# Patient Record
Sex: FEMALE | Race: WHITE | NOT HISPANIC OR LATINO | Employment: OTHER | URBAN - METROPOLITAN AREA
[De-identification: names, ages, dates, MRNs, and addresses within clinical notes are randomized per-mention and may not be internally consistent; named-entity substitution may affect disease eponyms.]

---

## 2020-05-16 ENCOUNTER — HOSPITAL ENCOUNTER (EMERGENCY)
Facility: MEDICAL CENTER | Age: 55
End: 2020-05-16
Attending: EMERGENCY MEDICINE
Payer: MEDICARE

## 2020-05-16 ENCOUNTER — APPOINTMENT (OUTPATIENT)
Dept: RADIOLOGY | Facility: MEDICAL CENTER | Age: 55
DRG: 177 | End: 2020-05-16
Attending: HOSPITALIST
Payer: MEDICARE

## 2020-05-16 ENCOUNTER — APPOINTMENT (OUTPATIENT)
Dept: RADIOLOGY | Facility: MEDICAL CENTER | Age: 55
End: 2020-05-16
Attending: EMERGENCY MEDICINE
Payer: MEDICARE

## 2020-05-16 ENCOUNTER — HOSPITAL ENCOUNTER (INPATIENT)
Facility: MEDICAL CENTER | Age: 55
LOS: 33 days | DRG: 177 | End: 2020-06-18
Attending: HOSPITALIST | Admitting: HOSPITALIST
Payer: MEDICARE

## 2020-05-16 VITALS
DIASTOLIC BLOOD PRESSURE: 67 MMHG | WEIGHT: 150 LBS | OXYGEN SATURATION: 99 % | TEMPERATURE: 97.5 F | RESPIRATION RATE: 18 BRPM | BODY MASS INDEX: 26.58 KG/M2 | HEIGHT: 63 IN | SYSTOLIC BLOOD PRESSURE: 124 MMHG | HEART RATE: 87 BPM

## 2020-05-16 DIAGNOSIS — I95.9 HYPOTENSION, UNSPECIFIED HYPOTENSION TYPE: ICD-10-CM

## 2020-05-16 DIAGNOSIS — G20.A1 PARKINSON'S DISEASE (TREMOR, STIFFNESS, SLOW MOTION, UNSTABLE POSTURE) (HCC): Primary | ICD-10-CM

## 2020-05-16 DIAGNOSIS — U07.1 COVID-19 VIRUS INFECTION: ICD-10-CM

## 2020-05-16 DIAGNOSIS — E86.0 DEHYDRATION: ICD-10-CM

## 2020-05-16 DIAGNOSIS — S92.911A CLOSED NONDISPLACED FRACTURE OF PHALANX OF TOE OF RIGHT FOOT, UNSPECIFIED TOE, INITIAL ENCOUNTER: ICD-10-CM

## 2020-05-16 DIAGNOSIS — M25.559 HIP PAIN: ICD-10-CM

## 2020-05-16 DIAGNOSIS — N17.9 AKI (ACUTE KIDNEY INJURY) (HCC): ICD-10-CM

## 2020-05-16 PROBLEM — T14.8XXA BRUISING: Status: ACTIVE | Noted: 2020-05-16

## 2020-05-16 PROBLEM — E87.20 METABOLIC ACIDOSIS: Status: ACTIVE | Noted: 2020-05-16

## 2020-05-16 LAB
ALBUMIN SERPL BCP-MCNC: 3.6 G/DL (ref 3.2–4.9)
ALBUMIN/GLOB SERPL: 1.4 G/DL
ALP SERPL-CCNC: 69 U/L (ref 30–99)
ALT SERPL-CCNC: 26 U/L (ref 2–50)
ANION GAP SERPL CALC-SCNC: 16 MMOL/L (ref 7–16)
APPEARANCE UR: CLEAR
AST SERPL-CCNC: 74 U/L (ref 12–45)
BASOPHILS # BLD AUTO: 0.2 % (ref 0–1.8)
BASOPHILS # BLD: 0.01 K/UL (ref 0–0.12)
BILIRUB SERPL-MCNC: <0.2 MG/DL (ref 0.1–1.5)
BILIRUB UR QL STRIP.AUTO: NEGATIVE
BUN SERPL-MCNC: 44 MG/DL (ref 8–22)
CALCIUM SERPL-MCNC: 9.2 MG/DL (ref 8.4–10.2)
CHLORIDE SERPL-SCNC: 102 MMOL/L (ref 96–112)
CO2 SERPL-SCNC: 19 MMOL/L (ref 20–33)
COLOR UR: YELLOW
COVID ORDER STATUS COVID19: NORMAL
CREAT SERPL-MCNC: 2.26 MG/DL (ref 0.5–1.4)
EOSINOPHIL # BLD AUTO: 0 K/UL (ref 0–0.51)
EOSINOPHIL NFR BLD: 0 % (ref 0–6.9)
ERYTHROCYTE [DISTWIDTH] IN BLOOD BY AUTOMATED COUNT: 45.8 FL (ref 35.9–50)
GLOBULIN SER CALC-MCNC: 2.6 G/DL (ref 1.9–3.5)
GLUCOSE SERPL-MCNC: 144 MG/DL (ref 65–99)
GLUCOSE UR STRIP.AUTO-MCNC: NEGATIVE MG/DL
HCT VFR BLD AUTO: 37.3 % (ref 37–47)
HGB BLD-MCNC: 12 G/DL (ref 12–16)
IMM GRANULOCYTES # BLD AUTO: 0.03 K/UL (ref 0–0.11)
IMM GRANULOCYTES NFR BLD AUTO: 0.6 % (ref 0–0.9)
KETONES UR STRIP.AUTO-MCNC: 15 MG/DL
LACTATE BLD-SCNC: 1.5 MMOL/L (ref 0.5–2)
LACTATE BLD-SCNC: 3 MMOL/L (ref 0.5–2)
LEUKOCYTE ESTERASE UR QL STRIP.AUTO: NEGATIVE
LYMPHOCYTES # BLD AUTO: 1.39 K/UL (ref 1–4.8)
LYMPHOCYTES NFR BLD: 27 % (ref 22–41)
MCH RBC QN AUTO: 29.2 PG (ref 27–33)
MCHC RBC AUTO-ENTMCNC: 32.2 G/DL (ref 33.6–35)
MCV RBC AUTO: 90.8 FL (ref 81.4–97.8)
MICRO URNS: ABNORMAL
MONOCYTES # BLD AUTO: 0.69 K/UL (ref 0–0.85)
MONOCYTES NFR BLD AUTO: 13.4 % (ref 0–13.4)
NEUTROPHILS # BLD AUTO: 3.02 K/UL (ref 2–7.15)
NEUTROPHILS NFR BLD: 58.8 % (ref 44–72)
NITRITE UR QL STRIP.AUTO: NEGATIVE
NRBC # BLD AUTO: 0 K/UL
NRBC BLD-RTO: 0 /100 WBC
PH UR STRIP.AUTO: 5 [PH] (ref 5–8)
PLATELET # BLD AUTO: 64 K/UL (ref 164–446)
PMV BLD AUTO: 9.4 FL (ref 9–12.9)
POTASSIUM SERPL-SCNC: 4.3 MMOL/L (ref 3.6–5.5)
PROT SERPL-MCNC: 6.2 G/DL (ref 6–8.2)
PROT UR QL STRIP: NEGATIVE MG/DL
RBC # BLD AUTO: 4.11 M/UL (ref 4.2–5.4)
RBC UR QL AUTO: NEGATIVE
SARS-COV-2 RNA RESP QL NAA+PROBE: DETECTED
SODIUM SERPL-SCNC: 137 MMOL/L (ref 135–145)
SP GR UR STRIP.AUTO: 1.02
SPECIMEN SOURCE: ABNORMAL
TROPONIN T SERPL-MCNC: 16 NG/L (ref 6–19)
WBC # BLD AUTO: 5.1 K/UL (ref 4.8–10.8)

## 2020-05-16 PROCEDURE — 84484 ASSAY OF TROPONIN QUANT: CPT

## 2020-05-16 PROCEDURE — 83605 ASSAY OF LACTIC ACID: CPT

## 2020-05-16 PROCEDURE — 85025 COMPLETE CBC W/AUTO DIFF WBC: CPT

## 2020-05-16 PROCEDURE — 73630 X-RAY EXAM OF FOOT: CPT | Mod: RT

## 2020-05-16 PROCEDURE — 71045 X-RAY EXAM CHEST 1 VIEW: CPT

## 2020-05-16 PROCEDURE — A9270 NON-COVERED ITEM OR SERVICE: HCPCS

## 2020-05-16 PROCEDURE — 93005 ELECTROCARDIOGRAM TRACING: CPT | Performed by: EMERGENCY MEDICINE

## 2020-05-16 PROCEDURE — U0004 COV-19 TEST NON-CDC HGH THRU: HCPCS

## 2020-05-16 PROCEDURE — 80053 COMPREHEN METABOLIC PANEL: CPT

## 2020-05-16 PROCEDURE — C9803 HOPD COVID-19 SPEC COLLECT: HCPCS | Performed by: EMERGENCY MEDICINE

## 2020-05-16 PROCEDURE — 700102 HCHG RX REV CODE 250 W/ 637 OVERRIDE(OP)

## 2020-05-16 PROCEDURE — 81003 URINALYSIS AUTO W/O SCOPE: CPT

## 2020-05-16 PROCEDURE — 73130 X-RAY EXAM OF HAND: CPT | Mod: RT

## 2020-05-16 PROCEDURE — 99223 1ST HOSP IP/OBS HIGH 75: CPT | Mod: AI | Performed by: HOSPITALIST

## 2020-05-16 PROCEDURE — 770022 HCHG ROOM/CARE - ICU (200)

## 2020-05-16 PROCEDURE — 700105 HCHG RX REV CODE 258: Performed by: EMERGENCY MEDICINE

## 2020-05-16 PROCEDURE — 99285 EMERGENCY DEPT VISIT HI MDM: CPT

## 2020-05-16 RX ORDER — PROMETHAZINE HYDROCHLORIDE 25 MG/1
12.5-25 SUPPOSITORY RECTAL EVERY 4 HOURS PRN
Status: CANCELLED | OUTPATIENT
Start: 2020-05-16

## 2020-05-16 RX ORDER — SODIUM CHLORIDE 9 MG/ML
INJECTION, SOLUTION INTRAVENOUS CONTINUOUS
Status: DISCONTINUED | OUTPATIENT
Start: 2020-05-16 | End: 2020-05-16 | Stop reason: HOSPADM

## 2020-05-16 RX ORDER — SODIUM CHLORIDE 9 MG/ML
1000 INJECTION, SOLUTION INTRAVENOUS ONCE
Status: COMPLETED | OUTPATIENT
Start: 2020-05-16 | End: 2020-05-16

## 2020-05-16 RX ORDER — SODIUM CHLORIDE 9 MG/ML
INJECTION, SOLUTION INTRAVENOUS CONTINUOUS
Status: DISCONTINUED | OUTPATIENT
Start: 2020-05-16 | End: 2020-05-17

## 2020-05-16 RX ORDER — SODIUM CHLORIDE 9 MG/ML
INJECTION, SOLUTION INTRAVENOUS CONTINUOUS
Status: CANCELLED | OUTPATIENT
Start: 2020-05-16

## 2020-05-16 RX ORDER — ENALAPRILAT 1.25 MG/ML
1.25 INJECTION INTRAVENOUS EVERY 6 HOURS PRN
Status: CANCELLED | OUTPATIENT
Start: 2020-05-16

## 2020-05-16 RX ORDER — ENALAPRILAT 1.25 MG/ML
1.25 INJECTION INTRAVENOUS EVERY 6 HOURS PRN
Status: DISCONTINUED | OUTPATIENT
Start: 2020-05-16 | End: 2020-05-16 | Stop reason: HOSPADM

## 2020-05-16 RX ORDER — ONDANSETRON 2 MG/ML
4 INJECTION INTRAMUSCULAR; INTRAVENOUS EVERY 4 HOURS PRN
Status: DISCONTINUED | OUTPATIENT
Start: 2020-05-16 | End: 2020-06-18 | Stop reason: HOSPADM

## 2020-05-16 RX ORDER — ACETAMINOPHEN 325 MG/1
650 TABLET ORAL EVERY 6 HOURS PRN
Status: DISCONTINUED | OUTPATIENT
Start: 2020-05-16 | End: 2020-05-16 | Stop reason: HOSPADM

## 2020-05-16 RX ORDER — ONDANSETRON 4 MG/1
4 TABLET, ORALLY DISINTEGRATING ORAL EVERY 4 HOURS PRN
Status: CANCELLED | OUTPATIENT
Start: 2020-05-16

## 2020-05-16 RX ORDER — METFORMIN HYDROCHLORIDE 500 MG/1
500 TABLET, EXTENDED RELEASE ORAL DAILY
Status: SHIPPED | COMMUNITY
End: 2020-08-19

## 2020-05-16 RX ORDER — ONDANSETRON 4 MG/1
4 TABLET, ORALLY DISINTEGRATING ORAL EVERY 4 HOURS PRN
Status: DISCONTINUED | OUTPATIENT
Start: 2020-05-16 | End: 2020-06-18 | Stop reason: HOSPADM

## 2020-05-16 RX ORDER — PROMETHAZINE HYDROCHLORIDE 25 MG/1
12.5-25 TABLET ORAL EVERY 4 HOURS PRN
Status: DISCONTINUED | OUTPATIENT
Start: 2020-05-16 | End: 2020-06-18 | Stop reason: HOSPADM

## 2020-05-16 RX ORDER — PROMETHAZINE HYDROCHLORIDE 25 MG/1
12.5-25 SUPPOSITORY RECTAL EVERY 4 HOURS PRN
Status: DISCONTINUED | OUTPATIENT
Start: 2020-05-16 | End: 2020-06-18 | Stop reason: HOSPADM

## 2020-05-16 RX ORDER — ACETAMINOPHEN 325 MG/1
650 TABLET ORAL EVERY 6 HOURS PRN
Status: CANCELLED | OUTPATIENT
Start: 2020-05-16

## 2020-05-16 RX ORDER — ONDANSETRON 4 MG/1
4 TABLET, ORALLY DISINTEGRATING ORAL EVERY 4 HOURS PRN
Status: DISCONTINUED | OUTPATIENT
Start: 2020-05-16 | End: 2020-05-16 | Stop reason: HOSPADM

## 2020-05-16 RX ORDER — PROMETHAZINE HYDROCHLORIDE 25 MG/1
12.5-25 TABLET ORAL EVERY 4 HOURS PRN
Status: DISCONTINUED | OUTPATIENT
Start: 2020-05-16 | End: 2020-05-16 | Stop reason: HOSPADM

## 2020-05-16 RX ORDER — SODIUM CHLORIDE, SODIUM LACTATE, POTASSIUM CHLORIDE, CALCIUM CHLORIDE 600; 310; 30; 20 MG/100ML; MG/100ML; MG/100ML; MG/100ML
2000 INJECTION, SOLUTION INTRAVENOUS CONTINUOUS
Status: DISCONTINUED | OUTPATIENT
Start: 2020-05-16 | End: 2020-05-16 | Stop reason: HOSPADM

## 2020-05-16 RX ORDER — PROCHLORPERAZINE EDISYLATE 5 MG/ML
5-10 INJECTION INTRAMUSCULAR; INTRAVENOUS EVERY 4 HOURS PRN
Status: CANCELLED | OUTPATIENT
Start: 2020-05-16

## 2020-05-16 RX ORDER — ENALAPRILAT 1.25 MG/ML
1.25 INJECTION INTRAVENOUS EVERY 6 HOURS PRN
Status: DISCONTINUED | OUTPATIENT
Start: 2020-05-16 | End: 2020-06-18 | Stop reason: HOSPADM

## 2020-05-16 RX ORDER — SODIUM CHLORIDE, SODIUM LACTATE, POTASSIUM CHLORIDE, CALCIUM CHLORIDE 600; 310; 30; 20 MG/100ML; MG/100ML; MG/100ML; MG/100ML
2000 INJECTION, SOLUTION INTRAVENOUS CONTINUOUS
Status: DISCONTINUED | OUTPATIENT
Start: 2020-05-16 | End: 2020-05-17

## 2020-05-16 RX ORDER — ACETAMINOPHEN 325 MG/1
650 TABLET ORAL EVERY 6 HOURS PRN
Status: DISCONTINUED | OUTPATIENT
Start: 2020-05-16 | End: 2020-06-18 | Stop reason: HOSPADM

## 2020-05-16 RX ORDER — PROCHLORPERAZINE EDISYLATE 5 MG/ML
5-10 INJECTION INTRAMUSCULAR; INTRAVENOUS EVERY 4 HOURS PRN
Status: DISCONTINUED | OUTPATIENT
Start: 2020-05-16 | End: 2020-05-16 | Stop reason: HOSPADM

## 2020-05-16 RX ORDER — QUETIAPINE FUMARATE 100 MG/1
300 TABLET, FILM COATED ORAL
Status: DISCONTINUED | OUTPATIENT
Start: 2020-05-16 | End: 2020-05-16 | Stop reason: HOSPADM

## 2020-05-16 RX ORDER — ONDANSETRON 2 MG/ML
4 INJECTION INTRAMUSCULAR; INTRAVENOUS EVERY 4 HOURS PRN
Status: CANCELLED | OUTPATIENT
Start: 2020-05-16

## 2020-05-16 RX ORDER — QUETIAPINE FUMARATE 100 MG/1
TABLET, FILM COATED ORAL
Status: COMPLETED
Start: 2020-05-16 | End: 2020-05-16

## 2020-05-16 RX ORDER — PROCHLORPERAZINE EDISYLATE 5 MG/ML
5-10 INJECTION INTRAMUSCULAR; INTRAVENOUS EVERY 4 HOURS PRN
Status: DISCONTINUED | OUTPATIENT
Start: 2020-05-16 | End: 2020-06-18 | Stop reason: HOSPADM

## 2020-05-16 RX ORDER — SODIUM CHLORIDE, SODIUM LACTATE, POTASSIUM CHLORIDE, CALCIUM CHLORIDE 600; 310; 30; 20 MG/100ML; MG/100ML; MG/100ML; MG/100ML
2000 INJECTION, SOLUTION INTRAVENOUS CONTINUOUS
Status: CANCELLED | OUTPATIENT
Start: 2020-05-16

## 2020-05-16 RX ORDER — QUETIAPINE FUMARATE 100 MG/1
300 TABLET, FILM COATED ORAL
Status: CANCELLED | OUTPATIENT
Start: 2020-05-16

## 2020-05-16 RX ORDER — ONDANSETRON 2 MG/ML
4 INJECTION INTRAMUSCULAR; INTRAVENOUS EVERY 4 HOURS PRN
Status: DISCONTINUED | OUTPATIENT
Start: 2020-05-16 | End: 2020-05-16 | Stop reason: HOSPADM

## 2020-05-16 RX ORDER — PROMETHAZINE HYDROCHLORIDE 25 MG/1
12.5-25 SUPPOSITORY RECTAL EVERY 4 HOURS PRN
Status: DISCONTINUED | OUTPATIENT
Start: 2020-05-16 | End: 2020-05-16 | Stop reason: HOSPADM

## 2020-05-16 RX ORDER — MELOXICAM 15 MG/1
15 TABLET ORAL DAILY
Status: SHIPPED | COMMUNITY
End: 2020-08-19

## 2020-05-16 RX ORDER — PROMETHAZINE HYDROCHLORIDE 25 MG/1
12.5-25 TABLET ORAL EVERY 4 HOURS PRN
Status: CANCELLED | OUTPATIENT
Start: 2020-05-16

## 2020-05-16 RX ADMIN — QUETIAPINE FUMARATE 300 MG: 100 TABLET, FILM COATED ORAL at 22:22

## 2020-05-16 RX ADMIN — SODIUM CHLORIDE: 9 INJECTION, SOLUTION INTRAVENOUS at 17:02

## 2020-05-16 RX ADMIN — QUETIAPINE FUMARATE 300 MG: 100 TABLET ORAL at 22:22

## 2020-05-16 RX ADMIN — SODIUM CHLORIDE 1000 ML: 9 INJECTION, SOLUTION INTRAVENOUS at 15:38

## 2020-05-16 SDOH — HEALTH STABILITY: MENTAL HEALTH: HOW OFTEN DO YOU HAVE A DRINK CONTAINING ALCOHOL?: NEVER

## 2020-05-16 ASSESSMENT — COGNITIVE AND FUNCTIONAL STATUS - GENERAL
DRESSING REGULAR UPPER BODY CLOTHING: A LITTLE
EATING MEALS: A LITTLE
DAILY ACTIVITIY SCORE: 13
HELP NEEDED FOR BATHING: A LOT
STANDING UP FROM CHAIR USING ARMS: A LOT
MOVING TO AND FROM BED TO CHAIR: A LOT
WALKING IN HOSPITAL ROOM: TOTAL
SUGGESTED CMS G CODE MODIFIER DAILY ACTIVITY: CL
DRESSING REGULAR LOWER BODY CLOTHING: A LOT
TOILETING: TOTAL
PERSONAL GROOMING: A LOT
MOVING FROM LYING ON BACK TO SITTING ON SIDE OF FLAT BED: UNABLE
MOBILITY SCORE: 11
CLIMB 3 TO 5 STEPS WITH RAILING: TOTAL
SUGGESTED CMS G CODE MODIFIER MOBILITY: CL

## 2020-05-16 ASSESSMENT — FIBROSIS 4 INDEX
FIB4 SCORE: 12.47
FIB4 SCORE: 12.47
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
## 2020-05-17 ENCOUNTER — APPOINTMENT (OUTPATIENT)
Dept: RADIOLOGY | Facility: MEDICAL CENTER | Age: 55
DRG: 177 | End: 2020-05-17
Attending: HOSPITALIST
Payer: MEDICARE

## 2020-05-17 PROBLEM — E11.9 DIABETES (HCC): Status: ACTIVE | Noted: 2020-05-17

## 2020-05-17 PROBLEM — D69.6 THROMBOCYTOPENIA (HCC): Status: ACTIVE | Noted: 2020-05-17

## 2020-05-17 PROBLEM — U07.1 COVID-19 VIRUS DETECTED: Status: ACTIVE | Noted: 2020-05-17

## 2020-05-17 PROBLEM — K59.00 CONSTIPATION: Status: ACTIVE | Noted: 2020-05-17

## 2020-05-17 LAB
25(OH)D3 SERPL-MCNC: 44 NG/ML (ref 30–100)
ALBUMIN SERPL BCP-MCNC: 3 G/DL (ref 3.2–4.9)
ALBUMIN/GLOB SERPL: 1.3 G/DL
ALP SERPL-CCNC: 57 U/L (ref 30–99)
ALT SERPL-CCNC: 25 U/L (ref 2–50)
ANION GAP SERPL CALC-SCNC: 12 MMOL/L (ref 7–16)
AST SERPL-CCNC: 91 U/L (ref 12–45)
BASOPHILS # BLD AUTO: 0.2 % (ref 0–1.8)
BASOPHILS # BLD: 0.01 K/UL (ref 0–0.12)
BILIRUB SERPL-MCNC: 0.2 MG/DL (ref 0.1–1.5)
BUN SERPL-MCNC: 31 MG/DL (ref 8–22)
CALCIUM SERPL-MCNC: 8.6 MG/DL (ref 8.5–10.5)
CHLORIDE SERPL-SCNC: 104 MMOL/L (ref 96–112)
CO2 SERPL-SCNC: 21 MMOL/L (ref 20–33)
CREAT SERPL-MCNC: 1.23 MG/DL (ref 0.5–1.4)
CRP SERPL HS-MCNC: 7.05 MG/DL (ref 0–0.75)
D DIMER PPP IA.FEU-MCNC: <0.27 UG/ML (FEU) (ref 0–0.5)
EOSINOPHIL # BLD AUTO: 0 K/UL (ref 0–0.51)
EOSINOPHIL NFR BLD: 0 % (ref 0–6.9)
ERYTHROCYTE [DISTWIDTH] IN BLOOD BY AUTOMATED COUNT: 46.7 FL (ref 35.9–50)
FERRITIN SERPL-MCNC: 269 NG/ML (ref 10–291)
GLOBULIN SER CALC-MCNC: 2.3 G/DL (ref 1.9–3.5)
GLUCOSE BLD-MCNC: 103 MG/DL (ref 65–99)
GLUCOSE BLD-MCNC: 114 MG/DL (ref 65–99)
GLUCOSE BLD-MCNC: 183 MG/DL (ref 65–99)
GLUCOSE SERPL-MCNC: 138 MG/DL (ref 65–99)
HBV CORE AB SERPL QL IA: NONREACTIVE
HBV SURFACE AG SER QL: NORMAL
HCT VFR BLD AUTO: 32.4 % (ref 37–47)
HCV AB SER QL: NORMAL
HGB BLD-MCNC: 10.6 G/DL (ref 12–16)
HIV 1+2 AB+HIV1 P24 AG SERPL QL IA: NORMAL
IMM GRANULOCYTES # BLD AUTO: 0.02 K/UL (ref 0–0.11)
IMM GRANULOCYTES NFR BLD AUTO: 0.3 % (ref 0–0.9)
LDH SERPL L TO P-CCNC: 296 U/L (ref 107–266)
LYMPHOCYTES # BLD AUTO: 0.67 K/UL (ref 1–4.8)
LYMPHOCYTES NFR BLD: 11.4 % (ref 22–41)
MCH RBC QN AUTO: 29.8 PG (ref 27–33)
MCHC RBC AUTO-ENTMCNC: 32.7 G/DL (ref 33.6–35)
MCV RBC AUTO: 91 FL (ref 81.4–97.8)
MONOCYTES # BLD AUTO: 0.91 K/UL (ref 0–0.85)
MONOCYTES NFR BLD AUTO: 15.4 % (ref 0–13.4)
NEUTROPHILS # BLD AUTO: 4.28 K/UL (ref 2–7.15)
NEUTROPHILS NFR BLD: 72.7 % (ref 44–72)
NRBC # BLD AUTO: 0 K/UL
NRBC BLD-RTO: 0 /100 WBC
PLATELET # BLD AUTO: 54 K/UL (ref 164–446)
PMV BLD AUTO: 9.4 FL (ref 9–12.9)
POTASSIUM SERPL-SCNC: 4 MMOL/L (ref 3.6–5.5)
PROCALCITONIN SERPL-MCNC: 0.11 NG/ML
PROT SERPL-MCNC: 5.3 G/DL (ref 6–8.2)
RBC # BLD AUTO: 3.56 M/UL (ref 4.2–5.4)
SODIUM SERPL-SCNC: 137 MMOL/L (ref 135–145)
VALPROATE SERPL-MCNC: 75.8 UG/ML (ref 50–100)
WBC # BLD AUTO: 5.9 K/UL (ref 4.8–10.8)

## 2020-05-17 PROCEDURE — 87340 HEPATITIS B SURFACE AG IA: CPT

## 2020-05-17 PROCEDURE — 84145 PROCALCITONIN (PCT): CPT

## 2020-05-17 PROCEDURE — 87389 HIV-1 AG W/HIV-1&-2 AB AG IA: CPT

## 2020-05-17 PROCEDURE — 700102 HCHG RX REV CODE 250 W/ 637 OVERRIDE(OP): Performed by: HOSPITALIST

## 2020-05-17 PROCEDURE — 83520 IMMUNOASSAY QUANT NOS NONAB: CPT

## 2020-05-17 PROCEDURE — 82962 GLUCOSE BLOOD TEST: CPT

## 2020-05-17 PROCEDURE — 86140 C-REACTIVE PROTEIN: CPT

## 2020-05-17 PROCEDURE — 80053 COMPREHEN METABOLIC PANEL: CPT

## 2020-05-17 PROCEDURE — 99223 1ST HOSP IP/OBS HIGH 75: CPT | Performed by: INTERNAL MEDICINE

## 2020-05-17 PROCEDURE — 99232 SBSQ HOSP IP/OBS MODERATE 35: CPT | Mod: CS | Performed by: HOSPITALIST

## 2020-05-17 PROCEDURE — 80164 ASSAY DIPROPYLACETIC ACD TOT: CPT

## 2020-05-17 PROCEDURE — 86704 HEP B CORE ANTIBODY TOTAL: CPT

## 2020-05-17 PROCEDURE — 83010 ASSAY OF HAPTOGLOBIN QUANT: CPT

## 2020-05-17 PROCEDURE — 86803 HEPATITIS C AB TEST: CPT

## 2020-05-17 PROCEDURE — A9270 NON-COVERED ITEM OR SERVICE: HCPCS | Performed by: HOSPITALIST

## 2020-05-17 PROCEDURE — 85025 COMPLETE CBC W/AUTO DIFF WBC: CPT

## 2020-05-17 PROCEDURE — 700111 HCHG RX REV CODE 636 W/ 250 OVERRIDE (IP): Performed by: HOSPITALIST

## 2020-05-17 PROCEDURE — 82306 VITAMIN D 25 HYDROXY: CPT

## 2020-05-17 PROCEDURE — 700112 HCHG RX REV CODE 229: Performed by: HOSPITALIST

## 2020-05-17 PROCEDURE — 74176 CT ABD & PELVIS W/O CONTRAST: CPT

## 2020-05-17 PROCEDURE — 770021 HCHG ROOM/CARE - ISO PRIVATE

## 2020-05-17 PROCEDURE — 82728 ASSAY OF FERRITIN: CPT

## 2020-05-17 PROCEDURE — 700105 HCHG RX REV CODE 258: Performed by: HOSPITALIST

## 2020-05-17 PROCEDURE — 83615 LACTATE (LD) (LDH) ENZYME: CPT

## 2020-05-17 PROCEDURE — 85379 FIBRIN DEGRADATION QUANT: CPT

## 2020-05-17 PROCEDURE — 302146: Performed by: HOSPITALIST

## 2020-05-17 RX ORDER — QUETIAPINE FUMARATE 300 MG/1
300 TABLET, FILM COATED ORAL
COMMUNITY
End: 2020-08-19

## 2020-05-17 RX ORDER — LACTULOSE 10 G/15ML
300 SOLUTION ORAL 2 TIMES DAILY
Status: DISCONTINUED | OUTPATIENT
Start: 2020-05-17 | End: 2020-05-17

## 2020-05-17 RX ORDER — TOLTERODINE 4 MG/1
4 CAPSULE, EXTENDED RELEASE ORAL
Status: DISCONTINUED | OUTPATIENT
Start: 2020-05-17 | End: 2020-05-17

## 2020-05-17 RX ORDER — DONEPEZIL HYDROCHLORIDE 5 MG/1
10 TABLET, FILM COATED ORAL EVERY EVENING
Status: DISCONTINUED | OUTPATIENT
Start: 2020-05-17 | End: 2020-06-18 | Stop reason: HOSPADM

## 2020-05-17 RX ORDER — LORAZEPAM 2 MG/ML
1 INJECTION INTRAMUSCULAR ONCE
Status: COMPLETED | OUTPATIENT
Start: 2020-05-17 | End: 2020-05-17

## 2020-05-17 RX ORDER — DEXTROSE MONOHYDRATE 25 G/50ML
50 INJECTION, SOLUTION INTRAVENOUS
Status: DISCONTINUED | OUTPATIENT
Start: 2020-05-17 | End: 2020-05-28

## 2020-05-17 RX ORDER — ASCORBIC ACID 500 MG
1000 TABLET ORAL 2 TIMES DAILY
Status: DISCONTINUED | OUTPATIENT
Start: 2020-05-17 | End: 2020-06-08

## 2020-05-17 RX ORDER — ZINC SULFATE 50(220)MG
220 CAPSULE ORAL DAILY
Status: DISCONTINUED | OUTPATIENT
Start: 2020-05-17 | End: 2020-06-18 | Stop reason: HOSPADM

## 2020-05-17 RX ORDER — IBUPROFEN 200 MG
950 CAPSULE ORAL DAILY
Status: DISCONTINUED | OUTPATIENT
Start: 2020-05-17 | End: 2020-06-08

## 2020-05-17 RX ORDER — DOCUSATE SODIUM 100 MG/1
100 CAPSULE, LIQUID FILLED ORAL 2 TIMES DAILY
Status: DISCONTINUED | OUTPATIENT
Start: 2020-05-17 | End: 2020-06-18 | Stop reason: HOSPADM

## 2020-05-17 RX ORDER — MORPHINE SULFATE 4 MG/ML
2 INJECTION, SOLUTION INTRAMUSCULAR; INTRAVENOUS ONCE
Status: ACTIVE | OUTPATIENT
Start: 2020-05-17 | End: 2020-05-18

## 2020-05-17 RX ORDER — HEPARIN SODIUM 5000 [USP'U]/ML
5000 INJECTION, SOLUTION INTRAVENOUS; SUBCUTANEOUS EVERY 8 HOURS
Status: DISCONTINUED | OUTPATIENT
Start: 2020-05-17 | End: 2020-05-17

## 2020-05-17 RX ORDER — LISINOPRIL AND HYDROCHLOROTHIAZIDE 25; 20 MG/1; MG/1
1 TABLET ORAL DAILY
Status: ON HOLD | COMMUNITY
End: 2020-06-17

## 2020-05-17 RX ORDER — OXYBUTYNIN CHLORIDE 10 MG/1
10 TABLET, EXTENDED RELEASE ORAL EVERY EVENING
Status: DISCONTINUED | OUTPATIENT
Start: 2020-05-17 | End: 2020-06-18 | Stop reason: HOSPADM

## 2020-05-17 RX ORDER — DIVALPROEX SODIUM 500 MG/1
500 TABLET, DELAYED RELEASE ORAL
Status: DISCONTINUED | OUTPATIENT
Start: 2020-05-17 | End: 2020-06-18 | Stop reason: HOSPADM

## 2020-05-17 RX ORDER — VITAMIN B COMPLEX
1000 TABLET ORAL DAILY
Status: DISCONTINUED | OUTPATIENT
Start: 2020-05-17 | End: 2020-06-02

## 2020-05-17 RX ORDER — ARIPIPRAZOLE 15 MG/1
30 TABLET ORAL
Status: DISCONTINUED | OUTPATIENT
Start: 2020-05-17 | End: 2020-06-18 | Stop reason: HOSPADM

## 2020-05-17 RX ADMIN — DIVALPROEX SODIUM 500 MG: 500 TABLET, DELAYED RELEASE ORAL at 19:31

## 2020-05-17 RX ADMIN — OXYBUTYNIN CHLORIDE 10 MG: 10 TABLET, EXTENDED RELEASE ORAL at 17:25

## 2020-05-17 RX ADMIN — Medication 1000 MG: at 11:56

## 2020-05-17 RX ADMIN — HEPARIN SODIUM 5000 UNITS: 5000 INJECTION INTRAVENOUS; SUBCUTANEOUS at 01:40

## 2020-05-17 RX ADMIN — MELATONIN 1000 UNITS: at 11:56

## 2020-05-17 RX ADMIN — CALCIUM CITRATE 200 MG (950 MG) TABLET 950 MG: at 12:12

## 2020-05-17 RX ADMIN — SERTRALINE HYDROCHLORIDE 50 MG: 50 TABLET ORAL at 05:51

## 2020-05-17 RX ADMIN — ZINC SULFATE 220 MG (50 MG) CAPSULE 220 MG: CAPSULE at 11:56

## 2020-05-17 RX ADMIN — HEPARIN SODIUM 5000 UNITS: 5000 INJECTION INTRAVENOUS; SUBCUTANEOUS at 09:29

## 2020-05-17 RX ADMIN — Medication 1000 MG: at 17:25

## 2020-05-17 RX ADMIN — QUETIAPINE FUMARATE 300 MG: 200 TABLET ORAL at 19:31

## 2020-05-17 RX ADMIN — DONEPEZIL HYDROCHLORIDE 10 MG: 5 TABLET, FILM COATED ORAL at 17:25

## 2020-05-17 RX ADMIN — DOCUSATE SODIUM 100 MG: 100 CAPSULE, LIQUID FILLED ORAL at 17:25

## 2020-05-17 RX ADMIN — INSULIN HUMAN 1 UNITS: 100 INJECTION, SOLUTION PARENTERAL at 19:49

## 2020-05-17 RX ADMIN — SODIUM CHLORIDE, POTASSIUM CHLORIDE, SODIUM LACTATE AND CALCIUM CHLORIDE 1000 ML: 600; 310; 30; 20 INJECTION, SOLUTION INTRAVENOUS at 00:22

## 2020-05-17 RX ADMIN — Medication 1 PACKET: at 14:47

## 2020-05-17 RX ADMIN — ARIPIPRAZOLE 30 MG: 15 TABLET ORAL at 19:31

## 2020-05-17 RX ADMIN — LORAZEPAM 1 MG: 2 INJECTION INTRAMUSCULAR; INTRAVENOUS at 00:51

## 2020-05-17 RX ADMIN — ACETAMINOPHEN 650 MG: 325 TABLET, FILM COATED ORAL at 00:22

## 2020-05-17 NOTE — PROGRESS NOTES
Patient admitted by my colleague at [facility] and transferred in stable condition.  Please see orders and H&P from my colleague.  Patient seen at bedside.

## 2020-05-17 NOTE — ED NOTES
[facility] guardian    [phone] business hours  [phone] after hours
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
## 2020-05-17 NOTE — CARE PLAN
Problem: Safety  Goal: Will remain free from injury  Outcome: PROGRESSING AS EXPECTED  Goal: Will remain free from falls  Outcome: PROGRESSING AS EXPECTED     Problem: Fluid Volume:  Goal: Will maintain balanced intake and output  Outcome: PROGRESSING AS EXPECTED

## 2020-05-17 NOTE — PROGRESS NOTES
Pt arrived by EMS. Transferred from stretcher to bed with no issues. Pt complaint of pain in specific areas (hip, back, and legs) with bruising noted in multiple areas. (see flow sheet) VSS, cough present. Plans for CT pelvic and IV fluids.

## 2020-05-17 NOTE — ASSESSMENT & PLAN NOTE
With associated pneumonia/respiratory failure  Completed course of remdesevir  The patient has clinically essentially resolved, is on room air,  Patient continues to test positive for COVID by RNA PCR  Significance of repeated positive results from COVID PCR testing is not known, the patient has recovered clinically from COVID-19 infection and the follow up tests are being sent solely for the purpose of allowing her to return to her facility  Last + 6/9; repeat sent 6/13 and pending
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
## 2020-05-17 NOTE — DIETARY
"Nutrition note:  Received consult for TF; however, pt is on a diabetic diet.    Per RN, report from previous facility was that pt was eating fine.  Pt eating lunch now.   RN does not know of any plans for TF.  Feel TF consult was placed in error.   Per nutrition admit screen, pt is \"unsure\" about any recent wt changes but denied poor po PTA.   Please re-consult RD if pt eating poorly.  Otherwise, RD will monitor per dept policy.  "

## 2020-05-17 NOTE — PROGRESS NOTES
Received ED to inpatient transfer request from [facility]   Sending Physician: Brenda      Diagnosis KYLE   Patient Accepted by: Dr. Gutierrez     Patient coming via: EMS  ETA: TBD   Nursing to notify Direct Admit On-Call hospitalist when patient arrives
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
## 2020-05-17 NOTE — PLAN OF CARE (IOPOC)
COVID +ve  Talked with Transfer center for transfer to [facility]    Lactate improving with IVF  Will need clarification of home psych meds with group home tomorrow

## 2020-05-17 NOTE — H&P
This version of the note has been redacted during the course of a chart correction case. If you need access to the original text of this version of the note, please contact the Health Information Management department at [phone].

## 2020-05-17 NOTE — ED NOTES
Pt. Resting on cart in NAD. Pt. Needs addressed at this time, given warm blankets. Caregiver at bedside.
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
## 2020-05-17 NOTE — PROGRESS NOTES
[facility] Medicine Daily Progress Note    Date of Service  5/17/2020    Chief Complaint  54 y.o. female admitted 5/16/2020 with [facility] Course    Patient is a 54 y.o. female with a reported history of mental retardation and schizophrenia who was sent here from group home due to bruising in multiple areas.  Patient was unable to provide history, however there was reportedly a fall the night prior to admission.    She was also noted to have evidence of acute kidney injury on admission with previous laboratories unknown.  She was also found to be positive for COVID 19 in the ER at [facility] so she was transferred here.  Records indicate that she is normally followed by [facility] family medicine.      Interval Problem Update  She is calm and cooperative but communication is minimal  She denies pain  Follows simple commands  Babbles a lot to herself  Appears comfortable  Mild cough  ROS otherwise unobtainable    Consultants/Specialty  Case management    Code Status  Full code    Disposition  tbd    Review of Systems  Review of Systems   Unable to perform ROS: Medical condition        Physical Exam  Temp:  [37.5 °C (99.5 °F)-37.7 °C (99.8 °F)] 37.5 °C (99.5 °F)  Pulse:  [81-93] 91  Resp:  [14-18] 18  BP: (110-112)/(63-71) 112/71  SpO2:  [86 %-96 %] 86 %    Physical Exam  Vitals signs and nursing note reviewed. Exam conducted with a chaperone present.   Constitutional:       General: She is not in acute distress.     Appearance: Normal appearance. She is not diaphoretic.   HENT:      Head: Normocephalic.      Nose: Nose normal.      Mouth/Throat:      Mouth: Mucous membranes are moist.   Eyes:      Pupils: Pupils are equal, round, and reactive to light.   Cardiovascular:      Rate and Rhythm: Normal rate and regular rhythm.      Pulses: Normal pulses.      Heart sounds: Normal heart sounds.   Pulmonary:      Effort: Pulmonary effort is normal.      Breath sounds: Normal breath sounds.   Abdominal:       General: Abdomen is flat. Bowel sounds are normal.      Palpations: Abdomen is soft.   Musculoskeletal: Normal range of motion.         General: No swelling or deformity.   Skin:     General: Skin is warm and dry.      Capillary Refill: Capillary refill takes less than 2 seconds.      Findings: Bruising (mainly lateral right hand and lateral left foot, mild brusing chest , two small lesions right foot, minimal bruising left lateral ankle and left hand) present.   Neurological:      General: No focal deficit present.      Mental Status: She is alert.      Cranial Nerves: No cranial nerve deficit.   Psychiatric:         Mood and Affect: Mood normal.         Speech: She is noncommunicative (minimal communication, does tell me her name, can say yes and no and follows commands).         Behavior: Behavior is cooperative.         Fluids    Intake/Output Summary (Last 24 hours) at 5/17/2020 1545  Last data filed at 5/17/2020 0700  Gross per 24 hour   Intake 597.5 ml   Output 300 ml   Net 297.5 ml       Laboratory  Recent Labs     05/16/20  1523 05/17/20  0400   WBC 5.1 5.9   RBC 4.11* 3.56*   HEMOGLOBIN 12.0 10.6*   HEMATOCRIT 37.3 32.4*   MCV 90.8 91.0   MCH 29.2 29.8   MCHC 32.2* 32.7*   RDW 45.8 46.7   PLATELETCT 64* 54*   MPV 9.4 9.4     Recent Labs     05/16/20  1523 05/17/20  0400   SODIUM 137 137   POTASSIUM 4.3 4.0   CHLORIDE 102 104   CO2 19* 21   GLUCOSE 144* 138*   BUN 44* 31*   CREATININE 2.26* 1.23   CALCIUM 9.2 8.6                   Imaging  CT-ABDOMEN-PELVIS W/O   Final Result         1. No evidence of acute inflammatory change in the abdomen or pelvis.  The study is however limited due to nonuse of intravenous contrast.      2. Airspace opacity in the right lower lobe, in keeping with pneumonia.      3. Hepatic steatosis.      4. Moderate amount of stool throughout the colon.           Assessment/Plan  COVID-19 virus detected  Assessment & Plan  With infiltrated noted RL on CT scan and mild hypoxia  Suspect  COVID is contributing to acute thrombocytopenia  Following  Immune support with Vitamin D, C and zinc  Discussed with ID  Stable on 1 L but down to 85% on RA  ID in process of evaluating her for plasma trial    Constipation  Assessment & Plan  Increase bowel regimen    Diabetes ([facility])  Assessment & Plan  Suspect type 2 chronic  On metformin and ACE  Inhibitor at baseline - holding both due to renal failure  SSI  Diabetic diet  Following    Thrombocytopenia ([facility])  Assessment & Plan  Suspect this is related to COVID  Likely cause of or significantly contributing to the bruising  Baseline labs pending  H/o pending  No evidence of splenomegaly on exam  Query h/o malignancy  No sign of hemolysis on peripheral and bili wnl but will check LDH and haptoglobin, HIV  Will stop heparin  Continue to follow closely, will consider hematology consult if needed      Bruising  Assessment & Plan  Thrombocytopenia likely contributing  History of fall prior to admission, 5th MT fracture found at [facility]  EPS case pending    KYLE (acute kidney injury) ([facility])- (present on admission)  Assessment & Plan  Baseline unknown  Improvement overnight  Will stop IV fluids as she is COVID positive and monitor  Currently euvolemic on exam  Continue to follow  Awaiting medical records and baseline    Moderate intellectual disability- (present on admission)  Assessment & Plan  Per group home at baseline    Schizophrenia ([facility])- (present on admission)  Assessment & Plan  Continue current outpatient psych meds a tolerated  Check valproic acid level       VTE prophylaxis: heparin

## 2020-05-17 NOTE — DISCHARGE PLANNING
Care Transition Team Assessment    Information Source  Orientation : Disoriented to Event, Disoriented to Place, Disoriented to Time  Information Given By: Legal Guardian  Who is responsible for making decisions for patient? : Guardian  Name(s) of Primary Decision Maker: Ann Urias [phone]    Readmission Evaluation  Is this a readmission?: No    Elopement Risk  Legal Hold: No  Ambulatory or Self Mobile in Wheelchair: No-Not an Elopement Risk    Interdisciplinary Discharge Planning  Primary Care Physician: Whitney Graham MD  Lives with - Patient's Self Care Capacity: Attendant / Paid Care Giver(Group home)  Patient or legal guardian wants to designate a caregiver (see row info): Yes  Caregiver name: Ekaterina  Caregiver relationship to patient: (Group home owner)  Caregiver contact info: [phone]  Housing / Facility: Group Home([facility] [place]-[phone])  Name of Care Facility: [facility] [phone]  Do You Take your Prescribed Medications Regularly: Yes  Able to Return to Previous ADL's: Yes  Mobility Issues: No  Prior Services: Continuous (24 Hour) Care Giving Per Service  Patient Expects to be Discharged to:: Group Home  Assistance Needed: Unknown at this Time  Durable Medical Equipment: Not Applicable    Discharge Preparedness  What is your plan after discharge?: [facility]  What are your discharge supports?: Other (comment)  Prior Functional Level: Ambulatory, Needs Assist with Activities of Daily Living, Needs Assist with Medication Management    Functional Assesment  Prior Functional Level: Ambulatory, Needs Assist with Activities of Daily Living, Needs Assist with Medication Management    Finances  Prescription Coverage: Yes     Advance Directive  Advance Directive?: None     Psychological Assessment  History of Psychiatric Problems: Yes    Discharge Risks or Barriers  Discharge risks or barriers?: Complex medical needs  Patient risk factors: Vulnerable adult    Anticipated  Discharge Information  Anticipated discharge disposition: Group home([facility])  Discharge Address: [address] [place] [id number]  Discharge Contact Phone Number: [phone]
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
## 2020-05-18 LAB
ABO GROUP BLD: NORMAL
ALBUMIN SERPL BCP-MCNC: 3.1 G/DL (ref 3.2–4.9)
ALBUMIN/GLOB SERPL: 1.1 G/DL
ALP SERPL-CCNC: 56 U/L (ref 30–99)
ALT SERPL-CCNC: 25 U/L (ref 2–50)
ANION GAP SERPL CALC-SCNC: 12 MMOL/L (ref 7–16)
AST SERPL-CCNC: 69 U/L (ref 12–45)
BASOPHILS # BLD AUTO: 0.2 % (ref 0–1.8)
BASOPHILS # BLD: 0.01 K/UL (ref 0–0.12)
BILIRUB SERPL-MCNC: 0.2 MG/DL (ref 0.1–1.5)
BUN SERPL-MCNC: 23 MG/DL (ref 8–22)
CALCIUM SERPL-MCNC: 8.9 MG/DL (ref 8.5–10.5)
CHLORIDE SERPL-SCNC: 100 MMOL/L (ref 96–112)
CO2 SERPL-SCNC: 24 MMOL/L (ref 20–33)
COVID ORDER STATUS COVID19: NORMAL
CREAT SERPL-MCNC: 0.93 MG/DL (ref 0.5–1.4)
EOSINOPHIL # BLD AUTO: 0 K/UL (ref 0–0.51)
EOSINOPHIL NFR BLD: 0 % (ref 0–6.9)
ERYTHROCYTE [DISTWIDTH] IN BLOOD BY AUTOMATED COUNT: 47.3 FL (ref 35.9–50)
GLOBULIN SER CALC-MCNC: 2.8 G/DL (ref 1.9–3.5)
GLUCOSE BLD-MCNC: 116 MG/DL (ref 65–99)
GLUCOSE BLD-MCNC: 135 MG/DL (ref 65–99)
GLUCOSE BLD-MCNC: 143 MG/DL (ref 65–99)
GLUCOSE BLD-MCNC: 152 MG/DL (ref 65–99)
GLUCOSE SERPL-MCNC: 126 MG/DL (ref 65–99)
HCT VFR BLD AUTO: 34.1 % (ref 37–47)
HGB BLD-MCNC: 11 G/DL (ref 12–16)
IMM GRANULOCYTES # BLD AUTO: 0.01 K/UL (ref 0–0.11)
IMM GRANULOCYTES NFR BLD AUTO: 0.2 % (ref 0–0.9)
LYMPHOCYTES # BLD AUTO: 1.25 K/UL (ref 1–4.8)
LYMPHOCYTES NFR BLD: 26.2 % (ref 22–41)
MCH RBC QN AUTO: 29.5 PG (ref 27–33)
MCHC RBC AUTO-ENTMCNC: 32.3 G/DL (ref 33.6–35)
MCV RBC AUTO: 91.4 FL (ref 81.4–97.8)
MONOCYTES # BLD AUTO: 0.53 K/UL (ref 0–0.85)
MONOCYTES NFR BLD AUTO: 11.1 % (ref 0–13.4)
NEUTROPHILS # BLD AUTO: 2.98 K/UL (ref 2–7.15)
NEUTROPHILS NFR BLD: 62.3 % (ref 44–72)
NRBC # BLD AUTO: 0 K/UL
NRBC BLD-RTO: 0 /100 WBC
PLATELET # BLD AUTO: 62 K/UL (ref 164–446)
PMV BLD AUTO: 8.9 FL (ref 9–12.9)
POTASSIUM SERPL-SCNC: 4 MMOL/L (ref 3.6–5.5)
PROCALCITONIN SERPL-MCNC: 0.11 NG/ML
PROT SERPL-MCNC: 5.9 G/DL (ref 6–8.2)
RBC # BLD AUTO: 3.73 M/UL (ref 4.2–5.4)
RH BLD: NORMAL
SARS-COV-2 RNA RESP QL NAA+PROBE: DETECTED
SODIUM SERPL-SCNC: 136 MMOL/L (ref 135–145)
SPECIMEN SOURCE: ABNORMAL
WBC # BLD AUTO: 4.8 K/UL (ref 4.8–10.8)

## 2020-05-18 PROCEDURE — 84145 PROCALCITONIN (PCT): CPT

## 2020-05-18 PROCEDURE — 80053 COMPREHEN METABOLIC PANEL: CPT

## 2020-05-18 PROCEDURE — U0004 COV-19 TEST NON-CDC HGH THRU: HCPCS

## 2020-05-18 PROCEDURE — 700102 HCHG RX REV CODE 250 W/ 637 OVERRIDE(OP): Performed by: HOSPITALIST

## 2020-05-18 PROCEDURE — 770021 HCHG ROOM/CARE - ISO PRIVATE

## 2020-05-18 PROCEDURE — 85025 COMPLETE CBC W/AUTO DIFF WBC: CPT

## 2020-05-18 PROCEDURE — A9270 NON-COVERED ITEM OR SERVICE: HCPCS | Performed by: HOSPITALIST

## 2020-05-18 PROCEDURE — 82962 GLUCOSE BLOOD TEST: CPT

## 2020-05-18 PROCEDURE — 700105 HCHG RX REV CODE 258: Performed by: HOSPITALIST

## 2020-05-18 PROCEDURE — 86900 BLOOD TYPING SEROLOGIC ABO: CPT

## 2020-05-18 PROCEDURE — 86480 TB TEST CELL IMMUN MEASURE: CPT

## 2020-05-18 PROCEDURE — 99232 SBSQ HOSP IP/OBS MODERATE 35: CPT | Mod: CS | Performed by: HOSPITALIST

## 2020-05-18 PROCEDURE — 82962 GLUCOSE BLOOD TEST: CPT | Mod: 91

## 2020-05-18 PROCEDURE — C9803 HOPD COVID-19 SPEC COLLECT: HCPCS | Performed by: HOSPITALIST

## 2020-05-18 PROCEDURE — 99233 SBSQ HOSP IP/OBS HIGH 50: CPT | Mod: CS | Performed by: INTERNAL MEDICINE

## 2020-05-18 PROCEDURE — 86901 BLOOD TYPING SEROLOGIC RH(D): CPT

## 2020-05-18 PROCEDURE — 700112 HCHG RX REV CODE 229: Performed by: HOSPITALIST

## 2020-05-18 RX ORDER — LACTULOSE 10 G/15ML
300 SOLUTION ORAL ONCE
Status: COMPLETED | OUTPATIENT
Start: 2020-05-18 | End: 2020-05-18

## 2020-05-18 RX ORDER — SODIUM CHLORIDE 9 MG/ML
1000 INJECTION, SOLUTION INTRAVENOUS ONCE
Status: DISCONTINUED | OUTPATIENT
Start: 2020-05-18 | End: 2020-05-18

## 2020-05-18 RX ORDER — SODIUM CHLORIDE 9 MG/ML
500 INJECTION, SOLUTION INTRAVENOUS ONCE
Status: COMPLETED | OUTPATIENT
Start: 2020-05-18 | End: 2020-05-18

## 2020-05-18 RX ADMIN — SERTRALINE HYDROCHLORIDE 50 MG: 50 TABLET ORAL at 05:53

## 2020-05-18 RX ADMIN — CALCIUM CITRATE 200 MG (950 MG) TABLET 950 MG: at 05:49

## 2020-05-18 RX ADMIN — OXYBUTYNIN CHLORIDE 10 MG: 10 TABLET, EXTENDED RELEASE ORAL at 17:18

## 2020-05-18 RX ADMIN — LACTULOSE 300 ML: 10 SOLUTION ORAL at 12:13

## 2020-05-18 RX ADMIN — DIVALPROEX SODIUM 500 MG: 500 TABLET, DELAYED RELEASE ORAL at 21:17

## 2020-05-18 RX ADMIN — ACETAMINOPHEN 650 MG: 325 TABLET, FILM COATED ORAL at 05:51

## 2020-05-18 RX ADMIN — Medication 1000 MG: at 05:49

## 2020-05-18 RX ADMIN — ACETAMINOPHEN 650 MG: 325 TABLET, FILM COATED ORAL at 17:23

## 2020-05-18 RX ADMIN — SODIUM CHLORIDE 500 ML: 9 INJECTION, SOLUTION INTRAVENOUS at 17:26

## 2020-05-18 RX ADMIN — ZINC SULFATE 220 MG (50 MG) CAPSULE 220 MG: CAPSULE at 05:30

## 2020-05-18 RX ADMIN — INSULIN HUMAN 1 UNITS: 100 INJECTION, SOLUTION PARENTERAL at 17:17

## 2020-05-18 RX ADMIN — DONEPEZIL HYDROCHLORIDE 10 MG: 5 TABLET, FILM COATED ORAL at 17:18

## 2020-05-18 RX ADMIN — Medication 1000 MG: at 17:18

## 2020-05-18 RX ADMIN — QUETIAPINE FUMARATE 300 MG: 200 TABLET ORAL at 21:16

## 2020-05-18 RX ADMIN — DOCUSATE SODIUM 100 MG: 100 CAPSULE, LIQUID FILLED ORAL at 05:49

## 2020-05-18 RX ADMIN — ARIPIPRAZOLE 30 MG: 15 TABLET ORAL at 21:16

## 2020-05-18 RX ADMIN — Medication 1 PACKET: at 05:30

## 2020-05-18 RX ADMIN — MELATONIN 1000 UNITS: at 05:53

## 2020-05-18 ASSESSMENT — FIBROSIS 4 INDEX: FIB4 SCORE: 12.24

## 2020-05-18 NOTE — CARE PLAN
Problem: Infection  Goal: Will remain free from infection  Outcome: PROGRESSING AS EXPECTED     Problem: Fluid Volume:  Goal: Will maintain balanced intake and output  Outcome: PROGRESSING AS EXPECTED

## 2020-05-18 NOTE — PROGRESS NOTES
Spoke with Ann Urias, the patients Legal Guardian to Obtain Medical Records,  her direct line [phone].     Primary care is Dr. Whitney Graham with [facility] on [address] [phone]. Records will be faxed over to [facility].     Updated Dr. Lima and bedside ROD Alexandra.

## 2020-05-18 NOTE — DISCHARGE PLANNING
Anticipated Discharge Disposition: [facility]    Action: APS referral form completed and emailed to [email or web address]  Per chart review, pt with multiple bruising and 5th MT fx of Rt foot; may have had fall night prior to admission; thrombocytopenia; covid + 5/16  Hx of intellectual disablity  Left VM with stevenangel Ann  1530: received call from raul Juarez. Informed Ann of APS referral. Ann's email: [email or web address]  Per Ann Juarez has received approval for remdesivir and medical team may administer remdesivir if needed but Ann still looking for medical literature to support use of convalescent plasma before authorizing approval. TT Dr. Hogan who forwarded message to Dr. Yahaira James who's covering today      Barriers to Discharge:   covid + 5/16  Open APS referral    Plan: return to  when covid negative/cleared by APS

## 2020-05-18 NOTE — DISCHARGE PLANNING
Request from ER on Saturday to evaluate Dr Concerns of Injuries in light of autism and group home setting. Received on Monday. Please review [facility] ER MD notes for concerns. Referral to APS not completed as yet.Referred to Martina MACIAS for possible APS referral and further evaluation.

## 2020-05-18 NOTE — CARE PLAN
Problem: Safety  Goal: Will remain free from injury  Outcome: PROGRESSING AS EXPECTED  Intervention: Provide assistance with mobility  Flowsheets  Taken 5/18/2020 1128  Assistance: Assistance of Two or More  Taken 5/18/2020 1100  Ambulation Tolerance: Tolerates Poorly     Problem: Skin Integrity  Goal: Risk for impaired skin integrity will decrease  Outcome: PROGRESSING AS EXPECTED  Intervention: Implement precautions to protect skin integrity in collaboration with the interdisciplinary team  Flowsheets  Taken 5/18/2020 1128  Skin Preventative Measures:   Pillows in Use to Float Heels   Pillows in Use for Support / Positioning   Silicone Oxygen Tubing in Use  Protocols: Pressure Ulcer Prevention / Intervention Protocol in Place  Patient is Receiving Nutrition: Oral Intake Adequate  Vitamin Therapy in Use: No  Activity: (Stood at bedside 1 minute) Other (comment)  Taken 5/18/2020 0900  Friction Interventions: Draw Sheet / Pad Used for Repositioning  PT / OT Involved in Care: Order has been Placed  Taken 5/18/2020 1100  Patient Turns / Repositioning:   Right Side   Reposition - RUE   Reposition - LUE  Assistance / Tolerance for Turning/Repositioning:   Assistance of Two or More   Tolerates Well

## 2020-05-18 NOTE — DISCHARGE PLANNING
Please notes in Chart for [facility] admission. Email for evaluation of injuries in light of autism in group home setting sent Saturday, received Monday. Forwarded to [facility] for further eval and needs.

## 2020-05-18 NOTE — PROGRESS NOTES
Hospital Medicine Daily Progress Note    Date of Service  5/18/2020    Chief Complaint  54 y.o. female admitted 5/16/2020 with [facility]    Patient is a 54 y.o. female with a reported history of mental retardation and schizophrenia who was sent here from group home due to bruising in multiple areas.  Patient was unable to provide history, however there was reportedly a fall the night prior to admission.    She was also noted to have evidence of acute kidney injury on admission with previous laboratories unknown.  She was also found to be positive for COVID 19 in the ER at [facility] so she was transferred here.  Records indicate that she is normally followed by [facility] family medicine.      Interval Problem Update  Mild hypotension this am, denies dizziness will check orthostatics  Constipation has resolved  She remains axox1  Following simple commands  Denies pain  No cough, denies sob  ROS otherwise negative    Consultants/Specialty  Case management    Code Status  Full code    Disposition  tbd    Review of Systems  Review of Systems   Unable to perform ROS: Medical condition        Physical Exam  Temp:  [37.2 °C (99 °F)-37.8 °C (100 °F)] 37.7 °C (99.9 °F)  Pulse:  [] 89  Resp:  [16-19] 17  BP: (109-127)/(60-72) 109/70  SpO2:  [86 %-94 %] 94 %    Physical Exam  Vitals signs and nursing note reviewed. Exam conducted with a chaperone present.   Constitutional:       General: She is not in acute distress.     Appearance: Normal appearance. She is not diaphoretic.   HENT:      Head: Normocephalic.      Nose: Nose normal.      Mouth/Throat:      Mouth: Mucous membranes are moist.   Eyes:      Pupils: Pupils are equal, round, and reactive to light.   Cardiovascular:      Rate and Rhythm: Normal rate and regular rhythm.      Pulses: Normal pulses.      Heart sounds: Normal heart sounds.   Pulmonary:      Effort: Pulmonary effort is normal.      Breath sounds: Normal breath sounds.    Abdominal:      General: Abdomen is flat. Bowel sounds are normal.      Palpations: Abdomen is soft.   Musculoskeletal: Normal range of motion.         General: No swelling or deformity.   Skin:     General: Skin is warm and dry.      Capillary Refill: Capillary refill takes less than 2 seconds.      Findings: Bruising (mainly lateral right hand and lateral left foot, mild brusing chest , two small lesions right foot, minimal bruising left lateral ankle and left hand) present.      Comments: Bruising stable  Right 5th MT bryce taped   Neurological:      General: No focal deficit present.      Mental Status: She is alert.      Cranial Nerves: No cranial nerve deficit.   Psychiatric:         Mood and Affect: Mood normal.         Speech: She is noncommunicative (minimal communication, does tell me her name, can say yes and no and follows commands).         Behavior: Behavior is cooperative.         Fluids    Intake/Output Summary (Last 24 hours) at 5/18/2020 0838  Last data filed at 5/18/2020 0500  Gross per 24 hour   Intake 1140 ml   Output 900 ml   Net 240 ml       Laboratory  Recent Labs     05/16/20  1523 05/17/20  0400 05/18/20  0554   WBC 5.1 5.9 4.8   RBC 4.11* 3.56* 3.73*   HEMOGLOBIN 12.0 10.6* 11.0*   HEMATOCRIT 37.3 32.4* 34.1*   MCV 90.8 91.0 91.4   MCH 29.2 29.8 29.5   MCHC 32.2* 32.7* 32.3*   RDW 45.8 46.7 47.3   PLATELETCT 64* 54* 62*   MPV 9.4 9.4 8.9*     Recent Labs     05/16/20  1523 05/17/20  0400 05/18/20  0554   SODIUM 137 137 136   POTASSIUM 4.3 4.0 4.0   CHLORIDE 102 104 100   CO2 19* 21 24   GLUCOSE 144* 138* 126*   BUN 44* 31* 23*   CREATININE 2.26* 1.23 0.93   CALCIUM 9.2 8.6 8.9                   Imaging  CT-ABDOMEN-PELVIS W/O   Final Result         1. No evidence of acute inflammatory change in the abdomen or pelvis.  The study is however limited due to nonuse of intravenous contrast.      2. Airspace opacity in the right lower lobe, in keeping with pneumonia.      3. Hepatic steatosis.       4. Moderate amount of stool throughout the colon.           Assessment/Plan  COVID-19 virus detected  Assessment & Plan  With infiltrated noted RL on CT scan and mild hypoxia  Positive COVID test 5/16, will recheck today  Suspect COVID is contributing to acute thrombocytopenia  Following  Immune support with Vitamin D, C and zinc  Discussed with ID  Stable on 1 L but down to 85% on RA  ID in process of evaluating her for plasma trial    Constipation  Assessment & Plan  Increase bowel regimen    Diabetes (HCC)  Assessment & Plan  Suspect type 2 chronic  On metformin and ACE  Inhibitor at baseline - holding both due to renal failure  SSI  Diabetic diet  Continue to follow    Thrombocytopenia (HCC)  Assessment & Plan  Suspect this is related to COVID  Likely cause of or significantly contributing to the bruising  Baseline labs still pending  No evidence of splenomegaly on exam or CT  No sign of hemolysis on peripheral and bili wnl   Haptoglobin still pending, LDH minimally elevated  HIV and hepatitis negative  Some improvement today  Continue to follow closely, will consider hematology consult if needed      Bruising  Assessment & Plan  Likely from trauma, reported fall prior to admission  Thrombocytopenia likely contributing  5th MT fracture found at [facility] R foot  EPS case pending    KYLE (acute kidney injury) (HCC)- (present on admission)  Assessment & Plan  Resolved  Renal function now in normal range and continued to improve off of IVF  Continue to follow  Continue to hold chronic ace at this time    Moderate intellectual disability- (present on admission)  Assessment & Plan  Per group home at baseline    Schizophrenia (HCC)- (present on admission)  Assessment & Plan  Continue current outpatient psych meds a tolerated  valproic acid level pending       VTE prophylaxis: heparin

## 2020-05-18 NOTE — PROGRESS NOTES
Report received from ROD Carlin. All cares taken over at this time by this RN. Patient sleeping soundly. Bed alarm on, call light within reach.

## 2020-05-19 LAB
ALBUMIN SERPL BCP-MCNC: 3.2 G/DL (ref 3.2–4.9)
ALBUMIN/GLOB SERPL: 1 G/DL
ALP SERPL-CCNC: 69 U/L (ref 30–99)
ALT SERPL-CCNC: 34 U/L (ref 2–50)
ANION GAP SERPL CALC-SCNC: 12 MMOL/L (ref 7–16)
AST SERPL-CCNC: 75 U/L (ref 12–45)
BASOPHILS # BLD AUTO: 0.2 % (ref 0–1.8)
BASOPHILS # BLD: 0.01 K/UL (ref 0–0.12)
BILIRUB SERPL-MCNC: 0.2 MG/DL (ref 0.1–1.5)
BUN SERPL-MCNC: 23 MG/DL (ref 8–22)
CALCIUM SERPL-MCNC: 8.9 MG/DL (ref 8.5–10.5)
CHLORIDE SERPL-SCNC: 100 MMOL/L (ref 96–112)
CO2 SERPL-SCNC: 23 MMOL/L (ref 20–33)
COMMENT 1642: NORMAL
CREAT SERPL-MCNC: 0.95 MG/DL (ref 0.5–1.4)
D DIMER PPP IA.FEU-MCNC: 0.69 UG/ML (FEU) (ref 0–0.5)
EOSINOPHIL # BLD AUTO: 0 K/UL (ref 0–0.51)
EOSINOPHIL NFR BLD: 0 % (ref 0–6.9)
ERYTHROCYTE [DISTWIDTH] IN BLOOD BY AUTOMATED COUNT: 47.7 FL (ref 35.9–50)
GLOBULIN SER CALC-MCNC: 3.1 G/DL (ref 1.9–3.5)
GLUCOSE BLD-MCNC: 133 MG/DL (ref 65–99)
GLUCOSE BLD-MCNC: 139 MG/DL (ref 65–99)
GLUCOSE BLD-MCNC: 141 MG/DL (ref 65–99)
GLUCOSE BLD-MCNC: 162 MG/DL (ref 65–99)
GLUCOSE SERPL-MCNC: 139 MG/DL (ref 65–99)
HAPTOGLOB SERPL-MCNC: 121 MG/DL (ref 30–200)
HCT VFR BLD AUTO: 33.9 % (ref 37–47)
HGB BLD-MCNC: 10.8 G/DL (ref 12–16)
IL6 SERPL-MCNC: 3.6 PG/ML
IMM GRANULOCYTES # BLD AUTO: 0.05 K/UL (ref 0–0.11)
IMM GRANULOCYTES NFR BLD AUTO: 1.2 % (ref 0–0.9)
LYMPHOCYTES # BLD AUTO: 0.6 K/UL (ref 1–4.8)
LYMPHOCYTES NFR BLD: 14.6 % (ref 22–41)
MCH RBC QN AUTO: 29.5 PG (ref 27–33)
MCHC RBC AUTO-ENTMCNC: 31.9 G/DL (ref 33.6–35)
MCV RBC AUTO: 92.6 FL (ref 81.4–97.8)
MONOCYTES # BLD AUTO: 0.57 K/UL (ref 0–0.85)
MONOCYTES NFR BLD AUTO: 13.8 % (ref 0–13.4)
MORPHOLOGY BLD-IMP: NORMAL
NEUTROPHILS # BLD AUTO: 2.89 K/UL (ref 2–7.15)
NEUTROPHILS NFR BLD: 70.2 % (ref 44–72)
NRBC # BLD AUTO: 0 K/UL
NRBC BLD-RTO: 0 /100 WBC
PLATELET # BLD AUTO: 55 K/UL (ref 164–446)
PLATELET BLD QL SMEAR: NORMAL
PMV BLD AUTO: 9.5 FL (ref 9–12.9)
POTASSIUM SERPL-SCNC: 4 MMOL/L (ref 3.6–5.5)
PROT SERPL-MCNC: 6.3 G/DL (ref 6–8.2)
RBC # BLD AUTO: 3.66 M/UL (ref 4.2–5.4)
RBC BLD AUTO: NORMAL
SODIUM SERPL-SCNC: 135 MMOL/L (ref 135–145)
WBC # BLD AUTO: 4.1 K/UL (ref 4.8–10.8)

## 2020-05-19 PROCEDURE — 97166 OT EVAL MOD COMPLEX 45 MIN: CPT

## 2020-05-19 PROCEDURE — 85379 FIBRIN DEGRADATION QUANT: CPT

## 2020-05-19 PROCEDURE — 80053 COMPREHEN METABOLIC PANEL: CPT

## 2020-05-19 PROCEDURE — A9270 NON-COVERED ITEM OR SERVICE: HCPCS | Performed by: HOSPITALIST

## 2020-05-19 PROCEDURE — 87040 BLOOD CULTURE FOR BACTERIA: CPT

## 2020-05-19 PROCEDURE — 97161 PT EVAL LOW COMPLEX 20 MIN: CPT

## 2020-05-19 PROCEDURE — 82962 GLUCOSE BLOOD TEST: CPT | Mod: 91

## 2020-05-19 PROCEDURE — 85025 COMPLETE CBC W/AUTO DIFF WBC: CPT

## 2020-05-19 PROCEDURE — 700112 HCHG RX REV CODE 229: Performed by: HOSPITALIST

## 2020-05-19 PROCEDURE — 99232 SBSQ HOSP IP/OBS MODERATE 35: CPT | Mod: CS | Performed by: INTERNAL MEDICINE

## 2020-05-19 PROCEDURE — 99233 SBSQ HOSP IP/OBS HIGH 50: CPT | Mod: CS | Performed by: INTERNAL MEDICINE

## 2020-05-19 PROCEDURE — 700102 HCHG RX REV CODE 250 W/ 637 OVERRIDE(OP): Performed by: HOSPITALIST

## 2020-05-19 PROCEDURE — 770021 HCHG ROOM/CARE - ISO PRIVATE

## 2020-05-19 RX ADMIN — ZINC SULFATE 220 MG (50 MG) CAPSULE 220 MG: CAPSULE at 05:25

## 2020-05-19 RX ADMIN — OXYBUTYNIN CHLORIDE 10 MG: 10 TABLET, EXTENDED RELEASE ORAL at 17:30

## 2020-05-19 RX ADMIN — DIVALPROEX SODIUM 500 MG: 500 TABLET, DELAYED RELEASE ORAL at 21:20

## 2020-05-19 RX ADMIN — Medication 1000 MG: at 17:27

## 2020-05-19 RX ADMIN — DOCUSATE SODIUM 100 MG: 100 CAPSULE, LIQUID FILLED ORAL at 05:25

## 2020-05-19 RX ADMIN — QUETIAPINE FUMARATE 300 MG: 200 TABLET ORAL at 21:20

## 2020-05-19 RX ADMIN — DOCUSATE SODIUM 100 MG: 100 CAPSULE, LIQUID FILLED ORAL at 17:27

## 2020-05-19 RX ADMIN — CALCIUM CITRATE 200 MG (950 MG) TABLET 950 MG: at 05:26

## 2020-05-19 RX ADMIN — DONEPEZIL HYDROCHLORIDE 10 MG: 5 TABLET, FILM COATED ORAL at 17:30

## 2020-05-19 RX ADMIN — ARIPIPRAZOLE 30 MG: 15 TABLET ORAL at 21:20

## 2020-05-19 RX ADMIN — SERTRALINE HYDROCHLORIDE 50 MG: 50 TABLET ORAL at 05:25

## 2020-05-19 RX ADMIN — MELATONIN 1000 UNITS: at 05:25

## 2020-05-19 RX ADMIN — INSULIN HUMAN 1 UNITS: 100 INJECTION, SOLUTION PARENTERAL at 21:16

## 2020-05-19 RX ADMIN — Medication 1 PACKET: at 06:00

## 2020-05-19 ASSESSMENT — COGNITIVE AND FUNCTIONAL STATUS - GENERAL
MOBILITY SCORE: 8
MOVING TO AND FROM BED TO CHAIR: UNABLE
DRESSING REGULAR LOWER BODY CLOTHING: A LOT
SUGGESTED CMS G CODE MODIFIER DAILY ACTIVITY: CK
TURNING FROM BACK TO SIDE WHILE IN FLAT BAD: UNABLE
DAILY ACTIVITIY SCORE: 14
MOVING FROM LYING ON BACK TO SITTING ON SIDE OF FLAT BED: UNABLE
HELP NEEDED FOR BATHING: A LOT
STANDING UP FROM CHAIR USING ARMS: A LOT
SUGGESTED CMS G CODE MODIFIER MOBILITY: CM
TOILETING: A LOT
DRESSING REGULAR UPPER BODY CLOTHING: A LOT
CLIMB 3 TO 5 STEPS WITH RAILING: TOTAL
PERSONAL GROOMING: A LITTLE
WALKING IN HOSPITAL ROOM: A LOT
EATING MEALS: A LITTLE

## 2020-05-19 ASSESSMENT — GAIT ASSESSMENTS: GAIT LEVEL OF ASSIST: UNABLE TO PARTICIPATE

## 2020-05-19 NOTE — THERAPY
Physical Therapy   Initial Evaluation     Patient Name: Erlinda Verde  Age:  54 y.o., Sex:  female  Medical Record #: [id number]  Today's Date: 5/19/2020     Precautions  Precautions: Fall Risk  Comments: Hx schizophrenia, R 5th MT fx    Assessment  Patient is 54 y.o. female admitted following suspected fall with COVID-19. Pt with hx of schizophrenia and developmental delay. Pt seen for PT evaluation. Was limited by baseline behaviors and impaired cognition. Suspect pt is at her functional baseline, demonstrated ability to move in bed but ultimately required max A for functional mobility/transfers d/t impaired new learning and attention. Anticipate pt able to return to group home with 24/7 assist. Patient will not be actively followed for physical therapy services at this time, however may be seen if requested by physician for 1 more visit within 30 days to address any discharge or equipment needs.    Plan  Recommend Physical Therapy for Evaluation only   Discharge recommendations: Anticipate that the patient will have no further physical therapy needs after discharge from the hospital.       05/19/20 1134   Prior Living Situation   Prior Services Continuous (24 Hour) Care Giving Per Service   Housing / Facility Group Home   Lives with - Patient's Self Care Capacity Attendant / Paid Care Giver   Comments unknown true PLOF though pt has 24/7 assist and suspect is at her baseline   Prior Level of Functional Mobility   Comments as above   Balance Assessment   Sitting Balance (Static) Trace +   Sitting Balance (Dynamic) Trace   Standing Balance (Static) Trace +   Standing Balance (Dynamic) Trace   Weight Shift Sitting Poor   Weight Shift Standing Poor   Comments strong posterior lean sitting EOB with very minimal righting reactions. pt resisting therapist cueing and is easily distracted. B knee flexion when standing with assist x2.    Gait Analysis   Gait Level Of Assist Unable to Participate   Bed Mobility    Supine to  Sit Maximal Assist  (d/t behaviors/cognition)   Sit to Supine Maximal Assist   Scooting Maximal Assist   Functional Mobility   Sit to Stand Maximal Assist   Bed, Chair, Wheelchair Transfer Unable to Participate   Anticipated Discharge Equipment   DC Equipment None

## 2020-05-19 NOTE — THERAPY
Occupational Therapy   Initial Evaluation     Patient Name: Erlinda Verde  Age:  54 y.o., Sex:  female  Medical Record #: [id number]  Today's Date: 5/19/2020     Precautions  Precautions: Fall Risk  Comments: Hx of developmental delay vs autism, varies in chart    Subjective    Ouch, my back!     Objective       05/19/20 1139   Prior Living Situation   Prior Services Continuous (24 Hour) Care Giving Per Service   Housing / Facility Group Home   Comments Pt resides at [facility] group home, receives assistance with ADL and IADL per SW note   Prior Level of ADL Function   Comments Pt unable to state, requires assistance per SW note   Prior Level of IADL Function   Comments Per SW note, requires assistance   Precautions   Precautions Fall Risk   Comments Hx of developmental delay vs autism, varies in chart   Cognition    Cognition / Consciousness X   Speech/ Communication Delayed Responses  (Limited vocabulary )   Level of Consciousness Confused   Ability To Follow Commands 1 Step   New Learning Impaired   Attention Impaired   Initiation Impaired   Comments Delayed response, poor attention, somewhat limited behaviorally, possibly fearful   Balance Assessment   Sitting Balance (Static) Trace +   Sitting Balance (Dynamic) Trace   Standing Balance (Static) Trace +   Standing Balance (Dynamic) Trace   Weight Shift Sitting Poor   Weight Shift Standing Absent   Comments Stong posterior lean in sitting, does not fully extend knees in standing   Bed Mobility    Supine to Sit Maximal Assist  (x2)   Sit to Supine Maximal Assist   ADL Assessment   Grooming Seated;Supervision  (Not on command)   Lower Body Dressing Maximal Assist   Functional Mobility   Sit to Stand Maximal Assist   Mobility sup><sit, STS   Comments Unable to fully come upright       Assessment  Patient is 54 y.o. female with a diagnosis of bruising 2/2 GLF with 5th metatarsal fx.  Additional factors influencing patient status / progress: schizophrenia an  developmental delay. Patient is difficult to engage in participation, appears fearful of standing. Pt with several cog deficits, notably speaking to people not in the room during eval. Per SW note, patient is in a group home and receives assistance with ADLs. Likely at functional baseline.       Plan    Recommend Occupational Therapy for Evaluation only.    Discharge recommendations:  DC back to group home.

## 2020-05-19 NOTE — PROGRESS NOTES
Infectious Disease Progress Note    Author: Yahaira James M.D. Date & Time of service: 2020  7:14 PM    Chief Complaint:  COVID-19 pneumonia     Interval History:    Review of Systems:  Review of Systems   Unable to perform ROS: Mental acuity       Hemodynamics:  Temp (24hrs), Av.8 °C (100 °F), Min:37.1 °C (98.7 °F), Max:38.8 °C (101.9 °F)  Temperature: 38 °C (100.4 °F)  Pulse  Av.5  Min: 76  Max: 121   Blood Pressure: 132/72       Physical Exam:  Physical Exam  Constitutional:       Appearance: Normal appearance. She is not ill-appearing.   Cardiovascular:      Rate and Rhythm: Regular rhythm. Tachycardia present.   Pulmonary:      Effort: Pulmonary effort is normal.      Comments: Crackles   Abdominal:      General: Abdomen is flat.   Skin:     General: Skin is warm.      Findings: Bruising present.   Neurological:      Mental Status: She is alert.      Comments: Minimally responsive, moving all extermities   Psychiatric:         Mood and Affect: Mood normal.         Behavior: Behavior normal.         Meds:    Current Facility-Administered Medications:   •  zinc sulfate  •  ascorbic acid  •  vitamin D  •  insulin regular **AND** POC Blood Glucose **AND** NOTIFY MD and PharmD **AND** glucose **AND** dextrose 50%  •  ARIPiprazole  •  divalproex  •  docusate sodium  •  donepezil  •  calcium citrate  •  oxybutynin SR  •  psyllium  •  enalaprilat  •  ondansetron  •  ondansetron  •  prochlorperazine  •  promethazine  •  promethazine  •  acetaminophen  •  QUEtiapine  •  sertraline    Labs:  Recent Labs     20  1523 20  0400 20  0554   WBC 5.1 5.9 4.8   RBC 4.11* 3.56* 3.73*   HEMOGLOBIN 12.0 10.6* 11.0*   HEMATOCRIT 37.3 32.4* 34.1*   MCV 90.8 91.0 91.4   MCH 29.2 29.8 29.5   RDW 45.8 46.7 47.3   PLATELETCT 64* 54* 62*   MPV 9.4 9.4 8.9*   NEUTSPOLYS 58.80 72.70* 62.30   LYMPHOCYTES 27.00 11.40* 26.20   MONOCYTES 13.40 15.40* 11.10   EOSINOPHILS 0.00 0.00 0.00   BASOPHILS 0.20 0.20  0.20     Recent Labs     05/16/20  1523 05/17/20  0400 05/18/20  0554   SODIUM 137 137 136   POTASSIUM 4.3 4.0 4.0   CHLORIDE 102 104 100   CO2 19* 21 24   GLUCOSE 144* 138* 126*   BUN 44* 31* 23*     Recent Labs     05/16/20  1523 05/17/20  0400 05/18/20  0554   ALBUMIN 3.6 3.0* 3.1*   TBILIRUBIN <0.2 0.2 0.2   ALKPHOSPHAT 69 57 56   TOTPROTEIN 6.2 5.3* 5.9*   ALTSGPT 26 25 25   ASTSGOT 74* 91* 69*   CREATININE 2.26* 1.23 0.93       Imaging:  Ct-abdomen-pelvis W/o    Result Date: 5/17/2020 5/17/2020 12:28 AM HISTORY/REASON FOR EXAM:  abd pain ? Trauma; IV contrast only POSITIVE FOR COVID-19 TESTING TECHNIQUE/EXAM DESCRIPTION: CT scan of the abdomen and pelvis without contrast. Noncontrast helical scanning was obtained from the diaphragmatic domes through the pubic symphysis. Low dose optimization technique was utilized for this CT exam including automated exposure control and adjustment of the mA and/or kV according to patient size. COMPARISON: None. FINDINGS: Lung Bases: Airspace opacity in the right lower lobe Abdomen: Within the limits of noncontrast technique, the spleen, pancreas, kidneys and adrenal glands are unremarkable in appearance. Hepatic steatosis. The gallbladder is unremarkable and there is no biliary dilatation. There is no bowel wall thickening or abnormal dilatation. The abdominal aorta is normal in caliber. Moderate amount of stool throughout the colon. Pelvis: No obvious abnormality seen in the pelvic structures but evaluation limited on CT. No lymph node enlargement. No free fluid, or free air in the abdomen or pelvis. No aggressive bone lesions are seen. ________________________________    1. No evidence of acute inflammatory change in the abdomen or pelvis.  The study is however limited due to nonuse of intravenous contrast. 2. Airspace opacity in the right lower lobe, in keeping with pneumonia. 3. Hepatic steatosis. 4. Moderate amount of stool throughout the colon.    Dx-chest-portable (1  View)    Result Date: 5/16/2020 5/16/2020 3:16 PM HISTORY/REASON FOR EXAM:  Chest Pain. Hypotension, recent fall. TECHNIQUE/EXAM DESCRIPTION AND NUMBER OF VIEWS: Single portable view of the chest. COMPARISON: None FINDINGS: Cardiomediastinal contour is within normal limits. Lungs show hypoinflation. Ill-defined increased opacity at both lung bases. No pleural fluid collection or pneumothorax. No major bony abnormality is seen.     Hypoinflation and mild bibasilar atelectasis.  Developing pneumonia is difficult to exclude.    Dx-foot-complete 3+ Right    Result Date: 5/16/2020 5/16/2020 3:16 PM HISTORY/REASON FOR EXAM:  Right foot pain and bruising after fall last evening. TECHNIQUE/EXAM DESCRIPTION AND NUMBER OF VIEWS: 3 views of the RIGHT foot. COMPARISON:  None. FINDINGS: There is an acute oblique nondisplaced fracture of the diaphysis/distal metaphysis of the proximal phalanx of the right 5th toe. No articular communication is present. No dislocation is present. No other fracture is identified.     1.  Acute oblique nondisplaced fracture of the diaphysis/distal metaphysis of the proximal phalanx of the right 5th toe. 2.  No dislocation.    Dx-hand 3+ Right    Result Date: 5/16/2020 5/16/2020 3:16 PM HISTORY/REASON FOR EXAM: Right hand pain after fall last evening with TECHNIQUE/EXAM DESCRIPTION AND NUMBER OF VIEWS:  3 views of the RIGHT hand. COMPARISON: None FINDINGS: No fracture, dislocation, or acute joint abnormality is present. Pulse oximeter is present on the distal aspect of the right index finger.     Negative limited RIGHT hand series.      Micro:  Results     Procedure Component Value Units Date/Time    SARS-CoV-2, PCR (In-House) [[id number]]  (Abnormal) Collected:  05/18/20 0955    Order Status:  Completed Updated:  05/18/20 1129     SARS-CoV-2 Source NP Swab     SARS-CoV-2 by PCR DETECTED     Comment: **The [employer] GeneXpert Xpress SARS-CoV-2 Test has been made available for  use under the Emergency  Use Authorization (EUA) only.         Narrative:       Droplet, Contact, and Eye [id number] REFUGIO PRICE    COVID/SARS CoV-2 [[id number]] Collected:  05/18/20 0955    Order Status:  Completed Specimen:  Respirate from Nasopharyngeal Updated:  05/18/20 1018     COVID Order Status Received    Narrative:       Droplet, Contact, and Eye [id number] REFUGIO PRICE          Assessment:  Active Hospital Problems    Diagnosis   • Thrombocytopenia (HCC) [D69.6]   • Diabetes (HCC) [E11.9]   • Constipation [K59.00]   • COVID-19 virus detected [U07.1]   • KYLE (acute kidney injury) (HCC) [N17.9]   • Moderate intellectual disability [F71]   • Schizophrenia (HCC) [F20.9]     Interval 24 hours:      101.9, O2 2 L NC   Labs reviewed  Imaging personally reviewed both images and report.   Studies reviewed   Micro reviewed       Hospital Course/Assessment:   Erlinda Verde is a 54 y.o. female with a history of mild developmental delay and schizophrenia, long-term resident of a group home, brought to the ER due to multiple new areas of bruising, also found to have an oxygen requirement and diagnosed with COVID-19.     Purplish skin lesions that appear bruise-like, most prominent over right fourth and fifth toes, extending proximally to the distal dorsal foot, as well as (symmetrically in some respects) the right fifth finger.  Also noted smaller similar patches right forearm, left arm.  There was suspicion that these lesions may be secondary to COVID-19, however, she does have an acute fracture underlying the right fifth toe supporting that these may in fact be bruises.     Patient also noted to have thrombocytopenia (appears to be acute, not noted on labs from 2018), potentially secondary to COVID-19.     Pertinent Diagnoses:  COVID-19 pneumonia-chest x-ray unremarkable but opacities noted in right lower lobe on CT of abdomen/pelvis  Acute hypoxemic respiratory failure  Fevers-new  Multiple bruises, right fifth toe  acute fracture  Thrombocytopenia, likely acute-ongoing, stable  Mild developmental delay  Schizophrenia  KYLE-improved    Inflammatory markers  IL-6 - pending   CRP 7.05   Ferritin 269      D Dimer 0.27   Troponin 16 / 5/16   PCT 0.11     Screening  TB quant-pending  Hep B core antibody total- neg  Hep B surface antigen- neg   Hep C antibody- neg   HIV - neg   ABO-  B+     Plan:     -Continue supportive care which is only known proven therapy for COVID-19 at this time-oxygen supplementation, self-proning, judicious use of IV fluids  -Monitor inflammatory markers every 48 hours   -Monitor d-dimer with low threshold to start therapeutic anticoagulation   -Ordered blood cultures due to new fever  -ID (Dr. Renteria) discussed with [facility] public guardian on-call (Juan Carlos Barrios) regarding possibility of administration of convalescent plasma or remdesivir. Per discussion, the public guardian is unable to provide consent for experimental therapies without court permission.  Since patient is currently not severely ill and has no threat of imminent loss of life, will discuss further steps with the COVID-19 EBM committee.  Received message from team and patient has been approved for RDV if needed and potentially convalescent plasma- will discuss further in a.m.  -Recommend repeat chest x-ray to see if worsening in setting of new fever     Sai with Dr. Blankenship. ID will follow.

## 2020-05-19 NOTE — CARE PLAN
Problem: Safety  Goal: Will remain free from injury  Outcome: PROGRESSING AS EXPECTED   Bed locked and in low position, Lower bed rails raised, bed alarm in use, call light within reach, treaded slipper socks on patient and patient room near nursing station. Pt educated on calling when in need of assistance.

## 2020-05-19 NOTE — PROGRESS NOTES
[facility] Medicine Daily Progress Note    Date of Service  5/19/2020    Chief Complaint  brusing    Hospital Course    Patient is a 54 y.o. female with a reported history of mental retardation and schizophrenia who was sent here from group home due to bruising in multiple areas.  Patient was unable to provide history, however there was reportedly a fall the night prior to admission. On admission, was noted to have evidence of acute kidney injury, with fracture of the right 5th metatarsal, low platelets, and tested positive for COVID 19. EPS case was filed. She was started on supportive care, IVF. ID was consulted, who is evaluating her for plasma trial.         Interval Problem Update  5/19/2020 - I reviewed the patient's chart. There were no significant overnight events. Remains hemodynamically stable. Tachycardic. Tmax 38C. Stable on 2L O2 NC.  PCT low. Platelet 67576. Hgb stable at 10.8. Renal function has normalized. ID reports that she is approved for remdesevir trial but public guardian cannot consent without court order.    > I have personally seen and examined the patient today. Comfortable. Answers questions but (+) circumstantial. No complaints. Not short of breath. No nausea, vomiting. No cough/sputum production.      Consultants/Specialty  Case management    Code Status  Full code    Disposition  Monitor in the [facility]  EPS case opened.    Review of Systems  Review of Systems      Pertinent positives/negatives as mentioned above.     A complete review of systems was personally done by me, limited by mentation. All other systems were negative.         Physical Exam  Temp:  [37.1 °C (98.8 °F)-38.8 °C (101.9 °F)] 37.1 °C (98.8 °F)  Pulse:  [] 98  Resp:  [17-23] 21  BP: (101-132)/(57-90) 129/90  SpO2:  [93 %-96 %] 95 %    Physical Exam  Vitals signs and nursing note reviewed. Exam conducted with a chaperone present.   Constitutional:       General: She is not in acute distress.     Appearance: Normal  appearance. She is not diaphoretic.   HENT:      Head: Normocephalic.      Right Ear: External ear normal.      Left Ear: External ear normal.      Nose: Nose normal.      Mouth/Throat:      Mouth: Mucous membranes are moist.   Eyes:      Pupils: Pupils are equal, round, and reactive to light.   Cardiovascular:      Rate and Rhythm: Normal rate and regular rhythm.      Pulses: Normal pulses.      Heart sounds: Normal heart sounds.   Pulmonary:      Effort: Pulmonary effort is normal.      Breath sounds: Normal breath sounds.   Abdominal:      General: Abdomen is flat. Bowel sounds are normal.      Palpations: Abdomen is soft.   Musculoskeletal: Normal range of motion.         General: No swelling or deformity.   Skin:     General: Skin is warm and dry.      Capillary Refill: Capillary refill takes less than 2 seconds.      Findings: Bruising (mainly lateral right hand and lateral left foot, mild brusing chest , two small lesions right foot, minimal bruising left lateral ankle and left hand) present.      Comments: Bruising stable  Right 5th MT bryce taped   Neurological:      General: No focal deficit present.      Mental Status: She is alert.      Cranial Nerves: No cranial nerve deficit.   Psychiatric:         Mood and Affect: Mood normal.         Speech: She is noncommunicative (minimal communication, does tell me her name, can say yes and no and follows commands).         Behavior: Behavior is cooperative.      Comments: Answers questions, but circumstantial, (+) mental retardation  Cooperative             Fluids    Intake/Output Summary (Last 24 hours) at 5/19/2020 1503  Last data filed at 5/19/2020 1303  Gross per 24 hour   Intake 1080 ml   Output --   Net 1080 ml       Laboratory  Recent Labs     05/17/20  0400 05/18/20  0554 05/19/20  0430   WBC 5.9 4.8 4.1*   RBC 3.56* 3.73* 3.66*   HEMOGLOBIN 10.6* 11.0* 10.8*   HEMATOCRIT 32.4* 34.1* 33.9*   MCV 91.0 91.4 92.6   MCH 29.8 29.5 29.5   MCHC 32.7* 32.3* 31.9*    RDW 46.7 47.3 47.7   PLATELETCT 54* 62* 55*   MPV 9.4 8.9* 9.5     Recent Labs     05/17/20  0400 05/18/20  0554 05/19/20  0430   SODIUM 137 136 135   POTASSIUM 4.0 4.0 4.0   CHLORIDE 104 100 100   CO2 21 24 23   GLUCOSE 138* 126* 139*   BUN 31* 23* 23*   CREATININE 1.23 0.93 0.95   CALCIUM 8.6 8.9 8.9                   Imaging  CT-ABDOMEN-PELVIS W/O   Final Result         1. No evidence of acute inflammatory change in the abdomen or pelvis.  The study is however limited due to nonuse of intravenous contrast.      2. Airspace opacity in the right lower lobe, in keeping with pneumonia.      3. Hepatic steatosis.      4. Moderate amount of stool throughout the colon.           Assessment/Plan  * COVID-19 virus infection- (present on admission)  Assessment & Plan  -With infiltrates noted RL on CT scan and mild hypoxia, on 2L O2 NC. Procalcitonin remains low. Last COVID positive test on 5/18.   -Suspect COVID is contributing to acute thrombocytopenia.   -continue immune support with Vitamin D, C and zinc.   -ID on board. She is approved for Remdesivir trial, but public guardian cannot consent to experimental treatment without court approval. Currently no threat of imminent loss to life. Will discuss with public guardian to look ahead and get court approval.   -Initial D dimer low. Hold off on prophylactic lovenox especially in setting of thrombocytopenia and bruising. Repeat d dimer.     Constipation- (present on admission)  Assessment & Plan  -resolved. Having regular BM. Continue bowel regimen.     Thrombocytopenia (HCC)  Assessment & Plan  -Suspect this is related to COVID/acute viral infection.   -No evidence of splenomegaly on exam or CT. No sign of hemolysis on peripheral and bili WNL.  HIV and hepatitis negative. Haptoglobin normal. Platelet count stable. Continue to monitor.     KYLE (acute kidney injury) (HCC)- (present on admission)  Assessment & Plan  -Resolved. Improved with IVF.   -continue to follow.   -  avoid nephrotoxins, and continue to renally dose all medications.    Bruising  Assessment & Plan  Likely from trauma, reported fall prior to admission  Thrombocytopenia likely contributing  5th MT fracture found at [facility]  EPS case pending    Diabetes ([facility])- (present on admission)  Assessment & Plan  -Suspect type 2 chronic.  -continue to hold metformin for now. Continue SSI. Accu-Cheks before meals and at bedtime. Goal to keep BG between 140-180 per 2019 ADA guidelines.      Moderate intellectual disability- (present on admission)  Assessment & Plan  -Per group home at baseline    Schizophrenia (HCC)- (present on admission)  Assessment & Plan  -Continue current outpatient psych meds as tolerated.       VTE prophylaxis: SCD

## 2020-05-19 NOTE — CARE PLAN
Problem: Safety  Goal: Will remain free from injury  Outcome: PROGRESSING AS EXPECTED  Note: Bed locked and in lowest position. Bed alarm on. Treaded socks. Call light and belongings within reach. Patient educated to call for assistance. Pt verbalized understanding. Hourly rounding in place.       Problem: Infection  Goal: Will remain free from infection  Outcome: PROGRESSING AS EXPECTED  Note: Patients individual infection risk assessed. Monitored for signs and symptoms of infection throughout shift. Washed hands or foamed in and out every encounter. Utilized universal precautions. Scrubbed hub before access to IV lines per protocol.

## 2020-05-20 ENCOUNTER — APPOINTMENT (OUTPATIENT)
Dept: RADIOLOGY | Facility: MEDICAL CENTER | Age: 55
DRG: 177 | End: 2020-05-20
Attending: INTERNAL MEDICINE
Payer: MEDICARE

## 2020-05-20 LAB
ALBUMIN SERPL BCP-MCNC: 2.9 G/DL (ref 3.2–4.9)
ALP SERPL-CCNC: 69 U/L (ref 30–99)
ALT SERPL-CCNC: 37 U/L (ref 2–50)
ANION GAP SERPL CALC-SCNC: 11 MMOL/L (ref 7–16)
APTT PPP: 34.4 SEC (ref 24.7–36)
AST SERPL-CCNC: 70 U/L (ref 12–45)
BASOPHILS # BLD AUTO: 0.3 % (ref 0–1.8)
BASOPHILS # BLD: 0.01 K/UL (ref 0–0.12)
BILIRUB CONJ SERPL-MCNC: <0.2 MG/DL (ref 0.1–0.5)
BILIRUB INDIRECT SERPL-MCNC: ABNORMAL MG/DL (ref 0–1)
BILIRUB SERPL-MCNC: <0.2 MG/DL (ref 0.1–1.5)
BUN SERPL-MCNC: 23 MG/DL (ref 8–22)
CALCIUM SERPL-MCNC: 8.3 MG/DL (ref 8.5–10.5)
CHLORIDE SERPL-SCNC: 101 MMOL/L (ref 96–112)
CK SERPL-CCNC: 418 U/L (ref 0–154)
CO2 SERPL-SCNC: 24 MMOL/L (ref 20–33)
CREAT SERPL-MCNC: 0.93 MG/DL (ref 0.5–1.4)
CRP SERPL HS-MCNC: 10.71 MG/DL (ref 0–0.75)
D DIMER PPP IA.FEU-MCNC: 0.55 UG/ML (FEU) (ref 0–0.5)
EOSINOPHIL # BLD AUTO: 0 K/UL (ref 0–0.51)
EOSINOPHIL NFR BLD: 0 % (ref 0–6.9)
ERYTHROCYTE [DISTWIDTH] IN BLOOD BY AUTOMATED COUNT: 46.8 FL (ref 35.9–50)
FERRITIN SERPL-MCNC: 463 NG/ML (ref 10–291)
FIBRINOGEN PPP-MCNC: 522 MG/DL (ref 215–460)
GAMMA INTERFERON BACKGROUND BLD IA-ACNC: 0.05 IU/ML
GLUCOSE BLD-MCNC: 125 MG/DL (ref 65–99)
GLUCOSE BLD-MCNC: 138 MG/DL (ref 65–99)
GLUCOSE BLD-MCNC: 143 MG/DL (ref 65–99)
GLUCOSE BLD-MCNC: 82 MG/DL (ref 65–99)
GLUCOSE SERPL-MCNC: 161 MG/DL (ref 65–99)
HCT VFR BLD AUTO: 31.8 % (ref 37–47)
HGB BLD-MCNC: 10.1 G/DL (ref 12–16)
IMM GRANULOCYTES # BLD AUTO: 0.05 K/UL (ref 0–0.11)
IMM GRANULOCYTES NFR BLD AUTO: 1.3 % (ref 0–0.9)
INR PPP: 0.9 (ref 0.87–1.13)
LDH SERPL L TO P-CCNC: 260 U/L (ref 107–266)
LYMPHOCYTES # BLD AUTO: 0.99 K/UL (ref 1–4.8)
LYMPHOCYTES NFR BLD: 25.7 % (ref 22–41)
M TB IFN-G BLD-IMP: NEGATIVE
M TB IFN-G CD4+ BCKGRND COR BLD-ACNC: 0.01 IU/ML
MAGNESIUM SERPL-MCNC: 2 MG/DL (ref 1.5–2.5)
MCH RBC QN AUTO: 29.4 PG (ref 27–33)
MCHC RBC AUTO-ENTMCNC: 31.8 G/DL (ref 33.6–35)
MCV RBC AUTO: 92.7 FL (ref 81.4–97.8)
MITOGEN IGNF BCKGRD COR BLD-ACNC: >10 IU/ML
MONOCYTES # BLD AUTO: 0.73 K/UL (ref 0–0.85)
MONOCYTES NFR BLD AUTO: 19 % (ref 0–13.4)
NEUTROPHILS # BLD AUTO: 2.07 K/UL (ref 2–7.15)
NEUTROPHILS NFR BLD: 53.7 % (ref 44–72)
NRBC # BLD AUTO: 0 K/UL
NRBC BLD-RTO: 0 /100 WBC
NT-PROBNP SERPL IA-MCNC: 272 PG/ML (ref 0–125)
PLATELET # BLD AUTO: 66 K/UL (ref 164–446)
PMV BLD AUTO: 9.1 FL (ref 9–12.9)
POTASSIUM SERPL-SCNC: 3.9 MMOL/L (ref 3.6–5.5)
PROCALCITONIN SERPL-MCNC: 0.26 NG/ML
PROT SERPL-MCNC: 5.7 G/DL (ref 6–8.2)
PROTHROMBIN TIME: 12.3 SEC (ref 12–14.6)
QFT TB2 - NIL TBQ2: 0.01 IU/ML
RBC # BLD AUTO: 3.43 M/UL (ref 4.2–5.4)
SODIUM SERPL-SCNC: 136 MMOL/L (ref 135–145)
TROPONIN T SERPL-MCNC: 8 NG/L (ref 6–19)
WBC # BLD AUTO: 3.9 K/UL (ref 4.8–10.8)

## 2020-05-20 PROCEDURE — 83615 LACTATE (LD) (LDH) ENZYME: CPT

## 2020-05-20 PROCEDURE — 84484 ASSAY OF TROPONIN QUANT: CPT

## 2020-05-20 PROCEDURE — A9270 NON-COVERED ITEM OR SERVICE: HCPCS | Performed by: HOSPITALIST

## 2020-05-20 PROCEDURE — 99233 SBSQ HOSP IP/OBS HIGH 50: CPT | Mod: CS | Performed by: INTERNAL MEDICINE

## 2020-05-20 PROCEDURE — 86140 C-REACTIVE PROTEIN: CPT

## 2020-05-20 PROCEDURE — 85384 FIBRINOGEN ACTIVITY: CPT

## 2020-05-20 PROCEDURE — 700112 HCHG RX REV CODE 229: Performed by: HOSPITALIST

## 2020-05-20 PROCEDURE — 82962 GLUCOSE BLOOD TEST: CPT

## 2020-05-20 PROCEDURE — 83880 ASSAY OF NATRIURETIC PEPTIDE: CPT

## 2020-05-20 PROCEDURE — 80048 BASIC METABOLIC PNL TOTAL CA: CPT

## 2020-05-20 PROCEDURE — 71045 X-RAY EXAM CHEST 1 VIEW: CPT

## 2020-05-20 PROCEDURE — 85025 COMPLETE CBC W/AUTO DIFF WBC: CPT

## 2020-05-20 PROCEDURE — 700102 HCHG RX REV CODE 250 W/ 637 OVERRIDE(OP): Performed by: HOSPITALIST

## 2020-05-20 PROCEDURE — 85610 PROTHROMBIN TIME: CPT

## 2020-05-20 PROCEDURE — 84145 PROCALCITONIN (PCT): CPT

## 2020-05-20 PROCEDURE — 82728 ASSAY OF FERRITIN: CPT

## 2020-05-20 PROCEDURE — 80076 HEPATIC FUNCTION PANEL: CPT

## 2020-05-20 PROCEDURE — 85730 THROMBOPLASTIN TIME PARTIAL: CPT

## 2020-05-20 PROCEDURE — 82550 ASSAY OF CK (CPK): CPT

## 2020-05-20 PROCEDURE — 83735 ASSAY OF MAGNESIUM: CPT

## 2020-05-20 PROCEDURE — 85379 FIBRIN DEGRADATION QUANT: CPT

## 2020-05-20 PROCEDURE — 770020 HCHG ROOM/CARE - TELE (206)

## 2020-05-20 RX ADMIN — OXYBUTYNIN CHLORIDE 10 MG: 10 TABLET, EXTENDED RELEASE ORAL at 17:26

## 2020-05-20 RX ADMIN — ACETAMINOPHEN 650 MG: 325 TABLET, FILM COATED ORAL at 20:16

## 2020-05-20 RX ADMIN — DONEPEZIL HYDROCHLORIDE 10 MG: 5 TABLET, FILM COATED ORAL at 17:26

## 2020-05-20 RX ADMIN — DOCUSATE SODIUM 100 MG: 100 CAPSULE, LIQUID FILLED ORAL at 04:29

## 2020-05-20 RX ADMIN — Medication 1 PACKET: at 06:00

## 2020-05-20 RX ADMIN — Medication 1000 MG: at 17:26

## 2020-05-20 RX ADMIN — Medication 1000 MG: at 04:28

## 2020-05-20 RX ADMIN — DIVALPROEX SODIUM 500 MG: 500 TABLET, DELAYED RELEASE ORAL at 20:16

## 2020-05-20 RX ADMIN — ZINC SULFATE 220 MG (50 MG) CAPSULE 220 MG: CAPSULE at 04:28

## 2020-05-20 RX ADMIN — QUETIAPINE FUMARATE 300 MG: 200 TABLET ORAL at 20:16

## 2020-05-20 RX ADMIN — ACETAMINOPHEN 650 MG: 325 TABLET, FILM COATED ORAL at 04:28

## 2020-05-20 RX ADMIN — SERTRALINE HYDROCHLORIDE 50 MG: 50 TABLET ORAL at 04:28

## 2020-05-20 RX ADMIN — CALCIUM CITRATE 200 MG (950 MG) TABLET 950 MG: at 04:29

## 2020-05-20 RX ADMIN — ARIPIPRAZOLE 30 MG: 15 TABLET ORAL at 20:16

## 2020-05-20 RX ADMIN — MELATONIN 1000 UNITS: at 04:29

## 2020-05-20 RX ADMIN — DOCUSATE SODIUM 100 MG: 100 CAPSULE, LIQUID FILLED ORAL at 17:26

## 2020-05-20 ASSESSMENT — LIFESTYLE VARIABLES
ON A TYPICAL DAY WHEN YOU DRINK ALCOHOL HOW MANY DRINKS DO YOU HAVE: 0
EVER HAD A DRINK FIRST THING IN THE MORNING TO STEADY YOUR NERVES TO GET RID OF A HANGOVER: NO
AVERAGE NUMBER OF DAYS PER WEEK YOU HAVE A DRINK CONTAINING ALCOHOL: 0
TOTAL SCORE: 0
TOTAL SCORE: 0
CONSUMPTION TOTAL: NEGATIVE
DOES PATIENT WANT TO STOP DRINKING: CANNOT ASSESS
DO YOU DRINK ALCOHOL: NO
TOTAL SCORE: 0
HOW MANY TIMES IN THE PAST YEAR HAVE YOU HAD 5 OR MORE DRINKS IN A DAY: 0
HAVE PEOPLE ANNOYED YOU BY CRITICIZING YOUR DRINKING: NO
HAVE YOU EVER FELT YOU SHOULD CUT DOWN ON YOUR DRINKING: NO
EVER FELT BAD OR GUILTY ABOUT YOUR DRINKING: NO

## 2020-05-20 ASSESSMENT — PATIENT HEALTH QUESTIONNAIRE - PHQ9
1. LITTLE INTEREST OR PLEASURE IN DOING THINGS: NOT AT ALL
SUM OF ALL RESPONSES TO PHQ9 QUESTIONS 1 AND 2: 0
2. FEELING DOWN, DEPRESSED, IRRITABLE, OR HOPELESS: NOT AT ALL

## 2020-05-20 NOTE — PROGRESS NOTES
"Hospital Medicine Daily Progress Note    Date of Service  5/20/2020    Chief Complaint  [facility] Course    Patient is a 54 y.o. female with a reported history of mental retardation and schizophrenia who was sent here from group home due to bruising in multiple areas.  Patient was unable to provide history, however there was reportedly a fall the night prior to admission. On admission, was noted to have evidence of acute kidney injury, with fracture of the right 5th metatarsal, low platelets, and tested positive for COVID 19. EPS case was filed. She was started on supportive care, IVF. ID was consulted, who is evaluating her for plasma trial. ID reports that she is approved for remdesevir trial but public guardian cannot consent without court order. Currently no threat of imminent loss to life.          Interval Problem Update  5/20/2020 - I reviewed the patient's chart today. Uneventful night. VSS. Fever Tmax 38.1C. Requiring 2L O2 NC. WBC 3900. [place] at [address] today. No bandemia. . Troponin 8. . D dimer stable at 0.55. Ferritin 463.Procalcitonion 0.26. CRP 10.71. Blood cultures resent 5/19, negative to date. I spoke with the public guardian, who states that public guardian's office will place an emergency petition for consent for experimental treatment/trial if there is an \"imminent threat to patient's life\". I repeated a portable CXR today, and it showed increasing bilateral haziness on the bases (personally reviewed). I discussed with ID, who recommended to go ahead with Remdesivir and convalescent plasma. I again called the public guardian - Ann Urias who stated that a court order is not needed for Remdesivir, and that she is giving her consent for that. She will go ahead and get a court order for the convalescent plasma therapy.     > I have personally seen and examined the patient today. She remains confused. Appears comfortable. Not short of breath. Has occasional " cough. No nausea, vomiting, diarrhea,       Consultants/Specialty  Case management    Code Status  Full code    Disposition  Monitor in the [facility]  EPS case opened.    Review of Systems  ROS     Pertinent positives/negatives as mentioned above.     A complete review of systems was personally done by me, limited by mentation. All other systems were negative.         Physical Exam  Temp:  [37.4 °C (99.4 °F)-38.1 °C (100.5 °F)] 37.7 °C (99.9 °F)  Pulse:  [] 80  Resp:  [13-26] 13  BP: (110-121)/(65-78) 113/78  SpO2:  [88 %-94 %] 91 %    Physical Exam  Vitals signs and nursing note reviewed. Exam conducted with a chaperone present.   Constitutional:       General: She is not in acute distress.     Appearance: Normal appearance. She is not diaphoretic.   HENT:      Head: Normocephalic.      Right Ear: External ear normal.      Left Ear: External ear normal.      Nose: Nose normal.      Mouth/Throat:      Mouth: Mucous membranes are moist.   Eyes:      Pupils: Pupils are equal, round, and reactive to light.   Cardiovascular:      Rate and Rhythm: Normal rate and regular rhythm.      Pulses: Normal pulses.      Heart sounds: Normal heart sounds.   Pulmonary:      Effort: Pulmonary effort is normal.      Breath sounds: Normal breath sounds.   Abdominal:      General: Abdomen is flat. Bowel sounds are normal.      Palpations: Abdomen is soft.   Musculoskeletal: Normal range of motion.         General: No swelling or deformity.   Skin:     General: Skin is warm and dry.      Capillary Refill: Capillary refill takes less than 2 seconds.      Findings: Bruising (mainly lateral right hand and lateral left foot, mild brusing chest , two small lesions right foot, minimal bruising left lateral ankle and left hand) present.      Comments: Bruising stable  Right 5th MT bryce taped   Neurological:      General: No focal deficit present.      Mental Status: She is alert.      Cranial Nerves: No cranial nerve deficit.   Psychiatric:          Mood and Affect: Mood normal.         Speech: She is noncommunicative (minimal communication, does tell me her name, can say yes and no and follows commands).         Behavior: Behavior is cooperative.      Comments: Answers questions, but circumstantial, (+) mental retardation  Cooperative       I have performed the physical examination today 5/20/2020.  In review of yesterday's note, there are no new changes except as documented above.        Fluids    Intake/Output Summary (Last 24 hours) at 5/20/2020 1223  Last data filed at 5/19/2020 1800  Gross per 24 hour   Intake 720 ml   Output 650 ml   Net 70 ml       Laboratory  Recent Labs     05/18/20  0554 05/19/20  0430 05/20/20  0523   WBC 4.8 4.1* 3.9*   RBC 3.73* 3.66* 3.43*   HEMOGLOBIN 11.0* 10.8* 10.1*   HEMATOCRIT 34.1* 33.9* 31.8*   MCV 91.4 92.6 92.7   MCH 29.5 29.5 29.4   MCHC 32.3* 31.9* 31.8*   RDW 47.3 47.7 46.8   PLATELETCT 62* 55* 66*   MPV 8.9* 9.5 9.1     Recent Labs     05/18/20  0554 05/19/20  0430 05/20/20  0523   SODIUM 136 135 136   POTASSIUM 4.0 4.0 3.9   CHLORIDE 100 100 101   CO2 24 23 24   GLUCOSE 126* 139* 161*   BUN 23* 23* 23*   CREATININE 0.93 0.95 0.93   CALCIUM 8.9 8.9 8.3*     Recent Labs     05/20/20  0523   APTT 34.4   INR 0.90               Imaging  DX-CHEST-PORTABLE (1 VIEW)   Final Result      1.  Enlarged cardiac silhouette with probable mild vascular congestion.   2.  Bibasilar opacities could be related atelectasis, vascular congestion or pneumonitis.      CT-ABDOMEN-PELVIS W/O   Final Result         1. No evidence of acute inflammatory change in the abdomen or pelvis.  The study is however limited due to nonuse of intravenous contrast.      2. Airspace opacity in the right lower lobe, in keeping with pneumonia.      3. Hepatic steatosis.      4. Moderate amount of stool throughout the colon.           Assessment/Plan  * COVID-19 virus infection- (present on admission)  Assessment & Plan  -With infiltrates noted RL on CT  scan and persistent hypoxia, remains on 2L O2 NC. Increasing infiltrates on chest xray with persistent fevers. Procalcitonin remains low. Last COVID positive test on 5/18.   -Suspect COVID is contributing to acute thrombocytopenia.   -continue immune support with Vitamin D, C and zinc.   -Discussed with ID, who recommends remdesivir and convalescent plasma treatement. Public guardian has given consent for Remdesivir (does not need court approval). She will pursue court approval for convalescent plasma therapy.   -d-dimer trending down. Hold off on prophylactic lovenox especially in setting of thrombocytopenia and bruising. Trend d-dimer.    Constipation- (present on admission)  Assessment & Plan  -resolved. Having regular BM. Continue bowel regimen.     Thrombocytopenia (HCC)  Assessment & Plan  -Suspect this is related to COVID/acute viral infection.   -No evidence of splenomegaly on exam or CT. No sign of hemolysis on peripheral and bili WNL.  HIV and hepatitis negative. Haptoglobin normal. Platelet count stable. Continue to monitor.     KYLE (acute kidney injury) (HCC)- (present on admission)  Assessment & Plan  -Resolved. Improved with IVF.   -continue to follow.   - avoid nephrotoxins, and continue to renally dose all medications.    Bruising  Assessment & Plan  Likely from trauma, reported fall prior to admission  Thrombocytopenia likely contributing  5th MT fracture found at [facility]  EPS case pending    Diabetes (HCC)- (present on admission)  Assessment & Plan  -Suspect type 2 chronic.  -continue to hold metformin for now. Continue SSI. Accu-Cheks before meals and at bedtime. Goal to keep BG between 140-180 per 2019 ADA guidelines.      Moderate intellectual disability- (present on admission)  Assessment & Plan  -Per group home at baseline    Schizophrenia (HCC)- (present on admission)  Assessment & Plan  -Continue current outpatient psych meds as tolerated.       VTE prophylaxis: SCD

## 2020-05-20 NOTE — DISCHARGE PLANNING
Anticipated Discharge Disposition:  [facility]    Action: per chart review, currently on 2L NC. T38.1 BCx2 yesterday.  1500: received VM from [phone]. Called back, no answer    Barriers to Discharge:   covid + 5/16, 5/18  Open APS referral    Plan:   return to  when covid negative/cleared by APS

## 2020-05-20 NOTE — PROGRESS NOTES
Infectious Disease Progress Note    Author: Yahaira James M.D. Date & Time of service: 2020  5:05 PM       Chief Complaint:  COVID-19 pneumonia      Interval History:     101.9, O2 2 L NC    Review of Systems:  Review of Systems   Unable to perform ROS: Psychiatric disorder       Hemodynamics:  Temp (24hrs), Av.4 °C (99.4 °F), Min:37.1 °C (98.8 °F), Max:38 °C (100.4 °F)  Temperature: 37.1 °C (98.8 °F)  Pulse  Av.9  Min: 76  Max: 121   Blood Pressure: 121/65       Physical Exam:  Physical Exam  Constitutional:       Appearance: Normal appearance. She is not ill-appearing.   Cardiovascular:      Rate and Rhythm: Normal rate and regular rhythm.      Heart sounds: Normal heart sounds.   Pulmonary:      Effort: Pulmonary effort is normal.      Comments: Crackles in bases  Abdominal:      General: Abdomen is flat. Bowel sounds are normal.      Palpations: Abdomen is soft.   Musculoskeletal:      Comments: Bruising on right foot, left toes, right forearm   Skin:     General: Skin is warm and dry.   Neurological:      Mental Status: She is alert.      Comments: Moving all extremities         Meds:    Current Facility-Administered Medications:   •  zinc sulfate  •  ascorbic acid  •  vitamin D  •  insulin regular **AND** POC Blood Glucose **AND** NOTIFY MD and PharmD **AND** glucose **AND** dextrose 50%  •  ARIPiprazole  •  divalproex  •  docusate sodium  •  donepezil  •  calcium citrate  •  oxybutynin SR  •  psyllium  •  enalaprilat  •  ondansetron  •  ondansetron  •  prochlorperazine  •  promethazine  •  promethazine  •  acetaminophen  •  QUEtiapine  •  sertraline    Labs:  Recent Labs     20  0400 20  0554 20  0430   WBC 5.9 4.8 4.1*   RBC 3.56* 3.73* 3.66*   HEMOGLOBIN 10.6* 11.0* 10.8*   HEMATOCRIT 32.4* 34.1* 33.9*   MCV 91.0 91.4 92.6   MCH 29.8 29.5 29.5   RDW 46.7 47.3 47.7   PLATELETCT 54* 62* 55*   MPV 9.4 8.9* 9.5   NEUTSPOLYS 72.70* 62.30 70.20   LYMPHOCYTES 11.40* 26.20  14.60*   MONOCYTES 15.40* 11.10 13.80*   EOSINOPHILS 0.00 0.00 0.00   BASOPHILS 0.20 0.20 0.20   RBCMORPHOLO  --   --  #CRI     Recent Labs     05/17/20  0400 05/18/20  0554 05/19/20  0430   SODIUM 137 136 135   POTASSIUM 4.0 4.0 4.0   CHLORIDE 104 100 100   CO2 21 24 23   GLUCOSE 138* 126* 139*   BUN 31* 23* 23*     Recent Labs     05/17/20  0400 05/18/20  0554 05/19/20  0430   ALBUMIN 3.0* 3.1* 3.2   TBILIRUBIN 0.2 0.2 0.2   ALKPHOSPHAT 57 56 69   TOTPROTEIN 5.3* 5.9* 6.3   ALTSGPT 25 25 34   ASTSGOT 91* 69* 75*   CREATININE 1.23 0.93 0.95       Imaging:  Ct-abdomen-pelvis W/o    Result Date: 5/17/2020 5/17/2020 12:28 AM HISTORY/REASON FOR EXAM:  abd pain ? Trauma; IV contrast only POSITIVE FOR COVID-19 TESTING TECHNIQUE/EXAM DESCRIPTION: CT scan of the abdomen and pelvis without contrast. Noncontrast helical scanning was obtained from the diaphragmatic domes through the pubic symphysis. Low dose optimization technique was utilized for this CT exam including automated exposure control and adjustment of the mA and/or kV according to patient size. COMPARISON: None. FINDINGS: Lung Bases: Airspace opacity in the right lower lobe Abdomen: Within the limits of noncontrast technique, the spleen, pancreas, kidneys and adrenal glands are unremarkable in appearance. Hepatic steatosis. The gallbladder is unremarkable and there is no biliary dilatation. There is no bowel wall thickening or abnormal dilatation. The abdominal aorta is normal in caliber. Moderate amount of stool throughout the colon. Pelvis: No obvious abnormality seen in the pelvic structures but evaluation limited on CT. No lymph node enlargement. No free fluid, or free air in the abdomen or pelvis. No aggressive bone lesions are seen. ________________________________    1. No evidence of acute inflammatory change in the abdomen or pelvis.  The study is however limited due to nonuse of intravenous contrast. 2. Airspace opacity in the right lower lobe, in  keeping with pneumonia. 3. Hepatic steatosis. 4. Moderate amount of stool throughout the colon.    Dx-chest-portable (1 View)    Result Date: 5/16/2020 5/16/2020 3:16 PM HISTORY/REASON FOR EXAM:  Chest Pain. Hypotension, recent fall. TECHNIQUE/EXAM DESCRIPTION AND NUMBER OF VIEWS: Single portable view of the chest. COMPARISON: None FINDINGS: Cardiomediastinal contour is within normal limits. Lungs show hypoinflation. Ill-defined increased opacity at both lung bases. No pleural fluid collection or pneumothorax. No major bony abnormality is seen.     Hypoinflation and mild bibasilar atelectasis.  Developing pneumonia is difficult to exclude.    Dx-foot-complete 3+ Right    Result Date: 5/16/2020 5/16/2020 3:16 PM HISTORY/REASON FOR EXAM:  Right foot pain and bruising after fall last evening. TECHNIQUE/EXAM DESCRIPTION AND NUMBER OF VIEWS: 3 views of the RIGHT foot. COMPARISON:  None. FINDINGS: There is an acute oblique nondisplaced fracture of the diaphysis/distal metaphysis of the proximal phalanx of the right 5th toe. No articular communication is present. No dislocation is present. No other fracture is identified.     1.  Acute oblique nondisplaced fracture of the diaphysis/distal metaphysis of the proximal phalanx of the right 5th toe. 2.  No dislocation.    Dx-hand 3+ Right    Result Date: 5/16/2020 5/16/2020 3:16 PM HISTORY/REASON FOR EXAM: Right hand pain after fall last evening with TECHNIQUE/EXAM DESCRIPTION AND NUMBER OF VIEWS:  3 views of the RIGHT hand. COMPARISON: None FINDINGS: No fracture, dislocation, or acute joint abnormality is present. Pulse oximeter is present on the distal aspect of the right index finger.     Negative limited RIGHT hand series.      Micro:  Results     Procedure Component Value Units Date/Time    BLOOD CULTURE [[id number]] Collected:  05/19/20 0441    Order Status:  Completed Specimen:  Blood from Peripheral Updated:  05/19/20 0444    Narrative:       Droplet, Contact, and Eye  "[id number] CADEN FIGUEROA  Per Hospital Policy: Only change Specimen Src: to \"Line\" if  specified by physician order.    BLOOD CULTURE [[id number]] Collected:  05/19/20 0441    Order Status:  Completed Specimen:  Blood from Peripheral Updated:  05/19/20 0444    Narrative:       Droplet, Contact, and Eye [id number] CADEN FIGUEROA  Per Hospital Policy: Only change Specimen Src: to \"Line\" if  specified by physician order.    BLOOD CULTURE [[id number]]     Order Status:  Canceled Specimen:  Blood from Peripheral     BLOOD CULTURE [[id number]]     Order Status:  Canceled Specimen:  Blood from Peripheral     SARS-CoV-2, PCR (In-House) [[id number]]  (Abnormal) Collected:  05/18/20 0955    Order Status:  Completed Updated:  05/18/20 1129     SARS-CoV-2 Source NP Swab     SARS-CoV-2 by PCR DETECTED     Comment: **The [employer] GeneXpert Xpress SARS-CoV-2 Test has been made available for  use under the Emergency Use Authorization (EUA) only.         Narrative:       Droplet, Contact, and Eye [id number] [facility]    COVID/SARS CoV-2 [[id number]] Collected:  05/18/20 0955    Order Status:  Completed Specimen:  Respirate from Nasopharyngeal Updated:  05/18/20 1018     COVID Order Status Received    Narrative:       Droplet, Contact, and Eye [id number] [facility]          Assessment:  Active Hospital Problems    Diagnosis   • *COVID-19 virus infection [U07.1]   • Thrombocytopenia (HCC) [D69.6]   • Constipation [K59.00]   • KYLE (acute kidney injury) (HCC) [N17.9]   • Diabetes (HCC) [E11.9]   • Moderate intellectual disability [F71]   • Schizophrenia (HCC) [F20.9]     Interval 24 hours:      AF, O2 2 L NC   Labs reviewed    Pt with difficulty communicating but was alert and appeared to be in no distress.  No further fevers and stable on 2 L nasal cannula.      Hospital Course/Assessment:   Erlinda Verde is a 54 y.o. female with a history of mild developmental delay and schizophrenia, " long-term resident of a group home, brought to the ER due to multiple new areas of bruising, also found to have an oxygen requirement and diagnosed with COVID-19 on 5/16.     Purplish skin lesions that appear bruise-like, most prominent over right fourth and fifth toes, extending proximally to the distal dorsal foot, as well as (symmetrically in some respects) the right fifth finger.  Also noted smaller similar patches right forearm, left arm.  There was suspicion that these lesions may be secondary to COVID-19, however, she does have an acute fracture underlying the right fifth toe supporting that these may in fact be bruises.     Patient also noted to have thrombocytopenia (appears to be acute, not noted on labs from 2018), potentially secondary to COVID-19.     Pertinent Diagnoses:  COVID-19 pneumonia-chest x-ray unremarkable but opacities noted in right lower lobe on CT of abdomen/pelvis  Acute hypoxemic respiratory failure  Fevers-new  Multiple bruises, right fifth toe acute fracture  Thrombocytopenia, likely acute-ongoing, stable  Mild developmental delay  Schizophrenia  KYLE-improved     Inflammatory markers  IL-6 - 3.6   CRP 7.05   Ferritin 269      D Dimer 0.27   Troponin 16 / 5/16   PCT 0.11      Screening  TB quant-pending  Hep B core antibody total- neg  Hep B surface antigen- neg   Hep C antibody- neg   HIV - neg   ABO-  B+     Plan:      -Continue supportive care which is only known proven therapy for COVID-19 at this time-oxygen supplementation, self-proning, judicious use of IV fluids  -Monitor inflammatory markers every 48 hours   -Monitor d-dimer with low threshold to start therapeutic anticoagulation   -Ordered blood cultures due to new fever- pending   -ID (Dr. Renteria) discussed with [facility] public guardian on-call (Juan Carlos Barrios) regarding possibility of administration of convalescent plasma or remdesivir. Per discussion, the public guardian is unable to provide consent for  experimental therapies without court permission.  Since patient is currently not severely ill and has no threat of imminent loss of life, will discuss further steps with the COVID-19 EBM committee.  Received message from team and patient has been approved for RDV if needed and potentially convalescent plasma- she is stable and appears very comfortable earlier was on 1 L nasal cannula -will evaluate daily and if any worsening will pursue additional treatment options   -If she spikes another fever recommend repeat chest x-ray  -Multiple labs pending, will follow     Discussed with Dr. Hayden.  ID will follow.

## 2020-05-20 NOTE — PROGRESS NOTES
Infectious Disease Progress Note    Author: Yahaira James M.D. Date & Time of service: 2020  10:15 AM    Chief Complaint:  COVID-19 pneumonia      Interval History:    101.9, O2 2 L NC   AF, O2 2 L NC, pt has difficulty communicating but was alert and appeared to be in no distress.     Review of Systems:  Review of Systems   Unable to perform ROS: Medical condition       Hemodynamics:  Temp (24hrs), Av.6 °C (99.7 °F), Min:37.1 °C (98.8 °F), Max:38.1 °C (100.5 °F)  Temperature: 37.7 °C (99.9 °F)  Pulse  Av  Min: 76  Max: 123   Blood Pressure: 113/78       Physical Exam:  Physical Exam  Constitutional:       Appearance: Normal appearance.   Cardiovascular:      Rate and Rhythm: Normal rate and regular rhythm.      Heart sounds: Normal heart sounds.   Pulmonary:      Effort: Pulmonary effort is normal.      Breath sounds: Normal breath sounds.   Abdominal:      General: Abdomen is flat. Bowel sounds are normal.      Palpations: Abdomen is soft.   Musculoskeletal:      Right lower leg: No edema.      Left lower leg: No edema.   Skin:     Findings: Bruising present.   Neurological:      Mental Status: She is alert.      Comments: Moving all extremities, poor verbal ability   Psychiatric:         Behavior: Behavior normal.         Meds:    Current Facility-Administered Medications:   •  zinc sulfate  •  ascorbic acid  •  vitamin D  •  insulin regular **AND** POC Blood Glucose **AND** NOTIFY MD and PharmD **AND** glucose **AND** dextrose 50%  •  ARIPiprazole  •  divalproex  •  docusate sodium  •  donepezil  •  calcium citrate  •  oxybutynin SR  •  psyllium  •  enalaprilat  •  ondansetron  •  ondansetron  •  prochlorperazine  •  promethazine  •  promethazine  •  acetaminophen  •  QUEtiapine  •  sertraline    Labs:  Recent Labs     20  0554 20  0430 20  0523   WBC 4.8 4.1* 3.9*   RBC 3.73* 3.66* 3.43*   HEMOGLOBIN 11.0* 10.8* 10.1*   HEMATOCRIT 34.1* 33.9* 31.8*   MCV 91.4 92.6  92.7   MCH 29.5 29.5 29.4   RDW 47.3 47.7 46.8   PLATELETCT 62* 55* 66*   MPV 8.9* 9.5 9.1   NEUTSPOLYS 62.30 70.20 53.70   LYMPHOCYTES 26.20 14.60* 25.70   MONOCYTES 11.10 13.80* 19.00*   EOSINOPHILS 0.00 0.00 0.00   BASOPHILS 0.20 0.20 0.30   RBCMORPHOLO  --  #CRI  --      Recent Labs     05/18/20  0554 05/19/20  0430 05/20/20  0523   SODIUM 136 135 136   POTASSIUM 4.0 4.0 3.9   CHLORIDE 100 100 101   CO2 24 23 24   GLUCOSE 126* 139* 161*   BUN 23* 23* 23*   CPKTOTAL  --   --  418*     Recent Labs     05/18/20  0554 05/19/20  0430 05/20/20  0523   ALBUMIN 3.1* 3.2 2.9*   TBILIRUBIN 0.2 0.2 <0.2   ALKPHOSPHAT 56 69 69   TOTPROTEIN 5.9* 6.3 5.7*   ALTSGPT 25 34 37   ASTSGOT 69* 75* 70*   CREATININE 0.93 0.95 0.93       Imaging:  Ct-abdomen-pelvis W/o    Result Date: 5/17/2020 5/17/2020 12:28 AM HISTORY/REASON FOR EXAM:  abd pain ? Trauma; IV contrast only POSITIVE FOR COVID-19 TESTING TECHNIQUE/EXAM DESCRIPTION: CT scan of the abdomen and pelvis without contrast. Noncontrast helical scanning was obtained from the diaphragmatic domes through the pubic symphysis. Low dose optimization technique was utilized for this CT exam including automated exposure control and adjustment of the mA and/or kV according to patient size. COMPARISON: None. FINDINGS: Lung Bases: Airspace opacity in the right lower lobe Abdomen: Within the limits of noncontrast technique, the spleen, pancreas, kidneys and adrenal glands are unremarkable in appearance. Hepatic steatosis. The gallbladder is unremarkable and there is no biliary dilatation. There is no bowel wall thickening or abnormal dilatation. The abdominal aorta is normal in caliber. Moderate amount of stool throughout the colon. Pelvis: No obvious abnormality seen in the pelvic structures but evaluation limited on CT. No lymph node enlargement. No free fluid, or free air in the abdomen or pelvis. No aggressive bone lesions are seen. ________________________________    1. No evidence  of acute inflammatory change in the abdomen or pelvis.  The study is however limited due to nonuse of intravenous contrast. 2. Airspace opacity in the right lower lobe, in keeping with pneumonia. 3. Hepatic steatosis. 4. Moderate amount of stool throughout the colon.    Dx-chest-portable (1 View)    Result Date: 5/16/2020 5/16/2020 3:16 PM HISTORY/REASON FOR EXAM:  Chest Pain. Hypotension, recent fall. TECHNIQUE/EXAM DESCRIPTION AND NUMBER OF VIEWS: Single portable view of the chest. COMPARISON: None FINDINGS: Cardiomediastinal contour is within normal limits. Lungs show hypoinflation. Ill-defined increased opacity at both lung bases. No pleural fluid collection or pneumothorax. No major bony abnormality is seen.     Hypoinflation and mild bibasilar atelectasis.  Developing pneumonia is difficult to exclude.    Dx-foot-complete 3+ Right    Result Date: 5/16/2020 5/16/2020 3:16 PM HISTORY/REASON FOR EXAM:  Right foot pain and bruising after fall last evening. TECHNIQUE/EXAM DESCRIPTION AND NUMBER OF VIEWS: 3 views of the RIGHT foot. COMPARISON:  None. FINDINGS: There is an acute oblique nondisplaced fracture of the diaphysis/distal metaphysis of the proximal phalanx of the right 5th toe. No articular communication is present. No dislocation is present. No other fracture is identified.     1.  Acute oblique nondisplaced fracture of the diaphysis/distal metaphysis of the proximal phalanx of the right 5th toe. 2.  No dislocation.    Dx-hand 3+ Right    Result Date: 5/16/2020 5/16/2020 3:16 PM HISTORY/REASON FOR EXAM: Right hand pain after fall last evening with TECHNIQUE/EXAM DESCRIPTION AND NUMBER OF VIEWS:  3 views of the RIGHT hand. COMPARISON: None FINDINGS: No fracture, dislocation, or acute joint abnormality is present. Pulse oximeter is present on the distal aspect of the right index finger.     Negative limited RIGHT hand series.      Micro:  Results     Procedure Component Value Units Date/Time    BLOOD  "CULTURE [[id number]] Collected:  05/19/20 0441    Order Status:  Completed Specimen:  Blood from Peripheral Updated:  05/20/20 0717     Significant Indicator NEG     Source BLD     Site PERIPHERAL     Culture Result No Growth  Note: Blood cultures are incubated for 5 days and  are monitored continuously.Positive blood cultures  are called to the RN and reported as soon as  they are identified.      Narrative:       Droplet, Contact, and Eye [id number] [facility]  [facility]  Per Hospital Policy: Only change Specimen Src: to \"Line\" if  specified by physician order.  Left Wrist    BLOOD CULTURE [[id number]] Collected:  05/19/20 0441    Order Status:  Completed Specimen:  Blood from Peripheral Updated:  05/20/20 0717     Significant Indicator NEG     Source BLD     Site PERIPHERAL     Culture Result No Growth  Note: Blood cultures are incubated for 5 days and  are monitored continuously.Positive blood cultures  are called to the RN and reported as soon as  they are identified.      Narrative:       Droplet, Contact, and Eye [id number] [facility] ROXY  A  Per Hospital Policy: Only change Specimen Src: to \"Line\" if  specified by physician order.  Right Hand    BLOOD CULTURE [[id number]]     Order Status:  Canceled Specimen:  Blood from Peripheral     BLOOD CULTURE [[id number]]     Order Status:  Canceled Specimen:  Blood from Peripheral     SARS-CoV-2, PCR (In-House) [[id number]]  (Abnormal) Collected:  05/18/20 0955    Order Status:  Completed Updated:  05/18/20 1129     SARS-CoV-2 Source NP Swab     SARS-CoV-2 by PCR DETECTED     Comment: **The [employer] GeneXpert Xpress SARS-CoV-2 Test has been made available for  use under the Emergency Use Authorization (EUA) only.         Narrative:       Droplet, Contact, and Eye [id number] [employer] [facility]    COVID/SARS CoV-2 [[id number]] Collected:  05/18/20 0955    Order Status:  Completed Specimen:  Respirate from Nasopharyngeal Updated:  05/18/20 1018     COVID " Order Status Received    Narrative:       Droplet, Contact, and Eye [id number] RE[employer]UG[employer] PRICE          Assessment:  Active Hospital Problems    Diagnosis   • *COVID-19 virus infection [U07.1]   • Thrombocytopenia (HCC) [D69.6]   • Constipation [K59.00]   • KYLE (acute kidney injury) (HCC) [N17.9]   • Diabetes (HCC) [E11.9]   • Moderate intellectual disability [F71]   • Schizophrenia (HCC) [F20.9]     Interval 24 hours:      100.5, O2 2 L NC  Labs reviewed  Imaging personally reviewed both images and report.  Chest x-ray with bilateral opacities, stable to slightly worse per my read  Studies reviewed  Micro reviewed    Pt with fevers and inflammatory labs have all increased.  Hospitalist discussed starting room dose of air with guardian and they have agreed.  I discussed this with pharmacy and they are preparing formulation and will likely give first dose tomorrow.  Discussion regarding convalescent plasma also initiated but this will require court order as it is a trial therapy.       Hospital Course/Assessment:   Erlinda Verde is a 54 y.o. female with a history of mild developmental delay and schizophrenia, long-term resident of a group home, brought to the ER due to multiple new areas of bruising, also found to have an oxygen requirement and diagnosed with COVID-19 on 5/16.     Purplish skin lesions that appear bruise-like, most prominent over right fourth and fifth toes, extending proximally to the distal dorsal foot, as well as (symmetrically in some respects) the right fifth finger.  Also noted smaller similar patches right forearm, left arm.  There was suspicion that these lesions may be secondary to COVID-19, however, she does have an acute fracture underlying the right fifth toe supporting that these may in fact be bruises.     Patient also noted to have thrombocytopenia (appears to be acute, not noted on labs from 2018), potentially secondary to COVID-19.     Pertinent Diagnoses:  COVID-19  pneumonia-chest x-ray unremarkable but opacities noted in right lower lobe on CT of abdomen/pelvis  Acute hypoxemic respiratory failure  Fevers-new, ongoing   Multiple bruises, right fifth toe acute fracture  Thrombocytopenia, likely acute-ongoing, stable  Mild developmental delay  Schizophrenia  KYLE-improved  Transaminitis, mild and stable     Inflammatory markers  IL-6 - 3.6   CRP 7.05 -> 10.71   Ferritin 269 -> 463    -> 260   D Dimer 0.27 -> 0.69 -> 0.55   Troponin 16 / 5/16   PCT 0.11 -> 0.26      Screening  TB quant-pending  Hep B core antibody total- neg  Hep B surface antigen- neg   Hep C antibody- neg   HIV - neg   ABO-  B+     Plan:      -Continue supportive care which is only known proven therapy for COVID-19 at this time-oxygen supplementation, self-proning, judicious use of IV fluids  -Monitor inflammatory markers every 48 hours   -Monitor d-dimer with low threshold to start therapeutic anticoagulation   -Follow-up blood culture-no growth to date  -Hospitalist Dr. Hayden discussed starting RDV with public guardian on 5/20 and has approval -will evaluate overnight and then potentially start tomorrow   -Discussion also underway for convalescent plasma but this will require court order as it is a trial therapy      Discussed with Dr. Hayden.  ID will follow.

## 2020-05-20 NOTE — CARE PLAN
Problem: Safety  Goal: Will remain free from injury  Outcome: PROGRESSING AS EXPECTED  Goal: Will remain free from falls  Outcome: PROGRESSING AS EXPECTED     Problem: Venous Thromboembolism (VTW)/Deep Vein Thrombosis (DVT) Prevention:  Goal: Patient will participate in Venous Thrombosis (VTE)/Deep Vein Thrombosis (DVT)Prevention Measures  Outcome: PROGRESSING AS EXPECTED     Problem: Bowel/Gastric:  Goal: Normal bowel function is maintained or improved  Outcome: PROGRESSING AS EXPECTED  Goal: Will not experience complications related to bowel motility  Outcome: PROGRESSING AS EXPECTED

## 2020-05-21 LAB
ALBUMIN SERPL BCP-MCNC: 3.1 G/DL (ref 3.2–4.9)
ALBUMIN/GLOB SERPL: 1.1 G/DL
ALP SERPL-CCNC: 75 U/L (ref 30–99)
ALT SERPL-CCNC: 31 U/L (ref 2–50)
ANION GAP SERPL CALC-SCNC: 11 MMOL/L (ref 7–16)
AST SERPL-CCNC: 44 U/L (ref 12–45)
BILIRUB SERPL-MCNC: 0.2 MG/DL (ref 0.1–1.5)
BUN SERPL-MCNC: 23 MG/DL (ref 8–22)
CALCIUM SERPL-MCNC: 8.4 MG/DL (ref 8.5–10.5)
CHLORIDE SERPL-SCNC: 102 MMOL/L (ref 96–112)
CO2 SERPL-SCNC: 25 MMOL/L (ref 20–33)
CREAT SERPL-MCNC: 0.87 MG/DL (ref 0.5–1.4)
GLOBULIN SER CALC-MCNC: 2.8 G/DL (ref 1.9–3.5)
GLUCOSE BLD-MCNC: 102 MG/DL (ref 65–99)
GLUCOSE BLD-MCNC: 117 MG/DL (ref 65–99)
GLUCOSE BLD-MCNC: 146 MG/DL (ref 65–99)
GLUCOSE BLD-MCNC: 209 MG/DL (ref 65–99)
GLUCOSE SERPL-MCNC: 159 MG/DL (ref 65–99)
POTASSIUM SERPL-SCNC: 4 MMOL/L (ref 3.6–5.5)
PROT SERPL-MCNC: 5.9 G/DL (ref 6–8.2)
SODIUM SERPL-SCNC: 138 MMOL/L (ref 135–145)

## 2020-05-21 PROCEDURE — 99233 SBSQ HOSP IP/OBS HIGH 50: CPT | Mod: CS | Performed by: INTERNAL MEDICINE

## 2020-05-21 PROCEDURE — A9270 NON-COVERED ITEM OR SERVICE: HCPCS | Performed by: HOSPITALIST

## 2020-05-21 PROCEDURE — A9270 NON-COVERED ITEM OR SERVICE: HCPCS | Performed by: INTERNAL MEDICINE

## 2020-05-21 PROCEDURE — 700102 HCHG RX REV CODE 250 W/ 637 OVERRIDE(OP): Performed by: INTERNAL MEDICINE

## 2020-05-21 PROCEDURE — 80053 COMPREHEN METABOLIC PANEL: CPT

## 2020-05-21 PROCEDURE — 700112 HCHG RX REV CODE 229: Performed by: HOSPITALIST

## 2020-05-21 PROCEDURE — 700102 HCHG RX REV CODE 250 W/ 637 OVERRIDE(OP): Performed by: HOSPITALIST

## 2020-05-21 PROCEDURE — 770020 HCHG ROOM/CARE - TELE (206)

## 2020-05-21 PROCEDURE — 82962 GLUCOSE BLOOD TEST: CPT

## 2020-05-21 PROCEDURE — 700105 HCHG RX REV CODE 258: Performed by: INTERNAL MEDICINE

## 2020-05-21 RX ADMIN — Medication 1 PACKET: at 05:44

## 2020-05-21 RX ADMIN — ZINC SULFATE 220 MG (50 MG) CAPSULE 220 MG: CAPSULE at 05:43

## 2020-05-21 RX ADMIN — INSULIN HUMAN 2 UNITS: 100 INJECTION, SOLUTION PARENTERAL at 21:05

## 2020-05-21 RX ADMIN — DONEPEZIL HYDROCHLORIDE 10 MG: 5 TABLET, FILM COATED ORAL at 17:15

## 2020-05-21 RX ADMIN — ARIPIPRAZOLE 30 MG: 15 TABLET ORAL at 21:00

## 2020-05-21 RX ADMIN — DOCUSATE SODIUM 100 MG: 100 CAPSULE, LIQUID FILLED ORAL at 05:43

## 2020-05-21 RX ADMIN — MELATONIN 1000 UNITS: at 05:43

## 2020-05-21 RX ADMIN — REMDESIVIR 200 MG: 5 INJECTION INTRAVENOUS at 15:05

## 2020-05-21 RX ADMIN — DIVALPROEX SODIUM 500 MG: 500 TABLET, DELAYED RELEASE ORAL at 21:01

## 2020-05-21 RX ADMIN — ACETAMINOPHEN 650 MG: 325 TABLET, FILM COATED ORAL at 21:16

## 2020-05-21 RX ADMIN — QUETIAPINE FUMARATE 300 MG: 200 TABLET ORAL at 20:40

## 2020-05-21 RX ADMIN — DOCUSATE SODIUM 100 MG: 100 CAPSULE, LIQUID FILLED ORAL at 17:15

## 2020-05-21 RX ADMIN — OXYBUTYNIN CHLORIDE 10 MG: 10 TABLET, EXTENDED RELEASE ORAL at 17:15

## 2020-05-21 RX ADMIN — Medication 1000 MG: at 05:43

## 2020-05-21 RX ADMIN — SERTRALINE HYDROCHLORIDE 50 MG: 50 TABLET ORAL at 05:43

## 2020-05-21 RX ADMIN — Medication 1000 MG: at 17:15

## 2020-05-21 RX ADMIN — CALCIUM CITRATE 200 MG (950 MG) TABLET 950 MG: at 05:44

## 2020-05-21 NOTE — PROGRESS NOTES
Report received, Assumed care of pt, Assessment completed, see flow sheet. Pt resting comfortably in bed, minimal communication, pt bubbles a lot of note she is developmentally delayed, safety precautions are in place. Will monitor closely.

## 2020-05-21 NOTE — PROGRESS NOTES
REMDESIVIR    PRIOR TO administering remdesivir, I informed the patient or parent/caregiver of the following information:   · I provided them the “Fact Sheet for Patients and Parents/Caregivers”   · I informed them of therapeutic alternatives to remdesivir and the risks and benefits of those alternatives   · I informed them that they have the option to accept or refuse remdesivir   · I informed them that remdesivir is not FDA approved, but that it is authorized for use under emergency by the FDA   · I informed them of the known risks and benefits of remdesivir and discussed the extent to which such risks and benefits are unknown   All information provided and communicated was consistent with the “Fact Sheet for Patients and Parents/Caregivers”.         Yahaira James MD  Infectious Diseases

## 2020-05-21 NOTE — PROGRESS NOTES
[facility] Medicine Daily Progress Note    Date of Service  5/21/2020    Chief Complaint  brusing    Hospital Course    Patient is a 54 y.o. female with a reported history of mental retardation and schizophrenia who was sent here from group home due to bruising in multiple areas.  Patient was unable to provide history, however there was reportedly a fall the night prior to admission. On admission, was noted to have evidence of acute kidney injury, with fracture of the right 5th metatarsal, low platelets, and tested positive for COVID 19. EPS case was filed. She was started on supportive care, IVF. Patient continued to spike fevers, with worsened infiltrates on chest xray. ID was consulted, who is recommended convalescent plasma trial along with Remdesevir. ID reports that she is approved for remdesevir. Public guardian cannot consent without court order for plasma, which has been pursued.          Interval Problem Update  5/21/2020 - I reviewed the patient's chart. There were no significant overnight events. Remains hemodynamically stable. Continues to require 2L O2 NC. Tachypneic RR 25. No fevers since 5/20. Starting remdesevir today. Pending emergency court order for convalescent plasma.     > I have personally seen and examined the patient today. She appears comfortable on oxygen supplement. She is not in distress. Answers questions tangentially. States she is not short of breath. No cough. Denies any pain. Not nauseated and no vomiting. No abdominal pain.           Consultants/Specialty  Case management    Code Status  Full code    Disposition  Monitor in the [facility]  EPS case opened.    Review of Systems  ROS     Pertinent positives/negatives as mentioned above.     A complete review of systems was personally done by me, limited by mentation. All other systems were negative.         Physical Exam  Temp:  [36.9 °C (98.4 °F)-37.6 °C (99.7 °F)] 36.9 °C (98.4 °F)  Pulse:  [] 96  Resp:  [14-25] 16  BP:  (105-125)/(60-75) 124/61  SpO2:  [91 %-96 %] 95 %    Physical Exam  Vitals signs and nursing note reviewed. Exam conducted with a chaperone present.   Constitutional:       General: She is not in acute distress.     Appearance: Normal appearance. She is not diaphoretic.   HENT:      Head: Normocephalic.      Right Ear: External ear normal.      Left Ear: External ear normal.      Nose: Nose normal.      Mouth/Throat:      Mouth: Mucous membranes are moist.   Eyes:      Pupils: Pupils are equal, round, and reactive to light.   Cardiovascular:      Rate and Rhythm: Normal rate and regular rhythm.      Pulses: Normal pulses.      Heart sounds: Normal heart sounds.   Pulmonary:      Effort: Pulmonary effort is normal.      Breath sounds: Normal breath sounds.   Abdominal:      General: Abdomen is flat. Bowel sounds are normal.      Palpations: Abdomen is soft.   Musculoskeletal: Normal range of motion.         General: No swelling or deformity.   Skin:     General: Skin is warm and dry.      Capillary Refill: Capillary refill takes less than 2 seconds.      Findings: Bruising (mainly lateral right hand and lateral left foot, mild brusing chest , two small lesions right foot, minimal bruising left lateral ankle and left hand) present.      Comments: Bruising stable  Right 5th MT bryce taped   Neurological:      General: No focal deficit present.      Mental Status: She is alert.      Cranial Nerves: No cranial nerve deficit.   Psychiatric:         Mood and Affect: Mood normal.         Speech: She is noncommunicative (minimal communication, does tell me her name, can say yes and no and follows commands).         Behavior: Behavior is cooperative.      Comments: Answers questions, but circumstantial, (+) mental retardation  Cooperative       I have performed the physical examination today 5/21/2020.  In review of yesterday's note, there are no new changes except as documented above.      Fluids    Intake/Output Summary  (Last 24 hours) at 5/21/2020 1139  Last data filed at 5/20/2020 1600  Gross per 24 hour   Intake --   Output 450 ml   Net -450 ml       Laboratory  Recent Labs     05/19/20  0430 05/20/20  0523   WBC 4.1* 3.9*   RBC 3.66* 3.43*   HEMOGLOBIN 10.8* 10.1*   HEMATOCRIT 33.9* 31.8*   MCV 92.6 92.7   MCH 29.5 29.4   MCHC 31.9* 31.8*   RDW 47.7 46.8   PLATELETCT 55* 66*   MPV 9.5 9.1     Recent Labs     05/19/20  0430 05/20/20  0523   SODIUM 135 136   POTASSIUM 4.0 3.9   CHLORIDE 100 101   CO2 23 24   GLUCOSE 139* 161*   BUN 23* 23*   CREATININE 0.95 0.93   CALCIUM 8.9 8.3*     Recent Labs     05/20/20  0523   APTT 34.4   INR 0.90               Imaging  DX-CHEST-PORTABLE (1 VIEW)   Final Result      1.  Enlarged cardiac silhouette with probable mild vascular congestion.   2.  Bibasilar opacities could be related atelectasis, vascular congestion or pneumonitis.      CT-ABDOMEN-PELVIS W/O   Final Result         1. No evidence of acute inflammatory change in the abdomen or pelvis.  The study is however limited due to nonuse of intravenous contrast.      2. Airspace opacity in the right lower lobe, in keeping with pneumonia.      3. Hepatic steatosis.      4. Moderate amount of stool throughout the colon.           Assessment/Plan  * COVID-19 virus infection- (present on admission)  Assessment & Plan  -With infiltrates noted RL on CT scan and persistent hypoxia, remains on 2L O2 NC. Increasing infiltrates on chest xray with persistent fevers. Procalcitonin remains low. Last COVID positive test on 5/18.   -Suspect COVID is contributing to acute thrombocytopenia.   -continue immune support with Vitamin D, C and zinc.   -ID starting remdesivir, discussed with pharmacy who is coordinating logistics. ID also recommending starting convalescent plasma. [facility] for consent for experimental treatment. I sent the letter of necessity to the guardian 5/20/20. Per the guardian, they expect  the court to consent by 5/22.   - repeat inflammatory markers tomorrow (q48 hours).   - Hold off on prophylactic lovenox especially in setting of thrombocytopenia and bruising. Trend d-dimer.    Constipation- (present on admission)  Assessment & Plan  -resolved. Having regular BM. Continue bowel regimen.     Thrombocytopenia (HCC)- (present on admission)  Assessment & Plan  -Suspect this is related to COVID/acute viral infection.   -No evidence of splenomegaly on exam or CT. No sign of hemolysis on peripheral and bili WNL.  HIV and hepatitis negative. Haptoglobin normal. Platelet count stable. Continue to monitor.     KYLE (acute kidney injury) (HCC)- (present on admission)  Assessment & Plan  -Resolved. Improved with IVF.   -continue to follow.   - avoid nephrotoxins, and continue to renally dose all medications.    Bruising  Assessment & Plan  Likely from trauma, reported fall prior to admission  Thrombocytopenia likely contributing  5th MT fracture found at [facility]  EPS case pending    Diabetes ([facility])- (present on admission)  Assessment & Plan  -Suspect type 2 chronic.  -continue to hold metformin for now. Continue SSI. Accu-Cheks before meals and at bedtime. Goal to keep BG between 140-180 per 2019 ADA guidelines.      Moderate intellectual disability- (present on admission)  Assessment & Plan  -Per group home at baseline    Schizophrenia (HCC)- (present on admission)  Assessment & Plan  -Continue current outpatient psych meds as tolerated.       VTE prophylaxis: SCD

## 2020-05-21 NOTE — CARE PLAN
Problem: Skin Integrity  Goal: Risk for impaired skin integrity will decrease  Note: Skin is assessed for integrity throughout the shift. Pt is encouraged to reposition and is turned at least every 2 hours. Pt is kept dry and clean.       Problem: Respiratory:  Goal: Respiratory status will improve  Note: Respiratory rate and rhythm assessed and monitored. Oxygenation and saturation monitored and titrated as ordered. Patient positioned optimally. Collaboration with RT in place.

## 2020-05-21 NOTE — PROGRESS NOTES
Infectious Disease Progress Note    Author: Yahaira James M.D. Date & Time of service: 2020  9:06 AM       Chief Complaint:  COVID-19 pneumonia      Interval History:    101.9, O2 2 L NC   AF, O2 2 L NC, pt has difficulty communicating but was alert and appeared to be in no distress.    100.5, O2 2 L NC, fevers and inflammatory labs have all increased.       Review of Systems:  Review of Systems   Unable to perform ROS: Medical condition       Hemodynamics:  Temp (24hrs), Av.2 °C (98.9 °F), Min:36.9 °C (98.4 °F), Max:37.6 °C (99.7 °F)  Temperature: 36.9 °C (98.4 °F)  Pulse  Av.4  Min: 65  Max: 123   Blood Pressure: 109/68       Physical Exam:  Physical Exam  Constitutional:       Appearance: Normal appearance.   Cardiovascular:      Rate and Rhythm: Normal rate and regular rhythm.      Heart sounds: Normal heart sounds.   Pulmonary:      Effort: Pulmonary effort is normal.      Breath sounds: Normal breath sounds.   Abdominal:      General: Abdomen is flat. Bowel sounds are normal.      Palpations: Abdomen is soft.   Skin:     Findings: Bruising present.   Neurological:      General: No focal deficit present.      Mental Status: She is alert.   Psychiatric:         Mood and Affect: Mood normal.         Behavior: Behavior normal.         Meds:    Current Facility-Administered Medications:   •  zinc sulfate  •  ascorbic acid  •  vitamin D  •  insulin regular **AND** POC Blood Glucose **AND** NOTIFY MD and PharmD **AND** glucose **AND** dextrose 50%  •  ARIPiprazole  •  divalproex  •  docusate sodium  •  donepezil  •  calcium citrate  •  oxybutynin SR  •  psyllium  •  enalaprilat  •  ondansetron  •  ondansetron  •  prochlorperazine  •  promethazine  •  promethazine  •  acetaminophen  •  QUEtiapine  •  sertraline    Labs:  Recent Labs     20  0430 20  0523   WBC 4.1* 3.9*   RBC 3.66* 3.43*   HEMOGLOBIN 10.8* 10.1*   HEMATOCRIT 33.9* 31.8*   MCV 92.6 92.7   MCH 29.5 29.4   RDW  47.7 46.8   PLATELETCT 55* 66*   MPV 9.5 9.1   NEUTSPOLYS 70.20 53.70   LYMPHOCYTES 14.60* 25.70   MONOCYTES 13.80* 19.00*   EOSINOPHILS 0.00 0.00   BASOPHILS 0.20 0.30   RBCMORPHOLO #CRI  --      Recent Labs     05/19/20  0430 05/20/20  0523   SODIUM 135 136   POTASSIUM 4.0 3.9   CHLORIDE 100 101   CO2 23 24   GLUCOSE 139* 161*   BUN 23* 23*   CPKTOTAL  --  418*     Recent Labs     05/19/20  0430 05/20/20  0523   ALBUMIN 3.2 2.9*   TBILIRUBIN 0.2 <0.2   ALKPHOSPHAT 69 69   TOTPROTEIN 6.3 5.7*   ALTSGPT 34 37   ASTSGOT 75* 70*   CREATININE 0.95 0.93       Imaging:  Ct-abdomen-pelvis W/o    Result Date: 5/17/2020 5/17/2020 12:28 AM HISTORY/REASON FOR EXAM:  abd pain ? Trauma; IV contrast only POSITIVE FOR COVID-19 TESTING TECHNIQUE/EXAM DESCRIPTION: CT scan of the abdomen and pelvis without contrast. Noncontrast helical scanning was obtained from the diaphragmatic domes through the pubic symphysis. Low dose optimization technique was utilized for this CT exam including automated exposure control and adjustment of the mA and/or kV according to patient size. COMPARISON: None. FINDINGS: Lung Bases: Airspace opacity in the right lower lobe Abdomen: Within the limits of noncontrast technique, the spleen, pancreas, kidneys and adrenal glands are unremarkable in appearance. Hepatic steatosis. The gallbladder is unremarkable and there is no biliary dilatation. There is no bowel wall thickening or abnormal dilatation. The abdominal aorta is normal in caliber. Moderate amount of stool throughout the colon. Pelvis: No obvious abnormality seen in the pelvic structures but evaluation limited on CT. No lymph node enlargement. No free fluid, or free air in the abdomen or pelvis. No aggressive bone lesions are seen. ________________________________    1. No evidence of acute inflammatory change in the abdomen or pelvis.  The study is however limited due to nonuse of intravenous contrast. 2. Airspace opacity in the right lower lobe,  in keeping with pneumonia. 3. Hepatic steatosis. 4. Moderate amount of stool throughout the colon.    Dx-chest-portable (1 View)    Result Date: 5/20/2020 5/20/2020 8:08 AM HISTORY/REASON FOR EXAM:  Fever TECHNIQUE/EXAM DESCRIPTION AND NUMBER OF VIEWS: Single portable view of the chest. COMPARISON: 5/16/2020 FINDINGS: The mediastinal and cardiac silhouette is mildly enlarged. There are perihilar areas of bronchial wall thickening. There is patchy perihilar and lower lobe parenchymal opacity. There may be trace amounts of pleural fluid. There is no visible pneumothorax. Bones are stable. There is a questionable area of sclerosis involving the left anterior mid rib versus old trauma.     1.  Enlarged cardiac silhouette with probable mild vascular congestion. 2.  Bibasilar opacities could be related atelectasis, vascular congestion or pneumonitis.    Dx-chest-portable (1 View)    Result Date: 5/16/2020 5/16/2020 3:16 PM HISTORY/REASON FOR EXAM:  Chest Pain. Hypotension, recent fall. TECHNIQUE/EXAM DESCRIPTION AND NUMBER OF VIEWS: Single portable view of the chest. COMPARISON: None FINDINGS: Cardiomediastinal contour is within normal limits. Lungs show hypoinflation. Ill-defined increased opacity at both lung bases. No pleural fluid collection or pneumothorax. No major bony abnormality is seen.     Hypoinflation and mild bibasilar atelectasis.  Developing pneumonia is difficult to exclude.    Dx-foot-complete 3+ Right    Result Date: 5/16/2020 5/16/2020 3:16 PM HISTORY/REASON FOR EXAM:  Right foot pain and bruising after fall last evening. TECHNIQUE/EXAM DESCRIPTION AND NUMBER OF VIEWS: 3 views of the RIGHT foot. COMPARISON:  None. FINDINGS: There is an acute oblique nondisplaced fracture of the diaphysis/distal metaphysis of the proximal phalanx of the right 5th toe. No articular communication is present. No dislocation is present. No other fracture is identified.     1.  Acute oblique nondisplaced fracture of the  "diaphysis/distal metaphysis of the proximal phalanx of the right 5th toe. 2.  No dislocation.    Dx-hand 3+ Right    Result Date: 5/16/2020 5/16/2020 3:16 PM HISTORY/REASON FOR EXAM: Right hand pain after fall last evening with TECHNIQUE/EXAM DESCRIPTION AND NUMBER OF VIEWS:  3 views of the RIGHT hand. COMPARISON: None FINDINGS: No fracture, dislocation, or acute joint abnormality is present. Pulse oximeter is present on the distal aspect of the right index finger.     Negative limited RIGHT hand series.      Micro:  Results     Procedure Component Value Units Date/Time    BLOOD CULTURE [[id number]] Collected:  05/19/20 0441    Order Status:  Completed Specimen:  Blood from Peripheral Updated:  05/20/20 0717     Significant Indicator NEG     Source BLD     Site PERIPHERAL     Culture Result No Growth  Note: Blood cultures are incubated for 5 days and  are monitored continuously.Positive blood cultures  are called to the RN and reported as soon as  they are identified.      Narrative:       Droplet, Contact, and Eye [id number] NEGRETE ROXY  A  Per Hospital Policy: Only change Specimen Src: to \"Line\" if  specified by physician order.  Left Wrist    BLOOD CULTURE [[id number]] Collected:  05/19/20 0441    Order Status:  Completed Specimen:  Blood from Peripheral Updated:  05/20/20 0717     Significant Indicator NEG     Source BLD     Site PERIPHERAL     Culture Result No Growth  Note: Blood cultures are incubated for 5 days and  are monitored continuously.Positive blood cultures  are called to the RN and reported as soon as  they are identified.      Narrative:       Droplet, Contact, and Eye [id number] NEGRETE ROXY  A  Per Hospital Policy: Only change Specimen Src: to \"Line\" if  specified by physician order.  Right Hand    BLOOD CULTURE [[id number]]     Order Status:  Canceled Specimen:  Blood from Peripheral     BLOOD CULTURE [[id number]]     Order Status:  Canceled Specimen:  Blood from " Peripheral     SARS-CoV-2, PCR (In-House) [[id number]]  (Abnormal) Collected:  05/18/20 0955    Order Status:  Completed Updated:  05/18/20 1129     SARS-CoV-2 Source NP Swab     SARS-CoV-2 by PCR DETECTED     Comment: **The [employer] GeneXpert Xpress SARS-CoV-2 Test has been made available for  use under the Emergency Use Authorization (EUA) only.         Narrative:       Droplet, Contact, and Eye [id number] REFUGIO PRICE    COVID/SARS CoV-2 [[id number]] Collected:  05/18/20 0955    Order Status:  Completed Specimen:  Respirate from Nasopharyngeal Updated:  05/18/20 1018     COVID Order Status Received    Narrative:       Droplet, Contact, and Eye [id number] REFUGIO PRICE          Assessment:  Active Hospital Problems    Diagnosis   • *COVID-19 virus infection [U07.1]   • Thrombocytopenia (HCC) [D69.6]   • Constipation [K59.00]   • KYLE (acute kidney injury) (HCC) [N17.9]   • Diabetes (HCC) [E11.9]   • Moderate intellectual disability [F71]   • Schizophrenia (HCC) [F20.9]     Interval 24 hours:      AF, O2 2 L NC  Labs reviewed  Micro reviewed    Patient with limited verbalization ability due to chronic condition but appears to be in no distress.  Per nurse she does desat when her oxygen is removed.  Will start Remdesivir today .     Hospital Course/Assessment:   Erlinda Verde is a 54 y.o. female with a history of mild developmental delay and schizophrenia, long-term resident of a group home, brought to the ER due to multiple new areas of bruising, also found to have an oxygen requirement and diagnosed with COVID-19 on 5/16.     Purplish skin lesions that appear bruise-like, most prominent over right fourth and fifth toes, extending proximally to the distal dorsal foot, as well as (symmetrically in some respects) the right fifth finger.  Also noted smaller similar patches right forearm, left arm.  There was suspicion that these lesions may be secondary to COVID-19, however, she does have an acute  fracture underlying the right fifth toe supporting that these may in fact be bruises.     Patient also noted to have thrombocytopenia (appears to be acute, not noted on labs from 2018), potentially secondary to COVID-19.     Pertinent Diagnoses:  COVID-19 pneumonia-chest x-ray unremarkable but opacities noted in right lower lobe on CT of abdomen/pelvis  Acute hypoxemic respiratory failure  Fevers- improved this am   Multiple bruises, right fifth toe acute fracture  Thrombocytopenia, likely acute-ongoing, stable  Transaminitis, mild and stable  Leukopenia - new   Developmental delay, minimally verbal   Schizophrenia  KYLE-improved    Inflammatory markers  IL-6 - 3.6   CRP 7.05 -> 10.71   Ferritin 269 -> 463    -> 260   D Dimer 0.27 -> 0.69 -> 0.55   Troponin 16 / 5/16   PCT 0.11 -> 0.26      Screening  TB quant-pending  Hep B core antibody total- neg  Hep B surface antigen- neg   Hep C antibody- neg   HIV - neg   ABO-  B+     Plan:      -Continue supportive care which is only known proven therapy for COVID-19 at this time-oxygen supplementation, self-proning, judicious use of IV fluids  -Monitor inflammatory markers every 48 hours   -Monitor d-dimer with low threshold to start therapeutic anticoagulation   -Follow-up blood culture-no growth to date  -Hospitalist Dr. Hayden discussed starting RDV with public guardian on 5/20 and has approval - will start remdesivir today and anticipate 5 day course   -Once patient is on RDV she will need daily CMP to monitor renal and liver function and ensure no new toxicity   -Discussion also underway for convalescent plasma but this will require court order as it is a trial therapy      Discussed with Dr. Hayden and ID pharmacy.   ID will follow.

## 2020-05-21 NOTE — CARE PLAN
Pt had an incontinent episode of clear yellow urine, pt turned and cleaned, linens changed. Swallowed pm meds without any evidence of chocking, pt blood sugar was wdp [id number] not requiring coverage. Pt maintaing oxygen sats greater than 94 on RA. Will cont to monitor pt frequently

## 2020-05-22 LAB
ALBUMIN SERPL BCP-MCNC: 2.7 G/DL (ref 3.2–4.9)
ALBUMIN/GLOB SERPL: 1 G/DL
ALP SERPL-CCNC: 75 U/L (ref 30–99)
ALT SERPL-CCNC: 26 U/L (ref 2–50)
ANION GAP SERPL CALC-SCNC: 10 MMOL/L (ref 7–16)
APTT PPP: 33.7 SEC (ref 24.7–36)
AST SERPL-CCNC: 39 U/L (ref 12–45)
BASOPHILS # BLD AUTO: 0.3 % (ref 0–1.8)
BASOPHILS # BLD: 0.01 K/UL (ref 0–0.12)
BILIRUB CONJ SERPL-MCNC: <0.2 MG/DL (ref 0.1–0.5)
BILIRUB INDIRECT SERPL-MCNC: ABNORMAL MG/DL (ref 0–1)
BILIRUB SERPL-MCNC: 0.2 MG/DL (ref 0.1–1.5)
BUN SERPL-MCNC: 27 MG/DL (ref 8–22)
CALCIUM SERPL-MCNC: 8.3 MG/DL (ref 8.5–10.5)
CHLORIDE SERPL-SCNC: 100 MMOL/L (ref 96–112)
CK SERPL-CCNC: 143 U/L (ref 0–154)
CO2 SERPL-SCNC: 24 MMOL/L (ref 20–33)
CREAT SERPL-MCNC: 0.75 MG/DL (ref 0.5–1.4)
CRP SERPL HS-MCNC: 6.49 MG/DL (ref 0–0.75)
D DIMER PPP IA.FEU-MCNC: 0.76 UG/ML (FEU) (ref 0–0.5)
EKG IMPRESSION: NORMAL
EKG IMPRESSION: NORMAL
EOSINOPHIL # BLD AUTO: 0 K/UL (ref 0–0.51)
EOSINOPHIL NFR BLD: 0 % (ref 0–6.9)
ERYTHROCYTE [DISTWIDTH] IN BLOOD BY AUTOMATED COUNT: 45.7 FL (ref 35.9–50)
FERRITIN SERPL-MCNC: 552 NG/ML (ref 10–291)
FIBRINOGEN PPP-MCNC: 521 MG/DL (ref 215–460)
GLOBULIN SER CALC-MCNC: 2.8 G/DL (ref 1.9–3.5)
GLUCOSE BLD-MCNC: 105 MG/DL (ref 65–99)
GLUCOSE BLD-MCNC: 122 MG/DL (ref 65–99)
GLUCOSE BLD-MCNC: 165 MG/DL (ref 65–99)
GLUCOSE BLD-MCNC: 91 MG/DL (ref 65–99)
GLUCOSE SERPL-MCNC: 133 MG/DL (ref 65–99)
HCT VFR BLD AUTO: 31.5 % (ref 37–47)
HGB BLD-MCNC: 10 G/DL (ref 12–16)
IMM GRANULOCYTES # BLD AUTO: 0.12 K/UL (ref 0–0.11)
IMM GRANULOCYTES NFR BLD AUTO: 3.4 % (ref 0–0.9)
INR PPP: 0.92 (ref 0.87–1.13)
LDH SERPL L TO P-CCNC: 278 U/L (ref 107–266)
LYMPHOCYTES # BLD AUTO: 1.15 K/UL (ref 1–4.8)
LYMPHOCYTES NFR BLD: 32.2 % (ref 22–41)
MAGNESIUM SERPL-MCNC: 2.1 MG/DL (ref 1.5–2.5)
MCH RBC QN AUTO: 29.1 PG (ref 27–33)
MCHC RBC AUTO-ENTMCNC: 31.7 G/DL (ref 33.6–35)
MCV RBC AUTO: 91.6 FL (ref 81.4–97.8)
MONOCYTES # BLD AUTO: 0.72 K/UL (ref 0–0.85)
MONOCYTES NFR BLD AUTO: 20.2 % (ref 0–13.4)
NEUTROPHILS # BLD AUTO: 1.57 K/UL (ref 2–7.15)
NEUTROPHILS NFR BLD: 43.9 % (ref 44–72)
NRBC # BLD AUTO: 0.02 K/UL
NRBC BLD-RTO: 0.6 /100 WBC
NT-PROBNP SERPL IA-MCNC: 181 PG/ML (ref 0–125)
PLATELET # BLD AUTO: 96 K/UL (ref 164–446)
PMV BLD AUTO: 8.5 FL (ref 9–12.9)
POTASSIUM SERPL-SCNC: 3.6 MMOL/L (ref 3.6–5.5)
PROCALCITONIN SERPL-MCNC: 0.17 NG/ML
PROT SERPL-MCNC: 5.5 G/DL (ref 6–8.2)
PROTHROMBIN TIME: 12.6 SEC (ref 12–14.6)
RBC # BLD AUTO: 3.44 M/UL (ref 4.2–5.4)
SODIUM SERPL-SCNC: 134 MMOL/L (ref 135–145)
TROPONIN T SERPL-MCNC: 11 NG/L (ref 6–19)
WBC # BLD AUTO: 3.6 K/UL (ref 4.8–10.8)

## 2020-05-22 PROCEDURE — 84145 PROCALCITONIN (PCT): CPT

## 2020-05-22 PROCEDURE — 770020 HCHG ROOM/CARE - TELE (206)

## 2020-05-22 PROCEDURE — 93005 ELECTROCARDIOGRAM TRACING: CPT | Performed by: INTERNAL MEDICINE

## 2020-05-22 PROCEDURE — 99233 SBSQ HOSP IP/OBS HIGH 50: CPT | Mod: CS | Performed by: INTERNAL MEDICINE

## 2020-05-22 PROCEDURE — 82248 BILIRUBIN DIRECT: CPT

## 2020-05-22 PROCEDURE — 700102 HCHG RX REV CODE 250 W/ 637 OVERRIDE(OP): Performed by: INTERNAL MEDICINE

## 2020-05-22 PROCEDURE — 82962 GLUCOSE BLOOD TEST: CPT | Mod: 91

## 2020-05-22 PROCEDURE — 85384 FIBRINOGEN ACTIVITY: CPT

## 2020-05-22 PROCEDURE — 85730 THROMBOPLASTIN TIME PARTIAL: CPT

## 2020-05-22 PROCEDURE — 700102 HCHG RX REV CODE 250 W/ 637 OVERRIDE(OP): Performed by: HOSPITALIST

## 2020-05-22 PROCEDURE — 83735 ASSAY OF MAGNESIUM: CPT

## 2020-05-22 PROCEDURE — A9270 NON-COVERED ITEM OR SERVICE: HCPCS | Performed by: HOSPITALIST

## 2020-05-22 PROCEDURE — 80053 COMPREHEN METABOLIC PANEL: CPT

## 2020-05-22 PROCEDURE — 83615 LACTATE (LD) (LDH) ENZYME: CPT

## 2020-05-22 PROCEDURE — 83880 ASSAY OF NATRIURETIC PEPTIDE: CPT

## 2020-05-22 PROCEDURE — 86140 C-REACTIVE PROTEIN: CPT

## 2020-05-22 PROCEDURE — 85610 PROTHROMBIN TIME: CPT

## 2020-05-22 PROCEDURE — A9270 NON-COVERED ITEM OR SERVICE: HCPCS | Performed by: INTERNAL MEDICINE

## 2020-05-22 PROCEDURE — 700112 HCHG RX REV CODE 229: Performed by: HOSPITALIST

## 2020-05-22 PROCEDURE — 85379 FIBRIN DEGRADATION QUANT: CPT

## 2020-05-22 PROCEDURE — 85025 COMPLETE CBC W/AUTO DIFF WBC: CPT

## 2020-05-22 PROCEDURE — 93010 ELECTROCARDIOGRAM REPORT: CPT | Performed by: INTERNAL MEDICINE

## 2020-05-22 PROCEDURE — 84484 ASSAY OF TROPONIN QUANT: CPT

## 2020-05-22 PROCEDURE — 82550 ASSAY OF CK (CPK): CPT

## 2020-05-22 PROCEDURE — 700105 HCHG RX REV CODE 258: Performed by: INTERNAL MEDICINE

## 2020-05-22 PROCEDURE — 82728 ASSAY OF FERRITIN: CPT

## 2020-05-22 RX ADMIN — INSULIN HUMAN 1 UNITS: 100 INJECTION, SOLUTION PARENTERAL at 19:56

## 2020-05-22 RX ADMIN — Medication 1000 MG: at 05:51

## 2020-05-22 RX ADMIN — MELATONIN 1000 UNITS: at 05:51

## 2020-05-22 RX ADMIN — CALCIUM CITRATE 200 MG (950 MG) TABLET 950 MG: at 05:51

## 2020-05-22 RX ADMIN — SERTRALINE HYDROCHLORIDE 50 MG: 50 TABLET ORAL at 05:51

## 2020-05-22 RX ADMIN — DOCUSATE SODIUM 100 MG: 100 CAPSULE, LIQUID FILLED ORAL at 05:51

## 2020-05-22 RX ADMIN — Medication 1000 MG: at 17:44

## 2020-05-22 RX ADMIN — Medication 1 PACKET: at 06:00

## 2020-05-22 RX ADMIN — DOCUSATE SODIUM 100 MG: 100 CAPSULE, LIQUID FILLED ORAL at 17:44

## 2020-05-22 RX ADMIN — ZINC SULFATE 220 MG (50 MG) CAPSULE 220 MG: CAPSULE at 05:51

## 2020-05-22 RX ADMIN — OXYBUTYNIN CHLORIDE 10 MG: 10 TABLET, EXTENDED RELEASE ORAL at 17:44

## 2020-05-22 RX ADMIN — REMDESIVIR 100 MG: 5 INJECTION INTRAVENOUS at 14:01

## 2020-05-22 RX ADMIN — DONEPEZIL HYDROCHLORIDE 10 MG: 5 TABLET, FILM COATED ORAL at 17:44

## 2020-05-22 RX ADMIN — QUETIAPINE FUMARATE 300 MG: 200 TABLET ORAL at 19:54

## 2020-05-22 RX ADMIN — ARIPIPRAZOLE 30 MG: 15 TABLET ORAL at 19:54

## 2020-05-22 RX ADMIN — DIVALPROEX SODIUM 500 MG: 500 TABLET, DELAYED RELEASE ORAL at 19:54

## 2020-05-22 ASSESSMENT — PAIN SCALES - WONG BAKER
WONGBAKER_NUMERICALRESPONSE: DOESN'T HURT AT ALL

## 2020-05-22 NOTE — DIETARY
Nutrition Services: Decrease in PO intake noted.    Pt is a 54 y.o. female with Dx: KYLE (acute kidney injury) (HCC), COVID-19 virus detected    Admit day 6.  RD has been informally monitoring pt's PO intake per recommended COVID-19 screening guidelines.   PO intake was % from dinner 5/17 through D 5/19. PO intake has since decreased to 25-50% since lunch 5/20.  Will add oral nutrition supplements to meals in an effort to increase daily nutrient intake.     Please encourage pt to eat and drink. Promote Boost Glucose Control supplements for additional nutrition.    RD following.

## 2020-05-22 NOTE — CARE PLAN
Problem: Nutritional:  Goal: Achieve adequate nutritional intake  Description: Patient will consume at least 50% of meals/supplements.  Outcome: PROGRESSING SLOWER THAN EXPECTED

## 2020-05-22 NOTE — DISCHARGE PLANNING
Anticipated Discharge Disposition: [facility]    Action: per chart review, T38.3, 2L NC O2 5/21. Receiving RDV 5/21-5/26, daily CMP while on RDV    Barriers to Discharge:   medical clearance  covid + 5/16, 5/18  Open APS referral  Leadership aware    Plan: return to

## 2020-05-22 NOTE — PROGRESS NOTES
[facility] Medicine Daily Progress Note    Date of Service  5/22/2020    Chief Complaint  brusing    Hospital Course    Patient is a 54 y.o. female with a reported history of mental retardation and schizophrenia who was sent here from group home due to bruising in multiple areas.  Patient was unable to provide history, however there was reportedly a fall the night prior to admission. On admission, was noted to have evidence of acute kidney injury, with fracture of the right 5th metatarsal, low platelets, and tested positive for COVID 19. EPS case was filed. She was started on supportive care, IVF. Patient continued to spike fevers, with worsened infiltrates on chest xray. ID was consulted, who is recommended convalescent plasma trial along with Remdesevir. ID reports that she is approved for remdesevir. Public guardian cannot consent without court order for plasma, which has been pursued. Patient has started on Remdesivir.          Interval Problem Update  5/22/2020 - I reviewed the patient's chart today. Uneventful night. VSS. Spiked a fever last night, Tmax 38.3C . Saturating well on 2L O2 NC. Na 134. CPK down to 143. D dimer 0.76. . Troponin remains low. LFT remains normal. Day 2 of Remdesivir course. We have received news from her public guardian that the court has approved/given consent for her to receive convalescent plasma.     > I have personally seen and examined the patient today. Awake, answers questions but tangential. States she has no appetite today, but requesting for poptarts. Per RN, no cough. Not short of breath. No nausea or vomiting. No diarrhea.        Consultants/Specialty  Case management  ID    Code Status  Full code    Disposition  Monitor in the [facility]  EPS case opened.    Review of Systems  ROS     Pertinent positives/negatives as mentioned above.     A complete review of systems was personally done by me, limited by mentation. All other systems were negative.         Physical  Exam  Temp:  [37.2 °C (99 °F)-38.3 °C (101 °F)] 37.2 °C (99 °F)  Pulse:  [] 83  Resp:  [16-23] 16  BP: (114-120)/(67-73) 120/67  SpO2:  [91 %-96 %] 91 %    Physical Exam  Vitals signs and nursing note reviewed. Exam conducted with a chaperone present.   Constitutional:       General: She is not in acute distress.     Appearance: Normal appearance. She is not diaphoretic.   HENT:      Head: Normocephalic.      Right Ear: External ear normal.      Left Ear: External ear normal.      Nose: Nose normal.      Mouth/Throat:      Mouth: Mucous membranes are moist.   Eyes:      Pupils: Pupils are equal, round, and reactive to light.   Cardiovascular:      Rate and Rhythm: Normal rate and regular rhythm.      Pulses: Normal pulses.      Heart sounds: Normal heart sounds.   Pulmonary:      Effort: Pulmonary effort is normal.      Breath sounds: Normal breath sounds.   Abdominal:      General: Abdomen is flat. Bowel sounds are normal.      Palpations: Abdomen is soft.   Musculoskeletal: Normal range of motion.         General: No swelling or deformity.   Skin:     General: Skin is warm and dry.      Capillary Refill: Capillary refill takes less than 2 seconds.      Findings: Bruising (mainly lateral right hand and lateral left foot, mild brusing chest , two small lesions right foot, minimal bruising left lateral ankle and left hand) present.      Comments: Bruising stable  Right 5th MT bryce taped   Neurological:      General: No focal deficit present.      Mental Status: She is alert.      Cranial Nerves: No cranial nerve deficit.   Psychiatric:         Speech: She is communicative (minimal communication, does tell me her name, can say yes and no and follows commands).      Comments: Answers questions but tangential/circumstantial.   Impaired judgment and though content.  (+) mental retardation  Cooperative but elena           Fluids    Intake/Output Summary (Last 24 hours) at 5/22/2020 1150  Last data filed at  5/21/2020 1750  Gross per 24 hour   Intake 970 ml   Output 250 ml   Net 720 ml       Laboratory  Recent Labs     05/20/20  0523 05/22/20  0515   WBC 3.9* 3.6*   RBC 3.43* 3.44*   HEMOGLOBIN 10.1* 10.0*   HEMATOCRIT 31.8* 31.5*   MCV 92.7 91.6   MCH 29.4 29.1   MCHC 31.8* 31.7*   RDW 46.8 45.7   PLATELETCT 66* 96*   MPV 9.1 8.5*     Recent Labs     05/20/20  0523 05/21/20  1315 05/22/20  0515   SODIUM 136 138 134*   POTASSIUM 3.9 4.0 3.6   CHLORIDE 101 102 100   CO2 24 25 24   GLUCOSE 161* 159* 133*   BUN 23* 23* 27*   CREATININE 0.93 0.87 0.75   CALCIUM 8.3* 8.4* 8.3*     Recent Labs     05/20/20  0523 05/22/20  0515   APTT 34.4 33.7   INR 0.90 0.92               Imaging  DX-CHEST-PORTABLE (1 VIEW)   Final Result      1.  Enlarged cardiac silhouette with probable mild vascular congestion.   2.  Bibasilar opacities could be related atelectasis, vascular congestion or pneumonitis.      CT-ABDOMEN-PELVIS W/O   Final Result         1. No evidence of acute inflammatory change in the abdomen or pelvis.  The study is however limited due to nonuse of intravenous contrast.      2. Airspace opacity in the right lower lobe, in keeping with pneumonia.      3. Hepatic steatosis.      4. Moderate amount of stool throughout the colon.           Assessment/Plan  * COVID-19 virus infection- (present on admission)  Assessment & Plan  -With infiltrates noted RL on CT scan and persistent hypoxia, remains on 2L O2 NC. Increasing infiltrates on chest xray with persistent fevers. Procalcitonin remains low. Last COVID positive test on 5/18.   -Suspect COVID is contributing to acute thrombocytopenia.   -continue immune support with Vitamin D, C and zinc.   -Receiving remdesevir, Day 2 of 5 today. ID also recommending starting convalescent plasma, and public guardian has successfully obtained court consent to proceed with treatment.   - trend inflammatory markers q48 hours (next on 5/24), so far stable. Trend CMP daily to monitor for  remdesevir toxicity.  - Hold off on prophylactic lovenox especially in setting of thrombocytopenia and bruising. Trend d-dimer.    Constipation- (present on admission)  Assessment & Plan  -resolved. Having regular BM. Continue bowel regimen.     Thrombocytopenia (HCC)- (present on admission)  Assessment & Plan  -Suspect this is related to COVID/acute viral infection. Counts improving.   -No evidence of splenomegaly on exam or CT. No sign of hemolysis on peripheral and bili WNL.  HIV and hepatitis negative. Haptoglobin normal. Continue to monitor.     KYLE (acute kidney injury) (HCC)- (present on admission)  Assessment & Plan  -Resolved. Improved with IVF.   -continue to follow.   - avoid nephrotoxins, and continue to renally dose all medications.    Bruising  Assessment & Plan  Likely from trauma, reported fall prior to admission  Thrombocytopenia likely contributing  5th MT fracture [facility] at [facility]  EPS case pending    Diabetes (HCC)- (present on admission)  Assessment & Plan  -Suspect type 2 chronic.  -continue to hold metformin for now. Continue SSI. Accu-Cheks before meals and at bedtime. Goal to keep BG between 140-180 per 2019 ADA guidelines.      Moderate intellectual disability- (present on admission)  Assessment & Plan  -Per group home at baseline    Schizophrenia (HCC)- (present on admission)  Assessment & Plan  -Continue current outpatient psych meds as tolerated.       VTE prophylaxis: SCD

## 2020-05-22 NOTE — CARE PLAN
Problem: Knowledge Deficit  Goal: Knowledge of disease process/condition, treatment plan, diagnostic tests, and medications will improve  Outcome: PROGRESSING AS EXPECTED  Goal: Knowledge of the prescribed therapeutic regimen will improve  Outcome: PROGRESSING AS EXPECTED     Problem: Discharge Barriers/Planning  Goal: Patient's continuum of care needs will be met  Outcome: PROGRESSING AS EXPECTED     Problem: Skin Integrity  Goal: Risk for impaired skin integrity will decrease  Outcome: PROGRESSING AS EXPECTED     Problem: Respiratory:  Goal: Respiratory status will improve  Outcome: PROGRESSING AS EXPECTED     Problem: Mobility  Goal: Risk for activity intolerance will decrease  5/22/2020 0408 by Lauryn Watts R.N.  Outcome: PROGRESSING AS EXPECTED  5/22/2020 0407 by Lauryn Watts R.N.  Outcome: PROGRESSING AS EXPECTED     Problem: Urinary Elimination:  Goal: Ability to reestablish a normal urinary elimination pattern will improve  Outcome: PROGRESSING AS EXPECTED

## 2020-05-22 NOTE — PROGRESS NOTES
0715 assumed care. Resting in bed and in no distress. On tele monitor. Bed in low position, call light w/in reach. Voiced no complaints at this time.

## 2020-05-23 LAB
ALBUMIN SERPL BCP-MCNC: 2.7 G/DL (ref 3.2–4.9)
ALBUMIN/GLOB SERPL: 0.9 G/DL
ALP SERPL-CCNC: 81 U/L (ref 30–99)
ALT SERPL-CCNC: 18 U/L (ref 2–50)
ANION GAP SERPL CALC-SCNC: 12 MMOL/L (ref 7–16)
AST SERPL-CCNC: 30 U/L (ref 12–45)
BILIRUB SERPL-MCNC: 0.2 MG/DL (ref 0.1–1.5)
BUN SERPL-MCNC: 28 MG/DL (ref 8–22)
CALCIUM SERPL-MCNC: 8.6 MG/DL (ref 8.5–10.5)
CHLORIDE SERPL-SCNC: 103 MMOL/L (ref 96–112)
CO2 SERPL-SCNC: 25 MMOL/L (ref 20–33)
CREAT SERPL-MCNC: 0.71 MG/DL (ref 0.5–1.4)
GLOBULIN SER CALC-MCNC: 3.1 G/DL (ref 1.9–3.5)
GLUCOSE BLD-MCNC: 117 MG/DL (ref 65–99)
GLUCOSE BLD-MCNC: 202 MG/DL (ref 65–99)
GLUCOSE SERPL-MCNC: 167 MG/DL (ref 65–99)
POTASSIUM SERPL-SCNC: 4 MMOL/L (ref 3.6–5.5)
PROT SERPL-MCNC: 5.8 G/DL (ref 6–8.2)
SODIUM SERPL-SCNC: 140 MMOL/L (ref 135–145)

## 2020-05-23 PROCEDURE — 700112 HCHG RX REV CODE 229: Performed by: HOSPITALIST

## 2020-05-23 PROCEDURE — A9270 NON-COVERED ITEM OR SERVICE: HCPCS | Performed by: HOSPITALIST

## 2020-05-23 PROCEDURE — 700102 HCHG RX REV CODE 250 W/ 637 OVERRIDE(OP): Performed by: HOSPITALIST

## 2020-05-23 PROCEDURE — 82962 GLUCOSE BLOOD TEST: CPT | Mod: 91

## 2020-05-23 PROCEDURE — A9270 NON-COVERED ITEM OR SERVICE: HCPCS | Performed by: INTERNAL MEDICINE

## 2020-05-23 PROCEDURE — 80053 COMPREHEN METABOLIC PANEL: CPT

## 2020-05-23 PROCEDURE — 700105 HCHG RX REV CODE 258: Performed by: INTERNAL MEDICINE

## 2020-05-23 PROCEDURE — 770020 HCHG ROOM/CARE - TELE (206)

## 2020-05-23 PROCEDURE — 99233 SBSQ HOSP IP/OBS HIGH 50: CPT | Mod: CS | Performed by: INTERNAL MEDICINE

## 2020-05-23 PROCEDURE — 700102 HCHG RX REV CODE 250 W/ 637 OVERRIDE(OP): Performed by: INTERNAL MEDICINE

## 2020-05-23 RX ADMIN — DONEPEZIL HYDROCHLORIDE 10 MG: 5 TABLET, FILM COATED ORAL at 17:24

## 2020-05-23 RX ADMIN — DIVALPROEX SODIUM 500 MG: 500 TABLET, DELAYED RELEASE ORAL at 20:48

## 2020-05-23 RX ADMIN — CALCIUM CITRATE 200 MG (950 MG) TABLET 950 MG: at 05:49

## 2020-05-23 RX ADMIN — Medication 1 PACKET: at 06:00

## 2020-05-23 RX ADMIN — SERTRALINE HYDROCHLORIDE 50 MG: 50 TABLET ORAL at 05:52

## 2020-05-23 RX ADMIN — QUETIAPINE FUMARATE 300 MG: 200 TABLET ORAL at 20:48

## 2020-05-23 RX ADMIN — Medication 1000 MG: at 05:49

## 2020-05-23 RX ADMIN — Medication 1000 MG: at 17:24

## 2020-05-23 RX ADMIN — DOCUSATE SODIUM 100 MG: 100 CAPSULE, LIQUID FILLED ORAL at 05:49

## 2020-05-23 RX ADMIN — ZINC SULFATE 220 MG (50 MG) CAPSULE 220 MG: CAPSULE at 05:49

## 2020-05-23 RX ADMIN — INSULIN HUMAN 2 UNITS: 100 INJECTION, SOLUTION PARENTERAL at 20:50

## 2020-05-23 RX ADMIN — OXYBUTYNIN CHLORIDE 10 MG: 10 TABLET, EXTENDED RELEASE ORAL at 17:24

## 2020-05-23 RX ADMIN — MELATONIN 1000 UNITS: at 05:49

## 2020-05-23 RX ADMIN — DOCUSATE SODIUM 100 MG: 100 CAPSULE, LIQUID FILLED ORAL at 17:24

## 2020-05-23 RX ADMIN — REMDESIVIR 100 MG: 5 INJECTION INTRAVENOUS at 13:53

## 2020-05-23 RX ADMIN — ARIPIPRAZOLE 30 MG: 15 TABLET ORAL at 20:48

## 2020-05-23 NOTE — PROGRESS NOTES
Infectious Disease Progress Note    Author: Yahaira James M.D. Date & Time of service: 2020  11:26 AM    Chief Complaint:  COVID-19 pneumonia      Interval History:    101.9, O2 2 L NC   AF, O2 2 L NC, pt has difficulty communicating but was alert and appeared to be in no distress.    100.5, O2 2 L NC, fevers and inflammatory labs have all increased.      AF, O2 2 L NC, appears to be in no distress.  Per nurse she does desat when her oxygen is removed.  Will start Remdesivir today .    101, O2 2 L NC, alert and in no obvious distress.  Patient continued on RDV.     Review of Systems:  Review of Systems   Unable to perform ROS: Medical condition       Hemodynamics:  Temp (24hrs), Av.4 °C (99.3 °F), Min:36.9 °C (98.4 °F), Max:37.9 °C (100.2 °F)  Temperature: 37 °C (98.6 °F)  Pulse  Av  Min: 65  Max: 123   Blood Pressure: 111/68       Physical Exam:  Physical Exam  Constitutional:       Appearance: Normal appearance.   Cardiovascular:      Rate and Rhythm: Normal rate and regular rhythm.      Heart sounds: Normal heart sounds.   Pulmonary:      Effort: Pulmonary effort is normal.      Breath sounds: Normal breath sounds.   Abdominal:      General: Abdomen is flat. Bowel sounds are normal.      Palpations: Abdomen is soft.   Musculoskeletal:      Right lower leg: No edema.      Left lower leg: No edema.   Skin:     Findings: Bruising present.   Neurological:      Mental Status: She is alert. Mental status is at baseline.      Comments: Moving all extremities   Psychiatric:         Mood and Affect: Mood normal.         Behavior: Behavior normal.         Meds:    Current Facility-Administered Medications:   •  [COMPLETED] Remdesivir in NS IVPB **FOLLOWED BY** Remdesivir in NS IVPB  •  zinc sulfate  •  ascorbic acid  •  vitamin D  •  insulin regular **AND** POC Blood Glucose **AND** NOTIFY MD and PharmD **AND** glucose **AND** dextrose 50%  •  ARIPiprazole  •  divalproex  •   docusate sodium  •  donepezil  •  calcium citrate  •  oxybutynin SR  •  psyllium  •  enalaprilat  •  ondansetron  •  ondansetron  •  prochlorperazine  •  promethazine  •  promethazine  •  acetaminophen  •  QUEtiapine  •  sertraline    Labs:  Recent Labs     05/22/20  0515   WBC 3.6*   RBC 3.44*   HEMOGLOBIN 10.0*   HEMATOCRIT 31.5*   MCV 91.6   MCH 29.1   RDW 45.7   PLATELETCT 96*   MPV 8.5*   NEUTSPOLYS 43.90*   LYMPHOCYTES 32.20   MONOCYTES 20.20*   EOSINOPHILS 0.00   BASOPHILS 0.30     Recent Labs     05/21/20  1315 05/22/20  0515 05/23/20  0959   SODIUM 138 134* 140   POTASSIUM 4.0 3.6 4.0   CHLORIDE 102 100 103   CO2 25 24 25   GLUCOSE 159* 133* 167*   BUN 23* 27* 28*   CPKTOTAL  --  143  --      Recent Labs     05/21/20  1315 05/22/20  0515 05/23/20  0959   ALBUMIN 3.1* 2.7* 2.7*   TBILIRUBIN 0.2 0.2 0.2   ALKPHOSPHAT 75 75 81   TOTPROTEIN 5.9* 5.5* 5.8*   ALTSGPT 31 26 18   ASTSGOT 44 39 30   CREATININE 0.87 0.75 0.71       Imaging:  Ct-abdomen-pelvis W/o    Result Date: 5/17/2020 5/17/2020 12:28 AM HISTORY/REASON FOR EXAM:  abd pain ? Trauma; IV contrast only POSITIVE FOR COVID-19 TESTING TECHNIQUE/EXAM DESCRIPTION: CT scan of the abdomen and pelvis without contrast. Noncontrast helical scanning was obtained from the diaphragmatic domes through the pubic symphysis. Low dose optimization technique was utilized for this CT exam including automated exposure control and adjustment of the mA and/or kV according to patient size. COMPARISON: None. FINDINGS: Lung Bases: Airspace opacity in the right lower lobe Abdomen: Within the limits of noncontrast technique, the spleen, pancreas, kidneys and adrenal glands are unremarkable in appearance. Hepatic steatosis. The gallbladder is unremarkable and there is no biliary dilatation. There is no bowel wall thickening or abnormal dilatation. The abdominal aorta is normal in caliber. Moderate amount of stool throughout the colon. Pelvis: No obvious abnormality seen in the  pelvic structures but evaluation limited on CT. No lymph node enlargement. No free fluid, or free air in the abdomen or pelvis. No aggressive bone lesions are seen. ________________________________    1. No evidence of acute inflammatory change in the abdomen or pelvis.  The study is however limited due to nonuse of intravenous contrast. 2. Airspace opacity in the right lower lobe, in keeping with pneumonia. 3. Hepatic steatosis. 4. Moderate amount of stool throughout the colon.    Dx-chest-portable (1 View)    Result Date: 5/20/2020 5/20/2020 8:08 AM HISTORY/REASON FOR EXAM:  Fever TECHNIQUE/EXAM DESCRIPTION AND NUMBER OF VIEWS: Single portable view of the chest. COMPARISON: 5/16/2020 FINDINGS: The mediastinal and cardiac silhouette is mildly enlarged. There are perihilar areas of bronchial wall thickening. There is patchy perihilar and lower lobe parenchymal opacity. There may be trace amounts of pleural fluid. There is no visible pneumothorax. Bones are stable. There is a questionable area of sclerosis involving the left anterior mid rib versus old trauma.     1.  Enlarged cardiac silhouette with probable mild vascular congestion. 2.  Bibasilar opacities could be related atelectasis, vascular congestion or pneumonitis.    Dx-chest-portable (1 View)    Result Date: 5/16/2020 5/16/2020 3:16 PM HISTORY/REASON FOR EXAM:  Chest Pain. Hypotension, recent fall. TECHNIQUE/EXAM DESCRIPTION AND NUMBER OF VIEWS: Single portable view of the chest. COMPARISON: None FINDINGS: Cardiomediastinal contour is within normal limits. Lungs show hypoinflation. Ill-defined increased opacity at both lung bases. No pleural fluid collection or pneumothorax. No major bony abnormality is seen.     Hypoinflation and mild bibasilar atelectasis.  Developing pneumonia is difficult to exclude.    Dx-foot-complete 3+ Right    Result Date: 5/16/2020 5/16/2020 3:16 PM HISTORY/REASON FOR EXAM:  Right foot pain and bruising after fall last evening.  "TECHNIQUE/EXAM DESCRIPTION AND NUMBER OF VIEWS: 3 views of the RIGHT foot. COMPARISON:  None. FINDINGS: There is an acute oblique nondisplaced fracture of the diaphysis/distal metaphysis of the proximal phalanx of the right 5th toe. No articular communication is present. No dislocation is present. No other fracture is identified.     1.  Acute oblique nondisplaced fracture of the diaphysis/distal metaphysis of the proximal phalanx of the right 5th toe. 2.  No dislocation.    Dx-hand 3+ Right    Result Date: 5/16/2020 5/16/2020 3:16 PM HISTORY/REASON FOR EXAM: Right hand pain after fall last evening with TECHNIQUE/EXAM DESCRIPTION AND NUMBER OF VIEWS:  3 views of the RIGHT hand. COMPARISON: None FINDINGS: No fracture, dislocation, or acute joint abnormality is present. Pulse oximeter is present on the distal aspect of the right index finger.     Negative limited RIGHT hand series.      Micro:  Results     Procedure Component Value Units Date/Time    BLOOD CULTURE [[id number]] Collected:  05/19/20 0441    Order Status:  Completed Specimen:  Blood from Peripheral Updated:  05/20/20 0717     Significant Indicator NEG     Source BLD     Site PERIPHERAL     Culture Result No Growth  Note: Blood cultures are incubated for 5 days and  are monitored continuously.Positive blood cultures  are called to the RN and reported as soon as  they are identified.      Narrative:       Droplet, Contact, and Eye [id number] CDAEN FIGUEROA  Per Hospital Policy: Only change Specimen Src: to \"Line\" if  specified by physician order.  Left Wrist    BLOOD CULTURE [[id number]] Collected:  05/19/20 0441    Order Status:  Completed Specimen:  Blood from Peripheral Updated:  05/20/20 0717     Significant Indicator NEG     Source BLD     Site PERIPHERAL     Culture Result No Growth  Note: Blood cultures are incubated for 5 days and  are monitored continuously.Positive blood cultures  are called to the RN and reported as soon as  they are " "identified.      Narrative:       Droplet, Contact, and Eye [id number] CADEN FIGUEROA  Per Hospital Policy: Only change Specimen Src: to \"Line\" if  specified by physician order.  Right Hand    BLOOD CULTURE [[id number]]     Order Status:  Canceled Specimen:  Blood from Peripheral     BLOOD CULTURE [[id number]]     Order Status:  Canceled Specimen:  Blood from Peripheral     SARS-CoV-2, PCR (In-House) [[id number]]  (Abnormal) Collected:  05/18/20 0955    Order Status:  Completed Updated:  05/18/20 1129     SARS-CoV-2 Source NP Swab     SARS-CoV-2 by PCR DETECTED     Comment: **The [employer] GeneXpert Xpress SARS-CoV-2 Test has been made available for  use under the Emergency Use Authorization (EUA) only.         Narrative:       Droplet, Contact, and Eye [id number] REFUGIO PRICE    COVID/SARS CoV-2 [[id number]] Collected:  05/18/20 0955    Order Status:  Completed Specimen:  Respirate from Nasopharyngeal Updated:  05/18/20 1018     COVID Order Status Received    Narrative:       Droplet, Contact, and Eye [id number] HUFF ALBERT          Assessment:  Active Hospital Problems    Diagnosis   • *COVID-19 virus infection [U07.1]   • Thrombocytopenia (HCC) [D69.6]   • Constipation [K59.00]   • KYLE (acute kidney injury) (HCC) [N17.9]   • Diabetes (HCC) [E11.9]   • Moderate intellectual disability [F71]   • Schizophrenia (HCC) [F20.9]     Interval 24 hours:      100.2, O2 2 L NC  Labs reviewed  Micro reviewed    Pt alert and appears to be no distress.  He still requiring oxygen and desats when attempting to wean.  Continued on Remdesivir.      Hospital Course/Assessment:   Erlinda Verde is a 54 y.o. female with a history of mild developmental delay and schizophrenia, long-term resident of a group home, brought to the ER due to multiple new areas of bruising, also found to have an oxygen requirement and diagnosed with COVID-19 on 5/16.     Purplish skin lesions that appear bruise-like, most " prominent over right fourth and fifth toes, extending proximally to the distal dorsal foot, as well as (symmetrically in some respects) the right fifth finger.  Also noted smaller similar patches right forearm, left arm.  There was suspicion that these lesions may be secondary to COVID-19, however, she does have an acute fracture underlying the right fifth toe supporting that these may in fact be bruises.     Patient also noted to have thrombocytopenia (appears to be acute, not noted on labs from 2018), potentially secondary to COVID-19.     Pertinent Diagnoses:  COVID-19 pneumonia-chest x-ray unremarkable but opacities noted in right lower lobe on CT of abdomen/pelvis  Acute hypoxemic respiratory failure  Fevers- improved this am   Multiple bruises, right fifth toe acute fracture  Thrombocytopenia, likely acute-ongoing, stable  Transaminitis, mild and stable, now improved   Leukopenia - new, ongoing, likely due current illness - worse today   - started prior to RDV being initiated   Developmental delay, minimally verbal   Schizophrenia  KYLE-improved     Inflammatory markers  IL-6 - 3.6   CRP 7.05 -> 10.71   Ferritin 269 -> 463    -> 260   D Dimer 0.27 -> 0.69 -> 0.55   Troponin 16 / 5/16   PCT 0.11 -> 0.26      Screening  TB quant-pending  Hep B core antibody total- neg  Hep B surface antigen- neg   Hep C antibody- neg   HIV - neg   ABO-  B+     Plan:      -Continue supportive care which is only known proven therapy for COVID-19 at this time-oxygen supplementation, self-proning, judicious use of IV fluids  -Monitor inflammatory markers every 48 hours   -Monitor d-dimer with low threshold to start therapeutic anticoagulation   -Follow-up blood culture-no growth to date  -Hospitalist Dr. Hayden discussed starting RDV with public guardian on 5/20 and has approval. I confirmed this personally with Guardian (Ann) and after discussing RDV as per FDA emergency use and risk/benefits, placed note in chart. RDV  started on 5/21 and anticipate 5 day course   -Once patient is on RDV she will need daily CMP to monitor renal and liver function and ensure no new toxicity -LFTs and creatinine within normal limits today   -Received report that the patient had received court permission for convalescent plasma.  She is very stable from a respiratory standpoint so do not plan to initiate unless she decompensates.  However, did obtain consent so that we are prepared should her condition deteriorate.       Discussed with Dr. Hayden.  ID will follow.

## 2020-05-23 NOTE — CARE PLAN
Problem: Safety  Goal: Will remain free from falls  Outcome: PROGRESSING AS EXPECTED  Intervention: Implement fall precautions  Flowsheets  Taken 5/22/2020 2129  Bed Alarm: Yes - Alarm On  Taken 5/22/2020 2000  Environmental Precautions:   Treaded Slipper Socks on Patient   Personal Belongings, Wastebasket, Call Bell etc. in Easy Reach   Report Given to Other Health Care Providers Regarding Fall Risk  Note: Fall precautions in place. Room near nurses station. Hourly rounding. Bed locked and in lowest position. Call light and belongings in place.     Problem: Infection  Goal: Will remain free from infection  Outcome: PROGRESSING AS EXPECTED  Intervention: Give CDC handouts for infection prevention (infection prevention/hand washing, disease specific, and device specific) and document in Education  Note: Hand washing done before and after patient care. Proper PPE worn.

## 2020-05-23 NOTE — PROGRESS NOTES
"Hospital Medicine Daily Progress Note    Date of Service  5/23/2020    Chief Complaint  brusing    Hospital Course    Patient is a 54 y.o. female with a reported history of mental retardation and schizophrenia who was sent here from group home due to bruising in multiple areas.  Patient was unable to provide history, however there was reportedly a fall the night prior to admission. On admission, was noted to have evidence of acute kidney injury, with fracture of the right 5th metatarsal, low platelets, and tested positive for COVID 19. EPS case was filed. She was started on supportive care, IVF. Patient continued to spike fevers, with worsened infiltrates on chest xray. ID was consulted, who is recommended convalescent plasma trial along with Remdesevir. ID reports that she is approved for remdesevir. Public guardian cannot consent without court order for plasma, which has been pursued. Patient has started on Remdesivir. Court has also given consent for convalescent plasma.           Interval Problem Update  5/23/2020 - I reviewed the patient's chart. There were no significant overnight events. Remains hemodynamically stable. Continues to spike fevers, Tmax 37.9C. Stable on 3L O2 NC. Day 3 of remdesivir course. ID holding off of convalescent plasma as relatively stable for now.  Pending CMP today.    > I have personally seen and examined the patient today. No complaints. States \"I'm fine\". Not short of breath. No cough. No nausea, vomiting, abd pain.         Consultants/Specialty  Case management  ID    Code Status  Full code    Disposition  Monitor in the [facility]  EPS case opened.    Review of Systems  ROS     Pertinent positives/negatives as mentioned above.     A complete review of systems was personally done by me, limited by mentation. All other systems were negative.         Physical Exam  Temp:  [36.9 °C (98.4 °F)-37.9 °C (100.2 °F)] 36.9 °C (98.5 °F)  Pulse:  [81-94] 87  Resp:  [16-26] 26  BP: (106-143)/(62-68) " 111/68  SpO2:  [90 %-94 %] 92 %    Physical Exam  Vitals signs and nursing note reviewed. Exam conducted with a chaperone present.   Constitutional:       General: She is not in acute distress.     Appearance: Normal appearance. She is not diaphoretic.   HENT:      Head: Normocephalic.      Right Ear: External ear normal.      Left Ear: External ear normal.      Nose: Nose normal.      Mouth/Throat:      Mouth: Mucous membranes are moist.   Eyes:      Pupils: Pupils are equal, round, and reactive to light.   Cardiovascular:      Rate and Rhythm: Normal rate and regular rhythm.      Pulses: Normal pulses.      Heart sounds: Normal heart sounds.   Pulmonary:      Effort: Pulmonary effort is normal.      Breath sounds: Normal breath sounds.   Abdominal:      General: Abdomen is flat. Bowel sounds are normal.      Palpations: Abdomen is soft.   Musculoskeletal: Normal range of motion.         General: No swelling or deformity.   Skin:     General: Skin is warm and dry.      Capillary Refill: Capillary refill takes less than 2 seconds.      Findings: Bruising (mainly lateral right hand and lateral left foot, mild brusing chest , two small lesions right foot, minimal bruising left lateral ankle and left hand) present.      Comments: Bruising stable  Right 5th MT bryce taped   Neurological:      General: No focal deficit present.      Mental Status: She is alert.      Cranial Nerves: No cranial nerve deficit.   Psychiatric:         Speech: She is communicative (minimal communication, does tell me her name, can say yes and no and follows commands).      Comments: Answers questions but tangential/circumstantial.   Impaired judgment and though content.  (+) mental retardation  Cooperative but elena       I have performed the physical examination today 5/23/2020.  In review of yesterday's note, there are no new changes except as documented above.        Fluids    Intake/Output Summary (Last 24 hours) at 5/23/2020 1036  Last  data filed at 5/23/2020 0600  Gross per 24 hour   Intake 50 ml   Output 600 ml   Net -550 ml       Laboratory  Recent Labs     05/22/20  0515   WBC 3.6*   RBC 3.44*   HEMOGLOBIN 10.0*   HEMATOCRIT 31.5*   MCV 91.6   MCH 29.1   MCHC 31.7*   RDW 45.7   PLATELETCT 96*   MPV 8.5*     Recent Labs     05/21/20  1315 05/22/20  0515   SODIUM 138 134*   POTASSIUM 4.0 3.6   CHLORIDE 102 100   CO2 25 24   GLUCOSE 159* 133*   BUN 23* 27*   CREATININE 0.87 0.75   CALCIUM 8.4* 8.3*     Recent Labs     05/22/20  0515   APTT 33.7   INR 0.92               Imaging  DX-CHEST-PORTABLE (1 VIEW)   Final Result      1.  Enlarged cardiac silhouette with probable mild vascular congestion.   2.  Bibasilar opacities could be related atelectasis, vascular congestion or pneumonitis.      CT-ABDOMEN-PELVIS W/O   Final Result         1. No evidence of acute inflammatory change in the abdomen or pelvis.  The study is however limited due to nonuse of intravenous contrast.      2. Airspace opacity in the right lower lobe, in keeping with pneumonia.      3. Hepatic steatosis.      4. Moderate amount of stool throughout the colon.           Assessment/Plan  * COVID-19 virus infection- (present on admission)  Assessment & Plan  -With infiltrates noted RL on CT scan and persistent hypoxia, remains on 2L O2 NC. Increasing infiltrates on chest xray with persistent fevers. Procalcitonin remains low. Last COVID positive test on 5/18.   -Suspect COVID is contributing to acute thrombocytopenia.   -continue immune support with Vitamin D, C and zinc.   -Receiving remdesevir, Day 3 of 5 today. ID initially recommending starting convalescent plasma, and public guardian has successfully obtained court consent to proceed with treatment, but as currently stabilized ID holding off on plasma for now.  - trend inflammatory markers q48 hours (next check tomorrow). Trend CMP daily to monitor for remdesevir toxicity.  - Hold off on prophylactic lovenox especially in  setting of thrombocytopenia and bruising. Trend d-dimer.  - repeat COVID PCR once completed remdesivir course.    Constipation- (present on admission)  Assessment & Plan  -resolved. Having regular BM. Continue bowel regimen.     Thrombocytopenia (HCC)- (present on admission)  Assessment & Plan  -Suspect this is related to COVID/acute viral infection. Counts improving. Continue to trend.  -No evidence of splenomegaly on exam or CT. No sign of hemolysis on peripheral and bili WNL.  HIV and hepatitis negative. Haptoglobin normal.     KYLE (acute kidney injury) ([facility])- (present on admission)  Assessment & Plan  -Resolved. Improved with IVF. D/C IVF.  -continue to follow.   - avoid nephrotoxins, and continue to renally dose all medications.    Bruising  Assessment & Plan  Likely from trauma, reported fall prior to admission  Thrombocytopenia likely contributing  5th MT fracture found at [facility]  EPS case pending    Diabetes ([facility])- (present on admission)  Assessment & Plan  -Suspect type 2 chronic.  -continue to hold metformin for now. Continue SSI. Accu-Cheks before meals and at bedtime. Goal to keep BG between 140-180 per 2019 ADA guidelines.      Moderate intellectual disability- (present on admission)  Assessment & Plan  -Per group home at baseline    Schizophrenia (HCC)- (present on admission)  Assessment & Plan  -Continue current outpatient psych meds as tolerated.       VTE prophylaxis: SCD

## 2020-05-24 LAB
ALBUMIN SERPL BCP-MCNC: 2.4 G/DL (ref 3.2–4.9)
ALP SERPL-CCNC: 78 U/L (ref 30–99)
ALT SERPL-CCNC: 16 U/L (ref 2–50)
APTT PPP: 26.2 SEC (ref 24.7–36)
AST SERPL-CCNC: 19 U/L (ref 12–45)
BACTERIA BLD CULT: NORMAL
BACTERIA BLD CULT: NORMAL
BASOPHILS # BLD AUTO: 0.2 % (ref 0–1.8)
BASOPHILS # BLD: 0.01 K/UL (ref 0–0.12)
BILIRUB CONJ SERPL-MCNC: <0.2 MG/DL (ref 0.1–0.5)
BILIRUB INDIRECT SERPL-MCNC: ABNORMAL MG/DL (ref 0–1)
BILIRUB SERPL-MCNC: 0.2 MG/DL (ref 0.1–1.5)
CK SERPL-CCNC: 42 U/L (ref 0–154)
COMMENT 1642: NORMAL
CRP SERPL HS-MCNC: 6.73 MG/DL (ref 0–0.75)
D DIMER PPP IA.FEU-MCNC: 0.67 UG/ML (FEU) (ref 0–0.5)
EOSINOPHIL # BLD AUTO: 0.01 K/UL (ref 0–0.51)
EOSINOPHIL NFR BLD: 0.2 % (ref 0–6.9)
ERYTHROCYTE [DISTWIDTH] IN BLOOD BY AUTOMATED COUNT: 48 FL (ref 35.9–50)
FERRITIN SERPL-MCNC: 558 NG/ML (ref 10–291)
FIBRINOGEN PPP-MCNC: 589 MG/DL (ref 215–460)
GLUCOSE BLD-MCNC: 103 MG/DL (ref 65–99)
GLUCOSE BLD-MCNC: 121 MG/DL (ref 65–99)
GLUCOSE BLD-MCNC: 127 MG/DL (ref 65–99)
GLUCOSE BLD-MCNC: 92 MG/DL (ref 65–99)
HCT VFR BLD AUTO: 32.8 % (ref 37–47)
HGB BLD-MCNC: 10.1 G/DL (ref 12–16)
IMM GRANULOCYTES # BLD AUTO: 0.16 K/UL (ref 0–0.11)
IMM GRANULOCYTES NFR BLD AUTO: 2.9 % (ref 0–0.9)
INR PPP: 0.9 (ref 0.87–1.13)
LDH SERPL L TO P-CCNC: 316 U/L (ref 107–266)
LYMPHOCYTES # BLD AUTO: 1.61 K/UL (ref 1–4.8)
LYMPHOCYTES NFR BLD: 29 % (ref 22–41)
MAGNESIUM SERPL-MCNC: 2.1 MG/DL (ref 1.5–2.5)
MCH RBC QN AUTO: 29.1 PG (ref 27–33)
MCHC RBC AUTO-ENTMCNC: 30.8 G/DL (ref 33.6–35)
MCV RBC AUTO: 94.5 FL (ref 81.4–97.8)
MONOCYTES # BLD AUTO: 0.92 K/UL (ref 0–0.85)
MONOCYTES NFR BLD AUTO: 16.6 % (ref 0–13.4)
MORPHOLOGY BLD-IMP: NORMAL
NEUTROPHILS # BLD AUTO: 2.84 K/UL (ref 2–7.15)
NEUTROPHILS NFR BLD: 51.1 % (ref 44–72)
NRBC # BLD AUTO: 0.04 K/UL
NRBC BLD-RTO: 0.7 /100 WBC
NT-PROBNP SERPL IA-MCNC: 266 PG/ML (ref 0–125)
PLATELET # BLD AUTO: 131 K/UL (ref 164–446)
PMV BLD AUTO: 8.9 FL (ref 9–12.9)
PROCALCITONIN SERPL-MCNC: 0.2 NG/ML
PROT SERPL-MCNC: 5.5 G/DL (ref 6–8.2)
PROTHROMBIN TIME: 12.3 SEC (ref 12–14.6)
RBC # BLD AUTO: 3.47 M/UL (ref 4.2–5.4)
SIGNIFICANT IND 70042: NORMAL
SIGNIFICANT IND 70042: NORMAL
SITE SITE: NORMAL
SITE SITE: NORMAL
SOURCE SOURCE: NORMAL
SOURCE SOURCE: NORMAL
TROPONIN T SERPL-MCNC: 10 NG/L (ref 6–19)
WBC # BLD AUTO: 5.6 K/UL (ref 4.8–10.8)

## 2020-05-24 PROCEDURE — 80076 HEPATIC FUNCTION PANEL: CPT

## 2020-05-24 PROCEDURE — 85384 FIBRINOGEN ACTIVITY: CPT

## 2020-05-24 PROCEDURE — 700102 HCHG RX REV CODE 250 W/ 637 OVERRIDE(OP): Performed by: INTERNAL MEDICINE

## 2020-05-24 PROCEDURE — 86140 C-REACTIVE PROTEIN: CPT

## 2020-05-24 PROCEDURE — 85610 PROTHROMBIN TIME: CPT

## 2020-05-24 PROCEDURE — 83735 ASSAY OF MAGNESIUM: CPT

## 2020-05-24 PROCEDURE — 84484 ASSAY OF TROPONIN QUANT: CPT

## 2020-05-24 PROCEDURE — 700111 HCHG RX REV CODE 636 W/ 250 OVERRIDE (IP): Performed by: INTERNAL MEDICINE

## 2020-05-24 PROCEDURE — A9270 NON-COVERED ITEM OR SERVICE: HCPCS | Performed by: INTERNAL MEDICINE

## 2020-05-24 PROCEDURE — 83615 LACTATE (LD) (LDH) ENZYME: CPT

## 2020-05-24 PROCEDURE — A9270 NON-COVERED ITEM OR SERVICE: HCPCS | Performed by: HOSPITALIST

## 2020-05-24 PROCEDURE — 82728 ASSAY OF FERRITIN: CPT

## 2020-05-24 PROCEDURE — 82962 GLUCOSE BLOOD TEST: CPT | Mod: 91

## 2020-05-24 PROCEDURE — 700102 HCHG RX REV CODE 250 W/ 637 OVERRIDE(OP): Performed by: HOSPITALIST

## 2020-05-24 PROCEDURE — 85379 FIBRIN DEGRADATION QUANT: CPT

## 2020-05-24 PROCEDURE — 700105 HCHG RX REV CODE 258: Performed by: INTERNAL MEDICINE

## 2020-05-24 PROCEDURE — 85730 THROMBOPLASTIN TIME PARTIAL: CPT

## 2020-05-24 PROCEDURE — 82550 ASSAY OF CK (CPK): CPT

## 2020-05-24 PROCEDURE — 83880 ASSAY OF NATRIURETIC PEPTIDE: CPT

## 2020-05-24 PROCEDURE — 99233 SBSQ HOSP IP/OBS HIGH 50: CPT | Mod: CS | Performed by: INTERNAL MEDICINE

## 2020-05-24 PROCEDURE — 84145 PROCALCITONIN (PCT): CPT

## 2020-05-24 PROCEDURE — 770020 HCHG ROOM/CARE - TELE (206)

## 2020-05-24 PROCEDURE — 700112 HCHG RX REV CODE 229: Performed by: HOSPITALIST

## 2020-05-24 PROCEDURE — 85025 COMPLETE CBC W/AUTO DIFF WBC: CPT

## 2020-05-24 RX ADMIN — ARIPIPRAZOLE 30 MG: 15 TABLET ORAL at 20:29

## 2020-05-24 RX ADMIN — DONEPEZIL HYDROCHLORIDE 10 MG: 5 TABLET, FILM COATED ORAL at 17:33

## 2020-05-24 RX ADMIN — MELATONIN 1000 UNITS: at 05:38

## 2020-05-24 RX ADMIN — DIVALPROEX SODIUM 500 MG: 500 TABLET, DELAYED RELEASE ORAL at 20:29

## 2020-05-24 RX ADMIN — DOCUSATE SODIUM 100 MG: 100 CAPSULE, LIQUID FILLED ORAL at 17:34

## 2020-05-24 RX ADMIN — Medication 1000 MG: at 05:37

## 2020-05-24 RX ADMIN — QUETIAPINE FUMARATE 300 MG: 200 TABLET ORAL at 20:29

## 2020-05-24 RX ADMIN — ZINC SULFATE 220 MG (50 MG) CAPSULE 220 MG: CAPSULE at 05:38

## 2020-05-24 RX ADMIN — Medication 1 PACKET: at 05:37

## 2020-05-24 RX ADMIN — Medication 1000 MG: at 17:34

## 2020-05-24 RX ADMIN — OXYBUTYNIN CHLORIDE 10 MG: 10 TABLET, EXTENDED RELEASE ORAL at 17:34

## 2020-05-24 RX ADMIN — CALCIUM CITRATE 200 MG (950 MG) TABLET 950 MG: at 05:37

## 2020-05-24 RX ADMIN — REMDESIVIR 100 MG: 5 INJECTION INTRAVENOUS at 14:19

## 2020-05-24 RX ADMIN — SERTRALINE HYDROCHLORIDE 50 MG: 50 TABLET ORAL at 05:38

## 2020-05-24 RX ADMIN — ENOXAPARIN SODIUM 30 MG: 30 INJECTION SUBCUTANEOUS at 14:19

## 2020-05-24 RX ADMIN — DOCUSATE SODIUM 100 MG: 100 CAPSULE, LIQUID FILLED ORAL at 05:37

## 2020-05-24 NOTE — PROGRESS NOTES
[facility] Medicine Daily Progress Note    Date of Service  5/24/2020    Chief Complaint  brusing    Hospital Course    Patient is a 54 y.o. female with a reported history of mental retardation and schizophrenia who was sent here from group home due to bruising in multiple areas.  Patient was unable to provide history, however there was reportedly a fall the night prior to admission. On admission, was noted to have evidence of acute kidney injury, with fracture of the right 5th metatarsal, low platelets, and tested positive for COVID 19. EPS case was filed. She was started on supportive care, IVF. Patient continued to spike fevers, with worsened infiltrates on chest xray. ID was consulted, who is recommended convalescent plasma trial along with Remdesevir. ID reports that she is approved for remdesevir. Public guardian cannot consent without court order for plasma, which has been pursued. Patient has started on Remdesivir. Court has also given consent for convalescent plasma. ID holding off of convalescent plasma as relatively stable for now.           Interval Problem Update  5/24/2020 - I reviewed the patient's chart today. Uneventful night. VSS. Tmax 37.8C last night, no temp spikes this morning. Stable respiratory status, on 2L O2 NC. No leukocytosis. Hgb stable. LFTs remain normal. D dimer remains <1. Ferritin stable at 558. Procalcitonin remains low. CRP 6.73. She is on Day 4 of Remdesivir course today.     > I have personally seen and examined the patient today. She is not in respiratory distress. Awake and answers questions but tangential, remains at her baseline mentaion. She is not coughing, and not short of breath. No nausea, or vomiting. No GI complaints. No fevers or chills. She does say she feels cold.        Consultants/Specialty  Case management  ID    Code Status  Full code    Disposition  Monitor in the [facility]  EPS case opened.    Review of Systems  ROS     Pertinent positives/negatives as mentioned  above.     A complete review of systems was personally done by me, limited by mentation. All other systems were negative.         Physical Exam  Temp:  [37.1 °C (98.8 °F)-37.8 °C (100.1 °F)] 37.1 °C (98.8 °F)  Pulse:  [] 78  Resp:  [16-24] 22  BP: (114-130)/(53-68) 123/68  SpO2:  [88 %-90 %] 88 %    Physical Exam  Vitals signs and nursing note reviewed. Exam conducted with a chaperone present.   Constitutional:       General: She is not in acute distress.     Appearance: Normal appearance. She is not diaphoretic.   HENT:      Head: Normocephalic.      Right Ear: External ear normal.      Left Ear: External ear normal.      Nose: Nose normal.      Mouth/Throat:      Mouth: Mucous membranes are moist.   Eyes:      Pupils: Pupils are equal, round, and reactive to light.   Cardiovascular:      Rate and Rhythm: Normal rate and regular rhythm.      Pulses: Normal pulses.      Heart sounds: Normal heart sounds.   Pulmonary:      Effort: Pulmonary effort is normal.      Breath sounds: Normal breath sounds.   Abdominal:      General: Abdomen is flat. Bowel sounds are normal.      Palpations: Abdomen is soft.   Musculoskeletal: Normal range of motion.         General: No swelling or deformity.   Skin:     General: Skin is warm and dry.      Capillary Refill: Capillary refill takes less than 2 seconds.      Findings: Bruising (mainly lateral right hand and lateral left foot, mild brusing chest , two small lesions right foot, minimal bruising left lateral ankle and left hand) present.      Comments: Bruising stable  Right 5th MT bryce taped   Neurological:      General: No focal deficit present.      Mental Status: She is alert.      Cranial Nerves: No cranial nerve deficit.   Psychiatric:         Speech: She is communicative (minimal communication, does tell me her name, can say yes and no and follows commands).      Comments: Answers questions but tangential/circumstantial.   Impaired judgment and though content.  (+)  mental retardation  Cooperative but elena       I have performed the physical examination today 5/24/2020.  In review of yesterday's note, there are no new changes except as documented above.      Fluids    Intake/Output Summary (Last 24 hours) at 5/24/2020 1200  Last data filed at 5/24/2020 0536  Gross per 24 hour   Intake --   Output 400 ml   Net -400 ml       Laboratory  Recent Labs     05/22/20  0515 05/24/20  0517   WBC 3.6* 5.6   RBC 3.44* 3.47*   HEMOGLOBIN 10.0* 10.1*   HEMATOCRIT 31.5* 32.8*   MCV 91.6 94.5   MCH 29.1 29.1   MCHC 31.7* 30.8*   RDW 45.7 48.0   PLATELETCT 96* 131*   MPV 8.5* 8.9*     Recent Labs     05/21/20  1315 05/22/20  0515 05/23/20  0959   SODIUM 138 134* 140   POTASSIUM 4.0 3.6 4.0   CHLORIDE 102 100 103   CO2 25 24 25   GLUCOSE 159* 133* 167*   BUN 23* 27* 28*   CREATININE 0.87 0.75 0.71   CALCIUM 8.4* 8.3* 8.6     Recent Labs     05/22/20  0515 05/24/20  0517   APTT 33.7 26.2   INR 0.92 0.90               Imaging  DX-CHEST-PORTABLE (1 VIEW)   Final Result      1.  Enlarged cardiac silhouette with probable mild vascular congestion.   2.  Bibasilar opacities could be related atelectasis, vascular congestion or pneumonitis.      CT-ABDOMEN-PELVIS W/O   Final Result         1. No evidence of acute inflammatory change in the abdomen or pelvis.  The study is however limited due to nonuse of intravenous contrast.      2. Airspace opacity in the right lower lobe, in keeping with pneumonia.      3. Hepatic steatosis.      4. Moderate amount of stool throughout the colon.           Assessment/Plan  * COVID-19 virus infection- (present on admission)  Assessment & Plan  -With infiltrates noted RL on CT scan and persistent hypoxia, remains on 2L O2 NC. Increasing infiltrates on chest xray with persistent fevers. Procalcitonin remains low. Last COVID positive test on 5/18.   -Suspect COVID contributed to acute thrombocytopenia.   -continue immune support with Vitamin D, C and zinc.   -Receiving  remdesevir, Day 4 of 5 today. LFTs remain normal. ID initially recommending starting convalescent plasma, and public guardian has successfully obtained court consent to proceed with treatment, but as currently stabilized ID holding off on plasma for now.  - inflammatory markers remain stable. Trend inflammatory markers q48 hours (next check on 5/26). Trend CMP daily to monitor for remdesevir toxicity.  - As platelet count has improved, start lovenox 30mg dailt for DVT prophylaxis. Monitor platelet count, and trend d dimer.   - repeat COVID PCR once completed remdesivir course.    Constipation- (present on admission)  Assessment & Plan  -resolved. Continue bowel regimen.     Thrombocytopenia (HCC)- (present on admission)  Assessment & Plan  -Suspect this is related to COVID/acute viral infection. Counts improving. Continue to trend.  -No evidence of splenomegaly on exam or CT. No sign of hemolysis on peripheral and bili WNL.  HIV and hepatitis negative. Haptoglobin normal.   - start lovenox for DVT prophylaxis.     KYLE (acute kidney injury) (HCC)- (present on admission)  Assessment & Plan  -Resolved. Improved with IVF. Off IVF.  -continue to follow.   - avoid nephrotoxins, and continue to renally dose all medications.    Bruising  Assessment & Plan  Likely from trauma, reported fall prior to admission  Thrombocytopenia likely contributing  5th MT fracture found at [facility]  EPS case pending    Diabetes (HCC)- (present on admission)  Assessment & Plan  -Suspect type 2 chronic.  -continue to hold metformin for now. Continue SSI. Accu-Cheks before meals and at bedtime. Goal to keep BG between 140-180 per 2019 ADA guidelines.      Moderate intellectual disability- (present on admission)  Assessment & Plan  -Per group home at baseline    Schizophrenia (HCC)- (present on admission)  Assessment & Plan  -Continue current outpatient psych meds as tolerated.       VTE prophylaxis: SCD

## 2020-05-24 NOTE — CARE PLAN
Problem: Safety  Goal: Will remain free from falls  Outcome: PROGRESSING AS EXPECTED  Intervention: Implement fall precautions  Flowsheets  Taken 5/23/2020 2212  Bed Alarm: Yes - Alarm On  Taken 5/23/2020 2000  Environmental Precautions:   Treaded Slipper Socks on Patient   Personal Belongings, Wastebasket, Call Bell etc. in Easy Reach   Bed in Low Position  Note: Fall precautions in place. Bed locked and I lowest position. Room near nurses station. Call light and belongings within reach.      Problem: Infection  Goal: Will remain free from infection  Outcome: PROGRESSING AS EXPECTED  Intervention: Implement standard precautions and perform hand washing before and after patient contact  Note: Hand washing completed before and after patient care. Proper PPE worn.

## 2020-05-25 LAB
ALBUMIN SERPL BCP-MCNC: 2.4 G/DL (ref 3.2–4.9)
ALBUMIN/GLOB SERPL: 0.8 G/DL
ALP SERPL-CCNC: 76 U/L (ref 30–99)
ALT SERPL-CCNC: 13 U/L (ref 2–50)
ANION GAP SERPL CALC-SCNC: 11 MMOL/L (ref 7–16)
AST SERPL-CCNC: 18 U/L (ref 12–45)
BASOPHILS # BLD AUTO: 0.5 % (ref 0–1.8)
BASOPHILS # BLD: 0.02 K/UL (ref 0–0.12)
BILIRUB SERPL-MCNC: 0.2 MG/DL (ref 0.1–1.5)
BUN SERPL-MCNC: 29 MG/DL (ref 8–22)
CALCIUM SERPL-MCNC: 8.6 MG/DL (ref 8.5–10.5)
CHLORIDE SERPL-SCNC: 104 MMOL/L (ref 96–112)
CO2 SERPL-SCNC: 25 MMOL/L (ref 20–33)
COVID ORDER STATUS COVID19: NORMAL
CREAT SERPL-MCNC: 0.71 MG/DL (ref 0.5–1.4)
EOSINOPHIL # BLD AUTO: 0.01 K/UL (ref 0–0.51)
EOSINOPHIL NFR BLD: 0.2 % (ref 0–6.9)
ERYTHROCYTE [DISTWIDTH] IN BLOOD BY AUTOMATED COUNT: 48.9 FL (ref 35.9–50)
GLOBULIN SER CALC-MCNC: 2.9 G/DL (ref 1.9–3.5)
GLUCOSE BLD-MCNC: 104 MG/DL (ref 65–99)
GLUCOSE BLD-MCNC: 118 MG/DL (ref 65–99)
GLUCOSE BLD-MCNC: 123 MG/DL (ref 65–99)
GLUCOSE BLD-MCNC: 165 MG/DL (ref 65–99)
GLUCOSE BLD-MCNC: 96 MG/DL (ref 65–99)
GLUCOSE BLD-MCNC: 96 MG/DL (ref 65–99)
GLUCOSE SERPL-MCNC: 141 MG/DL (ref 65–99)
HCT VFR BLD AUTO: 32.1 % (ref 37–47)
HGB BLD-MCNC: 9.8 G/DL (ref 12–16)
IMM GRANULOCYTES # BLD AUTO: 0.13 K/UL (ref 0–0.11)
IMM GRANULOCYTES NFR BLD AUTO: 3 % (ref 0–0.9)
LYMPHOCYTES # BLD AUTO: 1.21 K/UL (ref 1–4.8)
LYMPHOCYTES NFR BLD: 28.1 % (ref 22–41)
MCH RBC QN AUTO: 29.2 PG (ref 27–33)
MCHC RBC AUTO-ENTMCNC: 30.5 G/DL (ref 33.6–35)
MCV RBC AUTO: 95.5 FL (ref 81.4–97.8)
MONOCYTES # BLD AUTO: 0.35 K/UL (ref 0–0.85)
MONOCYTES NFR BLD AUTO: 8.1 % (ref 0–13.4)
NEUTROPHILS # BLD AUTO: 2.59 K/UL (ref 2–7.15)
NEUTROPHILS NFR BLD: 60.1 % (ref 44–72)
NRBC # BLD AUTO: 0.03 K/UL
NRBC BLD-RTO: 0.7 /100 WBC
PLATELET # BLD AUTO: 158 K/UL (ref 164–446)
PMV BLD AUTO: 8.5 FL (ref 9–12.9)
POTASSIUM SERPL-SCNC: 4.2 MMOL/L (ref 3.6–5.5)
PROT SERPL-MCNC: 5.3 G/DL (ref 6–8.2)
RBC # BLD AUTO: 3.36 M/UL (ref 4.2–5.4)
SODIUM SERPL-SCNC: 140 MMOL/L (ref 135–145)
WBC # BLD AUTO: 4.3 K/UL (ref 4.8–10.8)

## 2020-05-25 PROCEDURE — 82962 GLUCOSE BLOOD TEST: CPT | Mod: 91

## 2020-05-25 PROCEDURE — 80053 COMPREHEN METABOLIC PANEL: CPT

## 2020-05-25 PROCEDURE — 99233 SBSQ HOSP IP/OBS HIGH 50: CPT | Mod: CS | Performed by: INTERNAL MEDICINE

## 2020-05-25 PROCEDURE — 700102 HCHG RX REV CODE 250 W/ 637 OVERRIDE(OP): Performed by: HOSPITALIST

## 2020-05-25 PROCEDURE — A9270 NON-COVERED ITEM OR SERVICE: HCPCS | Performed by: HOSPITALIST

## 2020-05-25 PROCEDURE — 700111 HCHG RX REV CODE 636 W/ 250 OVERRIDE (IP): Performed by: INTERNAL MEDICINE

## 2020-05-25 PROCEDURE — A9270 NON-COVERED ITEM OR SERVICE: HCPCS | Performed by: INTERNAL MEDICINE

## 2020-05-25 PROCEDURE — 700112 HCHG RX REV CODE 229: Performed by: HOSPITALIST

## 2020-05-25 PROCEDURE — 85025 COMPLETE CBC W/AUTO DIFF WBC: CPT

## 2020-05-25 PROCEDURE — 302146: Performed by: INTERNAL MEDICINE

## 2020-05-25 PROCEDURE — 770020 HCHG ROOM/CARE - TELE (206)

## 2020-05-25 PROCEDURE — 700105 HCHG RX REV CODE 258: Performed by: INTERNAL MEDICINE

## 2020-05-25 PROCEDURE — C9803 HOPD COVID-19 SPEC COLLECT: HCPCS | Performed by: INTERNAL MEDICINE

## 2020-05-25 PROCEDURE — 700102 HCHG RX REV CODE 250 W/ 637 OVERRIDE(OP): Performed by: INTERNAL MEDICINE

## 2020-05-25 RX ADMIN — SERTRALINE HYDROCHLORIDE 50 MG: 50 TABLET ORAL at 04:56

## 2020-05-25 RX ADMIN — INSULIN HUMAN 1 UNITS: 100 INJECTION, SOLUTION PARENTERAL at 21:11

## 2020-05-25 RX ADMIN — REMDESIVIR 100 MG: 5 INJECTION INTRAVENOUS at 14:02

## 2020-05-25 RX ADMIN — Medication 1000 MG: at 04:56

## 2020-05-25 RX ADMIN — DOCUSATE SODIUM 100 MG: 100 CAPSULE, LIQUID FILLED ORAL at 04:56

## 2020-05-25 RX ADMIN — DIVALPROEX SODIUM 500 MG: 500 TABLET, DELAYED RELEASE ORAL at 21:04

## 2020-05-25 RX ADMIN — DONEPEZIL HYDROCHLORIDE 10 MG: 5 TABLET, FILM COATED ORAL at 17:29

## 2020-05-25 RX ADMIN — ARIPIPRAZOLE 30 MG: 15 TABLET ORAL at 21:04

## 2020-05-25 RX ADMIN — OXYBUTYNIN CHLORIDE 10 MG: 10 TABLET, EXTENDED RELEASE ORAL at 17:29

## 2020-05-25 RX ADMIN — CALCIUM CITRATE 200 MG (950 MG) TABLET 950 MG: at 04:56

## 2020-05-25 RX ADMIN — MELATONIN 1000 UNITS: at 04:56

## 2020-05-25 RX ADMIN — Medication 1 PACKET: at 04:56

## 2020-05-25 RX ADMIN — Medication 1000 MG: at 17:29

## 2020-05-25 RX ADMIN — DOCUSATE SODIUM 100 MG: 100 CAPSULE, LIQUID FILLED ORAL at 17:29

## 2020-05-25 RX ADMIN — QUETIAPINE FUMARATE 300 MG: 200 TABLET ORAL at 21:04

## 2020-05-25 RX ADMIN — ENOXAPARIN SODIUM 30 MG: 30 INJECTION SUBCUTANEOUS at 04:56

## 2020-05-25 RX ADMIN — ZINC SULFATE 220 MG (50 MG) CAPSULE 220 MG: CAPSULE at 04:56

## 2020-05-25 ASSESSMENT — FIBROSIS 4 INDEX: FIB4 SCORE: 1.99

## 2020-05-25 NOTE — PROGRESS NOTES
Infectious Disease Progress Note    Author: Judson Mullins D.O. Date & Time of service: 2020  9:21 AM    Chief Complaint:  COVID-19 pneumonia  55yo femal with hx of mental retardation and schizoprenia from group home due to multiple brusies, KYLE     Interval History:    101.9, O2 2 L NC   AF, O2 2 L NC, pt has difficulty communicating but was alert and appeared to be in no distress.    100.5, O2 2 L NC, fevers and inflammatory labs have all increased.      AF, O2 2 L NC, appears to be in no distress.  Per nurse she does desat when her oxygen is removed.  Will start Remdesivir today .    101, O2 2 L NC, alert and in no obvious distress.  Patient continued on RDV.    Afebrile in the past 72 hours, on 1L NC sat >94%. HR in 60-80s. WBC 5.6. Creatinine 0.71. LFT normal    Review of Systems:  Review of Systems   Unable to perform ROS: Medical condition (Unable to obtain due to pt's mental condition )       Hemodynamics:  Temp (24hrs), Av.8 °C (98.3 °F), Min:36.5 °C (97.7 °F), Max:37.1 °C (98.7 °F)  Temperature: 37 °C (98.6 °F)  Pulse  Av  Min: 65  Max: 123   Blood Pressure: 115/74       Physical Exam:  Physical Exam  Constitutional:       General: She is not in acute distress.     Appearance: Normal appearance. She is normal weight. She is not ill-appearing.      Comments: Alert, not able to answer questions appropriately due to mental status.    HENT:      Head: Normocephalic and atraumatic.      Mouth/Throat:      Mouth: Mucous membranes are dry.   Neck:      Musculoskeletal: Neck supple.   Cardiovascular:      Rate and Rhythm: Normal rate and regular rhythm.      Heart sounds: Normal heart sounds.   Pulmonary:      Effort: Pulmonary effort is normal.      Breath sounds: Normal breath sounds. No wheezing, rhonchi or rales.      Comments: Diminished at bases, no wheezing  Abdominal:      General: Abdomen is flat. Bowel sounds are normal.      Palpations: Abdomen is soft.    Musculoskeletal:      Right lower leg: No edema.      Left lower leg: No edema.   Skin:     Findings: Bruising present.   Neurological:      Mental Status: She is alert. Mental status is at baseline.      Comments: Moving all extremities   Psychiatric:      Comments: Underlying mental status at [place]         Meds:    Current Facility-Administered Medications:   •  enoxaparin (LOVENOX) injection  •  [COMPLETED] Remdesivir in NS IVPB **FOLLOWED BY** Remdesivir in NS IVPB  •  zinc sulfate  •  ascorbic acid  •  vitamin D  •  insulin regular **AND** POC Blood Glucose **AND** NOTIFY MD and PharmD **AND** glucose **AND** dextrose 50%  •  ARIPiprazole  •  divalproex  •  docusate sodium  •  donepezil  •  calcium citrate  •  oxybutynin SR  •  psyllium  •  enalaprilat  •  ondansetron  •  ondansetron  •  prochlorperazine  •  promethazine  •  promethazine  •  acetaminophen  •  QUEtiapine  •  sertraline    Labs:  Recent Labs     05/24/20 0517   WBC 5.6   RBC 3.47*   HEMOGLOBIN 10.1*   HEMATOCRIT 32.8*   MCV 94.5   MCH 29.1   RDW 48.0   PLATELETCT 131*   MPV 8.9*   NEUTSPOLYS 51.10   LYMPHOCYTES 29.00   MONOCYTES 16.60*   EOSINOPHILS 0.20   BASOPHILS 0.20     Recent Labs     05/23/20  0959 05/24/20  0517 05/25/20  0449   SODIUM 140  --  140   POTASSIUM 4.0  --  4.2   CHLORIDE 103  --  104   CO2 25  --  25   GLUCOSE 167*  --  141*   BUN 28*  --  29*   CPKTOTAL  --  42  --      Recent Labs     05/23/20  0959 05/24/20  0517 05/25/20  0449   ALBUMIN 2.7* 2.4* 2.4*   TBILIRUBIN 0.2 0.2 0.2   ALKPHOSPHAT 81 78 76   TOTPROTEIN 5.8* 5.5* 5.3*   ALTSGPT 18 16 13   ASTSGOT 30 19 18   CREATININE 0.71  --  0.71       Imaging:  Ct-abdomen-pelvis W/o    Result Date: 5/17/2020 5/17/2020 12:28 AM HISTORY/REASON FOR EXAM:  abd pain ? Trauma; IV contrast only POSITIVE FOR COVID-19 TESTING TECHNIQUE/EXAM DESCRIPTION: CT scan of the abdomen and pelvis without contrast. Noncontrast helical scanning was obtained from the diaphragmatic domes  through the pubic symphysis. Low dose optimization technique was utilized for this CT exam including automated exposure control and adjustment of the mA and/or kV according to patient size. COMPARISON: None. FINDINGS: Lung Bases: Airspace opacity in the right lower lobe Abdomen: Within the limits of noncontrast technique, the spleen, pancreas, kidneys and adrenal glands are unremarkable in appearance. Hepatic steatosis. The gallbladder is unremarkable and there is no biliary dilatation. There is no bowel wall thickening or abnormal dilatation. The abdominal aorta is normal in caliber. Moderate amount of stool throughout the colon. Pelvis: No obvious abnormality seen in the pelvic structures but evaluation limited on CT. No lymph node enlargement. No free fluid, or free air in the abdomen or pelvis. No aggressive bone lesions are seen. ________________________________    1. No evidence of acute inflammatory change in the abdomen or pelvis.  The study is however limited due to nonuse of intravenous contrast. 2. Airspace opacity in the right lower lobe, in keeping with pneumonia. 3. Hepatic steatosis. 4. Moderate amount of stool throughout the colon.    Dx-chest-portable (1 View)    Result Date: 5/20/2020 5/20/2020 8:08 AM HISTORY/REASON FOR EXAM:  Fever TECHNIQUE/EXAM DESCRIPTION AND NUMBER OF VIEWS: Single portable view of the chest. COMPARISON: 5/16/2020 FINDINGS: The mediastinal and cardiac silhouette is mildly enlarged. There are perihilar areas of bronchial wall thickening. There is patchy perihilar and lower lobe parenchymal opacity. There may be trace amounts of pleural fluid. There is no visible pneumothorax. Bones are stable. There is a questionable area of sclerosis involving the left anterior mid rib versus old trauma.     1.  Enlarged cardiac silhouette with probable mild vascular congestion. 2.  Bibasilar opacities could be related atelectasis, vascular congestion or pneumonitis.    Dx-chest-portable (1  View)    Result Date: 5/16/2020 5/16/2020 3:16 PM HISTORY/REASON FOR EXAM:  Chest Pain. Hypotension, recent fall. TECHNIQUE/EXAM DESCRIPTION AND NUMBER OF VIEWS: Single portable view of the chest. COMPARISON: None FINDINGS: Cardiomediastinal contour is within normal limits. Lungs show hypoinflation. Ill-defined increased opacity at both lung bases. No pleural fluid collection or pneumothorax. No major bony abnormality is seen.     Hypoinflation and mild bibasilar atelectasis.  Developing pneumonia is difficult to exclude.    Dx-foot-complete 3+ Right    Result Date: 5/16/2020 5/16/2020 3:16 PM HISTORY/REASON FOR EXAM:  Right foot pain and bruising after fall last evening. TECHNIQUE/EXAM DESCRIPTION AND NUMBER OF VIEWS: 3 views of the RIGHT foot. COMPARISON:  None. FINDINGS: There is an acute oblique nondisplaced fracture of the diaphysis/distal metaphysis of the proximal phalanx of the right 5th toe. No articular communication is present. No dislocation is present. No other fracture is identified.     1.  Acute oblique nondisplaced fracture of the diaphysis/distal metaphysis of the proximal phalanx of the right 5th toe. 2.  No dislocation.    Dx-hand 3+ Right    Result Date: 5/16/2020 5/16/2020 3:16 PM HISTORY/REASON FOR EXAM: Right hand pain after fall last evening with TECHNIQUE/EXAM DESCRIPTION AND NUMBER OF VIEWS:  3 views of the RIGHT hand. COMPARISON: None FINDINGS: No fracture, dislocation, or acute joint abnormality is present. Pulse oximeter is present on the distal aspect of the right index finger.     Negative limited RIGHT hand series.      Micro:  Results     Procedure Component Value Units Date/Time    COVID/SARS CoV-2 [[id number]]     Order Status:  No result Specimen:  Respirate from Nasopharyngeal     BLOOD CULTURE [[id number]] Collected:  05/19/20 0441    Order Status:  Completed Specimen:  Blood from Peripheral Updated:  05/24/20 0700     Significant Indicator NEG     Source BLD     Site  "PERIPHERAL     Culture Result No growth after 5 days of incubation.    Narrative:       Droplet, Contact, and Eye [id number] NEGRETE ROXY  A  Per Hospital Policy: Only change Specimen Src: to \"Line\" if  specified by physician order.  Right Hand    BLOOD CULTURE [[id number]] Collected:  05/19/20 0441    Order Status:  Completed Specimen:  Blood from Peripheral Updated:  05/24/20 0700     Significant Indicator NEG     Source BLD     Site PERIPHERAL     Culture Result No growth after 5 days of incubation.    Narrative:       Droplet, Contact, and Eye [id number] NEGRETE ROXY  A  Per Hospital Policy: Only change Specimen Src: to \"Line\" if  specified by physician order.  Left Wrist    BLOOD CULTURE [[id number]]     Order Status:  Canceled Specimen:  Blood from Peripheral     BLOOD CULTURE [[id number]]     Order Status:  Canceled Specimen:  Blood from Peripheral     SARS-CoV-2, PCR (In-House) [[id number]]  (Abnormal) Collected:  05/18/20 0955    Order Status:  Completed Updated:  05/18/20 1129     SARS-CoV-2 Source NP Swab     SARS-CoV-2 by PCR DETECTED     Comment: **The [employer] GeneXpert Xpress SARS-CoV-2 Test has been made available for  use under the Emergency Use Authorization (EUA) only.         Narrative:       Droplet, Contact, and Eye [id number] REFUGIO PRICE    COVID/SARS CoV-2 [[id number]] Collected:  05/18/20 0955    Order Status:  Completed Specimen:  Respirate from Nasopharyngeal Updated:  05/18/20 1018     COVID Order Status Received    Narrative:       Droplet, Contact, and Eye [id number] REFUGIO PRICE          Assessment:  Active Hospital Problems    Diagnosis   • *COVID-19 virus infection [U07.1]   • Thrombocytopenia (HCC) [D69.6]   • Constipation [K59.00]   • KYLE (acute kidney injury) (HCC) [N17.9]   • Diabetes (HCC) [E11.9]   • Moderate intellectual disability [F71]   • Schizophrenia (HCC) [F20.9]     Interval 24 hours:      100.2, O2 2 L NC  Labs reviewed  Micro " reviewed    Pt alert and appears to be no distress.  He still requiring oxygen and desats when attempting to wean.  Continued on Remdesivir.      Hospital Course/Assessment:   Erlinda Verde is a 54 y.o. female with a history of mild developmental delay and schizophrenia, long-term resident of a group home, brought to the ER due to multiple new areas of bruising, also found to have an oxygen requirement and diagnosed with COVID-19 on 5/16.     Purplish skin lesions that appear bruise-like, most prominent over right fourth and fifth toes, extending proximally to the distal dorsal foot, as well as (symmetrically in some respects) the right fifth finger.  Also noted smaller similar patches right forearm, left arm.  There was suspicion that these lesions may be secondary to COVID-19, however, she does have an acute fracture underlying the right fifth toe supporting that these may in fact be bruises.     Patient also noted to have thrombocytopenia (appears to be acute, not noted on labs from 2018), potentially secondary to COVID-19.     Pertinent Diagnoses:  COVID-19 pneumonia-chest x-ray unremarkable but opacities noted in right lower lobe on CT of abdomen/pelvis  Acute hypoxemic respiratory failure  Fevers- improved this am   Multiple bruises, right fifth toe acute fracture  Thrombocytopenia, likely acute-ongoing, stable  Transaminitis, mild and stable, now improved   Leukopenia - new, ongoing, likely due current illness - worse today   - started prior to RDV being initiated   Developmental delay, minimally verbal   Schizophrenia  KYLE-improved     Inflammatory markers  IL-6 - 3.6   CRP 7.05 -> 10.71 --> 6.73  Ferritin 269 -> 463 --> 558   -> 260   D Dimer 0.27 -> 0.69 -> 0.55  --> 0.67  Troponin 16 --> 10  PCT 0.11 -> 0.26 --> 0.20   --> 143 --> 42    Screening  TB quant-negative  Hep B core antibody total- neg  Hep B surface antigen- neg   Hep C antibody- neg   HIV - neg   ABO-  B+     Plan:       -Continue supportive care which is only known proven therapy for COVID-19 at this time-oxygen supplementation, self-proning, judicious use of IV fluids  -Monitor inflammatory markers every 48 hours   -Monitor d-dimer with low threshold to start therapeutic anticoagulation   -Follow-up blood culture-no growth to date  -Hospitalist Dr. Hayden discussed starting RDV with public guardian on 5/20 and has approval. I confirmed this personally with Guardian (Ann) and after discussing RDV as per FDA emergency use and risk/benefits, placed note in chart. RDV started on 5/21 and anticipate 5 day course   -Once patient is on RDV she will need daily CMP to monitor renal and liver function and ensure no new toxicity -LFTs and creatinine within normal limits today   -Received report that the patient had received court permission for convalescent plasma.  She is very stable from a respiratory standpoint so do not plan to initiate unless she decompensates.  However, did obtain consent so that we are prepared should her condition deteriorate.    - Pt at this time clinically is stable, will hold off for plasma     Discussed with Dr. Hayden.  ID will follow.

## 2020-05-25 NOTE — DIETARY
Nutrition Services: Pt seen for PO intake follow up. Per ADL, pt's intake remains poor over last six documented meals (Refusing two meals , <25% of meal consumed for two meals and 25-50% of two other meals consumed). Confirmed with RN, pt requesting only certain foods and eats 10-15 bites and then states she is full, drinks ~ 1/2 of Boost but dislikes these. RN reviewed preferences with pt, pt likes chocolate, soups,sandwiches, mac and cheese, pancakes, french toast. RN explains pt needs a lot of encouragement with PO intake with 1:1 assistance, on dysphagia 3 as she needs foods chopped.     Diet= Diabetic, Soft/Bite sized (Dysphagia 3), thin liquids.       Plan/Rec:   1) RN to see if diet can be advanced from Dysphagia 3 to regular texture to offer pt more options.   2) Changing to 4 ounce chocolate milkshakes twice daily and Boost once daily (to be sent with meals). Will monitor glucose with addition of milkshakes (only giving half portions) and adjust as needed.   3) Adding standard foods pt likes as listed above.    RD following.

## 2020-05-25 NOTE — CARE PLAN
Problem: Nutritional:  Goal: Achieve adequate nutritional intake  Description: Patient will consume >50% of meals  Outcome: PROGRESSING SLOWER THAN EXPECTED

## 2020-05-25 NOTE — CARE PLAN
Problem: Safety  Goal: Will remain free from injury  Note: Call light within reach, treaded socks in place, bed in lowest position and locked.  Hourly rounding in progress      Problem: Skin Integrity  Goal: Risk for impaired skin integrity will decrease  Note: Pt turned and repositioned q2, heels floated with pillows, pillow in use to support bony prominences, mepilex in use, waffle cushion in use. Barrier past applied to sacrum, purwick in place to prevent incontinent associated skin breakdown

## 2020-05-25 NOTE — PROGRESS NOTES
[facility] Medicine Daily Progress Note    Date of Service  5/25/2020    Chief Complaint  brusing    Hospital Course    Patient is a 54 y.o. female with a reported history of mental retardation and schizophrenia who was sent here from group home due to bruising in multiple areas.  Patient was unable to provide history, however there was reportedly a fall the night prior to admission. On admission, was noted to have evidence of acute kidney injury, with fracture of the right 5th metatarsal, low platelets, and tested positive for COVID 19. EPS case was filed. She was started on supportive care, IVF. Patient continued to spike fevers, with worsened infiltrates on chest xray. ID was consulted, who is recommended convalescent plasma trial along with Remdesevir. ID reports that she is approved for remdesevir. Public guardian cannot consent without court order for plasma, which has been pursued. Patient has started on Remdesivir. Court has also given consent for convalescent plasma. ID holding off of convalescent plasma as relatively stable for now. Her d dimer remained <1. Her platelet count has improved, and thus she was started on lovenox for DVT prophylaxis. Her LFTs remain normal with remdesivir. Procalcitonin remained low.           Interval Problem Update  5/25/2020 - I reviewed the patient's chart. There were no significant overnight events. Remains hemodynamically stable and afebrile. Stable on 2L O2 NC.  Her LFTs remain normal. She's on Day 5 of remdesevir today. WBC 4300. Platelets further improved to [id number].    > I have personally seen and examined the patient today. She is not in distress, weaning oxygen down to 1L. She is interactive but tangential. Answers questions. Not dyspneic. No cough. No nausea, vomiting, diarrhea, abd pain. No fevers or chills.         Consultants/Specialty  Case management  ID    Code Status  Full code    Disposition  Monitor in the [facility]  EPS case opened.    Review of Systems  ROS      Pertinent positives/negatives as mentioned above.     A complete review of systems was personally done by me, limited by mentation. All other systems were negative.         Physical Exam  Temp:  [36.5 °C (97.7 °F)-37.1 °C (98.7 °F)] 37 °C (98.6 °F)  Pulse:  [66-85] 85  Resp:  [11-21] 16  BP: (106-129)/(63-76) 115/74  SpO2:  [89 %-97 %] 94 %    Physical Exam  Vitals signs and nursing note reviewed. Exam conducted with a chaperone present.   Constitutional:       General: She is not in acute distress.     Appearance: Normal appearance. She is not diaphoretic.   HENT:      Head: Normocephalic.      Right Ear: External ear normal.      Left Ear: External ear normal.      Nose: Nose normal.      Mouth/Throat:      Mouth: Mucous membranes are moist.   Eyes:      Pupils: Pupils are equal, round, and reactive to light.   Cardiovascular:      Rate and Rhythm: Normal rate and regular rhythm.      Pulses: Normal pulses.      Heart sounds: Normal heart sounds.   Pulmonary:      Effort: Pulmonary effort is normal.      Breath sounds: Normal breath sounds.   Abdominal:      General: Abdomen is flat. Bowel sounds are normal.      Palpations: Abdomen is soft.   Musculoskeletal: Normal range of motion.         General: No swelling or deformity.   Skin:     General: Skin is warm and dry.      Capillary Refill: Capillary refill takes less than 2 seconds.      Findings: Bruising (mainly lateral right hand and lateral left foot, mild brusing chest , two small lesions right foot, minimal bruising left lateral ankle and left hand) present.      Comments: Bruising stable  Right 5th MT bryce taped   Neurological:      General: No focal deficit present.      Mental Status: She is alert.      Cranial Nerves: No cranial nerve deficit.   Psychiatric:      Comments: Awake, answers questions and interacts but tangential.   Impaired judgment and though content.  (+) mental retardation  Cooperative but elena           Fluids    Intake/Output  Summary (Last 24 hours) at 5/25/2020 1119  Last data filed at 5/25/2020 0800  Gross per 24 hour   Intake 950 ml   Output 1550 ml   Net -600 ml       Laboratory  Recent Labs     05/24/20  0517 05/25/20  0918   WBC 5.6 4.3*   RBC 3.47* 3.36*   HEMOGLOBIN 10.1* 9.8*   HEMATOCRIT 32.8* 32.1*   MCV 94.5 95.5   MCH 29.1 29.2   MCHC 30.8* 30.5*   RDW 48.0 48.9   PLATELETCT 131* 158*   MPV 8.9* 8.5*     Recent Labs     05/23/20  0959 05/25/20  0449   SODIUM 140 140   POTASSIUM 4.0 4.2   CHLORIDE 103 104   CO2 25 25   GLUCOSE 167* 141*   BUN 28* 29*   CREATININE 0.71 0.71   CALCIUM 8.6 8.6     Recent Labs     05/24/20  0517   APTT 26.2   INR 0.90               Imaging  DX-CHEST-PORTABLE (1 VIEW)   Final Result      1.  Enlarged cardiac silhouette with probable mild vascular congestion.   2.  Bibasilar opacities could be related atelectasis, vascular congestion or pneumonitis.      CT-ABDOMEN-PELVIS W/O   Final Result         1. No evidence of acute inflammatory change in the abdomen or pelvis.  The study is however limited due to nonuse of intravenous contrast.      2. Airspace opacity in the right lower lobe, in keeping with pneumonia.      3. Hepatic steatosis.      4. Moderate amount of stool throughout the colon.           Assessment/Plan  * COVID-19 virus infection- (present on admission)  Assessment & Plan  -With infiltrates noted RL on CT scan and persistent hypoxia, remains on 2L O2 NC. Increasing infiltrates on chest xray with persistent fevers. Procalcitonin remains low. Last COVID positive test on 5/18.   -Suspect COVID contributed to acute thrombocytopenia.   -continue immune support with Vitamin D, C and zinc.   -Completing course of remdesevir, Day 5 of 5 today. LFTs remain normal. ID initially recommending starting convalescent plasma, and public guardian has successfully obtained court consent to proceed with treatment, but as currently stabilized ID holding off on plasma for now.  - inflammatory markers  remain stable, recheck tomorrow (5/26). Trend CMP daily to monitor for remdesevir toxicity.  - Platelet count countinues to improve. Continue lovenox 30mg dailt for DVT prophylaxis. Continue to monitor platelet count, and trend d dimer.   - repeat COVID PCR today, send to state lab.     Constipation- (present on admission)  Assessment & Plan  -resolved. Continue bowel regimen.     Thrombocytopenia (HCC)- (present on admission)  Assessment & Plan  -Suspect this is related to COVID/acute viral infection. Counts continue to improve. Continue to trend.  -No evidence of splenomegaly on exam or CT. No sign of hemolysis on peripheral and bili WNL.  HIV and hepatitis negative. Haptoglobin normal.   - Continue lovenox for DVT prophylaxis.     KYLE (acute kidney injury) ([facility])- (present on admission)  Assessment & Plan  -Resolved. Improved with IVF. Off IVF.  -continue to follow.   - avoid nephrotoxins, and continue to renally dose all medications.    Bruising  Assessment & Plan  Likely from trauma, reported fall prior to admission  Thrombocytopenia likely contributing  5th MT fracture found at [facility] R foot  EPS case pending    Diabetes (HCC)- (present on admission)  Assessment & Plan  -Suspect type 2 chronic.  -continue to hold metformin for now. Continue SSI. Accu-Cheks before meals and at bedtime. Goal to keep BG between 140-180 per 2019 ADA guidelines.      Moderate intellectual disability- (present on admission)  Assessment & Plan  -Per group home at baseline    Schizophrenia (HCC)- (present on admission)  Assessment & Plan  -Continue current outpatient psych meds as tolerated.       VTE prophylaxis: Lovenox SQ

## 2020-05-26 LAB
ALBUMIN SERPL BCP-MCNC: 2.5 G/DL (ref 3.2–4.9)
ALP SERPL-CCNC: 84 U/L (ref 30–99)
ALT SERPL-CCNC: 14 U/L (ref 2–50)
ANION GAP SERPL CALC-SCNC: 12 MMOL/L (ref 7–16)
APTT PPP: 34.3 SEC (ref 24.7–36)
AST SERPL-CCNC: 19 U/L (ref 12–45)
BASOPHILS # BLD AUTO: 0 % (ref 0–1.8)
BASOPHILS # BLD: 0 K/UL (ref 0–0.12)
BILIRUB CONJ SERPL-MCNC: <0.2 MG/DL (ref 0.1–0.5)
BILIRUB INDIRECT SERPL-MCNC: ABNORMAL MG/DL (ref 0–1)
BILIRUB SERPL-MCNC: 0.2 MG/DL (ref 0.1–1.5)
BUN SERPL-MCNC: 31 MG/DL (ref 8–22)
CALCIUM SERPL-MCNC: 8.7 MG/DL (ref 8.5–10.5)
CHLORIDE SERPL-SCNC: 106 MMOL/L (ref 96–112)
CK SERPL-CCNC: 27 U/L (ref 0–154)
CO2 SERPL-SCNC: 23 MMOL/L (ref 20–33)
CREAT SERPL-MCNC: 0.75 MG/DL (ref 0.5–1.4)
CRP SERPL HS-MCNC: 4.09 MG/DL (ref 0–0.75)
D DIMER PPP IA.FEU-MCNC: 0.37 UG/ML (FEU) (ref 0–0.5)
EOSINOPHIL # BLD AUTO: 0.06 K/UL (ref 0–0.51)
EOSINOPHIL NFR BLD: 1 % (ref 0–6.9)
ERYTHROCYTE [DISTWIDTH] IN BLOOD BY AUTOMATED COUNT: 47.2 FL (ref 35.9–50)
FERRITIN SERPL-MCNC: 427 NG/ML (ref 10–291)
FIBRINOGEN PPP-MCNC: 537 MG/DL (ref 215–460)
GLUCOSE BLD-MCNC: 130 MG/DL (ref 65–99)
GLUCOSE BLD-MCNC: 130 MG/DL (ref 65–99)
GLUCOSE BLD-MCNC: 176 MG/DL (ref 65–99)
GLUCOSE BLD-MCNC: 89 MG/DL (ref 65–99)
GLUCOSE SERPL-MCNC: 143 MG/DL (ref 65–99)
HCT VFR BLD AUTO: 30.4 % (ref 37–47)
HGB BLD-MCNC: 9.6 G/DL (ref 12–16)
HYPOCHROMIA BLD QL SMEAR: ABNORMAL
INR PPP: 1.06 (ref 0.87–1.13)
LDH SERPL L TO P-CCNC: 299 U/L (ref 107–266)
LYMPHOCYTES # BLD AUTO: 1.86 K/UL (ref 1–4.8)
LYMPHOCYTES NFR BLD: 31 % (ref 22–41)
MAGNESIUM SERPL-MCNC: 2 MG/DL (ref 1.5–2.5)
MANUAL DIFF BLD: NORMAL
MCH RBC QN AUTO: 29.3 PG (ref 27–33)
MCHC RBC AUTO-ENTMCNC: 31.6 G/DL (ref 33.6–35)
MCV RBC AUTO: 92.7 FL (ref 81.4–97.8)
MICROCYTES BLD QL SMEAR: ABNORMAL
MONOCYTES # BLD AUTO: 0.3 K/UL (ref 0–0.85)
MONOCYTES NFR BLD AUTO: 5 % (ref 0–13.4)
MORPHOLOGY BLD-IMP: NORMAL
NEUTROPHILS # BLD AUTO: 3.78 K/UL (ref 2–7.15)
NEUTROPHILS NFR BLD: 63 % (ref 44–72)
NRBC # BLD AUTO: 0.07 K/UL
NRBC BLD-RTO: 1.2 /100 WBC
NT-PROBNP SERPL IA-MCNC: 356 PG/ML (ref 0–125)
OVALOCYTES BLD QL SMEAR: NORMAL
PLATELET # BLD AUTO: 193 K/UL (ref 164–446)
PLATELET BLD QL SMEAR: NORMAL
PMV BLD AUTO: 8.7 FL (ref 9–12.9)
POIKILOCYTOSIS BLD QL SMEAR: NORMAL
POTASSIUM SERPL-SCNC: 3.9 MMOL/L (ref 3.6–5.5)
PROCALCITONIN SERPL-MCNC: 0.1 NG/ML
PROT SERPL-MCNC: 5.5 G/DL (ref 6–8.2)
PROTHROMBIN TIME: 14.1 SEC (ref 12–14.6)
RBC # BLD AUTO: 3.28 M/UL (ref 4.2–5.4)
RBC BLD AUTO: PRESENT
SODIUM SERPL-SCNC: 141 MMOL/L (ref 135–145)
TROPONIN T SERPL-MCNC: 14 NG/L (ref 6–19)
WBC # BLD AUTO: 6 K/UL (ref 4.8–10.8)

## 2020-05-26 PROCEDURE — 80076 HEPATIC FUNCTION PANEL: CPT

## 2020-05-26 PROCEDURE — 83880 ASSAY OF NATRIURETIC PEPTIDE: CPT

## 2020-05-26 PROCEDURE — 770020 HCHG ROOM/CARE - TELE (206)

## 2020-05-26 PROCEDURE — 82550 ASSAY OF CK (CPK): CPT

## 2020-05-26 PROCEDURE — 85007 BL SMEAR W/DIFF WBC COUNT: CPT

## 2020-05-26 PROCEDURE — 85384 FIBRINOGEN ACTIVITY: CPT

## 2020-05-26 PROCEDURE — 85610 PROTHROMBIN TIME: CPT

## 2020-05-26 PROCEDURE — 84484 ASSAY OF TROPONIN QUANT: CPT

## 2020-05-26 PROCEDURE — 85379 FIBRIN DEGRADATION QUANT: CPT

## 2020-05-26 PROCEDURE — A6213 FOAM DRG >16<=48 SQ IN W/BDR: HCPCS | Performed by: HOSPITALIST

## 2020-05-26 PROCEDURE — A9270 NON-COVERED ITEM OR SERVICE: HCPCS | Performed by: HOSPITALIST

## 2020-05-26 PROCEDURE — 85027 COMPLETE CBC AUTOMATED: CPT

## 2020-05-26 PROCEDURE — 99232 SBSQ HOSP IP/OBS MODERATE 35: CPT | Mod: CS | Performed by: HOSPITALIST

## 2020-05-26 PROCEDURE — 84145 PROCALCITONIN (PCT): CPT

## 2020-05-26 PROCEDURE — 83735 ASSAY OF MAGNESIUM: CPT

## 2020-05-26 PROCEDURE — 700111 HCHG RX REV CODE 636 W/ 250 OVERRIDE (IP): Performed by: INTERNAL MEDICINE

## 2020-05-26 PROCEDURE — 85730 THROMBOPLASTIN TIME PARTIAL: CPT

## 2020-05-26 PROCEDURE — 80048 BASIC METABOLIC PNL TOTAL CA: CPT

## 2020-05-26 PROCEDURE — 700112 HCHG RX REV CODE 229: Performed by: HOSPITALIST

## 2020-05-26 PROCEDURE — 86140 C-REACTIVE PROTEIN: CPT

## 2020-05-26 PROCEDURE — 83615 LACTATE (LD) (LDH) ENZYME: CPT

## 2020-05-26 PROCEDURE — 700102 HCHG RX REV CODE 250 W/ 637 OVERRIDE(OP): Performed by: HOSPITALIST

## 2020-05-26 PROCEDURE — 82962 GLUCOSE BLOOD TEST: CPT | Mod: 91

## 2020-05-26 PROCEDURE — 82728 ASSAY OF FERRITIN: CPT

## 2020-05-26 RX ADMIN — OXYBUTYNIN CHLORIDE 10 MG: 10 TABLET, EXTENDED RELEASE ORAL at 17:37

## 2020-05-26 RX ADMIN — INSULIN HUMAN 1 UNITS: 100 INJECTION, SOLUTION PARENTERAL at 21:23

## 2020-05-26 RX ADMIN — DOCUSATE SODIUM 100 MG: 100 CAPSULE, LIQUID FILLED ORAL at 17:37

## 2020-05-26 RX ADMIN — Medication 1000 MG: at 17:36

## 2020-05-26 RX ADMIN — ZINC SULFATE 220 MG (50 MG) CAPSULE 220 MG: CAPSULE at 05:03

## 2020-05-26 RX ADMIN — Medication 1 PACKET: at 05:03

## 2020-05-26 RX ADMIN — DOCUSATE SODIUM 100 MG: 100 CAPSULE, LIQUID FILLED ORAL at 05:03

## 2020-05-26 RX ADMIN — ARIPIPRAZOLE 30 MG: 15 TABLET ORAL at 21:23

## 2020-05-26 RX ADMIN — DONEPEZIL HYDROCHLORIDE 10 MG: 5 TABLET, FILM COATED ORAL at 17:37

## 2020-05-26 RX ADMIN — SERTRALINE HYDROCHLORIDE 50 MG: 50 TABLET ORAL at 05:03

## 2020-05-26 RX ADMIN — QUETIAPINE FUMARATE 300 MG: 200 TABLET ORAL at 21:23

## 2020-05-26 RX ADMIN — CALCIUM CITRATE 200 MG (950 MG) TABLET 950 MG: at 05:03

## 2020-05-26 RX ADMIN — MELATONIN 1000 UNITS: at 05:03

## 2020-05-26 RX ADMIN — DIVALPROEX SODIUM 500 MG: 500 TABLET, DELAYED RELEASE ORAL at 21:23

## 2020-05-26 RX ADMIN — ENOXAPARIN SODIUM 30 MG: 30 INJECTION SUBCUTANEOUS at 05:03

## 2020-05-26 RX ADMIN — Medication 1000 MG: at 05:03

## 2020-05-26 ASSESSMENT — ENCOUNTER SYMPTOMS
SPUTUM PRODUCTION: 0
HEMOPTYSIS: 0
DIZZINESS: 0
NAUSEA: 0
VOMITING: 0
PALPITATIONS: 0
CHILLS: 0
COUGH: 0
ORTHOPNEA: 0

## 2020-05-26 ASSESSMENT — FIBROSIS 4 INDEX: FIB4 SCORE: 1.45

## 2020-05-26 NOTE — CARE PLAN
Problem: Safety  Goal: Will remain free from injury  Note: Call light within reach, treaded socks in place, bed in lowest position and locked.  Hourly rounding in progress      Problem: Skin Integrity  Goal: Risk for impaired skin integrity will decrease  Note: Pt turned and repositioned q2, heels floated with pillows, pillow in use to support bony prominences, mepilex in use, waffle cushion in use.

## 2020-05-26 NOTE — DISCHARGE PLANNING
Anticipated Discharge Disposition: [facility]    Action: medically stable per chart review    Barriers to Discharge:   medical clearance  covid + 5/16, 5/18, 5/25  Open APS referral  Leadership aware    Plan: return to  when covid negative

## 2020-05-27 LAB
ERYTHROCYTE [DISTWIDTH] IN BLOOD BY AUTOMATED COUNT: 49.2 FL (ref 35.9–50)
GLUCOSE BLD-MCNC: 118 MG/DL (ref 65–99)
GLUCOSE BLD-MCNC: 134 MG/DL (ref 65–99)
GLUCOSE BLD-MCNC: 233 MG/DL (ref 65–99)
HCT VFR BLD AUTO: 39 % (ref 37–47)
HGB BLD-MCNC: 12 G/DL (ref 12–16)
MCH RBC QN AUTO: 29.1 PG (ref 27–33)
MCHC RBC AUTO-ENTMCNC: 30.8 G/DL (ref 33.6–35)
MCV RBC AUTO: 94.7 FL (ref 81.4–97.8)
PLATELET # BLD AUTO: 255 K/UL (ref 164–446)
PMV BLD AUTO: 8.6 FL (ref 9–12.9)
RBC # BLD AUTO: 4.12 M/UL (ref 4.2–5.4)
SARS-COV-2 RNA RESP QL NAA+PROBE: DETECTED
SPECIMEN SOURCE: ABNORMAL
WBC # BLD AUTO: 7.2 K/UL (ref 4.8–10.8)

## 2020-05-27 PROCEDURE — 700102 HCHG RX REV CODE 250 W/ 637 OVERRIDE(OP): Performed by: HOSPITALIST

## 2020-05-27 PROCEDURE — 770020 HCHG ROOM/CARE - TELE (206)

## 2020-05-27 PROCEDURE — A9270 NON-COVERED ITEM OR SERVICE: HCPCS | Performed by: HOSPITALIST

## 2020-05-27 PROCEDURE — 700112 HCHG RX REV CODE 229: Performed by: HOSPITALIST

## 2020-05-27 PROCEDURE — 700111 HCHG RX REV CODE 636 W/ 250 OVERRIDE (IP): Performed by: HOSPITALIST

## 2020-05-27 PROCEDURE — 85027 COMPLETE CBC AUTOMATED: CPT

## 2020-05-27 PROCEDURE — 99231 SBSQ HOSP IP/OBS SF/LOW 25: CPT | Mod: CS | Performed by: HOSPITALIST

## 2020-05-27 PROCEDURE — 82962 GLUCOSE BLOOD TEST: CPT

## 2020-05-27 RX ORDER — POLYETHYLENE GLYCOL 3350 17 G/17G
1 POWDER, FOR SOLUTION ORAL
Status: DISCONTINUED | OUTPATIENT
Start: 2020-05-27 | End: 2020-06-18 | Stop reason: HOSPADM

## 2020-05-27 RX ORDER — AMOXICILLIN 250 MG
2 CAPSULE ORAL 2 TIMES DAILY
Status: DISCONTINUED | OUTPATIENT
Start: 2020-05-27 | End: 2020-06-18 | Stop reason: HOSPADM

## 2020-05-27 RX ORDER — BISACODYL 10 MG
10 SUPPOSITORY, RECTAL RECTAL DAILY
Status: DISCONTINUED | OUTPATIENT
Start: 2020-05-27 | End: 2020-06-18 | Stop reason: HOSPADM

## 2020-05-27 RX ADMIN — BISACODYL 10 MG: 10 SUPPOSITORY RECTAL at 17:01

## 2020-05-27 RX ADMIN — ACETAMINOPHEN 650 MG: 325 TABLET, FILM COATED ORAL at 05:55

## 2020-05-27 RX ADMIN — DOCUSATE SODIUM 100 MG: 100 CAPSULE, LIQUID FILLED ORAL at 05:51

## 2020-05-27 RX ADMIN — DONEPEZIL HYDROCHLORIDE 10 MG: 5 TABLET, FILM COATED ORAL at 17:05

## 2020-05-27 RX ADMIN — Medication 1000 MG: at 17:05

## 2020-05-27 RX ADMIN — CALCIUM CITRATE 200 MG (950 MG) TABLET 950 MG: at 05:51

## 2020-05-27 RX ADMIN — Medication 1 PACKET: at 05:51

## 2020-05-27 RX ADMIN — ZINC SULFATE 220 MG (50 MG) CAPSULE 220 MG: CAPSULE at 05:51

## 2020-05-27 RX ADMIN — QUETIAPINE FUMARATE 300 MG: 200 TABLET ORAL at 20:11

## 2020-05-27 RX ADMIN — INSULIN HUMAN 2 UNITS: 100 INJECTION, SOLUTION PARENTERAL at 11:24

## 2020-05-27 RX ADMIN — DIVALPROEX SODIUM 500 MG: 500 TABLET, DELAYED RELEASE ORAL at 20:11

## 2020-05-27 RX ADMIN — MELATONIN 1000 UNITS: at 05:51

## 2020-05-27 RX ADMIN — OXYBUTYNIN CHLORIDE 10 MG: 10 TABLET, EXTENDED RELEASE ORAL at 17:05

## 2020-05-27 RX ADMIN — SERTRALINE HYDROCHLORIDE 50 MG: 50 TABLET ORAL at 05:51

## 2020-05-27 RX ADMIN — ARIPIPRAZOLE 30 MG: 15 TABLET ORAL at 20:11

## 2020-05-27 RX ADMIN — INSULIN HUMAN 1 UNITS: 100 INJECTION, SOLUTION PARENTERAL at 20:10

## 2020-05-27 RX ADMIN — ENOXAPARIN SODIUM 40 MG: 100 INJECTION SUBCUTANEOUS at 05:51

## 2020-05-27 RX ADMIN — Medication 1000 MG: at 05:51

## 2020-05-27 ASSESSMENT — ENCOUNTER SYMPTOMS
SPUTUM PRODUCTION: 0
HEMOPTYSIS: 0
NAUSEA: 0
CHILLS: 0
DIZZINESS: 0
COUGH: 0
VOMITING: 0

## 2020-05-27 NOTE — CARE PLAN
Problem: Skin Integrity  Goal: Risk for impaired skin integrity will decrease  Note: Pt turned and repositioned q2, heels floated with pillows, pillow in use to support bony prominences, mepilex in use, waffle cushion in use. Purwick in place to prevent skin breakdown.     Problem: Safety  Goal: Will remain free from injury  Note: Call light within reach, treaded socks in place, bed in lowest position and locked.  Hourly rounding in progress

## 2020-05-27 NOTE — WOUND TEAM
[facility]  Inpatient Services  Initial Wound and Skin Care Evaluation    Admission Date: 5/16/2020     Last order of IP CONSULT TO WOUND CARE was found on 5/26/2020 from Hospital Encounter on 5/16/2020     HPI, PMH, SH: Reviewed    Unit where seen by Wound Team: [id number]     WOUND CONSULT/FOLLOW UP RELATED TO:  Left buttock     Self Report / Pain Level:  No pain.        OBJECTIVE:  mepilex in place. Waffle cushion in place    WOUND TYPE, LOCATION, CHARACTERISTICS (Pressure Injuries: location, stage, POA or date identified)  Wound 05/26/20 Partial Thickness Wound Sacrum;Buttocks Left related to friction (Active)   Wound Image      Site Assessment Dry;Intact;Red    Periwound Assessment Blanchable erythema;Dry;Intact    Margins Attached edges    Closure Other (Comment)    Drainage Amount None    Treatments Cleansed;Site care;Offloading    Wound Cleansing Not Applicable    Periwound Protectant Barrier Paste    Dressing Cleansing/Solutions Not Applicable    Dressing Options Open to Air    Dressing Changed New    Dressing Status Clean;Dry;Intact    Dressing Change/Treatment Frequency Every Shift, and As Needed    NEXT Weekly Photo (Inpatient Only) 06/03/20    Number of days: 1          Vascular:    JOSE:   No results found.      Lab Values:    Lab Results   Component Value Date/Time    WBC 7.2 05/27/2020 08:56 AM    RBC 4.12 (L) 05/27/2020 08:56 AM    HEMOGLOBIN 12.0 05/27/2020 08:56 AM    HEMATOCRIT 39.0 05/27/2020 08:56 AM    CREACTPROT 4.09 (H) 05/26/2020 04:15 AM          Culture:   Obtained n/a,   Culture Results show:  No results found for this or any previous visit (from the past 720 hour(s)).      INTERVENTIONS BY WOUND TEAM:  Patient and chart reviewed. Consult received regarding patient left buttock. With assistance of bedside RN, patient turned to right side. Left buttock presenting with a partial thickness wound related to friction. No longer opened or draining, scab present. Blanchable erythema  surrounding. Patient incontinent. Pictures taken. Barrier paste applied to entire buttock and sacrum. Patient turned to comfortable position. Appropriate skin orders placed.     Interdisciplinary consultation: Patient, Bedside RN (So Young),     EVALUATION: Patient admitted with scattered bruising. Buttocks assessed, left buttock with partial thickness wound present, likely related to friction and moisture, inconsistent with pressure injury. Barrier paste applied to reduce friction and shear and keep the area dry. Can place mepilex if patient continent. Appropriate skin orders placed. No need for further wound team follow up at this time. Reconsult wound team if concerns arise. Bedside RN updated.    Goals: Steady decrease in wound area and depth weekly.    NURSING PLAN OF CARE ORDERS (X):    Dressing changes: See Dressing Care orders: X  Skin care: See Skin Care orders: X  Rectal tube care: See Rectal Tube Care orders:   Other orders:    RSKIN:   CURRENTLY IN PLACE (X), APPLIED THIS VISIT (A), ORDERED (O):   Q shift Rohith:  X  Q shift pressure point assessments:  X  Pressure redistribution mattress   X         Low Airloss          Bariatric FABBY         Bariatric foam           Heel float boots     Heel Silicone dressing   O     Float Heels off Bed with Pillows               Barrier wipes         Barrier Cream         Barrier paste   O,A       Sacral silicone dressing   X      Silicone O2 tubing  X       Anchorfast         Cannula fixation Device (Tender )          Gray Foam Ear protectors           Trach with Optifoam split foam                 Waffle cushion        Waffle Overlay  X       Rectal tube or BMS    Purwick/Condom Cath   X       Antifungal tx      Interdry          Reposition q 2 hours   X     Up to chair        Ambulate      PT/OT        Dietician        Diabetes Education      PO    X TF     TPN     NPO   # days   Other        WOUND TEAM PLAN OF CARE:   Dressing changes by wound team:           Follow up 1-2 times weekly:               Follow up 3 times weekly:                NPWT change 3 times weekly:     Follow up as needed:     X -- reconsult if concerns arise  Other (explain):     Anticipated discharge plans: n/a  LTACH:        SNF/Rehab:                  Home Care:           Outpatient Wound Center:            Self Care:

## 2020-05-27 NOTE — PROGRESS NOTES
Hospital Medicine Daily Progress Note    Date of Service  5/26/2020    Chief Complaint  brusing    Hospital Course    Patient is a 54 y.o. female with a reported history of mental retardation and schizophrenia who was sent here from group home due to bruising in multiple areas.  Patient was unable to provide history, however there was reportedly a fall the night prior to admission. On admission, was noted to have evidence of acute kidney injury, with fracture of the right 5th metatarsal, low platelets, and tested positive for COVID 19. EPS case was filed. She was started on supportive care, IVF. Patient continued to spike fevers, with worsened infiltrates on chest xray. ID was consulted, who is recommended convalescent plasma trial along with Remdesevir. ID reports that she is approved for remdesevir. Public guardian cannot consent without court order for plasma, which has been pursued. Patient has started on Remdesivir. Court has also given consent for convalescent plasma. ID holding off of convalescent plasma as relatively stable for now. Her d dimer remained <1. Her platelet count has improved, and thus she was started on lovenox for DVT prophylaxis. Her LFTs remain normal with remdesivir. Procalcitonin remained low.         Interval Problem Update  Patient is awake, follows commands  Does not have specific complaints, denies shortness of breath but remains on supplemental oxygen, endorses mild cough, endorses fatigue  Tolerating diet, denies abdominal pain    Consultants/Specialty  Case management  ID, I discussed management with Dr. Mullins    Code Status  Full code    Disposition  To be determined  EPS case opened.    Review of Systems  Review of Systems   Constitutional: Negative for chills and malaise/fatigue.   Respiratory: Negative for cough, hemoptysis and sputum production.    Cardiovascular: Negative for chest pain, palpitations and orthopnea.   Gastrointestinal: Negative for nausea and vomiting.   Skin:  Negative for itching and rash.   Neurological: Negative for dizziness.   All other systems reviewed and are negative.      Physical Exam  Temp:  [36.4 °C (97.6 °F)-37 °C (98.6 °F)] 36.5 °C (97.7 °F)  Pulse:  [64-95] 64  Resp:  [7-21] 13  BP: ()/(49-79) 112/79  SpO2:  [89 %-96 %] 89 %    Physical Exam  Constitutional:       General: She is not in acute distress.     Appearance: Normal appearance. She is normal weight.   HENT:      Head: Normocephalic and atraumatic.      Right Ear: External ear normal.      Left Ear: External ear normal.      Nose: Nose normal.      Mouth/Throat:      Mouth: Mucous membranes are moist.      Pharynx: Oropharynx is clear.   Eyes:      Extraocular Movements: Extraocular movements intact.      Conjunctiva/sclera: Conjunctivae normal.      Pupils: Pupils are equal, round, and reactive to light.   Neck:      Musculoskeletal: Normal range of motion and neck supple.   Cardiovascular:      Rate and Rhythm: Normal rate and regular rhythm.      Pulses: Normal pulses.   Pulmonary:      Effort: Pulmonary effort is normal.      Comments: Scattered crackles  Abdominal:      General: Abdomen is flat. Bowel sounds are normal.      Palpations: Abdomen is soft.   Musculoskeletal: Normal range of motion.   Skin:     General: Skin is warm and dry.      Capillary Refill: Capillary refill takes less than 2 seconds.      Coloration: Skin is not jaundiced.      Comments: Bruising extremiteis   Neurological:      General: No focal deficit present.      Mental Status: She is alert.      Cranial Nerves: No cranial nerve deficit.      Gait: Gait normal.   Psychiatric:         Mood and Affect: Mood normal.         Behavior: Behavior normal.           Fluids    Intake/Output Summary (Last 24 hours) at 5/26/2020 1717  Last data filed at 5/26/2020 0600  Gross per 24 hour   Intake 600 ml   Output 300 ml   Net 300 ml       Laboratory  Recent Labs     05/24/20  0517 05/25/20  0918 05/26/20  0415   WBC 5.6 4.3*  6.0   RBC 3.47* 3.36* 3.28*   HEMOGLOBIN 10.1* 9.8* 9.6*   HEMATOCRIT 32.8* 32.1* 30.4*   MCV 94.5 95.5 92.7   MCH 29.1 29.2 29.3   MCHC 30.8* 30.5* 31.6*   RDW 48.0 48.9 47.2   PLATELETCT 131* 158* 193   MPV 8.9* 8.5* 8.7*     Recent Labs     05/25/20  0449 05/26/20  0415   SODIUM 140 141   POTASSIUM 4.2 3.9   CHLORIDE 104 106   CO2 25 23   GLUCOSE 141* 143*   BUN 29* 31*   CREATININE 0.71 0.75   CALCIUM 8.6 8.7     Recent Labs     05/24/20  0517 05/26/20  0415   APTT 26.2 34.3   INR 0.90 1.06               Imaging  DX-CHEST-PORTABLE (1 VIEW)   Final Result      1.  Enlarged cardiac silhouette with probable mild vascular congestion.   2.  Bibasilar opacities could be related atelectasis, vascular congestion or pneumonitis.      CT-ABDOMEN-PELVIS W/O   Final Result         1. No evidence of acute inflammatory change in the abdomen or pelvis.  The study is however limited due to nonuse of intravenous contrast.      2. Airspace opacity in the right lower lobe, in keeping with pneumonia.      3. Hepatic steatosis.      4. Moderate amount of stool throughout the colon.           Assessment/Plan  * COVID-19 virus infection- (present on admission)  Assessment & Plan  With associated pneumonia/respiratory failure  Completed course of remdesevir, liver enzymes remain in the normal range  Continue to monitor inflammatory markers  Prophylactic Lovenox  Continue submental oxygen, wean as tolerated  COVID PCR test sent 5/25, results are pending    Constipation- (present on admission)  Assessment & Plan  Resolved    Thrombocytopenia (HCC)- (present on admission)  Assessment & Plan  Suspect this is related to COVID/acute viral infection  Resolved    KYLE (acute kidney injury) (HCC)- (present on admission)  Assessment & Plan  Resolved  Continue to follow.   Avoid nephrotoxins, and continue to renally dose all medications.    Bruising  Assessment & Plan  Likely from trauma, reported fall prior to admission  Thrombocytopenia likely  contributing  5th MT fracture found at [facility] foot  EPS case pending    Diabetes ([facility])- (present on admission)  Assessment & Plan  Insulin sliding scale    Moderate intellectual disability- (present on admission)  Assessment & Plan  -Per group home at baseline    Schizophrenia ([facility])- (present on admission)  Assessment & Plan  -Continue current outpatient psych meds as tolerated.       VTE prophylaxis: Lovenox SQ

## 2020-05-27 NOTE — PROGRESS NOTES
12 hour chart check    Monitor Summary    SR 60-70s, occasional jaclyn to 48, asymptomatic.  .12/.10/.40

## 2020-05-27 NOTE — CARE PLAN
Problem: Safety  Goal: Will remain free from falls  Outcome: PROGRESSING AS EXPECTED  Note: Pt remains free from falls     Problem: Skin Integrity  Goal: Risk for impaired skin integrity will decrease  Outcome: PROGRESSING AS EXPECTED  Note: Q2H turns, pillows in place to elevate heels off bed, barrier paste and mepilex in place for protection

## 2020-05-28 LAB
GLUCOSE BLD-MCNC: 115 MG/DL (ref 65–99)
GLUCOSE BLD-MCNC: 153 MG/DL (ref 65–99)
GLUCOSE BLD-MCNC: 194 MG/DL (ref 65–99)

## 2020-05-28 PROCEDURE — 700102 HCHG RX REV CODE 250 W/ 637 OVERRIDE(OP): Performed by: HOSPITALIST

## 2020-05-28 PROCEDURE — 99232 SBSQ HOSP IP/OBS MODERATE 35: CPT | Mod: CS | Performed by: INTERNAL MEDICINE

## 2020-05-28 PROCEDURE — 770020 HCHG ROOM/CARE - TELE (206)

## 2020-05-28 PROCEDURE — A9270 NON-COVERED ITEM OR SERVICE: HCPCS | Performed by: HOSPITALIST

## 2020-05-28 PROCEDURE — 700112 HCHG RX REV CODE 229: Performed by: HOSPITALIST

## 2020-05-28 PROCEDURE — 82962 GLUCOSE BLOOD TEST: CPT | Mod: 91

## 2020-05-28 PROCEDURE — 700111 HCHG RX REV CODE 636 W/ 250 OVERRIDE (IP): Performed by: HOSPITALIST

## 2020-05-28 PROCEDURE — 99232 SBSQ HOSP IP/OBS MODERATE 35: CPT | Mod: CS | Performed by: HOSPITALIST

## 2020-05-28 RX ORDER — METFORMIN HYDROCHLORIDE 500 MG/1
500 TABLET, EXTENDED RELEASE ORAL DAILY
Status: DISCONTINUED | OUTPATIENT
Start: 2020-05-28 | End: 2020-06-18 | Stop reason: HOSPADM

## 2020-05-28 RX ADMIN — BISACODYL 10 MG: 10 SUPPOSITORY RECTAL at 17:31

## 2020-05-28 RX ADMIN — DIVALPROEX SODIUM 500 MG: 500 TABLET, DELAYED RELEASE ORAL at 20:07

## 2020-05-28 RX ADMIN — Medication 1000 MG: at 17:31

## 2020-05-28 RX ADMIN — QUETIAPINE FUMARATE 300 MG: 200 TABLET ORAL at 20:08

## 2020-05-28 RX ADMIN — DOCUSATE SODIUM 100 MG: 100 CAPSULE, LIQUID FILLED ORAL at 17:31

## 2020-05-28 RX ADMIN — ENOXAPARIN SODIUM 40 MG: 100 INJECTION SUBCUTANEOUS at 05:29

## 2020-05-28 RX ADMIN — Medication 1000 MG: at 05:29

## 2020-05-28 RX ADMIN — DONEPEZIL HYDROCHLORIDE 10 MG: 5 TABLET, FILM COATED ORAL at 17:30

## 2020-05-28 RX ADMIN — SERTRALINE HYDROCHLORIDE 50 MG: 50 TABLET ORAL at 05:29

## 2020-05-28 RX ADMIN — ARIPIPRAZOLE 30 MG: 15 TABLET ORAL at 20:08

## 2020-05-28 RX ADMIN — METFORMIN HYDROCHLORIDE 500 MG: 500 TABLET, EXTENDED RELEASE ORAL at 14:28

## 2020-05-28 RX ADMIN — CALCIUM CITRATE 200 MG (950 MG) TABLET 950 MG: at 05:38

## 2020-05-28 RX ADMIN — OXYBUTYNIN CHLORIDE 10 MG: 10 TABLET, EXTENDED RELEASE ORAL at 17:31

## 2020-05-28 RX ADMIN — Medication 1 PACKET: at 05:38

## 2020-05-28 RX ADMIN — ZINC SULFATE 220 MG (50 MG) CAPSULE 220 MG: CAPSULE at 05:29

## 2020-05-28 RX ADMIN — MELATONIN 1000 UNITS: at 05:29

## 2020-05-28 RX ADMIN — INSULIN HUMAN 1 UNITS: 100 INJECTION, SOLUTION PARENTERAL at 11:33

## 2020-05-28 ASSESSMENT — ENCOUNTER SYMPTOMS
DIZZINESS: 0
VOMITING: 0
CHILLS: 0
SPUTUM PRODUCTION: 0
NAUSEA: 0
COUGH: 0
HEMOPTYSIS: 0

## 2020-05-28 ASSESSMENT — FIBROSIS 4 INDEX: FIB4 SCORE: 1.1

## 2020-05-28 NOTE — CARE PLAN
Problem: Nutritional:  Goal: Achieve adequate nutritional intake  Description: Patient will consume >50% of meals.  Outcome: PROGRESSING AS EXPECTED     Admit day 12.  PO intake has shown significant improvement since last RD note (5/25). PO intake typically recorded as % per ADLs.   RD will continue to follow to ensure ongoing adequacy of PO.

## 2020-05-28 NOTE — PROGRESS NOTES
Hospital Medicine Daily Progress Note    Date of Service  5/27/2020    Chief Complaint  brusing    Hospital Course    Patient is a 54 y.o. female with a reported history of mental retardation and schizophrenia who was sent here from group home due to bruising in multiple areas.  Patient was unable to provide history, however there was reportedly a fall the night prior to admission. On admission, was noted to have evidence of acute kidney injury, with fracture of the right 5th metatarsal, low platelets, and tested positive for COVID 19. EPS case was filed. She was started on supportive care, IVF. Patient continued to spike fevers, with worsened infiltrates on chest xray. ID was consulted, who is recommended convalescent plasma trial along with Remdesevir. ID reports that she is approved for remdesevir. Public guardian cannot consent without court order for plasma, which has been pursued. Patient has started on Remdesivir. Court has also given consent for convalescent plasma. ID holding off of convalescent plasma as relatively stable for now. Her d dimer remained <1. Her platelet count has improved, and thus she was started on lovenox for DVT prophylaxis. Her LFTs remain normal with remdesivir. Procalcitonin remained low.       Interval Problem Update  Awake and cooperative  Does not really seem to know why she is in the hospital but certainly able to answer questions about symptoms and concerns  She really only complains that the bed is uncomfortable, she is not short of breath, denies chest pain, denies significant cough    Consultants/Specialty  Case management  Infectious disease    Code Status  Full code    Disposition  To be determined  EPS case opened.    Review of Systems  Review of Systems   Constitutional: Negative for chills and malaise/fatigue.   Respiratory: Negative for cough, hemoptysis and sputum production.    Gastrointestinal: Negative for nausea and vomiting.   Skin: Negative for itching and rash.    Neurological: Negative for dizziness.   All other systems reviewed and are negative.      Physical Exam  Temp:  [36.3 °C (97.3 °F)-37.1 °C (98.7 °F)] 36.9 °C (98.5 °F)  Pulse:  [62-80] 75  Resp:  [7-21] 19  BP: ()/(51-81) 101/68  SpO2:  [90 %-97 %] 95 %    Physical Exam  Constitutional:       General: She is not in acute distress.     Appearance: Normal appearance. She is normal weight.   HENT:      Head: Normocephalic and atraumatic.      Right Ear: External ear normal.      Left Ear: External ear normal.      Nose: Nose normal.      Mouth/Throat:      Mouth: Mucous membranes are moist.      Pharynx: Oropharynx is clear.   Eyes:      Extraocular Movements: Extraocular movements intact.      Conjunctiva/sclera: Conjunctivae normal.      Pupils: Pupils are equal, round, and reactive to light.   Neck:      Musculoskeletal: Normal range of motion and neck supple.   Cardiovascular:      Rate and Rhythm: Normal rate and regular rhythm.      Pulses: Normal pulses.   Pulmonary:      Effort: Pulmonary effort is normal.      Comments: Scattered crackles  Abdominal:      General: Abdomen is flat. Bowel sounds are normal.      Palpations: Abdomen is soft.   Musculoskeletal: Normal range of motion.   Skin:     General: Skin is warm and dry.      Capillary Refill: Capillary refill takes less than 2 seconds.      Coloration: Skin is not jaundiced.      Comments: Bruising extremiteis   Neurological:      General: No focal deficit present.      Mental Status: She is alert.      Cranial Nerves: No cranial nerve deficit.      Gait: Gait normal.   Psychiatric:         Mood and Affect: Mood normal.         Behavior: Behavior normal.           Fluids    Intake/Output Summary (Last 24 hours) at 5/27/2020 1707  Last data filed at 5/27/2020 1200  Gross per 24 hour   Intake 900 ml   Output 300 ml   Net 600 ml       Laboratory  Recent Labs     05/25/20  0918 05/26/20  0415 05/27/20  0856   WBC 4.3* 6.0 7.2   RBC 3.36* 3.28* 4.12*    HEMOGLOBIN 9.8* 9.6* 12.0   HEMATOCRIT 32.1* 30.4* 39.0   MCV 95.5 92.7 94.7   MCH 29.2 29.3 29.1   MCHC 30.5* 31.6* 30.8*   RDW 48.9 47.2 49.2   PLATELETCT 158* 193 255   MPV 8.5* 8.7* 8.6*     Recent Labs     05/25/20  0449 05/26/20  0415   SODIUM 140 141   POTASSIUM 4.2 3.9   CHLORIDE 104 106   CO2 25 23   GLUCOSE 141* 143*   BUN 29* 31*   CREATININE 0.71 0.75   CALCIUM 8.6 8.7     Recent Labs     05/26/20  0415   APTT 34.3   INR 1.06               Imaging  DX-CHEST-PORTABLE (1 VIEW)   Final Result      1.  Enlarged cardiac silhouette with probable mild vascular congestion.   2.  Bibasilar opacities could be related atelectasis, vascular congestion or pneumonitis.      CT-ABDOMEN-PELVIS W/O   Final Result         1. No evidence of acute inflammatory change in the abdomen or pelvis.  The study is however limited due to nonuse of intravenous contrast.      2. Airspace opacity in the right lower lobe, in keeping with pneumonia.      3. Hepatic steatosis.      4. Moderate amount of stool throughout the colon.           Assessment/Plan  * COVID-19 virus infection- (present on admission)  Assessment & Plan  With associated pneumonia/respiratory failure  Completed course of remdesevir, liver enzymes remain in the normal range  Prophylactic Lovenox  Continue submental oxygen, wean as tolerated  COVID PCR test sent 5/25: positive  Repeat test in 5 days    Constipation- (present on admission)  Assessment & Plan  Add bowel regimen    Thrombocytopenia (HCC)- (present on admission)  Assessment & Plan  Resolved    KYLE (acute kidney injury) (HCC)- (present on admission)  Assessment & Plan  Resolved  Avoid nephrotoxins    Bruising  Assessment & Plan  Likely from trauma, reported fall prior to admission  Thrombocytopenia likely contributing  5th MT fracture found at [facility] foot  EPS case pending    Diabetes (HCC)- (present on admission)  Assessment & Plan  Insulin sliding scale    Schizophrenia (HCC)- (present on  admission)  Assessment & Plan  Continue current outpatient psych meds as tolerated.       VTE prophylaxis: Lovenox SQ

## 2020-05-28 NOTE — PROGRESS NOTES
[facility] Medicine Daily Progress Note    Date of Service  5/28/2020    Chief Complaint  brusing    Hospital Course    Ms Verde has a history of cognitive deficits and schizophrenia.  She was sent to the emergency room for multiple areas of skin bruising, a fall prior to admission was reported although the details are poorly defined.  Evaluation demonstrated the patient had acute renal failure, 1/5 metatarsal fracture, and thrombocytopenia.  Patient tested positive for COVID 19.  She was admitted to the hospital patient had recurrent fevers and follow-up imaging demonstrated worsened infiltrates on her chest x-ray.  Infectious disease was consulted and she was treated with Remdesevir.  The patient's respiratory status and thrombocytopenia have improved.  She continues to test positive for COVID by PCR.      Interval Problem Update  Patient is sleepy this morning, when awakened she has no complaints  Denies shortness of breath, denies cough  Does not recall why she is in the hospital or where she lives    Consultants/Specialty  Case management  Infectious disease    Code Status  Full code    Disposition  To be determined  EPS case opened.    Review of Systems  Review of Systems   Constitutional: Negative for chills and malaise/fatigue.   Respiratory: Negative for cough, hemoptysis and sputum production.    Gastrointestinal: Negative for nausea and vomiting.   Skin: Negative for itching and rash.   Neurological: Negative for dizziness.   All other systems reviewed and are negative.      Physical Exam  Temp:  [36.7 °C (98 °F)-37.4 °C (99.3 °F)] 36.9 °C (98.4 °F)  Pulse:  [] 78  Resp:  [12-24] 19  BP: (100-124)/(57-94) 116/57  SpO2:  [91 %-100 %] 98 %    Physical Exam  Constitutional:       General: She is not in acute distress.     Appearance: Normal appearance. She is normal weight.   HENT:      Head: Normocephalic and atraumatic.      Right Ear: External ear normal.      Left Ear: External ear normal.       Nose: Nose normal.      Mouth/Throat:      Mouth: Mucous membranes are moist.      Pharynx: Oropharynx is clear.   Eyes:      Extraocular Movements: Extraocular movements intact.      Conjunctiva/sclera: Conjunctivae normal.      Pupils: Pupils are equal, round, and reactive to light.   Neck:      Musculoskeletal: Normal range of motion and neck supple.   Cardiovascular:      Rate and Rhythm: Normal rate and regular rhythm.      Pulses: Normal pulses.   Pulmonary:      Effort: Pulmonary effort is normal.      Comments: Scattered crackles  Abdominal:      General: Abdomen is flat. Bowel sounds are normal.      Palpations: Abdomen is soft.   Musculoskeletal: Normal range of motion.   Skin:     General: Skin is warm and dry.      Capillary Refill: Capillary refill takes less than 2 seconds.      Coloration: Skin is not jaundiced.      Comments: Bruising extremiteis   Neurological:      General: No focal deficit present.      Mental Status: She is alert.      Cranial Nerves: No cranial nerve deficit.      Gait: Gait normal.   Psychiatric:         Mood and Affect: Mood normal.         Behavior: Behavior normal.           Fluids    Intake/Output Summary (Last 24 hours) at 5/28/2020 1352  Last data filed at 5/28/2020 0800  Gross per 24 hour   Intake 100 ml   Output 700 ml   Net -600 ml       Laboratory  Recent Labs     05/26/20  0415 05/27/20  0856   WBC 6.0 7.2   RBC 3.28* 4.12*   HEMOGLOBIN 9.6* 12.0   HEMATOCRIT 30.4* 39.0   MCV 92.7 94.7   MCH 29.3 29.1   MCHC 31.6* 30.8*   RDW 47.2 49.2   PLATELETCT 193 255   MPV 8.7* 8.6*     Recent Labs     05/26/20  0415   SODIUM 141   POTASSIUM 3.9   CHLORIDE 106   CO2 23   GLUCOSE 143*   BUN 31*   CREATININE 0.75   CALCIUM 8.7     Recent Labs     05/26/20  0415   APTT 34.3   INR 1.06               Imaging  DX-CHEST-PORTABLE (1 VIEW)   Final Result      1.  Enlarged cardiac silhouette with probable mild vascular congestion.   2.  Bibasilar opacities could be related atelectasis,  vascular congestion or pneumonitis.      CT-ABDOMEN-PELVIS W/O   Final Result         1. No evidence of acute inflammatory change in the abdomen or pelvis.  The study is however limited due to nonuse of intravenous contrast.      2. Airspace opacity in the right lower lobe, in keeping with pneumonia.      3. Hepatic steatosis.      4. Moderate amount of stool throughout the colon.           Assessment/Plan  * COVID-19 virus infection- (present on admission)  Assessment & Plan  With associated pneumonia/respiratory failure  Completed course of remdesevir  Continue submental oxygen, wean as tolerated  COVID PCR test sent 5/25: positive  Repeat test in 5 days    Constipation- (present on admission)  Assessment & Plan  Add bowel regimen    Thrombocytopenia (HCC)- (present on admission)  Assessment & Plan  Resolved    KYLE (acute kidney injury) (HCC)- (present on admission)  Assessment & Plan  Resolved  Avoid nephrotoxins    Bruising  Assessment & Plan  Likely from trauma, reported fall prior to admission  Thrombocytopenia likely contributing  5th MT fracture found at [facility]  EPS case pending    Diabetes (HCC)- (present on admission)  Assessment & Plan  Mild elevation of blood sugars, stop insulin sliding scale and change to metformin    Schizophrenia (HCC)- (present on admission)  Assessment & Plan  Continue current outpatient psych meds as tolerated.       VTE prophylaxis: Lovenox SQ

## 2020-05-28 NOTE — DISCHARGE PLANNING
Anticipated Discharge Disposition: [facility]     Action: no change     Barriers to Discharge:   medical clearance  covid + 5/16, 5/18, 5/25     Plan: return to  when covid negative

## 2020-05-28 NOTE — CARE PLAN
Problem: Communication  Goal: The ability to communicate needs accurately and effectively will improve  Outcome: PROGRESSING AS EXPECTED  Interventions: patient oriented to room and surroundings, plan of care discussed, questions answered, call light near, hourly rounding in place.      Problem: Mobility  Goal: Risk for activity intolerance will decrease  Outcome: PROGRESSING AS EXPECTED  Interventions: patient mobilized to edge of bed as tolerated, q2 turns, range of motion q2.

## 2020-05-28 NOTE — CARE PLAN
Problem: Safety  Goal: Will remain free from falls  Outcome: PROGRESSING AS EXPECTED  Intervention: Implement fall precautions  Flowsheets  Taken 5/27/2020 2307  Bed Alarm: Yes - Alarm On  Taken 5/27/2020 2000  Environmental Precautions:   Personal Belongings, Wastebasket, Call Bell etc. in Easy Reach   Treaded Slipper Socks on Patient   Bed in Low Position  Note: Fall precautions in place. Call light and belongings within reach. Bed locked and in lowest position. Room near nurses station. Hourly rounding.     Problem: Infection  Goal: Will remain free from infection  Outcome: PROGRESSING AS EXPECTED  Intervention: Give CDC handouts for infection prevention (infection prevention/hand washing, disease specific, and device specific) and document in Education  Note: Hand washing completed before and after patient care. Proper PPE worn.

## 2020-05-29 PROCEDURE — 700102 HCHG RX REV CODE 250 W/ 637 OVERRIDE(OP): Performed by: HOSPITALIST

## 2020-05-29 PROCEDURE — A9270 NON-COVERED ITEM OR SERVICE: HCPCS | Performed by: HOSPITALIST

## 2020-05-29 PROCEDURE — 770020 HCHG ROOM/CARE - TELE (206)

## 2020-05-29 PROCEDURE — 700112 HCHG RX REV CODE 229: Performed by: HOSPITALIST

## 2020-05-29 PROCEDURE — 99232 SBSQ HOSP IP/OBS MODERATE 35: CPT | Mod: CS | Performed by: HOSPITALIST

## 2020-05-29 PROCEDURE — 700111 HCHG RX REV CODE 636 W/ 250 OVERRIDE (IP): Performed by: HOSPITALIST

## 2020-05-29 RX ADMIN — DOCUSATE SODIUM 100 MG: 100 CAPSULE, LIQUID FILLED ORAL at 04:57

## 2020-05-29 RX ADMIN — Medication 1 PACKET: at 05:01

## 2020-05-29 RX ADMIN — ENOXAPARIN SODIUM 40 MG: 100 INJECTION SUBCUTANEOUS at 04:57

## 2020-05-29 RX ADMIN — ARIPIPRAZOLE 30 MG: 15 TABLET ORAL at 20:58

## 2020-05-29 RX ADMIN — METFORMIN HYDROCHLORIDE 500 MG: 500 TABLET, EXTENDED RELEASE ORAL at 05:00

## 2020-05-29 RX ADMIN — DOCUSATE SODIUM 50 MG AND SENNOSIDES 8.6 MG 2 TABLET: 8.6; 5 TABLET, FILM COATED ORAL at 18:04

## 2020-05-29 RX ADMIN — Medication 1000 MG: at 18:03

## 2020-05-29 RX ADMIN — SERTRALINE HYDROCHLORIDE 50 MG: 50 TABLET ORAL at 04:57

## 2020-05-29 RX ADMIN — DONEPEZIL HYDROCHLORIDE 10 MG: 5 TABLET, FILM COATED ORAL at 18:04

## 2020-05-29 RX ADMIN — OXYBUTYNIN CHLORIDE 10 MG: 10 TABLET, EXTENDED RELEASE ORAL at 18:04

## 2020-05-29 RX ADMIN — CALCIUM CITRATE 200 MG (950 MG) TABLET 950 MG: at 05:00

## 2020-05-29 RX ADMIN — QUETIAPINE FUMARATE 300 MG: 200 TABLET ORAL at 20:58

## 2020-05-29 RX ADMIN — MELATONIN 1000 UNITS: at 04:57

## 2020-05-29 RX ADMIN — DOCUSATE SODIUM 100 MG: 100 CAPSULE, LIQUID FILLED ORAL at 18:05

## 2020-05-29 RX ADMIN — Medication 1000 MG: at 04:57

## 2020-05-29 RX ADMIN — DOCUSATE SODIUM 50 MG AND SENNOSIDES 8.6 MG 2 TABLET: 8.6; 5 TABLET, FILM COATED ORAL at 04:57

## 2020-05-29 RX ADMIN — BISACODYL 10 MG: 10 SUPPOSITORY RECTAL at 18:07

## 2020-05-29 RX ADMIN — ZINC SULFATE 220 MG (50 MG) CAPSULE 220 MG: CAPSULE at 04:57

## 2020-05-29 RX ADMIN — DIVALPROEX SODIUM 500 MG: 500 TABLET, DELAYED RELEASE ORAL at 20:58

## 2020-05-29 ASSESSMENT — ENCOUNTER SYMPTOMS
DIZZINESS: 0
VOMITING: 0
HEMOPTYSIS: 0
SPUTUM PRODUCTION: 0
NAUSEA: 0
COUGH: 0
CHILLS: 0

## 2020-05-29 NOTE — PROGRESS NOTES
Infectious Disease Progress Note    Author: Judson Mullins D.O. Date & Time of service: 2020  5:54 PM    Chief Complaint:  COVID-19 pneumonia  55yo femal with hx of mental retardation and schizoprenia from group home due to multiple brusies, KYLE     Interval History:    101.9, O2 2 L NC   AF, O2 2 L NC, pt has difficulty communicating but was alert and appeared to be in no distress.    100.5, O2 2 L NC, fevers and inflammatory labs have all increased.      AF, O2 2 L NC, appears to be in no distress.  Per nurse she does desat when her oxygen is removed.  Will start Remdesivir today .    101, O2 2 L NC, alert and in no obvious distress.  Patient continued on RDV.    Afebrile in the past 72 hours, on 1L NC sat >94%. HR in 60-80s. WBC 5.6. Creatinine 0.71. LFT normal   afebrile, no acute events in the past 3 days, slowly weaning off oxygen. Pt is off oxygen. Hemodynamically stable.     Review of Systems:  Review of Systems   Unable to perform ROS: Medical condition (Unable to obtain due to pt's mental condition )       Hemodynamics:  Temp (24hrs), Av.8 °C (98.3 °F), Min:36.5 °C (97.7 °F), Max:37.4 °C (99.3 °F)  Temperature: 36.5 °C (97.7 °F)  Pulse  Av.7  Min: 54  Max: 123   Blood Pressure: 108/54       Physical Exam:  Physical Exam  Constitutional:       General: She is not in acute distress.     Appearance: Normal appearance. She is normal weight. She is not ill-appearing.      Comments: Alert, not able to answer questions appropriately due to mental status.    HENT:      Head: Normocephalic and atraumatic.      Mouth/Throat:      Mouth: Mucous membranes are dry.   Neck:      Musculoskeletal: Neck supple.   Cardiovascular:      Rate and Rhythm: Normal rate and regular rhythm.      Heart sounds: Normal heart sounds.   Pulmonary:      Effort: Pulmonary effort is normal.      Breath sounds: Normal breath sounds. No wheezing, rhonchi or rales.      Comments: Diminished at bases, no  wheezing  Abdominal:      General: Abdomen is flat. Bowel sounds are normal.      Palpations: Abdomen is soft.   Musculoskeletal:      Right lower leg: No edema.      Left lower leg: No edema.   Skin:     Findings: Bruising present.   Neurological:      Mental Status: She is alert. Mental status is at baseline.      Comments: Moving all extremities   Psychiatric:      Comments: Underlying mental status at [place]         Meds:    Current Facility-Administered Medications:   •  metFORMIN ER  •  bisacodyl  •  senna-docusate  •  polyethylene glycol/lytes  •  magnesium hydroxide  •  enoxaparin (LOVENOX) injection  •  zinc sulfate  •  ascorbic acid  •  vitamin D  •  ARIPiprazole  •  divalproex  •  docusate sodium  •  donepezil  •  calcium citrate  •  oxybutynin SR  •  psyllium  •  enalaprilat  •  ondansetron  •  ondansetron  •  prochlorperazine  •  promethazine  •  promethazine  •  acetaminophen  •  QUEtiapine  •  sertraline    Labs:  Recent Labs     05/26/20  0415 05/27/20  0856   WBC 6.0 7.2   RBC 3.28* 4.12*   HEMOGLOBIN 9.6* 12.0   HEMATOCRIT 30.4* 39.0   MCV 92.7 94.7   MCH 29.3 29.1   RDW 47.2 49.2   PLATELETCT 193 255   MPV 8.7* 8.6*   NEUTSPOLYS 63.00  --    LYMPHOCYTES 31.00  --    MONOCYTES 5.00  --    EOSINOPHILS 1.00  --    BASOPHILS 0.00  --    RBCMORPHOLO Present  --      Recent Labs     05/26/20  0415   SODIUM 141   POTASSIUM 3.9   CHLORIDE 106   CO2 23   GLUCOSE 143*   BUN 31*   CPKTOTAL 27     Recent Labs     05/26/20  0415   ALBUMIN 2.5*   TBILIRUBIN 0.2   ALKPHOSPHAT 84   TOTPROTEIN 5.5*   ALTSGPT 14   ASTSGOT 19   CREATININE 0.75       Imaging:  Ct-abdomen-pelvis W/o    Result Date: 5/17/2020 5/17/2020 12:28 AM HISTORY/REASON FOR EXAM:  abd pain ? Trauma; IV contrast only POSITIVE FOR COVID-19 TESTING TECHNIQUE/EXAM DESCRIPTION: CT scan of the abdomen and pelvis without contrast. Noncontrast helical scanning was obtained from the diaphragmatic domes through the pubic symphysis. Low dose  optimization technique was utilized for this CT exam including automated exposure control and adjustment of the mA and/or kV according to patient size. COMPARISON: None. FINDINGS: Lung Bases: Airspace opacity in the right lower lobe Abdomen: Within the limits of noncontrast technique, the spleen, pancreas, kidneys and adrenal glands are unremarkable in appearance. Hepatic steatosis. The gallbladder is unremarkable and there is no biliary dilatation. There is no bowel wall thickening or abnormal dilatation. The abdominal aorta is normal in caliber. Moderate amount of stool throughout the colon. Pelvis: No obvious abnormality seen in the pelvic structures but evaluation limited on CT. No lymph node enlargement. No free fluid, or free air in the abdomen or pelvis. No aggressive bone lesions are seen. ________________________________    1. No evidence of acute inflammatory change in the abdomen or pelvis.  The study is however limited due to nonuse of intravenous contrast. 2. Airspace opacity in the right lower lobe, in keeping with pneumonia. 3. Hepatic steatosis. 4. Moderate amount of stool throughout the colon.    Dx-chest-portable (1 View)    Result Date: 5/20/2020 5/20/2020 8:08 AM HISTORY/REASON FOR EXAM:  Fever TECHNIQUE/EXAM DESCRIPTION AND NUMBER OF VIEWS: Single portable view of the chest. COMPARISON: 5/16/2020 FINDINGS: The mediastinal and cardiac silhouette is mildly enlarged. There are perihilar areas of bronchial wall thickening. There is patchy perihilar and lower lobe parenchymal opacity. There may be trace amounts of pleural fluid. There is no visible pneumothorax. Bones are stable. There is a questionable area of sclerosis involving the left anterior mid rib versus old trauma.     1.  Enlarged cardiac silhouette with probable mild vascular congestion. 2.  Bibasilar opacities could be related atelectasis, vascular congestion or pneumonitis.    Dx-chest-portable (1 View)    Result Date:  5/16/2020 5/16/2020 3:16 PM HISTORY/REASON FOR EXAM:  Chest Pain. Hypotension, recent fall. TECHNIQUE/EXAM DESCRIPTION AND NUMBER OF VIEWS: Single portable view of the chest. COMPARISON: None FINDINGS: Cardiomediastinal contour is within normal limits. Lungs show hypoinflation. Ill-defined increased opacity at both lung bases. No pleural fluid collection or pneumothorax. No major bony abnormality is seen.     Hypoinflation and mild bibasilar atelectasis.  Developing pneumonia is difficult to exclude.    Dx-foot-complete 3+ Right    Result Date: 5/16/2020 5/16/2020 3:16 PM HISTORY/REASON FOR EXAM:  Right foot pain and bruising after fall last evening. TECHNIQUE/EXAM DESCRIPTION AND NUMBER OF VIEWS: 3 views of the RIGHT foot. COMPARISON:  None. FINDINGS: There is an acute oblique nondisplaced fracture of the diaphysis/distal metaphysis of the proximal phalanx of the right 5th toe. No articular communication is present. No dislocation is present. No other fracture is identified.     1.  Acute oblique nondisplaced fracture of the diaphysis/distal metaphysis of the proximal phalanx of the right 5th toe. 2.  No dislocation.    Dx-hand 3+ Right    Result Date: 5/16/2020 5/16/2020 3:16 PM HISTORY/REASON FOR EXAM: Right hand pain after fall last evening with TECHNIQUE/EXAM DESCRIPTION AND NUMBER OF VIEWS:  3 views of the RIGHT hand. COMPARISON: None FINDINGS: No fracture, dislocation, or acute joint abnormality is present. Pulse oximeter is present on the distal aspect of the right index finger.     Negative limited RIGHT hand series.      Micro:  Results     Procedure Component Value Units Date/Time    COVID/SARS CoV-2 [[id number]] Collected:  05/25/20 1032    Order Status:  Completed Specimen:  Respirate from Nasopharyngeal Updated:  05/25/20 1035     COVID Order Status Received    Narrative:       Droplet, Contact, and Eye [id number] DENISA CONNER.  Is this test for diagnosis or screening?->Diagnosis of  "ill  patient    BLOOD CULTURE [[id number]] Collected:  05/19/20 0441    Order Status:  Completed Specimen:  Blood from Peripheral Updated:  05/24/20 0700     Significant Indicator NEG     Source BLD     Site PERIPHERAL     Culture Result No growth after 5 days of incubation.    Narrative:       Droplet, Contact, and Eye [id number] NEGRETE ROXY  A  Per Hospital Policy: Only change Specimen Src: to \"Line\" if  specified by physician order.  Right Hand    BLOOD CULTURE [[id number]] Collected:  05/19/20 0441    Order Status:  Completed Specimen:  Blood from Peripheral Updated:  05/24/20 0700     Significant Indicator NEG     Source BLD     Site PERIPHERAL     Culture Result No growth after 5 days of incubation.    Narrative:       Droplet, Contact, and Eye [id number] NEGRETE ROXY  A  Per Hospital Policy: Only change Specimen Src: to \"Line\" if  specified by physician order.  Left Wrist          Assessment:  Active Hospital Problems    Diagnosis   • *COVID-19 virus infection [U07.1]   • Thrombocytopenia (HCC) [D69.6]   • Constipation [K59.00]   • KYLE (acute kidney injury) (HCC) [N17.9]   • Diabetes (HCC) [E11.9]   • Moderate intellectual disability [F71]   • Schizophrenia (HCC) [F20.9]     Interval 24 hours:      100.2, O2 2 L NC  Labs reviewed  Micro reviewed    Pt alert and appears to be no distress.  He still requiring oxygen and desats when attempting to wean.  Continued on Remdesivir.      Hospital Course/Assessment:   Erlinda Verde is a 54 y.o. female with a history of mild developmental delay and schizophrenia, long-term resident of a group home, brought to the ER due to multiple new areas of bruising, also found to have an oxygen requirement and diagnosed with COVID-19 on 5/16.     Purplish skin lesions that appear bruise-like, most prominent over right fourth and fifth toes, extending proximally to the distal dorsal foot, as well as (symmetrically in some respects) the right fifth finger. "  Also noted smaller similar patches right forearm, left arm.  There was suspicion that these lesions may be secondary to COVID-19, however, she does have an acute fracture underlying the right fifth toe supporting that these may in fact be bruises.     Patient also noted to have thrombocytopenia (appears to be acute, not noted on labs from 2018), potentially secondary to COVID-19.     Pertinent Diagnoses:  COVID-19 pneumonia-chest x-ray unremarkable but opacities noted in right lower lobe on CT of abdomen/pelvis  Acute hypoxemic respiratory failure  Fevers- improved this am   Multiple bruises, right fifth toe acute fracture  Thrombocytopenia, likely acute-ongoing, stable  Transaminitis, mild and stable, now improved   Leukopenia - new, ongoing, likely due current illness - worse today   - started prior to RDV being initiated   Developmental delay, minimally verbal   Schizophrenia  KYLE-improved     Inflammatory markers  IL-6 - 3.6   CRP 7.05 -> 10.71 --> 6.73  Ferritin 269 -> 463 --> 558   -> 260   D Dimer 0.27 -> 0.69 -> 0.55  --> 0.67  Troponin 16 --> 10  PCT 0.11 -> 0.26 --> 0.20   --> 143 --> 42    Screening  TB quant-negative  Hep B core antibody total- neg  Hep B surface antigen- neg   Hep C antibody- neg   HIV - neg   ABO-  B+     Plan:      -Continue supportive care which is only known proven therapy for COVID-19 at this time-oxygen supplementation, self-proning, judicious use of IV fluids  -Monitor inflammatory markers every 48 hours   -Monitor d-dimer with low threshold to start therapeutic anticoagulation   -Follow-up blood culture-no growth to date  -Hospitalist Dr. Hayden discussed starting RDV with public guardian on 5/20 and has approval. I confirmed this personally with Guardian (Ann) and after discussing RDV as per FDA emergency use and risk/benefits, placed note in chart. RDV started on 5/21 and completed 5 day course on 5/26  -Once patient is on RDV she will need daily CMP to  monitor renal and liver function and ensure no new toxicity -LFTs and creatinine within normal limits today    -Received report that the patient had received court permission for convalescent plasma.  She is very stable from a respiratory standpoint so do not plan to initiate unless she decompensates.  However, did obtain consent so that we are prepared should her condition deteriorate.    - Pt at this time continues to do better, therefore, does not need plasma.  clinically is stable, will hold off for plasma  - Pt is awaiting SNF placement. Last repeat PCR test was 5/25 which was positive. Can wait for 4 days and retest tomorrow.     Discussed with Dr. Fulton, [facility]  Will sign off, please call with questions

## 2020-05-29 NOTE — DISCHARGE PLANNING
Anticipated Discharge Disposition: [address]      Action: no change per chart review  1510: received call from guardian Ann requesting update. Brief medical update provided to Ann. Ann concerned about pt deconditioning and would like to request PT/OT. Per Ann, pt was ambulatory independently w/o assisted device at the group home. Ann states pt may have hx of hip arthritis  TT Dr. Fulton    Barriers to Discharge:   medical clearance  covid + 5/16, 5/18, 5/25     Plan: return to  when covid negative

## 2020-05-29 NOTE — PROGRESS NOTES
Report received from ROD Newton. Pt care assumed. Pt sitting up in bed, oriented to self only. No complaints of pain.

## 2020-05-30 LAB
COVID ORDER STATUS COVID19: NORMAL
SARS-COV-2 RNA RESP QL NAA+PROBE: DETECTED
SPECIMEN SOURCE: ABNORMAL

## 2020-05-30 PROCEDURE — 700102 HCHG RX REV CODE 250 W/ 637 OVERRIDE(OP): Performed by: HOSPITALIST

## 2020-05-30 PROCEDURE — A9270 NON-COVERED ITEM OR SERVICE: HCPCS | Performed by: HOSPITALIST

## 2020-05-30 PROCEDURE — U0004 COV-19 TEST NON-CDC HGH THRU: HCPCS

## 2020-05-30 PROCEDURE — 99231 SBSQ HOSP IP/OBS SF/LOW 25: CPT | Mod: CS | Performed by: HOSPITALIST

## 2020-05-30 PROCEDURE — 700112 HCHG RX REV CODE 229: Performed by: HOSPITALIST

## 2020-05-30 PROCEDURE — 700111 HCHG RX REV CODE 636 W/ 250 OVERRIDE (IP): Performed by: HOSPITALIST

## 2020-05-30 PROCEDURE — C9803 HOPD COVID-19 SPEC COLLECT: HCPCS | Performed by: HOSPITALIST

## 2020-05-30 PROCEDURE — 770020 HCHG ROOM/CARE - TELE (206)

## 2020-05-30 RX ADMIN — CALCIUM CITRATE 200 MG (950 MG) TABLET 950 MG: at 04:34

## 2020-05-30 RX ADMIN — Medication 1000 MG: at 17:08

## 2020-05-30 RX ADMIN — BISACODYL 10 MG: 10 SUPPOSITORY RECTAL at 17:08

## 2020-05-30 RX ADMIN — Medication 1 PACKET: at 04:34

## 2020-05-30 RX ADMIN — METFORMIN HYDROCHLORIDE 500 MG: 500 TABLET, EXTENDED RELEASE ORAL at 04:34

## 2020-05-30 RX ADMIN — Medication 1000 MG: at 04:33

## 2020-05-30 RX ADMIN — MELATONIN 1000 UNITS: at 04:34

## 2020-05-30 RX ADMIN — ARIPIPRAZOLE 30 MG: 15 TABLET ORAL at 20:53

## 2020-05-30 RX ADMIN — DOCUSATE SODIUM 100 MG: 100 CAPSULE, LIQUID FILLED ORAL at 04:34

## 2020-05-30 RX ADMIN — ZINC SULFATE 220 MG (50 MG) CAPSULE 220 MG: CAPSULE at 04:34

## 2020-05-30 RX ADMIN — OXYBUTYNIN CHLORIDE 10 MG: 10 TABLET, EXTENDED RELEASE ORAL at 17:08

## 2020-05-30 RX ADMIN — DONEPEZIL HYDROCHLORIDE 10 MG: 5 TABLET, FILM COATED ORAL at 17:08

## 2020-05-30 RX ADMIN — SERTRALINE HYDROCHLORIDE 50 MG: 50 TABLET ORAL at 04:34

## 2020-05-30 RX ADMIN — DOCUSATE SODIUM 50 MG AND SENNOSIDES 8.6 MG 2 TABLET: 8.6; 5 TABLET, FILM COATED ORAL at 04:33

## 2020-05-30 RX ADMIN — ACETAMINOPHEN 650 MG: 325 TABLET, FILM COATED ORAL at 04:34

## 2020-05-30 RX ADMIN — ENOXAPARIN SODIUM 40 MG: 100 INJECTION SUBCUTANEOUS at 04:33

## 2020-05-30 RX ADMIN — DIVALPROEX SODIUM 500 MG: 500 TABLET, DELAYED RELEASE ORAL at 20:53

## 2020-05-30 RX ADMIN — QUETIAPINE FUMARATE 300 MG: 200 TABLET ORAL at 20:53

## 2020-05-30 ASSESSMENT — ENCOUNTER SYMPTOMS
DIZZINESS: 0
CHILLS: 0
NAUSEA: 0
VOMITING: 0

## 2020-05-30 NOTE — PROGRESS NOTES
[facility] Medicine Daily Progress Note    Date of Service  5/29/2020    Chief Complaint  [facility]    Hospital Course    Ms Verde has a history of cognitive deficits and schizophrenia.  She was sent to the emergency room for multiple areas of skin bruising, a fall prior to admission was reported although the details are poorly defined.  Evaluation demonstrated the patient had acute renal failure, 1/5 metatarsal fracture, and thrombocytopenia.  Patient tested positive for COVID 19.  She was admitted to the hospital patient had recurrent fevers and follow-up imaging demonstrated worsened infiltrates on her chest x-ray.  Infectious disease was consulted and she was treated with Remdesevir.  The patient's respiratory status and thrombocytopenia have improved.  She continues to test positive for COVID by PCR.      Interval Problem Update  Patient is pleasant cooperative, does not know why she is in the hospital  Denies shortness of breath, denies cough  Asks when she can go home  Afebrile    Consultants/Specialty  Case management  Infectious disease    Code Status  Full code    Disposition  To be determined  EPS case opened  I ordered a PT/ OT evaluation    Review of Systems  Review of Systems   Constitutional: Negative for chills and malaise/fatigue.   Respiratory: Negative for cough, hemoptysis and sputum production.    Gastrointestinal: Negative for nausea and vomiting.   Skin: Negative for itching and rash.   Neurological: Negative for dizziness.   All other systems reviewed and are negative.      Physical Exam  Temp:  [36.3 °C (97.3 °F)-37.2 °C (98.9 °F)] 36.4 °C (97.6 °F)  Pulse:  [] 63  Resp:  [14-18] 16  BP: ()/(60-75) 121/60  SpO2:  [92 %-96 %] 94 %    Physical Exam  Constitutional:       General: She is not in acute distress.     Appearance: Normal appearance. She is normal weight.   HENT:      Head: Normocephalic and atraumatic.      Right Ear: External ear normal.      Left Ear: External ear  normal.      Nose: Nose normal.      Mouth/Throat:      Mouth: Mucous membranes are moist.      Pharynx: Oropharynx is clear.   Eyes:      Extraocular Movements: Extraocular movements intact.      Conjunctiva/sclera: Conjunctivae normal.      Pupils: Pupils are equal, round, and reactive to light.   Neck:      Musculoskeletal: Normal range of motion and neck supple.   Cardiovascular:      Rate and Rhythm: Normal rate and regular rhythm.      Pulses: Normal pulses.   Pulmonary:      Effort: Pulmonary effort is normal.      Comments: Scattered crackles  Abdominal:      General: Abdomen is flat. Bowel sounds are normal.      Palpations: Abdomen is soft.   Musculoskeletal: Normal range of motion.   Skin:     General: Skin is warm and dry.      Capillary Refill: Capillary refill takes less than 2 seconds.      Coloration: Skin is not jaundiced.      Comments: Bruising extremiteis   Neurological:      General: No focal deficit present.      Mental Status: She is alert.      Cranial Nerves: No cranial nerve deficit.      Gait: Gait normal.   Psychiatric:         Mood and Affect: Mood normal.         Behavior: Behavior normal.           Fluids    Intake/Output Summary (Last 24 hours) at 5/29/2020 1835  Last data filed at 5/29/2020 1200  Gross per 24 hour   Intake 325 ml   Output 300 ml   Net 25 ml       Laboratory  Recent Labs     05/27/20  0856   WBC 7.2   RBC 4.12*   HEMOGLOBIN 12.0   HEMATOCRIT 39.0   MCV 94.7   MCH 29.1   MCHC 30.8*   RDW 49.2   PLATELETCT 255   MPV 8.6*                       Imaging  DX-CHEST-PORTABLE (1 VIEW)   Final Result      1.  Enlarged cardiac silhouette with probable mild vascular congestion.   2.  Bibasilar opacities could be related atelectasis, vascular congestion or pneumonitis.      CT-ABDOMEN-PELVIS W/O   Final Result         1. No evidence of acute inflammatory change in the abdomen or pelvis.  The study is however limited due to nonuse of intravenous contrast.      2. Airspace opacity  in the right lower lobe, in keeping with pneumonia.      3. Hepatic steatosis.      4. Moderate amount of stool throughout the colon.           Assessment/Plan  * COVID-19 virus infection- (present on admission)  Assessment & Plan  With associated pneumonia/respiratory failure  Completed course of remdesevir  Continue submental oxygen, wean as tolerated  COVID PCR test sent 5/25: positive  I ordered a repeat COVID PCR today, will evaluate when resulted    Constipation- (present on admission)  Assessment & Plan  Add bowel regimen    Thrombocytopenia (HCC)- (present on admission)  Assessment & Plan  Resolved    KYLE (acute kidney injury) (HCC)- (present on admission)  Assessment & Plan  Resolved  Avoid nephrotoxins    Bruising  Assessment & Plan  Likely from trauma, reported fall prior to admission  Thrombocytopenia likely contributing  5th MT fracture found at [facility]  EPS case pending    Diabetes (HCC)- (present on admission)  Assessment & Plan  Metformin    Schizophrenia (HCC)- (present on admission)  Assessment & Plan  Continue current outpatient psych meds as tolerated.       VTE prophylaxis: Lovenox SQ

## 2020-05-30 NOTE — CARE PLAN
Problem: Safety  Goal: Will remain free from injury  Outcome: PROGRESSING AS EXPECTED     Problem: Knowledge Deficit  Goal: Knowledge of disease process/condition, treatment plan, diagnostic tests, and medications will improve  Outcome: PROGRESSING AS EXPECTED     Problem: Skin Integrity  Goal: Risk for impaired skin integrity will decrease  Outcome: PROGRESSING AS EXPECTED     Problem: Pain Management  Goal: Pain level will decrease to patient's comfort goal  Outcome: PROGRESSING AS EXPECTED

## 2020-05-30 NOTE — PROGRESS NOTES
[facility] Medicine Daily Progress Note    Date of Service  5/30/2020    Chief Complaint  [facility]    Hospital Course    Ms Verde has a history of cognitive deficits and schizophrenia.  She was sent to the emergency room for multiple areas of skin bruising, a fall prior to admission was reported although the details are poorly defined.  Evaluation demonstrated the patient had acute renal failure, 1/5 metatarsal fracture, and thrombocytopenia.  Patient tested positive for COVID 19.  She was admitted to the hospital patient had recurrent fevers and follow-up imaging demonstrated worsened infiltrates on her chest x-ray.  Infectious disease was consulted and she was treated with Remdesevir.  The patient's respiratory status and thrombocytopenia have improved.  She continues to test positive for COVID by PCR.      Interval Problem Update  Sleepy this morning, she awakens to her name, knows she is in the hospital, does not have any complaints  Specifically denies shortness of breath and cough  I discussed with her bedside nurse, patient is tolerating diet    Consultants/Specialty  Case management  Infectious disease    Code Status  Full code    Disposition  To be determined  EPS case opened  PT/ OT evaluation    Review of Systems  Review of Systems   Constitutional: Negative for chills and malaise/fatigue.   Gastrointestinal: Negative for nausea and vomiting.   Skin: Negative for itching and rash.   Neurological: Negative for dizziness.   All other systems reviewed and are negative.      Physical Exam  Temp:  [35.8 °C (96.5 °F)-37.3 °C (99.1 °F)] 36.3 °C (97.3 °F)  Pulse:  [] 82  Resp:  [12-18] 12  BP: (109-127)/(60-82) 113/67  SpO2:  [91 %-94 %] 94 %    Physical Exam  Constitutional:       General: She is not in acute distress.     Appearance: Normal appearance. She is normal weight.   HENT:      Head: Normocephalic and atraumatic.      Right Ear: External ear normal.      Left Ear: External ear normal.      Nose:  Nose normal.      Mouth/Throat:      Mouth: Mucous membranes are moist.      Pharynx: Oropharynx is clear.   Eyes:      Extraocular Movements: Extraocular movements intact.      Conjunctiva/sclera: Conjunctivae normal.      Pupils: Pupils are equal, round, and reactive to light.   Neck:      Musculoskeletal: Normal range of motion and neck supple.   Cardiovascular:      Rate and Rhythm: Normal rate and regular rhythm.      Pulses: Normal pulses.   Pulmonary:      Effort: Pulmonary effort is normal.      Comments: Scattered crackles  Abdominal:      General: Abdomen is flat. Bowel sounds are normal.      Palpations: Abdomen is soft.   Musculoskeletal: Normal range of motion.   Skin:     General: Skin is warm and dry.      Capillary Refill: Capillary refill takes less than 2 seconds.      Coloration: Skin is not jaundiced.      Comments: Bruising extremiteis   Neurological:      General: No focal deficit present.      Mental Status: She is alert.      Cranial Nerves: No cranial nerve deficit.      Gait: Gait normal.   Psychiatric:         Mood and Affect: Mood normal.         Behavior: Behavior normal.           Fluids    Intake/Output Summary (Last 24 hours) at 5/30/2020 1451  Last data filed at 5/30/2020 0800  Gross per 24 hour   Intake 440 ml   Output 700 ml   Net -260 ml       Laboratory                        Imaging  DX-CHEST-PORTABLE (1 VIEW)   Final Result      1.  Enlarged cardiac silhouette with probable mild vascular congestion.   2.  Bibasilar opacities could be related atelectasis, vascular congestion or pneumonitis.      CT-ABDOMEN-PELVIS W/O   Final Result         1. No evidence of acute inflammatory change in the abdomen or pelvis.  The study is however limited due to nonuse of intravenous contrast.      2. Airspace opacity in the right lower lobe, in keeping with pneumonia.      3. Hepatic steatosis.      4. Moderate amount of stool throughout the colon.           Assessment/Plan  * COVID-19 virus  infection- (present on admission)  Assessment & Plan  With associated pneumonia/respiratory failure  Completed course of remdesevir  Continue submental oxygen, wean as tolerated  COVID PCR test 5/25/2020: positive  COVID PCR test 5/30/2020: positive  Significance of repeated positive results from COVID PCR testing is not known, the patient has recovered clinically from COVID-19 infection and the tests are being sent solely for the purpose of allowing her to return to her facility    Constipation- (present on admission)  Assessment & Plan  Continue bowel protocol    Thrombocytopenia (HCC)- (present on admission)  Assessment & Plan  Resolved    KYLE (acute kidney injury) (HCC)- (present on admission)  Assessment & Plan  Resolved  Avoid nephrotoxins    Bruising  Assessment & Plan  Likely from trauma, reported fall prior to admission  Thrombocytopenia likely contributing  5th MT fracture found at [facility]  EPS case pending    Diabetes (HCC)- (present on admission)  Assessment & Plan  Metformin    Schizophrenia (HCC)- (present on admission)  Assessment & Plan  Continue current outpatient psychiatric medications       VTE prophylaxis: Lovenox SQ

## 2020-05-31 PROCEDURE — 700102 HCHG RX REV CODE 250 W/ 637 OVERRIDE(OP): Performed by: HOSPITALIST

## 2020-05-31 PROCEDURE — A9270 NON-COVERED ITEM OR SERVICE: HCPCS | Performed by: HOSPITALIST

## 2020-05-31 PROCEDURE — 700112 HCHG RX REV CODE 229: Performed by: HOSPITALIST

## 2020-05-31 PROCEDURE — 700111 HCHG RX REV CODE 636 W/ 250 OVERRIDE (IP): Performed by: HOSPITALIST

## 2020-05-31 PROCEDURE — 99231 SBSQ HOSP IP/OBS SF/LOW 25: CPT | Mod: CS | Performed by: HOSPITALIST

## 2020-05-31 PROCEDURE — 770020 HCHG ROOM/CARE - TELE (206)

## 2020-05-31 RX ADMIN — METFORMIN HYDROCHLORIDE 500 MG: 500 TABLET, EXTENDED RELEASE ORAL at 04:33

## 2020-05-31 RX ADMIN — DOCUSATE SODIUM 50 MG AND SENNOSIDES 8.6 MG 2 TABLET: 8.6; 5 TABLET, FILM COATED ORAL at 18:32

## 2020-05-31 RX ADMIN — ENOXAPARIN SODIUM 40 MG: 100 INJECTION SUBCUTANEOUS at 04:29

## 2020-05-31 RX ADMIN — QUETIAPINE FUMARATE 300 MG: 200 TABLET ORAL at 22:10

## 2020-05-31 RX ADMIN — MELATONIN 1000 UNITS: at 04:29

## 2020-05-31 RX ADMIN — SERTRALINE HYDROCHLORIDE 50 MG: 50 TABLET ORAL at 04:29

## 2020-05-31 RX ADMIN — DONEPEZIL HYDROCHLORIDE 10 MG: 5 TABLET, FILM COATED ORAL at 18:34

## 2020-05-31 RX ADMIN — DIVALPROEX SODIUM 500 MG: 500 TABLET, DELAYED RELEASE ORAL at 22:10

## 2020-05-31 RX ADMIN — ARIPIPRAZOLE 30 MG: 15 TABLET ORAL at 22:10

## 2020-05-31 RX ADMIN — DOCUSATE SODIUM 100 MG: 100 CAPSULE, LIQUID FILLED ORAL at 04:29

## 2020-05-31 RX ADMIN — OXYBUTYNIN CHLORIDE 10 MG: 10 TABLET, EXTENDED RELEASE ORAL at 18:31

## 2020-05-31 RX ADMIN — CALCIUM CITRATE 200 MG (950 MG) TABLET 950 MG: at 04:29

## 2020-05-31 RX ADMIN — ZINC SULFATE 220 MG (50 MG) CAPSULE 220 MG: CAPSULE at 04:29

## 2020-05-31 RX ADMIN — DOCUSATE SODIUM 100 MG: 100 CAPSULE, LIQUID FILLED ORAL at 18:33

## 2020-05-31 RX ADMIN — Medication 1000 MG: at 06:56

## 2020-05-31 RX ADMIN — Medication 1 PACKET: at 04:29

## 2020-05-31 ASSESSMENT — ENCOUNTER SYMPTOMS
DIZZINESS: 0
VOMITING: 0
CHILLS: 0
NAUSEA: 0

## 2020-05-31 NOTE — CARE PLAN
Problem: Nutritional:  Goal: Achieve adequate nutritional intake  Description: Patient will consume >50% of meals  Outcome: MET.

## 2020-05-31 NOTE — PROGRESS NOTES
Per MD Donaldo pt does not need to be cardiac monitored. Monitor room notified. Pt removed from cardiac monitor.

## 2020-05-31 NOTE — DIETARY
Brief Nutrition Services Note: PO intake follow up. Per ADL, pt eating 50% or greater of last six documented meals (mostly % of meals). No pressure ulcers per wound care on 5/27. Discontinued milkshakes/Boost Glucose control supplement to aid with tight glucose control/Diabetic diet restrictions as pt now taking adequate intake. RD to screen weekly per dietary guidelines.

## 2020-05-31 NOTE — CARE PLAN
Problem: Bowel/Gastric:  Goal: Normal bowel function is maintained or improved  Outcome: PROGRESSING AS EXPECTED  Note: Patient had one BM today and bowel protocol continued.     Problem: Urinary Elimination:  Goal: Ability to reestablish a normal urinary elimination pattern will improve  Outcome: PROGRESSING AS EXPECTED  Note: Patient has a purewick for urinary incontinence. 400cc output in the purwick and incontinence requiring sheet change additionally.

## 2020-05-31 NOTE — PROGRESS NOTES
[facility] Medicine Daily Progress Note    Date of Service  5/31/2020    Chief Complaint  [facility]    Hospital Course    Ms Verde has a history of cognitive deficits and schizophrenia.  She was sent to the emergency room for multiple areas of skin bruising, a fall prior to admission was reported although the details are poorly defined.  Evaluation demonstrated the patient had acute renal failure, 1/5 metatarsal fracture, and thrombocytopenia.  Patient tested positive for COVID 19.  She was admitted to the hospital patient had recurrent fevers and follow-up imaging demonstrated worsened infiltrates on her chest x-ray.  Infectious disease was consulted and she was treated with Remdesevir.  The patient's respiratory status and thrombocytopenia have improved.  She continues to test positive for COVID by PCR.      Interval Problem Update  Patient is awake this morning, she is generally cooperative and can be reoriented in the hospital but her conversation is very tangential  Denies pain, denies shortness of breath  I discussed with the patient's bedside and RN, she tells me the patient is taking adequate p.o.    Consultants/Specialty  Case management  Infectious disease    Code Status  Full code    Disposition  To be determined  EPS case opened  PT/ OT evaluation    Review of Systems  Review of Systems   Constitutional: Negative for chills and malaise/fatigue.   Gastrointestinal: Negative for nausea and vomiting.   Skin: Negative for itching and rash.   Neurological: Negative for dizziness.   All other systems reviewed and are negative.      Physical Exam  Temp:  [36 °C (96.8 °F)-36.6 °C (97.8 °F)] 36.1 °C (96.9 °F)  Pulse:  [73-89] 82  Resp:  [17-19] 18  BP: ()/(67-72) 97/71  SpO2:  [93 %-98 %] 94 %    Physical Exam  Constitutional:       General: She is not in acute distress.     Appearance: Normal appearance. She is normal weight.   HENT:      Head: Normocephalic and atraumatic.      Right Ear: External ear  normal.      Left Ear: External ear normal.      Nose: Nose normal.      Mouth/Throat:      Mouth: Mucous membranes are moist.      Pharynx: Oropharynx is clear.   Eyes:      Extraocular Movements: Extraocular movements intact.      Conjunctiva/sclera: Conjunctivae normal.      Pupils: Pupils are equal, round, and reactive to light.   Neck:      Musculoskeletal: Normal range of motion and neck supple.   Cardiovascular:      Rate and Rhythm: Normal rate and regular rhythm.      Pulses: Normal pulses.   Pulmonary:      Effort: Pulmonary effort is normal.      Comments: Scattered crackles  Abdominal:      General: Abdomen is flat. Bowel sounds are normal.      Palpations: Abdomen is soft.   Musculoskeletal: Normal range of motion.   Skin:     General: Skin is warm and dry.      Capillary Refill: Capillary refill takes less than 2 seconds.      Coloration: Skin is not jaundiced.      Comments: Bruising extremiteis   Neurological:      General: No focal deficit present.      Mental Status: She is alert.      Cranial Nerves: No cranial nerve deficit.      Gait: Gait normal.   Psychiatric:         Mood and Affect: Mood normal.         Behavior: Behavior normal.           Fluids    Intake/Output Summary (Last 24 hours) at 5/31/2020 1313  Last data filed at 5/31/2020 0500  Gross per 24 hour   Intake --   Output 525 ml   Net -525 ml       Laboratory                        Imaging  DX-CHEST-PORTABLE (1 VIEW)   Final Result      1.  Enlarged cardiac silhouette with probable mild vascular congestion.   2.  Bibasilar opacities could be related atelectasis, vascular congestion or pneumonitis.      CT-ABDOMEN-PELVIS W/O   Final Result         1. No evidence of acute inflammatory change in the abdomen or pelvis.  The study is however limited due to nonuse of intravenous contrast.      2. Airspace opacity in the right lower lobe, in keeping with pneumonia.      3. Hepatic steatosis.      4. Moderate amount of stool throughout the  colon.           Assessment/Plan  * COVID-19 virus infection- (present on admission)  Assessment & Plan  With associated pneumonia/respiratory failure  Completed course of remdesevir  Continue submental oxygen, wean as tolerated  COVID PCR test 5/25/2020: positive  COVID PCR test 5/30/2020: positive  Significance of repeated positive results from COVID PCR testing is not known, the patient has recovered clinically from COVID-19 infection and the follow up tests are being sent solely for the purpose of allowing her to return to her facility    Constipation- (present on admission)  Assessment & Plan  Bowel protocol    Thrombocytopenia (HCC)- (present on admission)  Assessment & Plan  Resolved    KYLE (acute kidney injury) (HCC)- (present on admission)  Assessment & Plan  Resolved  Avoid nephrotoxins    Bruising  Assessment & Plan  Likely from trauma, reported fall prior to admission  Thrombocytopenia likely contributing  5th MT fracture found at [facility]  EPS case pending    Diabetes (HCC)- (present on admission)  Assessment & Plan  Metformin    Schizophrenia (HCC)- (present on admission)  Assessment & Plan  Continue current outpatient psychiatric medications       VTE prophylaxis: Lovenox SQ

## 2020-06-01 PROCEDURE — 700112 HCHG RX REV CODE 229: Performed by: HOSPITALIST

## 2020-06-01 PROCEDURE — 700102 HCHG RX REV CODE 250 W/ 637 OVERRIDE(OP): Performed by: HOSPITALIST

## 2020-06-01 PROCEDURE — A9270 NON-COVERED ITEM OR SERVICE: HCPCS | Performed by: HOSPITALIST

## 2020-06-01 PROCEDURE — 99232 SBSQ HOSP IP/OBS MODERATE 35: CPT | Mod: CS | Performed by: HOSPITALIST

## 2020-06-01 PROCEDURE — 700111 HCHG RX REV CODE 636 W/ 250 OVERRIDE (IP): Performed by: HOSPITALIST

## 2020-06-01 PROCEDURE — 97168 OT RE-EVAL EST PLAN CARE: CPT

## 2020-06-01 PROCEDURE — 770020 HCHG ROOM/CARE - TELE (206)

## 2020-06-01 PROCEDURE — 97535 SELF CARE MNGMENT TRAINING: CPT

## 2020-06-01 RX ADMIN — DIVALPROEX SODIUM 500 MG: 500 TABLET, DELAYED RELEASE ORAL at 20:10

## 2020-06-01 RX ADMIN — METFORMIN HYDROCHLORIDE 500 MG: 500 TABLET, EXTENDED RELEASE ORAL at 05:34

## 2020-06-01 RX ADMIN — DOCUSATE SODIUM 50 MG AND SENNOSIDES 8.6 MG 2 TABLET: 8.6; 5 TABLET, FILM COATED ORAL at 05:34

## 2020-06-01 RX ADMIN — QUETIAPINE FUMARATE 300 MG: 200 TABLET ORAL at 20:10

## 2020-06-01 RX ADMIN — Medication 1000 MG: at 17:07

## 2020-06-01 RX ADMIN — Medication 1 PACKET: at 05:34

## 2020-06-01 RX ADMIN — Medication 1000 MG: at 05:34

## 2020-06-01 RX ADMIN — ARIPIPRAZOLE 30 MG: 15 TABLET ORAL at 20:10

## 2020-06-01 RX ADMIN — DOCUSATE SODIUM 100 MG: 100 CAPSULE, LIQUID FILLED ORAL at 17:07

## 2020-06-01 RX ADMIN — MELATONIN 1000 UNITS: at 05:37

## 2020-06-01 RX ADMIN — ENOXAPARIN SODIUM 40 MG: 100 INJECTION SUBCUTANEOUS at 05:34

## 2020-06-01 RX ADMIN — OXYBUTYNIN CHLORIDE 10 MG: 10 TABLET, EXTENDED RELEASE ORAL at 17:07

## 2020-06-01 RX ADMIN — ZINC SULFATE 220 MG (50 MG) CAPSULE 220 MG: CAPSULE at 05:37

## 2020-06-01 RX ADMIN — CALCIUM CITRATE 200 MG (950 MG) TABLET 950 MG: at 05:34

## 2020-06-01 RX ADMIN — SERTRALINE HYDROCHLORIDE 50 MG: 50 TABLET ORAL at 05:37

## 2020-06-01 RX ADMIN — DOCUSATE SODIUM 50 MG AND SENNOSIDES 8.6 MG 2 TABLET: 8.6; 5 TABLET, FILM COATED ORAL at 17:07

## 2020-06-01 RX ADMIN — DOCUSATE SODIUM 100 MG: 100 CAPSULE, LIQUID FILLED ORAL at 05:35

## 2020-06-01 RX ADMIN — DONEPEZIL HYDROCHLORIDE 10 MG: 5 TABLET, FILM COATED ORAL at 17:07

## 2020-06-01 ASSESSMENT — COGNITIVE AND FUNCTIONAL STATUS - GENERAL
TURNING FROM BACK TO SIDE WHILE IN FLAT BAD: UNABLE
DRESSING REGULAR LOWER BODY CLOTHING: A LOT
EATING MEALS: A LITTLE
HELP NEEDED FOR BATHING: A LOT
PERSONAL GROOMING: A LOT
CLIMB 3 TO 5 STEPS WITH RAILING: TOTAL
EATING MEALS: A LOT
MOVING TO AND FROM BED TO CHAIR: A LITTLE
TOILETING: A LOT
MOVING FROM LYING ON BACK TO SITTING ON SIDE OF FLAT BED: A LOT
MOVING TO AND FROM BED TO CHAIR: UNABLE
SUGGESTED CMS G CODE MODIFIER DAILY ACTIVITY: CL
DAILY ACTIVITIY SCORE: 13
MOVING FROM LYING ON BACK TO SITTING ON SIDE OF FLAT BED: UNABLE
DRESSING REGULAR UPPER BODY CLOTHING: A LOT
WALKING IN HOSPITAL ROOM: TOTAL
TURNING FROM BACK TO SIDE WHILE IN FLAT BAD: A LITTLE
SUGGESTED CMS G CODE MODIFIER MOBILITY: CL
DRESSING REGULAR UPPER BODY CLOTHING: A LOT
TOILETING: A LOT
HELP NEEDED FOR BATHING: A LOT
WALKING IN HOSPITAL ROOM: A LOT
PERSONAL GROOMING: A LOT
SUGGESTED CMS G CODE MODIFIER DAILY ACTIVITY: CL
SUGGESTED CMS G CODE MODIFIER MOBILITY: CM
CLIMB 3 TO 5 STEPS WITH RAILING: TOTAL
MOBILITY SCORE: 13
STANDING UP FROM CHAIR USING ARMS: A LOT
MOBILITY SCORE: 7
DAILY ACTIVITIY SCORE: 12
DRESSING REGULAR LOWER BODY CLOTHING: A LOT
STANDING UP FROM CHAIR USING ARMS: A LOT

## 2020-06-01 ASSESSMENT — ENCOUNTER SYMPTOMS
PALPITATIONS: 0
VOMITING: 0
COUGH: 0
MEMORY LOSS: 1
NERVOUS/ANXIOUS: 1

## 2020-06-01 ASSESSMENT — GAIT ASSESSMENTS: GAIT LEVEL OF ASSIST: UNABLE TO PARTICIPATE

## 2020-06-01 ASSESSMENT — ACTIVITIES OF DAILY LIVING (ADL): TOILETING: UNABLE TO DETERMINE AT THIS TIME

## 2020-06-01 NOTE — THERAPY
"Occupational Therapy   Re-Evaluation     Patient Name: Erlinda Verde  Age:  54 y.o., Sex:  female  Medical Record #: [id number]  Today's Date: 6/1/2020     Precautions  Precautions: Fall Risk  Comments: hx of developmental delay vs autism    Assessment  Patient is 54 y.o. female with a diagnosis of Covid-19 w/ PMH of cognitive deficits and schizophrenia. Pt resides at group home at baseline. Re-evaluation initiated as group home reports that pt is ambulatory at baseline. Pt primarily limited by behaviors and cognition and followed no commands throughout session. Pt is not therapeutic at this time. Recommend placement. Will remain available for DC needs only.     Plan    Recommend Occupational Therapy DC needs only.      Discharge recommendations:  Pt will require 24/7 SPV and physical assist at time of DC. If group home is unable to provide then she will require other long term placement.       Subjective    \"Who is that woman over there?\"     Objective         06/01/20 1518   Initial Contact Note    Initial Contact Note Order Received and Verified, Occupational Therapy Evaluation in Progress with Full Report to Follow.   Prior Living Situation   Prior Services Continuous (24 Hour) Care Giving Per Service   Housing / Facility Group Home   Lives with - Patient's Self Care Capacity Attendant / Paid Care Giver   Comments pt resides at group home. Per  pt was ambulatory w/out device now. However that is highly unlikely due to how she physiologically presents.    Cognition    Cognition / Consciousness X   Speech/ Communication Delayed Responses   Level of Consciousness Alert   Ability To Follow Commands 1 Step   New Learning Impaired   Attention Impaired   Initiation Impaired   Comments Pt appeared to be having visual hallucinations, frequently yelling out \"fuck\" and talking to someone who was not there. Would repeat \"tshirt?\" and \"Pizza for dinner?\"   Balance Assessment   Sitting Balance (Static) Trace "   Sitting Balance (Dynamic) Trace   Bed Mobility    Supine to Sit Total Assist   Sit to Supine Total Assist   Scooting Maximal Assist   Skilled Intervention Verbal Cuing;Tactile Cuing;Sequencing;Postural Facilitation   Comments pt initially required total A x2 for supine > sit, pt then performed herself when she was not cued.    ADL Assessment   Lower Body Dressing Maximal Assist   Toileting Maximal Assist   Functional Mobility   Sit to Stand Unable to Participate   Activity Tolerance   Comments limited by behaviors   Short Term Goals   Short Term Goal # 1 Pt will have no acute OT needs by time of DC

## 2020-06-01 NOTE — CARE PLAN
Problem: Communication  Goal: The ability to communicate needs accurately and effectively will improve  Outcome: PROGRESSING AS EXPECTED     Problem: Safety  Goal: Will remain free from injury  Outcome: PROGRESSING AS EXPECTED  Goal: Will remain free from falls  Outcome: PROGRESSING AS EXPECTED     Problem: Infection  Goal: Will remain free from infection  Outcome: PROGRESSING AS EXPECTED     Problem: Venous Thromboembolism (VTW)/Deep Vein Thrombosis (DVT) Prevention:  Goal: Patient will participate in Venous Thrombosis (VTE)/Deep Vein Thrombosis (DVT)Prevention Measures  Outcome: PROGRESSING AS EXPECTED     Problem: Bowel/Gastric:  Goal: Normal bowel function is maintained or improved  Outcome: PROGRESSING AS EXPECTED  Goal: Will not experience complications related to bowel motility  Outcome: PROGRESSING AS EXPECTED     Problem: Knowledge Deficit  Goal: Knowledge of disease process/condition, treatment plan, diagnostic tests, and medications will improve  Outcome: PROGRESSING AS EXPECTED  Goal: Knowledge of the prescribed therapeutic regimen will improve  Outcome: PROGRESSING AS EXPECTED     Problem: Discharge Barriers/Planning  Goal: Patient's continuum of care needs will be met  Outcome: PROGRESSING AS EXPECTED     Problem: Fluid Volume:  Goal: Will maintain balanced intake and output  Outcome: PROGRESSING AS EXPECTED     Problem: Skin Integrity  Goal: Risk for impaired skin integrity will decrease  Outcome: PROGRESSING AS EXPECTED     Problem: Respiratory:  Goal: Respiratory status will improve  Outcome: PROGRESSING AS EXPECTED     Problem: Mobility  Goal: Risk for activity intolerance will decrease  Outcome: PROGRESSING AS EXPECTED     Problem: Urinary Elimination:  Goal: Ability to reestablish a normal urinary elimination pattern will improve  Outcome: PROGRESSING AS EXPECTED     Problem: Pain Management  Goal: Pain level will decrease to patient's comfort goal  Outcome: PROGRESSING AS EXPECTED     Problem:  Psychosocial Needs:  Goal: Level of anxiety will decrease  Outcome: PROGRESSING AS EXPECTED

## 2020-06-01 NOTE — CARE PLAN
Problem: Communication  Goal: The ability to communicate needs accurately and effectively will improve  Outcome: PROGRESSING AS EXPECTED     Problem: Safety  Goal: Will remain free from injury  Outcome: PROGRESSING AS EXPECTED  Goal: Will remain free from falls  Outcome: PROGRESSING AS EXPECTED     Problem: Bowel/Gastric:  Goal: Normal bowel function is maintained or improved  Outcome: PROGRESSING AS EXPECTED  Goal: Will not experience complications related to bowel motility  Outcome: PROGRESSING AS EXPECTED     Problem: Respiratory:  Goal: Respiratory status will improve  Outcome: PROGRESSING AS EXPECTED

## 2020-06-01 NOTE — PROGRESS NOTES
"Bedside report received.  Assessment complete.  A&O x person. Patient calls appropriately.  Patient ambulates with two assist. Bed alarm on.   Patient has 0/10 pain. Pain managed with prescribed medications.  Denies N&V. Tolerating ADA diet.  incontinent void, + flatus, incontinent BM.  Patient denies SOB.  SCD's off.  Patient has Tourette's and calls out non-stop, she does not exhibit any sign of understanding direction.  Review plan with of care with patient. Call light and personal belongings with in reach. Hourly rounding in place. All needs met at this time.  /76   Pulse 94   Temp 35.8 °C (96.5 °F) (Temporal)   Resp 16   Ht 1.6 m (5' 3\")   Wt 60 kg (132 lb 4.4 oz)   SpO2 96%   BMI 23.43 kg/m²     "

## 2020-06-01 NOTE — PROGRESS NOTES
[facility] Medicine Daily Progress Note    Date of Service  6/1/2020    [facility]  [facility]    [facility] Course    Ms Verde has a history of cognitive deficits and schizophrenia.  She was sent to the emergency room for multiple areas of skin bruising, a fall prior to admission was reported although the details are poorly defined.  Evaluation demonstrated the patient had acute renal failure, 1/5 metatarsal fracture, and thrombocytopenia.  Patient tested positive for COVID 19.  She was admitted to the hospital patient had recurrent fevers and follow-up imaging demonstrated worsened infiltrates on her chest x-ray.  Infectious disease was consulted and she was treated with Remdesevir.  The patient's respiratory status and thrombocytopenia have improved.  She continues to test positive for COVID by PCR.      Interval Problem Update  Sleepy this morning, denies shortness of breath, no pain  Generally cooperative, at times very tangential, can be re-directed  I discussed with her bedside RN    Consultants/Specialty  Case management  Infectious disease    Code Status  Full code    Disposition  To be determined  EPS case opened  PT/ OT evaluation    Review of Systems  Review of Systems   Respiratory: Negative for cough.    Cardiovascular: Negative for chest pain and palpitations.   Gastrointestinal: Negative for vomiting.   Psychiatric/Behavioral: Positive for memory loss. The patient is nervous/anxious.        Physical Exam  Temp:  [35.8 °C (96.5 °F)-36.9 °C (98.4 °F)] 35.8 °C (96.5 °F)  Pulse:  [] 94  Resp:  [16-20] 16  BP: (100-130)/(53-84) 126/76  SpO2:  [91 %-96 %] 96 %    Physical Exam  Constitutional:       General: She is not in acute distress.     Appearance: Normal appearance. She is normal weight.   HENT:      Head: Normocephalic and atraumatic.      Right Ear: External ear normal.      Left Ear: External ear normal.      Nose: Nose normal.      Mouth/Throat:      Mouth: Mucous membranes are moist.       Pharynx: Oropharynx is clear.   Eyes:      Extraocular Movements: Extraocular movements intact.      Conjunctiva/sclera: Conjunctivae normal.      Pupils: Pupils are equal, round, and reactive to light.   Neck:      Musculoskeletal: Normal range of motion and neck supple.   Cardiovascular:      Rate and Rhythm: Normal rate and regular rhythm.      Pulses: Normal pulses.   Pulmonary:      Effort: Pulmonary effort is normal.      Comments: Scattered crackles  Abdominal:      General: Abdomen is flat. Bowel sounds are normal.      Palpations: Abdomen is soft.   Musculoskeletal: Normal range of motion.   Skin:     General: Skin is warm and dry.      Capillary Refill: Capillary refill takes less than 2 seconds.      Coloration: Skin is not jaundiced.      Comments: Bruising extremiteis   Neurological:      General: No focal deficit present.      Mental Status: She is alert.      Cranial Nerves: No cranial nerve deficit.      Gait: Gait normal.   Psychiatric:         Mood and Affect: Mood normal.         Behavior: Behavior normal.           Fluids    Intake/Output Summary (Last 24 hours) at 6/1/2020 1042  Last data filed at 6/1/2020 0800  Gross per 24 hour   Intake 360 ml   Output 400 ml   Net -40 ml       Laboratory                        Imaging  DX-CHEST-PORTABLE (1 VIEW)   Final Result      1.  Enlarged cardiac silhouette with probable mild vascular congestion.   2.  Bibasilar opacities could be related atelectasis, vascular congestion or pneumonitis.      CT-ABDOMEN-PELVIS W/O   Final Result         1. No evidence of acute inflammatory change in the abdomen or pelvis.  The study is however limited due to nonuse of intravenous contrast.      2. Airspace opacity in the right lower lobe, in keeping with pneumonia.      3. Hepatic steatosis.      4. Moderate amount of stool throughout the colon.           Assessment/Plan  * COVID-19 virus infection- (present on admission)  Assessment & Plan  With associated  pneumonia/respiratory failure  Completed course of remdesevir  Continue submental oxygen, wean as tolerated  COVID PCR test 5/25/2020: positive  COVID PCR test 5/30/2020: positive  I ordered repeat COVID PCR to be sent on 6/2/2020  Significance of repeated positive results from COVID PCR testing is not known, the patient has recovered clinically from COVID-19 infection and the follow up tests are being sent solely for the purpose of allowing her to return to her facility    Constipation- (present on admission)  Assessment & Plan  Bowel protocol    Thrombocytopenia ([facility])- (present on admission)  Assessment & Plan  Resolved    KYLE (acute kidney injury) (HCC)- (present on admission)  Assessment & Plan  Resolved  Avoid nephrotoxins    Bruising  Assessment & Plan  Likely from trauma, reported fall prior to admission  Thrombocytopenia likely contributing  5th MT fracture found at [facility]  EPS case pending    Diabetes ([facility])- (present on admission)  Assessment & Plan  Metformin    Schizophrenia (HCC)- (present on admission)  Assessment & Plan  Continue current outpatient psychiatric medications       VTE prophylaxis: Lovenox SQ

## 2020-06-01 NOTE — DISCHARGE PLANNING
Hospital Care Management Discharge Planning       Anticipated Discharge Disposition:   · To group home      Action:   · Chart reviewed. Documents received from [facility] [employer] and [facility]  which have been scanned to chart and placed in pt's chart.     Barriers to Discharge:   · COVID negative test  · Open APS referral     Plan:    · Continue to provide support services and assistance with discharge planning as needed.

## 2020-06-01 NOTE — THERAPY
"Physical Therapy   Re-Assessment     Patient Name: Erlinda Verde  Age:  54 y.o., Sex:  female  Medical Record #: [id number]  Today's Date: 6/1/2020     Precautions: Fall Risk, hx schizophrenia, developmental delay    Assessment/Plan  Pt seen per request of physician via new order. Per CM note: group home indicates pt is ambulatory without AD at baseline and they are requesting therapy updates. Pt remains inappropriate for skilled therapy at this time. Pt is unable to attend to task and only intermittently follow commands d/t behaviors. Pt shrieking throughout and appears to be talking with someone who is not present. Pt yells \"ow\" inconsistently with certain movement or touch, and is unable to specify what bothers her. Pt resisting cues and assist from therapist for mobility, but when not cued, did demo ability to come supine > sit on her own. Does not maintain sitting EOB long enough to complete functional tasks. Given baseline cognitive deficits and impaired new learning and attention, pt will not be activity followed for acute PT. Pt will require 24/7 supervision and physical assist at [place]. May require WC and/or dependent lift should she continue to refuse to stand or ambulate for safety.        06/01/20 1559   Balance   Sitting Balance (Static) Trace   Sitting Balance (Dynamic) Trace   Weight Shift Sitting Poor   Comments pt resisting assist to come to sit EOB   Gait Analysis   Gait Level Of Assist Unable to Participate   Bed Mobility    Supine to Sit Total Assist   Sit to Supine Total Assist   Scooting Maximal Assist   Comments initially resisting assist to sit EOB. pt yelling and throwing self back into bed. when not cued to sit up, pt demonstrates ability to perform on her own but does not maintain.    Functional Mobility   Sit to Stand Refused   Bed, Chair, Wheelchair Transfer Unable to Participate   Mobility would not remain sitting EOB and attempt STS                  "

## 2020-06-02 LAB — COVID ORDER STATUS COVID19: NORMAL

## 2020-06-02 PROCEDURE — 700102 HCHG RX REV CODE 250 W/ 637 OVERRIDE(OP): Performed by: HOSPITALIST

## 2020-06-02 PROCEDURE — C9803 HOPD COVID-19 SPEC COLLECT: HCPCS | Performed by: HOSPITALIST

## 2020-06-02 PROCEDURE — 700112 HCHG RX REV CODE 229: Performed by: HOSPITALIST

## 2020-06-02 PROCEDURE — A9270 NON-COVERED ITEM OR SERVICE: HCPCS | Performed by: HOSPITALIST

## 2020-06-02 PROCEDURE — 700111 HCHG RX REV CODE 636 W/ 250 OVERRIDE (IP): Performed by: HOSPITALIST

## 2020-06-02 PROCEDURE — 770001 HCHG ROOM/CARE - MED/SURG/GYN PRIV*

## 2020-06-02 PROCEDURE — 99233 SBSQ HOSP IP/OBS HIGH 50: CPT | Performed by: HOSPITALIST

## 2020-06-02 RX ORDER — VITAMIN B COMPLEX
2000 TABLET ORAL DAILY
Status: DISCONTINUED | OUTPATIENT
Start: 2020-06-03 | End: 2020-06-18 | Stop reason: HOSPADM

## 2020-06-02 RX ADMIN — ZINC SULFATE 220 MG (50 MG) CAPSULE 220 MG: CAPSULE at 05:39

## 2020-06-02 RX ADMIN — ARIPIPRAZOLE 30 MG: 15 TABLET ORAL at 21:21

## 2020-06-02 RX ADMIN — MELATONIN 1000 UNITS: at 05:39

## 2020-06-02 RX ADMIN — DOCUSATE SODIUM 50 MG AND SENNOSIDES 8.6 MG 2 TABLET: 8.6; 5 TABLET, FILM COATED ORAL at 05:39

## 2020-06-02 RX ADMIN — SERTRALINE HYDROCHLORIDE 50 MG: 50 TABLET ORAL at 05:39

## 2020-06-02 RX ADMIN — ENOXAPARIN SODIUM 40 MG: 100 INJECTION SUBCUTANEOUS at 05:38

## 2020-06-02 RX ADMIN — OXYBUTYNIN CHLORIDE 10 MG: 10 TABLET, EXTENDED RELEASE ORAL at 17:41

## 2020-06-02 RX ADMIN — DOCUSATE SODIUM 50 MG AND SENNOSIDES 8.6 MG 2 TABLET: 8.6; 5 TABLET, FILM COATED ORAL at 17:41

## 2020-06-02 RX ADMIN — Medication 1000 MG: at 08:46

## 2020-06-02 RX ADMIN — Medication 1 PACKET: at 05:39

## 2020-06-02 RX ADMIN — CALCIUM CITRATE 200 MG (950 MG) TABLET 950 MG: at 05:39

## 2020-06-02 RX ADMIN — BISACODYL 10 MG: 10 SUPPOSITORY RECTAL at 17:41

## 2020-06-02 RX ADMIN — DIVALPROEX SODIUM 500 MG: 500 TABLET, DELAYED RELEASE ORAL at 21:21

## 2020-06-02 RX ADMIN — METFORMIN HYDROCHLORIDE 500 MG: 500 TABLET, EXTENDED RELEASE ORAL at 05:39

## 2020-06-02 RX ADMIN — QUETIAPINE FUMARATE 300 MG: 200 TABLET ORAL at 21:21

## 2020-06-02 RX ADMIN — DOCUSATE SODIUM 100 MG: 100 CAPSULE, LIQUID FILLED ORAL at 17:41

## 2020-06-02 RX ADMIN — Medication 1000 MG: at 18:21

## 2020-06-02 RX ADMIN — DONEPEZIL HYDROCHLORIDE 10 MG: 5 TABLET, FILM COATED ORAL at 17:41

## 2020-06-02 RX ADMIN — DOCUSATE SODIUM 100 MG: 100 CAPSULE, LIQUID FILLED ORAL at 05:39

## 2020-06-02 ASSESSMENT — ENCOUNTER SYMPTOMS
VOMITING: 0
MEMORY LOSS: 1
NERVOUS/ANXIOUS: 0
PALPITATIONS: 0
COUGH: 0

## 2020-06-02 NOTE — PROGRESS NOTES
[facility] Medicine Daily Progress Note    Date of Service  6/2/2020    Chief Complaint  [facility]    [facility] Course    Ms Verde has a history of cognitive deficits and schizophrenia.  She was sent to the emergency room for multiple areas of skin bruising, a fall prior to admission was reported although the details are poorly defined.  Evaluation demonstrated the patient had acute renal failure, 1/5 metatarsal fracture, and thrombocytopenia.  Patient tested positive for COVID 19.  She was admitted to the hospital patient had recurrent fevers and follow-up imaging demonstrated worsened infiltrates on her chest x-ray.  Infectious disease was consulted and she was treated with Remdesevir.  The patient's respiratory status and thrombocytopenia have improved.  She continues to test positive for COVID by PCR.  6/2:  4 positive tests for COVID 19 on 5/16,  5/18, 5/25 and 5/30.  Repeat in 7 days ordered for 6/6.  Asymptomatic, sleeping on exam, stable vital signs, on home O2 2 LPM NC.          Consultants/Specialty  Case management  Infectious disease    Code Status  Full code    Disposition  Came from group home, [facility] requiring negative COVID 19 testing.    Review of Systems  Review of Systems   Respiratory: Negative for cough.    Cardiovascular: Negative for chest pain and palpitations.   Gastrointestinal: Negative for vomiting.   Psychiatric/Behavioral: Positive for memory loss. The patient is not nervous/anxious.        Physical Exam  Temp:  [36.2 °C (97.1 °F)-36.6 °C (97.9 °F)] 36.3 °C (97.3 °F)  Pulse:  [79-93] 93  Resp:  [16-18] 18  BP: (108-117)/(49-67) 111/49  SpO2:  [97 %-98 %] 97 %    Physical Exam  Constitutional:       General: She is not in acute distress.     Appearance: Normal appearance. She is normal weight.   HENT:      Head: Normocephalic and atraumatic.      Right Ear: External ear normal.      Left Ear: External ear normal.      Nose: Nose normal.      Mouth/Throat:      Mouth: Mucous membranes are  moist.      Pharynx: Oropharynx is clear.   Eyes:      Extraocular Movements: Extraocular movements intact.      Conjunctiva/sclera: Conjunctivae normal.      Pupils: Pupils are equal, round, and reactive to light.   Neck:      Musculoskeletal: Normal range of motion and neck supple.   Cardiovascular:      Rate and Rhythm: Normal rate and regular rhythm.      Pulses: Normal pulses.   Pulmonary:      Effort: Pulmonary effort is normal.      Comments: CTA b/l  Abdominal:      General: Abdomen is flat. Bowel sounds are normal.      Palpations: Abdomen is soft.   Musculoskeletal: Normal range of motion.   Skin:     General: Skin is warm and dry.      Capillary Refill: Capillary refill takes less than 2 seconds.      Coloration: Skin is not jaundiced.      Comments: Bruising extremiteis   Neurological:      General: No focal deficit present.      Mental Status: She is alert.      Cranial Nerves: No cranial nerve deficit.      Gait: Gait normal.   Psychiatric:         Mood and Affect: Mood normal.         Behavior: Behavior normal.           Fluids    Intake/Output Summary (Last 24 hours) at 6/2/2020 1353  Last data filed at 6/2/2020 1200  Gross per 24 hour   Intake 600 ml   Output --   Net 600 ml       Laboratory                        Imaging  DX-CHEST-PORTABLE (1 VIEW)   Final Result      1.  Enlarged cardiac silhouette with probable mild vascular congestion.   2.  Bibasilar opacities could be related atelectasis, vascular congestion or pneumonitis.      CT-ABDOMEN-PELVIS W/O   Final Result         1. No evidence of acute inflammatory change in the abdomen or pelvis.  The study is however limited due to nonuse of intravenous contrast.      2. Airspace opacity in the right lower lobe, in keeping with pneumonia.      3. Hepatic steatosis.      4. Moderate amount of stool throughout the colon.           Assessment/Plan  * COVID-19 virus infection- (present on admission)  Assessment & Plan  With associated  pneumonia/respiratory failure  Completed course of remdesevir  Continue submental oxygen, wean as tolerated  5/16 positive COVID 19  5/18 positive COVID 19  COVID PCR test 5/25/2020: positive  COVID PCR test 5/30/2020: positive  I ordered repeat COVID PCR to be sent on 6/6/2020  Significance of repeated positive results from COVID PCR testing is not known, the patient has recovered clinically from COVID-19 infection and the follow up tests are being sent solely for the purpose of allowing her to return to her facility    Constipation- (present on admission)  Assessment & Plan  Bowel protocol    Thrombocytopenia (HCC)- (present on admission)  Assessment & Plan  Resolved    KYLE (acute kidney injury) ([facility])- (present on admission)  Assessment & Plan  Resolved  Avoid nephrotoxins    Bruising  Assessment & Plan  Likely from trauma, reported fall prior to admission  Thrombocytopenia likely contributing  5th MT fracture found at [facility] foot  EPS case pending    Diabetes ([facility])- (present on admission)  Assessment & Plan  Metformin    Moderate intellectual disability- (present on admission)  Assessment & Plan  At baseline.  Resides at group home.    Schizophrenia ([facility])- (present on admission)  Assessment & Plan  Continue current outpatient psychiatric medications       VTE prophylaxis: Lovenox SQ

## 2020-06-03 PROCEDURE — A9270 NON-COVERED ITEM OR SERVICE: HCPCS | Performed by: HOSPITALIST

## 2020-06-03 PROCEDURE — 700111 HCHG RX REV CODE 636 W/ 250 OVERRIDE (IP): Performed by: HOSPITALIST

## 2020-06-03 PROCEDURE — 700102 HCHG RX REV CODE 250 W/ 637 OVERRIDE(OP): Performed by: HOSPITALIST

## 2020-06-03 PROCEDURE — 770001 HCHG ROOM/CARE - MED/SURG/GYN PRIV*

## 2020-06-03 PROCEDURE — 700112 HCHG RX REV CODE 229: Performed by: HOSPITALIST

## 2020-06-03 PROCEDURE — 99232 SBSQ HOSP IP/OBS MODERATE 35: CPT | Performed by: HOSPITALIST

## 2020-06-03 RX ADMIN — DOCUSATE SODIUM 100 MG: 100 CAPSULE, LIQUID FILLED ORAL at 04:25

## 2020-06-03 RX ADMIN — MELATONIN 2000 UNITS: at 04:25

## 2020-06-03 RX ADMIN — ARIPIPRAZOLE 30 MG: 15 TABLET ORAL at 20:47

## 2020-06-03 RX ADMIN — DONEPEZIL HYDROCHLORIDE 10 MG: 5 TABLET, FILM COATED ORAL at 16:35

## 2020-06-03 RX ADMIN — DOCUSATE SODIUM 50 MG AND SENNOSIDES 8.6 MG 2 TABLET: 8.6; 5 TABLET, FILM COATED ORAL at 04:25

## 2020-06-03 RX ADMIN — METFORMIN HYDROCHLORIDE 500 MG: 500 TABLET, EXTENDED RELEASE ORAL at 04:52

## 2020-06-03 RX ADMIN — DIVALPROEX SODIUM 500 MG: 500 TABLET, DELAYED RELEASE ORAL at 20:47

## 2020-06-03 RX ADMIN — Medication 1000 MG: at 04:25

## 2020-06-03 RX ADMIN — DOCUSATE SODIUM 50 MG AND SENNOSIDES 8.6 MG 2 TABLET: 8.6; 5 TABLET, FILM COATED ORAL at 16:35

## 2020-06-03 RX ADMIN — ZINC SULFATE 220 MG (50 MG) CAPSULE 220 MG: CAPSULE at 04:25

## 2020-06-03 RX ADMIN — Medication 1 PACKET: at 06:00

## 2020-06-03 RX ADMIN — SERTRALINE HYDROCHLORIDE 50 MG: 50 TABLET ORAL at 04:25

## 2020-06-03 RX ADMIN — QUETIAPINE FUMARATE 300 MG: 200 TABLET ORAL at 20:48

## 2020-06-03 RX ADMIN — OXYBUTYNIN CHLORIDE 10 MG: 10 TABLET, EXTENDED RELEASE ORAL at 16:35

## 2020-06-03 RX ADMIN — ENOXAPARIN SODIUM 40 MG: 100 INJECTION SUBCUTANEOUS at 04:25

## 2020-06-03 RX ADMIN — BISACODYL 10 MG: 10 SUPPOSITORY RECTAL at 16:35

## 2020-06-03 RX ADMIN — CALCIUM CITRATE 200 MG (950 MG) TABLET 950 MG: at 04:52

## 2020-06-03 RX ADMIN — DOCUSATE SODIUM 100 MG: 100 CAPSULE, LIQUID FILLED ORAL at 16:35

## 2020-06-03 RX ADMIN — Medication 1000 MG: at 16:35

## 2020-06-03 ASSESSMENT — ENCOUNTER SYMPTOMS
VOMITING: 0
NERVOUS/ANXIOUS: 0
PALPITATIONS: 0
COUGH: 0
MEMORY LOSS: 1

## 2020-06-03 NOTE — PROGRESS NOTES
[facility] Medicine Daily Progress Note    Date of Service  6/3/2020    Chief Complaint  [facility] Course    Ms Verde has a history of cognitive deficits and schizophrenia.  She was sent to the emergency room for multiple areas of skin bruising, a fall prior to admission was reported although the details are poorly defined.  Evaluation demonstrated the patient had acute renal failure, 1/5 metatarsal fracture, and thrombocytopenia.  Patient tested positive for COVID 19.  She was admitted to the hospital patient had recurrent fevers and follow-up imaging demonstrated worsened infiltrates on her chest x-ray.  Infectious disease was consulted and she was treated with Remdesevir.  The patient's respiratory status and thrombocytopenia have improved.  She continues to test positive for COVID by PCR.  6/2:  4 positive tests for COVID 19 on 5/16,  5/18, 5/25 and 5/30.  Asymptomatic, sleeping on exam, stable vital signs, on home O2 2 LPM NC.  6/3:  Dementia, pleasantly asleep on exam, on RA.  6/2 COVID testing pending.    I certify that the patient requires continued medically necessary treatment of COVID 19 infection and will remain in the hospital for 7 more days.  Discharge plan is pending the acceptance of the group home which is currently requiring negative COVID 19 test.  4 tests have remained positive.  6/2 testing pending.          Consultants/Specialty  Case management  Infectious disease    Code Status  Full code    Disposition  Came from group home, [facility] requiring negative COVID 19 testing.    Review of Systems  Review of Systems   Respiratory: Negative for cough.    Cardiovascular: Negative for chest pain and palpitations.   Gastrointestinal: Negative for vomiting.   Psychiatric/Behavioral: Positive for memory loss. The patient is not nervous/anxious.        Physical Exam  Temp:  [36.6 °C (97.9 °F)-36.9 °C (98.4 °F)] 36.6 °C (97.9 °F)  Pulse:  [] 80  Resp:  [16-19] 16  BP: (109-127)/(73-86)  109/79  SpO2:  [93 %-94 %] 93 %    Physical Exam  Constitutional:       General: She is not in acute distress.     Appearance: Normal appearance. She is normal weight.   HENT:      Head: Normocephalic and atraumatic.      Right Ear: External ear normal.      Left Ear: External ear normal.      Nose: Nose normal.      Mouth/Throat:      Mouth: Mucous membranes are moist.      Pharynx: Oropharynx is clear.   Eyes:      Extraocular Movements: Extraocular movements intact.      Conjunctiva/sclera: Conjunctivae normal.      Pupils: Pupils are equal, round, and reactive to light.   Neck:      Musculoskeletal: Normal range of motion and neck supple.   Cardiovascular:      Rate and Rhythm: Normal rate and regular rhythm.      Pulses: Normal pulses.   Pulmonary:      Effort: Pulmonary effort is normal.      Comments: CTA b/l  Abdominal:      General: Abdomen is flat. Bowel sounds are normal.      Palpations: Abdomen is soft.   Musculoskeletal: Normal range of motion.   Skin:     General: Skin is warm and dry.      Capillary Refill: Capillary refill takes less than 2 seconds.      Coloration: Skin is not jaundiced.      Comments: Bruising extremiteis   Neurological:      General: No focal deficit present.      Mental Status: She is alert.      Cranial Nerves: No cranial nerve deficit.      Gait: Gait normal.   Psychiatric:         Mood and Affect: Mood normal.         Behavior: Behavior normal.           Fluids    Intake/Output Summary (Last 24 hours) at 6/3/2020 1444  Last data filed at 6/3/2020 0800  Gross per 24 hour   Intake 120 ml   Output 250 ml   Net -130 ml       Laboratory                        Imaging  DX-CHEST-PORTABLE (1 VIEW)   Final Result      1.  Enlarged cardiac silhouette with probable mild vascular congestion.   2.  Bibasilar opacities could be related atelectasis, vascular congestion or pneumonitis.      CT-ABDOMEN-PELVIS W/O   Final Result         1. No evidence of acute inflammatory change in the  abdomen or pelvis.  The study is however limited due to nonuse of intravenous contrast.      2. Airspace opacity in the right lower lobe, in keeping with pneumonia.      3. Hepatic steatosis.      4. Moderate amount of stool throughout the colon.           Assessment/Plan  * COVID-19 virus infection- (present on admission)  Assessment & Plan  With associated pneumonia/respiratory failure  Completed course of remdesevir  Continue submental oxygen, wean as tolerated  5/16 positive COVID 19  5/18 positive COVID 19  COVID PCR test 5/25/2020: positive  COVID PCR test 5/30/2020: positive  I ordered repeat COVID PCR to be sent on 6/6/2020  Significance of repeated positive results from COVID PCR testing is not known, the patient has recovered clinically from COVID-19 infection and the follow up tests are being sent solely for the purpose of allowing her to return to her facility    Constipation- (present on admission)  Assessment & Plan  Bowel protocol    Thrombocytopenia (HCC)- (present on admission)  Assessment & Plan  Resolved    KYLE (acute kidney injury) (HCC)- (present on admission)  Assessment & Plan  Resolved  Avoid nephrotoxins    Bruising  Assessment & Plan  Likely from trauma, reported fall prior to admission  Thrombocytopenia likely contributing  5th MT fracture found at [facility] foot  EPS case pending    Diabetes (HCC)- (present on admission)  Assessment & Plan  Metformin    Moderate intellectual disability- (present on admission)  Assessment & Plan  At baseline.  Resides at group home.    Schizophrenia (HCC)- (present on admission)  Assessment & Plan  Continue current outpatient psychiatric medications       VTE prophylaxis: Lovenox SQ

## 2020-06-03 NOTE — CARE PLAN
Problem: Safety  Goal: Will remain free from injury  Outcome: PROGRESSING AS EXPECTED  Goal: Will remain free from falls  Outcome: PROGRESSING AS EXPECTED     Problem: Communication  Goal: The ability to communicate needs accurately and effectively will improve  Outcome: PROGRESSING SLOWER THAN EXPECTED     Problem: Bowel/Gastric:  Goal: Normal bowel function is maintained or improved  Outcome: PROGRESSING SLOWER THAN EXPECTED

## 2020-06-03 NOTE — DISCHARGE PLANNING
Hospital Care Management Discharge Planning       Anticipated Discharge Disposition:   · Group Home     Action:   · Chart Reviewed, no changes     Barriers to Discharge:   · Negative COVID (Pending)     Plan:   · DC once COVID negative.   · Continue to provide support services and assistance with discharge planning as needed.

## 2020-06-03 NOTE — DISCHARGE PLANNING
Ann ([phone]) from [facility] called to get update and status of pt. Notified Ann COVID testing is pending from swab on 6/2/20. No further questions at this time.

## 2020-06-04 LAB
SARS-COV-2 RNA RESP QL NAA+PROBE: DETECTED
SPECIMEN SOURCE: ABNORMAL

## 2020-06-04 PROCEDURE — A9270 NON-COVERED ITEM OR SERVICE: HCPCS | Performed by: HOSPITALIST

## 2020-06-04 PROCEDURE — 770001 HCHG ROOM/CARE - MED/SURG/GYN PRIV*

## 2020-06-04 PROCEDURE — 700111 HCHG RX REV CODE 636 W/ 250 OVERRIDE (IP): Performed by: HOSPITALIST

## 2020-06-04 PROCEDURE — 700102 HCHG RX REV CODE 250 W/ 637 OVERRIDE(OP): Performed by: HOSPITALIST

## 2020-06-04 PROCEDURE — 99231 SBSQ HOSP IP/OBS SF/LOW 25: CPT | Mod: CS | Performed by: HOSPITALIST

## 2020-06-04 PROCEDURE — 700112 HCHG RX REV CODE 229: Performed by: HOSPITALIST

## 2020-06-04 RX ADMIN — DOCUSATE SODIUM 100 MG: 100 CAPSULE, LIQUID FILLED ORAL at 18:11

## 2020-06-04 RX ADMIN — ZINC SULFATE 220 MG (50 MG) CAPSULE 220 MG: CAPSULE at 06:11

## 2020-06-04 RX ADMIN — MELATONIN 2000 UNITS: at 06:12

## 2020-06-04 RX ADMIN — DOCUSATE SODIUM 50 MG AND SENNOSIDES 8.6 MG 2 TABLET: 8.6; 5 TABLET, FILM COATED ORAL at 18:11

## 2020-06-04 RX ADMIN — Medication 1000 MG: at 18:11

## 2020-06-04 RX ADMIN — DIVALPROEX SODIUM 500 MG: 500 TABLET, DELAYED RELEASE ORAL at 22:13

## 2020-06-04 RX ADMIN — Medication 1000 MG: at 11:00

## 2020-06-04 RX ADMIN — CALCIUM CITRATE 200 MG (950 MG) TABLET 950 MG: at 06:12

## 2020-06-04 RX ADMIN — Medication 1 PACKET: at 06:11

## 2020-06-04 RX ADMIN — OXYBUTYNIN CHLORIDE 10 MG: 10 TABLET, EXTENDED RELEASE ORAL at 18:41

## 2020-06-04 RX ADMIN — ENOXAPARIN SODIUM 40 MG: 100 INJECTION SUBCUTANEOUS at 06:10

## 2020-06-04 RX ADMIN — DOCUSATE SODIUM 100 MG: 100 CAPSULE, LIQUID FILLED ORAL at 06:11

## 2020-06-04 RX ADMIN — DONEPEZIL HYDROCHLORIDE 10 MG: 5 TABLET, FILM COATED ORAL at 18:11

## 2020-06-04 RX ADMIN — ARIPIPRAZOLE 30 MG: 15 TABLET ORAL at 22:13

## 2020-06-04 RX ADMIN — SERTRALINE HYDROCHLORIDE 50 MG: 50 TABLET ORAL at 06:11

## 2020-06-04 RX ADMIN — QUETIAPINE FUMARATE 300 MG: 200 TABLET ORAL at 22:13

## 2020-06-04 RX ADMIN — BISACODYL 10 MG: 10 SUPPOSITORY RECTAL at 18:11

## 2020-06-04 RX ADMIN — METFORMIN HYDROCHLORIDE 500 MG: 500 TABLET, EXTENDED RELEASE ORAL at 06:12

## 2020-06-04 ASSESSMENT — ENCOUNTER SYMPTOMS
MEMORY LOSS: 1
NERVOUS/ANXIOUS: 0
PALPITATIONS: 0
VOMITING: 0
COUGH: 0

## 2020-06-04 NOTE — CARE PLAN
Discussed POC with patient and MD    Problem: Safety  Goal: Will remain free from injury  Outcome: PROGRESSING AS EXPECTED     Problem: Safety  Goal: Will remain free from falls  Outcome: PROGRESSING AS EXPECTED     Problem: Bowel/Gastric:  Goal: Normal bowel function is maintained or improved  Outcome: PROGRESSING AS EXPECTED     Problem: Respiratory:  Goal: Respiratory status will improve  Outcome: PROGRESSING AS EXPECTED

## 2020-06-04 NOTE — CARE PLAN
Problem: Skin Integrity  Goal: Risk for impaired skin integrity will decrease  Outcome: PROGRESSING AS EXPECTED     Q2 turns    Problem: Urinary Elimination:  Goal: Ability to reestablish a normal urinary elimination pattern will improve  Outcome: PROGRESSING AS EXPECTED     Purewick in place

## 2020-06-04 NOTE — PROGRESS NOTES
Hospital Medicine Daily Progress Note    Date of Service  6/4/2020    Chief Complaint  [facility]    Hospital Course    Ms Verde has a history of cognitive deficits and schizophrenia.  She was sent to the emergency room for multiple areas of skin bruising, a fall prior to admission was reported although the details are poorly defined.  Evaluation demonstrated the patient had acute renal failure, 1/5 metatarsal fracture, and thrombocytopenia.  Patient tested positive for COVID 19.  She was admitted to the hospital patient had recurrent fevers and follow-up imaging demonstrated worsened infiltrates on her chest x-ray.  Infectious disease was consulted and she was treated with Remdesevir.  The patient's respiratory status and thrombocytopenia have improved.  She continues to test positive for COVID by PCR.  6/2:  4 positive tests for COVID 19 on 5/16,  5/18, 5/25 and 5/30.  Asymptomatic, sleeping on exam, stable vital signs, on home O2 2 LPM NC.  6/3:  Dementia, pleasantly asleep on exam, on RA.  6/2 COVID testing pending.    I certify that the patient requires continued medically necessary treatment of COVID 19 infection and will remain in the hospital for 7 more days.  Discharge plan is pending the acceptance of the group home which is currently requiring negative COVID 19 test.  4 tests have remained positive.  6/2 testing pending.  6/4:  Talkative today, asymptomatic.  Pending negative testing. Up to chair bid and incentive spirometry ordered.  Examined right toes, bryce tape on 4th and 5th toes.  No ecchymosis or deformity noted, but remains tender to touch.          Consultants/Specialty  Case management  Infectious disease    Code Status  Full code    Disposition  Came from group home, [facility] requiring negative COVID 19 testing.    Review of Systems  Review of Systems   Respiratory: Negative for cough.    Cardiovascular: Negative for chest pain and palpitations.   Gastrointestinal: Negative for vomiting.    Psychiatric/Behavioral: Positive for memory loss. The patient is not nervous/anxious.        Physical Exam  Temp:  [36 °C (96.8 °F)-36.4 °C (97.5 °F)] 36 °C (96.8 °F)  Pulse:  [82-90] 89  Resp:  [16-18] 18  BP: (118-134)/(74-87) 118/82  SpO2:  [90 %-95 %] 91 %    Physical Exam  Constitutional:       General: She is not in acute distress.     Appearance: Normal appearance. She is normal weight.   HENT:      Head: Normocephalic and atraumatic.      Right Ear: External ear normal.      Left Ear: External ear normal.      Nose: Nose normal.      Mouth/Throat:      Mouth: Mucous membranes are moist.      Pharynx: Oropharynx is clear.   Eyes:      Extraocular Movements: Extraocular movements intact.      Conjunctiva/sclera: Conjunctivae normal.      Pupils: Pupils are equal, round, and reactive to light.   Neck:      Musculoskeletal: Normal range of motion and neck supple.   Cardiovascular:      Rate and Rhythm: Normal rate and regular rhythm.      Pulses: Normal pulses.   Pulmonary:      Effort: Pulmonary effort is normal.      Comments: CTA b/l  Abdominal:      General: Abdomen is flat. Bowel sounds are normal.      Palpations: Abdomen is soft.   Musculoskeletal: Normal range of motion.   Skin:     General: Skin is warm and dry.      Capillary Refill: Capillary refill takes less than 2 seconds.      Coloration: Skin is not jaundiced.      Comments: No further ecchymosis.  Westley tape right 4th and 5th toes.   Neurological:      General: No focal deficit present.      Mental Status: She is alert.      Cranial Nerves: No cranial nerve deficit.      Gait: Gait normal.   Psychiatric:         Mood and Affect: Mood normal.         Behavior: Behavior normal.           Fluids    Intake/Output Summary (Last 24 hours) at 6/4/2020 1540  Last data filed at 6/4/2020 0400  Gross per 24 hour   Intake 240 ml   Output 650 ml   Net -410 ml       Laboratory                        Imaging  DX-CHEST-PORTABLE (1 VIEW)   Final Result      1.   Enlarged cardiac silhouette with probable mild vascular congestion.   2.  Bibasilar opacities could be related atelectasis, vascular congestion or pneumonitis.      CT-ABDOMEN-PELVIS W/O   Final Result         1. No evidence of acute inflammatory change in the abdomen or pelvis.  The study is however limited due to nonuse of intravenous contrast.      2. Airspace opacity in the right lower lobe, in keeping with pneumonia.      3. Hepatic steatosis.      4. Moderate amount of stool throughout the colon.           Assessment/Plan  * COVID-19 virus infection- (present on admission)  Assessment & Plan  With associated pneumonia/respiratory failure  Completed course of remdesevir  Continue submental oxygen, wean as tolerated  5/16 positive COVID 19  5/18 positive COVID 19  COVID PCR test 5/25/2020: positive  COVID PCR test 5/30/2020: positive  I ordered repeat COVID PCR to be sent on 6/6/2020  Significance of repeated positive results from COVID PCR testing is not known, the patient has recovered clinically from COVID-19 infection and the follow up tests are being sent solely for the purpose of allowing her to return to her facility    Constipation- (present on admission)  Assessment & Plan  Bowel protocol    Thrombocytopenia (HCC)- (present on admission)  Assessment & Plan  Resolved    KYLE (acute kidney injury) (HCC)- (present on admission)  Assessment & Plan  Resolved  Avoid nephrotoxins    Bruising  Assessment & Plan  Likely from trauma, reported fall prior to admission  Thrombocytopenia likely contributing  5th MT fracture found at [facility] foot  EPS case pending    Diabetes (HCC)- (present on admission)  Assessment & Plan  Metformin    Moderate intellectual disability- (present on admission)  Assessment & Plan  At baseline.  Resides at group home.    Schizophrenia (HCC)- (present on admission)  Assessment & Plan  Continue current outpatient psychiatric medications       VTE prophylaxis: Lovenox SQ

## 2020-06-05 PROCEDURE — A9270 NON-COVERED ITEM OR SERVICE: HCPCS | Performed by: HOSPITALIST

## 2020-06-05 PROCEDURE — 770021 HCHG ROOM/CARE - ISO PRIVATE

## 2020-06-05 PROCEDURE — 700102 HCHG RX REV CODE 250 W/ 637 OVERRIDE(OP): Performed by: HOSPITALIST

## 2020-06-05 PROCEDURE — 99231 SBSQ HOSP IP/OBS SF/LOW 25: CPT | Mod: CS | Performed by: HOSPITALIST

## 2020-06-05 PROCEDURE — 700112 HCHG RX REV CODE 229: Performed by: HOSPITALIST

## 2020-06-05 PROCEDURE — 700111 HCHG RX REV CODE 636 W/ 250 OVERRIDE (IP): Performed by: HOSPITALIST

## 2020-06-05 RX ADMIN — METFORMIN HYDROCHLORIDE 500 MG: 500 TABLET, EXTENDED RELEASE ORAL at 05:41

## 2020-06-05 RX ADMIN — DONEPEZIL HYDROCHLORIDE 10 MG: 5 TABLET, FILM COATED ORAL at 18:03

## 2020-06-05 RX ADMIN — QUETIAPINE FUMARATE 300 MG: 200 TABLET ORAL at 19:53

## 2020-06-05 RX ADMIN — OXYBUTYNIN CHLORIDE 10 MG: 10 TABLET, EXTENDED RELEASE ORAL at 18:03

## 2020-06-05 RX ADMIN — Medication 1000 MG: at 19:54

## 2020-06-05 RX ADMIN — ZINC SULFATE 220 MG (50 MG) CAPSULE 220 MG: CAPSULE at 05:41

## 2020-06-05 RX ADMIN — Medication 1 PACKET: at 06:00

## 2020-06-05 RX ADMIN — ENOXAPARIN SODIUM 40 MG: 100 INJECTION SUBCUTANEOUS at 05:41

## 2020-06-05 RX ADMIN — DOCUSATE SODIUM 50 MG AND SENNOSIDES 8.6 MG 2 TABLET: 8.6; 5 TABLET, FILM COATED ORAL at 05:55

## 2020-06-05 RX ADMIN — DOCUSATE SODIUM 100 MG: 100 CAPSULE, LIQUID FILLED ORAL at 18:03

## 2020-06-05 RX ADMIN — CALCIUM CITRATE 200 MG (950 MG) TABLET 950 MG: at 05:41

## 2020-06-05 RX ADMIN — DOCUSATE SODIUM 100 MG: 100 CAPSULE, LIQUID FILLED ORAL at 05:41

## 2020-06-05 RX ADMIN — MELATONIN 2000 UNITS: at 05:41

## 2020-06-05 RX ADMIN — Medication 1000 MG: at 05:41

## 2020-06-05 RX ADMIN — ARIPIPRAZOLE 30 MG: 15 TABLET ORAL at 19:53

## 2020-06-05 RX ADMIN — DIVALPROEX SODIUM 500 MG: 500 TABLET, DELAYED RELEASE ORAL at 19:54

## 2020-06-05 RX ADMIN — SERTRALINE HYDROCHLORIDE 50 MG: 50 TABLET ORAL at 05:42

## 2020-06-05 ASSESSMENT — ENCOUNTER SYMPTOMS
COUGH: 0
PALPITATIONS: 0
NERVOUS/ANXIOUS: 0
VOMITING: 0
MEMORY LOSS: 1

## 2020-06-05 NOTE — DISCHARGE PLANNING
Anticipated Discharge Disposition: Return to group home pending negative COVID test and medically cleared.    Action: RN CM chart review. Per chart last positive COVID test 6/2. MD recommends pt remain in the hospital for 7 more days. GH requires a negative COVID test before reacceptance.     Barriers to Discharge:  Positive for COVID    Plan: Continue to coordinate with the health care team for discharge planning.

## 2020-06-05 NOTE — PROGRESS NOTES
Hospital Medicine Daily Progress Note    Date of Service  6/5/2020    Chief Complaint  [facility]    Hospital Course    Ms Verde has a history of cognitive deficits and schizophrenia.  She was sent to the emergency room for multiple areas of skin bruising, a fall prior to admission was reported although the details are poorly defined.  Evaluation demonstrated the patient had acute renal failure, 1/5 metatarsal fracture, and thrombocytopenia.  Patient tested positive for COVID 19.  She was admitted to the hospital patient had recurrent fevers and follow-up imaging demonstrated worsened infiltrates on her chest x-ray.  Infectious disease was consulted and she was treated with Remdesevir.  The patient's respiratory status and thrombocytopenia have improved.  She continues to test positive for COVID by PCR.  6/2:  4 positive tests for COVID 19 on 5/16,  5/18, 5/25 and 5/30.  Asymptomatic, sleeping on exam, stable vital signs, on home O2 2 LPM NC.  6/3:  Dementia, pleasantly asleep on exam, on RA.  6/2 COVID testing pending.    I certify that the patient requires continued medically necessary treatment of COVID 19 infection and will remain in the hospital for 7 more days.  Discharge plan is pending the acceptance of the group [place] which is currently requiring negative COVID 19 test.  4 tests have remained positive.  6/2 testing pending.  6/4:  Talkative today, asymptomatic.  Pending negative testing. Up to chair bid and incentive spirometry ordered.  Examined right toes, bryce tape on 4th and 5th toes.  No ecchymosis or deformity noted, but remains tender to touch.  6/5:  Talkative, awake, on RA.  Pending negative COVID 19 testing.          Consultants/Specialty  Case management  Infectious disease    Code Status  Full code    Disposition  Came from group home, [facility] requiring negative COVID 19 testing.  +5/16, 5/18, 5/25, 5/30, 6/2.    6/9 ordered.    Review of Systems  Review of Systems   Respiratory: Negative for cough.     Cardiovascular: Negative for chest pain and palpitations.   Gastrointestinal: Negative for vomiting.   Psychiatric/Behavioral: Positive for memory loss. The patient is not nervous/anxious.        Physical Exam  Temp:  [36.1 °C (96.9 °F)-36.8 °C (98.3 °F)] 36.6 °C (97.9 °F)  Pulse:  [] 83  Resp:  [16-20] 16  BP: (115-126)/(65-89) 124/89  SpO2:  [90 %-93 %] 91 %    Physical Exam  Constitutional:       General: She is not in acute distress.     Appearance: Normal appearance. She is normal weight.   HENT:      Head: Normocephalic and atraumatic.      Right Ear: External ear normal.      Left Ear: External ear normal.      Nose: Nose normal.      Mouth/Throat:      Mouth: Mucous membranes are moist.      Pharynx: Oropharynx is clear.   Eyes:      Extraocular Movements: Extraocular movements intact.      Conjunctiva/sclera: Conjunctivae normal.      Pupils: Pupils are equal, round, and reactive to light.   Neck:      Musculoskeletal: Normal range of motion and neck supple.   Cardiovascular:      Rate and Rhythm: Normal rate and regular rhythm.      Pulses: Normal pulses.   Pulmonary:      Effort: Pulmonary effort is normal.      Comments: CTA b/l  Abdominal:      General: Abdomen is flat. Bowel sounds are normal.      Palpations: Abdomen is soft.   Musculoskeletal: Normal range of motion.   Skin:     General: Skin is warm and dry.      Capillary Refill: Capillary refill takes less than 2 seconds.      Coloration: Skin is not jaundiced.      Comments: No further ecchymosis.  Westley tape right 4th and 5th toes.   Neurological:      General: No focal deficit present.      Mental Status: She is alert.      Cranial Nerves: No cranial nerve deficit.      Gait: Gait normal.   Psychiatric:         Mood and Affect: Mood normal.         Behavior: Behavior normal.           Fluids    Intake/Output Summary (Last 24 hours) at 6/5/2020 1359  Last data filed at 6/5/2020 0600  Gross per 24 hour   Intake --   Output 375 ml   Net  -375 ml       Laboratory                        Imaging  DX-CHEST-PORTABLE (1 VIEW)   Final Result      1.  Enlarged cardiac silhouette with probable mild vascular congestion.   2.  Bibasilar opacities could be related atelectasis, vascular congestion or pneumonitis.      CT-ABDOMEN-PELVIS W/O   Final Result         1. No evidence of acute inflammatory change in the abdomen or pelvis.  The study is however limited due to nonuse of intravenous contrast.      2. Airspace opacity in the right lower lobe, in keeping with pneumonia.      3. Hepatic steatosis.      4. Moderate amount of stool throughout the colon.           Assessment/Plan  * COVID-19 virus infection- (present on admission)  Assessment & Plan  With associated pneumonia/respiratory failure  Completed course of remdesevir  Continue submental oxygen, wean as tolerated  5/16 positive COVID 19  5/18 positive COVID 19  COVID PCR test 5/25/2020: positive  COVID PCR test 5/30/2020: positive  6/2 positive  Significance of repeated positive results from COVID PCR testing is not known, the patient has recovered clinically from COVID-19 infection and the follow up tests are being sent solely for the purpose of allowing her to return to her facility    Constipation- (present on admission)  Assessment & Plan  Bowel protocol    Thrombocytopenia (HCC)- (present on admission)  Assessment & Plan  Resolved    KYLE (acute kidney injury) (HCC)- (present on admission)  Assessment & Plan  Resolved  Avoid nephrotoxins    Bruising  Assessment & Plan  Likely from trauma, reported fall prior to admission  Thrombocytopenia likely contributing  5th MT fracture found at [facility]  EPS case pending    Diabetes (HCC)- (present on admission)  Assessment & Plan  Metformin    Moderate intellectual disability- (present on admission)  Assessment & Plan  At baseline.  Resides at group home.    Schizophrenia (HCC)- (present on admission)  Assessment & Plan  Continue current  outpatient psychiatric medications       VTE prophylaxis: Lovenox SQ

## 2020-06-06 PROCEDURE — 770021 HCHG ROOM/CARE - ISO PRIVATE

## 2020-06-06 PROCEDURE — A9270 NON-COVERED ITEM OR SERVICE: HCPCS | Performed by: HOSPITALIST

## 2020-06-06 PROCEDURE — 700102 HCHG RX REV CODE 250 W/ 637 OVERRIDE(OP): Performed by: HOSPITALIST

## 2020-06-06 PROCEDURE — 99231 SBSQ HOSP IP/OBS SF/LOW 25: CPT | Mod: CS | Performed by: HOSPITALIST

## 2020-06-06 PROCEDURE — 700111 HCHG RX REV CODE 636 W/ 250 OVERRIDE (IP): Performed by: HOSPITALIST

## 2020-06-06 PROCEDURE — 700112 HCHG RX REV CODE 229: Performed by: HOSPITALIST

## 2020-06-06 RX ADMIN — DONEPEZIL HYDROCHLORIDE 10 MG: 5 TABLET, FILM COATED ORAL at 17:39

## 2020-06-06 RX ADMIN — QUETIAPINE FUMARATE 300 MG: 200 TABLET ORAL at 20:30

## 2020-06-06 RX ADMIN — DOCUSATE SODIUM 100 MG: 100 CAPSULE, LIQUID FILLED ORAL at 17:39

## 2020-06-06 RX ADMIN — OXYBUTYNIN CHLORIDE 10 MG: 10 TABLET, EXTENDED RELEASE ORAL at 17:40

## 2020-06-06 RX ADMIN — ACETAMINOPHEN 650 MG: 325 TABLET, FILM COATED ORAL at 20:30

## 2020-06-06 RX ADMIN — METFORMIN HYDROCHLORIDE 500 MG: 500 TABLET, EXTENDED RELEASE ORAL at 06:17

## 2020-06-06 RX ADMIN — ZINC SULFATE 220 MG (50 MG) CAPSULE 220 MG: CAPSULE at 06:16

## 2020-06-06 RX ADMIN — Medication 1000 MG: at 06:16

## 2020-06-06 RX ADMIN — Medication 1000 MG: at 18:41

## 2020-06-06 RX ADMIN — Medication 1 PACKET: at 06:17

## 2020-06-06 RX ADMIN — CALCIUM CITRATE 200 MG (950 MG) TABLET 950 MG: at 06:17

## 2020-06-06 RX ADMIN — SERTRALINE HYDROCHLORIDE 50 MG: 50 TABLET ORAL at 06:16

## 2020-06-06 RX ADMIN — DOCUSATE SODIUM 50 MG AND SENNOSIDES 8.6 MG 2 TABLET: 8.6; 5 TABLET, FILM COATED ORAL at 17:40

## 2020-06-06 RX ADMIN — ARIPIPRAZOLE 30 MG: 15 TABLET ORAL at 20:30

## 2020-06-06 RX ADMIN — MELATONIN 2000 UNITS: at 06:16

## 2020-06-06 RX ADMIN — ENOXAPARIN SODIUM 40 MG: 100 INJECTION SUBCUTANEOUS at 06:16

## 2020-06-06 RX ADMIN — DIVALPROEX SODIUM 500 MG: 500 TABLET, DELAYED RELEASE ORAL at 20:30

## 2020-06-06 ASSESSMENT — ENCOUNTER SYMPTOMS
PALPITATIONS: 0
VOMITING: 0
COUGH: 0
MEMORY LOSS: 1
NERVOUS/ANXIOUS: 0

## 2020-06-06 NOTE — PROGRESS NOTES
[facility] Medicine Daily Progress Note    Date of Service  6/6/2020    [facility]    [facility]    Ms Verde has a history of cognitive deficits and schizophrenia.  She was sent to the emergency room for multiple areas of skin bruising, a fall prior to admission was reported although the details are poorly defined.  Evaluation demonstrated the patient had acute renal failure, 1/5 metatarsal fracture, and thrombocytopenia.  Patient tested positive for COVID 19.  She was admitted to the hospital patient had recurrent fevers and follow-up imaging demonstrated worsened infiltrates on her chest x-ray.  Infectious disease was consulted and she was treated with Remdesevir.  The patient's respiratory status and thrombocytopenia have improved.  She continues to test positive for COVID by PCR.  6/2:  4 positive tests for COVID 19 on 5/16,  5/18, 5/25 and 5/30.  Asymptomatic, sleeping on exam, stable vital signs, on home O2 2 LPM NC.  6/3:  Dementia, pleasantly asleep on exam, on RA.  6/2 COVID testing pending.    I certify that the patient requires continued medically necessary treatment of COVID 19 infection and will remain in the hospital for 7 more days.  Discharge plan is pending the acceptance of the group [place] which is currently requiring negative COVID 19 test.  4 tests have remained positive.  6/2 testing pending.  6/4:  Talkative today, asymptomatic.  Pending negative testing. Up to chair bid and incentive spirometry ordered.  Examined right toes, bryce tape on 4th and 5th toes.  No ecchymosis or deformity noted, but remains tender to touch.  6/5:  Talkative, awake, on RA.  Pending negative COVID 19 testing.  6/6:  NAD, remains RA.            Consultants/Specialty  Case management  Infectious disease    Code Status  Full code    Disposition  Came from group home, [facility] requiring negative COVID 19 testing.  +5/16, 5/18, 5/25, 5/30, 6/2.    6/9 ordered.    Review of Systems  Review of Systems    Respiratory: Negative for cough.    Cardiovascular: Negative for chest pain and palpitations.   Gastrointestinal: Negative for vomiting.   Psychiatric/Behavioral: Positive for memory loss. The patient is not nervous/anxious.        Physical Exam  Temp:  [36.1 °C (97 °F)-37.1 °C (98.8 °F)] 36.1 °C (97 °F)  Pulse:  [] 91  Resp:  [16-19] 16  BP: (106-123)/(72-83) 123/83  SpO2:  [92 %-95 %] 93 %    Physical Exam  Constitutional:       General: She is not in acute distress.     Appearance: Normal appearance. She is normal weight.   HENT:      Head: Normocephalic and atraumatic.      Right Ear: External ear normal.      Left Ear: External ear normal.      Nose: Nose normal.      Mouth/Throat:      Mouth: Mucous membranes are moist.      Pharynx: Oropharynx is clear.   Eyes:      Extraocular Movements: Extraocular movements intact.      Conjunctiva/sclera: Conjunctivae normal.      Pupils: Pupils are equal, round, and reactive to light.   Neck:      Musculoskeletal: Normal range of motion and neck supple.   Cardiovascular:      Rate and Rhythm: Normal rate and regular rhythm.      Pulses: Normal pulses.   Pulmonary:      Effort: Pulmonary effort is normal.      Comments: CTA b/l  Abdominal:      General: Abdomen is flat. Bowel sounds are normal.      Palpations: Abdomen is soft.   Musculoskeletal: Normal range of motion.   Skin:     General: Skin is warm and dry.      Capillary Refill: Capillary refill takes less than 2 seconds.      Coloration: Skin is not jaundiced.      Comments: No further ecchymosis.  Westley tape right 4th and 5th toes.   Neurological:      General: No focal deficit present.      Mental Status: She is alert.      Cranial Nerves: No cranial nerve deficit.      Gait: Gait normal.   Psychiatric:         Mood and Affect: Mood normal.         Behavior: Behavior normal.           Fluids    Intake/Output Summary (Last 24 hours) at 6/6/2020 1204  Last data filed at 6/6/2020 0900  Gross per 24 hour    Intake 240 ml   Output 550 ml   Net -310 ml       Laboratory                        Imaging  DX-CHEST-PORTABLE (1 VIEW)   Final Result      1.  Enlarged cardiac silhouette with probable mild vascular congestion.   2.  Bibasilar opacities could be related atelectasis, vascular congestion or pneumonitis.      CT-ABDOMEN-PELVIS W/O   Final Result         1. No evidence of acute inflammatory change in the abdomen or pelvis.  The study is however limited due to nonuse of intravenous contrast.      2. Airspace opacity in the right lower lobe, in keeping with pneumonia.      3. Hepatic steatosis.      4. Moderate amount of stool throughout the colon.           Assessment/Plan  * COVID-19 virus infection- (present on admission)  Assessment & Plan  With associated pneumonia/respiratory failure  Completed course of remdesevir  Continue submental oxygen, wean as tolerated  5/16 positive COVID 19  5/18 positive COVID 19  COVID PCR test 5/25/2020: positive  COVID PCR test 5/30/2020: positive  6/2 positive  Significance of repeated positive results from COVID PCR testing is not known, the patient has recovered clinically from COVID-19 infection and the follow up tests are being sent solely for the purpose of allowing her to return to her facility    Constipation- (present on admission)  Assessment & Plan  Bowel protocol    Thrombocytopenia (HCC)- (present on admission)  Assessment & Plan  Resolved    KYLE (acute kidney injury) (HCC)- (present on admission)  Assessment & Plan  Resolved  Avoid nephrotoxins    Bruising  Assessment & Plan  Likely from trauma, reported fall prior to admission  Thrombocytopenia likely contributing  5th MT fracture found at [facility]  EPS case pending    Diabetes (HCC)- (present on admission)  Assessment & Plan  Metformin    Moderate intellectual disability- (present on admission)  Assessment & Plan  At baseline.  Resides at group home.    Schizophrenia (HCC)- (present on  admission)  Assessment & Plan  Continue current outpatient psychiatric medications       VTE prophylaxis: Lovenox SQ

## 2020-06-07 PROCEDURE — 99231 SBSQ HOSP IP/OBS SF/LOW 25: CPT | Mod: CS | Performed by: HOSPITALIST

## 2020-06-07 PROCEDURE — A9270 NON-COVERED ITEM OR SERVICE: HCPCS | Performed by: HOSPITALIST

## 2020-06-07 PROCEDURE — 770021 HCHG ROOM/CARE - ISO PRIVATE

## 2020-06-07 PROCEDURE — 700112 HCHG RX REV CODE 229: Performed by: HOSPITALIST

## 2020-06-07 PROCEDURE — 700102 HCHG RX REV CODE 250 W/ 637 OVERRIDE(OP): Performed by: HOSPITALIST

## 2020-06-07 PROCEDURE — 700111 HCHG RX REV CODE 636 W/ 250 OVERRIDE (IP): Performed by: HOSPITALIST

## 2020-06-07 RX ADMIN — DOCUSATE SODIUM 50 MG AND SENNOSIDES 8.6 MG 2 TABLET: 8.6; 5 TABLET, FILM COATED ORAL at 04:23

## 2020-06-07 RX ADMIN — Medication 1000 MG: at 18:11

## 2020-06-07 RX ADMIN — ACETAMINOPHEN 650 MG: 325 TABLET, FILM COATED ORAL at 04:23

## 2020-06-07 RX ADMIN — ENOXAPARIN SODIUM 40 MG: 100 INJECTION SUBCUTANEOUS at 04:23

## 2020-06-07 RX ADMIN — DOCUSATE SODIUM 50 MG AND SENNOSIDES 8.6 MG 2 TABLET: 8.6; 5 TABLET, FILM COATED ORAL at 18:10

## 2020-06-07 RX ADMIN — MELATONIN 2000 UNITS: at 04:24

## 2020-06-07 RX ADMIN — Medication 1000 MG: at 04:24

## 2020-06-07 RX ADMIN — OXYBUTYNIN CHLORIDE 10 MG: 10 TABLET, EXTENDED RELEASE ORAL at 18:11

## 2020-06-07 RX ADMIN — DONEPEZIL HYDROCHLORIDE 10 MG: 5 TABLET, FILM COATED ORAL at 18:11

## 2020-06-07 RX ADMIN — METFORMIN HYDROCHLORIDE 500 MG: 500 TABLET, EXTENDED RELEASE ORAL at 04:25

## 2020-06-07 RX ADMIN — ARIPIPRAZOLE 30 MG: 15 TABLET ORAL at 20:50

## 2020-06-07 RX ADMIN — BISACODYL 10 MG: 10 SUPPOSITORY RECTAL at 18:08

## 2020-06-07 RX ADMIN — QUETIAPINE FUMARATE 300 MG: 200 TABLET ORAL at 20:50

## 2020-06-07 RX ADMIN — DIVALPROEX SODIUM 500 MG: 500 TABLET, DELAYED RELEASE ORAL at 20:50

## 2020-06-07 RX ADMIN — DOCUSATE SODIUM 100 MG: 100 CAPSULE, LIQUID FILLED ORAL at 18:11

## 2020-06-07 RX ADMIN — SERTRALINE HYDROCHLORIDE 50 MG: 50 TABLET ORAL at 04:23

## 2020-06-07 RX ADMIN — ONDANSETRON 4 MG: 4 TABLET, ORALLY DISINTEGRATING ORAL at 21:09

## 2020-06-07 RX ADMIN — ZINC SULFATE 220 MG (50 MG) CAPSULE 220 MG: CAPSULE at 04:23

## 2020-06-07 RX ADMIN — CALCIUM CITRATE 200 MG (950 MG) TABLET 950 MG: at 04:26

## 2020-06-07 ASSESSMENT — ENCOUNTER SYMPTOMS
MEMORY LOSS: 1
COUGH: 0
PALPITATIONS: 0
VOMITING: 0
NERVOUS/ANXIOUS: 0

## 2020-06-07 NOTE — CARE PLAN
Problem: Safety  Goal: Will remain free from falls  Outcome: PROGRESSING AS EXPECTED  Intervention: Implement fall precautions  Flowsheets  Taken 6/7/2020 1006  Bed Alarm: Yes - Alarm On  Bedrails: Alternative to Bedrails Used  Chair/Bed Strip Alarm: Yes - Alarm On  Taken 6/7/2020 0900  Environmental Precautions:   Treaded Slipper Socks on Patient   Personal Belongings, Wastebasket, Call Bell etc. in Easy Reach   Transferred to Stronger Side   Report Given to Other Health Care Providers Regarding Fall Risk   Bed in Low Position   Communication Sign for Patients & Families   Mobility Assessed & Appropriate Sign Placed     Problem: Mobility  Goal: Risk for activity intolerance will decrease  Outcome: PROGRESSING AS EXPECTED

## 2020-06-07 NOTE — INFECTION CONTROL
Pt meets COVID recovery as she is > 21 days from positive test with improvement of clinical symptoms. Isolation de-escalation discussed with RN.

## 2020-06-07 NOTE — PROGRESS NOTES
[facility] Medicine Daily Progress Note    Date of Service  6/7/2020    Chief Complaint  [facility] Course    Ms Verde has a history of cognitive deficits and schizophrenia.  She was sent to the emergency room for multiple areas of skin bruising, a fall prior to admission was reported although the details are poorly defined.  Evaluation demonstrated the patient had acute renal failure, 1/5 metatarsal fracture, and thrombocytopenia.  Patient tested positive for COVID 19.  She was admitted to the hospital patient had recurrent fevers and follow-up imaging demonstrated worsened infiltrates on her chest x-ray.  Infectious disease was consulted and she was treated with Remdesevir.  The patient's respiratory status and thrombocytopenia have improved.  She continues to test positive for COVID by PCR.  6/2:  4 positive tests for COVID 19 on 5/16,  5/18, 5/25 and 5/30.  Asymptomatic, sleeping on exam, stable vital signs, on home O2 2 LPM NC.  6/3:  Dementia, pleasantly asleep on exam, on RA.  6/2 COVID testing pending.    I certify that the patient requires continued medically necessary treatment of COVID 19 infection and will remain in the hospital for 7 more days.  Discharge plan is pending the acceptance of the group [place] which is currently requiring negative COVID 19 test.  4 tests have remained positive.  6/2 testing pending.  6/4:  Talkative today, asymptomatic.  Pending negative testing. Up to chair bid and incentive spirometry ordered.  Examined right toes, bryce tape on 4th and 5th toes.  No ecchymosis or deformity noted, but remains tender to touch.  6/5:  Talkative, awake, on RA.  Pending negative COVID 19 testing.  6/6:  NAD, remains RA.   6/7: talkative, awake, on RA, lungs CTA b/l.  Labs in a.m.           Consultants/Specialty  Case management  Infectious disease    Code Status  Full code    Disposition  Came from group home,  requiring negative COVID 19 testing.  +5/16, 5/18, 5/25, 5/30, 6/2.     6/9 ordered.    Review of Systems  Review of Systems   Respiratory: Negative for cough.    Cardiovascular: Negative for chest pain and palpitations.   Gastrointestinal: Negative for vomiting.   Psychiatric/Behavioral: Positive for memory loss. The patient is not nervous/anxious.        Physical Exam  Temp:  [36.1 °C (96.9 °F)-36.4 °C (97.6 °F)] 36.4 °C (97.6 °F)  Pulse:  [] 113  Resp:  [16-18] 16  BP: (110-123)/(62-79) 117/71  SpO2:  [92 %-94 %] 92 %    Physical Exam  Constitutional:       General: She is not in acute distress.     Appearance: Normal appearance. She is normal weight.   HENT:      Head: Normocephalic and atraumatic.      Right Ear: External ear normal.      Left Ear: External ear normal.      Nose: Nose normal.      Mouth/Throat:      Mouth: Mucous membranes are moist.      Pharynx: Oropharynx is clear.   Eyes:      Extraocular Movements: Extraocular movements intact.      Conjunctiva/sclera: Conjunctivae normal.      Pupils: Pupils are equal, round, and reactive to light.   Neck:      Musculoskeletal: Normal range of motion and neck supple.   Cardiovascular:      Rate and Rhythm: Normal rate and regular rhythm.      Pulses: Normal pulses.   Pulmonary:      Effort: Pulmonary effort is normal.      Comments: CTA b/l  Abdominal:      General: Abdomen is flat. Bowel sounds are normal.      Palpations: Abdomen is soft.   Musculoskeletal: Normal range of motion.   Skin:     General: Skin is warm and dry.      Capillary Refill: Capillary refill takes less than 2 seconds.      Coloration: Skin is not jaundiced.      Comments: No further ecchymosis.  Westley tape right 4th and 5th toes.   Neurological:      General: No focal deficit present.      Mental Status: She is alert.      Cranial Nerves: No cranial nerve deficit.      Gait: Gait normal.   Psychiatric:         Mood and Affect: Mood normal.         Behavior: Behavior normal.           Fluids    Intake/Output Summary (Last 24 hours) at 6/7/2020  1135  Last data filed at 6/7/2020 0600  Gross per 24 hour   Intake 450 ml   Output 552 ml   Net -102 ml       Laboratory                        Imaging  DX-CHEST-PORTABLE (1 VIEW)   Final Result      1.  Enlarged cardiac silhouette with probable mild vascular congestion.   2.  Bibasilar opacities could be related atelectasis, vascular congestion or pneumonitis.      CT-ABDOMEN-PELVIS W/O   Final Result         1. No evidence of acute inflammatory change in the abdomen or pelvis.  The study is however limited due to nonuse of intravenous contrast.      2. Airspace opacity in the right lower lobe, in keeping with pneumonia.      3. Hepatic steatosis.      4. Moderate amount of stool throughout the colon.           Assessment/Plan  * COVID-19 virus infection- (present on admission)  Assessment & Plan  With associated pneumonia/respiratory failure  Completed course of remdesevir  Continue submental oxygen, wean as tolerated  5/16 positive COVID 19  5/18 positive COVID 19  COVID PCR test 5/25/2020: positive  COVID PCR test 5/30/2020: positive  6/2 positive  Significance of repeated positive results from COVID PCR testing is not known, the patient has recovered clinically from COVID-19 infection and the follow up tests are being sent solely for the purpose of allowing her to return to her facility    Constipation- (present on admission)  Assessment & Plan  Bowel protocol    Thrombocytopenia (HCC)- (present on admission)  Assessment & Plan  Resolved    KYLE (acute kidney injury) (HCC)- (present on admission)  Assessment & Plan  Resolved  Avoid nephrotoxins    Bruising  Assessment & Plan  Likely from trauma, reported fall prior to admission  Thrombocytopenia likely contributing  5th MT fracture found at [facility]  EPS case pending    Diabetes (HCC)- (present on admission)  Assessment & Plan  Metformin    Moderate intellectual disability- (present on admission)  Assessment & Plan  At baseline.  Resides at [facility]  home.    Schizophrenia (HCC)- (present on admission)  Assessment & Plan  Continue current outpatient psychiatric medications       VTE prophylaxis: Lovenox SQ

## 2020-06-07 NOTE — PROGRESS NOTES
Report received from overnight RN. All cares taken over at this time by this RN. Patient awake and alert, lying comfortably in bed. Bed alarm in place, call light within reach. No c/o pain.

## 2020-06-08 PROBLEM — D64.9 ANEMIA: Status: ACTIVE | Noted: 2020-06-08

## 2020-06-08 LAB
ALBUMIN SERPL BCP-MCNC: 3.2 G/DL (ref 3.2–4.9)
ALBUMIN/GLOB SERPL: 1.1 G/DL
ALP SERPL-CCNC: 108 U/L (ref 30–99)
ALT SERPL-CCNC: 11 U/L (ref 2–50)
ANION GAP SERPL CALC-SCNC: 7 MMOL/L (ref 7–16)
ANISOCYTOSIS BLD QL SMEAR: ABNORMAL
AST SERPL-CCNC: 13 U/L (ref 12–45)
BASOPHILS # BLD AUTO: 0.4 % (ref 0–1.8)
BASOPHILS # BLD: 0.02 K/UL (ref 0–0.12)
BILIRUB SERPL-MCNC: <0.2 MG/DL (ref 0.1–1.5)
BUN SERPL-MCNC: 24 MG/DL (ref 8–22)
BURR CELLS BLD QL SMEAR: NORMAL
CALCIUM SERPL-MCNC: 9.6 MG/DL (ref 8.5–10.5)
CHLORIDE SERPL-SCNC: 108 MMOL/L (ref 96–112)
CO2 SERPL-SCNC: 25 MMOL/L (ref 20–33)
COMMENT 1642: NORMAL
CREAT SERPL-MCNC: 0.69 MG/DL (ref 0.5–1.4)
D DIMER PPP IA.FEU-MCNC: 0.61 UG/ML (FEU) (ref 0–0.5)
EOSINOPHIL # BLD AUTO: 0.06 K/UL (ref 0–0.51)
EOSINOPHIL NFR BLD: 1.2 % (ref 0–6.9)
ERYTHROCYTE [DISTWIDTH] IN BLOOD BY AUTOMATED COUNT: 51.8 FL (ref 35.9–50)
GLOBULIN SER CALC-MCNC: 2.9 G/DL (ref 1.9–3.5)
GLUCOSE SERPL-MCNC: 150 MG/DL (ref 65–99)
HCT VFR BLD AUTO: 38.1 % (ref 37–47)
HGB BLD-MCNC: 11.3 G/DL (ref 12–16)
IMM GRANULOCYTES # BLD AUTO: 0.01 K/UL (ref 0–0.11)
IMM GRANULOCYTES NFR BLD AUTO: 0.2 % (ref 0–0.9)
IRON SATN MFR SERPL: 15 % (ref 15–55)
IRON SERPL-MCNC: 49 UG/DL (ref 40–170)
LYMPHOCYTES # BLD AUTO: 2.04 K/UL (ref 1–4.8)
LYMPHOCYTES NFR BLD: 41.3 % (ref 22–41)
MACROCYTES BLD QL SMEAR: ABNORMAL
MCH RBC QN AUTO: 28.5 PG (ref 27–33)
MCHC RBC AUTO-ENTMCNC: 29.7 G/DL (ref 33.6–35)
MCV RBC AUTO: 96 FL (ref 81.4–97.8)
MICROCYTES BLD QL SMEAR: ABNORMAL
MONOCYTES # BLD AUTO: 0.59 K/UL (ref 0–0.85)
MONOCYTES NFR BLD AUTO: 11.9 % (ref 0–13.4)
MORPHOLOGY BLD-IMP: NORMAL
NEUTROPHILS # BLD AUTO: 2.22 K/UL (ref 2–7.15)
NEUTROPHILS NFR BLD: 45 % (ref 44–72)
NRBC # BLD AUTO: 0 K/UL
NRBC BLD-RTO: 0 /100 WBC
OVALOCYTES BLD QL SMEAR: NORMAL
PLATELET # BLD AUTO: 216 K/UL (ref 164–446)
PLATELET BLD QL SMEAR: NORMAL
PMV BLD AUTO: 8.6 FL (ref 9–12.9)
POIKILOCYTOSIS BLD QL SMEAR: NORMAL
POLYCHROMASIA BLD QL SMEAR: NORMAL
POTASSIUM SERPL-SCNC: 4.3 MMOL/L (ref 3.6–5.5)
PROT SERPL-MCNC: 6.1 G/DL (ref 6–8.2)
RBC # BLD AUTO: 3.97 M/UL (ref 4.2–5.4)
RBC BLD AUTO: PRESENT
SODIUM SERPL-SCNC: 140 MMOL/L (ref 135–145)
STOMATOCYTES BLD QL SMEAR: NORMAL
TIBC SERPL-MCNC: 323 UG/DL (ref 250–450)
UIBC SERPL-MCNC: 274 UG/DL (ref 110–370)
VIT B12 SERPL-MCNC: 815 PG/ML (ref 211–911)
WBC # BLD AUTO: 4.9 K/UL (ref 4.8–10.8)

## 2020-06-08 PROCEDURE — 85025 COMPLETE CBC W/AUTO DIFF WBC: CPT

## 2020-06-08 PROCEDURE — 700102 HCHG RX REV CODE 250 W/ 637 OVERRIDE(OP): Performed by: HOSPITALIST

## 2020-06-08 PROCEDURE — 99232 SBSQ HOSP IP/OBS MODERATE 35: CPT | Mod: CS | Performed by: HOSPITALIST

## 2020-06-08 PROCEDURE — 85379 FIBRIN DEGRADATION QUANT: CPT

## 2020-06-08 PROCEDURE — A9270 NON-COVERED ITEM OR SERVICE: HCPCS | Performed by: HOSPITALIST

## 2020-06-08 PROCEDURE — 36415 COLL VENOUS BLD VENIPUNCTURE: CPT

## 2020-06-08 PROCEDURE — 700111 HCHG RX REV CODE 636 W/ 250 OVERRIDE (IP): Performed by: HOSPITALIST

## 2020-06-08 PROCEDURE — 80053 COMPREHEN METABOLIC PANEL: CPT

## 2020-06-08 PROCEDURE — 83550 IRON BINDING TEST: CPT

## 2020-06-08 PROCEDURE — 770006 HCHG ROOM/CARE - MED/SURG/GYN SEMI*

## 2020-06-08 PROCEDURE — 82607 VITAMIN B-12: CPT

## 2020-06-08 PROCEDURE — 700112 HCHG RX REV CODE 229: Performed by: HOSPITALIST

## 2020-06-08 PROCEDURE — 83540 ASSAY OF IRON: CPT

## 2020-06-08 RX ORDER — ASCORBIC ACID 500 MG
1000 TABLET ORAL DAILY
Status: DISCONTINUED | OUTPATIENT
Start: 2020-06-09 | End: 2020-06-18 | Stop reason: HOSPADM

## 2020-06-08 RX ADMIN — MELATONIN 2000 UNITS: at 05:26

## 2020-06-08 RX ADMIN — Medication 1000 MG: at 05:27

## 2020-06-08 RX ADMIN — DIVALPROEX SODIUM 500 MG: 500 TABLET, DELAYED RELEASE ORAL at 19:25

## 2020-06-08 RX ADMIN — ARIPIPRAZOLE 30 MG: 15 TABLET ORAL at 19:25

## 2020-06-08 RX ADMIN — CALCIUM CITRATE 200 MG (950 MG) TABLET 950 MG: at 05:28

## 2020-06-08 RX ADMIN — METFORMIN HYDROCHLORIDE 500 MG: 500 TABLET, EXTENDED RELEASE ORAL at 05:27

## 2020-06-08 RX ADMIN — DONEPEZIL HYDROCHLORIDE 10 MG: 5 TABLET, FILM COATED ORAL at 17:24

## 2020-06-08 RX ADMIN — SERTRALINE HYDROCHLORIDE 50 MG: 50 TABLET ORAL at 05:28

## 2020-06-08 RX ADMIN — QUETIAPINE FUMARATE 300 MG: 200 TABLET ORAL at 21:29

## 2020-06-08 RX ADMIN — OXYBUTYNIN CHLORIDE 10 MG: 10 TABLET, EXTENDED RELEASE ORAL at 17:24

## 2020-06-08 RX ADMIN — ENOXAPARIN SODIUM 40 MG: 100 INJECTION SUBCUTANEOUS at 05:26

## 2020-06-08 RX ADMIN — ZINC SULFATE 220 MG (50 MG) CAPSULE 220 MG: CAPSULE at 05:27

## 2020-06-08 RX ADMIN — DOCUSATE SODIUM 100 MG: 100 CAPSULE, LIQUID FILLED ORAL at 17:24

## 2020-06-08 RX ADMIN — DOCUSATE SODIUM 100 MG: 100 CAPSULE, LIQUID FILLED ORAL at 05:27

## 2020-06-08 ASSESSMENT — PAIN SCALES - WONG BAKER
WONGBAKER_NUMERICALRESPONSE: DOESN'T HURT AT ALL
WONGBAKER_NUMERICALRESPONSE: DOESN'T HURT AT ALL

## 2020-06-08 ASSESSMENT — ENCOUNTER SYMPTOMS
VOMITING: 0
NERVOUS/ANXIOUS: 0
PALPITATIONS: 0
COUGH: 0
MEMORY LOSS: 1

## 2020-06-08 NOTE — PROGRESS NOTES
Pt arrived to floor from Neuro science. Pt A+Ox1 to self, able to make needs known, PIV to right wrist Patent and SL. Pt unable to state pain, no noted sign of pain or discomfort.   T's and ZACH's WNL,  Educated pt on call light and TV remote and Incentive Spirometer.  Bed alarm on.

## 2020-06-08 NOTE — CARE PLAN
Problem: Safety  Goal: Will remain free from falls  Outcome: PROGRESSING AS EXPECTED  Intervention: Implement fall precautions  Flowsheets  Taken 6/8/2020 1111  Bed Alarm: Yes - Alarm On  Chair/Bed Strip Alarm: Yes - Alarm On  Taken 6/8/2020 1000  Environmental Precautions:   Treaded Slipper Socks on Patient   Personal Belongings, Wastebasket, Call Bell etc. in Easy Reach   Transferred to Stronger Side   Report Given to Other Health Care Providers Regarding Fall Risk   Bed in Low Position   Mobility Assessed & Appropriate Sign Placed   Communication Sign for Patients & Families     Problem: Skin Integrity  Goal: Risk for impaired skin integrity will decrease  Outcome: PROGRESSING AS EXPECTED  Intervention: Assess risk factors for impaired skin integrity and/or pressure ulcers  Note: Mepilex in place, barrier cream utilized. Q2 turn.

## 2020-06-08 NOTE — CARE PLAN
Problem: Safety  Goal: Will remain free from injury  Outcome: PROGRESSING AS EXPECTED  Intervention: Collaborate with Interdisciplinary Team for safe transfer and mobilization techniques  Flowsheets (Taken 6/7/2020 0000 by Jose Bates RBOZENA)  Assistive Devices: Hand held assist  Note: Pt's call light within reach, pt calls for assistance, bed locked and in lowest position, frequent rounded, bed alarm in place, personal items within reach      Problem: Infection  Goal: Will remain free from infection  Outcome: PROGRESSING AS EXPECTED  Intervention: Implement standard precautions and perform hand washing before and after patient contact  Note: Handwashing performed, pt educate on importance of handwashing

## 2020-06-08 NOTE — ASSESSMENT & PLAN NOTE
Ferritin elevated 2/2 COVID 19 infection.  Iron levels normal, likely anemia of chronic disease  Monitor

## 2020-06-08 NOTE — PROGRESS NOTES
[facility] Medicine Daily Progress Note    Date of Service  6/8/2020    Chief Complaint  [facility]    [facility] Course    Ms Verde has a history of cognitive deficits and schizophrenia.  She was sent to the emergency room for multiple areas of skin bruising, a fall prior to admission was reported although the details are poorly defined.  Evaluation demonstrated the patient had acute renal failure, 1/5 metatarsal fracture, and thrombocytopenia.  Patient tested positive for COVID 19.  She was admitted to the hospital patient had recurrent fevers and follow-up imaging demonstrated worsened infiltrates on her chest x-ray.  Infectious disease was consulted and she was treated with Remdesevir.  The patient's respiratory status and thrombocytopenia have improved.  She continues to test positive for COVID by PCR.  6/2:  4 positive tests for COVID 19 on 5/16,  5/18, 5/25 and 5/30.  Asymptomatic, sleeping on exam, stable vital signs, on home O2 2 LPM NC.  6/3:  Dementia, pleasantly asleep on exam, on RA.  6/2 COVID testing pending.    I certify that the patient requires continued medically necessary treatment of COVID 19 infection and will remain in the hospital for 7 more days.  Discharge plan is pending the acceptance of the group home which is currently requiring negative COVID 19 test.  4 tests have remained positive.  6/2 testing pending.  6/4:  Talkative today, asymptomatic.  Pending negative testing. Up to chair bid and incentive spirometry ordered.  Examined right toes, bryce tape on 4th and 5th toes.  No ecchymosis or deformity noted, but remains tender to touch.  6/5:  Talkative, awake, on RA.  Pending negative COVID 19 testing.  6/6:  NAD, remains RA.   6/7: talkative, awake, on RA, lungs CTA b/l.  Labs in a.m.  6/8:  Labs wnl, no symptoms of COVID 19, on RA.  D-dimer remains low.  Checking iron and B12 levels since anemia Hg 11.  Patient has been in hospital x 21 days, now asymptomatic, recovered from COVID 19  infection, however still testing positive for COVID 19. Unable to return to her group home due to + testing.  Infection control has recommended moving off COVID unit.  It is unclear if asymptomatic COVID 19+ patients can spread COVID 19 to others.            Consultants/Specialty  Case management  Infectious disease    Code Status  Full code    Disposition  Came from group home,  requiring negative COVID 19 testing.  +5/16, 5/18, 5/25, 5/30, 6/2.    6/9 ordered.    Review of Systems  Review of Systems   Respiratory: Negative for cough.    Cardiovascular: Negative for chest pain and palpitations.   Gastrointestinal: Negative for vomiting.   Psychiatric/Behavioral: Positive for memory loss. The patient is not nervous/anxious.        Physical Exam  Temp:  [36.1 °C (97 °F)-36.4 °C (97.5 °F)] 36.4 °C (97.5 °F)  Pulse:  [61-89] 70  Resp:  [16-18] 18  BP: (112-129)/(67-71) 112/67  SpO2:  [92 %-94 %] 92 %    Physical Exam  Constitutional:       General: She is not in acute distress.     Appearance: Normal appearance. She is normal weight.   HENT:      Head: Normocephalic and atraumatic.      Right Ear: External ear normal.      Left Ear: External ear normal.      Nose: Nose normal.      Mouth/Throat:      Mouth: Mucous membranes are moist.      Pharynx: Oropharynx is clear.   Eyes:      Extraocular Movements: Extraocular movements intact.      Conjunctiva/sclera: Conjunctivae normal.      Pupils: Pupils are equal, round, and reactive to light.   Neck:      Musculoskeletal: Normal range of motion and neck supple.   Cardiovascular:      Rate and Rhythm: Normal rate and regular rhythm.      Pulses: Normal pulses.   Pulmonary:      Effort: Pulmonary effort is normal.      Comments: CTA b/l  Abdominal:      General: Abdomen is flat. Bowel sounds are normal.      Palpations: Abdomen is soft.   Musculoskeletal: Normal range of motion.   Skin:     General: Skin is warm and dry.      Capillary Refill: Capillary refill takes less  than 2 seconds.      Coloration: Skin is not jaundiced.      Comments: No further ecchymosis.  Westley tape right 4th and 5th toes.   Neurological:      General: No focal deficit present.      Mental Status: She is alert.      Cranial Nerves: No cranial nerve deficit.      Gait: Gait normal.   Psychiatric:         Mood and Affect: Mood normal.         Behavior: Behavior normal.           Fluids    Intake/Output Summary (Last 24 hours) at 6/8/2020 1100  Last data filed at 6/8/2020 1000  Gross per 24 hour   Intake 370 ml   Output 450 ml   Net -80 ml       Laboratory  Recent Labs     06/08/20  0502   WBC 4.9   RBC 3.97*   HEMOGLOBIN 11.3*   HEMATOCRIT 38.1   MCV 96.0   MCH 28.5   MCHC 29.7*   RDW 51.8*   PLATELETCT 216   MPV 8.6*     Recent Labs     06/08/20  0502   SODIUM 140   POTASSIUM 4.3   CHLORIDE 108   CO2 25   GLUCOSE 150*   BUN 24*   CREATININE 0.69   CALCIUM 9.6                   Imaging  DX-CHEST-PORTABLE (1 VIEW)   Final Result      1.  Enlarged cardiac silhouette with probable mild vascular congestion.   2.  Bibasilar opacities could be related atelectasis, vascular congestion or pneumonitis.      CT-ABDOMEN-PELVIS W/O   Final Result         1. No evidence of acute inflammatory change in the abdomen or pelvis.  The study is however limited due to nonuse of intravenous contrast.      2. Airspace opacity in the right lower lobe, in keeping with pneumonia.      3. Hepatic steatosis.      4. Moderate amount of stool throughout the colon.           Assessment/Plan  * COVID-19 virus infection- (present on admission)  Assessment & Plan  With associated pneumonia/respiratory failure  Completed course of remdesevir  Continue submental oxygen, wean as tolerated  5/16 positive COVID 19  5/18 positive COVID 19  COVID PCR test 5/25/2020: positive  COVID PCR test 5/30/2020: positive  6/2 positive  Significance of repeated positive results from COVID PCR testing is not known, the patient has recovered clinically from  COVID-19 infection and the follow up tests are being sent solely for the purpose of allowing her to return to her facility    Constipation- (present on admission)  Assessment & Plan  Bowel protocol    Thrombocytopenia (HCC)- (present on admission)  Assessment & Plan  Resolved    KYLE (acute kidney injury) (HCC)- (present on admission)  Assessment & Plan  Resolved  Avoid nephrotoxins    Anemia  Assessment & Plan  Ferritin elevated 2/2 COVID 19 infection.  Hg 11  Check iron/TIBC and vitamin B12 level.  No bleeding source on exam.    Bruising  Assessment & Plan  Likely from trauma, reported fall prior to admission  Thrombocytopenia likely contributing  5th MT fracture found at [facility] foot  EPS case pending    Diabetes (HCC)- (present on admission)  Assessment & Plan  Metformin    Moderate intellectual disability- (present on admission)  Assessment & Plan  At baseline.  Resides at group home.    Schizophrenia (HCC)- (present on admission)  Assessment & Plan  Continue current outpatient psychiatric medications       VTE prophylaxis: Lovenox SQ

## 2020-06-08 NOTE — PROGRESS NOTES
Report received from overnight RN. All cares taken over at this time by this RN. Patient sleeping soundly. Bed alarm in place. Call light within reach. Patient on RA.

## 2020-06-08 NOTE — DISCHARGE PLANNING
Hospital Care Management Discharge Planning        Anticipated Discharge Disposition:   · Group Home     Action:   · Chart Reviewed, no changes     Barriers to Discharge:   · Negative COVID      Plan:   · DC once COVID negative.   · Continue to provide support services and assistance with discharge planning as needed.

## 2020-06-09 LAB
COVID ORDER STATUS COVID19: NORMAL
GLUCOSE BLD-MCNC: 128 MG/DL (ref 65–99)

## 2020-06-09 PROCEDURE — 99232 SBSQ HOSP IP/OBS MODERATE 35: CPT | Mod: CS | Performed by: HOSPITALIST

## 2020-06-09 PROCEDURE — 770006 HCHG ROOM/CARE - MED/SURG/GYN SEMI*

## 2020-06-09 PROCEDURE — 700112 HCHG RX REV CODE 229

## 2020-06-09 PROCEDURE — 700102 HCHG RX REV CODE 250 W/ 637 OVERRIDE(OP)

## 2020-06-09 PROCEDURE — A9270 NON-COVERED ITEM OR SERVICE: HCPCS | Performed by: HOSPITALIST

## 2020-06-09 PROCEDURE — 700111 HCHG RX REV CODE 636 W/ 250 OVERRIDE (IP): Performed by: HOSPITALIST

## 2020-06-09 PROCEDURE — 700102 HCHG RX REV CODE 250 W/ 637 OVERRIDE(OP): Performed by: HOSPITALIST

## 2020-06-09 PROCEDURE — 82962 GLUCOSE BLOOD TEST: CPT

## 2020-06-09 PROCEDURE — C9803 HOPD COVID-19 SPEC COLLECT: HCPCS | Performed by: HOSPITALIST

## 2020-06-09 PROCEDURE — A9270 NON-COVERED ITEM OR SERVICE: HCPCS

## 2020-06-09 PROCEDURE — 700112 HCHG RX REV CODE 229: Performed by: HOSPITALIST

## 2020-06-09 RX ORDER — DOCUSATE SODIUM 100 MG/1
CAPSULE, LIQUID FILLED ORAL
Status: COMPLETED
Start: 2020-06-09 | End: 2020-06-09

## 2020-06-09 RX ORDER — ACETAMINOPHEN 325 MG/1
TABLET ORAL
Status: COMPLETED
Start: 2020-06-09 | End: 2020-06-09

## 2020-06-09 RX ORDER — SENNOSIDES A AND B 8.6 MG/1
TABLET, FILM COATED ORAL
Status: COMPLETED
Start: 2020-06-09 | End: 2020-06-09

## 2020-06-09 RX ADMIN — ACETAMINOPHEN 650 MG: 325 TABLET, FILM COATED ORAL at 22:04

## 2020-06-09 RX ADMIN — QUETIAPINE FUMARATE 300 MG: 200 TABLET ORAL at 19:22

## 2020-06-09 RX ADMIN — OXYBUTYNIN CHLORIDE 10 MG: 10 TABLET, EXTENDED RELEASE ORAL at 17:27

## 2020-06-09 RX ADMIN — Medication 1000 MG: at 04:42

## 2020-06-09 RX ADMIN — MELATONIN 2000 UNITS: at 04:41

## 2020-06-09 RX ADMIN — SERTRALINE HYDROCHLORIDE 50 MG: 50 TABLET ORAL at 04:41

## 2020-06-09 RX ADMIN — METFORMIN HYDROCHLORIDE 500 MG: 500 TABLET, EXTENDED RELEASE ORAL at 04:41

## 2020-06-09 RX ADMIN — DOCUSATE SODIUM 100 MG: 100 CAPSULE, LIQUID FILLED ORAL at 17:27

## 2020-06-09 RX ADMIN — DOCUSATE SODIUM 50 MG AND SENNOSIDES 8.6 MG 2 TABLET: 8.6; 5 TABLET, FILM COATED ORAL at 17:27

## 2020-06-09 RX ADMIN — DIVALPROEX SODIUM 500 MG: 500 TABLET, DELAYED RELEASE ORAL at 19:22

## 2020-06-09 RX ADMIN — ENOXAPARIN SODIUM 40 MG: 100 INJECTION SUBCUTANEOUS at 04:41

## 2020-06-09 RX ADMIN — Medication 1 PACKET: at 04:43

## 2020-06-09 RX ADMIN — ARIPIPRAZOLE 30 MG: 15 TABLET ORAL at 19:22

## 2020-06-09 RX ADMIN — ZINC SULFATE 220 MG (50 MG) CAPSULE 220 MG: CAPSULE at 04:41

## 2020-06-09 RX ADMIN — DONEPEZIL HYDROCHLORIDE 10 MG: 5 TABLET, FILM COATED ORAL at 17:26

## 2020-06-09 RX ADMIN — DOCUSATE SODIUM 100 MG: 100 CAPSULE, LIQUID FILLED ORAL at 04:41

## 2020-06-09 ASSESSMENT — ENCOUNTER SYMPTOMS
NERVOUS/ANXIOUS: 0
PALPITATIONS: 0
MEMORY LOSS: 1
COUGH: 0
VOMITING: 0

## 2020-06-09 ASSESSMENT — PAIN SCALES - PAIN ASSESSMENT IN ADVANCED DEMENTIA (PAINAD)
NEGVOCALIZATION: OCCASIONAL MOAN/GROAN, LOW SPEECH, NEGATIVE/DISAPPROVING QUALITY
NEGVOCALIZATION: OCCASIONAL MOAN/GROAN, LOW SPEECH, NEGATIVE/DISAPPROVING QUALITY
TOTALSCORE: 5
BODYLANGUAGE: TENSE, DISTRESSED PACING, FIDGETING
TOTALSCORE: 2
BREATHING: OCCASIONAL LABORED BREATHING, SHORT PERIOD OF HYPERVENTILATION
FACIALEXPRESSION: SMILING OR INEXPRESSIVE
CONSOLABILITY: DISTRACTED OR REASSURED BY VOICE/TOUCH
BREATHING: NORMAL
FACIALEXPRESSION: SAD, FRIGHTENED, FROWN
CONSOLABILITY: DISTRACTED OR REASSURED BY VOICE/TOUCH
BODYLANGUAGE: RELAXED

## 2020-06-09 ASSESSMENT — PAIN SCALES - WONG BAKER
WONGBAKER_NUMERICALRESPONSE: DOESN'T HURT AT ALL

## 2020-06-09 NOTE — PROGRESS NOTES
Pt awake. Oriented to self only. Very repetitive.  Asking for mountain dew. Able to drink thins and feed self without any s/sx of aspiration noted. Complaints of generalized soreness but denies pain med needs. Turning q2 and as needed. purewick in place. On waffle mattress. Bed alarm active.

## 2020-06-09 NOTE — PROGRESS NOTES
[facility] Medicine Daily Progress Note    Date of Service  6/9/2020    Chief Complaint  [facility]    [facility] Course    Ms Verde has a history of cognitive deficits and schizophrenia.  She was sent to the emergency room for multiple areas of skin bruising, a fall prior to admission was reported although the details are poorly defined.  Evaluation demonstrated the patient had acute renal failure, 1/5 metatarsal fracture, and thrombocytopenia.  Patient tested positive for COVID 19.  She was admitted to the hospital patient had recurrent fevers and follow-up imaging demonstrated worsened infiltrates on her chest x-ray.  Infectious disease was consulted and she was treated with Remdesevir.  The patient's respiratory status and thrombocytopenia have improved.  She continues to test positive for COVID by PCR.  6/2:  4 positive tests for COVID 19 on 5/16,  5/18, 5/25 and 5/30.  Asymptomatic, sleeping on exam, stable vital signs, on home O2 2 LPM NC.  6/3:  Dementia, pleasantly asleep on exam, on RA.  6/2 COVID testing pending.    I certify that the patient requires continued medically necessary treatment of COVID 19 infection and will remain in the hospital for 7 more days.  Discharge plan is pending the acceptance of the group home which is currently requiring negative COVID 19 test.  4 tests have remained positive.  6/2 testing pending.  6/4:  Talkative today, asymptomatic.  Pending negative testing. Up to chair bid and incentive spirometry ordered.  Examined right toes, bryce tape on 4th and 5th toes.  No ecchymosis or deformity noted, but remains tender to touch.  6/5:  Talkative, awake, on RA.  Pending negative COVID 19 testing.  6/6:  NAD, remains RA.   6/7: talkative, awake, on RA, lungs CTA b/l.  Labs in a.m.  6/8:  Labs wnl, no symptoms of COVID 19, on RA.  D-dimer remains low.  Checking iron and B12 levels since anemia Hg 11.  Patient has been in hospital x 21 days, now asymptomatic, recovered from COVID 19  infection, however still testing positive for COVID 19. Unable to return to her group home due to + testing.  Infection control has recommended moving off COVID unit.  It is unclear if asymptomatic COVID 19+ patients can spread COVID 19 to others.    6/9 the patient is demented and cannot give much history or reliable review of system information, she does not appear to be in respiratory distress, no cough, no fever recorded, laboratory data indicate mild anemia, glycemic control adequate, sufficient iron stores.  Repeat COVID-19 testing pending          Consultants/Specialty  Case management  Infectious disease    Code Status  Full code    Disposition  Came from group home,  requiring negative COVID 19 testing.  +5/16, 5/18, 5/25, 5/30, 6/2.    6/9 ordered.    Review of Systems  Review of Systems   Respiratory: Negative for cough.    Cardiovascular: Negative for chest pain and palpitations.   Gastrointestinal: Negative for vomiting.   Psychiatric/Behavioral: Positive for memory loss. The patient is not nervous/anxious.        Physical Exam  Temp:  [36.1 °C (97 °F)-37.1 °C (98.7 °F)] 36.9 °C (98.5 °F)  Pulse:  [78-94] 94  Resp:  [17-18] 17  BP: (112-126)/(67-81) 117/81  SpO2:  [93 %-95 %] 93 %    Physical Exam  Constitutional:       General: She is not in acute distress.     Appearance: Normal appearance. She is normal weight.   HENT:      Head: Normocephalic and atraumatic.      Right Ear: External ear normal.      Left Ear: External ear normal.      Nose: Nose normal.      Mouth/Throat:      Mouth: Mucous membranes are moist.      Pharynx: Oropharynx is clear.   Eyes:      Extraocular Movements: Extraocular movements intact.      Conjunctiva/sclera: Conjunctivae normal.      Pupils: Pupils are equal, round, and reactive to light.   Neck:      Musculoskeletal: Normal range of motion and neck supple.   Cardiovascular:      Rate and Rhythm: Normal rate and regular rhythm.      Pulses: Normal pulses.   Pulmonary:       Effort: Pulmonary effort is normal.      Comments: CTA b/l  Abdominal:      General: Abdomen is flat. Bowel sounds are normal.      Palpations: Abdomen is soft.   Musculoskeletal: Normal range of motion.   Skin:     General: Skin is warm and dry.      Capillary Refill: Capillary refill takes less than 2 seconds.      Coloration: Skin is not jaundiced.      Comments: No further ecchymosis.  Westley tape right 4th and 5th toes.   Neurological:      General: No focal deficit present.      Mental Status: She is alert.      Cranial Nerves: No cranial nerve deficit.      Gait: Gait normal.   Psychiatric:         Mood and Affect: Mood normal.         Behavior: Behavior normal.           Fluids    Intake/Output Summary (Last 24 hours) at 6/9/2020 0741  Last data filed at 6/9/2020 0400  Gross per 24 hour   Intake 470 ml   Output 200 ml   Net 270 ml       Laboratory  Recent Labs     06/08/20  0502   WBC 4.9   RBC 3.97*   HEMOGLOBIN 11.3*   HEMATOCRIT 38.1   MCV 96.0   MCH 28.5   MCHC 29.7*   RDW 51.8*   PLATELETCT 216   MPV 8.6*     Recent Labs     06/08/20  0502   SODIUM 140   POTASSIUM 4.3   CHLORIDE 108   CO2 25   GLUCOSE 150*   BUN 24*   CREATININE 0.69   CALCIUM 9.6                   Imaging  DX-CHEST-PORTABLE (1 VIEW)   Final Result      1.  Enlarged cardiac silhouette with probable mild vascular congestion.   2.  Bibasilar opacities could be related atelectasis, vascular congestion or pneumonitis.      CT-ABDOMEN-PELVIS W/O   Final Result         1. No evidence of acute inflammatory change in the abdomen or pelvis.  The study is however limited due to nonuse of intravenous contrast.      2. Airspace opacity in the right lower lobe, in keeping with pneumonia.      3. Hepatic steatosis.      4. Moderate amount of stool throughout the colon.           Assessment/Plan  * COVID-19 virus infection- (present on admission)  Assessment & Plan  With associated pneumonia/respiratory failure  Completed course of  remdesevir  Continue submental oxygen, wean as tolerated  5/16 positive COVID 19  5/18 positive COVID 19  COVID PCR test 5/25/2020: positive  COVID PCR test 5/30/2020: positive  6/2 positive  Significance of repeated positive results from COVID PCR testing is not known, the patient has recovered clinically from COVID-19 infection and the follow up tests are being sent solely for the purpose of allowing her to return to her facility    Constipation- (present on admission)  Assessment & Plan  Bowel protocol    Thrombocytopenia (HCC)- (present on admission)  Assessment & Plan  Resolved    KYLE (acute kidney injury) (HCC)- (present on admission)  Assessment & Plan  Resolved  Avoid nephrotoxins    Anemia  Assessment & Plan  Ferritin elevated 2/2 COVID 19 infection.  Hg 11  Check iron/TIBC and vitamin B12 level.  No bleeding source on exam.    Bruising  Assessment & Plan  Likely from trauma, reported fall prior to admission  Thrombocytopenia likely contributing  5th MT fracture found at [facility] foot  EPS case pending    Diabetes (HCC)- (present on admission)  Assessment & Plan  Metformin    Moderate intellectual disability- (present on admission)  Assessment & Plan  At baseline.  Resides at group home.    Schizophrenia (HCC)- (present on admission)  Assessment & Plan  Continue current outpatient psychiatric medications  Plan  Continue with close monitoring in terms of the patient's symptoms  Repeat COVID-19 testing pending  See orders  Medically complex with significant risk associated    VTE prophylaxis: Lovenox SQ    I have performed a physical exam and reviewed and updated ROS and Plan today . In review of yesterday's note , there are no changes except as documented above.        Please note that this dictation was created using voice recognition software. I have made every reasonable attempt to correct obvious errors, but I expect that there are errors of grammar and possibly context that I did not discover  before finalizing the note.

## 2020-06-09 NOTE — CARE PLAN
Problem: Communication  Goal: The ability to communicate needs accurately and effectively will improve  Outcome: PROGRESSING AS EXPECTED     Problem: Safety  Goal: Will remain free from injury  Outcome: PROGRESSING AS EXPECTED  Bed alarm in place, frequent rounding in place.      Problem: Fluid Volume:  Goal: Will maintain balanced intake and output  Outcome: PROGRESSING AS EXPECTED     Problem: Skin Integrity  Goal: Risk for impaired skin integrity will decrease  Outcome: PROGRESSING AS EXPECTED  Reposition frequently, encouraged pt to remain dry from moisture.

## 2020-06-09 NOTE — CARE PLAN
Problem: Safety  Goal: Will remain free from injury  Outcome: PROGRESSING AS EXPECTED  Intervention: Provide assistance with mobility  Note: Pt with generalized weakness. Bed alarm active.     Problem: Respiratory:  Goal: Respiratory status will improve  Outcome: PROGRESSING AS EXPECTED  Intervention: Assess and monitor pulmonary status  Note: Pt on RA. Respirations equal and unlabored. No sob.

## 2020-06-09 NOTE — PROGRESS NOTES
Received shift report from brian RN and assumed care of this pt at 0715. Pt AOx to self only. Pt does not appear in pain or discomfort. Pt is up 2 assist, . Pt os not appropriate with call light when needing assistance. Bed alarm is on. PIV assessed and is patent, CDI, SL. Pt is on RA. Pt Q2 turns, purwick in place.  Discussed POC for day shift,  comfort, and safety. Patient has call light and personal belongings within reach. Safety and fall precautions in place. Reviewed orders, notes, labs, and test results. Hourly rounding in place with RN rounding on odd hours and CNA on even hours.

## 2020-06-10 PROCEDURE — A9270 NON-COVERED ITEM OR SERVICE: HCPCS | Performed by: HOSPITALIST

## 2020-06-10 PROCEDURE — 700112 HCHG RX REV CODE 229: Performed by: HOSPITALIST

## 2020-06-10 PROCEDURE — 770006 HCHG ROOM/CARE - MED/SURG/GYN SEMI*

## 2020-06-10 PROCEDURE — 700102 HCHG RX REV CODE 250 W/ 637 OVERRIDE(OP): Performed by: HOSPITALIST

## 2020-06-10 PROCEDURE — 700102 HCHG RX REV CODE 250 W/ 637 OVERRIDE(OP)

## 2020-06-10 PROCEDURE — 700111 HCHG RX REV CODE 636 W/ 250 OVERRIDE (IP): Performed by: HOSPITALIST

## 2020-06-10 PROCEDURE — 99232 SBSQ HOSP IP/OBS MODERATE 35: CPT | Mod: CS | Performed by: HOSPITALIST

## 2020-06-10 PROCEDURE — A9270 NON-COVERED ITEM OR SERVICE: HCPCS

## 2020-06-10 RX ORDER — AMOXICILLIN 250 MG
CAPSULE ORAL
Status: COMPLETED
Start: 2020-06-10 | End: 2020-06-10

## 2020-06-10 RX ORDER — SENNOSIDES A AND B 8.6 MG/1
TABLET, FILM COATED ORAL
Status: COMPLETED
Start: 2020-06-10 | End: 2020-06-10

## 2020-06-10 RX ADMIN — QUETIAPINE FUMARATE 300 MG: 200 TABLET ORAL at 19:21

## 2020-06-10 RX ADMIN — MELATONIN 2000 UNITS: at 05:38

## 2020-06-10 RX ADMIN — Medication 1000 MG: at 05:39

## 2020-06-10 RX ADMIN — DOCUSATE SODIUM 100 MG: 100 CAPSULE, LIQUID FILLED ORAL at 16:26

## 2020-06-10 RX ADMIN — OXYBUTYNIN CHLORIDE 10 MG: 10 TABLET, EXTENDED RELEASE ORAL at 16:26

## 2020-06-10 RX ADMIN — SERTRALINE HYDROCHLORIDE 50 MG: 50 TABLET ORAL at 05:39

## 2020-06-10 RX ADMIN — DOCUSATE SODIUM 50 MG AND SENNOSIDES 8.6 MG 2 TABLET: 8.6; 5 TABLET, FILM COATED ORAL at 05:40

## 2020-06-10 RX ADMIN — DOCUSATE SODIUM 50 MG AND SENNOSIDES 8.6 MG 2 TABLET: 8.6; 5 TABLET, FILM COATED ORAL at 18:00

## 2020-06-10 RX ADMIN — DIVALPROEX SODIUM 500 MG: 500 TABLET, DELAYED RELEASE ORAL at 19:21

## 2020-06-10 RX ADMIN — Medication 1 PACKET: at 05:40

## 2020-06-10 RX ADMIN — SENNOSIDES 17.2 MG: 8.6 TABLET, FILM COATED ORAL at 16:26

## 2020-06-10 RX ADMIN — ARIPIPRAZOLE 30 MG: 15 TABLET ORAL at 19:21

## 2020-06-10 RX ADMIN — METFORMIN HYDROCHLORIDE 500 MG: 500 TABLET, EXTENDED RELEASE ORAL at 05:39

## 2020-06-10 RX ADMIN — DONEPEZIL HYDROCHLORIDE 10 MG: 5 TABLET, FILM COATED ORAL at 16:26

## 2020-06-10 RX ADMIN — ENOXAPARIN SODIUM 40 MG: 100 INJECTION SUBCUTANEOUS at 05:37

## 2020-06-10 RX ADMIN — ZINC SULFATE 220 MG (50 MG) CAPSULE 220 MG: CAPSULE at 05:39

## 2020-06-10 ASSESSMENT — PAIN SCALES - PAIN ASSESSMENT IN ADVANCED DEMENTIA (PAINAD)
BODYLANGUAGE: RELAXED
TOTALSCORE: 1
FACIALEXPRESSION: SMILING OR INEXPRESSIVE
BREATHING: NORMAL
NEGVOCALIZATION: OCCASIONAL MOAN/GROAN, LOW SPEECH, NEGATIVE/DISAPPROVING QUALITY
FACIALEXPRESSION: SMILING OR INEXPRESSIVE
NEGVOCALIZATION: OCCASIONAL MOAN/GROAN, LOW SPEECH, NEGATIVE/DISAPPROVING QUALITY
BREATHING: NORMAL
FACIALEXPRESSION: SMILING OR INEXPRESSIVE
TOTALSCORE: 1
CONSOLABILITY: NO NEED TO CONSOLE
CONSOLABILITY: NO NEED TO CONSOLE
BREATHING: NORMAL
BREATHING: NORMAL
FACIALEXPRESSION: SMILING OR INEXPRESSIVE
NEGVOCALIZATION: OCCASIONAL MOAN/GROAN, LOW SPEECH, NEGATIVE/DISAPPROVING QUALITY
CONSOLABILITY: NO NEED TO CONSOLE
CONSOLABILITY: NO NEED TO CONSOLE
BODYLANGUAGE: RELAXED
TOTALSCORE: 0
TOTALSCORE: 1

## 2020-06-10 ASSESSMENT — ENCOUNTER SYMPTOMS
MEMORY LOSS: 1
PALPITATIONS: 0
NERVOUS/ANXIOUS: 0
COUGH: 0
VOMITING: 0

## 2020-06-10 NOTE — CARE PLAN
Problem: Safety  Goal: Will remain free from injury  Note: Call light within reach, treaded socks in place, bed in lowest position and locked.  Hourly rounding in progress.       Problem: Skin Integrity  Goal: Risk for impaired skin integrity will decrease  Note: Q2 hour turns in place.

## 2020-06-10 NOTE — PROGRESS NOTES
[facility] Medicine Daily Progress Note    Date of Service  6/10/2020    Chief Complaint  [facility] Course    Ms Verde has a history of cognitive deficits and schizophrenia.  She was sent to the emergency room for multiple areas of skin bruising, a fall prior to admission was reported although the details are poorly defined.  Evaluation demonstrated the patient had acute renal failure, 1/5 metatarsal fracture, and thrombocytopenia.  Patient tested positive for COVID 19.  She was admitted to the hospital patient had recurrent fevers and follow-up imaging demonstrated worsened infiltrates on her chest x-ray.  Infectious disease was consulted and she was treated with Remdesevir.  The patient's respiratory status and thrombocytopenia have improved.  She continues to test positive for COVID by PCR.  6/2:  4 positive tests for COVID 19 on 5/16,  5/18, 5/25 and 5/30.  Asymptomatic, sleeping on exam, stable vital signs, on home O2 2 LPM NC.  6/3:  Dementia, pleasantly asleep on exam, on RA.  6/2 COVID testing pending.    Certified that the patient requires continued medically necessary treatment of COVID 19 infection and will remain in the hospital for 7 more days.  Discharge plan is pending the acceptance of the group home which is currently requiring negative COVID 19 test.  4 tests have remained positive.  6/2 testing pending.  6/4:  Talkative today, asymptomatic.  Pending negative testing. Up to chair bid and incentive spirometry ordered.  Examined right toes, bryce tape on 4th and 5th toes.  No ecchymosis or deformity noted, but remains tender to touch.  6/5:  Talkative, awake, on RA.  Pending negative COVID 19 testing.  6/6:  NAD, remains RA.   6/7: talkative, awake, on RA, lungs CTA b/l.  Labs in a.m.  6/8:  Labs wnl, no symptoms of COVID 19, on RA.  D-dimer remains low.  Checking iron and B12 levels since anemia Hg 11.  Patient has been in hospital x 21 days, now asymptomatic, recovered from COVID 19  infection, however still testing positive for COVID 19. Unable to return to her group home due to + testing.  Infection control has recommended moving off COVID unit.  It is unclear if asymptomatic COVID 19+ patients can spread COVID 19 to others.    6/9 the patient is demented and cannot give much history or reliable review of system information, she does not appear to be in respiratory distress, no cough, no fever recorded, laboratory data indicate mild anemia, glycemic control adequate, sufficient iron stores.  Repeat COVID-19 testing pending  6/10 the patient remains without respiratory complaints, she is ongoing confused, repeat COVID-19 testing is sent and pending          Consultants/Specialty  Case management  Infectious disease    Code Status  Full code    Disposition  Came from group home,  requiring negative COVID 19 testing.  +5/16, 5/18, 5/25, 5/30, 6/2.    6/9 ordered.    Review of Systems  Review of Systems   Respiratory: Negative for cough.    Cardiovascular: Negative for chest pain and palpitations.   Gastrointestinal: Negative for vomiting.   Psychiatric/Behavioral: Positive for memory loss. The patient is not nervous/anxious.        Physical Exam  Temp:  [36.2 °C (97.2 °F)-36.4 °C (97.6 °F)] 36.4 °C (97.6 °F)  Pulse:  [89-99] 89  Resp:  [18] 18  BP: (109-128)/(66-75) 122/66  SpO2:  [92 %-94 %] 92 %    Physical Exam  Constitutional:       General: She is not in acute distress.     Appearance: Normal appearance. She is normal weight.   HENT:      Head: Normocephalic and atraumatic.      Right Ear: External ear normal.      Left Ear: External ear normal.      Nose: Nose normal.      Mouth/Throat:      Mouth: Mucous membranes are moist.      Pharynx: Oropharynx is clear.   Eyes:      Extraocular Movements: Extraocular movements intact.      Conjunctiva/sclera: Conjunctivae normal.      Pupils: Pupils are equal, round, and reactive to light.   Neck:      Musculoskeletal: Normal range of motion and  neck supple.   Cardiovascular:      Rate and Rhythm: Normal rate and regular rhythm.      Pulses: Normal pulses.   Pulmonary:      Effort: Pulmonary effort is normal.      Comments: CTA b/l  Abdominal:      General: Abdomen is flat. Bowel sounds are normal.      Palpations: Abdomen is soft.   Musculoskeletal: Normal range of motion.   Skin:     General: Skin is warm and dry.      Capillary Refill: Capillary refill takes less than 2 seconds.      Coloration: Skin is not jaundiced.      Comments: No further ecchymosis.  Westley tape right 4th and 5th toes.   Neurological:      General: No focal deficit present.      Mental Status: She is alert.      Cranial Nerves: No cranial nerve deficit.      Gait: Gait normal.   Psychiatric:         Mood and Affect: Mood normal.         Behavior: Behavior normal.           Fluids    Intake/Output Summary (Last 24 hours) at 6/10/2020 0732  Last data filed at 6/10/2020 0400  Gross per 24 hour   Intake 760 ml   Output 300 ml   Net 460 ml       Laboratory  Recent Labs     06/08/20  0502   WBC 4.9   RBC 3.97*   HEMOGLOBIN 11.3*   HEMATOCRIT 38.1   MCV 96.0   MCH 28.5   MCHC 29.7*   RDW 51.8*   PLATELETCT 216   MPV 8.6*     Recent Labs     06/08/20  0502   SODIUM 140   POTASSIUM 4.3   CHLORIDE 108   CO2 25   GLUCOSE 150*   BUN 24*   CREATININE 0.69   CALCIUM 9.6                   Imaging  DX-CHEST-PORTABLE (1 VIEW)   Final Result      1.  Enlarged cardiac silhouette with probable mild vascular congestion.   2.  Bibasilar opacities could be related atelectasis, vascular congestion or pneumonitis.      CT-ABDOMEN-PELVIS W/O   Final Result         1. No evidence of acute inflammatory change in the abdomen or pelvis.  The study is however limited due to nonuse of intravenous contrast.      2. Airspace opacity in the right lower lobe, in keeping with pneumonia.      3. Hepatic steatosis.      4. Moderate amount of stool throughout the colon.           Assessment/Plan  * COVID-19 virus  infection- (present on admission)  Assessment & Plan  With associated pneumonia/respiratory failure  Completed course of remdesevir  Continue submental oxygen, wean as tolerated  5/16 positive COVID 19  5/18 positive COVID 19  COVID PCR test 5/25/2020: positive  COVID PCR test 5/30/2020: positive  6/2 positive  Significance of repeated positive results from COVID PCR testing is not known, the patient has recovered clinically from COVID-19 infection and the follow up tests are being sent solely for the purpose of allowing her to return to her facility    Constipation- (present on admission)  Assessment & Plan  Bowel protocol    Thrombocytopenia (HCC)- (present on admission)  Assessment & Plan  Resolved    KYLE (acute kidney injury) (HCC)- (present on admission)  Assessment & Plan  Resolved  Avoid nephrotoxins    Anemia  Assessment & Plan  Ferritin elevated 2/2 COVID 19 infection.  Hg 11  Check iron/TIBC and vitamin B12 level.  No bleeding source on exam.    Bruising  Assessment & Plan  Likely from trauma, reported fall prior to admission  Thrombocytopenia likely contributing  5th MT fracture [facility] at [facility]  EPS case pending    Diabetes (HCC)- (present on admission)  Assessment & Plan  Metformin    Moderate intellectual disability- (present on admission)  Assessment & Plan  At baseline.  Resides at group home.    Schizophrenia (HCC)- (present on admission)  Assessment & Plan  Continue current outpatient psychiatric medications  Plan  Continue with close monitoring in terms of the patient's symptoms  Repeat COVID-19 testing pending  See orders  Medically complex with significant risk associated  Supportive care to continue  VTE prophylaxis: Lovenox SQ    I have performed a physical exam and reviewed and updated ROS and Plan today . In review of yesterday's note , there are no changes except as documented above.        Please note that this dictation was created using voice recognition software. I have  made every reasonable attempt to correct obvious errors, but I expect that there are errors of grammar and possibly context that I did not discover before finalizing the note.

## 2020-06-10 NOTE — PROGRESS NOTES
Pt awake. Oriented to self and aware she is in the hospital. repetitive and screams profanity at times. Hx of Tourette's. Right 4th and 5th toes with tape in place. Mostly calm and resting, watching TV between turning. Eating well without any s/sx of aspiration noted. purewick removed for now as she was open sore noted at the vaginal area. calazime cream applied. Will change pad q2 and prn to prevent more skin breakdown. Bed alarm active.

## 2020-06-10 NOTE — PROGRESS NOTES
Assumed pt care at 0715.  Received report from night RN.  Assessment completed.  Pt is oriented to self.  Pt has no comlaints of pain at this time.  No other s/s of discomfort or distress. Pt is 2 person assist.  Bed in lowest position and locked.  Purewick in place. Call light within reach and staff numbers provided.  Pt needs met at this time.

## 2020-06-10 NOTE — CARE PLAN
Problem: Infection  Goal: Will remain free from infection  Outcome: PROGRESSING AS EXPECTED  Intervention: Assess signs and symptoms of infection  Note: Afebrile. No chills. Vitals WNL.      Problem: Fluid Volume:  Goal: Will maintain balanced intake and output  Outcome: PROGRESSING AS EXPECTED  Note: Pt with good po intake. No n/v.

## 2020-06-11 LAB
ANION GAP SERPL CALC-SCNC: 9 MMOL/L (ref 7–16)
BUN SERPL-MCNC: 23 MG/DL (ref 8–22)
CALCIUM SERPL-MCNC: 9.3 MG/DL (ref 8.5–10.5)
CHLORIDE SERPL-SCNC: 107 MMOL/L (ref 96–112)
CO2 SERPL-SCNC: 23 MMOL/L (ref 20–33)
CREAT SERPL-MCNC: 0.7 MG/DL (ref 0.5–1.4)
ERYTHROCYTE [DISTWIDTH] IN BLOOD BY AUTOMATED COUNT: 50 FL (ref 35.9–50)
GLUCOSE SERPL-MCNC: 148 MG/DL (ref 65–99)
HCT VFR BLD AUTO: 37.3 % (ref 37–47)
HGB BLD-MCNC: 11.4 G/DL (ref 12–16)
MAGNESIUM SERPL-MCNC: 1.7 MG/DL (ref 1.5–2.5)
MCH RBC QN AUTO: 28.4 PG (ref 27–33)
MCHC RBC AUTO-ENTMCNC: 30.6 G/DL (ref 33.6–35)
MCV RBC AUTO: 93 FL (ref 81.4–97.8)
PHOSPHATE SERPL-MCNC: 3.1 MG/DL (ref 2.5–4.5)
PLATELET # BLD AUTO: 207 K/UL (ref 164–446)
PMV BLD AUTO: 8.5 FL (ref 9–12.9)
POTASSIUM SERPL-SCNC: 4 MMOL/L (ref 3.6–5.5)
RBC # BLD AUTO: 4.01 M/UL (ref 4.2–5.4)
SARS-COV-2 RNA RESP QL NAA+PROBE: DETECTED
SODIUM SERPL-SCNC: 139 MMOL/L (ref 135–145)
SPECIMEN SOURCE: ABNORMAL
WBC # BLD AUTO: 6.1 K/UL (ref 4.8–10.8)

## 2020-06-11 PROCEDURE — 99232 SBSQ HOSP IP/OBS MODERATE 35: CPT | Mod: CS | Performed by: HOSPITALIST

## 2020-06-11 PROCEDURE — A9270 NON-COVERED ITEM OR SERVICE: HCPCS | Performed by: HOSPITALIST

## 2020-06-11 PROCEDURE — 700102 HCHG RX REV CODE 250 W/ 637 OVERRIDE(OP): Performed by: HOSPITALIST

## 2020-06-11 PROCEDURE — 700112 HCHG RX REV CODE 229: Performed by: HOSPITALIST

## 2020-06-11 PROCEDURE — 36415 COLL VENOUS BLD VENIPUNCTURE: CPT

## 2020-06-11 PROCEDURE — 83735 ASSAY OF MAGNESIUM: CPT

## 2020-06-11 PROCEDURE — 80048 BASIC METABOLIC PNL TOTAL CA: CPT

## 2020-06-11 PROCEDURE — 84100 ASSAY OF PHOSPHORUS: CPT

## 2020-06-11 PROCEDURE — 770006 HCHG ROOM/CARE - MED/SURG/GYN SEMI*

## 2020-06-11 PROCEDURE — 700111 HCHG RX REV CODE 636 W/ 250 OVERRIDE (IP): Performed by: HOSPITALIST

## 2020-06-11 PROCEDURE — 85027 COMPLETE CBC AUTOMATED: CPT

## 2020-06-11 RX ORDER — AMOXICILLIN 250 MG
CAPSULE ORAL
Status: COMPLETED
Start: 2020-06-11 | End: 2020-06-11

## 2020-06-11 RX ADMIN — DOCUSATE SODIUM 50 MG AND SENNOSIDES 8.6 MG 2 TABLET: 8.6; 5 TABLET, FILM COATED ORAL at 20:07

## 2020-06-11 RX ADMIN — DOCUSATE SODIUM 50 MG AND SENNOSIDES 8.6 MG 2 TABLET: 8.6; 5 TABLET, FILM COATED ORAL at 05:43

## 2020-06-11 RX ADMIN — MELATONIN 2000 UNITS: at 05:42

## 2020-06-11 RX ADMIN — SERTRALINE HYDROCHLORIDE 50 MG: 50 TABLET ORAL at 05:43

## 2020-06-11 RX ADMIN — METFORMIN HYDROCHLORIDE 500 MG: 500 TABLET, EXTENDED RELEASE ORAL at 05:43

## 2020-06-11 RX ADMIN — DOCUSATE SODIUM 100 MG: 100 CAPSULE, LIQUID FILLED ORAL at 16:58

## 2020-06-11 RX ADMIN — ARIPIPRAZOLE 30 MG: 15 TABLET ORAL at 20:07

## 2020-06-11 RX ADMIN — ENOXAPARIN SODIUM 40 MG: 100 INJECTION SUBCUTANEOUS at 05:43

## 2020-06-11 RX ADMIN — DONEPEZIL HYDROCHLORIDE 10 MG: 5 TABLET, FILM COATED ORAL at 16:58

## 2020-06-11 RX ADMIN — OXYBUTYNIN CHLORIDE 10 MG: 10 TABLET, EXTENDED RELEASE ORAL at 16:58

## 2020-06-11 RX ADMIN — Medication 1 PACKET: at 05:44

## 2020-06-11 RX ADMIN — DIVALPROEX SODIUM 500 MG: 500 TABLET, DELAYED RELEASE ORAL at 20:07

## 2020-06-11 RX ADMIN — QUETIAPINE FUMARATE 300 MG: 200 TABLET ORAL at 20:07

## 2020-06-11 RX ADMIN — Medication 1000 MG: at 05:43

## 2020-06-11 RX ADMIN — ZINC SULFATE 220 MG (50 MG) CAPSULE 220 MG: CAPSULE at 05:42

## 2020-06-11 ASSESSMENT — PAIN SCALES - PAIN ASSESSMENT IN ADVANCED DEMENTIA (PAINAD)
NEGVOCALIZATION: OCCASIONAL MOAN/GROAN, LOW SPEECH, NEGATIVE/DISAPPROVING QUALITY
BODYLANGUAGE: RELAXED
NEGVOCALIZATION: OCCASIONAL MOAN/GROAN, LOW SPEECH, NEGATIVE/DISAPPROVING QUALITY
BODYLANGUAGE: RELAXED
BREATHING: NORMAL
BREATHING: NORMAL
NEGVOCALIZATION: OCCASIONAL MOAN/GROAN, LOW SPEECH, NEGATIVE/DISAPPROVING QUALITY
FACIALEXPRESSION: SMILING OR INEXPRESSIVE
BREATHING: NORMAL
NEGVOCALIZATION: OCCASIONAL MOAN/GROAN, LOW SPEECH, NEGATIVE/DISAPPROVING QUALITY
FACIALEXPRESSION: SMILING OR INEXPRESSIVE
BODYLANGUAGE: RELAXED
TOTALSCORE: 1
CONSOLABILITY: NO NEED TO CONSOLE
BREATHING: NORMAL
TOTALSCORE: 1
TOTALSCORE: 1
FACIALEXPRESSION: SMILING OR INEXPRESSIVE
FACIALEXPRESSION: SMILING OR INEXPRESSIVE
TOTALSCORE: 1
CONSOLABILITY: NO NEED TO CONSOLE
BODYLANGUAGE: RELAXED
CONSOLABILITY: NO NEED TO CONSOLE
CONSOLABILITY: NO NEED TO CONSOLE

## 2020-06-11 ASSESSMENT — FIBROSIS 4 INDEX: FIB4 SCORE: 1.04

## 2020-06-11 ASSESSMENT — ENCOUNTER SYMPTOMS
VOMITING: 0
PALPITATIONS: 0
NERVOUS/ANXIOUS: 0
MEMORY LOSS: 1
COUGH: 0

## 2020-06-11 NOTE — PROGRESS NOTES
"Pt awake. Asking to \"go home in [place]\". Have really good appetite. Incontinent urine. Wound to right vaginal/perineal wall. leandra cream applied. Complaints of leg pain. Elevated with pillows. Resting now. On RA. No SOB noted. Vitals WNL.  "

## 2020-06-11 NOTE — CARE PLAN
Problem: Infection  Goal: Will remain free from infection  Outcome: PROGRESSING AS EXPECTED  Intervention: Assess signs and symptoms of infection  Note: Vitals WNL. No chills.      Problem: Fluid Volume:  Goal: Will maintain balanced intake and output  Note: Pt with good po intake. Denies n/v.

## 2020-06-11 NOTE — CARE PLAN
Problem: Safety  Goal: Will remain free from injury  Outcome: PROGRESSING AS EXPECTED     Problem: Safety  Goal: Will remain free from falls  Outcome: PROGRESSING AS EXPECTED     Problem: Infection  Goal: Will remain free from infection  Outcome: PROGRESSING AS EXPECTED     Problem: Venous Thromboembolism (VTW)/Deep Vein Thrombosis (DVT) Prevention:  Goal: Patient will participate in Venous Thrombosis (VTE)/Deep Vein Thrombosis (DVT)Prevention Measures  Outcome: PROGRESSING AS EXPECTED     Problem: Bowel/Gastric:  Goal: Normal bowel function is maintained or improved  Outcome: PROGRESSING AS EXPECTED     Problem: Bowel/Gastric:  Goal: Will not experience complications related to bowel motility  Outcome: PROGRESSING AS EXPECTED

## 2020-06-11 NOTE — PROGRESS NOTES
[facility] Medicine Daily Progress Note    Date of Service  6/11/2020    Chief Complaint  [facility] Course    Ms Verde has a history of cognitive deficits and schizophrenia.  She was sent to the emergency room for multiple areas of skin bruising, a fall prior to admission was reported although the details are poorly defined.  Evaluation demonstrated the patient had acute renal failure, 1/5 metatarsal fracture, and thrombocytopenia.  Patient tested positive for COVID 19.  She was admitted to the hospital patient had recurrent fevers and follow-up imaging demonstrated worsened infiltrates on her chest x-ray.  Infectious disease was consulted and she was treated with Remdesevir.  The patient's respiratory status and thrombocytopenia have improved.  She continues to test positive for COVID by PCR.  6/2:  4 positive tests for COVID 19 on 5/16,  5/18, 5/25 and 5/30.  Asymptomatic, sleeping on exam, stable vital signs, on home O2 2 LPM NC.  6/3:  Dementia, pleasantly asleep on exam, on RA.  6/2 COVID testing pending.    Certified that the patient requires continued medically necessary treatment of COVID 19 infection and will remain in the hospital for 7 more days.  Discharge plan is pending the acceptance of the group home which is currently requiring negative COVID 19 test.  4 tests have remained positive.  6/2 testing pending.  6/4:  Talkative today, asymptomatic.  Pending negative testing. Up to chair bid and incentive spirometry ordered.  Examined right toes, bryce tape on 4th and 5th toes.  No ecchymosis or deformity noted, but remains tender to touch.  6/5:  Talkative, awake, on RA.  Pending negative COVID 19 testing.  6/6:  NAD, remains RA.   6/7: talkative, awake, on RA, lungs CTA b/l.  Labs in a.m.  6/8:  Labs wnl, no symptoms of COVID 19, on RA.  D-dimer remains low.  Checking iron and B12 levels since anemia Hg 11.  Patient has been in hospital x 21 days, now asymptomatic, recovered from COVID 19  infection, however still testing positive for COVID 19. Unable to return to her group home due to + testing.  Infection control has recommended moving off COVID unit.  It is unclear if asymptomatic COVID 19+ patients can spread COVID 19 to others.    6/9 the patient is demented and cannot give much history or reliable review of system information, she does not appear to be in respiratory distress, no cough, no fever recorded, laboratory data indicate mild anemia, glycemic control adequate, sufficient iron stores.  Repeat COVID-19 testing pending  6/10 the patient remains without respiratory complaints, she is ongoing confused, repeat COVID-19 testing is sent and pending  6/11 Erlinda is resting, she is content, no new developments of fever, nausea, respiratory difficulty, unfortunately her repeat COVID-19 RNA PCR remains positive from 6/9          Consultants/Specialty  Case management  Infectious disease    Code Status  Full code    Disposition  Came from group home,  requiring negative COVID 19 testing.  +5/16, 5/18, 5/25, 5/30, 6/2.    6/9 positive    Review of Systems  Review of Systems   Respiratory: Negative for cough.    Cardiovascular: Negative for chest pain and palpitations.   Gastrointestinal: Negative for vomiting.   Psychiatric/Behavioral: Positive for memory loss. The patient is not nervous/anxious.        Physical Exam  Temp:  [36.4 °C (97.5 °F)-37.1 °C (98.8 °F)] 36.4 °C (97.5 °F)  Pulse:  [] 90  Resp:  [16-18] 16  BP: (123-125)/(71-72) 124/71  SpO2:  [92 %-93 %] 93 %    Physical Exam  Constitutional:       General: She is not in acute distress.     Appearance: Normal appearance. She is normal weight.   HENT:      Head: Normocephalic and atraumatic.      Right Ear: External ear normal.      Left Ear: External ear normal.      Nose: Nose normal.      Mouth/Throat:      Mouth: Mucous membranes are moist.      Pharynx: Oropharynx is clear.   Eyes:      Extraocular Movements: Extraocular movements  intact.      Conjunctiva/sclera: Conjunctivae normal.      Pupils: Pupils are equal, round, and reactive to light.   Neck:      Musculoskeletal: Normal range of motion and neck supple.   Cardiovascular:      Rate and Rhythm: Normal rate and regular rhythm.      Pulses: Normal pulses.   Pulmonary:      Effort: Pulmonary effort is normal.      Comments: CTA b/l  Abdominal:      General: Abdomen is flat. Bowel sounds are normal.      Palpations: Abdomen is soft.   Musculoskeletal: Normal range of motion.   Skin:     General: Skin is warm and dry.      Capillary Refill: Capillary refill takes less than 2 seconds.      Coloration: Skin is not jaundiced.      Comments: No further ecchymosis.  Westley tape right 4th and 5th toes.   Neurological:      General: No focal deficit present.      Mental Status: She is alert.      Cranial Nerves: No cranial nerve deficit.      Gait: Gait normal.   Psychiatric:         Mood and Affect: Mood normal.         Behavior: Behavior normal.           Fluids    Intake/Output Summary (Last 24 hours) at 6/11/2020 0726  Last data filed at 6/10/2020 2000  Gross per 24 hour   Intake 1160 ml   Output --   Net 1160 ml       Laboratory  Recent Labs     06/11/20  0646   WBC 6.1   RBC 4.01*   HEMOGLOBIN 11.4*   HEMATOCRIT 37.3   MCV 93.0   MCH 28.4   MCHC 30.6*   RDW 50.0   PLATELETCT 207   MPV 8.5*     Recent Labs     06/11/20  0646   SODIUM 139   POTASSIUM 4.0   CHLORIDE 107   CO2 23   GLUCOSE 148*   BUN 23*   CREATININE 0.70   CALCIUM 9.3                   Imaging  DX-CHEST-PORTABLE (1 VIEW)   Final Result      1.  Enlarged cardiac silhouette with probable mild vascular congestion.   2.  Bibasilar opacities could be related atelectasis, vascular congestion or pneumonitis.      CT-ABDOMEN-PELVIS W/O   Final Result         1. No evidence of acute inflammatory change in the abdomen or pelvis.  The study is however limited due to nonuse of intravenous contrast.      2. Airspace opacity in the right  lower lobe, in keeping with pneumonia.      3. Hepatic steatosis.      4. Moderate amount of stool throughout the colon.           Assessment/Plan  * COVID-19 virus infection- (present on admission)  Assessment & Plan  With associated pneumonia/respiratory failure  Completed course of remdesevir  Continue submental oxygen, wean as tolerated  5/16 positive COVID 19  5/18 positive COVID 19  COVID PCR test 5/25/2020: positive  COVID PCR test 5/30/2020: positive  6/2 positive  Significance of repeated positive results from COVID PCR testing is not known, the patient has recovered clinically from COVID-19 infection and the follow up tests are being sent solely for the purpose of allowing her to return to her facility    Constipation- (present on admission)  Assessment & Plan  Bowel protocol    Thrombocytopenia (HCC)- (present on admission)  Assessment & Plan  Resolved    KYLE (acute kidney injury) (HCC)- (present on admission)  Assessment & Plan  Resolved  Avoid nephrotoxins    Anemia  Assessment & Plan  Ferritin elevated 2/2 COVID 19 infection.  Hg 11  Check iron/TIBC and vitamin B12 level.  No bleeding source on exam.    Bruising  Assessment & Plan  Likely from trauma, reported fall prior to admission  Thrombocytopenia likely contributing  5th MT fracture found at [facility]  EPS case pending    Diabetes (HCC)- (present on admission)  Assessment & Plan  Metformin    Moderate intellectual disability- (present on admission)  Assessment & Plan  At baseline.  Resides at group home.    Schizophrenia (HCC)- (present on admission)  Assessment & Plan  Continue current outpatient psychiatric medications  Plan  Continue with close monitoring in terms of the patient's symptoms  The patient repeatedly tested positive for COVID-19 PCR RNA, the patient is grossly asymptomatic  See orders  Medically complex with significant risk associated  Supportive care to continue  VTE prophylaxis: Lovenox SQ    I have performed a  physical exam and reviewed and updated ROS and Plan today . In review of yesterday's note , there are no changes except as documented above.        Please note that this dictation was created using voice recognition software. I have made every reasonable attempt to correct obvious errors, but I expect that there are errors of grammar and possibly context that I did not discover before finalizing the note.

## 2020-06-11 NOTE — PROGRESS NOTES
Alert/oriented to self only, VSS, afebrile. RA sats >95%, [place] diet, feeds self, consuming >75%. Voiding/incontinent. Repositioned/turned routinely.

## 2020-06-11 NOTE — PROGRESS NOTES
Assumed care @ 0715, bedside report from May RN. Pt resting in bed and in no distress. Bed in low position, call light w/in reach.

## 2020-06-12 PROCEDURE — 700102 HCHG RX REV CODE 250 W/ 637 OVERRIDE(OP): Performed by: HOSPITALIST

## 2020-06-12 PROCEDURE — A9270 NON-COVERED ITEM OR SERVICE: HCPCS | Performed by: HOSPITALIST

## 2020-06-12 PROCEDURE — 99232 SBSQ HOSP IP/OBS MODERATE 35: CPT | Mod: CS | Performed by: HOSPITALIST

## 2020-06-12 PROCEDURE — 700112 HCHG RX REV CODE 229: Performed by: HOSPITALIST

## 2020-06-12 PROCEDURE — 700112 HCHG RX REV CODE 229

## 2020-06-12 PROCEDURE — A9270 NON-COVERED ITEM OR SERVICE: HCPCS

## 2020-06-12 PROCEDURE — 700111 HCHG RX REV CODE 636 W/ 250 OVERRIDE (IP): Performed by: HOSPITALIST

## 2020-06-12 PROCEDURE — 770006 HCHG ROOM/CARE - MED/SURG/GYN SEMI*

## 2020-06-12 RX ORDER — AMOXICILLIN 250 MG
CAPSULE ORAL
Status: COMPLETED
Start: 2020-06-12 | End: 2020-06-12

## 2020-06-12 RX ORDER — DOCUSATE SODIUM 100 MG/1
CAPSULE, LIQUID FILLED ORAL
Status: COMPLETED
Start: 2020-06-12 | End: 2020-06-12

## 2020-06-12 RX ADMIN — SERTRALINE HYDROCHLORIDE 50 MG: 50 TABLET ORAL at 05:31

## 2020-06-12 RX ADMIN — DOCUSATE SODIUM 50 MG AND SENNOSIDES 8.6 MG 2 TABLET: 8.6; 5 TABLET, FILM COATED ORAL at 17:07

## 2020-06-12 RX ADMIN — ZINC SULFATE 220 MG (50 MG) CAPSULE 220 MG: CAPSULE at 05:32

## 2020-06-12 RX ADMIN — DOCUSATE SODIUM 50 MG AND SENNOSIDES 8.6 MG 2 TABLET: 8.6; 5 TABLET, FILM COATED ORAL at 05:32

## 2020-06-12 RX ADMIN — QUETIAPINE FUMARATE 300 MG: 200 TABLET ORAL at 22:17

## 2020-06-12 RX ADMIN — Medication 1000 MG: at 05:31

## 2020-06-12 RX ADMIN — ENOXAPARIN SODIUM 40 MG: 100 INJECTION SUBCUTANEOUS at 05:32

## 2020-06-12 RX ADMIN — DOCUSATE SODIUM 100 MG: 100 CAPSULE, LIQUID FILLED ORAL at 05:32

## 2020-06-12 RX ADMIN — MELATONIN 2000 UNITS: at 05:31

## 2020-06-12 RX ADMIN — DONEPEZIL HYDROCHLORIDE 10 MG: 5 TABLET, FILM COATED ORAL at 17:07

## 2020-06-12 RX ADMIN — DIVALPROEX SODIUM 500 MG: 500 TABLET, DELAYED RELEASE ORAL at 22:17

## 2020-06-12 RX ADMIN — DOCUSATE SODIUM 100 MG: 100 CAPSULE, LIQUID FILLED ORAL at 17:08

## 2020-06-12 RX ADMIN — Medication 1 PACKET: at 06:00

## 2020-06-12 RX ADMIN — OXYBUTYNIN CHLORIDE 10 MG: 10 TABLET, EXTENDED RELEASE ORAL at 17:07

## 2020-06-12 RX ADMIN — ARIPIPRAZOLE 30 MG: 15 TABLET ORAL at 22:17

## 2020-06-12 RX ADMIN — METFORMIN HYDROCHLORIDE 500 MG: 500 TABLET, EXTENDED RELEASE ORAL at 05:31

## 2020-06-12 ASSESSMENT — ENCOUNTER SYMPTOMS
COUGH: 0
MEMORY LOSS: 1
PALPITATIONS: 0
VOMITING: 0
NERVOUS/ANXIOUS: 0

## 2020-06-12 NOTE — PROGRESS NOTES
[facility] Medicine Daily Progress Note    Date of Service  6/12/2020    Chief Complaint  [facility] Course    Ms Verde has a history of cognitive deficits and schizophrenia.  She was sent to the emergency room for multiple areas of skin bruising, a fall prior to admission was reported although the details are poorly defined.  Evaluation demonstrated the patient had acute renal failure, 1/5 metatarsal fracture, and thrombocytopenia.  Patient tested positive for COVID 19.  She was admitted to the hospital patient had recurrent fevers and follow-up imaging demonstrated worsened infiltrates on her chest x-ray.  Infectious disease was consulted and she was treated with Remdesevir.  The patient's respiratory status and thrombocytopenia have improved.  She continues to test positive for COVID by PCR.  6/2:  4 positive tests for COVID 19 on 5/16,  5/18, 5/25 and 5/30.  Asymptomatic, sleeping on exam, stable vital signs, on home O2 2 LPM NC.  6/3:  Dementia, pleasantly asleep on exam, on RA.  6/2 COVID testing pending.    Certified that the patient requires continued medically necessary treatment of COVID 19 infection and will remain in the hospital for 7 more days.  Discharge plan is pending the acceptance of the group home which is currently requiring negative COVID 19 test.  4 tests have remained positive.  6/2 testing pending.  6/4:  Talkative today, asymptomatic.  Pending negative testing. Up to chair bid and incentive spirometry ordered.  Examined right toes, bryce tape on 4th and 5th toes.  No ecchymosis or deformity noted, but remains tender to touch.  6/5:  Talkative, awake, on RA.  Pending negative COVID 19 testing.  6/6:  NAD, remains RA.   6/7: talkative, awake, on RA, lungs CTA b/l.  Labs in a.m.  6/8:  Labs wnl, no symptoms of COVID 19, on RA.  D-dimer remains low.  Checking iron and B12 levels since anemia Hg 11.  Patient has been in hospital x 21 days, now asymptomatic, recovered from COVID 19  infection, however still testing positive for COVID 19. Unable to return to her group home due to + testing.  Infection control has recommended moving off COVID unit.  It is unclear if asymptomatic COVID 19+ patients can spread COVID 19 to others.    6/9 the patient is demented and cannot give much history or reliable review of system information, she does not appear to be in respiratory distress, no cough, no fever recorded, laboratory data indicate mild anemia, glycemic control adequate, sufficient iron stores.  Repeat COVID-19 testing pending  6/10 the patient remains without respiratory complaints, she is ongoing confused, repeat COVID-19 testing is sent and pending  6/11 Erlinda is resting, she is content, no new developments of fever, nausea, respiratory difficulty, unfortunately her repeat COVID-19 RNA PCR remains positive from 6/9 6/12 Erlinda is awake and smiling today, no specific complaints,        Consultants/Specialty  Case management  Infectious disease    Code Status  Full code    Disposition  Came from group home,  requiring negative COVID 19 testing.  +5/16, 5/18, 5/25, 5/30, 6/2.    6/9 positive    Review of Systems  Review of Systems   Respiratory: Negative for cough.    Cardiovascular: Negative for chest pain and palpitations.   Gastrointestinal: Negative for vomiting.   Psychiatric/Behavioral: Positive for memory loss. The patient is not nervous/anxious.        Physical Exam  Temp:  [36.7 °C (98 °F)-37 °C (98.6 °F)] 36.8 °C (98.2 °F)  Pulse:  [79-98] 98  Resp:  [16-18] 16  BP: (110-135)/(64-83) 135/83  SpO2:  [92 %-96 %] 92 %    Physical Exam  Constitutional:       General: She is not in acute distress.     Appearance: Normal appearance. She is normal weight.   HENT:      Head: Normocephalic and atraumatic.      Right Ear: External ear normal.      Left Ear: External ear normal.      Nose: Nose normal.      Mouth/Throat:      Mouth: Mucous membranes are moist.      Pharynx: Oropharynx is clear.    Eyes:      Extraocular Movements: Extraocular movements intact.      Conjunctiva/sclera: Conjunctivae normal.      Pupils: Pupils are equal, round, and reactive to light.   Neck:      Musculoskeletal: Normal range of motion and neck supple.   Cardiovascular:      Rate and Rhythm: Normal rate and regular rhythm.      Pulses: Normal pulses.   Pulmonary:      Effort: Pulmonary effort is normal.      Comments: CTA b/l  Abdominal:      General: Abdomen is flat. Bowel sounds are normal.      Palpations: Abdomen is soft.   Musculoskeletal: Normal range of motion.   Skin:     General: Skin is warm and dry.      Capillary Refill: Capillary refill takes less than 2 seconds.      Coloration: Skin is not jaundiced.      Comments: No further ecchymosis.  Westley tape right 4th and 5th toes.   Neurological:      General: No focal deficit present.      Mental Status: She is alert.      Cranial Nerves: No cranial nerve deficit.      Gait: Gait normal.   Psychiatric:         Mood and Affect: Mood normal.         Behavior: Behavior normal.           Fluids    Intake/Output Summary (Last 24 hours) at 6/12/2020 0736  Last data filed at 6/11/2020 1805  Gross per 24 hour   Intake 840 ml   Output 2 ml   Net 838 ml       Laboratory  Recent Labs     06/11/20  0646   WBC 6.1   RBC 4.01*   HEMOGLOBIN 11.4*   HEMATOCRIT 37.3   MCV 93.0   MCH 28.4   MCHC 30.6*   RDW 50.0   PLATELETCT 207   MPV 8.5*     Recent Labs     06/11/20  0646   SODIUM 139   POTASSIUM 4.0   CHLORIDE 107   CO2 23   GLUCOSE 148*   BUN 23*   CREATININE 0.70   CALCIUM 9.3                   Imaging  DX-CHEST-PORTABLE (1 VIEW)   Final Result      1.  Enlarged cardiac silhouette with probable mild vascular congestion.   2.  Bibasilar opacities could be related atelectasis, vascular congestion or pneumonitis.      CT-ABDOMEN-PELVIS W/O   Final Result         1. No evidence of acute inflammatory change in the abdomen or pelvis.  The study is however limited due to nonuse of  intravenous contrast.      2. Airspace opacity in the right lower lobe, in keeping with pneumonia.      3. Hepatic steatosis.      4. Moderate amount of stool throughout the colon.           Assessment/Plan  * COVID-19 virus infection- (present on admission)  Assessment & Plan  With associated pneumonia/respiratory failure  Completed course of remdesevir  The patient has clinically essentially resolved, is on room air,  Patient continues to test positive for COVID by RNA PCR  Significance of repeated positive results from COVID PCR testing is not known, the patient has recovered clinically from COVID-19 infection and the follow up tests are being sent solely for the purpose of allowing her to return to her facility    Constipation- (present on admission)  Assessment & Plan  Bowel protocol    Thrombocytopenia (HCC)- (present on admission)  Assessment & Plan  Resolved    KYLE (acute kidney injury) (HCC)- (present on admission)  Assessment & Plan  Resolved  Avoid nephrotoxins    Anemia  Assessment & Plan  Ferritin elevated 2/2 COVID 19 infection.  Iron levels normal, likely anemia of chronic disease  Monitor    Bruising  Assessment & Plan  Likely from trauma, reported fall prior to admission  Thrombocytopenia likely contributing  5th MT fracture found at [facility]  EPS case pending    Diabetes (HCC)- (present on admission)  Assessment & Plan  Metformin    Moderate intellectual disability- (present on admission)  Assessment & Plan  At baseline.  Resides at group home.    Schizophrenia (HCC)- (present on admission)  Assessment & Plan  Continue current outpatient psychiatric medications  Plan  Continue with close monitoring in terms of the patient's symptoms  The patient repeatedly tested positive for COVID-19 PCR RNA, the patient is grossly asymptomatic  See orders  Medically complex with significant risk associated  Supportive care to continue  VTE prophylaxis: Lovenox SQ    I have performed a physical exam  and reviewed and updated ROS and Plan today . In review of yesterday's note , there are no changes except as documented above.        Please note that this dictation was created using voice recognition software. I have made every reasonable attempt to correct obvious errors, but I expect that there are errors of grammar and possibly context that I did not discover before finalizing the note.      Current Facility-Administered Medications:   •  ascorbic acid tablet 1,000 mg, 1,000 mg, Oral, DAILY, Candida Bender M.D., 1,000 mg at 06/12/20 0531  •  vitamin D (cholecalciferol) tablet 2,000 Units, 2,000 Units, Oral, DAILY, Candida Bender M.D., 2,000 Units at 06/12/20 0531  •  metFORMIN ER (GLUCOPHAGE XR) tablet 500 mg, 500 mg, Oral, DAILY, Ubaldo Fulton M.D., 500 mg at 06/12/20 0531  •  bisacodyl (DULCOLAX) suppository 10 mg, 10 mg, Rectal, DAILY AT 1800, Ubaldo Fulton M.D., Stopped at 06/08/20 2028  •  senna-docusate (PERICOLACE or SENOKOT S) 8.6-50 MG per tablet 2 Tab, 2 Tab, Oral, BID, Ubaldo Fulton M.D., 2 Tab at 06/12/20 0532  •  polyethylene glycol/lytes (MIRALAX) PACKET 1 Packet, 1 Packet, Oral, QDAY PRN, Ubaldo Fulton M.D.  •  magnesium hydroxide (MILK OF MAGNESIA) suspension 30 mL, 30 mL, Oral, QDAY PRN, Ubaldo Fulton M.D.  •  enoxaparin (LOVENOX) inj 40 mg, 40 mg, Subcutaneous, DAILY, Ubaldo Fulton M.D., 40 mg at 06/12/20 0532  •  zinc sulfate (ZINCATE) capsule 220 mg, 220 mg, Oral, DAILY, Kim Lima M.D., 220 mg at 06/12/20 0532  •  ARIPiprazole (ABILIFY) tablet 30 mg, 30 mg, Oral, QHS, Kim Lima M.D., 30 mg at 06/11/20 2007  •  divalproex (DEPAKOTE) delayed-release tablet 500 mg, 500 mg, Oral, QHS, Kim Lima M.D., 500 mg at 06/11/20 2007  •  docusate sodium (COLACE) capsule 100 mg, 100 mg, Oral, BID, Kim Lima M.D., 100 mg at 06/12/20 0532  •  donepezil (ARICEPT) tablet 10 mg, 10 mg, Oral, Q EVENING, Kim Lima M.D., 10 mg at 06/11/20 1658  •  oxybutynin SR (DITROPAN-XL) tablet 10 mg, 10 mg,  Oral, Q EVENING, Kim Lima M.D., 10 mg at 06/11/20 1658  •  psyllium (METAMUCIL/KONSYL) 1 Packet, 1 Packet, Oral, DAILY, Kim Lima M.D., 1 Packet at 06/12/20 0600  •  enalaprilat (VASOTEC) injection 1.25 mg, 1.25 mg, Intravenous, Q6HRS PRN, Jorden Gutierrez M.D.  •  ondansetron (ZOFRAN ODT) dispertab 4 mg, 4 mg, Oral, Q4HRS PRN, Jorden Gutierrez M.D., 4 mg at 06/07/20 2109  •  ondansetron (ZOFRAN) syringe/vial injection 4 mg, 4 mg, Intravenous, Q4HRS PRN, Jorden Gutierrez M.D.  •  prochlorperazine (COMPAZINE) injection 5-10 mg, 5-10 mg, Intravenous, Q4HRS PRN, Jorden Gutierrez M.D.  •  promethazine (PHENERGAN) suppository 12.5-25 mg, 12.5-25 mg, Rectal, Q4HRS PRN, Jorden Gutierrez M.D.  •  promethazine (PHENERGAN) tablet 12.5-25 mg, 12.5-25 mg, Oral, Q4HRS PRN, Jorden Gutierrez M.D.  •  acetaminophen (TYLENOL) tablet 650 mg, 650 mg, Oral, Q6HRS PRN, Jorden Gutierrez M.D., 650 mg at 06/09/20 2204  •  QUEtiapine (SEROQUEL) tablet 300 mg, 300 mg, Oral, QHS, Jorden Gutierrez M.D., 300 mg at 06/11/20 2007  •  sertraline (ZOLOFT) tablet 50 mg, 50 mg, Oral, DAILY, Jorden Gutierrez M.D., 50 mg at 06/12/20 0526

## 2020-06-12 NOTE — CARE PLAN
Problem: Bowel/Gastric:  Goal: Normal bowel function is maintained or improved  Outcome: PROGRESSING AS EXPECTED   The pt is being given bowel medications to promote regular bowel movements.     Problem: Respiratory:  Goal: Respiratory status will improve  Outcome: PROGRESSING AS EXPECTED   The pt is on room air with normal oxygen saturations.     Problem: Infection  Goal: Will remain free from infection  Outcome: PROGRESSING SLOWER THAN EXPECTED   The pt is still testing positive for COVID-19, will continue to monitor.

## 2020-06-12 NOTE — CARE PLAN
Problem: Skin Integrity  Goal: Risk for impaired skin integrity will decrease   Outcome: PROGRESSING AS EXPECTED    ON A Q 2 HR TURN SCHEDULE WHILE IN BED TODAY.  Pt incont. Mepilex removed. Skin cleaned, barrier cream applied to payam area.    Problem: Knowledge Deficit  Goal: Knowledge of disease process/condition, treatment plan, diagnostic tests, and medications will improve  6/12/2020 1222 by Annemarie Nunez R.N.  Outcome: PROGRESSING SLOWER THAN EXPECTED    Difficult to educate pt concerning her plan of care due to her developmental delays. Emotional support given. Rounds done today with Hospitalist. Discussed pt's care with Hospitalist.

## 2020-06-12 NOTE — PROGRESS NOTES
Received care of pt from NOC RN this am. Pt is confused, but calm. Yells out when repositioned this am. Sitting up in bed at this time, awake and calm.

## 2020-06-13 LAB — COVID ORDER STATUS COVID19: NORMAL

## 2020-06-13 PROCEDURE — 700102 HCHG RX REV CODE 250 W/ 637 OVERRIDE(OP)

## 2020-06-13 PROCEDURE — A9270 NON-COVERED ITEM OR SERVICE: HCPCS | Performed by: HOSPITALIST

## 2020-06-13 PROCEDURE — A9270 NON-COVERED ITEM OR SERVICE: HCPCS

## 2020-06-13 PROCEDURE — 700102 HCHG RX REV CODE 250 W/ 637 OVERRIDE(OP): Performed by: HOSPITALIST

## 2020-06-13 PROCEDURE — 99232 SBSQ HOSP IP/OBS MODERATE 35: CPT | Performed by: HOSPITALIST

## 2020-06-13 PROCEDURE — C9803 HOPD COVID-19 SPEC COLLECT: HCPCS | Performed by: HOSPITALIST

## 2020-06-13 PROCEDURE — 770006 HCHG ROOM/CARE - MED/SURG/GYN SEMI*

## 2020-06-13 PROCEDURE — 700111 HCHG RX REV CODE 636 W/ 250 OVERRIDE (IP): Performed by: HOSPITALIST

## 2020-06-13 PROCEDURE — 700112 HCHG RX REV CODE 229: Performed by: HOSPITALIST

## 2020-06-13 RX ORDER — DOCUSATE SODIUM 100 MG/1
CAPSULE, LIQUID FILLED ORAL
Status: DISPENSED
Start: 2020-06-13 | End: 2020-06-13

## 2020-06-13 RX ORDER — ACETAMINOPHEN 325 MG/1
TABLET ORAL
Status: COMPLETED
Start: 2020-06-13 | End: 2020-06-13

## 2020-06-13 RX ORDER — AMOXICILLIN 250 MG
CAPSULE ORAL
Status: DISPENSED
Start: 2020-06-13 | End: 2020-06-13

## 2020-06-13 RX ORDER — AMOXICILLIN 250 MG
CAPSULE ORAL
Status: COMPLETED
Start: 2020-06-13 | End: 2020-06-13

## 2020-06-13 RX ADMIN — DOCUSATE SODIUM 50 MG AND SENNOSIDES 8.6 MG 2 TABLET: 8.6; 5 TABLET, FILM COATED ORAL at 17:24

## 2020-06-13 RX ADMIN — DOCUSATE SODIUM 100 MG: 100 CAPSULE, LIQUID FILLED ORAL at 17:23

## 2020-06-13 RX ADMIN — ARIPIPRAZOLE 30 MG: 15 TABLET ORAL at 20:23

## 2020-06-13 RX ADMIN — ENOXAPARIN SODIUM 40 MG: 100 INJECTION SUBCUTANEOUS at 05:38

## 2020-06-13 RX ADMIN — METFORMIN HYDROCHLORIDE 500 MG: 500 TABLET, EXTENDED RELEASE ORAL at 05:39

## 2020-06-13 RX ADMIN — ZINC SULFATE 220 MG (50 MG) CAPSULE 220 MG: CAPSULE at 05:39

## 2020-06-13 RX ADMIN — DONEPEZIL HYDROCHLORIDE 10 MG: 5 TABLET, FILM COATED ORAL at 17:23

## 2020-06-13 RX ADMIN — MELATONIN 2000 UNITS: at 05:39

## 2020-06-13 RX ADMIN — DIVALPROEX SODIUM 500 MG: 500 TABLET, DELAYED RELEASE ORAL at 20:23

## 2020-06-13 RX ADMIN — QUETIAPINE FUMARATE 300 MG: 200 TABLET ORAL at 20:23

## 2020-06-13 RX ADMIN — Medication 1 PACKET: at 05:38

## 2020-06-13 RX ADMIN — OXYBUTYNIN CHLORIDE 10 MG: 10 TABLET, EXTENDED RELEASE ORAL at 17:23

## 2020-06-13 RX ADMIN — ACETAMINOPHEN 650 MG: 325 TABLET, FILM COATED ORAL at 00:35

## 2020-06-13 RX ADMIN — SERTRALINE HYDROCHLORIDE 50 MG: 50 TABLET ORAL at 05:39

## 2020-06-13 RX ADMIN — Medication 1000 MG: at 05:39

## 2020-06-13 ASSESSMENT — ENCOUNTER SYMPTOMS
PALPITATIONS: 0
MEMORY LOSS: 1
NERVOUS/ANXIOUS: 0
COUGH: 0
VOMITING: 0

## 2020-06-13 NOTE — PROGRESS NOTES
1950:  Assumed care of patient at 1900.  Report received at bedside.  Patient is oriented only to self.  Denies pain or needs at this time.

## 2020-06-13 NOTE — PROGRESS NOTES
[facility] Medicine Daily Progress Note    Date of Service  6/13/2020    Chief Complaint  [facility] Course    Ms Verde has a history of cognitive deficits and schizophrenia.  She was sent to the emergency room for multiple areas of skin bruising, a fall prior to admission was reported although the details are poorly defined.  Evaluation demonstrated the patient had acute renal failure, 1/5 metatarsal fracture, and thrombocytopenia.  Patient tested positive for COVID 19.  She was admitted to the hospital patient had recurrent fevers and follow-up imaging demonstrated worsened infiltrates on her chest x-ray.  Infectious disease was consulted and she was treated with Remdesevir.  The patient's respiratory status and thrombocytopenia have improved.  She continues to test positive for COVID by PCR.  6/2:  4 positive tests for COVID 19 on 5/16,  5/18, 5/25 and 5/30.  Asymptomatic, sleeping on exam, stable vital signs, on home O2 2 LPM NC.  6/3:  Dementia, pleasantly asleep on exam, on RA.  6/2 COVID testing pending.    Certified that the patient requires continued medically necessary treatment of COVID 19 infection and will remain in the hospital for 7 more days.  Discharge plan is pending the acceptance of the group home which is currently requiring negative COVID 19 test.  4 tests have remained positive.  6/2 testing pending.  6/4:  Talkative today, asymptomatic.  Pending negative testing. Up to chair bid and incentive spirometry ordered.  Examined right toes, bryce tape on 4th and 5th toes.  No ecchymosis or deformity noted, but remains tender to touch.  6/5:  Talkative, awake, on RA.  Pending negative COVID 19 testing.  6/6:  NAD, remains RA.   6/7: talkative, awake, on RA, lungs CTA b/l.  Labs in a.m.  6/8:  Labs wnl, no symptoms of COVID 19, on RA.  D-dimer remains low.  Checking iron and B12 levels since anemia Hg 11.  Patient has been in hospital x 21 days, now asymptomatic, recovered from COVID 19  infection, however still testing positive for COVID 19. Unable to return to her group home due to + testing.  Infection control has recommended moving off COVID unit.  It is unclear if asymptomatic COVID 19+ patients can spread COVID 19 to others.    6/9 the patient is demented and cannot give much history or reliable review of system information, she does not appear to be in respiratory distress, no cough, no fever recorded, laboratory data indicate mild anemia, glycemic control adequate, sufficient iron stores.  Repeat COVID-19 testing pending  6/10 the patient remains without respiratory complaints, she is ongoing confused, repeat COVID-19 testing is sent and pending  6/11 Erlinda is resting, she is content, no new developments of fever, nausea, respiratory difficulty, unfortunately her repeat COVID-19 RNA PCR remains positive from 6/9 6/12 Erlinda is awake and smiling today, no specific complaints,  6/13 the patient is continued confused, answers questions fairly appropriately, denies pain, no dyspnea, no fever, vital signs reviewed and laboratory data as well as medication reviewed for stability.        Consultants/Specialty  Case management  Infectious disease    Code Status  Full code    Disposition  Came from group home,  requiring negative COVID 19 testing.  +5/16, 5/18, 5/25, 5/30, 6/2.    6/9 positive    Review of Systems  Review of Systems   Respiratory: Negative for cough.    Cardiovascular: Negative for chest pain and palpitations.   Gastrointestinal: Negative for vomiting.   Psychiatric/Behavioral: Positive for memory loss. The patient is not nervous/anxious.        Physical Exam  Temp:  [36.4 °C (97.5 °F)-37 °C (98.6 °F)] 36.4 °C (97.5 °F)  Pulse:  [] 103  Resp:  [16-18] 16  BP: (116-132)/(70-84) 116/70  SpO2:  [92 %] 92 %    Physical Exam  Constitutional:       General: She is not in acute distress.     Appearance: Normal appearance. She is normal weight.   HENT:      Head: Normocephalic and  atraumatic.      Right Ear: External ear normal.      Left Ear: External ear normal.      Nose: Nose normal.      Mouth/Throat:      Mouth: Mucous membranes are moist.      Pharynx: Oropharynx is clear.   Eyes:      Extraocular Movements: Extraocular movements intact.      Conjunctiva/sclera: Conjunctivae normal.      Pupils: Pupils are equal, round, and reactive to light.   Neck:      Musculoskeletal: Normal range of motion and neck supple.   Cardiovascular:      Rate and Rhythm: Normal rate and regular rhythm.      Pulses: Normal pulses.   Pulmonary:      Effort: Pulmonary effort is normal.      Comments: CTA b/l  Abdominal:      General: Abdomen is flat. Bowel sounds are normal.      Palpations: Abdomen is soft.   Musculoskeletal: Normal range of motion.   Skin:     General: Skin is warm and dry.      Capillary Refill: Capillary refill takes less than 2 seconds.      Coloration: Skin is not jaundiced.      Comments: No further ecchymosis.  Westley tape right 4th and 5th toes.   Neurological:      General: No focal deficit present.      Mental Status: She is alert.      Cranial Nerves: No cranial nerve deficit.      Gait: Gait normal.   Psychiatric:         Mood and Affect: Mood normal.         Behavior: Behavior normal.           Fluids    Intake/Output Summary (Last 24 hours) at 6/13/2020 0752  Last data filed at 6/13/2020 0100  Gross per 24 hour   Intake 1350 ml   Output --   Net 1350 ml       Laboratory  Recent Labs     06/11/20  0646   WBC 6.1   RBC 4.01*   HEMOGLOBIN 11.4*   HEMATOCRIT 37.3   MCV 93.0   MCH 28.4   MCHC 30.6*   RDW 50.0   PLATELETCT 207   MPV 8.5*     Recent Labs     06/11/20  0646   SODIUM 139   POTASSIUM 4.0   CHLORIDE 107   CO2 23   GLUCOSE 148*   BUN 23*   CREATININE 0.70   CALCIUM 9.3                   Imaging  DX-CHEST-PORTABLE (1 VIEW)   Final Result      1.  Enlarged cardiac silhouette with probable mild vascular congestion.   2.  Bibasilar opacities could be related atelectasis,  vascular congestion or pneumonitis.      CT-ABDOMEN-PELVIS W/O   Final Result         1. No evidence of acute inflammatory change in the abdomen or pelvis.  The study is however limited due to nonuse of intravenous contrast.      2. Airspace opacity in the right lower lobe, in keeping with pneumonia.      3. Hepatic steatosis.      4. Moderate amount of stool throughout the colon.           Assessment/Plan  * COVID-19 virus infection- (present on admission)  Assessment & Plan  With associated pneumonia/respiratory failure  Completed course of remdesevir  The patient has clinically essentially resolved, is on room air,  Patient continues to test positive for COVID by RNA PCR  Significance of repeated positive results from COVID PCR testing is not known, the patient has recovered clinically from COVID-19 infection and the follow up tests are being sent solely for the purpose of allowing her to return to her facility    Constipation- (present on admission)  Assessment & Plan  Bowel protocol    Thrombocytopenia (HCC)- (present on admission)  Assessment & Plan  Resolved    KYLE (acute kidney injury) (HCC)- (present on admission)  Assessment & Plan  Resolved  Avoid nephrotoxins    Anemia  Assessment & Plan  Ferritin elevated 2/2 COVID 19 infection.  Iron levels normal, likely anemia of chronic disease  Monitor    Bruising  Assessment & Plan  Likely from trauma, reported fall prior to admission  Thrombocytopenia likely contributing  5th MT fracture found at [facility]  EPS case pending    Diabetes (HCC)- (present on admission)  Assessment & Plan  Metformin    Moderate intellectual disability- (present on admission)  Assessment & Plan  At baseline.  Resides at group home.    Schizophrenia (HCC)- (present on admission)  Assessment & Plan  Continue current outpatient psychiatric medications  Plan  Continue with close monitoring in terms of the patient's symptoms  The patient repeatedly tested positive for COVID-19  PCR RNA, the patient is grossly asymptomatic  Repeat COVID testing today after 4 days from last positive test  See orders  Medically complex with significant risk associated  Supportive care to continue  VTE prophylaxis: Lovenox SQ    I have performed a physical exam and reviewed and updated ROS and Plan today . In review of yesterday's note , there are no changes except as documented above.        Please note that this dictation was created using voice recognition software. I have made every reasonable attempt to correct obvious errors, but I expect that there are errors of grammar and possibly context that I did not discover before finalizing the note.      Current Facility-Administered Medications:   •  ascorbic acid tablet 1,000 mg, 1,000 mg, Oral, DAILY, Candida Bender M.D., 1,000 mg at 06/13/20 0539  •  vitamin D (cholecalciferol) tablet 2,000 Units, 2,000 Units, Oral, DAILY, Candida Bender M.D., 2,000 Units at 06/13/20 0539  •  metFORMIN ER (GLUCOPHAGE XR) tablet 500 mg, 500 mg, Oral, DAILY, Ubaldo Fulton M.D., 500 mg at 06/13/20 0539  •  bisacodyl (DULCOLAX) suppository 10 mg, 10 mg, Rectal, DAILY AT 1800, Ubaldo Fulton M.D., Stopped at 06/08/20 2028  •  senna-docusate (PERICOLACE or SENOKOT S) 8.6-50 MG per tablet 2 Tab, 2 Tab, Oral, BID, Ubaldo Fulton M.D., Stopped at 06/13/20 0545  •  polyethylene glycol/lytes (MIRALAX) PACKET 1 Packet, 1 Packet, Oral, QDAY PRN, Ubaldo Fulton M.D.  •  magnesium hydroxide (MILK OF MAGNESIA) suspension 30 mL, 30 mL, Oral, QDAY PRN, Ubaldo Fulton M.D.  •  enoxaparin (LOVENOX) inj 40 mg, 40 mg, Subcutaneous, DAILY, Ubaldo Fulton M.D., 40 mg at 06/13/20 0538  •  zinc sulfate (ZINCATE) capsule 220 mg, 220 mg, Oral, DAILY, Kim Lima M.D., 220 mg at 06/13/20 0539  •  ARIPiprazole (ABILIFY) tablet 30 mg, 30 mg, Oral, QHS, Kim Lima M.D., 30 mg at 06/12/20 2217  •  divalproex (DEPAKOTE) delayed-release tablet 500 mg, 500 mg, Oral, QHS, Kim Lima M.D., 500 mg at 06/12/20  2217  •  docusate sodium (COLACE) capsule 100 mg, 100 mg, Oral, BID, Kim Lima M.D., Stopped at 06/13/20 0545  •  donepezil (ARICEPT) tablet 10 mg, 10 mg, Oral, Q EVENING, Kim Lima M.D., 10 mg at 06/12/20 1707  •  oxybutynin SR (DITROPAN-XL) tablet 10 mg, 10 mg, Oral, Q EVENING, Kim Lima M.D., 10 mg at 06/12/20 1707  •  psyllium (METAMUCIL/KONSYL) 1 Packet, 1 Packet, Oral, DAILY, Kim Lima M.D., 1 Packet at 06/13/20 0538  •  enalaprilat (VASOTEC) injection 1.25 mg, 1.25 mg, Intravenous, Q6HRS PRN, Jorden Gutierrez M.D.  •  ondansetron (ZOFRAN ODT) dispertab 4 mg, 4 mg, Oral, Q4HRS PRN, Jorden Gutierrez M.D., 4 mg at 06/07/20 2109  •  ondansetron (ZOFRAN) syringe/vial injection 4 mg, 4 mg, Intravenous, Q4HRS PRN, Jorden Gutierrez M.D.  •  prochlorperazine (COMPAZINE) injection 5-10 mg, 5-10 mg, Intravenous, Q4HRS PRN, Jorden Gutierrez M.D.  •  promethazine (PHENERGAN) suppository 12.5-25 mg, 12.5-25 mg, Rectal, Q4HRS PRN, Jorden Gutierrez M.D.  •  promethazine (PHENERGAN) tablet 12.5-25 mg, 12.5-25 mg, Oral, Q4HRS PRN, Jorden Gutierrez M.D.  •  acetaminophen (TYLENOL) tablet 650 mg, 650 mg, Oral, Q6HRS PRN, Jorden Gutierrez M.D., 650 mg at 06/13/20 0035  •  QUEtiapine (SEROQUEL) tablet 300 mg, 300 mg, Oral, QHS, Jorden Gutierrez M.D., 300 mg at 06/12/20 2213  •  sertraline (ZOLOFT) tablet 50 mg, 50 mg, Oral, DAILY, Jorden Gutierrez M.D., 50 mg at 06/13/20 5626

## 2020-06-13 NOTE — PROGRESS NOTES
Assumed care est time 0715 new staff introduced, pt resting in bed, calm and pleasant demeanor. Fall and safety precautions in place. Pt has no needs at this time, hourly rounds ongoing, call light and belongings within reach.

## 2020-06-13 NOTE — CARE PLAN
Problem: Knowledge Deficit  Goal: Knowledge of disease process/condition, treatment plan, diagnostic tests, and medications will improve  Outcome: PROGRESSING SLOWER THAN EXPECTED     Problem: Safety  Goal: Will remain free from injury  Outcome: PROGRESSING AS EXPECTED  Goal: Will remain free from falls  Outcome: PROGRESSING AS EXPECTED

## 2020-06-14 PROCEDURE — 700102 HCHG RX REV CODE 250 W/ 637 OVERRIDE(OP): Performed by: HOSPITALIST

## 2020-06-14 PROCEDURE — A9270 NON-COVERED ITEM OR SERVICE: HCPCS | Performed by: HOSPITALIST

## 2020-06-14 PROCEDURE — A9270 NON-COVERED ITEM OR SERVICE: HCPCS

## 2020-06-14 PROCEDURE — 700112 HCHG RX REV CODE 229

## 2020-06-14 PROCEDURE — 700111 HCHG RX REV CODE 636 W/ 250 OVERRIDE (IP): Performed by: HOSPITALIST

## 2020-06-14 PROCEDURE — 99232 SBSQ HOSP IP/OBS MODERATE 35: CPT | Mod: CS | Performed by: HOSPITALIST

## 2020-06-14 PROCEDURE — 770006 HCHG ROOM/CARE - MED/SURG/GYN SEMI*

## 2020-06-14 PROCEDURE — 700112 HCHG RX REV CODE 229: Performed by: HOSPITALIST

## 2020-06-14 RX ORDER — ACETAMINOPHEN 500 MG
TABLET ORAL
Status: COMPLETED
Start: 2020-06-14 | End: 2020-06-14

## 2020-06-14 RX ORDER — ACETAMINOPHEN 325 MG/1
TABLET ORAL
Status: COMPLETED
Start: 2020-06-14 | End: 2020-06-14

## 2020-06-14 RX ORDER — DOCUSATE SODIUM 100 MG/1
CAPSULE, LIQUID FILLED ORAL
Status: COMPLETED
Start: 2020-06-14 | End: 2020-06-14

## 2020-06-14 RX ORDER — AMOXICILLIN 250 MG
CAPSULE ORAL
Status: COMPLETED
Start: 2020-06-14 | End: 2020-06-14

## 2020-06-14 RX ADMIN — DIVALPROEX SODIUM 500 MG: 500 TABLET, DELAYED RELEASE ORAL at 20:26

## 2020-06-14 RX ADMIN — ACETAMINOPHEN 650 MG: 325 TABLET, FILM COATED ORAL at 10:34

## 2020-06-14 RX ADMIN — DONEPEZIL HYDROCHLORIDE 10 MG: 5 TABLET, FILM COATED ORAL at 17:33

## 2020-06-14 RX ADMIN — DOCUSATE SODIUM 100 MG: 100 CAPSULE, LIQUID FILLED ORAL at 17:33

## 2020-06-14 RX ADMIN — DOCUSATE SODIUM 100 MG: 100 CAPSULE, LIQUID FILLED ORAL at 05:23

## 2020-06-14 RX ADMIN — Medication 1 PACKET: at 05:24

## 2020-06-14 RX ADMIN — SERTRALINE HYDROCHLORIDE 50 MG: 50 TABLET ORAL at 05:25

## 2020-06-14 RX ADMIN — ENOXAPARIN SODIUM 40 MG: 100 INJECTION SUBCUTANEOUS at 05:23

## 2020-06-14 RX ADMIN — DOCUSATE SODIUM 50 MG AND SENNOSIDES 8.6 MG 2 TABLET: 8.6; 5 TABLET, FILM COATED ORAL at 17:33

## 2020-06-14 RX ADMIN — ACETAMINOPHEN 650 MG: 325 TABLET, FILM COATED ORAL at 17:35

## 2020-06-14 RX ADMIN — ARIPIPRAZOLE 30 MG: 15 TABLET ORAL at 20:26

## 2020-06-14 RX ADMIN — DOCUSATE SODIUM 50 MG AND SENNOSIDES 8.6 MG 2 TABLET: 8.6; 5 TABLET, FILM COATED ORAL at 10:31

## 2020-06-14 RX ADMIN — ZINC SULFATE 220 MG (50 MG) CAPSULE 220 MG: CAPSULE at 05:23

## 2020-06-14 RX ADMIN — QUETIAPINE FUMARATE 300 MG: 200 TABLET ORAL at 20:26

## 2020-06-14 RX ADMIN — MELATONIN 2000 UNITS: at 05:23

## 2020-06-14 RX ADMIN — METFORMIN HYDROCHLORIDE 500 MG: 500 TABLET, EXTENDED RELEASE ORAL at 05:23

## 2020-06-14 RX ADMIN — Medication 1000 MG: at 05:23

## 2020-06-14 RX ADMIN — OXYBUTYNIN CHLORIDE 10 MG: 10 TABLET, EXTENDED RELEASE ORAL at 17:33

## 2020-06-14 ASSESSMENT — ENCOUNTER SYMPTOMS
COUGH: 0
NERVOUS/ANXIOUS: 0
VOMITING: 0
PALPITATIONS: 0
MEMORY LOSS: 1

## 2020-06-14 NOTE — CARE PLAN
Problem: Safety  Goal: Will remain free from falls  Outcome: PROGRESSING AS EXPECTED     Problem: Infection  Goal: Will remain free from infection  Outcome: PROGRESSING AS EXPECTED     Problem: Bowel/Gastric:  Goal: Will not experience complications related to bowel motility  Outcome: PROGRESSING AS EXPECTED

## 2020-06-14 NOTE — PROGRESS NOTES
[facility] Medicine Daily Progress Note    Date of Service  6/14/2020    Chief Complaint  [facility] Course    Ms Verde has a history of cognitive deficits and schizophrenia.  She was sent to the emergency room for multiple areas of skin bruising, a fall prior to admission was reported although the details are poorly defined.  Evaluation demonstrated the patient had acute renal failure, 1/5 metatarsal fracture, and thrombocytopenia.  Patient tested positive for COVID 19.  She was admitted to the hospital patient had recurrent fevers and follow-up imaging demonstrated worsened infiltrates on her chest x-ray.  Infectious disease was consulted and she was treated with Remdesevir.  The patient's respiratory status and thrombocytopenia have improved.  She continues to test positive for COVID by PCR.  6/2:  4 positive tests for COVID 19 on 5/16,  5/18, 5/25 and 5/30.  Asymptomatic, sleeping on exam, stable vital signs, on home O2 2 LPM NC.  6/3:  Dementia, pleasantly asleep on exam, on RA.  6/2 COVID testing pending.    Certified that the patient requires continued medically necessary treatment of COVID 19 infection and will remain in the hospital for 7 more days.  Discharge plan is pending the acceptance of the group home which is currently requiring negative COVID 19 test.  4 tests have remained positive.  6/2 testing pending.  6/4:  Talkative today, asymptomatic.  Pending negative testing. Up to chair bid and incentive spirometry ordered.  Examined right toes, bryce tape on 4th and 5th toes.  No ecchymosis or deformity noted, but remains tender to touch.  6/5:  Talkative, awake, on RA.  Pending negative COVID 19 testing.  6/6:  NAD, remains RA.   6/7: talkative, awake, on RA, lungs CTA b/l.  Labs in a.m.  6/8:  Labs wnl, no symptoms of COVID 19, on RA.  D-dimer remains low.  Checking iron and B12 levels since anemia Hg 11.  Patient has been in hospital x 21 days, now asymptomatic, recovered from COVID 19  infection, however still testing positive for COVID 19. Unable to return to her group home due to + testing.  Infection control has recommended moving off COVID unit.  It is unclear if asymptomatic COVID 19+ patients can spread COVID 19 to others.    6/9 the patient is demented and cannot give much history or reliable review of system information, she does not appear to be in respiratory distress, no cough, no fever recorded, laboratory data indicate mild anemia, glycemic control adequate, sufficient iron stores.  Repeat COVID-19 testing pending  6/10 the patient remains without respiratory complaints, she is ongoing confused, repeat COVID-19 testing is sent and pending  6/11 Erlinda is resting, she is content, no new developments of fever, nausea, respiratory difficulty, unfortunately her repeat COVID-19 RNA PCR remains positive from 6/9 6/12 Erlinda is awake and smiling today, no specific complaints,  6/13 the patient is continued confused, answers questions fairly appropriately, denies pain, no dyspnea, no fever, vital signs reviewed and laboratory data as well as medication reviewed for stability.  6/14 patient is awake and alert, confused, repeat cover test pending, laboratory data and medication regimen reviewed for adequacy        Consultants/Specialty  Case management  Infectious disease    Code Status  Full code    Disposition  Came from group home,  requiring negative COVID 19 testing.  +5/16, 5/18, 5/25, 5/30, 6/2.    6/9 positive    Review of Systems  Review of Systems   Respiratory: Negative for cough.    Cardiovascular: Negative for chest pain and palpitations.   Gastrointestinal: Negative for vomiting.   Psychiatric/Behavioral: Positive for memory loss. The patient is not nervous/anxious.        Physical Exam  Temp:  [36.7 °C (98 °F)-37 °C (98.6 °F)] 36.8 °C (98.3 °F)  Pulse:  [67-94] 67  Resp:  [16-24] 17  BP: (119-146)/(65-92) 122/76  SpO2:  [90 %-98 %] 95 %    Physical Exam  Constitutional:        General: She is not in acute distress.     Appearance: Normal appearance. She is normal weight.   HENT:      Head: Normocephalic and atraumatic.      Right Ear: External ear normal.      Left Ear: External ear normal.      Nose: Nose normal.      Mouth/Throat:      Mouth: Mucous membranes are moist.      Pharynx: Oropharynx is clear.   Eyes:      Extraocular Movements: Extraocular movements intact.      Conjunctiva/sclera: Conjunctivae normal.      Pupils: Pupils are equal, round, and reactive to light.   Neck:      Musculoskeletal: Normal range of motion and neck supple.   Cardiovascular:      Rate and Rhythm: Normal rate and regular rhythm.      Pulses: Normal pulses.   Pulmonary:      Effort: Pulmonary effort is normal.      Comments: CTA b/l  Abdominal:      General: Abdomen is flat. Bowel sounds are normal.      Palpations: Abdomen is soft.   Musculoskeletal: Normal range of motion.   Skin:     General: Skin is warm and dry.      Capillary Refill: Capillary refill takes less than 2 seconds.      Coloration: Skin is not jaundiced.      Comments: No further ecchymosis.  Westley tape right 4th and 5th toes.   Neurological:      General: No focal deficit present.      Mental Status: She is alert.      Cranial Nerves: No cranial nerve deficit.      Gait: Gait normal.   Psychiatric:         Mood and Affect: Mood normal.         Behavior: Behavior normal.           Fluids    Intake/Output Summary (Last 24 hours) at 6/14/2020 0737  Last data filed at 6/14/2020 0400  Gross per 24 hour   Intake 720 ml   Output --   Net 720 ml       Laboratory                        Imaging  DX-CHEST-PORTABLE (1 VIEW)   Final Result      1.  Enlarged cardiac silhouette with probable mild vascular congestion.   2.  Bibasilar opacities could be related atelectasis, vascular congestion or pneumonitis.      CT-ABDOMEN-PELVIS W/O   Final Result         1. No evidence of acute inflammatory change in the abdomen or pelvis.  The study is however  limited due to nonuse of intravenous contrast.      2. Airspace opacity in the right lower lobe, in keeping with pneumonia.      3. Hepatic steatosis.      4. Moderate amount of stool throughout the colon.           Assessment/Plan  * COVID-19 virus infection- (present on admission)  Assessment & Plan  With associated pneumonia/respiratory failure  Completed course of remdesevir  The patient has clinically essentially resolved, is on room air,  Patient continues to test positive for COVID by RNA PCR  Significance of repeated positive results from COVID PCR testing is not known, the patient has recovered clinically from COVID-19 infection and the follow up tests are being sent solely for the purpose of allowing her to return to her facility    Constipation- (present on admission)  Assessment & Plan  Bowel protocol    Thrombocytopenia (HCC)- (present on admission)  Assessment & Plan  Resolved    KYLE (acute kidney injury) (HCC)- (present on admission)  Assessment & Plan  Resolved  Avoid nephrotoxins    Anemia  Assessment & Plan  Ferritin elevated 2/2 COVID 19 infection.  Iron levels normal, likely anemia of chronic disease  Monitor    Bruising  Assessment & Plan  Likely from trauma, reported fall prior to admission  Thrombocytopenia likely contributing  5th MT fracture found at [facility]  EPS case pending    Diabetes (HCC)- (present on admission)  Assessment & Plan  Metformin    Moderate intellectual disability- (present on admission)  Assessment & Plan  At baseline.  Resides at group home.    Schizophrenia (HCC)- (present on admission)  Assessment & Plan  Continue current outpatient psychiatric medications  Plan  Continue with close monitoring in terms of the patient's symptoms  The patient repeatedly tested positive for COVID-19 PCR RNA, the patient is grossly asymptomatic  Repeat COVID testing today after 4 days from last positive test  See orders  Medically complex with significant risk  associated  Supportive care to continue  VTE prophylaxis: Lovenox SQ    I have performed a physical exam and reviewed and updated ROS and Plan today . In review of yesterday's note , there are no changes except as documented above.        Please note that this dictation was created using voice recognition software. I have made every reasonable attempt to correct obvious errors, but I expect that there are errors of grammar and possibly context that I did not discover before finalizing the note.      Current Facility-Administered Medications:   •  ascorbic acid tablet 1,000 mg, 1,000 mg, Oral, DAILY, Candida Bender M.D., 1,000 mg at 06/14/20 0523  •  vitamin D (cholecalciferol) tablet 2,000 Units, 2,000 Units, Oral, DAILY, Candida Bender M.D., 2,000 Units at 06/14/20 0523  •  metFORMIN ER (GLUCOPHAGE XR) tablet 500 mg, 500 mg, Oral, DAILY, Ubaldo Fulton M.D., 500 mg at 06/14/20 0523  •  bisacodyl (DULCOLAX) suppository 10 mg, 10 mg, Rectal, DAILY AT 1800, Ubaldo Fulton M.D., Stopped at 06/08/20 2028  •  senna-docusate (PERICOLACE or SENOKOT S) 8.6-50 MG per tablet 2 Tab, 2 Tab, Oral, BID, Ubaldo Fulton M.D., 2 Tab at 06/13/20 1724  •  polyethylene glycol/lytes (MIRALAX) PACKET 1 Packet, 1 Packet, Oral, QDAY PRN, Ubaldo Fulton M.D.  •  magnesium hydroxide (MILK OF MAGNESIA) suspension 30 mL, 30 mL, Oral, QDAY PRN, Ubaldo Fulton M.D.  •  enoxaparin (LOVENOX) inj 40 mg, 40 mg, Subcutaneous, DAILY, Ubaldo Fulton M.D., 40 mg at 06/14/20 0523  •  zinc sulfate (ZINCATE) capsule 220 mg, 220 mg, Oral, DAILY, Kim Lima M.D., 220 mg at 06/14/20 0523  •  ARIPiprazole (ABILIFY) tablet 30 mg, 30 mg, Oral, QHS, Kim Lima M.D., 30 mg at 06/13/20 2023  •  divalproex (DEPAKOTE) delayed-release tablet 500 mg, 500 mg, Oral, QHS, Kim Lima M.D., 500 mg at 06/13/20 2023  •  docusate sodium (COLACE) capsule 100 mg, 100 mg, Oral, BID, Kim Lima M.D., 100 mg at 06/14/20 0523  •  donepezil (ARICEPT) tablet 10 mg, 10 mg, Oral, Q  EVENING, Kim Lima M.D., 10 mg at 06/13/20 1723  •  oxybutynin SR (DITROPAN-XL) tablet 10 mg, 10 mg, Oral, Q EVENING, Kim Lima M.D., 10 mg at 06/13/20 1723  •  psyllium (METAMUCIL/KONSYL) 1 Packet, 1 Packet, Oral, DAILY, Kim Lima M.D., 1 Packet at 06/14/20 0524  •  enalaprilat (VASOTEC) injection 1.25 mg, 1.25 mg, Intravenous, Q6HRS PRN, Jorden Gutierrez M.D.  •  ondansetron (ZOFRAN ODT) dispertab 4 mg, 4 mg, Oral, Q4HRS PRN, Jorden Gutierrez M.D., 4 mg at 06/07/20 2109  •  ondansetron (ZOFRAN) syringe/vial injection 4 mg, 4 mg, Intravenous, Q4HRS PRN, Jorden Gutierrez M.D.  •  prochlorperazine (COMPAZINE) injection 5-10 mg, 5-10 mg, Intravenous, Q4HRS PRN, Jorden Gutierrez M.D.  •  promethazine (PHENERGAN) suppository 12.5-25 mg, 12.5-25 mg, Rectal, Q4HRS PRN, Jorden Gutierrez M.D.  •  promethazine (PHENERGAN) tablet 12.5-25 mg, 12.5-25 mg, Oral, Q4HRS PRN, Jorden Gutierrez M.D.  •  acetaminophen (TYLENOL) tablet 650 mg, 650 mg, Oral, Q6HRS PRN, Jorden Gutierrez M.D., 650 mg at 06/13/20 0035  •  QUEtiapine (SEROQUEL) tablet 300 mg, 300 mg, Oral, QHS, Jorden Gutierrez M.D., 300 mg at 06/13/20 2023  •  sertraline (ZOLOFT) tablet 50 mg, 50 mg, Oral, DAILY, Jorden Gutierrez M.D., 50 mg at 06/14/20 0525

## 2020-06-14 NOTE — PROGRESS NOTES
1902:  Assumed care of patient at 1900.  Report received from off going nurse.  Patient stable.  Will continue to monitor.

## 2020-06-14 NOTE — PROGRESS NOTES
Assumed care est time 0700 new staff introduced, pt resting in bed, calm and pleasant demeanor. Fall and safety precautions in place. Pt has no needs at this time, hourly rounds ongoing, call light and belongings within reach.

## 2020-06-15 PROCEDURE — 700112 HCHG RX REV CODE 229

## 2020-06-15 PROCEDURE — A9270 NON-COVERED ITEM OR SERVICE: HCPCS | Performed by: HOSPITALIST

## 2020-06-15 PROCEDURE — 700102 HCHG RX REV CODE 250 W/ 637 OVERRIDE(OP): Performed by: HOSPITALIST

## 2020-06-15 PROCEDURE — A9270 NON-COVERED ITEM OR SERVICE: HCPCS

## 2020-06-15 PROCEDURE — 99231 SBSQ HOSP IP/OBS SF/LOW 25: CPT | Mod: CS | Performed by: HOSPITALIST

## 2020-06-15 PROCEDURE — 770006 HCHG ROOM/CARE - MED/SURG/GYN SEMI*

## 2020-06-15 PROCEDURE — 700111 HCHG RX REV CODE 636 W/ 250 OVERRIDE (IP)

## 2020-06-15 PROCEDURE — 700111 HCHG RX REV CODE 636 W/ 250 OVERRIDE (IP): Performed by: HOSPITALIST

## 2020-06-15 PROCEDURE — 700102 HCHG RX REV CODE 250 W/ 637 OVERRIDE(OP)

## 2020-06-15 PROCEDURE — 700112 HCHG RX REV CODE 229: Performed by: HOSPITALIST

## 2020-06-15 RX ORDER — LORAZEPAM 2 MG/ML
INJECTION INTRAMUSCULAR
Status: COMPLETED
Start: 2020-06-15 | End: 2020-06-15

## 2020-06-15 RX ORDER — LORAZEPAM 2 MG/ML
1 INJECTION INTRAMUSCULAR ONCE
Status: COMPLETED | OUTPATIENT
Start: 2020-06-15 | End: 2020-06-15

## 2020-06-15 RX ORDER — DOCUSATE SODIUM 100 MG/1
CAPSULE, LIQUID FILLED ORAL
Status: COMPLETED
Start: 2020-06-15 | End: 2020-06-15

## 2020-06-15 RX ORDER — ACETAMINOPHEN 325 MG/1
TABLET ORAL
Status: COMPLETED
Start: 2020-06-15 | End: 2020-06-15

## 2020-06-15 RX ORDER — AMOXICILLIN 250 MG
CAPSULE ORAL
Status: COMPLETED
Start: 2020-06-15 | End: 2020-06-15

## 2020-06-15 RX ADMIN — LORAZEPAM 1 MG: 2 INJECTION INTRAMUSCULAR at 21:39

## 2020-06-15 RX ADMIN — ARIPIPRAZOLE 30 MG: 15 TABLET ORAL at 20:13

## 2020-06-15 RX ADMIN — DOCUSATE SODIUM 100 MG: 100 CAPSULE, LIQUID FILLED ORAL at 17:15

## 2020-06-15 RX ADMIN — DOCUSATE SODIUM 100 MG: 100 CAPSULE, LIQUID FILLED ORAL at 05:26

## 2020-06-15 RX ADMIN — METFORMIN HYDROCHLORIDE 500 MG: 500 TABLET, EXTENDED RELEASE ORAL at 05:27

## 2020-06-15 RX ADMIN — Medication 1 PACKET: at 05:26

## 2020-06-15 RX ADMIN — DIVALPROEX SODIUM 500 MG: 500 TABLET, DELAYED RELEASE ORAL at 20:13

## 2020-06-15 RX ADMIN — ENOXAPARIN SODIUM 40 MG: 100 INJECTION SUBCUTANEOUS at 05:27

## 2020-06-15 RX ADMIN — DOCUSATE SODIUM 50 MG AND SENNOSIDES 8.6 MG 2 TABLET: 8.6; 5 TABLET, FILM COATED ORAL at 17:15

## 2020-06-15 RX ADMIN — DONEPEZIL HYDROCHLORIDE 10 MG: 5 TABLET, FILM COATED ORAL at 17:14

## 2020-06-15 RX ADMIN — MELATONIN 2000 UNITS: at 05:27

## 2020-06-15 RX ADMIN — OXYBUTYNIN CHLORIDE 10 MG: 10 TABLET, EXTENDED RELEASE ORAL at 17:15

## 2020-06-15 RX ADMIN — Medication 1000 MG: at 05:27

## 2020-06-15 RX ADMIN — ACETAMINOPHEN 650 MG: 325 TABLET, FILM COATED ORAL at 17:15

## 2020-06-15 RX ADMIN — ACETAMINOPHEN 650 MG: 325 TABLET, FILM COATED ORAL at 05:26

## 2020-06-15 RX ADMIN — QUETIAPINE FUMARATE 300 MG: 200 TABLET ORAL at 20:13

## 2020-06-15 RX ADMIN — DOCUSATE SODIUM 50 MG AND SENNOSIDES 8.6 MG 2 TABLET: 8.6; 5 TABLET, FILM COATED ORAL at 05:27

## 2020-06-15 RX ADMIN — SERTRALINE HYDROCHLORIDE 50 MG: 50 TABLET ORAL at 05:27

## 2020-06-15 RX ADMIN — BISACODYL 10 MG: 10 SUPPOSITORY RECTAL at 17:10

## 2020-06-15 RX ADMIN — LORAZEPAM 1 MG: 2 INJECTION INTRAMUSCULAR; INTRAVENOUS at 21:39

## 2020-06-15 RX ADMIN — ZINC SULFATE 220 MG (50 MG) CAPSULE 220 MG: CAPSULE at 05:27

## 2020-06-15 ASSESSMENT — ENCOUNTER SYMPTOMS
NERVOUS/ANXIOUS: 0
PALPITATIONS: 0
VOMITING: 0
COUGH: 0
MEMORY LOSS: 1

## 2020-06-15 ASSESSMENT — PAIN SCALES - PAIN ASSESSMENT IN ADVANCED DEMENTIA (PAINAD)
BODYLANGUAGE: RELAXED
TOTALSCORE: 0
CONSOLABILITY: NO NEED TO CONSOLE
FACIALEXPRESSION: SMILING OR INEXPRESSIVE
FACIALEXPRESSION: SMILING OR INEXPRESSIVE
CONSOLABILITY: NO NEED TO CONSOLE
BREATHING: NORMAL
BODYLANGUAGE: RELAXED
TOTALSCORE: 0
BREATHING: NORMAL

## 2020-06-15 NOTE — PROGRESS NOTES
2007:  Assumed care of patient at 1900.  Report received from day shift nurse.  No acute changes over the shift reported.  Patient is resting comfortably, denies needs at this time.

## 2020-06-15 NOTE — PROGRESS NOTES
Assumed care of patient this morning. Patient is A&O to self only, denies pain this morning. Pt is visible from nurses station. Q2h turns in place. Bed is locked and in the lowest position. Hourly rounding in place.

## 2020-06-15 NOTE — CARE PLAN
Problem: Safety  Goal: Will remain free from falls  Outcome: PROGRESSING AS EXPECTED  Intervention: Assess risk factors for falls  Note: Patient is visible from the nurses station. Patient educated regarding calling for assistance. Bed is locked and in the lowest position. Built in bed alarm in use.      Problem: Discharge Barriers/Planning  Goal: Patient's continuum of care needs will be met  Outcome: PROGRESSING AS EXPECTED  Intervention: Assess potential discharge barriers on admission and throughout hospital stay  Note: Patient currently pending covid test results.

## 2020-06-15 NOTE — DISCHARGE PLANNING
Anticipated Discharge Disposition:   [facility]    Action:    6- Covid 19 pending    Barriers to Discharge:    Covid 19 + 6-9-2020    Plan:    Review Covid 19 test result.

## 2020-06-15 NOTE — CARE PLAN
Problem: Infection  Goal: Will remain free from infection  Outcome: PROGRESSING AS EXPECTED     Problem: Bowel/Gastric:  Goal: Normal bowel function is maintained or improved  Outcome: PROGRESSING SLOWER THAN EXPECTED  Goal: Will not experience complications related to bowel motility  Outcome: PROGRESSING SLOWER THAN EXPECTED

## 2020-06-15 NOTE — PROGRESS NOTES
[facility] Medicine Daily Progress Note    Date of Service  6/15/2020    Chief Complaint  [facility] Course    Ms Verde has a history of cognitive deficits and schizophrenia.  She was sent to the emergency room for multiple areas of skin bruising, a fall prior to admission was reported although the details are poorly defined.  Evaluation demonstrated the patient had acute renal failure, 1/5 metatarsal fracture, and thrombocytopenia.  Patient tested positive for COVID 19.  She was admitted to the hospital patient had recurrent fevers and follow-up imaging demonstrated worsened infiltrates on her chest x-ray.  Infectious disease was consulted and she was treated with Remdesevir.  The patient's respiratory status and thrombocytopenia have improved.  She continues to test positive for COVID by PCR.  6/2:  4 positive tests for COVID 19 on 5/16,  5/18, 5/25 and 5/30.  Asymptomatic, sleeping on exam, stable vital signs, on home O2 2 LPM NC.  6/3:  Dementia, pleasantly asleep on exam, on RA.  6/2 COVID testing pending.    Certified that the patient requires continued medically necessary treatment of COVID 19 infection and will remain in the hospital for 7 more days.  Discharge plan is pending the acceptance of the group home which is currently requiring negative COVID 19 test.  4 tests have remained positive.  6/2 testing pending.  6/4:  Talkative today, asymptomatic.  Pending negative testing. Up to chair bid and incentive spirometry ordered.  Examined right toes, bryce tape on 4th and 5th toes.  No ecchymosis or deformity noted, but remains tender to touch.  6/5:  Talkative, awake, on RA.  Pending negative COVID 19 testing.  6/6:  NAD, remains RA.   6/7: talkative, awake, on RA, lungs CTA b/l.  Labs in a.m.  6/8:  Labs wnl, no symptoms of COVID 19, on RA.  D-dimer remains low.  Checking iron and B12 levels since anemia Hg 11.  Patient has been in hospital x 21 days, now asymptomatic, recovered from COVID 19  infection, however still testing positive for COVID 19. Unable to return to her group home due to + testing.  Infection control has recommended moving off COVID unit.  It is unclear if asymptomatic COVID 19+ patients can spread COVID 19 to others.    6/9 the patient is demented and cannot give much history or reliable review of system information, she does not appear to be in respiratory distress, no cough, no fever recorded, laboratory data indicate mild anemia, glycemic control adequate, sufficient iron stores.  Repeat COVID-19 testing pending  6/10 the patient remains without respiratory complaints, she is ongoing confused, repeat COVID-19 testing is sent and pending  6/11 Erlinda is resting, she is content, no new developments of fever, nausea, respiratory difficulty, unfortunately her repeat COVID-19 RNA PCR remains positive from 6/9 6/12 Erlinda is awake and smiling today, no specific complaints,  6/13 the patient is continued confused, answers questions fairly appropriately, denies pain, no dyspnea, no fever, vital signs reviewed and laboratory data as well as medication reviewed for stability.  6/14 patient is awake and alert, confused, repeat cover test pending, laboratory data and medication regimen reviewed for adequacy  6/15 Erlinda is confused, she is awake and alert, she has no complaints of pain, fever, dyspnea        Consultants/Specialty  Case management  Infectious disease    Code Status  Full code    Disposition  Came from group home,  requiring negative COVID 19 testing.  +5/16, 5/18, 5/25, 5/30, 6/2.    6/9 positive    Review of Systems  Review of Systems   Respiratory: Negative for cough.    Cardiovascular: Negative for chest pain and palpitations.   Gastrointestinal: Negative for vomiting.   Psychiatric/Behavioral: Positive for memory loss. The patient is not nervous/anxious.        Physical Exam  Temp:  [36.3 °C (97.3 °F)-36.9 °C (98.4 °F)] 36.9 °C (98.4 °F)  Pulse:  [79-85] 79  Resp:   [16-17] 16  BP: (111-123)/(75-78) 123/77  SpO2:  [91 %-97 %] 97 %    Physical Exam  Constitutional:       General: She is not in acute distress.     Appearance: Normal appearance. She is normal weight.   HENT:      Head: Normocephalic and atraumatic.      Right Ear: External ear normal.      Left Ear: External ear normal.      Nose: Nose normal.      Mouth/Throat:      Mouth: Mucous membranes are moist.      Pharynx: Oropharynx is clear.   Eyes:      Extraocular Movements: Extraocular movements intact.      Conjunctiva/sclera: Conjunctivae normal.      Pupils: Pupils are equal, round, and reactive to light.   Neck:      Musculoskeletal: Normal range of motion and neck supple.   Cardiovascular:      Rate and Rhythm: Normal rate and regular rhythm.      Pulses: Normal pulses.   Pulmonary:      Effort: Pulmonary effort is normal.      Comments: CTA b/l  Abdominal:      General: Abdomen is flat. Bowel sounds are normal.      Palpations: Abdomen is soft.   Musculoskeletal: Normal range of motion.   Skin:     General: Skin is warm and dry.      Capillary Refill: Capillary refill takes less than 2 seconds.      Coloration: Skin is not jaundiced.      Comments: No further ecchymosis.  Westley tape right 4th and 5th toes.   Neurological:      General: No focal deficit present.      Mental Status: She is alert.      Cranial Nerves: No cranial nerve deficit.      Gait: Gait normal.   Psychiatric:         Mood and Affect: Mood normal.         Behavior: Behavior normal.           Fluids  No intake or output data in the 24 hours ending 06/15/20 0733    Laboratory                        Imaging  DX-CHEST-PORTABLE (1 VIEW)   Final Result      1.  Enlarged cardiac silhouette with probable mild vascular congestion.   2.  Bibasilar opacities could be related atelectasis, vascular congestion or pneumonitis.      CT-ABDOMEN-PELVIS W/O   Final Result         1. No evidence of acute inflammatory change in the abdomen or pelvis.  The study  is however limited due to nonuse of intravenous contrast.      2. Airspace opacity in the right lower lobe, in keeping with pneumonia.      3. Hepatic steatosis.      4. Moderate amount of stool throughout the colon.           Assessment/Plan  * COVID-19 virus infection- (present on admission)  Assessment & Plan  With associated pneumonia/respiratory failure  Completed course of remdesevir  The patient has clinically essentially resolved, is on room air,  Patient continues to test positive for COVID by RNA PCR  Significance of repeated positive results from COVID PCR testing is not known, the patient has recovered clinically from COVID-19 infection and the follow up tests are being sent solely for the purpose of allowing her to return to her facility    Constipation- (present on admission)  Assessment & Plan  Bowel protocol    Thrombocytopenia (HCC)- (present on admission)  Assessment & Plan  Resolved    KYLE (acute kidney injury) (HCC)- (present on admission)  Assessment & Plan  Resolved  Avoid nephrotoxins    Anemia  Assessment & Plan  Ferritin elevated 2/2 COVID 19 infection.  Iron levels normal, likely anemia of chronic disease  Monitor    Bruising  Assessment & Plan  Likely from trauma, reported fall prior to admission  Thrombocytopenia likely contributing  5th MT fracture found at [facility]  EPS case pending    Diabetes (HCC)- (present on admission)  Assessment & Plan  Metformin    Moderate intellectual disability- (present on admission)  Assessment & Plan  At baseline.  Resides at group home.    Schizophrenia (HCC)- (present on admission)  Assessment & Plan  Continue current outpatient psychiatric medications  Plan  Continue with close monitoring in terms of the patient's symptoms  The patient repeatedly tested positive for COVID-19 PCR RNA, the patient is grossly asymptomatic  Repeat COVID testing today after 4 days from last positive test  See orders  Medically complex with significant risk  associated  Supportive care to continue  VTE prophylaxis: Lovenox SQ    I have performed a physical exam and reviewed and updated ROS and Plan today . In review of yesterday's note , there are no changes except as documented above.        Please note that this dictation was created using voice recognition software. I have made every reasonable attempt to correct obvious errors, but I expect that there are errors of grammar and possibly context that I did not discover before finalizing the note.      Current Facility-Administered Medications:   •  ascorbic acid tablet 1,000 mg, 1,000 mg, Oral, DAILY, Candida Bender M.D., 1,000 mg at 06/15/20 0527  •  vitamin D (cholecalciferol) tablet 2,000 Units, 2,000 Units, Oral, DAILY, Candida Bender M.D., 2,000 Units at 06/15/20 0527  •  metFORMIN ER (GLUCOPHAGE XR) tablet 500 mg, 500 mg, Oral, DAILY, Ubaldo Fulton M.D., 500 mg at 06/15/20 0527  •  bisacodyl (DULCOLAX) suppository 10 mg, 10 mg, Rectal, DAILY AT 1800, Ubaldo Fulton M.D., Stopped at 06/08/20 2028  •  senna-docusate (PERICOLACE or SENOKOT S) 8.6-50 MG per tablet 2 Tab, 2 Tab, Oral, BID, Ubaldo Fulton M.D., 2 Tab at 06/15/20 0527  •  polyethylene glycol/lytes (MIRALAX) PACKET 1 Packet, 1 Packet, Oral, QDAY PRN, Ubaldo Fulton M.D.  •  magnesium hydroxide (MILK OF MAGNESIA) suspension 30 mL, 30 mL, Oral, QDAY PRN, Ubaldo Fulton M.D.  •  enoxaparin (LOVENOX) inj 40 mg, 40 mg, Subcutaneous, DAILY, Ubaldo Fulton M.D., 40 mg at 06/15/20 0527  •  zinc sulfate (ZINCATE) capsule 220 mg, 220 mg, Oral, DAILY, Kim Lima M.D., 220 mg at 06/15/20 0527  •  ARIPiprazole (ABILIFY) tablet 30 mg, 30 mg, Oral, QHS, Kim Lima M.D., 30 mg at 06/14/20 2026  •  divalproex (DEPAKOTE) delayed-release tablet 500 mg, 500 mg, Oral, QHS, Kim Lima M.D., 500 mg at 06/14/20 2026  •  docusate sodium (COLACE) capsule 100 mg, 100 mg, Oral, BID, Kim Lima M.D., 100 mg at 06/15/20 0526  •  donepezil (ARICEPT) tablet 10 mg, 10 mg, Oral, Q  EVENING, Kim Lima M.D., 10 mg at 06/14/20 1733  •  oxybutynin SR (DITROPAN-XL) tablet 10 mg, 10 mg, Oral, Q EVENING, Kim Lima M.D., 10 mg at 06/14/20 1733  •  psyllium (METAMUCIL/KONSYL) 1 Packet, 1 Packet, Oral, DAILY, Kim Lima M.D., 1 Packet at 06/15/20 0526  •  enalaprilat (VASOTEC) injection 1.25 mg, 1.25 mg, Intravenous, Q6HRS PRN, Jorden Gutierrez M.D.  •  ondansetron (ZOFRAN ODT) dispertab 4 mg, 4 mg, Oral, Q4HRS PRN, Jorden Gutierrez M.D., 4 mg at 06/07/20 2109  •  ondansetron (ZOFRAN) syringe/vial injection 4 mg, 4 mg, Intravenous, Q4HRS PRN, Jorden Gutierrez M.D.  •  prochlorperazine (COMPAZINE) injection 5-10 mg, 5-10 mg, Intravenous, Q4HRS PRN, Jroden Gutierrez M.D.  •  promethazine (PHENERGAN) suppository 12.5-25 mg, 12.5-25 mg, Rectal, Q4HRS PRN, Jorden Gutierrez M.D.  •  promethazine (PHENERGAN) tablet 12.5-25 mg, 12.5-25 mg, Oral, Q4HRS PRN, Jorden Gutierrez M.D.  •  acetaminophen (TYLENOL) tablet 650 mg, 650 mg, Oral, Q6HRS PRN, Jorden Gutierrez M.D., 650 mg at 06/15/20 0526  •  QUEtiapine (SEROQUEL) tablet 300 mg, 300 mg, Oral, QHS, Jorden Gutierrez M.D., 300 mg at 06/14/20 2026  •  sertraline (ZOLOFT) tablet 50 mg, 50 mg, Oral, DAILY, Jorden Gutierrez M.D., 50 mg at 06/15/20 0527

## 2020-06-16 ENCOUNTER — APPOINTMENT (OUTPATIENT)
Dept: RADIOLOGY | Facility: MEDICAL CENTER | Age: 55
DRG: 177 | End: 2020-06-16
Attending: HOSPITALIST
Payer: MEDICARE

## 2020-06-16 PROBLEM — M25.559 HIP PAIN: Status: ACTIVE | Noted: 2020-06-16

## 2020-06-16 LAB
CRP SERPL HS-MCNC: 5.5 MG/L (ref 0–7.5)
ERYTHROCYTE [DISTWIDTH] IN BLOOD BY AUTOMATED COUNT: 50.4 FL (ref 35.9–50)
HCT VFR BLD AUTO: 37.8 % (ref 37–47)
HGB BLD-MCNC: 11.4 G/DL (ref 12–16)
MCH RBC QN AUTO: 28 PG (ref 27–33)
MCHC RBC AUTO-ENTMCNC: 30.2 G/DL (ref 33.6–35)
MCV RBC AUTO: 92.9 FL (ref 81.4–97.8)
PLATELET # BLD AUTO: 229 K/UL (ref 164–446)
PMV BLD AUTO: 8.7 FL (ref 9–12.9)
RBC # BLD AUTO: 4.07 M/UL (ref 4.2–5.4)
WBC # BLD AUTO: 6 K/UL (ref 4.8–10.8)

## 2020-06-16 PROCEDURE — A9270 NON-COVERED ITEM OR SERVICE: HCPCS | Performed by: HOSPITALIST

## 2020-06-16 PROCEDURE — 86141 C-REACTIVE PROTEIN HS: CPT

## 2020-06-16 PROCEDURE — 36415 COLL VENOUS BLD VENIPUNCTURE: CPT

## 2020-06-16 PROCEDURE — 700111 HCHG RX REV CODE 636 W/ 250 OVERRIDE (IP): Performed by: HOSPITALIST

## 2020-06-16 PROCEDURE — 770006 HCHG ROOM/CARE - MED/SURG/GYN SEMI*

## 2020-06-16 PROCEDURE — 700112 HCHG RX REV CODE 229: Performed by: HOSPITALIST

## 2020-06-16 PROCEDURE — 700102 HCHG RX REV CODE 250 W/ 637 OVERRIDE(OP): Performed by: HOSPITALIST

## 2020-06-16 PROCEDURE — 73501 X-RAY EXAM HIP UNI 1 VIEW: CPT | Mod: RT

## 2020-06-16 PROCEDURE — 83036 HEMOGLOBIN GLYCOSYLATED A1C: CPT

## 2020-06-16 PROCEDURE — 85027 COMPLETE CBC AUTOMATED: CPT

## 2020-06-16 PROCEDURE — 99233 SBSQ HOSP IP/OBS HIGH 50: CPT | Mod: CS | Performed by: HOSPITALIST

## 2020-06-16 PROCEDURE — 700111 HCHG RX REV CODE 636 W/ 250 OVERRIDE (IP): Performed by: INTERNAL MEDICINE

## 2020-06-16 RX ORDER — LORAZEPAM 2 MG/ML
1 INJECTION INTRAMUSCULAR ONCE
Status: COMPLETED | OUTPATIENT
Start: 2020-06-16 | End: 2020-06-16

## 2020-06-16 RX ADMIN — ARIPIPRAZOLE 30 MG: 15 TABLET ORAL at 19:28

## 2020-06-16 RX ADMIN — ACETAMINOPHEN 650 MG: 325 TABLET, FILM COATED ORAL at 07:19

## 2020-06-16 RX ADMIN — DONEPEZIL HYDROCHLORIDE 10 MG: 5 TABLET, FILM COATED ORAL at 17:05

## 2020-06-16 RX ADMIN — MELATONIN 2000 UNITS: at 05:35

## 2020-06-16 RX ADMIN — ACETAMINOPHEN 650 MG: 325 TABLET, FILM COATED ORAL at 13:49

## 2020-06-16 RX ADMIN — METFORMIN HYDROCHLORIDE 500 MG: 500 TABLET, EXTENDED RELEASE ORAL at 07:18

## 2020-06-16 RX ADMIN — LORAZEPAM 1 MG: 2 INJECTION INTRAMUSCULAR; INTRAVENOUS at 20:38

## 2020-06-16 RX ADMIN — ENOXAPARIN SODIUM 40 MG: 100 INJECTION SUBCUTANEOUS at 05:36

## 2020-06-16 RX ADMIN — ZINC SULFATE 220 MG (50 MG) CAPSULE 220 MG: CAPSULE at 05:36

## 2020-06-16 RX ADMIN — SERTRALINE HYDROCHLORIDE 50 MG: 50 TABLET ORAL at 07:19

## 2020-06-16 RX ADMIN — Medication 1000 MG: at 05:36

## 2020-06-16 RX ADMIN — OXYBUTYNIN CHLORIDE 10 MG: 10 TABLET, EXTENDED RELEASE ORAL at 17:05

## 2020-06-16 RX ADMIN — DOCUSATE SODIUM 100 MG: 100 CAPSULE, LIQUID FILLED ORAL at 17:05

## 2020-06-16 RX ADMIN — DIVALPROEX SODIUM 500 MG: 500 TABLET, DELAYED RELEASE ORAL at 19:28

## 2020-06-16 RX ADMIN — ACETAMINOPHEN 650 MG: 325 TABLET, FILM COATED ORAL at 19:28

## 2020-06-16 RX ADMIN — DOCUSATE SODIUM 50 MG AND SENNOSIDES 8.6 MG 2 TABLET: 8.6; 5 TABLET, FILM COATED ORAL at 17:05

## 2020-06-16 RX ADMIN — QUETIAPINE FUMARATE 300 MG: 200 TABLET ORAL at 19:28

## 2020-06-16 ASSESSMENT — PAIN SCALES - PAIN ASSESSMENT IN ADVANCED DEMENTIA (PAINAD)
BODYLANGUAGE: RELAXED
FACIALEXPRESSION: SMILING OR INEXPRESSIVE
TOTALSCORE: 7
FACIALEXPRESSION: SAD, FRIGHTENED, FROWN
TOTALSCORE: 0
CONSOLABILITY: NO NEED TO CONSOLE
BREATHING: OCCASIONAL LABORED BREATHING, SHORT PERIOD OF HYPERVENTILATION
BREATHING: NORMAL
CONSOLABILITY: DISTRACTED OR REASSURED BY VOICE/TOUCH
FACIALEXPRESSION: SAD, FRIGHTENED, FROWN
CONSOLABILITY: NO NEED TO CONSOLE
BREATHING: NORMAL
NEGVOCALIZATION: OCCASIONAL MOAN/GROAN, LOW SPEECH, NEGATIVE/DISAPPROVING QUALITY
NEGVOCALIZATION: REPEATED TROUBLED CALLING OUT, LOUD MOANING/GROANING, CRYING
BODYLANGUAGE: RIGID, FISTS CLENCHED, KNEES UP, PUSHING/PULLING AWAY, STRIKES OUT
TOTALSCORE: 0
TOTALSCORE: 5
CONSOLABILITY: DISTRACTED OR REASSURED BY VOICE/TOUCH
BODYLANGUAGE: RELAXED
BODYLANGUAGE: TENSE, DISTRESSED PACING, FIDGETING
FACIALEXPRESSION: SMILING OR INEXPRESSIVE
BREATHING: OCCASIONAL LABORED BREATHING, SHORT PERIOD OF HYPERVENTILATION

## 2020-06-16 NOTE — PROGRESS NOTES
Pt is A&Ox1, restless tonight.  Will give scheduled meds.  Follows some commands, not always cooperative with turns and linen changes, at times swings arms when cleaning pt.   No family is at bedside. VSS.  Denies pain.  Two assist q 2 hour turns.  Incontinent of bowel/bladder.  Pt updated on POC, needs met and questions answered.  No medical changes from previous assessment noted at this time.  Calls appropriately.  Call light within reach and working properly.

## 2020-06-16 NOTE — PROGRESS NOTES
[facility] Medicine Daily Progress Note    Date of Service  6/16/2020    Chief Complaint  54 y.o. female admitted 5/16/2020 with multiple areas of bruising    Hospital Course    Ms Verde has a history of cognitive deficits and schizophrenia.  She was sent to the emergency room for multiple areas of skin bruising, a fall prior to admission was reported although the details are poorly defined.  Evaluation demonstrated the patient had acute renal failure, 1/5 metatarsal fracture, and thrombocytopenia.  Patient tested positive for COVID 19.  She was admitted to the hospital patient had recurrent fevers and follow-up imaging demonstrated worsened infiltrates on her chest x-ray.  Infectious disease was consulted and she was treated with Remdesevir.          Interval Problem Update  Ms Verde has a history of cognitive deficits and schizophrenia.  She was sent to the emergency room for multiple areas of skin bruising, a fall prior to admission was reported although the details are poorly defined.  Evaluation demonstrated the patient had acute renal failure, 1/5 metatarsal fracture, and thrombocytopenia.  Patient tested positive for COVID 19.  She was admitted to the hospital patient had recurrent fevers and follow-up imaging demonstrated worsened infiltrates on her chest x-ray.  Infectious disease was consulted and she was treated with Remdesevir.  The patient's respiratory status and thrombocytopenia have improved.  She continues to test positive for COVID by PCR.  6/2:  4 positive tests for COVID 19 on 5/16,  5/18, 5/25 and 5/30.  Asymptomatic, sleeping on exam, stable vital signs, on home O2 2 LPM NC.  6/3:  Dementia, pleasantly asleep on exam, on RA.  6/2 COVID testing pending.    Certified that the patient requires continued medically necessary treatment of COVID 19 infection and will remain in the hospital for 7 more days.  Discharge plan is pending the acceptance of the group home which is currently requiring  negative COVID 19 test.  4 tests have remained positive.  6/2 testing pending.  6/4:  Talkative today, asymptomatic.  Pending negative testing. Up to chair bid and incentive spirometry ordered.  Examined right toes, bryce tape on 4th and 5th toes.  No ecchymosis or deformity noted, but remains tender to touch.  6/5:  Talkative, awake, on RA.  Pending negative COVID 19 testing.  6/6:  NAD, remains RA.   6/7: talkative, awake, on RA, lungs CTA b/l.  Labs in a.m.  6/8:  Labs wnl, no symptoms of COVID 19, on RA.  D-dimer remains low.  Checking iron and B12 levels since anemia Hg 11.  Patient has been in hospital x 21 days, now asymptomatic, recovered from COVID 19 infection, however still testing positive for COVID 19. Unable to return to her group home due to + testing.  Infection control has recommended moving off COVID unit.  It is unclear if asymptomatic COVID 19+ patients can spread COVID 19 to others.    6/9 the patient is demented and cannot give much history or reliable review of system information, she does not appear to be in respiratory distress, no cough, no fever recorded, laboratory data indicate mild anemia, glycemic control adequate, sufficient iron stores.  Repeat COVID-19 testing pending  6/10 the patient remains without respiratory complaints, she is ongoing confused, repeat COVID-19 testing is sent and pending  6/11 Erlinda is resting, she is content, no new developments of fever, nausea, respiratory difficulty, unfortunately her repeat COVID-19 RNA PCR remains positive from 6/9 6/12 Erlinda is awake and smiling today, no specific complaints,  6/13 the patient is continued confused, answers questions fairly appropriately, denies pain, no dyspnea, no fever, vital signs reviewed and laboratory data as well as medication reviewed for stability.  6/14 patient is awake and alert, confused, repeat cover test pending, laboratory data and medication regimen reviewed for adequacy  6/15 Erlinda is confused, she  is awake and alert, she has no complaints of pain, fever, dyspnea  6/16 last COVID 6/9 +, repeat sent 6/13 results yet pending.  C/o R hip pain with movement.  CT of abd/plvs 5/17 did not show any Fx/dislocation (reviewed by me today).  Will check plain films and inflamatory markers/CBC    Consultants/Specialty  ID    Code Status  full    Disposition  Awaiting neg COVID for placement    Review of Systems  Review of Systems   Unable to perform ROS: Psychiatric disorder   Musculoskeletal:        R hip pain        Physical Exam  Temp:  [36.1 °C (97 °F)-36.9 °C (98.4 °F)] 36.6 °C (97.8 °F)  Pulse:  [75-84] 75  Resp:  [16-18] 18  BP: (123-137)/(77-84) 137/84  SpO2:  [92 %-97 %] 95 %    Physical Exam  Vitals signs reviewed.   Constitutional:       General: She is not in acute distress.     Appearance: She is well-developed. She is not diaphoretic.   HENT:      Head: Normocephalic and atraumatic.   Neck:      Vascular: No JVD.   Cardiovascular:      Rate and Rhythm: Normal rate and regular rhythm.      Heart sounds: No murmur.   Pulmonary:      Effort: Pulmonary effort is normal. No respiratory distress.      Breath sounds: No stridor. No wheezing or rales.   Abdominal:      Palpations: Abdomen is soft.      Tenderness: There is no abdominal tenderness. There is no guarding or rebound.   Musculoskeletal:         General: Tenderness present.      Right lower leg: No edema.      Left lower leg: No edema.      Comments: Pain with movement of R hip   Skin:     General: Skin is warm and dry.      Findings: No rash.   Neurological:      Mental Status: Mental status is at baseline.         Fluids    Intake/Output Summary (Last 24 hours) at 6/16/2020 0607  Last data filed at 6/15/2020 1400  Gross per 24 hour   Intake 240 ml   Output --   Net 240 ml       Laboratory                        Imaging  DX-CHEST-PORTABLE (1 VIEW)   Final Result      1.  Enlarged cardiac silhouette with probable mild vascular congestion.   2.  Bibasilar  opacities could be related atelectasis, vascular congestion or pneumonitis.      CT-ABDOMEN-PELVIS W/O   Final Result         1. No evidence of acute inflammatory change in the abdomen or pelvis.  The study is however limited due to nonuse of intravenous contrast.      2. Airspace opacity in the right lower lobe, in keeping with pneumonia.      3. Hepatic steatosis.      4. Moderate amount of stool throughout the colon.           Assessment/Plan  * COVID-19 virus infection- (present on admission)  Assessment & Plan  With associated pneumonia/respiratory failure  Completed course of remdesevir  The patient has clinically essentially resolved, is on room air,  Patient continues to test positive for COVID by RNA PCR  Significance of repeated positive results from COVID PCR testing is not known, the patient has recovered clinically from COVID-19 infection and the follow up tests are being sent solely for the purpose of allowing her to return to her facility  Last + 6/9; repeat sent 6/13 and pending    Constipation- (present on admission)  Assessment & Plan  Bowel protocol    Thrombocytopenia (HCC)- (present on admission)  Assessment & Plan  Resolved  Likely acutely depressed with infection  Cont to spot check    KYLE (acute kidney injury) (HCC)- (present on admission)  Assessment & Plan  Resolved  Avoid nephrotoxins    Hip pain  Assessment & Plan  No recent trauma  Check plan films, CBC and CRP    Anemia  Assessment & Plan  Ferritin elevated 2/2 COVID 19 infection.  Iron levels normal, likely anemia of chronic disease  Monitor    Bruising  Assessment & Plan  Likely from trauma, reported fall prior to admission  Thrombocytopenia likely contributing  5th MT fracture found at [facility]  EPS case pending    Diabetes (HCC)- (present on admission)  Assessment & Plan  Well controled on Metformin  Check A1c    Moderate intellectual disability- (present on admission)  Assessment & Plan  At baseline.  Resides at [facility]  home.    Schizophrenia (HCC)- (present on admission)  Assessment & Plan  Continue current outpatient psychiatric medications       VTE prophylaxis: enoxaparin

## 2020-06-16 NOTE — CARE PLAN
Problem: Safety  Goal: Will remain free from injury  Outcome: PROGRESSING AS EXPECTED  Note: Alarm on bed turned on.  Bed in the lowest locked position. Treaded socks on pt. Call light within reach.

## 2020-06-16 NOTE — PROGRESS NOTES
"Assumed care of pt this am. Pt is A&O to self only. Pt screaming and crying this morning. RN administered PRN medication as patient was screaming, \"my hip hurts\" and repositioned. After intervention patient was lying in bed quietly watching tv. Bed alarm is in use. Pt is visible from nursing station. Hourly rounding in place.   "

## 2020-06-16 NOTE — PROGRESS NOTES
Pt yelling out curse words, hitting bedrails. Pt not following instructions to stop yelling.  Called Dr. Stone who orders Ativan 1 mg IM x once give now.  CNA assisted RN give IM shot.    TO/ Dr. Stone RBO/ Rosa Guajardo RN

## 2020-06-17 LAB
EST. AVERAGE GLUCOSE BLD GHB EST-MCNC: 146 MG/DL
HBA1C MFR BLD: 6.7 % (ref 0–5.6)

## 2020-06-17 PROCEDURE — A9270 NON-COVERED ITEM OR SERVICE: HCPCS

## 2020-06-17 PROCEDURE — 700102 HCHG RX REV CODE 250 W/ 637 OVERRIDE(OP): Performed by: HOSPITALIST

## 2020-06-17 PROCEDURE — 700102 HCHG RX REV CODE 250 W/ 637 OVERRIDE(OP)

## 2020-06-17 PROCEDURE — 99231 SBSQ HOSP IP/OBS SF/LOW 25: CPT | Mod: CS | Performed by: HOSPITALIST

## 2020-06-17 PROCEDURE — A9270 NON-COVERED ITEM OR SERVICE: HCPCS | Performed by: HOSPITALIST

## 2020-06-17 PROCEDURE — 700112 HCHG RX REV CODE 229

## 2020-06-17 PROCEDURE — 700111 HCHG RX REV CODE 636 W/ 250 OVERRIDE (IP): Performed by: HOSPITALIST

## 2020-06-17 PROCEDURE — 770006 HCHG ROOM/CARE - MED/SURG/GYN SEMI*

## 2020-06-17 RX ORDER — AMOXICILLIN 250 MG
CAPSULE ORAL
Status: COMPLETED
Start: 2020-06-17 | End: 2020-06-17

## 2020-06-17 RX ORDER — DOCUSATE SODIUM 100 MG/1
CAPSULE, LIQUID FILLED ORAL
Status: COMPLETED
Start: 2020-06-17 | End: 2020-06-17

## 2020-06-17 RX ORDER — QUETIAPINE FUMARATE 300 MG/1
300 TABLET, FILM COATED ORAL
Qty: 60 TAB | Refills: 3
Start: 2020-06-17 | End: 2020-08-19

## 2020-06-17 RX ORDER — ACETAMINOPHEN 325 MG/1
TABLET ORAL
Status: COMPLETED
Start: 2020-06-17 | End: 2020-06-17

## 2020-06-17 RX ORDER — OXYBUTYNIN CHLORIDE 10 MG/1
10 TABLET, EXTENDED RELEASE ORAL EVERY EVENING
Qty: 30 TAB | Refills: 0 | Status: SHIPPED | OUTPATIENT
Start: 2020-06-17 | End: 2020-08-19

## 2020-06-17 RX ADMIN — ACETAMINOPHEN 650 MG: 325 TABLET, FILM COATED ORAL at 05:19

## 2020-06-17 RX ADMIN — DONEPEZIL HYDROCHLORIDE 10 MG: 5 TABLET, FILM COATED ORAL at 17:11

## 2020-06-17 RX ADMIN — Medication 1000 MG: at 05:20

## 2020-06-17 RX ADMIN — ZINC SULFATE 220 MG (50 MG) CAPSULE 220 MG: CAPSULE at 05:20

## 2020-06-17 RX ADMIN — DOCUSATE SODIUM 100 MG: 100 CAPSULE, LIQUID FILLED ORAL at 05:20

## 2020-06-17 RX ADMIN — SERTRALINE HYDROCHLORIDE 50 MG: 50 TABLET ORAL at 05:21

## 2020-06-17 RX ADMIN — DOCUSATE SODIUM 50 MG AND SENNOSIDES 8.6 MG 2 TABLET: 8.6; 5 TABLET, FILM COATED ORAL at 05:18

## 2020-06-17 RX ADMIN — ACETAMINOPHEN 650 MG: 325 TABLET, FILM COATED ORAL at 17:19

## 2020-06-17 RX ADMIN — QUETIAPINE FUMARATE 300 MG: 200 TABLET ORAL at 20:36

## 2020-06-17 RX ADMIN — DOCUSATE SODIUM 100 MG: 100 CAPSULE, LIQUID FILLED ORAL at 17:15

## 2020-06-17 RX ADMIN — DOCUSATE SODIUM 50 MG AND SENNOSIDES 8.6 MG 2 TABLET: 8.6; 5 TABLET, FILM COATED ORAL at 17:15

## 2020-06-17 RX ADMIN — DIVALPROEX SODIUM 500 MG: 500 TABLET, DELAYED RELEASE ORAL at 20:36

## 2020-06-17 RX ADMIN — MELATONIN 2000 UNITS: at 05:21

## 2020-06-17 RX ADMIN — ENOXAPARIN SODIUM 40 MG: 100 INJECTION SUBCUTANEOUS at 05:26

## 2020-06-17 RX ADMIN — OXYBUTYNIN CHLORIDE 10 MG: 10 TABLET, EXTENDED RELEASE ORAL at 17:12

## 2020-06-17 RX ADMIN — METFORMIN HYDROCHLORIDE 500 MG: 500 TABLET, EXTENDED RELEASE ORAL at 07:58

## 2020-06-17 RX ADMIN — ARIPIPRAZOLE 30 MG: 15 TABLET ORAL at 20:37

## 2020-06-17 ASSESSMENT — PAIN SCALES - PAIN ASSESSMENT IN ADVANCED DEMENTIA (PAINAD)
FACIALEXPRESSION: SMILING OR INEXPRESSIVE
BREATHING: NORMAL
FACIALEXPRESSION: SMILING OR INEXPRESSIVE
TOTALSCORE: 0
CONSOLABILITY: NO NEED TO CONSOLE
TOTALSCORE: 0
BREATHING: NORMAL
BODYLANGUAGE: RELAXED
BODYLANGUAGE: RELAXED
CONSOLABILITY: NO NEED TO CONSOLE

## 2020-06-17 NOTE — DISCHARGE PLANNING
Anticipated Discharge Disposition:   [facility]    Action:    Secure email sent to patient's [facility] Public Guardian, Ann Dobson '[email or web address].' with the completed and signed DC of transmission-based precautions for symptomatic patients with Covid 19 10/3 medical clearance letter from Dr. Elliott.  Infection Control note from 6-7-2020 stating pt in recovered phase and also hospitalist progress note securely emailed.    Barriers to Discharge:    Placement acceptance    Plan:    F/U with Ann FAUSTIN.

## 2020-06-17 NOTE — PROGRESS NOTES
Assumed care of pt this am. Pt is A&O to self only. repositioned for pain relief as prn is not available at this time. Pt is on room air. Hourly rounding in place.

## 2020-06-17 NOTE — CARE PLAN
Problem: Communication  Goal: The ability to communicate needs accurately and effectively will improve  Outcome: PROGRESSING AS EXPECTED  Intervention: Collaborate with speech therapy  Note: Pt speech is mumbled at times, pt is able to repeat needs to staff.      Problem: Skin Integrity  Goal: Risk for impaired skin integrity will decrease  Outcome: PROGRESSING AS EXPECTED  Intervention: Assess risk factors for impaired skin integrity and/or pressure ulcers  Note: Pt is Q2h turns. Pt is being mobilized to chair 2x assist stand and pivot.

## 2020-06-17 NOTE — PROGRESS NOTES
Pt is A&Ox1.  No family is at bedside. VSS.   C/O pain to RLE.  Will give pain meds per orders.  Pt becomes agitated with turns and incontinence changes, at times will swing left arm.  Order for pelvis, x-ray tonight.  Paged Dr. Cm for orders to help pt stay still for x-ray.  Two assist q 2 hour turns.  Incontinent of bowel/bladder.  Call light within reach and working properly.  Hourly rounding in practice minimized per pt request.

## 2020-06-17 NOTE — CARE PLAN
Problem: Skin Integrity  Goal: Risk for impaired skin integrity will decrease  Outcome: PROGRESSING AS EXPECTED  Note: Two assist q two hour turns.      Problem: Skin Integrity  Goal: Risk for impaired skin integrity will decrease  Outcome: PROGRESSING AS EXPECTED  Note: Two assist q two hour turns.

## 2020-06-17 NOTE — PROGRESS NOTES
Pharmacy Pharmacotherapy Consult for LOS >30 days    Admit Date: 5/16/2020      Medications were reviewed for appropriateness and ongoing need.     Current Facility-Administered Medications   Medication Dose Route Frequency Provider Last Rate Last Dose   • ascorbic acid tablet 1,000 mg  1,000 mg Oral DAILY Candida Bender M.D.   1,000 mg at 06/17/20 0520   • vitamin D (cholecalciferol) tablet 2,000 Units  2,000 Units Oral DAILY Candida Bender M.D.   2,000 Units at 06/17/20 0521   • metFORMIN ER (GLUCOPHAGE XR) tablet 500 mg  500 mg Oral DAILY Ubaldo Fulton M.D.   500 mg at 06/17/20 0758   • bisacodyl (DULCOLAX) suppository 10 mg  10 mg Rectal DAILY AT 1800 Ubaldo Fulton M.D.   10 mg at 06/15/20 1710   • senna-docusate (PERICOLACE or SENOKOT S) 8.6-50 MG per tablet 2 Tab  2 Tab Oral BID Ubaldo Fulton M.D.   2 Tab at 06/17/20 0518   • polyethylene glycol/lytes (MIRALAX) PACKET 1 Packet  1 Packet Oral QDAY PRN Ubaldo Fulton M.D.       • magnesium hydroxide (MILK OF MAGNESIA) suspension 30 mL  30 mL Oral QDAY PRN Ubaldo Fulton M.D.       • enoxaparin (LOVENOX) inj 40 mg  40 mg Subcutaneous DAILY Ubaldo Fulton M.D.   40 mg at 06/17/20 0526   • zinc sulfate (ZINCATE) capsule 220 mg  220 mg Oral DAILY Kim Lima M.D.   220 mg at 06/17/20 0520   • ARIPiprazole (ABILIFY) tablet 30 mg  30 mg Oral QHS Kim Lima M.D.   30 mg at 06/16/20 1928   • divalproex (DEPAKOTE) delayed-release tablet 500 mg  500 mg Oral QHS Kim Lima M.D.   500 mg at 06/16/20 1928   • docusate sodium (COLACE) capsule 100 mg  100 mg Oral BID Kim Lima M.D.   100 mg at 06/17/20 0520   • donepezil (ARICEPT) tablet 10 mg  10 mg Oral Q EVENING Kim Lima M.D.   10 mg at 06/16/20 1705   • oxybutynin SR (DITROPAN-XL) tablet 10 mg  10 mg Oral Q EVENING Kim Lima M.D.   10 mg at 06/16/20 1705   • psyllium (METAMUCIL/KONSYL) 1 Packet  1 Packet Oral DAILY Kim Lima M.D.   Stopped at 06/16/20 0600   • enalaprilat (VASOTEC) injection 1.25 mg   1.25 mg Intravenous Q6HRS PRN Jorden Gutierrez M.D.       • ondansetron (ZOFRAN ODT) dispertab 4 mg  4 mg Oral Q4HRS PRN Jorden Gutierrez M.D.   4 mg at 06/07/20 2109   • ondansetron (ZOFRAN) syringe/vial injection 4 mg  4 mg Intravenous Q4HRS PRHELEN Gutierrez M.D.       • prochlorperazine (COMPAZINE) injection 5-10 mg  5-10 mg Intravenous Q4HRS PRN Jorden Gutierrez M.D.       • promethazine (PHENERGAN) suppository 12.5-25 mg  12.5-25 mg Rectal Q4HRS PRHELEN Gutierrez M.D.       • promethazine (PHENERGAN) tablet 12.5-25 mg  12.5-25 mg Oral Q4HRS ALENA Gutierrez M.D.       • acetaminophen (TYLENOL) tablet 650 mg  650 mg Oral Q6HRS PRN Jorden Gutierrez M.D.   650 mg at 06/17/20 0519   • QUEtiapine (SEROQUEL) tablet 300 mg  300 mg Oral QHS Jorden Gutierrez M.D.   300 mg at 06/16/20 1928   • sertraline (ZOLOFT) tablet 50 mg  50 mg Oral DAILY Jorden Gutierrez M.D.   50 mg at 06/17/20 0521       Recommendations:  -Discontinue prn medications that have not been administered> 2 weeks (IV enalaprilat prn, zofran, phenergan) - also patient currently doesn't have an IV for IV medications    Evangelina Leonard, Pharm.D, BCPS, [facility]

## 2020-06-17 NOTE — PROGRESS NOTES
[facility] Medicine Daily Progress Note    Date of Service  6/17/2020    Chief Complaint  54 y.o. female admitted 5/16/2020 with multiple areas of bruising    Hospital Course    Ms Verde has a history of cognitive deficits and schizophrenia.  She was sent to the emergency room for multiple areas of skin bruising, a fall prior to admission was reported although the details are poorly defined.  Evaluation demonstrated the patient had acute renal failure, 1/5 metatarsal fracture, and thrombocytopenia.  Patient tested positive for COVID 19.  She was admitted to the hospital patient had recurrent fevers and follow-up imaging demonstrated worsened infiltrates on her chest x-ray.  Infectious disease was consulted and she was treated with Remdesevir.          Interval Problem Update  Ms Verde has a history of cognitive deficits and schizophrenia.  She was sent to the emergency room for multiple areas of skin bruising, a fall prior to admission was reported although the details are poorly defined.  Evaluation demonstrated the patient had acute renal failure, 1/5 metatarsal fracture, and thrombocytopenia.  Patient tested positive for COVID 19.  She was admitted to the hospital patient had recurrent fevers and follow-up imaging demonstrated worsened infiltrates on her chest x-ray.  Infectious disease was consulted and she was treated with Remdesevir.  The patient's respiratory status and thrombocytopenia have improved.  She continues to test positive for COVID by PCR.  6/2:  4 positive tests for COVID 19 on 5/16,  5/18, 5/25 and 5/30.  Asymptomatic, sleeping on exam, stable vital signs, on home O2 2 LPM NC.  6/3:  Dementia, pleasantly asleep on exam, on RA.  6/2 COVID testing pending.    Certified that the patient requires continued medically necessary treatment of COVID 19 infection and will remain in the hospital for 7 more days.  Discharge plan is pending the acceptance of the group home which is currently requiring  negative COVID 19 test.  4 tests have remained positive.  6/2 testing pending.  6/4:  Talkative today, asymptomatic.  Pending negative testing. Up to chair bid and incentive spirometry ordered.  Examined right toes, bryce tape on 4th and 5th toes.  No ecchymosis or deformity noted, but remains tender to touch.  6/5:  Talkative, awake, on RA.  Pending negative COVID 19 testing.  6/6:  NAD, remains RA.   6/7: talkative, awake, on RA, lungs CTA b/l.  Labs in a.m.  6/8:  Labs wnl, no symptoms of COVID 19, on RA.  D-dimer remains low.  Checking iron and B12 levels since anemia Hg 11.  Patient has been in hospital x 21 days, now asymptomatic, recovered from COVID 19 infection, however still testing positive for COVID 19. Unable to return to her group home due to + testing.  Infection control has recommended moving off COVID unit.  It is unclear if asymptomatic COVID 19+ patients can spread COVID 19 to others.    6/9 the patient is demented and cannot give much history or reliable review of system information, she does not appear to be in respiratory distress, no cough, no fever recorded, laboratory data indicate mild anemia, glycemic control adequate, sufficient iron stores.  Repeat COVID-19 testing pending  6/10 the patient remains without respiratory complaints, she is ongoing confused, repeat COVID-19 testing is sent and pending  6/11 Erlinda is resting, she is content, no new developments of fever, nausea, respiratory difficulty, unfortunately her repeat COVID-19 RNA PCR remains positive from 6/9 6/12 Erlinda is awake and smiling today, no specific complaints,  6/13 the patient is continued confused, answers questions fairly appropriately, denies pain, no dyspnea, no fever, vital signs reviewed and laboratory data as well as medication reviewed for stability.  6/14 patient is awake and alert, confused, repeat cover test pending, laboratory data and medication regimen reviewed for adequacy  6/15 Erlinda is confused, she  is awake and alert, she has no complaints of pain, fever, dyspnea  6/16 last COVID 6/9 +, repeat sent 6/13 results yet pending.  C/o R hip pain with movement.  CT of abd/plvs 5/17 did not show any Fx/dislocation (reviewed by me today).  Will check plain films and inflamatory markers/CBC  6/17: pt is awake and interactive.  She has no complaints this am.  6/13 COVID pending still    Consultants/Specialty  ID    Code Status  full    Disposition  Awaiting neg COVID for placement    Review of Systems  Review of Systems   Unable to perform ROS: Psychiatric disorder   Musculoskeletal:        R hip pain        Physical Exam  Temp:  [36.2 °C (97.2 °F)-37 °C (98.6 °F)] 36.3 °C (97.4 °F)  Pulse:  [71-89] 72  Resp:  [16-18] 16  BP: (104-133)/(51-94) 121/65  SpO2:  [94 %-96 %] 94 %    Physical Exam  Vitals signs reviewed.   Constitutional:       General: She is not in acute distress.     Appearance: She is well-developed. She is not diaphoretic.   HENT:      Head: Normocephalic and atraumatic.   Eyes:      Conjunctiva/sclera: Conjunctivae normal.   Neck:      Vascular: No JVD.   Cardiovascular:      Rate and Rhythm: Normal rate and regular rhythm.      Heart sounds: No murmur.   Pulmonary:      Effort: Pulmonary effort is normal. No respiratory distress.      Breath sounds: No stridor. No wheezing or rales.   Abdominal:      Palpations: Abdomen is soft.      Tenderness: There is no abdominal tenderness. There is no guarding or rebound.   Musculoskeletal:         General: Tenderness present.      Right lower leg: No edema.      Left lower leg: No edema.      Comments: Pain with movement of R hip   Skin:     General: Skin is warm and dry.      Coloration: Skin is not jaundiced.      Findings: No rash.   Neurological:      Mental Status: Mental status is at baseline.         Fluids    Intake/Output Summary (Last 24 hours) at 6/17/2020 0612  Last data filed at 6/16/2020 1248  Gross per 24 hour   Intake 480 ml   Output --   Net 480  ml       Laboratory  Recent Labs     06/16/20  1628   WBC 6.0   RBC 4.07*   HEMOGLOBIN 11.4*   HEMATOCRIT 37.8   MCV 92.9   MCH 28.0   MCHC 30.2*   RDW 50.4*   PLATELETCT 229   MPV 8.7*                       Imaging  DX-HIP-UNILATERAL-WITH PELVIS-1 VIEW RIGHT   Final Result         1.  No radiographic evidence of acute traumatic injury.      DX-CHEST-PORTABLE (1 VIEW)   Final Result      1.  Enlarged cardiac silhouette with probable mild vascular congestion.   2.  Bibasilar opacities could be related atelectasis, vascular congestion or pneumonitis.      CT-ABDOMEN-PELVIS W/O   Final Result         1. No evidence of acute inflammatory change in the abdomen or pelvis.  The study is however limited due to nonuse of intravenous contrast.      2. Airspace opacity in the right lower lobe, in keeping with pneumonia.      3. Hepatic steatosis.      4. Moderate amount of stool throughout the colon.           Assessment/Plan  * COVID-19 virus infection- (present on admission)  Assessment & Plan  With associated pneumonia/respiratory failure  Completed course of remdesevir  The patient has clinically essentially resolved, is on room air,  Patient continues to test positive for COVID by RNA PCR  Significance of repeated positive results from COVID PCR testing is not known, the patient has recovered clinically from COVID-19 infection and the follow up tests are being sent solely for the purpose of allowing her to return to her facility  Last + 6/9; repeat sent 6/13 and pending    Constipation- (present on admission)  Assessment & Plan  Bowel protocol    Thrombocytopenia (HCC)- (present on admission)  Assessment & Plan  Resolved  Likely acutely depressed with infection  Cont to spot check    KYLE (acute kidney injury) (HCC)- (present on admission)  Assessment & Plan  Resolved  Avoid nephrotoxins    Hip pain  Assessment & Plan  No recent trauma  Check plan films, CBC and CRP    Anemia  Assessment & Plan  Ferritin elevated 2/2 COVID  19 infection.  Iron levels normal, likely anemia of chronic disease  Monitor    Bruising  Assessment & Plan  Likely from trauma, reported fall prior to admission  Thrombocytopenia likely contributing  5th MT fracture found at [facility] R foot  EPS case pending    Diabetes ([facility])- (present on admission)  Assessment & Plan  Well controled on Metformin  A1c on this admission 6.7    Moderate intellectual disability- (present on admission)  Assessment & Plan  At baseline.  Resides at group home.    Schizophrenia ([facility])- (present on admission)  Assessment & Plan  Continue current outpatient psychiatric medications       VTE prophylaxis: enoxaparin

## 2020-06-17 NOTE — DISCHARGE SUMMARY
Discharge Summary    CHIEF COMPLAINT ON ADMISSION  No chief complaint on file.      Reason for Admission  KYLE     CODE STATUS  Full Code    HPI & HOSPITAL COURSE  This is a 54 y.o. female who was admitted on May 16.  She has a previous medical history that includes dementia and schizophrenia as well as hypertension and type 2 diabetes..  She is a resident of a skilled nursing facility.  She had presented for evaluation of multiple bruises.  In the ED the patient's initial lab work demonstrated a thrombocytopenia with a platelet count in the 60s.  Imaging demonstrated bilateral patchy infiltrates, and the patient's COVID 19 came back as positive.  She was admitted to the hospital with acute respiratory failure, COVID-19 pneumonia, acute kidney injury and thrombocytopenia.    In-house the patient's blood cultures came back as negative.  The patient's thrombocytopenia eventually improved and she is now in the 200s.  It is thought that the cause of her low platelet count, was her acute illness.  As regards her COVID disease, she was treated supportively and and with remdesivir and has done well.  Today the patient is on room air, and has no respiratory complaints.  Her chest x-ray has normalized.  She did not require any antibiotics for possible bacterial superinfection.    As regards the patient's hypertension, she was initially on lisinopril and hydrochlorothiazide on her admission.  These were stopped due to low blood pressures and renal failure.  Since that time her pressures have remained stable, and she has not required reinstitution of therapy for her hypertension.  As regards her diabetes, her metformin was initially stopped due to her renal failure, however it has since been restarted.  On a diabetic diet and her baseline metformin, her sugars are maintaining in the low to mid 100s.      Therefore, she is discharged in good and stable condition to skilled nursing facility.    The patient met 2-midnight criteria  for an inpatient stay at the time of discharge.      FOLLOW UP ITEMS POST DISCHARGE  Continue to follow her blood pressure to see if she needs resumption of her lisinopril and hydrochlorothiazide    DISCHARGE DIAGNOSES  Principal Problem:    COVID-19 virus infection POA: Yes  Active Problems:    KYLE (acute kidney injury) (HCC) POA: Yes    Thrombocytopenia (HCC) POA: Yes    Constipation POA: Yes    Anemia POA: Unknown    Hip pain POA: Unknown    Schizophrenia (HCC) POA: Yes    Moderate intellectual disability POA: Yes    Diabetes (HCC) POA: Yes  Resolved Problems:    * No resolved hospital problems. *      FOLLOW UP  No future appointments.  No follow-up provider specified.    MEDICATIONS ON DISCHARGE     Medication List      START taking these medications      Instructions   oxybutynin SR 10 MG CR tablet  Commonly known as:  DITROPAN-XL   Take 1 Tab by mouth every evening.  Dose:  10 mg        CHANGE how you take these medications      Instructions   * SEROquel 300 MG tablet  What changed:  Another medication with the same name was changed. Make sure you understand how and when to take each.  Generic drug:  quetiapine   Take 300 mg by mouth every bedtime.  Dose:  300 mg     * quetiapine 300 MG tablet  What changed:  medication strength  Commonly known as:  SEROQUEL   Take 1 Tab by mouth every bedtime.  Dose:  300 mg         * This list has 2 medication(s) that are the same as other medications prescribed for you. Read the directions carefully, and ask your doctor or other care provider to review them with you.            CONTINUE taking these medications      Instructions   Abilify 30 MG tablet  Generic drug:  aripiprazole   Take 30 mg by mouth every bedtime.  Dose:  30 mg     Calcium Carbonate-Vitamin D 600-125 MG-UNIT Tabs   Take 1 tablet by mouth 2 Times a Day.  Dose:  1 tablet     Depakote 500 MG Tbec  Generic drug:  divalproex   Take 500 mg by mouth every bedtime.  Dose:  500 mg     Detrol LA 4 MG Cp24  Generic  drug:  tolterodine ER   Take 4 mg by mouth every bedtime.  Dose:  4 mg     donepezil 10 MG tablet  Commonly known as:  ARICEPT   Take 10 mg by mouth every bedtime.  Dose:  10 mg     meloxicam 15 MG tablet  Commonly known as:  MOBIC   Take 15 mg by mouth every day.  Dose:  15 mg     metFORMIN  MG Tb24  Commonly known as:  GLUCOPHAGE XR   Take 500 mg by mouth every day.  Dose:  500 mg     MULTIVITAMIN ADULT PO   Take  by mouth.     psyllium 58.12 % Pack  Commonly known as:  METAMUCIL   Take 1 Packet by mouth every day.  Dose:  1 Packet     sertraline 50 MG Tabs  Commonly known as:  ZOLOFT   Take 50 mg by mouth every day.  Dose:  50 mg        STOP taking these medications    lisinopril-hydrochlorothiazide 20-25 MG per tablet  Commonly known as:  PRINZIDE            Allergies  Allergies   Allergen Reactions   • Risperdal        DIET  Orders Placed This Encounter   Procedures   • Diet Order Diabetic     Standing Status:   Standing     Number of Occurrences:   1     Order Specific Question:   Diet:     Answer:   Diabetic [3]     Order Specific Question:   Texture Modifier     Answer:   Level 7 - Regular/Easy to Chew     Order Specific Question:   Liquid level     Answer:   Level 0 - Thin       ACTIVITY  As tolerated.  Weight bearing as tolerated    LINES, DRAINS, AND WOUNDS  This is an automated list. Peripheral IVs will be removed prior to discharge.     External Urinary Catheter (Female Wick) (Active)   Collection Container Suction container 06/10/20 0714   Suction Level (Female Wick Catheter) 225 mmHg 06/09/20 1600   Output (mL) 450 mL 06/07/20 1800      Wound 05/16/20 Other (comment) (Active)       Wound 05/26/20 Partial Thickness Wound Sacrum;Buttocks Left related to friction (Active)   Wound Image   05/27/20 1600   Site Assessment Clean;Dry;Intact;Red 06/17/20 0730   Periwound Assessment Clean;Dry;Intact 06/17/20 0730   Margins Defined edges 06/15/20 2000   Closure None 06/15/20 2000   Drainage Amount None  06/17/20 0730   Treatments Site care;Offloading 06/11/20 2000   Wound Cleansing Not Applicable 06/15/20 2000   Periwound Protectant Barrier Paste 06/17/20 0730   Dressing Cleansing/Solutions Not Applicable 06/15/20 2000   Dressing Options Mepilex 06/13/20 2000   Dressing Changed Changed 06/13/20 2000   Dressing Status Clean;Dry;Intact 06/15/20 2000   Dressing Change/Treatment Frequency As Needed 06/15/20 2000   NEXT Weekly Photo (Inpatient Only) 06/03/20 05/27/20 1600                  MENTAL STATUS ON TRANSFER  Level of Consciousness: Confused  Orientation : Disoriented to Event, Disoriented to Place, Disoriented to Time  Speech: Expressive Aphasia    CONSULTATIONS  Infectious disease    PROCEDURES      LABORATORY  Lab Results   Component Value Date    SODIUM 139 06/11/2020    POTASSIUM 4.0 06/11/2020    CHLORIDE 107 06/11/2020    CO2 23 06/11/2020    GLUCOSE 148 (H) 06/11/2020    BUN 23 (H) 06/11/2020    CREATININE 0.70 06/11/2020        Lab Results   Component Value Date    WBC 6.0 06/16/2020    HEMOGLOBIN 11.4 (L) 06/16/2020    HEMATOCRIT 37.8 06/16/2020    PLATELETCT 229 06/16/2020        Total time of the discharge process exceeds 35 minutes.

## 2020-06-18 VITALS
RESPIRATION RATE: 18 BRPM | HEIGHT: 63 IN | TEMPERATURE: 97.7 F | SYSTOLIC BLOOD PRESSURE: 111 MMHG | DIASTOLIC BLOOD PRESSURE: 69 MMHG | OXYGEN SATURATION: 93 % | BODY MASS INDEX: 21.72 KG/M2 | WEIGHT: 122.58 LBS | HEART RATE: 66 BPM

## 2020-06-18 PROCEDURE — 99239 HOSP IP/OBS DSCHRG MGMT >30: CPT | Mod: CS | Performed by: HOSPITALIST

## 2020-06-18 PROCEDURE — 700102 HCHG RX REV CODE 250 W/ 637 OVERRIDE(OP)

## 2020-06-18 PROCEDURE — 700102 HCHG RX REV CODE 250 W/ 637 OVERRIDE(OP): Performed by: HOSPITALIST

## 2020-06-18 PROCEDURE — A9270 NON-COVERED ITEM OR SERVICE: HCPCS | Performed by: HOSPITALIST

## 2020-06-18 PROCEDURE — 700111 HCHG RX REV CODE 636 W/ 250 OVERRIDE (IP): Performed by: HOSPITALIST

## 2020-06-18 PROCEDURE — A9270 NON-COVERED ITEM OR SERVICE: HCPCS

## 2020-06-18 RX ORDER — ACETAMINOPHEN 325 MG/1
TABLET ORAL
Status: COMPLETED
Start: 2020-06-18 | End: 2020-06-18

## 2020-06-18 RX ADMIN — Medication 1000 MG: at 05:14

## 2020-06-18 RX ADMIN — MELATONIN 2000 UNITS: at 05:14

## 2020-06-18 RX ADMIN — SERTRALINE HYDROCHLORIDE 50 MG: 50 TABLET ORAL at 05:14

## 2020-06-18 RX ADMIN — ACETAMINOPHEN 650 MG: 325 TABLET, FILM COATED ORAL at 01:52

## 2020-06-18 RX ADMIN — METFORMIN HYDROCHLORIDE 500 MG: 500 TABLET, EXTENDED RELEASE ORAL at 05:14

## 2020-06-18 RX ADMIN — ZINC SULFATE 220 MG (50 MG) CAPSULE 220 MG: CAPSULE at 05:14

## 2020-06-18 RX ADMIN — ACETAMINOPHEN 650 MG: 325 TABLET, FILM COATED ORAL at 12:46

## 2020-06-18 RX ADMIN — ENOXAPARIN SODIUM 40 MG: 100 INJECTION SUBCUTANEOUS at 05:14

## 2020-06-18 NOTE — CARE PLAN
Problem: Discharge Barriers/Planning  Goal: Patient's continuum of care needs will be met  Outcome: PROGRESSING SLOWER THAN EXPECTED  Note: Pt medically cleared. Pending discharge, awaiting placement.      Problem: Skin Integrity  Goal: Risk for impaired skin integrity will decrease  Outcome: PROGRESSING AS EXPECTED

## 2020-06-18 NOTE — DISCHARGE PLANNING
Received Choice form at [phone]  Agency/Facility Name: [facility] 1) [facility], 2) [facility]  Referral sent per Choice form @ [phone]

## 2020-06-18 NOTE — DISCHARGE PLANNING
Per notes in Epic, [facility] cannot see pt in a timely manner and is declining acceptance.    [facility] sent referral to [facility], choice #2.    [phone]  Agency/Facility Name: [facility]  Spoke To: Evangelina  Outcome: Waiting on office to confirm that pt can be scheduled in a timely manner. May have to wait until Tues 6/23.

## 2020-06-18 NOTE — CARE PLAN
Problem: Safety  Goal: Will remain free from injury  Outcome: PROGRESSING AS EXPECTED     Problem: Communication  Goal: The ability to communicate needs accurately and effectively will improve  Outcome: PROGRESSING SLOWER THAN EXPECTED     Problem: Knowledge Deficit  Goal: Knowledge of the prescribed therapeutic regimen will improve  Outcome: PROGRESSING SLOWER THAN EXPECTED

## 2020-06-18 NOTE — DIETARY
Nutrition Services: Re-screen day 33    Weight down 9% in 4 weeks (from 5/16 to 6/11). Called unit to request current weight, as last was 7 days ago.  PO intake % per ADLs.  Ready for D/c to group home with home health support services.     RD monitoring weekly.

## 2020-06-18 NOTE — DISCHARGE PLANNING
Anticipated Discharge Disposition:   [facility]    Action:    Group Home owner Ekater[facility] has accepted patient.  [facility] [place].  Afsaneh Law RN with [facility] requesting home health as patient is usually more mobile at group home.  She mobilizes at home with walker and must remain mobile to be able to reside there. In addition, patient has been in hospital > 30 days and deconditioned.  Choices obtained for [facility].  Form faxed to [facility].    Request to Dr. Elliott for home health order.    Ann Shelby coordinating transport and will let us know if we need to set up transport on our end.    Barriers to Discharge:    Home health acceptance  Transport    Plan:    F/U home health referral and transport.

## 2020-06-18 NOTE — PROGRESS NOTES
Assumed care of pt at 0745. Report received and bedside rounding completed with night RN. Pt is calm no SOB, or in any acute distress noted.    Fall precautions in place, Call light and pt belongings within reach - pt makes needs known - hourly rounding in place. See flowsheets for further assessment.

## 2020-06-18 NOTE — DISCHARGE PLANNING
Anticipated Discharge Disposition:   [facility]  Home Health    Action:    ROD MONTALVO spoke with Carina Bernstein the  will be here at 1630 to  patient and receive after visit summary.    ROD MONTALVO securely emailed the dc summary to [facility] and [facility].    DC summary and face sheet provided to [facility] RN for transfer.    Barriers to Discharge:    None    Plan:    DC

## 2020-06-18 NOTE — DISCHARGE INSTRUCTIONS
Discharge Instructions    Discharged to home by car with escort. Discharged via wheelchair, hospital escort: Yes.  Special equipment needed: Not Applicable    Be sure to schedule a follow-up appointment with your primary care doctor or any specialists as instructed.     Discharge Plan:   Diet Plan: Discussed  Activity Level: Discussed  Confirmed Follow up Appointment: Patient to Call and Schedule Appointment  Confirmed Symptoms Management: Discussed  Medication Reconciliation Updated: Yes      · Is patient discharged on Warfarin / Coumadin?   No     Depression / Suicide Risk    As you are discharged from this [facility] facility, it is important to learn how to keep safe from harming yourself.    Recognize the warning signs:  · Abrupt changes in personality, positive or negative- including increase in energy   · Giving away possessions  · Change in eating patterns- significant weight changes-  positive or negative  · Change in sleeping patterns- unable to sleep or sleeping all the time   · Unwillingness or inability to communicate  · Depression  · Unusual sadness, discouragement and loneliness  · Talk of wanting to die  · Neglect of personal appearance   · Rebelliousness- reckless behavior  · Withdrawal from people/activities they love  · Confusion- inability to concentrate     If you or a loved one observes any of these behaviors or has concerns about self-harm, here's what you can do:  · Talk about it- your feelings and reasons for harming yourself  · Remove any means that you might use to hurt yourself (examples: pills, rope, extension cords, firearm)  · Get professional help from the community (Mental Health, Substance Abuse, psychological counseling)  · Do not be alone:Call your Safe Contact- someone whom you trust who will be there for you.  · Call your local CRISIS HOTLINE 110-1770 or 979-143-4120  · Call your local Children's Mobile Crisis Response Team [place] (587) 059-9183 or  [email or web address]  · Call the toll free National Suicide Prevention Hotlines   · National Suicide Prevention Lifeline 679-220-AFIX (6679)  · National Hope Line Network 800-SUICIDE (565-6440)

## 2020-06-18 NOTE — PROGRESS NOTES
1948:  Assumed care of patient at 1900.  Report received from day shift.  Patient currently up to chair, increasingly agitated, medically stable.  Will continue to monitor.

## 2020-06-18 NOTE — FACE TO FACE
Face to Face Supporting Documentation - Home Health    The encounter with this patient was in whole or in part the primary reason for home health admission.    Date of encounter:   Patient:                    MRN:                       YOB: 2021  Erlinda Verde  [id number]  1965     Home health to see patient for:  Skilled Nursing care for assessment, interventions & education and Physical Therapy evaluation and treatment    Skilled need for:  New Onset Medical Diagnosis COVID pneumonia    Skilled nursing interventions to include:  Comment: gait and mobility training    Homebound status evidenced by:  Need the aid of supportive devices such as crutches, canes, wheelchairs or walkers. Leaving home requires a considerable and taxing effort. There is a normal inability to leave the home.    Community Physician to provide follow up care: Whitney Graham M.D.     Optional Interventions? No      I certify the face to face encounter for this home health care referral meets the CMS requirements and the encounter/clinical assessment with the patient was, in whole, or in part, for the medical condition(s) listed above, which is the primary reason for home health care. Based on my clinical findings: the service(s) are medically necessary, support the need for home health care, and the homebound criteria are met.  I certify that this patient has had a face to face encounter by myself.  Prince Elliott D.O. - NPI: [id number]

## 2020-06-18 NOTE — DISCHARGE PLANNING
ATTN: Case Management  RE: Referral for Home Health    Reason for referral denial: cannot see in a timely manner              Unfortunately, we are not able to accept this referral for the reason listed above. If further clarity is needed, our Transitional Care Specialists are available to discuss any barriers to service at [phone].      We look forward to collaborating with you in the future,  [facility]

## 2020-06-19 LAB
SARS-COV-2 RNA RESP QL NAA+PROBE: NOT DETECTED
SPECIMEN SOURCE: NORMAL

## 2020-06-20 NOTE — DISCHARGE PLANNING
Action:    Received a telephone call today from patient's [facility] Uri[facility] and group home owner Ekaterina [phone].  Pt has been agitated, not wanting to get out of bed, payam area concerns.  Requested sooner visit from [facility].    RN CM left message for [facility] to please visit patient today or tomorrow if possible and call group home owner Ekaterina and on call [facility] at [phone].

## 2020-06-23 NOTE — DISCHARGE PLANNING
Agency/Facility Name: [facility]  Spoke To: So  Outcome: [facility] sent most recent COVID results via e-fax in Epic.

## 2020-06-23 NOTE — DISCHARGE PLANNING
Action:  ROD MONTALVO telephoned Ekaterina with group home.  She stated that she never received a call from [place].    ROD MONTALVO contacted [place] and was informed that Nakia will be calling to schedule a visit with patient tomorrow.

## 2020-08-18 ENCOUNTER — HOSPITAL ENCOUNTER (OUTPATIENT)
Dept: RADIOLOGY | Facility: MEDICAL CENTER | Age: 55
DRG: 641 | End: 2020-08-18
Attending: PHYSICIAN ASSISTANT
Payer: MEDICARE

## 2020-08-18 ENCOUNTER — OFFICE VISIT (OUTPATIENT)
Dept: URGENT CARE | Facility: PHYSICIAN GROUP | Age: 55
End: 2020-08-18
Payer: MEDICARE

## 2020-08-18 VITALS
HEIGHT: 63 IN | WEIGHT: 131 LBS | TEMPERATURE: 97.4 F | BODY MASS INDEX: 23.21 KG/M2 | RESPIRATION RATE: 18 BRPM | OXYGEN SATURATION: 93 % | DIASTOLIC BLOOD PRESSURE: 68 MMHG | SYSTOLIC BLOOD PRESSURE: 112 MMHG | HEART RATE: 105 BPM

## 2020-08-18 DIAGNOSIS — R60.0 BILATERAL LOWER EXTREMITY EDEMA: ICD-10-CM

## 2020-08-18 PROCEDURE — 71045 X-RAY EXAM CHEST 1 VIEW: CPT

## 2020-08-18 PROCEDURE — 99214 OFFICE O/P EST MOD 30 MIN: CPT | Performed by: PHYSICIAN ASSISTANT

## 2020-08-18 ASSESSMENT — ENCOUNTER SYMPTOMS
COUGH: 0
MYALGIAS: 0
SENSORY CHANGE: 0
NAUSEA: 0
WEAKNESS: 0
FEVER: 0
HEADACHES: 0
SORE THROAT: 0
VOMITING: 0
PALPITATIONS: 0
TINGLING: 0
CHILLS: 0
WHEEZING: 0
ORTHOPNEA: 0
STRIDOR: 0
HEMOPTYSIS: 0
DIZZINESS: 0
SHORTNESS OF BREATH: 0
ABDOMINAL PAIN: 0
DIAPHORESIS: 0

## 2020-08-18 ASSESSMENT — FIBROSIS 4 INDEX: FIB4 SCORE: 0.94

## 2020-08-18 NOTE — LETTER
[facility] [address] [address]     August 18, 2020    Patient: Erlinda Verde   YOB: 1965   Date of Visit: 8/18/2020       To Whom It May Concern:    Erlinda Verde was seen and treated in our department on 8/18/2020.     Sincerely,     Kp Orourke P.A.-C.

## 2020-08-19 ENCOUNTER — APPOINTMENT (OUTPATIENT)
Dept: RADIOLOGY | Facility: MEDICAL CENTER | Age: 55
DRG: 641 | End: 2020-08-19
Attending: EMERGENCY MEDICINE
Payer: MEDICARE

## 2020-08-19 ENCOUNTER — HOSPITAL ENCOUNTER (INPATIENT)
Facility: MEDICAL CENTER | Age: 55
LOS: 5 days | DRG: 641 | End: 2020-08-24
Attending: EMERGENCY MEDICINE | Admitting: HOSPITALIST
Payer: MEDICARE

## 2020-08-19 DIAGNOSIS — S82.892A CLOSED FRACTURE OF LEFT ANKLE, INITIAL ENCOUNTER: ICD-10-CM

## 2020-08-19 DIAGNOSIS — S82.65XA CLOSED NONDISPLACED FRACTURE OF LATERAL MALLEOLUS OF LEFT FIBULA, INITIAL ENCOUNTER: ICD-10-CM

## 2020-08-19 DIAGNOSIS — E87.1 HYPONATREMIA: ICD-10-CM

## 2020-08-19 DIAGNOSIS — G20.A1 PARKINSON'S DISEASE (TREMOR, STIFFNESS, SLOW MOTION, UNSTABLE POSTURE) (HCC): ICD-10-CM

## 2020-08-19 LAB
ALBUMIN SERPL BCP-MCNC: 3.8 G/DL (ref 3.2–4.9)
ALBUMIN/GLOB SERPL: 1.5 G/DL
ALP SERPL-CCNC: 70 U/L (ref 30–99)
ALT SERPL-CCNC: 10 U/L (ref 2–50)
ANION GAP SERPL CALC-SCNC: 12 MMOL/L (ref 7–16)
ANION GAP SERPL CALC-SCNC: 13 MMOL/L (ref 7–16)
ANION GAP SERPL CALC-SCNC: 14 MMOL/L (ref 7–16)
APPEARANCE UR: CLEAR
AST SERPL-CCNC: 17 U/L (ref 12–45)
BASOPHILS # BLD AUTO: 0.2 % (ref 0–1.8)
BASOPHILS # BLD: 0.01 K/UL (ref 0–0.12)
BILIRUB SERPL-MCNC: 0.2 MG/DL (ref 0.1–1.5)
BILIRUB UR QL STRIP.AUTO: NEGATIVE
BUN SERPL-MCNC: 16 MG/DL (ref 8–22)
BUN SERPL-MCNC: 20 MG/DL (ref 8–22)
BUN SERPL-MCNC: 22 MG/DL (ref 8–22)
CALCIUM SERPL-MCNC: 8.9 MG/DL (ref 8.5–10.5)
CALCIUM SERPL-MCNC: 9.3 MG/DL (ref 8.5–10.5)
CALCIUM SERPL-MCNC: 9.3 MG/DL (ref 8.5–10.5)
CHLORIDE SERPL-SCNC: 88 MMOL/L (ref 96–112)
CHLORIDE SERPL-SCNC: 92 MMOL/L (ref 96–112)
CHLORIDE SERPL-SCNC: 92 MMOL/L (ref 96–112)
CO2 SERPL-SCNC: 20 MMOL/L (ref 20–33)
CO2 SERPL-SCNC: 21 MMOL/L (ref 20–33)
CO2 SERPL-SCNC: 23 MMOL/L (ref 20–33)
COLOR UR: YELLOW
COVID ORDER STATUS COVID19: NORMAL
CREAT SERPL-MCNC: 0.75 MG/DL (ref 0.5–1.4)
CREAT SERPL-MCNC: 0.96 MG/DL (ref 0.5–1.4)
CREAT SERPL-MCNC: 0.99 MG/DL (ref 0.5–1.4)
CRP SERPL HS-MCNC: 0.39 MG/DL (ref 0–0.75)
D DIMER PPP IA.FEU-MCNC: <0.27 UG/ML (FEU) (ref 0–0.5)
EOSINOPHIL # BLD AUTO: 0.01 K/UL (ref 0–0.51)
EOSINOPHIL NFR BLD: 0.2 % (ref 0–6.9)
ERYTHROCYTE [DISTWIDTH] IN BLOOD BY AUTOMATED COUNT: 47.5 FL (ref 35.9–50)
GLOBULIN SER CALC-MCNC: 2.5 G/DL (ref 1.9–3.5)
GLUCOSE BLD-MCNC: 114 MG/DL (ref 65–99)
GLUCOSE BLD-MCNC: 138 MG/DL (ref 65–99)
GLUCOSE BLD-MCNC: 94 MG/DL (ref 65–99)
GLUCOSE SERPL-MCNC: 96 MG/DL (ref 65–99)
GLUCOSE UR STRIP.AUTO-MCNC: NEGATIVE MG/DL
HCT VFR BLD AUTO: 35.5 % (ref 37–47)
HGB BLD-MCNC: 12 G/DL (ref 12–16)
IMM GRANULOCYTES # BLD AUTO: 0.03 K/UL (ref 0–0.11)
IMM GRANULOCYTES NFR BLD AUTO: 0.7 % (ref 0–0.9)
KETONES UR STRIP.AUTO-MCNC: NEGATIVE MG/DL
LEUKOCYTE ESTERASE UR QL STRIP.AUTO: NEGATIVE
LYMPHOCYTES # BLD AUTO: 1.92 K/UL (ref 1–4.8)
LYMPHOCYTES NFR BLD: 42.1 % (ref 22–41)
MCH RBC QN AUTO: 28.6 PG (ref 27–33)
MCHC RBC AUTO-ENTMCNC: 33.8 G/DL (ref 33.6–35)
MCV RBC AUTO: 84.7 FL (ref 81.4–97.8)
MICRO URNS: NORMAL
MONOCYTES # BLD AUTO: 0.33 K/UL (ref 0–0.85)
MONOCYTES NFR BLD AUTO: 7.2 % (ref 0–13.4)
NEUTROPHILS # BLD AUTO: 2.26 K/UL (ref 2–7.15)
NEUTROPHILS NFR BLD: 49.6 % (ref 44–72)
NITRITE UR QL STRIP.AUTO: NEGATIVE
NRBC # BLD AUTO: 0 K/UL
NRBC BLD-RTO: 0 /100 WBC
NT-PROBNP SERPL IA-MCNC: 521 PG/ML (ref 0–125)
OSMOLALITY SERPL: 263 MOSM/KG H2O (ref 278–298)
OSMOLALITY UR: 433 MOSM/KG H2O (ref 300–900)
PH UR STRIP.AUTO: 8 [PH] (ref 5–8)
PLATELET # BLD AUTO: 137 K/UL (ref 164–446)
PMV BLD AUTO: 8.9 FL (ref 9–12.9)
POTASSIUM SERPL-SCNC: 4.2 MMOL/L (ref 3.6–5.5)
POTASSIUM SERPL-SCNC: 4.2 MMOL/L (ref 3.6–5.5)
POTASSIUM SERPL-SCNC: 4.6 MMOL/L (ref 3.6–5.5)
PROT SERPL-MCNC: 6.3 G/DL (ref 6–8.2)
PROT UR QL STRIP: NEGATIVE MG/DL
RBC # BLD AUTO: 4.19 M/UL (ref 4.2–5.4)
RBC UR QL AUTO: NEGATIVE
SARS-COV-2 RNA RESP QL NAA+PROBE: NOTDETECTED
SODIUM SERPL-SCNC: 123 MMOL/L (ref 135–145)
SODIUM SERPL-SCNC: 126 MMOL/L (ref 135–145)
SODIUM SERPL-SCNC: 126 MMOL/L (ref 135–145)
SODIUM UR-SCNC: 91 MMOL/L
SP GR UR STRIP.AUTO: 1.01
SPECIMEN SOURCE: NORMAL
TSH SERPL DL<=0.005 MIU/L-ACNC: 4.26 UIU/ML (ref 0.38–5.33)
UROBILINOGEN UR STRIP.AUTO-MCNC: 0.2 MG/DL
WBC # BLD AUTO: 4.6 K/UL (ref 4.8–10.8)

## 2020-08-19 PROCEDURE — 83880 ASSAY OF NATRIURETIC PEPTIDE: CPT

## 2020-08-19 PROCEDURE — A9270 NON-COVERED ITEM OR SERVICE: HCPCS | Performed by: HOSPITALIST

## 2020-08-19 PROCEDURE — 84443 ASSAY THYROID STIM HORMONE: CPT

## 2020-08-19 PROCEDURE — 80048 BASIC METABOLIC PNL TOTAL CA: CPT | Mod: 91

## 2020-08-19 PROCEDURE — 99220 PR INITIAL OBSERVATION CARE,LEVL III: CPT | Performed by: HOSPITALIST

## 2020-08-19 PROCEDURE — 29515 APPLICATION SHORT LEG SPLINT: CPT

## 2020-08-19 PROCEDURE — 99285 EMERGENCY DEPT VISIT HI MDM: CPT

## 2020-08-19 PROCEDURE — 80053 COMPREHEN METABOLIC PANEL: CPT

## 2020-08-19 PROCEDURE — 96372 THER/PROPH/DIAG INJ SC/IM: CPT

## 2020-08-19 PROCEDURE — 73630 X-RAY EXAM OF FOOT: CPT | Mod: LT

## 2020-08-19 PROCEDURE — 85379 FIBRIN DEGRADATION QUANT: CPT

## 2020-08-19 PROCEDURE — 36415 COLL VENOUS BLD VENIPUNCTURE: CPT

## 2020-08-19 PROCEDURE — 86140 C-REACTIVE PROTEIN: CPT

## 2020-08-19 PROCEDURE — 84300 ASSAY OF URINE SODIUM: CPT

## 2020-08-19 PROCEDURE — 83930 ASSAY OF BLOOD OSMOLALITY: CPT

## 2020-08-19 PROCEDURE — U0003 INFECTIOUS AGENT DETECTION BY NUCLEIC ACID (DNA OR RNA); SEVERE ACUTE RESPIRATORY SYNDROME CORONAVIRUS 2 (SARS-COV-2) (CORONAVIRUS DISEASE [COVID-19]), AMPLIFIED PROBE TECHNIQUE, MAKING USE OF HIGH THROUGHPUT TECHNOLOGIES AS DESCRIBED BY CMS-2020-01-R: HCPCS

## 2020-08-19 PROCEDURE — 700105 HCHG RX REV CODE 258: Performed by: HOSPITALIST

## 2020-08-19 PROCEDURE — 700111 HCHG RX REV CODE 636 W/ 250 OVERRIDE (IP): Performed by: HOSPITALIST

## 2020-08-19 PROCEDURE — 73610 X-RAY EXAM OF ANKLE: CPT | Mod: LT

## 2020-08-19 PROCEDURE — 82962 GLUCOSE BLOOD TEST: CPT | Mod: 91

## 2020-08-19 PROCEDURE — C9803 HOPD COVID-19 SPEC COLLECT: HCPCS | Performed by: INTERNAL MEDICINE

## 2020-08-19 PROCEDURE — 302874 HCHG BANDAGE ACE 2 OR 3""

## 2020-08-19 PROCEDURE — 83935 ASSAY OF URINE OSMOLALITY: CPT

## 2020-08-19 PROCEDURE — 81003 URINALYSIS AUTO W/O SCOPE: CPT

## 2020-08-19 PROCEDURE — 85025 COMPLETE CBC W/AUTO DIFF WBC: CPT

## 2020-08-19 PROCEDURE — 770020 HCHG ROOM/CARE - TELE (206)

## 2020-08-19 PROCEDURE — 700102 HCHG RX REV CODE 250 W/ 637 OVERRIDE(OP): Performed by: HOSPITALIST

## 2020-08-19 RX ORDER — OXYBUTYNIN CHLORIDE 5 MG/1
5 TABLET, EXTENDED RELEASE ORAL EVERY EVENING
COMMUNITY
End: 2023-10-12

## 2020-08-19 RX ORDER — LISINOPRIL AND HYDROCHLOROTHIAZIDE 25; 20 MG/1; MG/1
1 TABLET ORAL EVERY MORNING
Status: ON HOLD | COMMUNITY
End: 2020-08-24

## 2020-08-19 RX ORDER — ACETAMINOPHEN 325 MG/1
650 TABLET ORAL EVERY 6 HOURS PRN
Status: DISCONTINUED | OUTPATIENT
Start: 2020-08-19 | End: 2020-08-24 | Stop reason: HOSPADM

## 2020-08-19 RX ORDER — DEXTROSE MONOHYDRATE 25 G/50ML
50 INJECTION, SOLUTION INTRAVENOUS
Status: DISCONTINUED | OUTPATIENT
Start: 2020-08-19 | End: 2020-08-19

## 2020-08-19 RX ORDER — PROCHLORPERAZINE EDISYLATE 5 MG/ML
5-10 INJECTION INTRAMUSCULAR; INTRAVENOUS EVERY 4 HOURS PRN
Status: DISCONTINUED | OUTPATIENT
Start: 2020-08-19 | End: 2020-08-24 | Stop reason: HOSPADM

## 2020-08-19 RX ORDER — ATORVASTATIN CALCIUM 20 MG/1
20 TABLET, FILM COATED ORAL
COMMUNITY

## 2020-08-19 RX ORDER — QUETIAPINE 300 MG/1
300 TABLET, FILM COATED, EXTENDED RELEASE ORAL NIGHTLY
COMMUNITY
End: 2022-07-28

## 2020-08-19 RX ORDER — ZIPRASIDONE HYDROCHLORIDE 20 MG/1
20 CAPSULE ORAL 2 TIMES DAILY
Status: ON HOLD | COMMUNITY
End: 2020-08-19

## 2020-08-19 RX ORDER — AMOXICILLIN 250 MG
2 CAPSULE ORAL 2 TIMES DAILY
Status: DISCONTINUED | OUTPATIENT
Start: 2020-08-19 | End: 2020-08-24 | Stop reason: HOSPADM

## 2020-08-19 RX ORDER — DEXTROSE MONOHYDRATE 25 G/50ML
50 INJECTION, SOLUTION INTRAVENOUS
Status: DISCONTINUED | OUTPATIENT
Start: 2020-08-19 | End: 2020-08-24 | Stop reason: HOSPADM

## 2020-08-19 RX ORDER — PROMETHAZINE HYDROCHLORIDE 25 MG/1
12.5-25 SUPPOSITORY RECTAL EVERY 4 HOURS PRN
Status: DISCONTINUED | OUTPATIENT
Start: 2020-08-19 | End: 2020-08-24 | Stop reason: HOSPADM

## 2020-08-19 RX ORDER — DIVALPROEX SODIUM 500 MG/1
1000 TABLET, EXTENDED RELEASE ORAL
COMMUNITY
End: 2023-10-12

## 2020-08-19 RX ORDER — BISACODYL 10 MG
10 SUPPOSITORY, RECTAL RECTAL
Status: DISCONTINUED | OUTPATIENT
Start: 2020-08-19 | End: 2020-08-24 | Stop reason: HOSPADM

## 2020-08-19 RX ORDER — SODIUM CHLORIDE 9 MG/ML
INJECTION, SOLUTION INTRAVENOUS CONTINUOUS
Status: DISPENSED | OUTPATIENT
Start: 2020-08-19 | End: 2020-08-20

## 2020-08-19 RX ORDER — INSULIN GLARGINE 100 [IU]/ML
0.2 INJECTION, SOLUTION SUBCUTANEOUS EVERY EVENING
Status: DISCONTINUED | OUTPATIENT
Start: 2020-08-19 | End: 2020-08-19

## 2020-08-19 RX ORDER — SERTRALINE HYDROCHLORIDE 100 MG/1
100 TABLET, FILM COATED ORAL EVERY MORNING
COMMUNITY

## 2020-08-19 RX ORDER — MELOXICAM 15 MG/1
15 TABLET ORAL EVERY MORNING
Status: ON HOLD | COMMUNITY
End: 2022-02-28

## 2020-08-19 RX ORDER — ONDANSETRON 4 MG/1
4 TABLET, ORALLY DISINTEGRATING ORAL EVERY 4 HOURS PRN
Status: DISCONTINUED | OUTPATIENT
Start: 2020-08-19 | End: 2020-08-24 | Stop reason: HOSPADM

## 2020-08-19 RX ORDER — ARIPIPRAZOLE 30 MG/1
30 TABLET ORAL EVERY EVENING
COMMUNITY
End: 2023-10-12

## 2020-08-19 RX ORDER — METFORMIN HYDROCHLORIDE 500 MG/1
500 TABLET, EXTENDED RELEASE ORAL EVERY MORNING
COMMUNITY
End: 2023-10-12

## 2020-08-19 RX ORDER — DONEPEZIL HYDROCHLORIDE 10 MG/1
10 TABLET, FILM COATED ORAL EVERY MORNING
COMMUNITY

## 2020-08-19 RX ORDER — LEVONORGESTREL AND ETHINYL ESTRADIOL 0.15-0.03
1 KIT ORAL DAILY
Status: ON HOLD | COMMUNITY
End: 2020-08-19

## 2020-08-19 RX ORDER — POLYETHYLENE GLYCOL 3350 17 G/17G
1 POWDER, FOR SOLUTION ORAL
Status: DISCONTINUED | OUTPATIENT
Start: 2020-08-19 | End: 2020-08-24 | Stop reason: HOSPADM

## 2020-08-19 RX ORDER — PROMETHAZINE HYDROCHLORIDE 25 MG/1
12.5-25 TABLET ORAL EVERY 4 HOURS PRN
Status: DISCONTINUED | OUTPATIENT
Start: 2020-08-19 | End: 2020-08-24 | Stop reason: HOSPADM

## 2020-08-19 RX ORDER — ONDANSETRON 2 MG/ML
4 INJECTION INTRAMUSCULAR; INTRAVENOUS EVERY 4 HOURS PRN
Status: DISCONTINUED | OUTPATIENT
Start: 2020-08-19 | End: 2020-08-24 | Stop reason: HOSPADM

## 2020-08-19 RX ORDER — M-VIT,TX,IRON,MINS/CALC/FOLIC 27MG-0.4MG
1 TABLET ORAL DAILY
COMMUNITY
End: 2022-07-28

## 2020-08-19 RX ORDER — TOLTERODINE 4 MG/1
4 CAPSULE, EXTENDED RELEASE ORAL EVERY MORNING
Status: ON HOLD | COMMUNITY
End: 2023-11-22

## 2020-08-19 RX ADMIN — SODIUM CHLORIDE: 9 INJECTION, SOLUTION INTRAVENOUS at 20:19

## 2020-08-19 RX ADMIN — SODIUM CHLORIDE: 9 INJECTION, SOLUTION INTRAVENOUS at 10:40

## 2020-08-19 RX ADMIN — ACETAMINOPHEN 650 MG: 325 TABLET, FILM COATED ORAL at 21:30

## 2020-08-19 RX ADMIN — ENOXAPARIN SODIUM 40 MG: 40 INJECTION SUBCUTANEOUS at 10:40

## 2020-08-19 ASSESSMENT — LIFESTYLE VARIABLES
TOTAL SCORE: 0
ON A TYPICAL DAY WHEN YOU DRINK ALCOHOL HOW MANY DRINKS DO YOU HAVE: 0
TOTAL SCORE: 0
TOTAL SCORE: 0
AVERAGE NUMBER OF DAYS PER WEEK YOU HAVE A DRINK CONTAINING ALCOHOL: 0
ALCOHOL_USE: NO
HAVE PEOPLE ANNOYED YOU BY CRITICIZING YOUR DRINKING: NO
EVER FELT BAD OR GUILTY ABOUT YOUR DRINKING: NO
DOES PATIENT WANT TO STOP DRINKING: CANNOT ASSESS
HOW MANY TIMES IN THE PAST YEAR HAVE YOU HAD 5 OR MORE DRINKS IN A DAY: 0
HAVE YOU EVER FELT YOU SHOULD CUT DOWN ON YOUR DRINKING: NO
CONSUMPTION TOTAL: NEGATIVE
EVER HAD A DRINK FIRST THING IN THE MORNING TO STEADY YOUR NERVES TO GET RID OF A HANGOVER: NO

## 2020-08-19 ASSESSMENT — COGNITIVE AND FUNCTIONAL STATUS - GENERAL
STANDING UP FROM CHAIR USING ARMS: A LITTLE
WALKING IN HOSPITAL ROOM: A LITTLE
SUGGESTED CMS G CODE MODIFIER DAILY ACTIVITY: CK
MOBILITY SCORE: 17
MOVING TO AND FROM BED TO CHAIR: A LITTLE
TURNING FROM BACK TO SIDE WHILE IN FLAT BAD: A LITTLE
EATING MEALS: A LITTLE
MOVING FROM LYING ON BACK TO SITTING ON SIDE OF FLAT BED: A LITTLE
CLIMB 3 TO 5 STEPS WITH RAILING: A LOT
DRESSING REGULAR UPPER BODY CLOTHING: A LITTLE
DAILY ACTIVITIY SCORE: 18
DRESSING REGULAR LOWER BODY CLOTHING: A LITTLE
SUGGESTED CMS G CODE MODIFIER MOBILITY: CK
PERSONAL GROOMING: A LITTLE
HELP NEEDED FOR BATHING: A LITTLE
TOILETING: A LITTLE

## 2020-08-19 ASSESSMENT — FIBROSIS 4 INDEX
FIB4 SCORE: 2.16
FIB4 SCORE: 0.94

## 2020-08-19 ASSESSMENT — COPD QUESTIONNAIRES
DO YOU EVER COUGH UP ANY MUCUS OR PHLEGM?: NO/ONLY WITH OCCASIONAL COLDS OR INFECTIONS
DURING THE PAST 4 WEEKS HOW MUCH DID YOU FEEL SHORT OF BREATH: NONE/LITTLE OF THE TIME
IN THE PAST 12 MONTHS DO YOU DO LESS THAN YOU USED TO BECAUSE OF YOUR BREATHING PROBLEMS: DISAGREE/UNSURE
HAVE YOU SMOKED AT LEAST 100 CIGARETTES IN YOUR ENTIRE LIFE: NO/DON'T KNOW
COPD SCREENING SCORE: 1

## 2020-08-19 NOTE — H&P
Hospital Medicine History & Physical Note    Date of Service  8/19/2020    Primary Care Physician  Whitney Graham M.D.    Consultants  Orthopedic surgery    Code Status  Full Code    Chief Complaint  Chief Complaint   Patient presents with   • Ankle Pain     Patient brought in by EMS from [facility] for left ankle pain. 3+ pitting edema noted to left foot, warm to touch, 2+ pedal pulse. Per EMS, patient went to urgent care yesterday for ankle pain and reported a chest xray was performed and found fluid in her lungs. EMS unable to state why patient had a CXR and if an xray of ankle was done. Patient is confused, A&Ox2 at baseline per EMS.        History of Presenting Illness  54 y.o. female who presented 8/19/2020 with ankle pain.  This patient lives at a group home, and she has Parkinson's and schizophrenia as well as some cognitive deficits and therefore is unable to give us any history.  She apparently had some ankle pain, this led to imaging here in the emergency room which has demonstrated a probable nondisplaced lateral malleolus fracture.  Patient labs were drawn as well, this has shown a sodium of 123.  The most recent comparison lab is from June in our system, on that occasion her sodium was 139.  The patient's medication list which was sent to us from the group home, is incomplete, and is dated from May therefore we are uncertain what medication she is on.  We are trying to get a hold of the pharmacy to get up more up-to-date information.  We also do not know what her p.o. fluid intake is.  The patient herself on my examination is quite pleasant, she greets me when I walked in the room, however she is unable to answer any questions for me.  As far as we can ascertain this is her baseline.    Review of Systems  Review of Systems   Unable to perform ROS: Other       Past Medical History  Schizophrenia  Parkinson's disease  Diabetes  Cognitive delay    Surgical History  Unable to ascertain due  to the patient's mental status  Family History  Unable to ascertain due to the patient's mental status    Social History   reports that she has never smoked. She has never used smokeless tobacco. She reports that she does not drink alcohol or use drugs.    Allergies  Allergies   Allergen Reactions   • Risperdal        Medications  None       Physical Exam  Temp:  [36.7 °C (98 °F)] 36.7 °C (98 °F)  Pulse:  [63-77] 67  Resp:  [14-23] 15  BP: (112-144)/(68-85) 137/85  SpO2:  [95 %-99 %] 99 %    Physical Exam  Vitals signs reviewed.   Constitutional:       General: She is not in acute distress.     Appearance: She is well-developed. She is not diaphoretic.   HENT:      Head: Normocephalic and atraumatic.   Neck:      Vascular: No JVD.   Cardiovascular:      Rate and Rhythm: Normal rate and regular rhythm.      Heart sounds: No murmur.   Pulmonary:      Effort: Pulmonary effort is normal. No respiratory distress.      Breath sounds: No stridor. No wheezing or rales.   Abdominal:      Palpations: Abdomen is soft.      Tenderness: There is no abdominal tenderness. There is no guarding or rebound.   Musculoskeletal:      Right lower leg: Edema present.      Comments: Left ankle is in a splint, the toes are warm and pink on the side, on the right side she has trace edema.  She has a varus deformity of the lower extremity on the right side, I cannot appreciate this on the left due to the brace.   Skin:     General: Skin is warm and dry.      Findings: No rash.   Neurological:      General: No focal deficit present.      Comments: Patient is alert and she attends to the conversation she can tell me her name and say hello but otherwise is unable to tell anything else.  She moves all 4 extremities purposefully and follows simple commands.   Psychiatric:         Mood and Affect: Mood normal.         Laboratory:  Recent Labs     08/19/20  0709   WBC 4.6*   RBC 4.19*   HEMOGLOBIN 12.0   HEMATOCRIT 35.5*   MCV 84.7   MCH 28.6   MCHC  33.8   RDW 47.5   PLATELETCT 137*   MPV 8.9*     Recent Labs     08/19/20  0709   SODIUM 123*   POTASSIUM 4.2   CHLORIDE 88*   CO2 23   GLUCOSE 96   BUN 22   CREATININE 0.96   CALCIUM 9.3     Recent Labs     08/19/20  0709   ALTSGPT 10   ASTSGOT 17   ALKPHOSPHAT 70   TBILIRUBIN 0.2   GLUCOSE 96         Recent Labs     08/19/20  0709   NTPROBNP 521*         No results for input(s): TROPONINT in the last 72 hours.    Imaging:  DX-FOOT-COMPLETE 3+ LEFT   Final Result      No acute bony abnormality.      DX-ANKLE 3+ VIEWS LEFT   Final Result      Possible nondisplaced lateral malleolar fracture as noted above.            Assessment/Plan:  I anticipate this patient will require at least two midnights for appropriate medical management, necessitating inpatient admission.    Closed nondisplaced fracture of lateral malleolus of left fibula  Assessment & Plan  Patient will be maintained nonweightbearing until orthopedic surgery can evaluate  Distal neurovascular function appears to be intact    Hyponatremia  Assessment & Plan  Na in low 120s is likely causing some mental status changes and makes her high risk for Sz  Etiology is unclear.  She had a normal sodium in June, her chloride is low which would imply perhaps hypovolemic hyponatremia.  We will gently resuscitate with normal saline  Follow every 6 sodiums  Check urine and serum osmolalities as well as a urine sodium  Goal will be to correct her up to the upper 120s in the next 24 hours as we do not know the degree of acuity    Parkinson's disease (tremor, stiffness, slow motion, unstable posture) ([facility])- (present on admission)  Assessment & Plan  As noted we are still in the process of ascertaining her medications, we will place her back on them once we have established what they are    Diabetes ([facility])- (present on admission)  Assessment & Plan  Again we do not know what medications the patient is on  We will for the moment place her on sliding scale and a diabetic diet  while we ascertain her normal medication regimen  Her last A1c was in the sixes in June of this year which would imply good control    Schizophrenia (HCC)- (present on admission)  Assessment & Plan  We are in the process of trying to ascertain the patient's baseline medications which we will resume after review

## 2020-08-19 NOTE — PROGRESS NOTES
Triage Note    55 yo woman with dementia, schizophrenia, HTN, DM who lives in a group home who presented with left ankle swelling and pain.  XR showed - Possible nondisplaced lateral malleolar fracture  I asked ERP to consult ortho PA  Also hyponatremic of 123 and will need correction and monitoring.    Dr. Dowell to admit.

## 2020-08-19 NOTE — ED PROVIDER NOTES
"ED Provider Note    CHIEF COMPLAINT  Chief Complaint   Patient presents with   • Ankle Pain     Patient brought in by EMS from [facility] for left ankle pain. 3+ pitting edema noted to left foot, warm to touch, 2+ pedal pulse. Per EMS, patient went to urgent care yesterday for ankle pain and reported a chest xray was performed and found fluid in her lungs. EMS unable to state why patient had a CXR and if an xray of ankle was done. Patient is confused, A&Ox2 at baseline per EMS.        HPI  Erlinda Verde is a 54 y.o. female who presents from nursing facility.  Patient is nonverbal and reportedly at her baseline mental status.  I called nursing staff at her care facility.  She has had swelling of her ankles mostly on the left for the last few days.  Was seen in urgent care yesterday.  Chest x-ray was done that showed a pleural effusion.  Her pain seemed worse today and was not acting quite right and therefore sent to the ER.  She has not had a fever.  No known trauma.  I am unable to obtain any other history.    Reviewed medical record.  X-ray showed pleural effusion which was very small.  There is plan for laboratory data.    REVIEW OF SYSTEMS  Unable to obtain    PAST MEDICAL HISTORY  Per the chart Parkinson's, schizophrenia, thrombocytopenia, diabetes, constipation    SOCIAL HISTORY  Lives at a nursing home    SURGICAL HISTORY  No past surgical history on file.    CURRENT MEDICATIONS  Home Medications    **Home medications have not yet been reviewed for this encounter**         ALLERGIES  Allergies   Allergen Reactions   • Risperdal        PHYSICAL EXAM  VITAL SIGNS: /70   Pulse 63   Resp (!) 23   Ht 1.6 m (5' 3\")   Wt 59.4 kg (130 lb 15.3 oz)   SpO2 96%   BMI 23.20 kg/m²    Constitutional: Awake and alert.  Pleasant and cooperative  HENT:  Atraumatic, Normocephalic.Oropharynx moist mucus membranes, Nose normal inspection.   Eyes: Normal inspection  Neck: Supple  Cardiovascular: Normal " heart rate, Normal rhythm.  Symmetric peripheral pulses.   Thorax & Lungs: No respiratory distress, No wheezing, No rales, No rhonchi, No chest tenderness.   Abdomen: Bowel sounds normal, soft, non-distended, nontender, no mass  Skin: Warm, Dry, No rash.   Back: No tenderness, No CVA tenderness.   Extremities: There is swelling over the dorsum of the left foot and lateral malleolus with scant overlying redness.  No Homans or cords.  Minimal pedal edema on the right.  Neurologic: She will move all extremities.  She does not answer any questions, but asks how I am doing in a clear voice.  She follows simple commands.  Repeatedly grabs her genitals  Psychiatric: Anxious appearing    RADIOLOGY/PROCEDURES  DX-FOOT-COMPLETE 3+ LEFT   Final Result      No acute bony abnormality.      DX-ANKLE 3+ VIEWS LEFT   Final Result      Possible nondisplaced lateral malleolar fracture as noted above.           Imaging is interpreted by radiologist    Labs:  Results for orders placed or performed during the hospital encounter of 08/19/20   CBC WITH DIFFERENTIAL   Result Value Ref Range    WBC 4.6 (L) 4.8 - 10.8 K/uL    RBC 4.19 (L) 4.20 - 5.40 M/uL    Hemoglobin 12.0 12.0 - 16.0 g/dL    Hematocrit 35.5 (L) 37.0 - 47.0 %    MCV 84.7 81.4 - 97.8 fL    MCH 28.6 27.0 - 33.0 pg    MCHC 33.8 33.6 - 35.0 g/dL    RDW 47.5 35.9 - 50.0 fL    Platelet Count 137 (L) 164 - 446 K/uL    MPV 8.9 (L) 9.0 - 12.9 fL    Neutrophils-Polys 49.60 44.00 - 72.00 %    Lymphocytes 42.10 (H) 22.00 - 41.00 %    Monocytes 7.20 0.00 - 13.40 %    Eosinophils 0.20 0.00 - 6.90 %    Basophils 0.20 0.00 - 1.80 %    Immature Granulocytes 0.70 0.00 - 0.90 %    Nucleated RBC 0.00 /100 WBC    Neutrophils (Absolute) 2.26 2.00 - 7.15 K/uL    Lymphs (Absolute) 1.92 1.00 - 4.80 K/uL    Monos (Absolute) 0.33 0.00 - 0.85 K/uL    Eos (Absolute) 0.01 0.00 - 0.51 K/uL    Baso (Absolute) 0.01 0.00 - 0.12 K/uL    Immature Granulocytes (abs) 0.03 0.00 - 0.11 K/uL    NRBC (Absolute)  0.00 K/uL   COMP METABOLIC PANEL   Result Value Ref Range    Sodium 123 (L) 135 - 145 mmol/L    Potassium 4.2 3.6 - 5.5 mmol/L    Chloride 88 (L) 96 - 112 mmol/L    Co2 23 20 - 33 mmol/L    Anion Gap 12.0 7.0 - 16.0    Glucose 96 65 - 99 mg/dL    Bun 22 8 - 22 mg/dL    Creatinine 0.96 0.50 - 1.40 mg/dL    Calcium 9.3 8.5 - 10.5 mg/dL    AST(SGOT) 17 12 - 45 U/L    ALT(SGPT) 10 2 - 50 U/L    Alkaline Phosphatase 70 30 - 99 U/L    Total Bilirubin 0.2 0.1 - 1.5 mg/dL    Albumin 3.8 3.2 - 4.9 g/dL    Total Protein 6.3 6.0 - 8.2 g/dL    Globulin 2.5 1.9 - 3.5 g/dL    A-G Ratio 1.5 g/dL   CRP QUANTITIVE (NON-CARDIAC)   Result Value Ref Range    Stat C-Reactive Protein 0.39 0.00 - 0.75 mg/dL   proBrain Natriuretic Peptide, NT   Result Value Ref Range    NT-proBNP 521 (H) 0 - 125 pg/mL   D-DIMER   Result Value Ref Range    D-Dimer Screen <0.27 0.00 - 0.50 ug/mL (FEU)   URINALYSIS,CULTURE IF INDICATED    Specimen: Urine   Result Value Ref Range    Color Yellow     Character Clear     Specific Gravity 1.013 <1.035    Ph 8.0 5.0 - 8.0    Glucose Negative Negative mg/dL    Ketones Negative Negative mg/dL    Protein Negative Negative mg/dL    Bilirubin Negative Negative    Urobilinogen, Urine 0.2 Negative    Nitrite Negative Negative    Leukocyte Esterase Negative Negative    Occult Blood Negative Negative    Micro Urine Req see below    ESTIMATED GFR   Result Value Ref Range    GFR If African American >60 >60 mL/min/1.73 m 2    GFR If Non African American >60 >60 mL/min/1.73 m 2         COURSE & MEDICAL DECISION MAKING  Patient presents with ankle pain and swelling.  Spoke with her nursing home.  Ordered evaluation.    She appears to have a lateral malleolus fracture she is placed in a posterior short leg splint.    Laboratory data demonstrates significant hyponatremia.  She will be admitted to the hospital for treatment.    Consulted Dr. Posada for admission.  Requested orthopedic evaluation in hospital.  Paged orthopedics for  consultation    FINAL IMPRESSION  1.  Hyponatremia  2.  Nondisplaced lateral malleolus fracture      This dictation was created using voice recognition software. The accuracy of the dictation is limited to the abilities of the software.  The nursing notes were reviewed and certain aspects of this information were incorporated into this note.      Electronically signed by: Aj Hummel M.D., 8/19/2020 8:55 AM

## 2020-08-19 NOTE — PROGRESS NOTES
2 RN Skin Check    2 RN skin check complete Irene RN.   Devices in place: PIV, Tele box.  Skin assessed under devices: yes.  Confirmed pressure ulcers found on: NA.  New potential pressure ulcers noted on NA. Wound consult placed N/A.  The following interventions in place Pillows, Mepilex, Lotion and moisturizer.    Right elbow scab  Skin intact

## 2020-08-19 NOTE — ED NOTES
Med Rec complete per pt's Pharmacy  Pt unable to participate in interview, UNABLE to obtain MAR for last doses  Allergies Reviewed  No ABX filled in the last 14 days

## 2020-08-19 NOTE — ED NOTES
UNABLE to address med rec,   Pt's pharmacy closed, Unable to reach care giver for MAR. Multiple attempts made.  [facility] home [phone]

## 2020-08-19 NOTE — ED TRIAGE NOTES
Erlinda RAPHAEL Ammon  54 y.o. female  Chief Complaint   Patient presents with   • Ankle Pain     Patient brought in by EMS from [facility] for left ankle pain. 3+ pitting edema noted to left foot, warm to touch, 2+ pedal pulse. Per EMS, patient went to urgent care yesterday for ankle pain and reported a chest xray was performed and found fluid in her lungs. EMS unable to state why patient had a CXR and if an xray of ankle was done. Patient is confused, A&Ox2 at baseline per EMS.      Vitals:    08/19/20 0703   BP: 112/73   Pulse: 77   Resp: 17   SpO2: 96%

## 2020-08-19 NOTE — ED NOTES
Received report from ROD Chavez. Assumed care of patient. Patient awake, lying in bed. Pt readjusted, given pillow and CARE channel turned on for patient. Pt oriented to self and place. Pt denies needs at this time. Call light in reach, door & curtain open for patient safety.

## 2020-08-19 NOTE — ED NOTES
Pt cleaned or urine incontinence, bed linen changed. Purewick placed to pt for urinary incontinence. Pt readjusted higher in bed for comfort. Vitals stable. Call light in reach. Pt given warm blankets.

## 2020-08-19 NOTE — PROGRESS NOTES
Report received from Aarti ED RN. Updated on POC.  Assumed care of patient upon arrival to unit. Patient currently A & O x self; on RA; up without complaints of acute pain. Pt placed on monitor, monitor room notified, SR 77. Patient oriented to unit and to call light system. Call light within reach. Pt educated to fall risk. Fall precautions in place. Pt provided with personal grooming items. Bed locked and in lowest position. All questions answered. No other needs indicated at this time.

## 2020-08-19 NOTE — PROGRESS NOTES
Subjective:   Erlinda Verde is a 54 y.o. female who presents for Foot Swelling ((L) foot swelling x3 days (R) foot this morning)    HPI:  This is a 54-year-old female presenting to the clinic accompanied by her caregiver.  All questions today were answered by the caregiver.  She states the patient recently had left foot swelling that started about 3 days ago.  The swelling started over her midfoot and gradually spread to her left ankle.  Yesterday she noticed some swelling starting in the right foot and progressing to the right ankle.  She denies any associated injury.  The patient was recently hospitalized back in June for an injury and later developed COVID.  Patient has a past medical history significant for kidney disease    Review of Systems   Constitutional: Negative for chills, diaphoresis, fever and malaise/fatigue.   HENT: Negative for sore throat.    Respiratory: Negative for cough, hemoptysis, shortness of breath, wheezing and stridor.    Cardiovascular: Positive for leg swelling. Negative for chest pain, palpitations and orthopnea.   Gastrointestinal: Negative for abdominal pain, nausea and vomiting.   Musculoskeletal: Negative for myalgias.   Skin: Negative for rash.        No redness or increased warmth to the lower extremities.   Neurological: Negative for dizziness, tingling, sensory change, weakness and headaches.       Medications:    • aripiprazole  • Calcium Carbonate-Vitamin D Tabs  • divalproex Tbec  • donepezil  • meloxicam  • metFORMIN ER Tb24  • MULTIVITAMIN ADULT PO  • oxybutynin SR  • psyllium Pack  • quetiapine  • sertraline Tabs  • tolterodine ER Cp24    Allergies: Risperdal    Problem List: Erlinda Verde has Parkinson's disease (tremor, stiffness, slow motion, unstable posture) (formerly [facility]); Schizophrenia (formerly [facility]); Moderate intellectual disability; Dysthymic disorder; Injury, self-inflicted; KYLE (acute kidney injury) (formerly [facility]); Metabolic acidosis; Bruising; Thrombocytopenia (formerly [facility]); Diabetes  "(HCC); Constipation; COVID-19 virus infection; Anemia; and Hip pain on their problem list.    Surgical History:  No past surgical history on file.    Past Social Hx: Erlinda Verde  reports that she has never smoked. She has never used smokeless tobacco. She reports that she does not drink alcohol or use drugs.     Past Family Hx:  Erlinda Verde family history is not on file.     Problem list, medications, and allergies reviewed by myself today in Epic.     Objective:     /68 (BP Location: Left arm, Patient Position: Sitting, BP Cuff Size: Adult)   Pulse (!) 105   Temp 36.3 °C (97.4 °F) (Temporal)   Resp 18   Ht 1.6 m (5' 3\")   Wt 59.4 kg (131 lb)   SpO2 93%   BMI 23.21 kg/m²     Physical Exam  Constitutional:       General: She is not in acute distress.     Appearance: Normal appearance. She is not ill-appearing, toxic-appearing or diaphoretic.   HENT:      Head: Normocephalic and atraumatic.   Eyes:      Conjunctiva/sclera: Conjunctivae normal.   Neck:      Musculoskeletal: Normal range of motion. No muscular tenderness.   Cardiovascular:      Rate and Rhythm: Normal rate and regular rhythm.      Pulses: Normal pulses.      Heart sounds: Normal heart sounds. No murmur. No friction rub. No gallop.    Pulmonary:      Effort: Pulmonary effort is normal.      Breath sounds: Normal breath sounds. No wheezing, rhonchi or rales.   Abdominal:      Palpations: Abdomen is soft.      Tenderness: There is no abdominal tenderness. There is no right CVA tenderness, left CVA tenderness or guarding.   Musculoskeletal:      Right lower leg: Edema present.      Left lower leg: Edema present.        Feet:       Comments: Bilateral lower extremity edema to the feet and ankles.  Edema does not extend proximal to the malleoli.  No pitting edema. No erythema or warm. No pain to palpation.     FROM to the bilateral lower extremities.    Lymphadenopathy:      Cervical: No cervical adenopathy.   Skin:     General: Skin " is warm and dry.      Capillary Refill: Capillary refill takes less than 2 seconds.      Comments: No discoloration to the skin of the lower extremities.  No signs of cellulitis.  No ascending redness.   Neurological:      General: No focal deficit present.      Mental Status: She is alert and oriented to person, place, and time. Mental status is at baseline.   Psychiatric:         Mood and Affect: Mood normal.         Thought Content: Thought content normal.           FINDINGS:  Cardiomediastinal contour is within normal limits.  Minimal opacity at both lung bases.  Minimal blunting of LEFT costophrenic angle.  No pneumothorax.     IMPRESSION:  1.  Minimal bibasilar atelectasis.  2.  No pneumonia or pneumothorax.  3.  Minimal LEFT pleural fluid versus pleural reactive change.    Assessment/Plan:     Diagnosis and associated orders:   1. Bilateral lower extremity edema    - CBC WITH DIFFERENTIAL; Future  - Comp Metabolic Panel; Future  - proBrain Natriuretic Peptide, NT; Future  - D-DIMER; Future     Comments/MDM:       • Differential diagnoses and treatment options were discussed with the patient at length today.  Her symptoms started with left foot swelling that spread to her left ankle.  She denies any pain or discoloration.  1 day later she started having swelling in her right foot and ankle.  She has no pain with weightbearing.  She has full range of motion of the lower extremities.  She was recently hospitalized and diagnosed with COVID-19.  She has been living in a group home since and is cared for by her caregiver.  The patient is a poor historian and is unable to answer questions.  The caregiver denies the patient having any shortness of breath, cough or complaining of any chest pain.  I discussed possible differentials including CHF versus kidney disease versus vascular insufficiency versus DVT.  Outpatient imaging was closed at the time of this visit and they refused going to the emergency department  tonight.  We agreed to order labs and follow-up with the patient in the morning.  I discussed potential red flag symptoms at length to the caregiver.  If any of these present she understands to immediately bring the patient to the emergency department.           Differential diagnosis, natural history, supportive care, and indications for immediate follow-up discussed.    Advised the patient to follow-up with the primary care physician for recheck, reevaluation, and consideration of further management.    Please note that this dictation was created using voice recognition software. I have made reasonable attempt to correct obvious errors, but I expect that there are errors of grammar and possibly content that I did not discover before finalizing the note.    This note was electronically signed by UCHE Orourke PA-C

## 2020-08-19 NOTE — CARE PLAN
Problem: Communication  Goal: The ability to communicate needs accurately and effectively will improve  Outcome: PROGRESSING SLOWER THAN EXPECTED  Intervention: [place] patient and significant other/support system to call light to alert staff of needs  Flowsheets (Taken 8/19/2020 1154)  Oriented to::   Location of bathroom   Unit Routine   Call Light & Bedside Controls   Bedside Rail Policy   Patient Rights and Responsibilities   Smoking Policy   Patient Education Notebook   Bedside Report  Note: Patient confused.      Problem: Safety  Goal: Will remain free from falls  Intervention: Implement fall precautions  Flowsheets  Taken 8/19/2020 1154  Bed Alarm: Yes - Alarm On  IV Pole on Same Side of Bed as Bathroom: Yes  Bedrails: Bedrails Closest to Bathroom Down  Taken 8/19/2020 1015  Environmental Precautions:   Treaded Slipper Socks on Patient   Personal Belongings, Wastebasket, Call Bell etc. in Easy Reach   Transferred to Stronger Side   Report Given to Other Health Care Providers Regarding Fall Risk   Bed in Low Position   Mobility Assessed & Appropriate Sign Placed   Communication Sign for Patients & Families

## 2020-08-19 NOTE — PROGRESS NOTES
Piedad patients caregiver from [facility] called with information on patient. Completed some questions on admit profile with Piedad.

## 2020-08-20 PROBLEM — I10 ESSENTIAL HYPERTENSION: Status: ACTIVE | Noted: 2020-08-20

## 2020-08-20 LAB
ANION GAP SERPL CALC-SCNC: 13 MMOL/L (ref 7–16)
ANION GAP SERPL CALC-SCNC: 13 MMOL/L (ref 7–16)
ANION GAP SERPL CALC-SCNC: 15 MMOL/L (ref 7–16)
BUN SERPL-MCNC: 14 MG/DL (ref 8–22)
BUN SERPL-MCNC: 16 MG/DL (ref 8–22)
BUN SERPL-MCNC: 17 MG/DL (ref 8–22)
CALCIUM SERPL-MCNC: 8.7 MG/DL (ref 8.5–10.5)
CALCIUM SERPL-MCNC: 9 MG/DL (ref 8.5–10.5)
CALCIUM SERPL-MCNC: 9.6 MG/DL (ref 8.5–10.5)
CHLORIDE SERPL-SCNC: 101 MMOL/L (ref 96–112)
CHLORIDE SERPL-SCNC: 92 MMOL/L (ref 96–112)
CHLORIDE SERPL-SCNC: 95 MMOL/L (ref 96–112)
CO2 SERPL-SCNC: 18 MMOL/L (ref 20–33)
CO2 SERPL-SCNC: 20 MMOL/L (ref 20–33)
CO2 SERPL-SCNC: 20 MMOL/L (ref 20–33)
CREAT SERPL-MCNC: 0.74 MG/DL (ref 0.5–1.4)
CREAT SERPL-MCNC: 0.79 MG/DL (ref 0.5–1.4)
CREAT SERPL-MCNC: 0.82 MG/DL (ref 0.5–1.4)
GLUCOSE BLD-MCNC: 110 MG/DL (ref 65–99)
GLUCOSE BLD-MCNC: 113 MG/DL (ref 65–99)
GLUCOSE BLD-MCNC: 128 MG/DL (ref 65–99)
GLUCOSE BLD-MCNC: 139 MG/DL (ref 65–99)
GLUCOSE SERPL-MCNC: 115 MG/DL (ref 65–99)
GLUCOSE SERPL-MCNC: 140 MG/DL (ref 65–99)
GLUCOSE SERPL-MCNC: 202 MG/DL (ref 65–99)
POTASSIUM SERPL-SCNC: 3.6 MMOL/L (ref 3.6–5.5)
POTASSIUM SERPL-SCNC: 4.2 MMOL/L (ref 3.6–5.5)
POTASSIUM SERPL-SCNC: 4.4 MMOL/L (ref 3.6–5.5)
SODIUM SERPL-SCNC: 125 MMOL/L (ref 135–145)
SODIUM SERPL-SCNC: 128 MMOL/L (ref 135–145)
SODIUM SERPL-SCNC: 134 MMOL/L (ref 135–145)

## 2020-08-20 PROCEDURE — 700102 HCHG RX REV CODE 250 W/ 637 OVERRIDE(OP): Performed by: HOSPITALIST

## 2020-08-20 PROCEDURE — 700111 HCHG RX REV CODE 636 W/ 250 OVERRIDE (IP): Performed by: HOSPITALIST

## 2020-08-20 PROCEDURE — 36415 COLL VENOUS BLD VENIPUNCTURE: CPT

## 2020-08-20 PROCEDURE — A9270 NON-COVERED ITEM OR SERVICE: HCPCS | Performed by: HOSPITALIST

## 2020-08-20 PROCEDURE — 770020 HCHG ROOM/CARE - TELE (206)

## 2020-08-20 PROCEDURE — 700105 HCHG RX REV CODE 258: Performed by: HOSPITALIST

## 2020-08-20 PROCEDURE — 82962 GLUCOSE BLOOD TEST: CPT

## 2020-08-20 PROCEDURE — 99233 SBSQ HOSP IP/OBS HIGH 50: CPT | Performed by: HOSPITALIST

## 2020-08-20 PROCEDURE — 80048 BASIC METABOLIC PNL TOTAL CA: CPT

## 2020-08-20 RX ORDER — SODIUM CHLORIDE 9 MG/ML
INJECTION, SOLUTION INTRAVENOUS CONTINUOUS
Status: DISCONTINUED | OUTPATIENT
Start: 2020-08-20 | End: 2020-08-21

## 2020-08-20 RX ORDER — DONEPEZIL HYDROCHLORIDE 5 MG/1
10 TABLET, FILM COATED ORAL NIGHTLY
Status: DISCONTINUED | OUTPATIENT
Start: 2020-08-20 | End: 2020-08-24 | Stop reason: HOSPADM

## 2020-08-20 RX ORDER — ATORVASTATIN CALCIUM 20 MG/1
20 TABLET, FILM COATED ORAL NIGHTLY
Status: DISCONTINUED | OUTPATIENT
Start: 2020-08-20 | End: 2020-08-24 | Stop reason: HOSPADM

## 2020-08-20 RX ORDER — OXYBUTYNIN CHLORIDE 5 MG/1
5 TABLET, EXTENDED RELEASE ORAL EVERY EVENING
Status: DISCONTINUED | OUTPATIENT
Start: 2020-08-20 | End: 2020-08-24 | Stop reason: HOSPADM

## 2020-08-20 RX ORDER — QUETIAPINE FUMARATE 25 MG/1
100 TABLET, FILM COATED ORAL 3 TIMES DAILY
Status: DISCONTINUED | OUTPATIENT
Start: 2020-08-20 | End: 2020-08-24 | Stop reason: HOSPADM

## 2020-08-20 RX ORDER — DIVALPROEX SODIUM 500 MG/1
1000 TABLET, EXTENDED RELEASE ORAL
Status: DISCONTINUED | OUTPATIENT
Start: 2020-08-20 | End: 2020-08-24 | Stop reason: HOSPADM

## 2020-08-20 RX ORDER — ARIPIPRAZOLE 15 MG/1
30 TABLET ORAL EVERY EVENING
Status: DISCONTINUED | OUTPATIENT
Start: 2020-08-20 | End: 2020-08-24 | Stop reason: HOSPADM

## 2020-08-20 RX ORDER — MELOXICAM 7.5 MG/1
15 TABLET ORAL EVERY MORNING
Status: DISCONTINUED | OUTPATIENT
Start: 2020-08-20 | End: 2020-08-24 | Stop reason: HOSPADM

## 2020-08-20 RX ORDER — SERTRALINE HYDROCHLORIDE 100 MG/1
100 TABLET, FILM COATED ORAL EVERY MORNING
Status: DISCONTINUED | OUTPATIENT
Start: 2020-08-20 | End: 2020-08-24 | Stop reason: HOSPADM

## 2020-08-20 RX ORDER — OXYBUTYNIN CHLORIDE 5 MG/1
5 TABLET, EXTENDED RELEASE ORAL
Status: DISCONTINUED | OUTPATIENT
Start: 2020-08-20 | End: 2020-08-24 | Stop reason: HOSPADM

## 2020-08-20 RX ADMIN — CALCIUM CARBONATE-CHOLECALCIFEROL TAB 250 MG-125 UNIT 1 TABLET: 250-125 TAB at 16:37

## 2020-08-20 RX ADMIN — OXYBUTYNIN CHLORIDE 5 MG: 5 TABLET, EXTENDED RELEASE ORAL at 16:37

## 2020-08-20 RX ADMIN — CALCIUM CARBONATE-CHOLECALCIFEROL TAB 250 MG-125 UNIT 1 TABLET: 250-125 TAB at 12:20

## 2020-08-20 RX ADMIN — ATORVASTATIN CALCIUM 20 MG: 20 TABLET, FILM COATED ORAL at 20:24

## 2020-08-20 RX ADMIN — DONEPEZIL HYDROCHLORIDE 10 MG: 5 TABLET, FILM COATED ORAL at 20:24

## 2020-08-20 RX ADMIN — ENOXAPARIN SODIUM 40 MG: 40 INJECTION SUBCUTANEOUS at 05:51

## 2020-08-20 RX ADMIN — DOCUSATE SODIUM 50 MG AND SENNOSIDES 8.6 MG 2 TABLET: 8.6; 5 TABLET, FILM COATED ORAL at 16:37

## 2020-08-20 RX ADMIN — METOPROLOL TARTRATE 25 MG: 25 TABLET, FILM COATED ORAL at 16:37

## 2020-08-20 RX ADMIN — ARIPIPRAZOLE 30 MG: 15 TABLET ORAL at 16:37

## 2020-08-20 RX ADMIN — QUETIAPINE FUMARATE 100 MG: 25 TABLET ORAL at 16:37

## 2020-08-20 RX ADMIN — SERTRALINE HYDROCHLORIDE 100 MG: 100 TABLET ORAL at 12:20

## 2020-08-20 RX ADMIN — DOCUSATE SODIUM 50 MG AND SENNOSIDES 8.6 MG 2 TABLET: 8.6; 5 TABLET, FILM COATED ORAL at 05:51

## 2020-08-20 RX ADMIN — DIVALPROEX SODIUM 1000 MG: 500 TABLET, EXTENDED RELEASE ORAL at 20:25

## 2020-08-20 RX ADMIN — SODIUM CHLORIDE: 9 INJECTION, SOLUTION INTRAVENOUS at 09:26

## 2020-08-20 RX ADMIN — OXYBUTYNIN CHLORIDE 5 MG: 5 TABLET, EXTENDED RELEASE ORAL at 20:25

## 2020-08-20 RX ADMIN — QUETIAPINE FUMARATE 100 MG: 25 TABLET ORAL at 12:20

## 2020-08-20 RX ADMIN — MELOXICAM 15 MG: 7.5 TABLET ORAL at 12:45

## 2020-08-20 ASSESSMENT — FIBROSIS 4 INDEX
FIB4 SCORE: 2.16
FIB4 SCORE: 2.16

## 2020-08-20 NOTE — PROGRESS NOTES
Assumed care of patient from ROD Herron. Handoff report received and understood. Patient is disoriented to time. Oriented x3. No signs and symptoms of distress. Bed is low and locked. Call light within reach.

## 2020-08-20 NOTE — CARE PLAN
Problem: Safety  Goal: Will remain free from falls  Outcome: PROGRESSING AS EXPECTED  Intervention: Implement fall precautions  Flowsheets  Taken 8/20/2020 0735  Environmental Precautions:   Treaded Slipper Socks on Patient   Personal Belongings, Wastebasket, Call Bell etc. in Easy Reach   Transferred to Stronger Side   Report Given to Other Health Care Providers Regarding Fall Risk   Bed in Low Position   Communication Sign for Patients & Families   Mobility Assessed & Appropriate Sign Placed  Taken 8/19/2020 1154  Bed Alarm: Yes - Alarm On  IV Pole on Same Side of Bed as Bathroom: Yes  Bedrails: Bedrails Closest to Bathroom Down     Problem: Skin Integrity  Goal: Risk for impaired skin integrity will decrease  Outcome: PROGRESSING AS EXPECTED  Intervention: Implement precautions to protect skin integrity in collaboration with the interdisciplinary team  Flowsheets  Taken 8/20/2020 0743  Friction Interventions: Draw Sheet / Pad Used for Repositioning  Taken 8/20/2020 0735  Skin Preventative Measures:   Pillows in Use for Support / Positioning   Pillows in Use to Float Heels   Waffle Cushion  Bed Types: Pressure Redistribution Mattress (Atmosair)  Patient Turns / Repositioning: Supine  Moisturizers: Moisturizer   Patient is Receiving Nutrition: Oral Intake Adequate  Vitamin Therapy in Use: No  Activity: Bed  Assistance / Tolerance for Turning/Repositioning: Assistance of Two or More

## 2020-08-20 NOTE — PROGRESS NOTES
Assumed care of patient. Bedside report, received from Germaine VELASCO. Updated POC, call light within reach, and fall precautions in place. Bed locked and and in lowest position. Patient  Is confused and  instructed to call for assistance before getting out of bed.All needs addressed.

## 2020-08-20 NOTE — CARE PLAN
Problem: Safety  Goal: Will remain free from falls  8/20/2020 1456 by Germaine Day R.N.  Outcome: PROGRESSING AS EXPECTED  8/20/2020 0743 by Germaine Day R.N.  Outcome: PROGRESSING AS EXPECTED  Intervention: Implement fall precautions  8/20/2020 1456 by Germaine Day R.N.  Flowsheets  Taken 8/20/2020 1456  Chair/Bed Strip Alarm: Yes - Alarm On  Taken 8/20/2020 0735  Environmental Precautions:   Treaded Slipper Socks on Patient   Personal Belongings, Wastebasket, Call Bell etc. in Easy Reach   Transferred to Stronger Side   Report Given to Other Health Care Providers Regarding Fall Risk   Bed in Low Position   Communication Sign for Patients & Families   Mobility Assessed & Appropriate Sign Placed  Taken 8/19/2020 1154  Bed Alarm: Yes - Alarm On  IV Pole on Same Side of Bed as Bathroom: Yes  Bedrails: Bedrails Closest to Bathroom Down  8/20/2020 0743 by Germaine Day R.N.  Flowsheets  Taken 8/20/2020 0735  Environmental Precautions:   Treaded Slipper Socks on Patient   Personal Belongings, Wastebasket, Call Bell etc. in Easy Reach   Transferred to Stronger Side   Report Given to Other Health Care Providers Regarding Fall Risk   Bed in Low Position   Communication Sign for Patients & Families   Mobility Assessed & Appropriate Sign Placed  Taken 8/19/2020 1154  Bed Alarm: Yes - Alarm On  IV Pole on Same Side of Bed as Bathroom: Yes  Bedrails: Bedrails Closest to Bathroom Down     Problem: Venous Thromboembolism (VTW)/Deep Vein Thrombosis (DVT) Prevention:  Goal: Patient will participate in Venous Thrombosis (VTE)/Deep Vein Thrombosis (DVT)Prevention Measures  Outcome: PROGRESSING SLOWER THAN EXPECTED  Intervention: Encourage ambulation/mobilization at level directed by Physical Therapy in collaboration with Interdisciplinary Team  Note: Patient refusing to wear SCD's and ambulate, or sit up in chair for meals.      Problem: Skin Integrity  Goal: Risk for impaired skin  integrity will decrease  8/20/2020 1456 by Germaine Day R.N.  Outcome: PROGRESSING AS EXPECTED  8/20/2020 0743 by Germaine Day R.N.  Outcome: PROGRESSING AS EXPECTED  Intervention: Implement precautions to protect skin integrity in collaboration with the interdisciplinary team  8/20/2020 1456 by Germaine Day R.N.  Flowsheets  Taken 8/20/2020 1456  PT / OT Involved in Care:   Physical Therapy Involved   Occupational Therapy Involved  Protocols: Incontinence Associated Dermatitis Protocol in Place  Nutrition Consult Ordered: Yes, Consult has been Placed  Taken 8/20/2020 0743  Friction Interventions: Draw Sheet / Pad Used for Repositioning  Taken 8/20/2020 0735  Skin Preventative Measures:   Pillows in Use for Support / Positioning   Pillows in Use to Float Heels   Waffle Cushion  Bed Types: Pressure Redistribution Mattress (Atmosair)  Patient Turns / Repositioning: Supine  Moisturizers: Moisturizer   Patient is Receiving Nutrition: Oral Intake Adequate  Vitamin Therapy in Use: No  Activity: Bed  Assistance / Tolerance for Turning/Repositioning: Assistance of Two or More  Note: Patient transferred to a low air loss bed.   8/20/2020 0743 by Germaine Day R.N.  Flowsheets  Taken 8/20/2020 0743  Friction Interventions: Draw Sheet / Pad Used for Repositioning  Taken 8/20/2020 0735  Skin Preventative Measures:   Pillows in Use for Support / Positioning   Pillows in Use to Float Heels   Waffle Cushion  Bed Types: Pressure Redistribution Mattress (Atmosair)  Patient Turns / Repositioning: Supine  Moisturizers: Moisturizer   Patient is Receiving Nutrition: Oral Intake Adequate  Vitamin Therapy in Use: No  Activity: Bed  Assistance / Tolerance for Turning/Repositioning: Assistance of Two or More     Problem: Pain Management  Goal: Pain level will decrease to patient's comfort goal  Outcome: PROGRESSING AS EXPECTED  Intervention: Educate and implement non-pharmacologic comfort  measures. Examples: relaxation, distration, play therapy, activity therapy, massage, etc.  Flowsheets (Taken 8/20/2020 7809)  Intervention:   Medication (see MAR)   Rest   Heat Applied   [place]

## 2020-08-20 NOTE — PROGRESS NOTES
Bedside report received. Pt A&Oxself. Call light and belongings within reach. Bed locked and in lowest position. Alarm and fall precautions in place.

## 2020-08-20 NOTE — CONSULTS
DATE OF SERVICE:  08/19/2020    ORTHOPEDIC CONSULTATION    REQUESTING PHYSICIAN:  Aj Hummel MD, emergency department.    REASON FOR CONSULTATION:  Left distal fibula fracture.    CHIEF COMPLAINT:  Left ankle pain.    HISTORY OF PRESENT ILLNESS:  Patient is a 54-year-old female.  She lives in a   group home and has some cognitive deficits and a history of Parkinson's as   well as schizophrenia.  She is a poor historian, but she was complaining of   some ankle pain and presented to the emergency department, was found to have   probable nondisplaced lateral malleolus fracture and she was being admitted to   the hospitalist service for hyponatremia.  I was asked to consult to provide   treatment recommendations.  She was placed into a splint in the emergency   department.    PAST MEDICAL HISTORY:  According to the medical record.    ALLERGIES:  TO RISPERDAL.    OUTPATIENT MEDICATIONS:  Include calcium, therapeutic multivitamin, Detrol LA,   meloxicam, Aricept, Ditropan XL, Abilify, Depakote, Seroquel, Zoloft,   Glucophage, Lipitor and Prinzide.    PAST MEDICAL DIAGNOSES:  Include schizophrenia, Parkinson disease, diabetes,   cognitive delay.    PAST SURGICAL HISTORY:  None according to the medical record.    SOCIAL HISTORY:  Patient, according to the medical record is a nonsmoker, does   not use alcohol or illicit drugs.  She lives in a group home.    FAMILY HISTORY:  Negative for significant medical problems according to the   medical record.    REVIEW OF SYSTEMS:  Unobtainable due to patient's cognitive deficits.    PHYSICAL EXAMINATION:  VITAL SIGNS:  Temperature 97.8, heart rate 80, respiratory rate 18, blood   pressure 117/80, pulse oximetry 98% on room air.  GENERAL APPEARANCE:  Patient is alert.  She is pleasant, cooperative, in no   acute distress.  Her speech is difficult to comprehend.  HEAD, EYES, EARS, NOSE AND THROAT:  Normocephalic, atraumatic.  Mucous   membranes are moist.  PULMONARY:  Symmetric,  unlabored breathing.  CARDIOVASCULAR:  Extremities are well perfused.  ABDOMEN:  Thin, nondistended.  MUSCULOSKELETAL EXAMINATION:  Left lower extremity, she has a short leg splint   in place.  She is able to flex and extend her toes.  She is nontender to   palpation of the knee.  She has brisk capillary refill in the toes.  There is   no evidence of obvious traumatic deformity of the bilateral upper or right   lower extremities, which are grossly neurovascularly intact.    DIAGNOSTIC IMAGING:  Plain x-rays of the left ankle shows a likely   nondisplaced distal fibula fracture.  There is no evidence of medial malleolus   or posterior malleolus fracture.  There is congruent ankle mortise.    ASSESSMENT:  A 54-year-old female with cognitive delay, schizophrenia and   Parkinson disease.  She has hyponatremia and is admitted to the hospitalist   service.  She has what is likely a nondisplaced distal fibula fracture which   appears to be a stable injury.    RECOMMENDATIONS:  1.  I discussed these findings with the patient.  I recommended nonoperative   management for her fibular fracture.  2.  She is currently in a short leg splint, but we can likely convert her to a   walking boot to help her mobility.  3.  She can follow up with me in 2 weeks for x-rays to just confirm stable   fracture alignment and should work with physical and occupational therapy for   mobilization in the meantime.       ____________________________________     MD KAVEH Manzo / IDA    DD:  08/19/2020 17:42:49  DT:  08/19/2020 18:28:04    D#:  [id number]  Job#:  [id number]

## 2020-08-20 NOTE — PROGRESS NOTES
Hospital Medicine Daily Progress Note    Date of Service  8/20/2020    Chief Complaint  54 y.o. female admitted 8/19/2020 with ANKLE FRACTURE    H  Interval Problem Update  Currently we are doing nonsurgical management of her ankle fracture    As per the Ortho MD patient to follow-up with him in 2 weeks    Sodium is 128    Floor RN instructed to minimize free water    Fluid rate decreased as sodium has improved markedly in the last 24 hours    Consultants/Specialty  Orthopedics    Code Status  Full Code    Disposition    Pending PT and OT eval    Review of Systems  Review of Systems   Unable to perform ROS: Psychiatric disorder        Physical Exam  Temp:  [35.9 °C (96.7 °F)-36.6 °C (97.9 °F)] 36.6 °C (97.8 °F)  Pulse:  [75-89] 83  Resp:  [16-18] 16  BP: (117-147)/(72-80) 141/77  SpO2:  [94 %-98 %] 97 %    Physical Exam  Constitutional:       Appearance: Normal appearance.   HENT:      Head: Atraumatic.   Eyes:      Extraocular Movements: Extraocular movements intact.      Pupils: Pupils are equal, round, and reactive to light.   Neck:      Musculoskeletal: Normal range of motion and neck supple.   Cardiovascular:      Rate and Rhythm: Normal rate and regular rhythm.      Pulses: Normal pulses.   Pulmonary:      Effort: Pulmonary effort is normal.      Breath sounds: Normal breath sounds.   Abdominal:      General: Abdomen is flat. There is no distension.      Palpations: There is no mass.      Tenderness: There is no abdominal tenderness. There is no guarding or rebound.   Musculoskeletal:         General: Tenderness (Both lower extremities) present.   Neurological:      Mental Status: She is alert.      Comments: AWAKE    ODD BEHAV[employer]OUR   Psychiatric:      Comments: ODD         Fluids    Intake/Output Summary (Last 24 hours) at 8/20/2020 1037  Last data filed at 8/20/2020 1022  Gross per 24 hour   Intake 700 ml   Output --   Net 700 ml       Laboratory  Recent Labs     08/19/20  0709   WBC 4.6*   RBC 4.19*    HEMOGLOBIN 12.0   HEMATOCRIT 35.5*   MCV 84.7   MCH 28.6   MCHC 33.8   RDW 47.5   PLATELETCT 137*   MPV 8.9*     Recent Labs     08/19/20  1754 08/20/20  0013 08/20/20  0926   SODIUM 126* 125* 128*   POTASSIUM 4.2 4.4 4.2   CHLORIDE 92* 92* 95*   CO2 21 20 20   GLUCOSE 96 115* 140*   BUN 16 16 14   CREATININE 0.75 0.82 0.74   CALCIUM 8.9 8.7 9.6                   Imaging  DX-FOOT-COMPLETE 3+ LEFT   Final Result      No acute bony abnormality.      DX-ANKLE 3+ VIEWS LEFT   Final Result      Possible nondisplaced lateral malleolar fracture as noted above.           Assessment/Plan  Essential hypertension- (present on admission)  Assessment & Plan  Hold lisinopril    Hold hctz    Closed nondisplaced fracture of lateral malleolus of left fibula- (present on admission)  Assessment & Plan  PT and OT eval    Hyponatremia- (present on admission)  Assessment & Plan  Na in low 120s is likely causing some mental status changes and makes her high risk for Sz  Etiology is unclear.  She had a normal sodium in JunE    BMP every 12 hours    Low-dose saline    Parkinson's disease (tremor, stiffness, slow motion, unstable posture) ([facility])- (present on admission)  Assessment & Plan  Resume home meds except hctz    Diabetes ([facility])- (present on admission)  Assessment & Plan  Fingerstick with sliding scale insulin l    Schizophrenia ([facility])- (present on admission)  Assessment & Plan  Resume home meds except hctz       VTE prophylaxis: scd

## 2020-08-21 LAB
ANION GAP SERPL CALC-SCNC: 11 MMOL/L (ref 7–16)
BUN SERPL-MCNC: 17 MG/DL (ref 8–22)
CALCIUM SERPL-MCNC: 9 MG/DL (ref 8.5–10.5)
CHLORIDE SERPL-SCNC: 107 MMOL/L (ref 96–112)
CO2 SERPL-SCNC: 20 MMOL/L (ref 20–33)
CREAT SERPL-MCNC: 0.71 MG/DL (ref 0.5–1.4)
GLUCOSE BLD-MCNC: 108 MG/DL (ref 65–99)
GLUCOSE BLD-MCNC: 165 MG/DL (ref 65–99)
GLUCOSE BLD-MCNC: 171 MG/DL (ref 65–99)
GLUCOSE BLD-MCNC: 99 MG/DL (ref 65–99)
GLUCOSE SERPL-MCNC: 107 MG/DL (ref 65–99)
POTASSIUM SERPL-SCNC: 4.4 MMOL/L (ref 3.6–5.5)
SODIUM SERPL-SCNC: 138 MMOL/L (ref 135–145)

## 2020-08-21 PROCEDURE — 36415 COLL VENOUS BLD VENIPUNCTURE: CPT

## 2020-08-21 PROCEDURE — 99232 SBSQ HOSP IP/OBS MODERATE 35: CPT | Performed by: HOSPITALIST

## 2020-08-21 PROCEDURE — 700111 HCHG RX REV CODE 636 W/ 250 OVERRIDE (IP): Performed by: HOSPITALIST

## 2020-08-21 PROCEDURE — 770020 HCHG ROOM/CARE - TELE (206)

## 2020-08-21 PROCEDURE — 82962 GLUCOSE BLOOD TEST: CPT

## 2020-08-21 PROCEDURE — A9270 NON-COVERED ITEM OR SERVICE: HCPCS | Performed by: HOSPITALIST

## 2020-08-21 PROCEDURE — 97162 PT EVAL MOD COMPLEX 30 MIN: CPT

## 2020-08-21 PROCEDURE — 700105 HCHG RX REV CODE 258: Performed by: HOSPITALIST

## 2020-08-21 PROCEDURE — 700102 HCHG RX REV CODE 250 W/ 637 OVERRIDE(OP): Performed by: HOSPITALIST

## 2020-08-21 PROCEDURE — 80048 BASIC METABOLIC PNL TOTAL CA: CPT

## 2020-08-21 RX ADMIN — INSULIN HUMAN 2 UNITS: 100 INJECTION, SOLUTION PARENTERAL at 11:38

## 2020-08-21 RX ADMIN — OXYBUTYNIN CHLORIDE 5 MG: 5 TABLET, EXTENDED RELEASE ORAL at 20:49

## 2020-08-21 RX ADMIN — DOCUSATE SODIUM 50 MG AND SENNOSIDES 8.6 MG 2 TABLET: 8.6; 5 TABLET, FILM COATED ORAL at 05:51

## 2020-08-21 RX ADMIN — ENOXAPARIN SODIUM 40 MG: 40 INJECTION SUBCUTANEOUS at 05:52

## 2020-08-21 RX ADMIN — QUETIAPINE FUMARATE 100 MG: 25 TABLET ORAL at 11:39

## 2020-08-21 RX ADMIN — SERTRALINE HYDROCHLORIDE 100 MG: 100 TABLET ORAL at 05:52

## 2020-08-21 RX ADMIN — MELOXICAM 15 MG: 7.5 TABLET ORAL at 05:51

## 2020-08-21 RX ADMIN — INSULIN HUMAN 2 UNITS: 100 INJECTION, SOLUTION PARENTERAL at 21:39

## 2020-08-21 RX ADMIN — QUETIAPINE FUMARATE 100 MG: 25 TABLET ORAL at 16:20

## 2020-08-21 RX ADMIN — QUETIAPINE FUMARATE 100 MG: 25 TABLET ORAL at 05:51

## 2020-08-21 RX ADMIN — DONEPEZIL HYDROCHLORIDE 10 MG: 5 TABLET, FILM COATED ORAL at 20:49

## 2020-08-21 RX ADMIN — OXYBUTYNIN CHLORIDE 5 MG: 5 TABLET, EXTENDED RELEASE ORAL at 16:21

## 2020-08-21 RX ADMIN — METOPROLOL TARTRATE 25 MG: 25 TABLET, FILM COATED ORAL at 16:21

## 2020-08-21 RX ADMIN — SODIUM CHLORIDE: 9 INJECTION, SOLUTION INTRAVENOUS at 09:41

## 2020-08-21 RX ADMIN — ATORVASTATIN CALCIUM 20 MG: 20 TABLET, FILM COATED ORAL at 20:49

## 2020-08-21 RX ADMIN — CALCIUM CARBONATE-CHOLECALCIFEROL TAB 250 MG-125 UNIT 1 TABLET: 250-125 TAB at 05:52

## 2020-08-21 RX ADMIN — ARIPIPRAZOLE 30 MG: 15 TABLET ORAL at 16:35

## 2020-08-21 RX ADMIN — CALCIUM CARBONATE-CHOLECALCIFEROL TAB 250 MG-125 UNIT 1 TABLET: 250-125 TAB at 16:20

## 2020-08-21 RX ADMIN — ACETAMINOPHEN 650 MG: 325 TABLET, FILM COATED ORAL at 09:41

## 2020-08-21 RX ADMIN — DIVALPROEX SODIUM 1000 MG: 500 TABLET, EXTENDED RELEASE ORAL at 20:50

## 2020-08-21 RX ADMIN — METOPROLOL TARTRATE 25 MG: 25 TABLET, FILM COATED ORAL at 05:52

## 2020-08-21 ASSESSMENT — COGNITIVE AND FUNCTIONAL STATUS - GENERAL
MOVING FROM LYING ON BACK TO SITTING ON SIDE OF FLAT BED: UNABLE
CLIMB 3 TO 5 STEPS WITH RAILING: TOTAL
MOVING TO AND FROM BED TO CHAIR: UNABLE
SUGGESTED CMS G CODE MODIFIER MOBILITY: CM
MOBILITY SCORE: 7
WALKING IN HOSPITAL ROOM: TOTAL
TURNING FROM BACK TO SIDE WHILE IN FLAT BAD: A LOT
STANDING UP FROM CHAIR USING ARMS: TOTAL

## 2020-08-21 ASSESSMENT — FIBROSIS 4 INDEX: FIB4 SCORE: 2.16

## 2020-08-21 NOTE — PROGRESS NOTES
Hospital Medicine Daily Progress Note    Date of Service  8/21/2020    Chief Complaint  54 y.o. female admitted 8/19/2020 with ANKLE FRACTURE    H  Interval Problem Update  Currently we are doing nonsurgical management of her ankle fracture    As per the Ortho MD patient to follow-up with him in 2 weeks    Sodium is wnl    There is no doubt hyponatremia was caused by the hydrochlorothiazide has since been discontinued      Consultants/Specialty  Orthopedics    Code Status  Full Code    Disposition    Pending PT and OT eval    Review of Systems  Review of Systems   Unable to perform ROS: Psychiatric disorder        Physical Exam  Temp:  [36 °C (96.8 °F)-36.8 °C (98.3 °F)] 36.7 °C (98.1 °F)  Pulse:  [65-82] 65  Resp:  [16-18] 16  BP: (101-150)/(64-78) 101/64  SpO2:  [93 %-97 %] 97 %    Physical Exam  Constitutional:       Appearance: Normal appearance.   HENT:      Head: Atraumatic.   Eyes:      Extraocular Movements: Extraocular movements intact.      Pupils: Pupils are equal, round, and reactive to light.   Neck:      Musculoskeletal: Normal range of motion and neck supple.   Cardiovascular:      Rate and Rhythm: Normal rate and regular rhythm.      Pulses: Normal pulses.   Pulmonary:      Effort: Pulmonary effort is normal.      Breath sounds: Normal breath sounds.   Abdominal:      General: Abdomen is flat. There is no distension.      Palpations: There is no mass.      Tenderness: There is no abdominal tenderness. There is no guarding or rebound.   Musculoskeletal:         General: Tenderness (Both lower extremities) present.      Comments: Left lower leg in a cast   Skin:     Capillary Refill: Capillary refill takes 2 to 3 seconds.      Coloration: Skin is not jaundiced or pale.      Findings: No bruising, erythema or rash.   Neurological:      Mental Status: She is alert.      Comments: AWAKE    ODD BEHAVIOUR   Psychiatric:      Comments: ODD         Fluids    Intake/Output Summary (Last 24 hours) at 8/21/2020  1130  Last data filed at 8/20/2020 1300  Gross per 24 hour   Intake 120 ml   Output --   Net 120 ml       Laboratory  Recent Labs     08/19/20  0709   WBC 4.6*   RBC 4.19*   HEMOGLOBIN 12.0   HEMATOCRIT 35.5*   MCV 84.7   MCH 28.6   MCHC 33.8   RDW 47.5   PLATELETCT 137*   MPV 8.9*     Recent Labs     08/20/20  0926 08/20/20  2111 08/21/20  0846   SODIUM 128* 134* 138   POTASSIUM 4.2 3.6 4.4   CHLORIDE 95* 101 107   CO2 20 18* 20   GLUCOSE 140* 202* 107*   BUN 14 17 17   CREATININE 0.74 0.79 0.71   CALCIUM 9.6 9.0 9.0                   Imaging  DX-FOOT-COMPLETE 3+ LEFT   Final Result      No acute bony abnormality.      DX-ANKLE 3+ VIEWS LEFT   Final Result      Possible nondisplaced lateral malleolar fracture as noted above.           Assessment/Plan  Essential hypertension- (present on admission)  Assessment & Plan  Hold lisinopril.. May be restarted if patient's blood pressure rises    Hold hctz    Closed nondisplaced fracture of lateral malleolus of left fibula- (present on admission)  Assessment & Plan  PT and OT eval    Hyponatremia- (present on admission)  Assessment & Plan  Due to HCTZ    This was not a chronic phenomenon it is now normal    Hep-Lock IV    Parkinson's disease (tremor, stiffness, slow motion, unstable posture) ([facility])- (present on admission)  Assessment & Plan  Resume home meds except hctz    Diabetes ([facility])- (present on admission)  Assessment & Plan  Fingerstick with sliding scale insulin l    Schizophrenia ([facility])- (present on admission)  Assessment & Plan  Resume home meds except hctz       VTE prophylaxis: scd    A.m. CBC BMP

## 2020-08-21 NOTE — PROGRESS NOTES
Monitor Summary  Rhythm: SB/SR  Rate: 58-77  Ectopy: jaclyn down to 45 while sleeping.   .16 / .08 / .40

## 2020-08-21 NOTE — PROGRESS NOTES
Assumed care of patient at bedside report from dayshift RN. Updated on POC. Patient currently A & O x 2; on RA; down q2 turns; without complaints of acute pain. Call light within reach. Whiteboard updated. Fall precautions in place. Bed locked and in lowest position. All questions answered. No other needs indicated at this time.

## 2020-08-21 NOTE — DISCHARGE PLANNING
Anticipated Discharge Disposition: TBD    Action: Patient pending PT/OT evals to help determine discharge needs.    If patient needs consents on the weekend, Ann (legal guardian) can be reached at [phone], or the emergency line can be called if she can't be reached at [phone].    Barriers to Discharge: PT/OT, medical clearance    Plan: Case coordination to f/u with treatment team for discharge planning needs    Care Transition Team Assessment    In case of emergency, contact patient's legal guardian, Ann Urias [phone]    Information received is from patient's legal guardian, Ann. Patient has lived at [facility] group [place] for years as she does have history of Schizophrenia and cognitive deficits.    She was able to walk until she was admitted to the hospital in May with COVID and became weak. She was discharged to her group home with [facility] in June.    Ann needs to be contacted with any discharge plans.    Information Source  Orientation : Disoriented to Time, Disoriented to Event  Information Given By: Legal Guardian  Informant's Name: Ann Urias  Who is responsible for making decisions for patient? : Guardian  Name(s) of Primary Decision Maker: Ann Urias    Readmission Evaluation  Is this a readmission?: No    Elopement Risk  Legal Hold: No  Ambulatory or Self Mobile in Wheelchair: No-Not an Elopement Risk  Elopement Risk: Not at Risk for Elopement    Interdisciplinary Discharge Planning  Does Admitting Nurse Feel This Could be a Complex Discharge?: No  Housing / Facility: senior care  Name of Care Facility: [facility]  Mobility Issues: Yes  Prior Services: Skilled Home Health Services    Discharge Preparedness  What is your plan after discharge?: Uncertain - pending medical team collaboration  What are your discharge supports?: Other (comment)(guardian , group home)  Prior Functional Level: Needs Assist with Activities of Daily Living, Needs Assist with Medication  Management    Functional Assesment  Prior Functional Level: Needs Assist with Activities of Daily Living, Needs Assist with Medication Management    Finances  Financial Barriers to Discharge: No  Prescription Coverage: Yes    Vision / Hearing Impairment  Vision Impairment : No  Hearing Impairment : No    Psychological Assessment  History of Psychiatric Problems: Yes    Discharge Risks or Barriers  Discharge risks or barriers?: Mental health  Patient risk factors: Vulnerable adult    Anticipated Discharge Information  Discharge Disposition: Still a Patient (30)  Discharge Contact Phone Number: [phone]

## 2020-08-21 NOTE — PROGRESS NOTES
Bedside report received. Tele monitor on patient. Fall precautions in place. Patient in bed. Patient alert and oriented x4. Call light within reach. Bed in low and locked position.

## 2020-08-22 LAB
ANION GAP SERPL CALC-SCNC: 12 MMOL/L (ref 7–16)
BUN SERPL-MCNC: 21 MG/DL (ref 8–22)
CALCIUM SERPL-MCNC: 8.8 MG/DL (ref 8.5–10.5)
CHLORIDE SERPL-SCNC: 105 MMOL/L (ref 96–112)
CO2 SERPL-SCNC: 22 MMOL/L (ref 20–33)
CREAT SERPL-MCNC: 0.78 MG/DL (ref 0.5–1.4)
ERYTHROCYTE [DISTWIDTH] IN BLOOD BY AUTOMATED COUNT: 51.9 FL (ref 35.9–50)
GLUCOSE BLD-MCNC: 177 MG/DL (ref 65–99)
GLUCOSE BLD-MCNC: 191 MG/DL (ref 65–99)
GLUCOSE BLD-MCNC: 197 MG/DL (ref 65–99)
GLUCOSE BLD-MCNC: 93 MG/DL (ref 65–99)
GLUCOSE SERPL-MCNC: 125 MG/DL (ref 65–99)
HCT VFR BLD AUTO: 36.8 % (ref 37–47)
HGB BLD-MCNC: 11.6 G/DL (ref 12–16)
MCH RBC QN AUTO: 28.2 PG (ref 27–33)
MCHC RBC AUTO-ENTMCNC: 31.5 G/DL (ref 33.6–35)
MCV RBC AUTO: 89.5 FL (ref 81.4–97.8)
PLATELET # BLD AUTO: 119 K/UL (ref 164–446)
PMV BLD AUTO: 9.1 FL (ref 9–12.9)
POTASSIUM SERPL-SCNC: 4.9 MMOL/L (ref 3.6–5.5)
RBC # BLD AUTO: 4.11 M/UL (ref 4.2–5.4)
SODIUM SERPL-SCNC: 139 MMOL/L (ref 135–145)
WBC # BLD AUTO: 6.9 K/UL (ref 4.8–10.8)

## 2020-08-22 PROCEDURE — 80048 BASIC METABOLIC PNL TOTAL CA: CPT

## 2020-08-22 PROCEDURE — 36415 COLL VENOUS BLD VENIPUNCTURE: CPT

## 2020-08-22 PROCEDURE — A9270 NON-COVERED ITEM OR SERVICE: HCPCS | Performed by: HOSPITALIST

## 2020-08-22 PROCEDURE — 700102 HCHG RX REV CODE 250 W/ 637 OVERRIDE(OP): Performed by: HOSPITALIST

## 2020-08-22 PROCEDURE — 770020 HCHG ROOM/CARE - TELE (206)

## 2020-08-22 PROCEDURE — 99232 SBSQ HOSP IP/OBS MODERATE 35: CPT | Performed by: HOSPITALIST

## 2020-08-22 PROCEDURE — 82962 GLUCOSE BLOOD TEST: CPT | Mod: 91

## 2020-08-22 PROCEDURE — 85027 COMPLETE CBC AUTOMATED: CPT

## 2020-08-22 PROCEDURE — 700111 HCHG RX REV CODE 636 W/ 250 OVERRIDE (IP): Performed by: HOSPITALIST

## 2020-08-22 RX ADMIN — INSULIN HUMAN 2 UNITS: 100 INJECTION, SOLUTION PARENTERAL at 21:22

## 2020-08-22 RX ADMIN — ATORVASTATIN CALCIUM 20 MG: 20 TABLET, FILM COATED ORAL at 21:17

## 2020-08-22 RX ADMIN — DIVALPROEX SODIUM 1000 MG: 500 TABLET, EXTENDED RELEASE ORAL at 21:18

## 2020-08-22 RX ADMIN — QUETIAPINE FUMARATE 100 MG: 25 TABLET ORAL at 05:32

## 2020-08-22 RX ADMIN — DONEPEZIL HYDROCHLORIDE 10 MG: 5 TABLET, FILM COATED ORAL at 21:17

## 2020-08-22 RX ADMIN — CALCIUM CARBONATE-CHOLECALCIFEROL TAB 250 MG-125 UNIT 1 TABLET: 250-125 TAB at 18:06

## 2020-08-22 RX ADMIN — OXYBUTYNIN CHLORIDE 5 MG: 5 TABLET, EXTENDED RELEASE ORAL at 21:18

## 2020-08-22 RX ADMIN — METOPROLOL TARTRATE 25 MG: 25 TABLET, FILM COATED ORAL at 18:06

## 2020-08-22 RX ADMIN — ENOXAPARIN SODIUM 40 MG: 40 INJECTION SUBCUTANEOUS at 05:33

## 2020-08-22 RX ADMIN — CALCIUM CARBONATE-CHOLECALCIFEROL TAB 250 MG-125 UNIT 1 TABLET: 250-125 TAB at 05:33

## 2020-08-22 RX ADMIN — METOPROLOL TARTRATE 25 MG: 25 TABLET, FILM COATED ORAL at 05:33

## 2020-08-22 RX ADMIN — INSULIN HUMAN 2 UNITS: 100 INJECTION, SOLUTION PARENTERAL at 18:42

## 2020-08-22 RX ADMIN — QUETIAPINE FUMARATE 100 MG: 25 TABLET ORAL at 12:35

## 2020-08-22 RX ADMIN — OXYBUTYNIN CHLORIDE 5 MG: 5 TABLET, EXTENDED RELEASE ORAL at 18:06

## 2020-08-22 RX ADMIN — MELOXICAM 15 MG: 7.5 TABLET ORAL at 05:33

## 2020-08-22 RX ADMIN — SERTRALINE HYDROCHLORIDE 100 MG: 100 TABLET ORAL at 05:33

## 2020-08-22 RX ADMIN — INSULIN HUMAN 2 UNITS: 100 INJECTION, SOLUTION PARENTERAL at 13:33

## 2020-08-22 RX ADMIN — ARIPIPRAZOLE 30 MG: 15 TABLET ORAL at 18:06

## 2020-08-22 RX ADMIN — QUETIAPINE FUMARATE 100 MG: 25 TABLET ORAL at 18:06

## 2020-08-22 ASSESSMENT — FIBROSIS 4 INDEX: FIB4 SCORE: 2.48

## 2020-08-22 NOTE — PROGRESS NOTES
[facility] Medicine Daily Progress Note    Date of Service  8/22/2020    Chief Complaint  54 y.o. female admitted 8/19/2020 with ANKLE FRACTURE      Interval Problem Update  Currently we are doing nonsurgical management of her ankle fracture    As per the Ortho MD patient to follow-up with him in 2 weeks    Sodium is wnl    There is no doubt hyponatremia was caused by the hydrochlorothiazide has since been discontinued      Patient has no new complaints    PT recommends SNF- snf referral made    Consultants/Specialty  Orthopedics    Code Status  Full Code    Disposition    Pending PT and OT eval    Review of Systems  Review of Systems   Unable to perform ROS: Psychiatric disorder        Physical Exam  Temp:  [35.8 °C (96.5 °F)-36.8 °C (98.3 °F)] 36.8 °C (98.3 °F)  Pulse:  [58-74] 65  Resp:  [15-16] 16  BP: (117-127)/(56-84) 119/62  SpO2:  [94 %-99 %] 98 %    Physical Exam  Constitutional:       Appearance: Normal appearance.   HENT:      Head: Atraumatic.   Eyes:      Extraocular Movements: Extraocular movements intact.      Pupils: Pupils are equal, round, and reactive to light.   Neck:      Musculoskeletal: Normal range of motion and neck supple.   Cardiovascular:      Rate and Rhythm: Normal rate and regular rhythm.      Pulses: Normal pulses.   Pulmonary:      Effort: Pulmonary effort is normal.      Breath sounds: Normal breath sounds.   Abdominal:      General: Abdomen is flat. There is no distension.      Palpations: There is no mass.      Tenderness: There is no abdominal tenderness. There is no guarding or rebound.   Musculoskeletal:         General: Tenderness (Both lower extremities) present.      Comments: Left lower leg in a cast   Skin:     Capillary Refill: Capillary refill takes 2 to 3 seconds.      Coloration: Skin is not jaundiced or pale.      Findings: No bruising, erythema or rash.   Neurological:      Mental Status: She is alert.      Comments: AWAKE    ODD BEHAVIOUR   Psychiatric:      Comments:  ODD         Fluids    Intake/Output Summary (Last 24 hours) at 8/22/2020 1252  Last data filed at 8/22/2020 0824  Gross per 24 hour   Intake --   Output 650 ml   Net -650 ml       Laboratory  Recent Labs     08/22/20  0421   WBC 6.9   RBC 4.11*   HEMOGLOBIN 11.6*   HEMATOCRIT 36.8*   MCV 89.5   MCH 28.2   MCHC 31.5*   RDW 51.9*   PLATELETCT 119*   MPV 9.1     Recent Labs     08/20/20  2111 08/21/20  0846 08/22/20  0421   SODIUM 134* 138 139   POTASSIUM 3.6 4.4 4.9   CHLORIDE 101 107 105   CO2 18* 20 22   GLUCOSE 202* 107* 125*   BUN 17 17 21   CREATININE 0.79 0.71 0.78   CALCIUM 9.0 9.0 8.8                   Imaging  DX-FOOT-COMPLETE 3+ LEFT   Final Result      No acute bony abnormality.      DX-ANKLE 3+ VIEWS LEFT   Final Result      Possible nondisplaced lateral malleolar fracture as noted above.           Assessment/Plan  Essential hypertension- (present on admission)  Assessment & Plan  Hold lisinopril.. May be restarted if patient's blood pressure rises    Hold hctz    Closed nondisplaced fracture of lateral malleolus of left fibula- (present on admission)  Assessment & Plan  PT and OT eval    Hyponatremia- (present on admission)  Assessment & Plan  Due to HCTZ    This was not a chronic phenomenon it is now normal    Hep-Lock IV    Parkinson's disease (tremor, stiffness, slow motion, unstable posture) ([facility])- (present on admission)  Assessment & Plan  Resume home meds except hctz    Diabetes ([facility])- (present on admission)  Assessment & Plan  Fingerstick with sliding scale insulin l    Schizophrenia ([facility])- (present on admission)  Assessment & Plan  Resume home meds except hctz       VTE prophylaxis: scd

## 2020-08-22 NOTE — CARE PLAN
Problem: Communication  Goal: The ability to communicate needs accurately and effectively will improve  Outcome: PROGRESSING AS EXPECTED     Problem: Safety  Goal: Will remain free from injury  Outcome: PROGRESSING AS EXPECTED  Goal: Will remain free from falls  Outcome: PROGRESSING AS EXPECTED     Problem: Infection  Goal: Will remain free from infection  Outcome: PROGRESSING AS EXPECTED     Problem: Venous Thromboembolism (VTW)/Deep Vein Thrombosis (DVT) Prevention:  Goal: Patient will participate in Venous Thrombosis (VTE)/Deep Vein Thrombosis (DVT)Prevention Measures  Outcome: PROGRESSING AS EXPECTED     Problem: Bowel/Gastric:  Goal: Normal bowel function is maintained or improved  Outcome: PROGRESSING AS EXPECTED  Goal: Will not experience complications related to bowel motility  Outcome: PROGRESSING AS EXPECTED     Problem: Knowledge Deficit  Goal: Knowledge of disease process/condition, treatment plan, diagnostic tests, and medications will improve  Outcome: PROGRESSING AS EXPECTED  Goal: Knowledge of the prescribed therapeutic regimen will improve  Outcome: PROGRESSING AS EXPECTED     Problem: Discharge Barriers/Planning  Goal: Patient's continuum of care needs will be met  Outcome: PROGRESSING AS EXPECTED     Problem: Fluid Volume:  Goal: Will maintain balanced intake and output  Outcome: PROGRESSING AS EXPECTED     Problem: Skin Integrity  Goal: Risk for impaired skin integrity will decrease  Outcome: PROGRESSING AS EXPECTED     Problem: Urinary Elimination:  Goal: Ability to reestablish a normal urinary elimination pattern will improve  Outcome: PROGRESSING AS EXPECTED     Problem: Pain Management  Goal: Pain level will decrease to patient's comfort goal  Outcome: PROGRESSING AS EXPECTED

## 2020-08-22 NOTE — PROGRESS NOTES
Assumed care of patient at bedside report from dayshift RN. Updated on POC. Patient currently A & O x 1; on RA; up max assist; without complaints of acute pain. Call light within reach. Whiteboard updated. Fall precautions in place. Bed locked and in lowest position. All questions answered. No other needs indicated at this time.

## 2020-08-22 NOTE — THERAPY
Physical Therapy   Initial Evaluation     Patient Name: Erlinda Verde  Age:  54 y.o., Sex:  female  Medical Record #: [id number]  Today's Date: 8/21/2020     Precautions: Fall Risk, No Weight Bearing Restrictions Left Lower Extremity    Assessment  Patient is 54 y.o. female with a diagnosis of fracture of L ankle fracture at lateral malleolus. PMH includes PD, schizophrenia, cognitive deficits, and lives in group home. Per pt chart review, previously ambulatory in group home w/ assist for ADLS/IADLS. Unclear if group home staff able to assist with ambulation, transfers, etc. During this eval, pt demonstrated no learning retention, repeatedly asking therapist name and perseverating about a shirt. Pt demonstrated AROM of RLE and limited ROM of LLE at more proximal joints. PT attempted to get pt sitting EOB multiple times, but pt appears fearful of any LLE movement, appears to be secondary to pain, also behavioral. As pt pain decreases, may be more willing to participate in mobility and transfers w/ therapy. As of this writing, pt refused to participate in therapy, and would be max A for mobility or transfers, and likely unable to voluntarily maintain NWB status of LLE.        Plan    Recommend Physical Therapy 3 times per week until therapy goals are met for the following treatments:  Bed Mobility, Community Re-integration, Equipment, Neuro Re-Education / Balance, Self Care/Home Evaluation, Therapeutic Activities and Therapeutic Exercises    DC Equipment Recommendations: Unable to determine at this time  Discharge Recommendations: Recommend post-acute placement for additional physical therapy services prior to discharge home        08/21/20 0411   Prior Living Situation   Prior Services Skilled Home Health Services   Housing / Facility Group Home   Comments per chart, in group home that provides assist w/ ADLs/IADLS   Prior Level of Functional Mobility   Bed Mobility Independent   Transfer Status Required Assist    Ambulation Independent   Assistive Devices Used None   Stairs Unable To Determine At This Time   Comments per chart, ambulatory prior to this admission   History of Falls   History of Falls   (unable to determine)   Cognition    Cognition / Consciousness X   Level of Consciousness Confused   Comments pt cognitive impairment; lives in group home; unable to retain info from PT   Passive ROM Lower Body   Passive ROM Lower Body X   Comments R side tested; L side in ankle cast   Active ROM Lower Body    Active ROM Lower Body  X   Comments L ankle ROM limited to pain; able to range at knee and hip   Strength Lower Body   Lower Body Strength  X   Comments grossly 3+/5; pt resistance limited secondary to cognition and pain   Sensation Lower Body   Lower Extremity Sensation   WDL   Strength Upper Body   Upper Body Strength  WDL   Comments able to prop self up in long sitting and long sit w/ PT; not formally tested   Balance Assessment   Sitting Balance (Static) Poor +   Sitting Balance (Dynamic) Poor +   Standing Balance (Static)   (NT)   Standing Balance (Dynamic)   (NT)   Weight Shift Sitting Fair   Weight Shift Standing   (NT)   Comments pt cog and behavior limiting testing, but demonstrated weight shift; guarded secondary to pain   Gait Analysis   Gait Level Of Assist   (NT)   Comments per chart, ambulatory at baseline without assist   Bed Mobility    Supine to Sit Moderate Assist  (to long sitting only)   Scooting Maximal Assist   Rolling Supervised   Skilled Intervention Verbal Cuing;Tactile Cuing   Functional Mobility   Sit to Stand Unable to Participate   Bed, Chair, Wheelchair Transfer Unable to Participate   Comments per chart independent w/ functional mobility, likely requiring VC and prompting   How much difficulty does the patient currently have...   Turning over in bed (including adjusting bedclothes, sheets and blankets)? 2   Sitting down on and standing up from a chair with arms (e.g., wheelchair, bedside  commode, etc.) 1   Moving from lying on back to sitting on the side of the bed? 1   How much help from another person does the patient currently need...   Moving to and from a bed to a chair (including a wheelchair)? 1   Need to walk in a hospital room? 1   Climbing 3-5 steps with a railing? 1   6 clicks Mobility Score 7   Activity Tolerance   Sitting Edge of Bed unable to achieve   Patient / Family Goals    Patient / Family Goal #1 none stated   Short Term Goals    Short Term Goal # 1 pt will transfer from supine to sitting EOB, mod A, within 6 txs, in order to decrease caregiver burden   Short Term Goal # 3 pt will transfer from bed to chair or WC, maintaining LLE NWB status, mod A, in order to improve functional mobility in group home   Education Group   Education Provided Role of Physical Therapist;Weight Bearing Status   Role of Physical Therapist Patient Response Patient;Nonacceptance;Explanation;No Learning Evidence   Weight Bearing Status Patient Response Patient;Nonacceptance;Explanation;No Learning Evidence   Additional Comments pt in long time group; cognition likely at baseline   Problem List    Problems Impaired Bed Mobility;Impaired Transfers;Safety Awareness Deficits / Cognition;Pain   Anticipated Discharge Equipment and Recommendations   DC Equipment Recommendations Unable to determine at this time   Discharge Recommendations Recommend post-acute placement for additional physical therapy services prior to discharge home

## 2020-08-23 LAB
GLUCOSE BLD-MCNC: 128 MG/DL (ref 65–99)
GLUCOSE BLD-MCNC: 136 MG/DL (ref 65–99)
GLUCOSE BLD-MCNC: 83 MG/DL (ref 65–99)
GLUCOSE BLD-MCNC: 98 MG/DL (ref 65–99)

## 2020-08-23 PROCEDURE — 770020 HCHG ROOM/CARE - TELE (206)

## 2020-08-23 PROCEDURE — A9270 NON-COVERED ITEM OR SERVICE: HCPCS | Performed by: HOSPITALIST

## 2020-08-23 PROCEDURE — 700111 HCHG RX REV CODE 636 W/ 250 OVERRIDE (IP): Performed by: HOSPITALIST

## 2020-08-23 PROCEDURE — 82962 GLUCOSE BLOOD TEST: CPT | Mod: 91

## 2020-08-23 PROCEDURE — 99232 SBSQ HOSP IP/OBS MODERATE 35: CPT | Performed by: HOSPITALIST

## 2020-08-23 PROCEDURE — 700102 HCHG RX REV CODE 250 W/ 637 OVERRIDE(OP): Performed by: HOSPITALIST

## 2020-08-23 RX ADMIN — CALCIUM CARBONATE-CHOLECALCIFEROL TAB 250 MG-125 UNIT 1 TABLET: 250-125 TAB at 17:52

## 2020-08-23 RX ADMIN — DIVALPROEX SODIUM 1000 MG: 500 TABLET, EXTENDED RELEASE ORAL at 20:45

## 2020-08-23 RX ADMIN — ARIPIPRAZOLE 30 MG: 15 TABLET ORAL at 17:52

## 2020-08-23 RX ADMIN — QUETIAPINE FUMARATE 100 MG: 25 TABLET ORAL at 12:20

## 2020-08-23 RX ADMIN — QUETIAPINE FUMARATE 100 MG: 25 TABLET ORAL at 17:52

## 2020-08-23 RX ADMIN — ACETAMINOPHEN 650 MG: 325 TABLET, FILM COATED ORAL at 13:27

## 2020-08-23 RX ADMIN — METOPROLOL TARTRATE 25 MG: 25 TABLET, FILM COATED ORAL at 17:52

## 2020-08-23 RX ADMIN — METOPROLOL TARTRATE 25 MG: 25 TABLET, FILM COATED ORAL at 05:23

## 2020-08-23 RX ADMIN — MELOXICAM 15 MG: 7.5 TABLET ORAL at 05:23

## 2020-08-23 RX ADMIN — OXYBUTYNIN CHLORIDE 5 MG: 5 TABLET, EXTENDED RELEASE ORAL at 20:45

## 2020-08-23 RX ADMIN — CALCIUM CARBONATE-CHOLECALCIFEROL TAB 250 MG-125 UNIT 1 TABLET: 250-125 TAB at 05:23

## 2020-08-23 RX ADMIN — SERTRALINE HYDROCHLORIDE 100 MG: 100 TABLET ORAL at 05:23

## 2020-08-23 RX ADMIN — ENOXAPARIN SODIUM 40 MG: 40 INJECTION SUBCUTANEOUS at 05:24

## 2020-08-23 RX ADMIN — ATORVASTATIN CALCIUM 20 MG: 20 TABLET, FILM COATED ORAL at 20:45

## 2020-08-23 RX ADMIN — OXYBUTYNIN CHLORIDE 5 MG: 5 TABLET, EXTENDED RELEASE ORAL at 17:52

## 2020-08-23 RX ADMIN — QUETIAPINE FUMARATE 100 MG: 25 TABLET ORAL at 05:23

## 2020-08-23 RX ADMIN — DONEPEZIL HYDROCHLORIDE 10 MG: 5 TABLET, FILM COATED ORAL at 20:45

## 2020-08-23 ASSESSMENT — FIBROSIS 4 INDEX: FIB4 SCORE: 2.48

## 2020-08-23 NOTE — CARE PLAN
Problem: Communication  Goal: The ability to communicate needs accurately and effectively will improve  Outcome: PROGRESSING AS EXPECTED     Problem: Safety  Goal: Will remain free from injury  Outcome: PROGRESSING AS EXPECTED  Goal: Will remain free from falls  Outcome: PROGRESSING AS EXPECTED     Problem: Infection  Goal: Will remain free from infection  Outcome: PROGRESSING AS EXPECTED     Problem: Venous Thromboembolism (VTW)/Deep Vein Thrombosis (DVT) Prevention:  Goal: Patient will participate in Venous Thrombosis (VTE)/Deep Vein Thrombosis (DVT)Prevention Measures  Outcome: PROGRESSING AS EXPECTED     Problem: Bowel/Gastric:  Goal: Normal bowel function is maintained or improved  Outcome: PROGRESSING AS EXPECTED  Goal: Will not experience complications related to bowel motility  Outcome: PROGRESSING AS EXPECTED     Problem: Knowledge Deficit  Goal: Knowledge of disease process/condition, treatment plan, diagnostic tests, and medications will improve  Outcome: PROGRESSING AS EXPECTED  Goal: Knowledge of the prescribed therapeutic regimen will improve  Outcome: PROGRESSING AS EXPECTED     Problem: Discharge Barriers/Planning  Goal: Patient's continuum of care needs will be met  Outcome: PROGRESSING AS EXPECTED     Problem: Fluid Volume:  Goal: Will maintain balanced intake and output  Outcome: PROGRESSING AS EXPECTED     Problem: Skin Integrity  Goal: Risk for impaired skin integrity will decrease  Outcome: PROGRESSING AS EXPECTED     Problem: Urinary Elimination:  Goal: Ability to reestablish a normal urinary elimination pattern will improve  Outcome: PROGRESSING AS EXPECTED     Problem: Pain Management  Goal: Pain level will decrease to patient's comfort goal  Outcome: PROGRESSING AS EXPECTED     Problem: Respiratory:  Goal: Respiratory status will improve  Outcome: PROGRESSING AS EXPECTED

## 2020-08-23 NOTE — DISCHARGE PLANNING
Anticipated Discharge Disposition: Group home with HH    Action: CM spoke with group home administrator Day ([phone]) to discuss pt's impending return. Day states pt can be accommodated with her weight bearing restrictions. Pt has a wheelchair at home staff had previously been using to mobilize her. CM spoke with pt's guardian Ann. Ann agrees the group home is the best setting for patient at discharge with HH. Ann would like pt's discharge summary and ortho notes faxed to her at discharge (fax [phone]). Ann requested CM contact Rajat Alba ([phone]) from the facility as well to clear pt for return on 8/24.     MD notified of request to resume HH; acknowledged and orders will be entered on 8/24.    Barriers to Discharge: HH referral pending.     Plan: Follow up with HH referral and coordinate discharge back to group home on 8/24.

## 2020-08-23 NOTE — PROGRESS NOTES
Assumed care of patient at bedside report from dayshift RN. Updated on POC. Patient currently A & O x 2; on RA; up max assist; without complaints of acute pain. Call light within reach. Whiteboard updated. Fall precautions in place. Bed locked and in lowest position. All questions answered. No other needs indicated at this time.

## 2020-08-23 NOTE — PROGRESS NOTES
[facility] Medicine Daily Progress Note    Date of Service  8/23/2020    Chief Complaint  54 y.o. female admitted 8/19/2020 with ANKLE FRACTURE      Interval Problem Update  Currently we are doing nonsurgical management of her ankle fracture    Patient denies any pain    As per the Ortho MD patient to follow-up with him in 2 weeks    Sodium is wnl    There is no doubt hyponatremia was caused by the hydrochlorothiazide has since been discontinued      No new issues    SNF- snf referral made    Consultants/Specialty  Orthopedics    Code Status  Full Code    Disposition    SNF    Review of Systems  Review of Systems   Unable to perform ROS: Psychiatric disorder        Physical Exam  Temp:  [36.1 °C (97 °F)-36.8 °C (98.3 °F)] 36.3 °C (97.3 °F)  Pulse:  [65-90] 65  Resp:  [16-17] 17  BP: ()/(57-69) 99/57  SpO2:  [92 %-98 %] 92 %    Physical Exam  Constitutional:       Appearance: Normal appearance.   HENT:      Head: Atraumatic.   Eyes:      Extraocular Movements: Extraocular movements intact.      Pupils: Pupils are equal, round, and reactive to light.   Neck:      Musculoskeletal: Normal range of motion and neck supple.   Cardiovascular:      Rate and Rhythm: Normal rate and regular rhythm.      Pulses: Normal pulses.   Pulmonary:      Effort: Pulmonary effort is normal.      Breath sounds: Normal breath sounds.   Abdominal:      General: Abdomen is flat. There is no distension.      Palpations: There is no mass.      Tenderness: There is no abdominal tenderness. There is no guarding or rebound.   Musculoskeletal:         General: Tenderness (Both lower extremities) present.      Comments: Left lower leg in a cast   Skin:     Capillary Refill: Capillary refill takes 2 to 3 seconds.      Coloration: Skin is not jaundiced or pale.      Findings: No bruising, erythema or rash.   Neurological:      Mental Status: She is alert.      Comments: AWAKE    [employer]   Psychiatric:      Comments: ODD          Fluids    Intake/Output Summary (Last 24 hours) at 8/23/2020 1219  Last data filed at 8/23/2020 1000  Gross per 24 hour   Intake 1200 ml   Output 500 ml   Net 700 ml       Laboratory  Recent Labs     08/22/20  0421   WBC 6.9   RBC 4.11*   HEMOGLOBIN 11.6*   HEMATOCRIT 36.8*   MCV 89.5   MCH 28.2   MCHC 31.5*   RDW 51.9*   PLATELETCT 119*   MPV 9.1     Recent Labs     08/20/20  2111 08/21/20  0846 08/22/20  0421   SODIUM 134* 138 139   POTASSIUM 3.6 4.4 4.9   CHLORIDE 101 107 105   CO2 18* 20 22   GLUCOSE 202* 107* 125*   BUN 17 17 21   CREATININE 0.79 0.71 0.78   CALCIUM 9.0 9.0 8.8                   Imaging  DX-FOOT-COMPLETE 3+ LEFT   Final Result      No acute bony abnormality.      DX-ANKLE 3+ VIEWS LEFT   Final Result      Possible nondisplaced lateral malleolar fracture as noted above.           Assessment/Plan  Essential hypertension- (present on admission)  Assessment & Plan  Hold lisinopril.. May be restarted if patient's blood pressure rises    Hold hctz    Closed nondisplaced fracture of lateral malleolus of left fibula- (present on admission)  Assessment & Plan  PT and OT eval    Hyponatremia- (present on admission)  Assessment & Plan  Due to HCTZ    This was not a chronic phenomenon it is now normal    Hep-Lock IV    Parkinson's disease (tremor, stiffness, slow motion, unstable posture) ([facility])- (present on admission)  Assessment & Plan  Resume home meds except hctz    Diabetes ([facility])- (present on admission)  Assessment & Plan  Fingerstick with sliding scale insulin l    Schizophrenia ([facility])- (present on admission)  Assessment & Plan  Resume home meds except hctz       VTE prophylaxis: scd

## 2020-08-23 NOTE — PROGRESS NOTES
Received bedside report form Nury Benson RN. Pt laying in bed, alert and awake. No distress noted. Denies any pain or any needs at this time.

## 2020-08-24 VITALS
OXYGEN SATURATION: 99 % | SYSTOLIC BLOOD PRESSURE: 119 MMHG | RESPIRATION RATE: 17 BRPM | HEIGHT: 63 IN | TEMPERATURE: 96.8 F | HEART RATE: 60 BPM | WEIGHT: 138.45 LBS | DIASTOLIC BLOOD PRESSURE: 69 MMHG | BODY MASS INDEX: 24.53 KG/M2

## 2020-08-24 PROBLEM — E87.1 HYPONATREMIA: Status: RESOLVED | Noted: 2020-08-19 | Resolved: 2020-08-24

## 2020-08-24 LAB
GLUCOSE BLD-MCNC: 100 MG/DL (ref 65–99)
GLUCOSE BLD-MCNC: 166 MG/DL (ref 65–99)

## 2020-08-24 PROCEDURE — A9270 NON-COVERED ITEM OR SERVICE: HCPCS | Performed by: HOSPITALIST

## 2020-08-24 PROCEDURE — 700102 HCHG RX REV CODE 250 W/ 637 OVERRIDE(OP): Performed by: HOSPITALIST

## 2020-08-24 PROCEDURE — 700111 HCHG RX REV CODE 636 W/ 250 OVERRIDE (IP): Performed by: HOSPITALIST

## 2020-08-24 PROCEDURE — 99239 HOSP IP/OBS DSCHRG MGMT >30: CPT | Performed by: HOSPITALIST

## 2020-08-24 PROCEDURE — 82962 GLUCOSE BLOOD TEST: CPT | Mod: 91

## 2020-08-24 RX ORDER — LISINOPRIL 20 MG/1
20 TABLET ORAL DAILY
Qty: 30 TAB | Refills: 3 | Status: ON HOLD | OUTPATIENT
Start: 2020-08-24 | End: 2022-02-28

## 2020-08-24 RX ADMIN — INSULIN HUMAN 2 UNITS: 100 INJECTION, SOLUTION PARENTERAL at 12:01

## 2020-08-24 RX ADMIN — ENOXAPARIN SODIUM 40 MG: 40 INJECTION SUBCUTANEOUS at 05:45

## 2020-08-24 RX ADMIN — QUETIAPINE FUMARATE 100 MG: 25 TABLET ORAL at 05:45

## 2020-08-24 RX ADMIN — CALCIUM CARBONATE-CHOLECALCIFEROL TAB 250 MG-125 UNIT 1 TABLET: 250-125 TAB at 05:45

## 2020-08-24 RX ADMIN — QUETIAPINE FUMARATE 100 MG: 25 TABLET ORAL at 11:58

## 2020-08-24 RX ADMIN — SERTRALINE HYDROCHLORIDE 100 MG: 100 TABLET ORAL at 05:46

## 2020-08-24 RX ADMIN — METOPROLOL TARTRATE 25 MG: 25 TABLET, FILM COATED ORAL at 05:45

## 2020-08-24 RX ADMIN — DOCUSATE SODIUM 50 MG AND SENNOSIDES 8.6 MG 2 TABLET: 8.6; 5 TABLET, FILM COATED ORAL at 05:46

## 2020-08-24 RX ADMIN — MELOXICAM 15 MG: 7.5 TABLET ORAL at 05:45

## 2020-08-24 NOTE — DISCHARGE PLANNING
Anticipated Discharge Disposition: Group Home with home health    Action: RN CM spoke with patient's legal guardian, Ann, to obtain choice for home health as ordered. Ann wanted patient to resume care with [facility]. Choice faxed to [facility].  ROD MONTALVO notified Rajat Vegas at [phone], with facility, and he will be having his nurse call the bedside nurse to discuss patient's mobility and care needs to make sure they can accommodate her back at . He will then call this RN CM back to notify if they are able to take patient back. He would like [facility] to be set up if able to to get patient home. Transport form faxed to [facility] in anticipation patient can return to  today.      Barriers to Discharge:  accepting patient back, transportation, home health acceptance    Plan: Case coordination to f/u with HH referral, f/u with facility to verify patient's return

## 2020-08-24 NOTE — PROGRESS NOTES
Bedside report received. Pt A&Ox3. No complains of pain. POC discussed with pt. Pt verbalizes understanding. Call light and belongings within reach. Bed locked and in lowest position. Alarm and fall precautions in place.

## 2020-08-24 NOTE — PROGRESS NOTES
Assumed care of patient at dayshift report from NOC RN. Updated on POC. Patient currently A & O x 2; on RA; up max assist; without complaints of acute pain. Call light within reach. Whiteboard updated. Fall precautions in place. Bed locked and in lowest position. All questions answered. No other needs indicated at this time.

## 2020-08-24 NOTE — DISCHARGE PLANNING
Received Transport Form @ 0969  Spoke to Wes @ [facility]    Transport is scheduled for Monday 8/24/2020 @1400 going to [facility].

## 2020-08-24 NOTE — FACE TO FACE
Face to Face Supporting Documentation - Home Health    The encounter with this patient was in whole or in part the primary reason for home health admission.    Date of encounter:   Patient:                    MRN:                       YOB: 2021  Erlinda Verde  [id number]  1965     Home health to see patient for:  Skilled Nursing care for assessment, interventions & education and Physical Therapy evaluation and treatment    Skilled need for:  New Onset Medical Diagnosis ankle fracture    Skilled nursing interventions to include:  Comment: medications, PT    Homebound status evidenced by:  Need the aid of supportive devices such as crutches, canes, wheelchairs or walkers. Leaving home requires a considerable and taxing effort. There is a normal inability to leave the home.    Community Physician to provide follow up care: Whitney Graham M.D.     Optional Interventions? No      I certify the face to face encounter for this home health care referral meets the CMS requirements and the encounter/clinical assessment with the patient was, in whole, or in part, for the medical condition(s) listed above, which is the primary reason for home health care. Based on my clinical findings: the service(s) are medically necessary, support the need for home health care, and the homebound criteria are met.  I certify that this patient has had a face to face encounter by myself.  Rahul Norwood M.D. - NPI: [id number]

## 2020-08-24 NOTE — DISCHARGE PLANNING
Anticipated Discharge Disposition: Group Home with [facility]    Action: RN CM received call from Rajat with group home stating that they can accept patient back today and he is aware of time that transport has been set up. RN CM called and updated patient's guardian, Ann, and provided IMM over the phone. DC summary and ortho note faxed to Ann per her request.    Barriers to Discharge: none    Plan: Patient [facility]

## 2020-08-24 NOTE — DISCHARGE PLANNING
Received Choice form at 0905  Agency/Facility Name: [facility]  Referral sent per Choice form @ 8341

## 2020-08-24 NOTE — PROGRESS NOTES
Patient discharged to [facility] with [facility] with [facility] transportation. All personal belongings collected. IV access removed. Monitor removed, monitor room notified. Discharge instructions discussed. Medications reviewed. Follow up appointments scheduled. Patient escorted off unit via wheelchair with Alina Bae [facility] transporter without incident.

## 2020-08-24 NOTE — DISCHARGE INSTRUCTIONS
Discharge Instructions    Discharged to group home by medical transportation with escort. Discharged via wheelchair, hospital escort: Yes.  Special equipment needed: Not Applicable    Be sure to schedule a follow-up appointment with your primary care doctor or any specialists as instructed.     Discharge Plan:   Diet Plan: Discussed  Activity Level: Discussed  Confirmed Follow up Appointment: Appointment Scheduled  Confirmed Symptoms Management: Discussed  Medication Reconciliation Updated: Yes    I understand that a diet low in cholesterol, fat, and sodium is recommended for good health. Unless I have been given specific instructions below for another diet, I accept this instruction as my diet prescription.   Other diet: Regular easy to chew    Special Instructions: None    · Is patient discharged on Warfarin / Coumadin?   No     Hyponatremia  Hyponatremia is when the amount of salt (sodium) in your blood is too low. When sodium levels are low, your cells absorb extra water, which causes them to swell. The swelling happens throughout the body, but it mostly affects the brain.  What are the causes?  This condition may be caused by:  · Certain medical conditions, such as:  ? Heart, kidney, or liver problems.  ? Thyroid problems.  ? Adrenal gland problems.  ? Metabolic conditions, such as [place] disease or syndrome of inappropriate antidiuretic hormone (SIADH).  · Severe vomiting or diarrhea.  · Certain medicines or illegal drugs.  · Dehydration.  · Drinking too much water.  · Eating a diet that is low in sodium.  · Large burns on your body.  · Excessive sweating.  What increases the risk?  You are more likely to develop this condition if you:  · Have long-term (chronic) kidney disease.  · Have heart failure.  · Have a medical condition that causes frequent or excessive diarrhea.  · Participate in intense physical activities, such as marathon running.  · Take certain medicines that affect the sodium and fluid balance  in the blood. Some of these medicine types include:  ? Diuretics.  ? NSAIDs.  ? Some opioid pain medicines.  ? Some antidepressants.  ? Some seizure prevention medicines.  What are the signs or symptoms?  Symptoms of this condition include:  · Headache.  · Nausea and vomiting.  · Being very tired (lethargic).  · Muscle weakness and cramping.  · Loss of appetite.  · Feeling weak or light-headed.  Severe symptoms of this condition include:  · Confusion.  · Agitation.  · Having a rapid heart rate.  · Passing out (fainting).  · Seizures.  · Coma.  How is this diagnosed?  This condition is diagnosed based on:  · A physical exam.  · Your medical history.  · Tests, including:  ? Blood tests.  ? Urine tests.  How is this treated?  Treatment for this condition depends on the cause. Treatment may include:  · Getting fluids through an IV that is inserted into one of your veins.  · Medicines to correct the sodium imbalance. If medicines are causing the condition, the medicines will need to be adjusted.  · Limiting your water or fluid intake to get the correct sodium balance.  · Monitoring in the hospital setting to closely watch your symptoms for improvement.  Follow these instructions at home:    · Take over-the-counter and prescription medicines only as told by your health care provider. Many medicines can make this condition worse. Talk with your health care provider about any medicines that you are currently taking.  · Carefully follow a recommended diet as told by your health care provider.  · Carefully follow instructions from your health care provider about fluid restrictions.  · Do not drink alcohol.  · Keep all follow-up visits as told by your health care provider. This is important.  Contact a health care provider if:  · You develop worsening nausea, fatigue, headache, confusion, or weakness.  · Your symptoms go away and then return.  · You have problems following the recommended diet.  Get help right away if:  · You  have a seizure.  · You pass out.  · You have ongoing diarrhea or vomiting.  Summary  · Hyponatremia is when the amount of salt (sodium) in your blood is too low.  · When sodium levels are low, your cells absorb extra water, which causes them to swell.  · The swelling happens throughout the body, but it mostly affects the brain.  · Treatment for this condition depends on the cause. It may include IV fluids, medicines, and limiting your fluid intake.  This information is not intended to replace advice given to you by your health care provider. Make sure you discuss any questions you have with your health care provider.  Document Released: 12/08/2003 Document Revised: 11/01/2019 Document Reviewed: 11/01/2019  [employer] Patient Education © 2020 [employer]  Hyponatremia  Hyponatremia is when the amount of salt (sodium) in your blood is too low. When salt levels are low, your body may take in extra water. This can cause swelling throughout the body. The swelling often affects the brain.  What are the causes?  This condition may be caused by:  · Certain medical problems or conditions.  · Vomiting a lot.  · Having watery poop (diarrhea) often.  · Certain medicines or illegal drugs.  · Not having enough water in the body (dehydration).  · Drinking too much water.  · Eating a diet that is low in salt.  · Large burns on your body.  · Too much sweating.  What increases the risk?  You are more likely to get this condition if you:  · Have long-term (chronic) kidney disease.  · Have heart failure.  · Have a medical condition that causes you to have watery poop often.  · Do very hard exercises.  · Take medicines that affect the amount of salt is in your blood.  What are the signs or symptoms?  Symptoms of this condition include:  · Headache.  · Feeling like you may vomit (nausea).  · Vomiting.  · Being very tired (lethargic).  · Muscle weakness and cramps.  · Not wanting to eat as much as normal (loss of appetite).  · Feeling  weak or light-headed.  Severe symptoms of this condition include:  · Confusion.  · Feeling restless (agitation).  · Having a fast heart rate.  · Passing out (fainting).  · Seizures.  · Coma.  How is this treated?  Treatment for this condition depends on the cause. Treatment may include:  · Getting fluids through an IV tube that is put into one of your veins.  · Taking medicines to fix the salt levels in your blood. If medicines are causing the problem, your medicines will need to be changed.  · Limiting how much water or fluid you take in.  · Monitoring in the hospital to watch your symptoms.  Follow these instructions at home:    · Take over-the-counter and prescription medicines only as told by your doctor. Many medicines can make this condition worse. Talk with your doctor about any medicines that you are taking.  · Eat and drink exactly as you are told by your doctor.  ? Eat only the foods you are told to eat.  ? Limit how much fluid you take.  · Do not drink alcohol.  · Keep all follow-up visits as told by your doctor. This is important.  Contact a doctor if:  · You feel more like you may vomit.  · You feel more tired.  · Your headache gets worse.  · You feel more confused.  · You feel weaker.  · Your symptoms go away and then they come back.  · You have trouble following the diet instructions.  Get help right away if:  · You have a seizure.  · You pass out.  · You keep having watery poop.  · You keep vomiting.  Summary  · Hyponatremia is when the amount of salt in your blood is too low.  · When salt levels are low, you can have swelling throughout the body. The swelling mostly affects the brain.  · Treatment depends on the cause. Treatment may include getting IV fluids, medicines, or not drinking as much fluid.  This information is not intended to replace advice given to you by your health care provider. Make sure you discuss any questions you have with your health care provider.  Document Released: 08/29/2012  Document Revised: 03/05/2020 Document Reviewed: 11/21/2019  [employer] Patient Education © 2020 [employer]    Hypertension, Adult  Hypertension is another name for high blood pressure. High blood pressure forces your heart to work harder to pump blood. This can cause problems over time.  There are two numbers in a blood pressure reading. There is a top number (systolic) over a bottom number (diastolic). It is best to have a blood pressure that is below 120/80. Healthy choices can help lower your blood pressure, or you may need medicine to help lower it.  What are the causes?  The cause of this condition is not known. Some conditions may be related to high blood pressure.  What increases the risk?  · Smoking.  · Having type 2 diabetes mellitus, high cholesterol, or both.  · Not getting enough exercise or physical activity.  · Being overweight.  · Having too much fat, sugar, calories, or salt (sodium) in your diet.  · Drinking too much alcohol.  · Having long-term (chronic) kidney disease.  · Having a family history of high blood pressure.  · Age. Risk increases with age.  · Race. You may be at higher risk if you are .  · Gender. Men are at higher risk than women before age 45. After age 65, women are at higher risk than men.  · Having obstructive sleep apnea.  · Stress.  What are the signs or symptoms?  · High blood pressure may not cause symptoms. Very high blood pressure (hypertensive crisis) may cause:  ? Headache.  ? Feelings of worry or nervousness (anxiety).  ? Shortness of breath.  ? Nosebleed.  ? A feeling of being sick to your stomach (nausea).  ? Throwing up (vomiting).  ? Changes in how you see.  ? Very bad chest pain.  ? Seizures.  How is this treated?  · This condition is treated by making healthy lifestyle changes, such as:  ? Eating healthy foods.  ? Exercising more.  ? Drinking less alcohol.  · Your health care provider may prescribe medicine if lifestyle changes are not enough to get  your blood pressure under control, and if:  ? Your top number is above 130.  ? Your bottom number is above 80.  · Your personal target blood pressure may vary.  Follow these instructions at home:  Eating and drinking    · If told, follow the DASH eating plan. To follow this plan:  ? Fill one half of your plate at each meal with fruits and vegetables.  ? Fill one fourth of your plate at each meal with whole grains. Whole grains include whole-wheat pasta, brown rice, and whole-grain bread.  ? Eat or drink low-fat dairy products, such as skim milk or low-fat yogurt.  ? Fill one fourth of your plate at each meal with low-fat (lean) proteins. Low-fat proteins include fish, chicken without skin, eggs, beans, and tofu.  ? Avoid fatty meat, cured and processed meat, or chicken with skin.  ? Avoid pre-made or processed food.  · Eat less than 1,500 mg of salt each day.  · Do not drink alcohol if:  ? Your doctor tells you not to drink.  ? You are pregnant, may be pregnant, or are planning to become pregnant.  · If you drink alcohol:  ? Limit how much you use to:  § 0-1 drink a day for women.  § 0-2 drinks a day for men.  ? Be aware of how much alcohol is in your drink. In the [place] one drink equals one 12 oz bottle of beer (355 mL), one 5 oz glass of wine (148 mL), or one 1½ oz glass of hard liquor (44 mL).  Lifestyle    · Work with your doctor to stay at a healthy weight or to lose weight. Ask your doctor what the best weight is for you.  · Get at least 30 minutes of exercise most days of the week. This may include walking, swimming, or biking.  · Get at least 30 minutes of exercise that strengthens your muscles (resistance exercise) at least 3 days a week. This may include lifting weights or doing Pilates.  · Do not use any products that contain nicotine or tobacco, such as cigarettes, e-cigarettes, and chewing tobacco. If you need help quitting, ask your doctor.  · Check your blood pressure at home as told by your  doctor.  · Keep all follow-up visits as told by your doctor. This is important.  Medicines  · Take over-the-counter and prescription medicines only as told by your doctor. Follow directions carefully.  · Do not skip doses of blood pressure medicine. The medicine does not work as well if you skip doses. Skipping doses also puts you at risk for problems.  · Ask your doctor about side effects or reactions to medicines that you should watch for.  Contact a doctor if you:  · Think you are having a reaction to the medicine you are taking.  · Have headaches that keep coming back (recurring).  · Feel dizzy.  · Have swelling in your ankles.  · Have trouble with your vision.  Get help right away if you:  · Get a very bad headache.  · Start to feel mixed up (confused).  · Feel weak or numb.  · Feel faint.  · Have very bad pain in your:  ? Chest.  ? Belly (abdomen).  · Throw up more than once.  · Have trouble breathing.  Summary  · Hypertension is another name for high blood pressure.  · High blood pressure forces your heart to work harder to pump blood.  · For most people, a normal blood pressure is less than 120/80.  · Making healthy choices can help lower blood pressure. If your blood pressure does not get lower with healthy choices, you may need to take medicine.  This information is not intended to replace advice given to you by your health care provider. Make sure you discuss any questions you have with your health care provider.  Document Released: 06/05/2009 Document Revised: 08/28/2019 Document Reviewed: 08/28/2019  [employer] Patient Education © 2020 [employer]    Lisinopril tablets  What is this medicine?  LISINOPRIL (lyse IN oh pril) is an ACE inhibitor. This medicine is used to treat high blood pressure and heart failure. It is also used to protect the heart immediately after a heart attack.  This medicine may be used for other purposes; ask your health care provider or pharmacist if you have questions.  COMMON  BRAND NAME(S): Prinivil, Zestril  What should I tell my health care provider before I take this medicine?  They need to know if you have any of these conditions:  · diabetes  · heart or blood vessel disease  · kidney disease  · low blood pressure  · previous swelling of the tongue, face, or lips with difficulty breathing, difficulty swallowing, hoarseness, or tightening of the throat  · an unusual or allergic reaction to lisinopril, other ACE inhibitors, insect venom, foods, dyes, or preservatives  · pregnant or trying to get pregnant  · breast-feeding  How should I use this medicine?  Take this medicine by mouth with a glass of water. Follow the directions on your prescription label. You may take this medicine with or without food. If it upsets your stomach, take it with food. Take your medicine at regular intervals. Do not take it more often than directed. Do not stop taking except on your doctor's advice.  Talk to your pediatrician regarding the use of this medicine in children. Special care may be needed. While this drug may be prescribed for children as young as 6 years of age for selected conditions, precautions do apply.  Overdosage: If you think you have taken too much of this medicine contact a poison control center or emergency room at once.  NOTE: This medicine is only for you. Do not share this medicine with others.  What if I miss a dose?  If you miss a dose, take it as soon as you can. If it is almost time for your next dose, take only that dose. Do not take double or extra doses.  What may interact with this medicine?  Do not take this medicine with any of the following medications:  · hymenoptera venom  · sacubitril; valsartan  This medicines may also interact with the following medications:  · aliskiren  · angiotensin receptor blockers, like losartan or valsartan  · certain medicines for diabetes  · diuretics  · everolimus  · gold compounds  · lithium  · NSAIDs, medicines for pain and inflammation,  like ibuprofen or naproxen  · potassium salts or supplements  · salt substitutes  · sirolimus  · temsirolimus  This list may not describe all possible interactions. Give your health care provider a list of all the medicines, herbs, non-prescription drugs, or dietary supplements you use. Also tell them if you smoke, drink alcohol, or use illegal drugs. Some items may interact with your medicine.  What should I watch for while using this medicine?  Visit your doctor or health care professional for regular check ups. Check your blood pressure as directed. Ask your doctor what your blood pressure should be, and when you should contact him or her.  Do not treat yourself for coughs, colds, or pain while you are using this medicine without asking your doctor or health care professional for advice. Some ingredients may increase your blood pressure.  Women should inform their doctor if they wish to become pregnant or think they might be pregnant. There is a potential for serious side effects to an unborn child. Talk to your health care professional or pharmacist for more information.  Check with your doctor or health care professional if you get an attack of severe diarrhea, nausea and vomiting, or if you sweat a lot. The loss of too much body fluid can make it dangerous for you to take this medicine.  You may get drowsy or dizzy. Do not drive, use machinery, or do anything that needs mental alertness until you know how this drug affects you. Do not stand or sit up quickly, especially if you are an older patient. This reduces the risk of dizzy or fainting spells. Alcohol can make you more drowsy and dizzy. Avoid alcoholic drinks.  Avoid salt substitutes unless you are told otherwise by your doctor or health care professional.  What side effects may I notice from receiving this medicine?  Side effects that you should report to your doctor or health care professional as soon as possible:  · allergic reactions like skin rash,  itching or hives, swelling of the hands, feet, face, lips, throat, or tongue  · breathing problems  · signs and symptoms of kidney injury like trouble passing urine or change in the amount of urine  · signs and symptoms of increased potassium like muscle weakness; chest pain; or fast, irregular heartbeat  · signs and symptoms of liver injury like dark yellow or brown urine; general ill feeling or flu-like symptoms; light-colored stools; loss of appetite; nausea; right upper belly pain; unusually weak or tired; yellowing of the eyes or skin  · signs and symptoms of low blood pressure like dizziness; feeling faint or lightheaded, falls; unusually weak or tired  · stomach pain with or without nausea and vomiting  Side effects that usually do not require medical attention (report to your doctor or health care professional if they continue or are bothersome):  · changes in taste  · cough  · dizziness  · fever  · headache  · sensitivity to light  This list may not describe all possible side effects. Call your doctor for medical advice about side effects. You may report side effects to FDA at 3-559-BXH-8578.  Where should I keep my medicine?  Keep out of the reach of children.  Store at room temperature between 15 and 30 degrees C (59 and 86 degrees F). Protect from moisture. Keep container tightly closed. Throw away any unused medicine after the expiration date.  NOTE: This sheet is a summary. It may not cover all possible information. If you have questions about this medicine, talk to your doctor, pharmacist, or health care provider.  © 2020 [employer] (2017-02-06 12:52:35)    Ankle Fracture    The ankle joint is made up of the lower (distal) sections of your lower leg bones(tibia and fibula) along with a bone in your foot (talus). An ankle fracture is a break in one, two, or all three of these sections of bone.  Follow these instructions at home:  If you have a splint:  · Wear the splint as told by your  doctor. Take it off only as told by your doctor.  · Loosen the splint if your toes tingle, become numb, or turn cold and blue.  · Keep the splint clean.  · If the splint is not waterproof:  ? Do not let it get wet.  ? Cover it with a watertight covering when you take a bath or a shower.  If you have a cast:  · Do not stick anything inside the cast to scratch your skin. Doing that increases your risk of infection.  · Check the skin around the cast every day. Tell your doctor about any concerns.  · You may put lotion on dry skin around the edges of the cast. Do not put lotion on the skin underneath the cast.  · Keep the cast clean.  · If the cast is not waterproof:  ? Do not let it get wet.  ? Cover it with a watertight covering when you take a bath or a shower.  Managing pain, stiffness, and swelling  · If directed, put ice on the injured area:  ? If you have a removable splint, remove it as told by your doctor.  ? Put ice in a plastic bag.  ? Place a towel between your skin and the bag.  ? Leave the ice on for 20 minutes, 2-3 times a day.  · Move your toes often. This prevents stiffness and lessens swelling.  · Raise (elevate) the injured area above the level of your heart while you are sitting or lying down.  General instructions  · Do not use the injured limb to support your body weight until your doctor says that you can. Use crutches as told by your doctor.  · Take over-the-counter and prescription medicines only as told by your doctor.  · Ask your doctor when it is safe to drive if you have a cast or splint.  · Do exercises as told by your doctor.  · Do not use any products that contain nicotine or tobacco, such as cigarettes and e-cigarettes. These can delay bone healing. If you need help quitting, ask your doctor.  · Keep all follow-up visits as told by your doctor. This is important.  Contact a doctor if:  · Your pain or swelling gets worse.  · Your pain or swelling does not get better when you rest or take  medicine.  Get help right away if:  · Your cast gets damaged.  · You continue to have very bad pain.  · You have new pain or swelling.  · Your skin or toes below the injured ankle:  ? Turn blue or gray.  ? Feel cold or numb.  ? Lose sensitivity to touch.  Summary  · An ankle fracture is a break in one, two, or all three of the bones in your lower leg and lower foot.  · If you have a splint, wear it as told by your health care provider. Keep it clean and dry.  · If you have a cast, do not stick anything inside the cast to scratch your skin. This can cause infection.  · Use ice, take medicines, raise your foot, and avoid tobacco and nicotine products. These steps will lessen pain and swelling and speed up healing.  This information is not intended to replace advice given to you by your health care provider. Make sure you discuss any questions you have with your health care provider.  Document Released: 10/15/2010 Document Revised: 11/30/2018 Document Reviewed: 01/22/2018  [employer] Patient Education © 2020 [employer]    Depression / Suicide Risk    As you are discharged from this [facility] facility, it is important to learn how to keep safe from harming yourself.    Recognize the warning signs:  · Abrupt changes in personality, positive or negative- including increase in energy   · Giving away possessions  · Change in eating patterns- significant weight changes-  positive or negative  · Change in sleeping patterns- unable to sleep or sleeping all the time   · Unwillingness or inability to communicate  · Depression  · Unusual sadness, discouragement and loneliness  · Talk of wanting to die  · Neglect of personal appearance   · Rebelliousness- reckless behavior  · Withdrawal from people/activities they love  · Confusion- inability to concentrate     If you or a loved one observes any of these behaviors or has concerns about self-harm, here's what you can do:  · Talk about it- your feelings and reasons for harming  yourself  · Remove any means that you might use to hurt yourself (examples: pills, rope, extension cords, firearm)  · Get professional help from the community (Mental Health, Substance Abuse, psychological counseling)  · Do not be alone:Call your Safe Contact- someone whom you trust who will be there for you.  · Call your local CRISIS HOTLINE 275-9578 or 801-002-3512  · Call your local Children's Mobile Crisis Response Team [place] (219) 427-0763 or www."[employer]"  · Call the toll free National Suicide Prevention Hotlines   · National Suicide Prevention Lifeline 226-852-YJYF (7905)  · [employer] 800-SUICIDE (454-7929)

## 2020-08-24 NOTE — DISCHARGE SUMMARY
Discharge Summary    CHIEF COMPLAINT ON ADMISSION  Chief Complaint   Patient presents with   • Ankle Pain     Patient brought in by EMS from [facility] for left ankle pain. 3+ pitting edema noted to left foot, warm to touch, 2+ pedal pulse. Per EMS, patient went to urgent care yesterday for ankle pain and reported a chest xray was performed and found fluid in her lungs. EMS unable to state why patient had a CXR and if an xray of ankle was done. Patient is confused, A&Ox2 at baseline per EMS.        Reason for Admission  EMS     Admission Date  8/19/2020    CODE STATUS  Full Code    HPI & HOSPITAL COURSE  This is a 54 y.o. female here with past medical history of diabetes mellitus type 2 ,Parkinson's disorder ,schizophrenia who presents with a ankle fracture.  Also had a very low sodium.  Review of her medical history reveals the low sodium was most likely related to her blood pressure medication( hydrochlorothiazide).  He was hydrated with saline and hydrochlorothiazide was discontinued and was sodium level normalized.  She can continue with p.o. lisinopril alone for blood pressure control.  New prescription for this medication lisinopril 20 mg p.o. daily was sent to her pharmacy.  Patient was referred to orthopedic doctor who recommended nonoperative management for her ankle fracture. patient was placed in a splint .patient to follow-up with Dr. hudson [facility] within 2 weeks.  At the time of discharge, this would mean she should follow-up with his orthopedic doctor within 1 week.  Patient has been set up with home health and home. PT. patient has not required any aggressive pain management other than NSAIDs and Tylenol       Therefore, she is discharged in good and stable condition to home with close outpatient follow-up.    The patient met 2-midnight criteria for an inpatient stay at the time of discharge.    Discharge Date  8/24     FOLLOW UP ITEMS POST DISCHARGE  [facility] 1  week    DISCHARGE DIAGNOSES  Active Problems:    Parkinson's disease (tremor, stiffness, slow motion, unstable posture) ([facility]) POA: Yes    Closed nondisplaced fracture of lateral malleolus of left fibula POA: Yes    Essential hypertension POA: Yes    Schizophrenia ([facility]) POA: Yes    Diabetes ([facility]) POA: Yes  Resolved Problems:    Hyponatremia POA: Yes      FOLLOW UP  Future Appointments   Date Time Provider Department Center   9/4/2020 10:40 AM MARY JulesGN None     No follow-up provider specified.    MEDICATIONS ON DISCHARGE     Medication List      START taking these medications      Instructions   lisinopril 20 MG Tabs  Commonly known as: PRINIVIL   Take 1 Tab by mouth every day.  Dose: 20 mg        CONTINUE taking these medications      Instructions   aripiprazole 30 MG tablet  Commonly known as: ABILIFY   Take 30 mg by mouth every evening.  Dose: 30 mg     atorvastatin 20 MG Tabs  Commonly known as: LIPITOR   Take 20 mg by mouth every evening.  Dose: 20 mg     Calcium 600-200 MG-UNIT Tabs   Take 1 Tab by mouth 2 Times a Day.  Dose: 1 Tab     divalproex  MG Tb24  Commonly known as: DEPAKOTE ER   Take 1,000 mg by mouth every bedtime. 2 tablets = 1000 mg  Dose: 1,000 mg     donepezil 10 MG tablet  Commonly known as: ARICEPT   Take 10 mg by mouth every evening.  Dose: 10 mg     meloxicam 15 MG tablet  Commonly known as: MOBIC   Take 15 mg by mouth every morning.  Dose: 15 mg     metFORMIN  MG Tb24  Commonly known as: GLUCOPHAGE XR   Take 500 mg by mouth every morning.  Dose: 500 mg     oxybutynin SR 5 MG Tb24  Commonly known as: DITROPAN-XL   Take 5 mg by mouth every evening.  Dose: 5 mg     quetiapine 300 MG XR tablet  Commonly known as: SEROQUEL XR   Take 300 mg by mouth every evening.  Dose: 300 mg     sertraline 100 MG Tabs  Commonly known as: ZOLOFT   Take 100 mg by mouth every morning.  Dose: 100 mg     therapeutic multivitamin-minerals Tabs   Take 1 Tab by mouth every day.  Dose: 1 Tab      tolterodine ER 4 MG Cp24  Commonly known as: DETROL LA   Take 4 mg by mouth every bedtime.  Dose: 4 mg        STOP taking these medications    lisinopril-hydrochlorothiazide 20-25 MG per tablet  Commonly known as: PRINZIDE            Allergies  Allergies   Allergen Reactions   • Risperdal        DIET  Orders Placed This Encounter   Procedures   • Diet Order Regular     Standing Status:   Standing     Number of Occurrences:   1     Order Specific Question:   Diet:     Answer:   Regular [1]     Order Specific Question:   Texture Modifier     Answer:   Level 7 - Regular/Easy to Chew     Order Specific Question:   Liquid level     Answer:   Level 0 - Thin       ACTIVITY  As tolerated.  Weight bearing as tolerated    CONSULTATIONS  Dr. [facility]    PROCEDURES    LABORATORY  Lab Results   Component Value Date    SODIUM 139 08/22/2020    POTASSIUM 4.9 08/22/2020    CHLORIDE 105 08/22/2020    CO2 22 08/22/2020    GLUCOSE 125 (H) 08/22/2020    BUN 21 08/22/2020    CREATININE 0.78 08/22/2020        Lab Results   Component Value Date    WBC 6.9 08/22/2020    HEMOGLOBIN 11.6 (L) 08/22/2020    HEMATOCRIT 36.8 (L) 08/22/2020    PLATELETCT 119 (L) 08/22/2020        Total time of the discharge process exceeds 38 minutes.

## 2020-08-24 NOTE — CARE PLAN
Problem: Safety  Goal: Will remain free from falls  Outcome: PROGRESSING AS EXPECTED  Intervention: Implement fall precautions  Flowsheets  Taken 8/24/2020 0800 by Germaine Day R.N.  Environmental Precautions:   Treaded Slipper Socks on Patient   Personal Belongings, Wastebasket, Call Bell etc. in Easy Reach   Transferred to Stronger Side   Report Given to Other Health Care Providers Regarding Fall Risk   Bed in Low Position   Communication Sign for Patients & Families   Mobility Assessed & Appropriate Sign Placed  Taken 8/20/2020 1940 by Nury Benson R.N.  IV Pole on Same Side of Bed as Bathroom: Yes  Bedrails: Bedrails Closest to Bathroom Down  Chair/Bed Strip Alarm: Yes - Alarm On  Taken 8/19/2020 1154 by Germaine Day R.N.  Bed Alarm: Yes - Alarm On     Problem: Skin Integrity  Goal: Risk for impaired skin integrity will decrease  Outcome: PROGRESSING AS EXPECTED  Intervention: Implement precautions to protect skin integrity in collaboration with the interdisciplinary team  Flowsheets  Taken 8/24/2020 1000 by Germaine Day R.N.  Patient Turns / Repositioning: Left Side  Taken 8/24/2020 0800 by Germaine Day R.N.  Skin Preventative Measures:   Pillows in Use for Support / Positioning   Pillows in Use to Float Heels   Waffle Cushion  Bed Types: Pressure Redistribution Mattress (Atmosair)  Friction Interventions: Draw Sheet / Pad Used for Repositioning  PT / OT Involved in Care:   Physical Therapy Involved   Occupational Therapy Involved  Moisturizers:   Moisturizer   Barrier Cream  Protocols: Incontinence Associated Dermatitis Protocol in Place  Patient is Receiving Nutrition: Oral Intake Adequate  Vitamin Therapy in Use: No  Activity: Bed  Assistance / Tolerance for Turning/Repositioning: Assistance of Two or More  Taken 8/23/2020 1955 by Nury Benson R.N.  Nutrition Consult Ordered: Yes, Consult has been Placed

## 2020-09-01 ENCOUNTER — HOSPITAL ENCOUNTER (EMERGENCY)
Facility: MEDICAL CENTER | Age: 55
End: 2020-09-01
Attending: EMERGENCY MEDICINE
Payer: MEDICARE

## 2020-09-01 ENCOUNTER — APPOINTMENT (OUTPATIENT)
Dept: RADIOLOGY | Facility: MEDICAL CENTER | Age: 55
End: 2020-09-01
Attending: EMERGENCY MEDICINE
Payer: MEDICARE

## 2020-09-01 VITALS
TEMPERATURE: 98 F | BODY MASS INDEX: 24.45 KG/M2 | HEART RATE: 84 BPM | SYSTOLIC BLOOD PRESSURE: 132 MMHG | OXYGEN SATURATION: 95 % | RESPIRATION RATE: 17 BRPM | DIASTOLIC BLOOD PRESSURE: 74 MMHG | WEIGHT: 138 LBS | HEIGHT: 63 IN

## 2020-09-01 DIAGNOSIS — S82.892A CLOSED FRACTURE OF LEFT ANKLE, INITIAL ENCOUNTER: ICD-10-CM

## 2020-09-01 PROCEDURE — 73564 X-RAY EXAM KNEE 4 OR MORE: CPT | Mod: RT

## 2020-09-01 PROCEDURE — 99285 EMERGENCY DEPT VISIT HI MDM: CPT

## 2020-09-01 PROCEDURE — 73610 X-RAY EXAM OF ANKLE: CPT | Mod: LT

## 2020-09-01 ASSESSMENT — FIBROSIS 4 INDEX: FIB4 SCORE: 2.48

## 2020-09-01 ASSESSMENT — PAIN SCALES - WONG BAKER: WONGBAKER_NUMERICALRESPONSE: HURTS JUST A LITTLE BIT

## 2020-09-01 NOTE — ED TRIAGE NOTES
"Pt bib ems from group home facility called [facility] ([phone]). Pt had suffered from broken left ankle approx 8 days ago now with increased pain and swelling according to staff. Pt states pain \"hurts a little bit\" Pt is watson of state guardian name Mallory Kingight # [phone]  " no

## 2020-09-01 NOTE — ED PROVIDER NOTES
ED Provider Note    Scribed for Farhad Ellis M.D. by Sha Arnold. 9/1/2020, 11:06 AM.    Primary care provider: Whitney Graham M.D.  Means of arrival: Ambulance  History obtained from: Patient and Nursing staff  History limited by: Poor Historian due to underlying psychiatric problems    CHIEF COMPLAINT  Chief Complaint   Patient presents with   • Leg Pain       HPI  Erlinda Verde is a 54 y.o. female with history of Parkinson's Disease, Schizophrenia and Diabetes, who presents to the Emergency Department for evaluation of left ankle pain onset approximately 8 days. She says she suffered a broken ankle 8 days ago. She says she is unsure why her facility sent her here today. Per nursing staff, staff at [facility], [facility], sent the patient to the ED for evaluation of worsening left ankle swelling and pain. She denies left knee pain, left calf pain, fever, chills, nausea, vomiting, diarrhea. She denies alleviating factors.      History limited by: Poor Historian due to underlying psychiatric problems    PPE Note: I personally donned PPE for all patient encounters during this visit, including being clean-shaven with a surgical mask, gloves, and eye protection.     Scribe remained outside the patient's room and did not have any contact with the patient for the duration of patient encounter.       REVIEW OF SYSTEMS  As above.    Review of Systems limited by: Poor Historian due to underlying psychiatric problems    PAST MEDICAL HISTORY    Diabetes, Parkinson's Disease and Schizophrenia    SURGICAL HISTORY  patient denies any surgical history    SOCIAL HISTORY  Social History     Tobacco Use   • Smoking status: Never Smoker   • Smokeless tobacco: Never Used   Substance Use Topics   • Alcohol use: Never     Frequency: Never   • Drug use: Never      Social History     Substance and Sexual Activity   Drug Use Never       FAMILY HISTORY  None noted    CURRENT MEDICATIONS  Home  "Medications     Reviewed by Treva Driver R.N. (Registered Nurse) on 09/01/20 at 1054  Med List Status: Partial   Medication Last Dose Status   aripiprazole (ABILIFY) 30 MG tablet  Active   atorvastatin (LIPITOR) 20 MG Tab  Active   Calcium 600-200 MG-UNIT Tab  Active   divalproex ER (DEPAKOTE ER) 500 MG TABLET SR 24 HR  Active   donepezil (ARICEPT) 10 MG tablet  Active   lisinopril (PRINIVIL) 20 MG Tab  Active   meloxicam (MOBIC) 15 MG tablet  Active   metFORMIN ER (GLUCOPHAGE XR) 500 MG TABLET SR 24 HR  Active   oxybutynin SR (DITROPAN-XL) 5 MG TABLET SR 24 HR  Active   quetiapine (SEROQUEL XR) 300 MG XR tablet  Active   sertraline (ZOLOFT) 100 MG Tab  Active   therapeutic multivitamin-minerals (THERAGRAN-M) Tab  Active   tolterodine ER (DETROL LA) 4 MG CAPSULE SR 24 HR  Active                ALLERGIES  Allergies   Allergen Reactions   • Risperdal        PHYSICAL EXAM  VITAL SIGNS: /56   Pulse 86   Temp 36.7 °C (98 °F) (Temporal)   Resp 17   Ht 1.6 m (5' 3\")   Wt 62.6 kg (138 lb)   SpO2 97%   BMI 24.45 kg/m²     Constitutional: Well developed, Well nourished, No acute distress, Non-toxic appearance.   HENT: Normocephalic, Atraumatic, Bilateral external ears normal, oropharynx moist, No oral exudates, Nose normal.   Eyes:conjunctiva is normal, there are no signs of exudate.   Neck: Supple, no meningeal signs.  Lymphatic: No lymphadenopathy noted.   Cardiovascular: Regular rate and rhythm without murmurs gallops or rubs.   Thorax & Lungs: No respiratory distress. Breathing comfortably. Lungs are clear to auscultation bilaterally, there are no wheezes no rales. Chest wall is nontender.  Abdomen: Soft, nontender, nondistended. Bowel sounds are present.   Skin: Warm, Dry, No erythema,   Back: No tenderness, No CVA tenderness.  Musculoskeletal: Tenderness about the lateral aspect of the left ankle, no edema. Right knee with a protuberant proximal fibula, non-tender, small ecchymotic contusion to the " medial aspect, but no obvious deformity. Good range of motion in all major joints. No major deformities noted. Intact distal pulses, no clubbing, no cyanosis.  Neurologic: Alert & oriented x 3 person place place time, Moving all extremities. No gross abnormalities.    Psychiatric: As above.     RADIOLOGY  DX-KNEE COMPLETE 4+ RIGHT   Final Result      1.  Unremarkable right knee series for age.      DX-ANKLE 3+ VIEWS LEFT   Final Result      Nondisplaced fracture of the lateral malleolus.      Soft tissue swelling.        The radiologist's interpretation of all radiological studies have been reviewed by me.    COURSE & MEDICAL DECISION MAKING  Pertinent Labs & Imaging studies reviewed. (See chart for details)    Obtained and reviewed past medical records from which indicated the patient was admitted from 8/18/20-8/24/20 for left ankle pain. She was discharged with follow up with Dr. Marquis in one week. She has history of Diabetes, Parkinson's Disease and Schizophrenia    11:06 AM - Patient seen and examined at bedside. I informed the patient of my plan to run diagnostic studies to evaluate their symptoms including imaging. Patient verbalizes understanding and support with my plan of care. Ordered DX-Ankle Left to evaluate her symptoms. The differential diagnoses include but are not limited to: Fracture, Unknown Injury.      12:18 PM - I reevaluated the patient at bedside. I discussed the patient's diagnostic study results which show a nondisplaced fracture of the lateral malleolus. Patient will be placed in a splint prior to discharge.     12:37 PM - Nursing staff informed me that the patient's group home was concerned for a bony protrusion near the patient's right knee. I reevaluated the patient at bedside. Ordered DX-Knee Right.      1:52 PM - I reevaluated the patient at bedside. I discussed the patient's diagnostic study results which show an unremarkable right knee. I discussed plan for discharge and follow up  as outlined below. The patient verbalizes they feel comfortable going home. The patient is stable for discharge at this time and will return for any new or worsening symptoms. Patient verbalizes understanding and support with my plan for discharge.      Decision Making:  Patient presented the emergency department per the notes it was noted the patient apparently there was concerned about her left ankle that it was having more swelling or redness no significant swelling no significant erythema patient is tender in the area x-ray still shows the same fracture there is no signs of displacement.  We are attempting to send the patient back to her care facility they were more concerned about the right knee that there looks like there is a deformity.  I reexamined the patient there is no overt signs of deformity she does have a prominent fibular head x-ray was obtained just because of concerns there was no signs of fractures or abnormalities.  At this point I feel the patient stable for transfer back to her care facility and recommend a follow-up with Dr. Marquis for her left ankle fracture.    The patient will return for new or worsening symptoms and is stable at the time of discharge.    DISPOSITION:  Patient will be discharged home in stable condition.    FOLLOW UP:  Ubaldo Marquis M.D.  [address]  [address]  [facility] [address]  [phone]    Schedule an appointment as soon as possible for a visit       FINAL IMPRESSION  1. Closed fracture of left ankle, initial encounter          Sha CORNEJO (Rosa), am scribing for, and in the presence of, Farhad Ellis M.D..    Electronically signed by: Sha Arnold (Rosa), 9/1/2020    Farhad CORNEJO M.D. personally performed the services described in this documentation, as scribed by Sha Arnold in my presence, and it is both accurate and complete.    The note accurately reflects work and decisions made by me.  Farhad Ellis M.D.   9/1/2020  5:06 PM

## 2020-09-01 NOTE — ED NOTES
The patient has been provided with discharge education and information.  The patient was also provided with instructions on follow up care and return precautions.  The patient verbalizes understanding of discharge instructions, follow up care, and return precautions.  All questions have been answered.    Report given to NGUYEN

## 2020-09-01 NOTE — ED NOTES
Pt to be picked up by NGUYEN at 1600. Pt and facility made aware. Facility also made aware of need for follow up with orthopedics ASAP.

## 2020-09-01 NOTE — DISCHARGE PLANNING
RN contacted COLLEEN stating Pt can return to Group Home.     Pt is at [facility]-[phone]    Pt has a Legal Guardian-Ann Urias. COLLEEN called Ann and she is in agreement to have Pt return. She asked that I call Rajat Alba [phone] -he is the Director of Group Home and notify him.     COLLEEN spoke to Rajat who agreed to Pt being transported back via [facility] or [facility]. [facility] does not have vans available.     PCS completed for [employer]  [employer] contacted and transportation authorization given by Whitney.  Transportation time arranged for 1600.    COLLEEN udpated RN.     COLLEEN faxed ED Encounter to Public Guardian.

## 2020-09-01 NOTE — ED NOTES
"Called pt facility to update on status, per staff concerned due to bruising on right knee and \"bone protruding more than normal\" ERP made aware and in room to re-evaluate.   "

## 2020-09-04 ENCOUNTER — OFFICE VISIT (OUTPATIENT)
Dept: NEUROLOGY | Facility: MEDICAL CENTER | Age: 55
End: 2020-09-04
Payer: MEDICARE

## 2020-09-04 VITALS
TEMPERATURE: 97.8 F | DIASTOLIC BLOOD PRESSURE: 64 MMHG | SYSTOLIC BLOOD PRESSURE: 110 MMHG | BODY MASS INDEX: 23.21 KG/M2 | WEIGHT: 131 LBS | OXYGEN SATURATION: 97 % | HEART RATE: 109 BPM | HEIGHT: 63 IN

## 2020-09-04 DIAGNOSIS — F03.90 DEMENTIA WITHOUT BEHAVIORAL DISTURBANCE, UNSPECIFIED DEMENTIA TYPE: ICD-10-CM

## 2020-09-04 PROCEDURE — 99204 OFFICE O/P NEW MOD 45 MIN: CPT | Performed by: PSYCHIATRY & NEUROLOGY

## 2020-09-04 ASSESSMENT — FIBROSIS 4 INDEX: FIB4 SCORE: 2.48

## 2020-09-04 NOTE — PROGRESS NOTES
"[facility] NEUROLOGY  NEW PATIENT VISIT    Referral source: Whitney Graham MD    CC: \"personal history of other diseases of the nervous system and sense organs\"    HISTORY OF ILLNESS:  Erlinda Verde is a 54 y.o. woman with a history most notable for schizophrenia and drug-induced parkinsonism.  Today, she was accompanied by a member of her group home staff, who provided the following history:    Erlinda has dementia, which seems to have gotten worse during the past 18 months.  She has been on donepezil since 2019.    About 18 months ago Erlinda's level of functioning was more independent than it was now.  She was better able to express herself verbally and could carry on a conversation.  She would ask the date and remember it.  She would play games with staff.  She could walk a bit better (with difficulty, holding onto furniture).    Erlinda was hospitalized with a broken toe and bruises.  She was also dehydrated, and her kidney function was affected.  She was tested for SARS-CoV-2 and was positive.  She was in the hospital for 1 month.    Since discharge Erlinda has not walked on her own.  She does manage to pivot for transfers.  Now, Erlinda forgets the date and will ask 20 times/day.  Her attention span is quite short.  Erlinda is still able to feed and dress herself.  She developed incontinence about 18 months ago, and now she wears briefs.  The group home staff give her the opportunity to void every 2 hours.  She was started on oxybutynin around the time of her hospitalization.    There has been no coughing or choking, though staff do have to remind her not to shove food in her mouth.    Erlinda's parents are , and she is a watson of the [facility].  She has lived in her current home for the past ~15 years.    MEDICAL AND SURGICAL HISTORY:  Past Medical History:   Diagnosis Date   • Dysthymic disorder    • HTN (hypertension)    • Intellectual disability     moderate   • Parkinsonism (HCC)  "   • Schizoaffective disorder (HCC)      History reviewed. No pertinent surgical history.    MEDICATIONS:  Current Outpatient Medications   Medication Sig   • lisinopril (PRINIVIL) 20 MG Tab Take 1 Tab by mouth every day.   • Calcium 600-200 MG-UNIT Tab Take 1 Tab by mouth 2 Times a Day.   • therapeutic multivitamin-minerals (THERAGRAN-M) Tab Take 1 Tab by mouth every day.   • tolterodine ER (DETROL LA) 4 MG CAPSULE SR 24 HR Take 4 mg by mouth every bedtime.   • meloxicam (MOBIC) 15 MG tablet Take 15 mg by mouth every morning.   • donepezil (ARICEPT) 10 MG tablet Take 10 mg by mouth every evening.   • oxybutynin SR (DITROPAN-XL) 5 MG TABLET SR 24 HR Take 5 mg by mouth every evening.   • aripiprazole (ABILIFY) 30 MG tablet Take 30 mg by mouth every evening.   • divalproex ER (DEPAKOTE ER) 500 MG TABLET SR 24 HR Take 1,000 mg by mouth every bedtime. 2 tablets = 1000 mg   • quetiapine (SEROQUEL XR) 300 MG XR tablet Take 300 mg by mouth every evening.   • sertraline (ZOLOFT) 100 MG Tab Take 100 mg by mouth every morning.   • metFORMIN ER (GLUCOPHAGE XR) 500 MG TABLET SR 24 HR Take 500 mg by mouth every morning.   • atorvastatin (LIPITOR) 20 MG Tab Take 20 mg by mouth every evening.     SOCIAL HISTORY:  Social History     Tobacco Use   • Smoking status: Never Smoker   • Smokeless tobacco: Never Used   Substance Use Topics   • Alcohol use: Never     Frequency: Never     Social History     Social History Narrative   • Not on file     FAMILY HISTORY:  History reviewed. No pertinent family history.    REVIEW OF SYSTEMS:  A ROS was completed.  Pertinent positives and negatives were included in the HPI, above.  All other systems were reviewed and are negative.    PHYSICAL EXAM:  General/Medical:  - NAD, sitting in wheel chair, foot in boot  - heart rate and rhythm were regular    Neuro:  MENTAL STATUS: awake and alert; kept calling me by her other doctor's name; fairly cooperative with physical exam maneuvers    CRANIAL  NERVES:    II: acuity was: NT; pupils 3/3 to 2/2; fields grossly intact    III/IV/VI: versions grossly intact    V: facial sensation: NT    VII: facial expression symmetric    VIII: hearing intact to voice    IX/X: palate: NT (mask in place)    XI: shoulder shrug symmetric    XII: tongue: NT (mask in place)    MOTOR:  - bulk was somewhat reduced throughout  - strength was grossly full in the upper and lower extremities    SENSATION:  - light touch: NT  - vibration (R/L, seconds): NT  - pinprick: NT  - proprioception: NT  - Romberg: NT    COORDINATION:  - finger to nose was grossly normal  - finger tapping: NT    REFLEXES:  Reflex Right Left   BR 1+ 1+   Biceps 1+ 1+   Triceps NT NT   Patellae 1+ 1+   Achilles NT NT   Toes NT NT     GAIT:  - NT (wheel chair, broken foot in boot)    REVIEW OF IMAGING STUDIES: No images available for review    REVIEW OF LABORATORY STUDIES:  - TSH (8/19/2020): 4.260  - vitamin B12 (6/8/2020): 815    ASSESSMENT:  Erlinda Verde is a 54 y.o. woman with a history most notable for dementia, schizophrenia, and drug-induced parkinsonism.  Her caregiver reports a history of cognitive decline since her hospitalization earlier this summer.  There are many factors which could explain this including: the stresses associated with hospitalization, possible CNS effects of SARS-CoV-2, and progression of underlying dementia.  Another possibility is medication side effect.  Oxybutynin was started around this time, which has high brain penetration.  Medication reconciliation also indicates that she is on tolterodine, which also has high brain penetration.  I recommend she switch to an anti-spasmodic with low brain penetration, such as trospium.  A screen for reversible causes of dementia has already been performed, and lab results are summarized above.    PLAN:  - no additional workup recommended at this time  - consider switching oxybutynin and tolterodine to trospium to minimize CNS effects  - RTC  as needed    Signed: Matias Kumar M.D. at 10:11 PM on 09/03/20

## 2022-02-24 ENCOUNTER — OFFICE VISIT (OUTPATIENT)
Dept: URGENT CARE | Facility: PHYSICIAN GROUP | Age: 57
End: 2022-02-24
Payer: MEDICARE

## 2022-02-24 ENCOUNTER — HOSPITAL ENCOUNTER (OUTPATIENT)
Dept: RADIOLOGY | Facility: MEDICAL CENTER | Age: 57
DRG: 522 | End: 2022-02-24
Attending: INTERNAL MEDICINE
Payer: MEDICARE

## 2022-02-24 VITALS
HEIGHT: 65 IN | WEIGHT: 148 LBS | TEMPERATURE: 97.8 F | DIASTOLIC BLOOD PRESSURE: 90 MMHG | BODY MASS INDEX: 24.66 KG/M2 | RESPIRATION RATE: 20 BRPM | SYSTOLIC BLOOD PRESSURE: 122 MMHG | HEART RATE: 131 BPM | OXYGEN SATURATION: 98 %

## 2022-02-24 DIAGNOSIS — M25.552 LEFT HIP PAIN: ICD-10-CM

## 2022-02-24 DIAGNOSIS — S72.002A LEFT DISPLACED FEMORAL NECK FRACTURE (HCC): ICD-10-CM

## 2022-02-24 PROCEDURE — 99214 OFFICE O/P EST MOD 30 MIN: CPT | Performed by: INTERNAL MEDICINE

## 2022-02-24 PROCEDURE — 73521 X-RAY EXAM HIPS BI 2 VIEWS: CPT

## 2022-02-24 ASSESSMENT — FIBROSIS 4 INDEX: FIB4 SCORE: 2.57

## 2022-02-25 ENCOUNTER — APPOINTMENT (OUTPATIENT)
Dept: RADIOLOGY | Facility: MEDICAL CENTER | Age: 57
DRG: 522 | End: 2022-02-25
Attending: ORTHOPAEDIC SURGERY
Payer: MEDICARE

## 2022-02-25 ENCOUNTER — HOSPITAL ENCOUNTER (INPATIENT)
Facility: MEDICAL CENTER | Age: 57
LOS: 19 days | DRG: 522 | End: 2022-03-16
Attending: EMERGENCY MEDICINE | Admitting: INTERNAL MEDICINE
Payer: MEDICARE

## 2022-02-25 ENCOUNTER — APPOINTMENT (OUTPATIENT)
Dept: RADIOLOGY | Facility: MEDICAL CENTER | Age: 57
DRG: 522 | End: 2022-02-25
Attending: EMERGENCY MEDICINE
Payer: MEDICARE

## 2022-02-25 DIAGNOSIS — M25.559 HIP PAIN: ICD-10-CM

## 2022-02-25 DIAGNOSIS — S72.002A CLOSED DISPLACED FRACTURE OF LEFT FEMORAL NECK (HCC): ICD-10-CM

## 2022-02-25 DIAGNOSIS — S82.65XA CLOSED NONDISPLACED FRACTURE OF LATERAL MALLEOLUS OF LEFT FIBULA, INITIAL ENCOUNTER: ICD-10-CM

## 2022-02-25 DIAGNOSIS — K21.9 GASTROESOPHAGEAL REFLUX DISEASE WITHOUT ESOPHAGITIS: ICD-10-CM

## 2022-02-25 DIAGNOSIS — I10 ESSENTIAL HYPERTENSION: ICD-10-CM

## 2022-02-25 DIAGNOSIS — S72.002A HIP FRACTURE REQUIRING OPERATIVE REPAIR, LEFT, CLOSED, INITIAL ENCOUNTER (HCC): ICD-10-CM

## 2022-02-25 PROBLEM — R00.0 TACHYCARDIA: Status: ACTIVE | Noted: 2022-02-25

## 2022-02-25 LAB
ALBUMIN SERPL BCP-MCNC: 3.9 G/DL (ref 3.2–4.9)
ALBUMIN/GLOB SERPL: 1.4 G/DL
ALP SERPL-CCNC: 74 U/L (ref 30–99)
ALT SERPL-CCNC: 11 U/L (ref 2–50)
ANION GAP SERPL CALC-SCNC: 12 MMOL/L (ref 7–16)
APPEARANCE UR: ABNORMAL
AST SERPL-CCNC: 21 U/L (ref 12–45)
BACTERIA #/AREA URNS HPF: NEGATIVE /HPF
BASOPHILS # BLD AUTO: 0.2 % (ref 0–1.8)
BASOPHILS # BLD: 0.02 K/UL (ref 0–0.12)
BILIRUB SERPL-MCNC: 0.2 MG/DL (ref 0.1–1.5)
BILIRUB UR QL STRIP.AUTO: NEGATIVE
BUN SERPL-MCNC: 20 MG/DL (ref 8–22)
CALCIUM SERPL-MCNC: 8.7 MG/DL (ref 8.5–10.5)
CHLORIDE SERPL-SCNC: 99 MMOL/L (ref 96–112)
CO2 SERPL-SCNC: 20 MMOL/L (ref 20–33)
COLOR UR: YELLOW
CREAT SERPL-MCNC: 0.84 MG/DL (ref 0.5–1.4)
EKG IMPRESSION: NORMAL
EOSINOPHIL # BLD AUTO: 0.01 K/UL (ref 0–0.51)
EOSINOPHIL NFR BLD: 0.1 % (ref 0–6.9)
EPI CELLS #/AREA URNS HPF: NEGATIVE /HPF
ERYTHROCYTE [DISTWIDTH] IN BLOOD BY AUTOMATED COUNT: 45.1 FL (ref 35.9–50)
FLUAV RNA SPEC QL NAA+PROBE: NEGATIVE
FLUBV RNA SPEC QL NAA+PROBE: NEGATIVE
GLOBULIN SER CALC-MCNC: 2.7 G/DL (ref 1.9–3.5)
GLUCOSE SERPL-MCNC: 147 MG/DL (ref 65–99)
GLUCOSE UR STRIP.AUTO-MCNC: NEGATIVE MG/DL
HCT VFR BLD AUTO: 34.6 % (ref 37–47)
HGB BLD-MCNC: 11.5 G/DL (ref 12–16)
HYALINE CASTS #/AREA URNS LPF: ABNORMAL /LPF
IMM GRANULOCYTES # BLD AUTO: 0.08 K/UL (ref 0–0.11)
IMM GRANULOCYTES NFR BLD AUTO: 0.9 % (ref 0–0.9)
KETONES UR STRIP.AUTO-MCNC: NEGATIVE MG/DL
LEUKOCYTE ESTERASE UR QL STRIP.AUTO: NEGATIVE
LYMPHOCYTES # BLD AUTO: 1.3 K/UL (ref 1–4.8)
LYMPHOCYTES NFR BLD: 13.8 % (ref 22–41)
MCH RBC QN AUTO: 29.3 PG (ref 27–33)
MCHC RBC AUTO-ENTMCNC: 33.2 G/DL (ref 33.6–35)
MCV RBC AUTO: 88.3 FL (ref 81.4–97.8)
MICRO URNS: ABNORMAL
MONOCYTES # BLD AUTO: 1.81 K/UL (ref 0–0.85)
MONOCYTES NFR BLD AUTO: 19.3 % (ref 0–13.4)
NEUTROPHILS # BLD AUTO: 6.17 K/UL (ref 2–7.15)
NEUTROPHILS NFR BLD: 65.7 % (ref 44–72)
NITRITE UR QL STRIP.AUTO: NEGATIVE
NRBC # BLD AUTO: 0 K/UL
NRBC BLD-RTO: 0 /100 WBC
PH UR STRIP.AUTO: 5.5 [PH] (ref 5–8)
PLATELET # BLD AUTO: 132 K/UL (ref 164–446)
PMV BLD AUTO: 8.5 FL (ref 9–12.9)
POTASSIUM SERPL-SCNC: 4.9 MMOL/L (ref 3.6–5.5)
PROT SERPL-MCNC: 6.6 G/DL (ref 6–8.2)
PROT UR QL STRIP: NEGATIVE MG/DL
RBC # BLD AUTO: 3.92 M/UL (ref 4.2–5.4)
RBC # URNS HPF: ABNORMAL /HPF
RBC UR QL AUTO: NEGATIVE
RSV RNA SPEC QL NAA+PROBE: NEGATIVE
SARS-COV-2 RNA RESP QL NAA+PROBE: NOTDETECTED
SODIUM SERPL-SCNC: 131 MMOL/L (ref 135–145)
SP GR UR STRIP.AUTO: 1.03
SPECIMEN SOURCE: NORMAL
UROBILINOGEN UR STRIP.AUTO-MCNC: 1 MG/DL
WBC # BLD AUTO: 9.4 K/UL (ref 4.8–10.8)
WBC #/AREA URNS HPF: ABNORMAL /HPF

## 2022-02-25 PROCEDURE — 71045 X-RAY EXAM CHEST 1 VIEW: CPT

## 2022-02-25 PROCEDURE — 99222 1ST HOSP IP/OBS MODERATE 55: CPT | Mod: AI | Performed by: INTERNAL MEDICINE

## 2022-02-25 PROCEDURE — 81001 URINALYSIS AUTO W/SCOPE: CPT

## 2022-02-25 PROCEDURE — 0241U HCHG SARS-COV-2 COVID-19 NFCT DS RESP RNA 4 TRGT MIC: CPT

## 2022-02-25 PROCEDURE — 96374 THER/PROPH/DIAG INJ IV PUSH: CPT

## 2022-02-25 PROCEDURE — 303105 HCHG CATHETER EXTRA

## 2022-02-25 PROCEDURE — 700111 HCHG RX REV CODE 636 W/ 250 OVERRIDE (IP): Performed by: EMERGENCY MEDICINE

## 2022-02-25 PROCEDURE — 96375 TX/PRO/DX INJ NEW DRUG ADDON: CPT

## 2022-02-25 PROCEDURE — 36415 COLL VENOUS BLD VENIPUNCTURE: CPT

## 2022-02-25 PROCEDURE — 73501 X-RAY EXAM HIP UNI 1 VIEW: CPT | Mod: LT

## 2022-02-25 PROCEDURE — C9803 HOPD COVID-19 SPEC COLLECT: HCPCS | Performed by: EMERGENCY MEDICINE

## 2022-02-25 PROCEDURE — 770001 HCHG ROOM/CARE - MED/SURG/GYN PRIV*

## 2022-02-25 PROCEDURE — 99285 EMERGENCY DEPT VISIT HI MDM: CPT

## 2022-02-25 PROCEDURE — 82607 VITAMIN B-12: CPT

## 2022-02-25 PROCEDURE — 93005 ELECTROCARDIOGRAM TRACING: CPT | Performed by: EMERGENCY MEDICINE

## 2022-02-25 PROCEDURE — 80053 COMPREHEN METABOLIC PANEL: CPT

## 2022-02-25 PROCEDURE — 73552 X-RAY EXAM OF FEMUR 2/>: CPT | Mod: LT

## 2022-02-25 PROCEDURE — 84443 ASSAY THYROID STIM HORMONE: CPT

## 2022-02-25 PROCEDURE — 700105 HCHG RX REV CODE 258: Performed by: EMERGENCY MEDICINE

## 2022-02-25 PROCEDURE — 85025 COMPLETE CBC W/AUTO DIFF WBC: CPT

## 2022-02-25 PROCEDURE — 51702 INSERT TEMP BLADDER CATH: CPT

## 2022-02-25 RX ORDER — PROMETHAZINE HYDROCHLORIDE 25 MG/1
12.5-25 TABLET ORAL EVERY 4 HOURS PRN
Status: DISCONTINUED | OUTPATIENT
Start: 2022-02-25 | End: 2022-03-16 | Stop reason: HOSPADM

## 2022-02-25 RX ORDER — PROCHLORPERAZINE EDISYLATE 5 MG/ML
5-10 INJECTION INTRAMUSCULAR; INTRAVENOUS EVERY 4 HOURS PRN
Status: DISCONTINUED | OUTPATIENT
Start: 2022-02-25 | End: 2022-03-16 | Stop reason: HOSPADM

## 2022-02-25 RX ORDER — PROMETHAZINE HYDROCHLORIDE 25 MG/1
12.5-25 SUPPOSITORY RECTAL EVERY 4 HOURS PRN
Status: DISCONTINUED | OUTPATIENT
Start: 2022-02-25 | End: 2022-03-16 | Stop reason: HOSPADM

## 2022-02-25 RX ORDER — DIVALPROEX SODIUM 500 MG/1
1000 TABLET, EXTENDED RELEASE ORAL
Status: DISCONTINUED | OUTPATIENT
Start: 2022-02-25 | End: 2022-03-16 | Stop reason: HOSPADM

## 2022-02-25 RX ORDER — AMOXICILLIN 250 MG
2 CAPSULE ORAL 2 TIMES DAILY
Status: DISCONTINUED | OUTPATIENT
Start: 2022-02-26 | End: 2022-03-07

## 2022-02-25 RX ORDER — OXYCODONE HYDROCHLORIDE 5 MG/1
2.5 TABLET ORAL
Status: DISCONTINUED | OUTPATIENT
Start: 2022-02-25 | End: 2022-03-16 | Stop reason: HOSPADM

## 2022-02-25 RX ORDER — HYDROMORPHONE HYDROCHLORIDE 1 MG/ML
1 INJECTION, SOLUTION INTRAMUSCULAR; INTRAVENOUS; SUBCUTANEOUS ONCE
Status: COMPLETED | OUTPATIENT
Start: 2022-02-25 | End: 2022-02-26

## 2022-02-25 RX ORDER — QUETIAPINE FUMARATE 100 MG/1
100 TABLET, FILM COATED ORAL EVERY MORNING
Status: DISCONTINUED | OUTPATIENT
Start: 2022-02-26 | End: 2022-03-16 | Stop reason: HOSPADM

## 2022-02-25 RX ORDER — QUETIAPINE 300 MG/1
300 TABLET, FILM COATED, EXTENDED RELEASE ORAL NIGHTLY
Status: DISCONTINUED | OUTPATIENT
Start: 2022-02-25 | End: 2022-02-25

## 2022-02-25 RX ORDER — QUETIAPINE FUMARATE 100 MG/1
200 TABLET, FILM COATED ORAL NIGHTLY
Status: DISCONTINUED | OUTPATIENT
Start: 2022-02-25 | End: 2022-03-16 | Stop reason: HOSPADM

## 2022-02-25 RX ORDER — LABETALOL HYDROCHLORIDE 5 MG/ML
10 INJECTION, SOLUTION INTRAVENOUS EVERY 4 HOURS PRN
Status: DISCONTINUED | OUTPATIENT
Start: 2022-02-25 | End: 2022-03-16 | Stop reason: HOSPADM

## 2022-02-25 RX ORDER — ONDANSETRON 4 MG/1
4 TABLET, ORALLY DISINTEGRATING ORAL EVERY 4 HOURS PRN
Status: DISCONTINUED | OUTPATIENT
Start: 2022-02-25 | End: 2022-03-16 | Stop reason: HOSPADM

## 2022-02-25 RX ORDER — SERTRALINE HYDROCHLORIDE 100 MG/1
100 TABLET, FILM COATED ORAL EVERY MORNING
Status: DISCONTINUED | OUTPATIENT
Start: 2022-02-26 | End: 2022-03-16 | Stop reason: HOSPADM

## 2022-02-25 RX ORDER — POLYETHYLENE GLYCOL 3350 17 G/17G
1 POWDER, FOR SOLUTION ORAL
Status: DISCONTINUED | OUTPATIENT
Start: 2022-02-25 | End: 2022-03-07

## 2022-02-25 RX ORDER — MELOXICAM 7.5 MG/1
15 TABLET ORAL EVERY MORNING
Status: DISCONTINUED | OUTPATIENT
Start: 2022-02-26 | End: 2022-02-27

## 2022-02-25 RX ORDER — ACETAMINOPHEN 325 MG/1
650 TABLET ORAL EVERY 6 HOURS PRN
Status: DISCONTINUED | OUTPATIENT
Start: 2022-02-25 | End: 2022-03-16 | Stop reason: HOSPADM

## 2022-02-25 RX ORDER — ARIPIPRAZOLE 10 MG/1
30 TABLET ORAL EVERY EVENING
Status: DISCONTINUED | OUTPATIENT
Start: 2022-02-26 | End: 2022-03-16 | Stop reason: HOSPADM

## 2022-02-25 RX ORDER — LORAZEPAM 2 MG/ML
1 INJECTION INTRAMUSCULAR ONCE
Status: COMPLETED | OUTPATIENT
Start: 2022-02-25 | End: 2022-02-25

## 2022-02-25 RX ORDER — SODIUM CHLORIDE 9 MG/ML
INJECTION, SOLUTION INTRAVENOUS CONTINUOUS
Status: DISCONTINUED | OUTPATIENT
Start: 2022-02-25 | End: 2022-02-26

## 2022-02-25 RX ORDER — ATORVASTATIN CALCIUM 20 MG/1
20 TABLET, FILM COATED ORAL NIGHTLY
Status: DISCONTINUED | OUTPATIENT
Start: 2022-02-25 | End: 2022-03-16 | Stop reason: HOSPADM

## 2022-02-25 RX ORDER — ONDANSETRON 2 MG/ML
4 INJECTION INTRAMUSCULAR; INTRAVENOUS ONCE
Status: COMPLETED | OUTPATIENT
Start: 2022-02-25 | End: 2022-02-25

## 2022-02-25 RX ORDER — HYDROMORPHONE HYDROCHLORIDE 1 MG/ML
1 INJECTION, SOLUTION INTRAMUSCULAR; INTRAVENOUS; SUBCUTANEOUS ONCE
Status: COMPLETED | OUTPATIENT
Start: 2022-02-25 | End: 2022-02-25

## 2022-02-25 RX ORDER — SODIUM CHLORIDE 9 MG/ML
INJECTION, SOLUTION INTRAVENOUS CONTINUOUS
Status: DISCONTINUED | OUTPATIENT
Start: 2022-02-25 | End: 2022-02-25

## 2022-02-25 RX ORDER — BISACODYL 10 MG
10 SUPPOSITORY, RECTAL RECTAL
Status: DISCONTINUED | OUTPATIENT
Start: 2022-02-25 | End: 2022-03-07

## 2022-02-25 RX ORDER — OXYBUTYNIN CHLORIDE 5 MG/1
5 TABLET, EXTENDED RELEASE ORAL EVERY EVENING
Status: DISCONTINUED | OUTPATIENT
Start: 2022-02-26 | End: 2022-03-16 | Stop reason: HOSPADM

## 2022-02-25 RX ORDER — ONDANSETRON 2 MG/ML
4 INJECTION INTRAMUSCULAR; INTRAVENOUS EVERY 4 HOURS PRN
Status: DISCONTINUED | OUTPATIENT
Start: 2022-02-25 | End: 2022-03-16 | Stop reason: HOSPADM

## 2022-02-25 RX ORDER — MORPHINE SULFATE 4 MG/ML
2 INJECTION INTRAVENOUS
Status: DISCONTINUED | OUTPATIENT
Start: 2022-02-25 | End: 2022-03-16 | Stop reason: HOSPADM

## 2022-02-25 RX ORDER — TOLTERODINE 4 MG/1
4 CAPSULE, EXTENDED RELEASE ORAL
Status: DISCONTINUED | OUTPATIENT
Start: 2022-02-25 | End: 2022-02-25

## 2022-02-25 RX ORDER — OXYCODONE HYDROCHLORIDE 5 MG/1
5 TABLET ORAL
Status: DISCONTINUED | OUTPATIENT
Start: 2022-02-25 | End: 2022-03-16 | Stop reason: HOSPADM

## 2022-02-25 RX ADMIN — LORAZEPAM 1 MG: 2 INJECTION INTRAMUSCULAR; INTRAVENOUS at 23:04

## 2022-02-25 RX ADMIN — HYDROMORPHONE HYDROCHLORIDE 1 MG: 1 INJECTION, SOLUTION INTRAMUSCULAR; INTRAVENOUS; SUBCUTANEOUS at 20:43

## 2022-02-25 RX ADMIN — ONDANSETRON 4 MG: 2 INJECTION INTRAMUSCULAR; INTRAVENOUS at 20:43

## 2022-02-25 RX ADMIN — SODIUM CHLORIDE: 9 INJECTION, SOLUTION INTRAVENOUS at 21:08

## 2022-02-25 ASSESSMENT — FIBROSIS 4 INDEX: FIB4 SCORE: 2.57

## 2022-02-25 NOTE — PROGRESS NOTES
Chief Complaint:      HPI:      Erlinda Verde is a 56 y.o. female with with history of moderate intellectual disability, mental retardation schizophrenia and diabetes accompanied by her caregivers today.  She was reported to have an unwitnessed fall at the group home when trying to move from bed to the bathroom.  Unfortunately the patient is unable to provide any historical information or provide any details.  According to her caregiver she is complaining of left hip pain.        ROS:   ROS   Unable to obtain information due to the patient's mental retardation    Past Medical History:  She   Patient Active Problem List    Diagnosis Date Noted   • Essential hypertension 08/20/2020   • Closed nondisplaced fracture of lateral malleolus of left fibula 08/19/2020   • Hip pain 06/16/2020   • Anemia 06/08/2020   • Thrombocytopenia ([facility]) 05/17/2020   • Diabetes ([facility]) 05/17/2020   • Constipation 05/17/2020   • COVID-19 virus infection 05/17/2020   • KYLE (acute kidney injury) ([facility]) 05/16/2020   • Metabolic acidosis 05/16/2020   • Bruising 05/16/2020   • Parkinson's disease (tremor, stiffness, slow motion, unstable posture) ([facility]) 11/26/2013   • Schizophrenia ([facility]) 11/26/2013   • Moderate intellectual disability 11/26/2013   • Dysthymic disorder 11/26/2013   • Injury, self-inflicted 11/26/2013     Past Surgical History:  She No past surgical history on file.   Allergies:  She Risperdal   Current Medications:  She   Current Outpatient Medications:   •  lisinopril (PRINIVIL) 20 MG Tab, Take 1 Tab by mouth every day., Disp: 30 Tab, Rfl: 3  •  Calcium 600-200 MG-UNIT Tab, Take 1 Tab by mouth 2 Times a Day., Disp: , Rfl:   •  therapeutic multivitamin-minerals (THERAGRAN-M) Tab, Take 1 Tab by mouth every day., Disp: , Rfl:   •  tolterodine ER (DETROL LA) 4 MG CAPSULE SR 24 HR, Take 4 mg by mouth every bedtime., Disp: , Rfl:   •  meloxicam (MOBIC) 15 MG tablet, Take 15 mg by mouth every morning., Disp: , Rfl:   •  donepezil  "(ARICEPT) 10 MG tablet, Take 10 mg by mouth every evening., Disp: , Rfl:   •  oxybutynin SR (DITROPAN-XL) 5 MG TABLET SR 24 HR, Take 5 mg by mouth every evening., Disp: , Rfl:   •  aripiprazole (ABILIFY) 30 MG tablet, Take 30 mg by mouth every evening., Disp: , Rfl:   •  divalproex ER (DEPAKOTE ER) 500 MG TABLET SR 24 HR, Take 1,000 mg by mouth at bedtime. 2 tablets = 1000 mg, Disp: , Rfl:   •  quetiapine (SEROQUEL XR) 300 MG XR tablet, Take 300 mg by mouth every evening., Disp: , Rfl:   •  sertraline (ZOLOFT) 100 MG Tab, Take 100 mg by mouth every morning., Disp: , Rfl:   •  metFORMIN ER (GLUCOPHAGE XR) 500 MG TABLET SR 24 HR, Take 500 mg by mouth every morning., Disp: , Rfl:   •  atorvastatin (LIPITOR) 20 MG Tab, Take 20 mg by mouth every evening., Disp: , Rfl:   Social History:  She   Social History     Socioeconomic History   • Marital status: Single     Spouse name: Not on file   • Number of children: Not on file   • Years of education: Not on file   • Highest education level: Not on file   Occupational History   • Not on file   Tobacco Use   • Smoking status: Never Smoker   • Smokeless tobacco: Never Used   Vaping Use   • Vaping Use: Never used   Substance and Sexual Activity   • Alcohol use: Never   • Drug use: Never   • Sexual activity: Not on file   Other Topics Concern   • Not on file   Social History Narrative   • Not on file     Social Determinants of Health     Financial Resource Strain: Not on file   Food Insecurity: Not on file   Transportation Needs: Not on file   Physical Activity: Not on file   Stress: Not on file   Social Connections: Not on file   Intimate Partner Violence: Not on file   Housing Stability: Not on file      Family History:   Her No family history on file.     PHYSICAL EXAM:    Vital signs: /90   Pulse (!) 131   Temp 36.6 °C (97.8 °F) (Temporal)   Resp 20   Ht 1.651 m (5' 5\")   Wt 67.1 kg (148 lb)   SpO2 98%   BMI 24.63 kg/m²   Physical Exam  Constitutional:       " Appearance: She is normal weight. She is not ill-appearing or toxic-appearing.   HENT:      Head: Normocephalic and atraumatic.   Eyes:      Extraocular Movements: Extraocular movements intact.      Conjunctiva/sclera: Conjunctivae normal.      Pupils: Pupils are equal, round, and reactive to light.   Cardiovascular:      Rate and Rhythm: Normal rate and regular rhythm.      Heart sounds: Normal heart sounds.   Pulmonary:      Effort: Pulmonary effort is normal.      Breath sounds: Normal breath sounds.   Musculoskeletal:      Cervical back: Normal range of motion and neck supple.      Comments: Unable to assess range of motion as patient is noncompliant but there is point tenderness on the left hip with palpation   Neurological:      Mental Status: She is alert.      Comments: She is alert and follows some verbal commands but I am unable to assess orientation.  She is on a wheelchair and her caregivers report that she normally does not ambulate without assistance         Labs:  Lab Results   Component Value Date/Time    HBA1C 6.7 (H) 2020 04:28 PM      No results found for: CHOLSTRLTOT, TRIGLYCERIDE, HDL, LDL, CHOLHDLRAT, NONHDL  No results found for: MICROALBCALC, MALBCRT, MALBEXCR, AKBUEZ11, MICROALBUR, MICRALB, UMICROALBUM, MICROALBTIM   Lab Results   Component Value Date/Time    CREATININE 0.78 2020 04:21 AM         IMAGIN2022 6:23 PM     HISTORY/REASON FOR EXAM:  Pelvic/Hip Pain Following Trauma; unwitnessed fall, patient with mental disability lives in group home, hx of hip dysplasia.        TECHNIQUE/EXAM DESCRIPTION AND NUMBER OF VIEWS:  3 views of the right and left hip.     COMPARISON: Pelvic x-ray 2020     FINDINGS:  BONY PELVIS: normal in appearance.     HIPS: There is a left femoral neck fracture.     Sacroiliac joints:There is bilateral SI joint osteoarthritis..     LUMBAR spine: Facet arthropathy of the lumbar spine.     ABDOMEN: Normal in  appearance.     IMPRESSION:     Displaced left femoral neck fracture    ASSESSMENT/PLAN:   1. Left hip pain  Recommend starting ibuprofen for initial pain management  If pain is not controlled with NSAIDs recommend escalating as necessary  Follow-up with physician at [facility]    2. Left displaced femoral neck fracture (HCC)  Refer to orthopedics      Return if symptoms worsen or fail to improve.      Differential diagnosis, natural history, supportive care, and indications for immediate follow-up discussed at length.   Instructed to return to  or nearest emergency department if symptoms fail to improve, for any change in condition, further concerns, or new concerning symptoms.    Patient states understanding of the plan of care and discharge instructions.      Feliciano Hathaway MD, FACE, ECNU  02/24/22

## 2022-02-26 ENCOUNTER — APPOINTMENT (OUTPATIENT)
Dept: RADIOLOGY | Facility: MEDICAL CENTER | Age: 57
DRG: 522 | End: 2022-02-26
Attending: ORTHOPAEDIC SURGERY
Payer: MEDICARE

## 2022-02-26 ENCOUNTER — ANESTHESIA EVENT (OUTPATIENT)
Dept: SURGERY | Facility: MEDICAL CENTER | Age: 57
DRG: 522 | End: 2022-02-26
Payer: MEDICARE

## 2022-02-26 ENCOUNTER — APPOINTMENT (OUTPATIENT)
Dept: RADIOLOGY | Facility: MEDICAL CENTER | Age: 57
DRG: 522 | End: 2022-02-26
Attending: HOSPITALIST
Payer: MEDICARE

## 2022-02-26 ENCOUNTER — ANESTHESIA (OUTPATIENT)
Dept: SURGERY | Facility: MEDICAL CENTER | Age: 57
DRG: 522 | End: 2022-02-26
Payer: MEDICARE

## 2022-02-26 LAB
ALBUMIN SERPL BCP-MCNC: 3 G/DL (ref 3.2–4.9)
ALBUMIN/GLOB SERPL: 1.2 G/DL
ALP SERPL-CCNC: 64 U/L (ref 30–99)
ALT SERPL-CCNC: 7 U/L (ref 2–50)
ANION GAP SERPL CALC-SCNC: 11 MMOL/L (ref 7–16)
AST SERPL-CCNC: 20 U/L (ref 12–45)
BASOPHILS # BLD AUTO: 0.3 % (ref 0–1.8)
BASOPHILS # BLD: 0.02 K/UL (ref 0–0.12)
BILIRUB SERPL-MCNC: <0.2 MG/DL (ref 0.1–1.5)
BUN SERPL-MCNC: 24 MG/DL (ref 8–22)
CALCIUM SERPL-MCNC: 8 MG/DL (ref 8.5–10.5)
CHLORIDE SERPL-SCNC: 103 MMOL/L (ref 96–112)
CO2 SERPL-SCNC: 21 MMOL/L (ref 20–33)
CREAT SERPL-MCNC: 0.96 MG/DL (ref 0.5–1.4)
EOSINOPHIL # BLD AUTO: 0.01 K/UL (ref 0–0.51)
EOSINOPHIL NFR BLD: 0.2 % (ref 0–6.9)
ERYTHROCYTE [DISTWIDTH] IN BLOOD BY AUTOMATED COUNT: 47.9 FL (ref 35.9–50)
GLOBULIN SER CALC-MCNC: 2.5 G/DL (ref 1.9–3.5)
GLUCOSE SERPL-MCNC: 109 MG/DL (ref 65–99)
HCT VFR BLD AUTO: 31.4 % (ref 37–47)
HGB BLD-MCNC: 10.2 G/DL (ref 12–16)
IMM GRANULOCYTES # BLD AUTO: 0.03 K/UL (ref 0–0.11)
IMM GRANULOCYTES NFR BLD AUTO: 0.5 % (ref 0–0.9)
LYMPHOCYTES # BLD AUTO: 1.09 K/UL (ref 1–4.8)
LYMPHOCYTES NFR BLD: 18.7 % (ref 22–41)
MCH RBC QN AUTO: 29.6 PG (ref 27–33)
MCHC RBC AUTO-ENTMCNC: 32.5 G/DL (ref 33.6–35)
MCV RBC AUTO: 91 FL (ref 81.4–97.8)
MONOCYTES # BLD AUTO: 1.17 K/UL (ref 0–0.85)
MONOCYTES NFR BLD AUTO: 20.1 % (ref 0–13.4)
NEUTROPHILS # BLD AUTO: 3.5 K/UL (ref 2–7.15)
NEUTROPHILS NFR BLD: 60.2 % (ref 44–72)
NRBC # BLD AUTO: 0 K/UL
NRBC BLD-RTO: 0 /100 WBC
PLATELET # BLD AUTO: 101 K/UL (ref 164–446)
PMV BLD AUTO: 8.3 FL (ref 9–12.9)
POTASSIUM SERPL-SCNC: 4.8 MMOL/L (ref 3.6–5.5)
PROT SERPL-MCNC: 5.5 G/DL (ref 6–8.2)
RBC # BLD AUTO: 3.45 M/UL (ref 4.2–5.4)
SODIUM SERPL-SCNC: 135 MMOL/L (ref 135–145)
TSH SERPL DL<=0.005 MIU/L-ACNC: 8.77 UIU/ML (ref 0.38–5.33)
VIT B12 SERPL-MCNC: 602 PG/ML (ref 211–911)
WBC # BLD AUTO: 5.8 K/UL (ref 4.8–10.8)

## 2022-02-26 PROCEDURE — L8699 PROSTHETIC IMPLANT NOS: HCPCS | Performed by: ORTHOPAEDIC SURGERY

## 2022-02-26 PROCEDURE — 74018 RADEX ABDOMEN 1 VIEW: CPT

## 2022-02-26 PROCEDURE — 502000 HCHG MISC OR IMPLANTS RC 0278: Performed by: ORTHOPAEDIC SURGERY

## 2022-02-26 PROCEDURE — 36415 COLL VENOUS BLD VENIPUNCTURE: CPT

## 2022-02-26 PROCEDURE — 770001 HCHG ROOM/CARE - MED/SURG/GYN PRIV*

## 2022-02-26 PROCEDURE — 72170 X-RAY EXAM OF PELVIS: CPT

## 2022-02-26 PROCEDURE — 700105 HCHG RX REV CODE 258: Performed by: INTERNAL MEDICINE

## 2022-02-26 PROCEDURE — 160002 HCHG RECOVERY MINUTES (STAT): Performed by: ORTHOPAEDIC SURGERY

## 2022-02-26 PROCEDURE — 700111 HCHG RX REV CODE 636 W/ 250 OVERRIDE (IP): Performed by: EMERGENCY MEDICINE

## 2022-02-26 PROCEDURE — 700101 HCHG RX REV CODE 250: Performed by: ANESTHESIOLOGY

## 2022-02-26 PROCEDURE — 0SRS0J9 REPLACEMENT OF LEFT HIP JOINT, FEMORAL SURFACE WITH SYNTHETIC SUBSTITUTE, CEMENTED, OPEN APPROACH: ICD-10-PCS | Performed by: ORTHOPAEDIC SURGERY

## 2022-02-26 PROCEDURE — 160042 HCHG SURGERY MINUTES - EA ADDL 1 MIN LEVEL 5: Performed by: ORTHOPAEDIC SURGERY

## 2022-02-26 PROCEDURE — A9270 NON-COVERED ITEM OR SERVICE: HCPCS | Performed by: INTERNAL MEDICINE

## 2022-02-26 PROCEDURE — 700111 HCHG RX REV CODE 636 W/ 250 OVERRIDE (IP): Performed by: ORTHOPAEDIC SURGERY

## 2022-02-26 PROCEDURE — 99233 SBSQ HOSP IP/OBS HIGH 50: CPT | Performed by: HOSPITALIST

## 2022-02-26 PROCEDURE — 160035 HCHG PACU - 1ST 60 MINS PHASE I: Performed by: ORTHOPAEDIC SURGERY

## 2022-02-26 PROCEDURE — 160031 HCHG SURGERY MINUTES - 1ST 30 MINS LEVEL 5: Performed by: ORTHOPAEDIC SURGERY

## 2022-02-26 PROCEDURE — 160048 HCHG OR STATISTICAL LEVEL 1-5: Performed by: ORTHOPAEDIC SURGERY

## 2022-02-26 PROCEDURE — 160036 HCHG PACU - EA ADDL 30 MINS PHASE I: Performed by: ORTHOPAEDIC SURGERY

## 2022-02-26 PROCEDURE — 700111 HCHG RX REV CODE 636 W/ 250 OVERRIDE (IP): Performed by: ANESTHESIOLOGY

## 2022-02-26 PROCEDURE — 700105 HCHG RX REV CODE 258: Performed by: ANESTHESIOLOGY

## 2022-02-26 PROCEDURE — 700101 HCHG RX REV CODE 250: Performed by: ORTHOPAEDIC SURGERY

## 2022-02-26 PROCEDURE — 501838 HCHG SUTURE GENERAL: Performed by: ORTHOPAEDIC SURGERY

## 2022-02-26 PROCEDURE — 80053 COMPREHEN METABOLIC PANEL: CPT

## 2022-02-26 PROCEDURE — 85025 COMPLETE CBC W/AUTO DIFF WBC: CPT

## 2022-02-26 PROCEDURE — 700105 HCHG RX REV CODE 258: Performed by: HOSPITALIST

## 2022-02-26 PROCEDURE — 160009 HCHG ANES TIME/MIN: Performed by: ORTHOPAEDIC SURGERY

## 2022-02-26 PROCEDURE — 700102 HCHG RX REV CODE 250 W/ 637 OVERRIDE(OP): Performed by: INTERNAL MEDICINE

## 2022-02-26 PROCEDURE — C1776 JOINT DEVICE (IMPLANTABLE): HCPCS | Performed by: ORTHOPAEDIC SURGERY

## 2022-02-26 DEVICE — BONE CEMENT SIMPLEX FULL DOSE - (10EA/PK): Type: IMPLANTABLE DEVICE | Site: HIP | Status: FUNCTIONAL

## 2022-02-26 DEVICE — IMPLANTABLE DEVICE: Type: IMPLANTABLE DEVICE | Site: HIP | Status: FUNCTIONAL

## 2022-02-26 RX ORDER — SODIUM CHLORIDE, SODIUM LACTATE, POTASSIUM CHLORIDE, CALCIUM CHLORIDE 600; 310; 30; 20 MG/100ML; MG/100ML; MG/100ML; MG/100ML
INJECTION, SOLUTION INTRAVENOUS
Status: DISCONTINUED | OUTPATIENT
Start: 2022-02-26 | End: 2022-02-26 | Stop reason: SURG

## 2022-02-26 RX ORDER — HALOPERIDOL 5 MG/ML
1 INJECTION INTRAMUSCULAR
Status: DISCONTINUED | OUTPATIENT
Start: 2022-02-26 | End: 2022-02-26 | Stop reason: HOSPADM

## 2022-02-26 RX ORDER — MIDAZOLAM HYDROCHLORIDE 1 MG/ML
1 INJECTION INTRAMUSCULAR; INTRAVENOUS
Status: DISCONTINUED | OUTPATIENT
Start: 2022-02-26 | End: 2022-02-26 | Stop reason: HOSPADM

## 2022-02-26 RX ORDER — CEFAZOLIN SODIUM 1 G/3ML
INJECTION, POWDER, FOR SOLUTION INTRAMUSCULAR; INTRAVENOUS PRN
Status: DISCONTINUED | OUTPATIENT
Start: 2022-02-26 | End: 2022-02-26 | Stop reason: SURG

## 2022-02-26 RX ORDER — HYDROMORPHONE HYDROCHLORIDE 1 MG/ML
0.2 INJECTION, SOLUTION INTRAMUSCULAR; INTRAVENOUS; SUBCUTANEOUS
Status: DISCONTINUED | OUTPATIENT
Start: 2022-02-26 | End: 2022-02-26 | Stop reason: HOSPADM

## 2022-02-26 RX ORDER — ONDANSETRON 2 MG/ML
4 INJECTION INTRAMUSCULAR; INTRAVENOUS
Status: DISCONTINUED | OUTPATIENT
Start: 2022-02-26 | End: 2022-02-26 | Stop reason: HOSPADM

## 2022-02-26 RX ORDER — OXYCODONE HCL 5 MG/5 ML
10 SOLUTION, ORAL ORAL
Status: DISCONTINUED | OUTPATIENT
Start: 2022-02-26 | End: 2022-02-26 | Stop reason: HOSPADM

## 2022-02-26 RX ORDER — OXYCODONE HCL 5 MG/5 ML
5 SOLUTION, ORAL ORAL
Status: DISCONTINUED | OUTPATIENT
Start: 2022-02-26 | End: 2022-02-26 | Stop reason: HOSPADM

## 2022-02-26 RX ORDER — HYDROMORPHONE HYDROCHLORIDE 1 MG/ML
0.4 INJECTION, SOLUTION INTRAMUSCULAR; INTRAVENOUS; SUBCUTANEOUS
Status: DISCONTINUED | OUTPATIENT
Start: 2022-02-26 | End: 2022-02-26 | Stop reason: HOSPADM

## 2022-02-26 RX ORDER — LIDOCAINE HYDROCHLORIDE 20 MG/ML
INJECTION, SOLUTION EPIDURAL; INFILTRATION; INTRACAUDAL; PERINEURAL PRN
Status: DISCONTINUED | OUTPATIENT
Start: 2022-02-26 | End: 2022-02-26 | Stop reason: SURG

## 2022-02-26 RX ORDER — HYDROMORPHONE HYDROCHLORIDE 2 MG/ML
INJECTION, SOLUTION INTRAMUSCULAR; INTRAVENOUS; SUBCUTANEOUS PRN
Status: DISCONTINUED | OUTPATIENT
Start: 2022-02-26 | End: 2022-02-26 | Stop reason: SURG

## 2022-02-26 RX ORDER — VANCOMYCIN HYDROCHLORIDE 1 G/20ML
INJECTION, POWDER, LYOPHILIZED, FOR SOLUTION INTRAVENOUS
Status: COMPLETED | OUTPATIENT
Start: 2022-02-26 | End: 2022-02-26

## 2022-02-26 RX ORDER — DIPHENHYDRAMINE HYDROCHLORIDE 50 MG/ML
12.5 INJECTION INTRAMUSCULAR; INTRAVENOUS
Status: DISCONTINUED | OUTPATIENT
Start: 2022-02-26 | End: 2022-02-26 | Stop reason: HOSPADM

## 2022-02-26 RX ORDER — METOCLOPRAMIDE HYDROCHLORIDE 5 MG/ML
INJECTION INTRAMUSCULAR; INTRAVENOUS PRN
Status: DISCONTINUED | OUTPATIENT
Start: 2022-02-26 | End: 2022-02-26 | Stop reason: SURG

## 2022-02-26 RX ORDER — ONDANSETRON 2 MG/ML
INJECTION INTRAMUSCULAR; INTRAVENOUS PRN
Status: DISCONTINUED | OUTPATIENT
Start: 2022-02-26 | End: 2022-02-26 | Stop reason: SURG

## 2022-02-26 RX ORDER — SODIUM CHLORIDE 9 MG/ML
INJECTION, SOLUTION INTRAVENOUS CONTINUOUS
Status: DISCONTINUED | OUTPATIENT
Start: 2022-02-26 | End: 2022-02-28

## 2022-02-26 RX ORDER — SODIUM CHLORIDE, SODIUM LACTATE, POTASSIUM CHLORIDE, CALCIUM CHLORIDE 600; 310; 30; 20 MG/100ML; MG/100ML; MG/100ML; MG/100ML
INJECTION, SOLUTION INTRAVENOUS CONTINUOUS
Status: DISCONTINUED | OUTPATIENT
Start: 2022-02-26 | End: 2022-02-26 | Stop reason: HOSPADM

## 2022-02-26 RX ORDER — HYDROMORPHONE HYDROCHLORIDE 1 MG/ML
0.1 INJECTION, SOLUTION INTRAMUSCULAR; INTRAVENOUS; SUBCUTANEOUS
Status: DISCONTINUED | OUTPATIENT
Start: 2022-02-26 | End: 2022-02-26 | Stop reason: HOSPADM

## 2022-02-26 RX ORDER — PHENYLEPHRINE HCL IN 0.9% NACL 0.5 MG/5ML
SYRINGE (ML) INTRAVENOUS PRN
Status: DISCONTINUED | OUTPATIENT
Start: 2022-02-26 | End: 2022-02-26 | Stop reason: SURG

## 2022-02-26 RX ORDER — MAGNESIUM HYDROXIDE 1200 MG/15ML
LIQUID ORAL
Status: COMPLETED | OUTPATIENT
Start: 2022-02-26 | End: 2022-02-26

## 2022-02-26 RX ORDER — MEPERIDINE HYDROCHLORIDE 25 MG/ML
12.5 INJECTION INTRAMUSCULAR; INTRAVENOUS; SUBCUTANEOUS
Status: DISCONTINUED | OUTPATIENT
Start: 2022-02-26 | End: 2022-02-26 | Stop reason: HOSPADM

## 2022-02-26 RX ADMIN — SODIUM CHLORIDE 1000 ML: 9 INJECTION, SOLUTION INTRAVENOUS at 15:36

## 2022-02-26 RX ADMIN — CEFAZOLIN 1.8 G: 330 INJECTION, POWDER, FOR SOLUTION INTRAMUSCULAR; INTRAVENOUS at 10:46

## 2022-02-26 RX ADMIN — OXYBUTYNIN CHLORIDE 5 MG: 5 TABLET, EXTENDED RELEASE ORAL at 17:54

## 2022-02-26 RX ADMIN — HYDROMORPHONE HYDROCHLORIDE 1 MG: 1 INJECTION, SOLUTION INTRAMUSCULAR; INTRAVENOUS; SUBCUTANEOUS at 01:17

## 2022-02-26 RX ADMIN — WATER 2 G: 100 INJECTION, SOLUTION INTRAVENOUS at 17:56

## 2022-02-26 RX ADMIN — ATORVASTATIN CALCIUM 20 MG: 20 TABLET, FILM COATED ORAL at 01:17

## 2022-02-26 RX ADMIN — EPHEDRINE SULFATE 10 MG: 50 INJECTION, SOLUTION INTRAVENOUS at 11:49

## 2022-02-26 RX ADMIN — SENNOSIDES AND DOCUSATE SODIUM 2 TABLET: 50; 8.6 TABLET ORAL at 17:54

## 2022-02-26 RX ADMIN — DIVALPROEX SODIUM 1000 MG: 500 TABLET, EXTENDED RELEASE ORAL at 20:20

## 2022-02-26 RX ADMIN — MELOXICAM 15 MG: 7.5 TABLET ORAL at 05:21

## 2022-02-26 RX ADMIN — SODIUM CHLORIDE, POTASSIUM CHLORIDE, SODIUM LACTATE AND CALCIUM CHLORIDE: 600; 310; 30; 20 INJECTION, SOLUTION INTRAVENOUS at 10:56

## 2022-02-26 RX ADMIN — METOCLOPRAMIDE 10 MG: 5 INJECTION, SOLUTION INTRAMUSCULAR; INTRAVENOUS at 11:24

## 2022-02-26 RX ADMIN — QUETIAPINE FUMARATE 200 MG: 100 TABLET ORAL at 01:17

## 2022-02-26 RX ADMIN — Medication 100 MCG: at 11:00

## 2022-02-26 RX ADMIN — ATORVASTATIN CALCIUM 20 MG: 20 TABLET, FILM COATED ORAL at 20:20

## 2022-02-26 RX ADMIN — QUETIAPINE FUMARATE 100 MG: 100 TABLET ORAL at 05:21

## 2022-02-26 RX ADMIN — SODIUM CHLORIDE: 9 INJECTION, SOLUTION INTRAVENOUS at 05:23

## 2022-02-26 RX ADMIN — SERTRALINE 100 MG: 100 TABLET, FILM COATED ORAL at 05:21

## 2022-02-26 RX ADMIN — Medication 100 MCG: at 10:52

## 2022-02-26 RX ADMIN — EPHEDRINE SULFATE 10 MG: 50 INJECTION, SOLUTION INTRAVENOUS at 11:01

## 2022-02-26 RX ADMIN — ONDANSETRON 4 MG: 2 INJECTION INTRAMUSCULAR; INTRAVENOUS at 11:49

## 2022-02-26 RX ADMIN — PROPOFOL 150 MG: 10 INJECTION, EMULSION INTRAVENOUS at 10:37

## 2022-02-26 RX ADMIN — SODIUM CHLORIDE: 9 INJECTION, SOLUTION INTRAVENOUS at 20:26

## 2022-02-26 RX ADMIN — Medication 100 MCG: at 10:45

## 2022-02-26 RX ADMIN — HYDROMORPHONE HYDROCHLORIDE 1 MG: 2 INJECTION INTRAMUSCULAR; INTRAVENOUS; SUBCUTANEOUS at 10:35

## 2022-02-26 RX ADMIN — Medication 100 MCG: at 10:48

## 2022-02-26 RX ADMIN — Medication 100 MCG: at 10:56

## 2022-02-26 RX ADMIN — LIDOCAINE HYDROCHLORIDE 40 MG: 20 INJECTION, SOLUTION EPIDURAL; INFILTRATION; INTRACAUDAL at 10:37

## 2022-02-26 RX ADMIN — DIVALPROEX SODIUM 1000 MG: 500 TABLET, EXTENDED RELEASE ORAL at 01:17

## 2022-02-26 RX ADMIN — ACETAMINOPHEN 650 MG: 325 TABLET, FILM COATED ORAL at 20:23

## 2022-02-26 RX ADMIN — ARIPIPRAZOLE 30 MG: 10 TABLET ORAL at 17:54

## 2022-02-26 RX ADMIN — Medication 100 MCG: at 11:46

## 2022-02-26 RX ADMIN — Medication 100 MCG: at 11:51

## 2022-02-26 RX ADMIN — QUETIAPINE FUMARATE 200 MG: 100 TABLET ORAL at 20:19

## 2022-02-26 ASSESSMENT — PAIN SCALES - PAIN ASSESSMENT IN ADVANCED DEMENTIA (PAINAD)
BREATHING: OCCASIONAL LABORED BREATHING, SHORT PERIOD OF HYPERVENTILATION
FACIALEXPRESSION: SAD, FRIGHTENED, FROWN
BREATHING: NORMAL
CONSOLABILITY: DISTRACTED OR REASSURED BY VOICE/TOUCH
CONSOLABILITY: NO NEED TO CONSOLE
BODYLANGUAGE: RELAXED
TOTALSCORE: 0
BREATHING: NORMAL
TOTALSCORE: 6
FACIALEXPRESSION: SMILING OR INEXPRESSIVE
FACIALEXPRESSION: SMILING OR INEXPRESSIVE
BODYLANGUAGE: TENSE, DISTRESSED PACING, FIDGETING
NEGVOCALIZATION: REPEATED TROUBLED CALLING OUT, LOUD MOANING/GROANING, CRYING
TOTALSCORE: 0
BODYLANGUAGE: RELAXED
CONSOLABILITY: NO NEED TO CONSOLE

## 2022-02-26 ASSESSMENT — PAIN SCALES - GENERAL: PAIN_LEVEL: 0

## 2022-02-26 ASSESSMENT — PAIN DESCRIPTION - PAIN TYPE
TYPE: ACUTE PAIN

## 2022-02-26 ASSESSMENT — LIFESTYLE VARIABLES
TOTAL SCORE: 0
EVER FELT BAD OR GUILTY ABOUT YOUR DRINKING: NO
EVER HAD A DRINK FIRST THING IN THE MORNING TO STEADY YOUR NERVES TO GET RID OF A HANGOVER: NO
HAVE YOU EVER FELT YOU SHOULD CUT DOWN ON YOUR DRINKING: NO
CONSUMPTION TOTAL: NEGATIVE
ON A TYPICAL DAY WHEN YOU DRINK ALCOHOL HOW MANY DRINKS DO YOU HAVE: 0
TOTAL SCORE: 0
HOW MANY TIMES IN THE PAST YEAR HAVE YOU HAD 5 OR MORE DRINKS IN A DAY: 0
DOES PATIENT WANT TO STOP DRINKING: CANNOT ASSESS
ALCOHOL_USE: NO
HAVE PEOPLE ANNOYED YOU BY CRITICIZING YOUR DRINKING: NO
TOTAL SCORE: 0
AVERAGE NUMBER OF DAYS PER WEEK YOU HAVE A DRINK CONTAINING ALCOHOL: 0

## 2022-02-26 NOTE — HOSPITAL COURSE
This is a 56 year old female with pmhx of developmental disability, schizoaffective disorder, parkinsonism, hypertension, diabetes type 2, who presented 2/25/2022 after she had a ground level fall.  She landed on the left side, x-ray showing femoral head fracture. Ortho was consulted and patient underwent Open treatment of left femoral neck fracture with hemiarthroplasty on 2/26 by Dr Marquis.    Patient is noted to be hypotensive, tachycardic on presentation. H&H at 11.5/34.6, platelet 132

## 2022-02-26 NOTE — PROGRESS NOTES
Pt is transported up to floor by Arleth VELASCO on 2 lpm on a full tank of O2. The pt is lethargic and in no acute distress prior to leaving the PACU. Pain is at a tolerable level and are not exhibiting any n/v. Xray at bedside prior to transportation    Handoff report given to Teagan VELASCO on the receiving unit and aware of pt arrival.

## 2022-02-26 NOTE — CARE PLAN
"The patient is Stable - Low risk of patient condition declining or worsening    Shift Goals  Clinical Goals: Pain management, NPO, surgery in the morning, reorientation  Patient Goals: Pain management, rest    Progress made toward(s) clinical / shift goals:    Problem: Knowledge Deficit - Standard  Goal: Patient and family/care givers will demonstrate understanding of plan of care, disease process/condition, diagnostic tests and medications  Outcome: Progressing  Note: Discussed plan of care with patient. The patient continually asks if there is going to be a cast on her hip, and I explained to her that she will be going to surgery. She indicates understanding, but then resumes asking about a cast shortly thereafter. I explained to the patient that we will keep her pain under control, take care of her and get her to surgery later this morning. The education will need more reinforcement.      Problem: Pain - Standard  Goal: Alleviation of pain or a reduction in pain to the patient’s comfort goal  Outcome: Progressing  Note: Patient unable to communicate a numeric score for pain, so the PAINAD scale is being utilized. The patient will state, \"hip hurt\" or \"ouch\". Medicated the patient for pain with good effect.      Problem: Fall Risk  Goal: Patient will remain free from falls  Outcome: Progressing  Note: Fall precautions in place, including bed alarm.      "

## 2022-02-26 NOTE — OR NURSING
Pt is alert to voice and very lethargic at this time. Dr. De La Torre, anesthesiologist informed that the pt was very tired in pre op as well and had to wake her up to talk with her during his assessment as well. The pt is in no acute distress, is not complaining of pain or n/v at this time. Respirations are equal and unlabored in pacu. She is provided an icepack on her surgical site for comfort. CMS is intact with a strong 2+ pedal pulse noted to her LLE. She can wiggle her toes without difficulty. An adductor pillow is placed on pt's legs and tolerates well. Will continue to monitor.

## 2022-02-26 NOTE — ASSESSMENT & PLAN NOTE
Sinus tachycardia on EKG  Thought to be secondary to pain and hypovolemia  Plan: Opiates, IV fluids  2/26: Improving   Tachy with elevated D-dimer; obtain CTA of the chest negative   3/1- sinus tachy. Not symptomatic. No other explanation. Start metoprolol 25 mg BID . Monitor with PCP and cardiology as OP   3/2- resolving   3/6- resolved

## 2022-02-26 NOTE — PROGRESS NOTES
MD at bedside to assess pt's abdomen as it is very firm to the touch and unknown when pt had last BM. Interventions in place. Pt remains at baseline assessment in PACU.

## 2022-02-26 NOTE — ED TRIAGE NOTES
Chief Complaint   Patient presents with   • Leg Pain     PT BIB EMS from home. PT was seen at urgent care yesterday and was diagnosed with a femoral head fracture.  Pt care giver called due to increased pain. PT was referred for follow up with ortho but has not had follow up.    PT was given 125 fent and 1 of versed by EMS.

## 2022-02-26 NOTE — ASSESSMENT & PLAN NOTE
Resume outpatient antipsychotic medications  New every 530 mg p.o. daily, Depakote 1000 g at p.m.  Quetiapine 200 at p.m. and 100 at am    QTc 395

## 2022-02-26 NOTE — OP REPORT
DATE OF SERVICE:  02/26/2022     PREOPERATIVE DIAGNOSIS:  Left displaced femoral neck fracture.     POSTOPERATIVE DIAGNOSIS:  Left displaced femoral neck fracture.     PROCEDURE PERFORMED:  Open treatment of left femoral neck fracture with   hemiarthroplasty.     SURGEON:  Ubaldo Marquis MD     ANESTHESIOLOGIST:  Paul De La Torre MD     ANESTHESIA:  General.     ASSISTANT:  Asfa Chu PA-C.     ESTIMATED BLOOD LOSS:  150 mL.     IMPLANTS:  [place] JASIEL cemented size 4 femoral stem with a +7.5x48 mm outer   diameter bipolar femoral head.     INDICATIONS FOR PROCEDURE:  The patient is a 56-year-old female.  She does   have some baseline intellectual disability as well as schizoaffective disorder   and Parkinson's disease.  She is minimally ambulatory at baseline.  She had a   fall while transferring injuring her left hip.  She was found to have left   displaced femoral neck fracture.  She was admitted to the hospitalist service.    I spoke to her public guardian on call about treatment options and   recommended hip hemiarthroplasty and they provided verbal consent over the   phone to proceed with surgical management.     DESCRIPTION OF PROCEDURE:  The patient was met in the preoperative holding   area.  Her surgical site was signed.  Her consent was confirmed to be   accurate.  She was taken back to the operating room and general anesthesia was   induced.  Ancef was administered.  She was positioned in the right lateral   decubitus position with Stulberg positioners and an axillary roll.  Left lower   extremity was provisionally cleansed with isopropyl alcohol and then prepped   and draped in the usual sterile fashion.  A formal timeout was performed to   confirm the patient's correct name, correct surgical site, correct procedure   and correct laterality.  Posterior approach to the hip was then performed with   a scalpel down through skin.  Dissection was carried with Bovie   electrocautery down through  subcutaneous tissue, the iliotibial band and   gluteal fascia, which was split longitudinally.  Charnley retractors were   placed.  I identified and developed the plane between gluteus minimus and   piriformis tendons and I released the short external rotators and   full-thickness sleeve with posterior capsule off the proximal femur.  I   identified the fracture and using appropriate retractors excised the excess   femoral neck with an oscillating saw.  I then removed the femoral head from   the acetabulum, preserving the acetabular labrum.  With appropriate retractors   in place, started preparing the femur initially with a box osteotome followed   by canal finding reamer followed by sequential broaching ultimately up to a   size 4 stem.  I then irrigated out the femoral canal, inserted the cement   restrictor, mixed vancomycin impregnated antibiotic cement filled and   pressurized the canal and inserted the component, size 4 JASIEL femoral stem   adding about 5 degrees of anteversion compared to her native anteversion.  I   removed excess cement during the curing process.  Once it was sufficiently   cured, I then trialed and ultimately selected a 7.5x48 mm outer diameter   femoral head, which had excellent stability parameters and clinically   symmetric limb lengths.  I removed the trial components, thoroughly irrigated   out the acetabulum to confirm there was no debris within the hip joint.  I   cleansed and dried the trunnion and inserted and gently impacted the component   head into place and reduced the hip.  The wound was then soaked in dilute   Betadine solution for 3 minutes.  I irrigated the wound once more with   Pulsavac using normal saline.  I repaired the piriformis tendon and posterior   capsule and short external rotators through bone tunnels through the greater   trochanter with #5 Ti-Cron and repaired the IT band and gluteal fascia with   size 2 Stratafix suture, subcutaneous layers with 0 Vicryl,  2-0 Vicryl and the   skin edges with staples.  The wounds were thoroughly cleansed, dried and a   sterile Aquacel silver impregnated dressing was applied.  She was then   transferred in the supine position, placed into an abduction pillow, awoken   from anesthesia and transferred on the gurney and taken to postanesthesia care   unit in stable condition.     PLAN:  1.  The patient will be readmitted to the medical service postop.  2.  She can weightbear as tolerated to left lower extremity.  3.  She should maintain posterior hip precautions and abduction pillow while   in bed.  4.  She will need Ancef for 2 doses postop for infection prophylaxis.  5.  She can work with physical and occupational therapy as soon as possible   for mobilization.  6.  She should have heparin and SCDs while inpatient and can follow up   ultimately with me 2 weeks postop for routine wound check and staple removal.        ______________________________  MD KAVEH Manzo/BRAXTON    DD:  02/26/2022 12:06  DT:  02/26/2022 13:00    Job#:  [id number]

## 2022-02-26 NOTE — ASSESSMENT & PLAN NOTE
Patient will be consulted by orthopedic surgery    ---x-ray showing femoral head fracture. Ortho was consulted and patient underwent Open treatment of left femoral neck fracture with hemiarthroplasty on 2/26 by Dr Marquis.   --- PT and OT    -- DVT ppx  3/12- one dose of lasix 40 mg IV once, to help with swelling

## 2022-02-26 NOTE — OR SURGEON
Immediate Post OP Note    PreOp Diagnosis: Left displaced femoral neck fracture      PostOp Diagnosis: same      Procedure(s):  HEMIARTHROPLASTY, HIP - Wound Class: Clean    Surgeon(s):  Ubaldo Marquis M.D.    Anesthesiologist/Type of Anesthesia:  Anesthesiologist: Paul De La Torre M.D./General    Surgical Staff:  Circulator: Georgina Dorado R.N.; Feliciano Spears R.N.  Scrub Person: Tayo Wood; Rosemarie Lovelace    Specimens removed if any:  * No specimens in log *    Estimated Blood Loss: 150cc    Findings: see dictation    Complications: none known    PLAN:  --readmit medicine postop  --WBAT LLE  --posterior hip precautions  --ancef x 2 doses postop  --PT/OT for mobilization ASAP  --heparin and SCDs while inpatient  --fu 2 weeks postop        2/26/2022 11:59 AM Ubaldo Marquis M.D.

## 2022-02-26 NOTE — THERAPY
Missed Therapy     Patient Name: Erlinda Verde  Age:  56 y.o., Sex:  female  Medical Record #: [id number]  Today's Date: 2/26/2022 02/26/22 0811   Initial Contact Note    Initial Contact Note Order Received and Verified, Occupational Therapy Evaluation in Progress with Full Report to Follow.   Interdisciplinary Plan of Care Collaboration   Collaboration Comments OT eval held pt is on bedrest pending surgical intervention   Session Information   Date / Session Number  2/26 HOLD

## 2022-02-26 NOTE — ED PROVIDER NOTES
ED Provider Note     Scribed for Laly Maloney D.O. by Digna Medina. 2/25/2022, 8:12 PM.     Primary care provider: Whitney Graham M.D.  Means of arrival: EMS         History obtained from: Patient  History limited by: None    CHIEF COMPLAINT  Chief Complaint   Patient presents with   • Leg Pain     PT BIB EMS from home. PT was seen at urgent care yesterday and was diagnosed with a femoral head fracture.  Pt care giver called due to increased pain. PT was referred for follow up with ortho but has not had follow up.        HPI  Erlinda Verde is a 56 y.o. female who presents to the emergency Department with increased left leg pain onset yesterday following a ground level fall. The pain is localized to the left femur secondary to a diagnosed femoral head fracture yesterday at urgent care. Following X-ray at urgent care, she was discharged home, and told to follow up as out-patient. The patient has a past medical history of Schizoaffective and Parkinsonism. There are no other symptoms of pain.     The patient's public guardian is named Noemy Urias. Her phone number is [phone].    REVIEW OF SYSTEMS  Pertinent positives include left leg pain and falls. Pertinent negatives include no pain.   See HPI for further details. All other systems are negative.    PAST MEDICAL HISTORY  Past Medical History:   Diagnosis Date   • Dysthymic disorder    • HTN (hypertension)    • Intellectual disability     moderate   • Parkinsonism (HCC)    • Schizoaffective disorder (HCC)      FAMILY HISTORY  None pertinent to the patient's case    SOCIAL HISTORY  Social History     Tobacco Use   • Smoking status: Never Smoker   • Smokeless tobacco: Never Used   Vaping Use   • Vaping Use: Never used   Substance Use Topics   • Alcohol use: Never   • Drug use: Never      Social History     Substance and Sexual Activity   Drug Use Never       SURGICAL HISTORY  None pertinent to the patient's case    CURRENT MEDICATIONS    Current  Facility-Administered Medications:   •  NS infusion, , Intravenous, Continuous, Andrew Mancuso M.D., Last Rate: 150 mL/hr at 02/26/22 0113, Rate Verify at 02/26/22 0113  •  senna-docusate (PERICOLACE or SENOKOT S) 8.6-50 MG per tablet 2 Tablet, 2 Tablet, Oral, BID **AND** polyethylene glycol/lytes (MIRALAX) PACKET 1 Packet, 1 Packet, Oral, QDAY PRN **AND** magnesium hydroxide (MILK OF MAGNESIA) suspension 30 mL, 30 mL, Oral, QDAY PRN **AND** bisacodyl (DULCOLAX) suppository 10 mg, 10 mg, Rectal, QDAY PRN, Andrew Mancuso M.D.  •  [Held by provider] enoxaparin (LOVENOX) inj 40 mg, 40 mg, Subcutaneous, DAILY, Andrew Mancuso M.D.  •  acetaminophen (Tylenol) tablet 650 mg, 650 mg, Oral, Q6HRS PRN, Andrew Mancuso M.D.  •  Notify provider if pain remains uncontrolled, , , CONTINUOUS **AND** Use the Numeric Rating Scale (NRS), Chavarria-Baker Faces (WBF), or FLACC on regular floors and Critical-Care Pain Observation Tool (CPOT) on ICUs/Trauma to assess pain, , , CONTINUOUS **AND** Pulse Ox, , , CONTINUOUS **AND** Pharmacy Consult Request ...Pain Management Review 1 Each, 1 Each, Other, PHARMACY TO DOSE **AND** If patient difficult to arouse and/or has respiratory depression (respiratory rate of 10 or less), stop any opiates that are currently infusing and call a Rapid Response., , , CONTINUOUS, Andrew Mancuso M.D.  •  oxyCODONE immediate-release (ROXICODONE) tablet 2.5 mg, 2.5 mg, Oral, Q3HRS PRN **OR** oxyCODONE immediate-release (ROXICODONE) tablet 5 mg, 5 mg, Oral, Q3HRS PRN **OR** morphine 4 MG/ML injection 2 mg, 2 mg, Intravenous, Q3HRS PRN, Andrew Mancuso M.D.  •  labetalol (NORMODYNE/TRANDATE) injection 10 mg, 10 mg, Intravenous, Q4HRS PRN, Andrew Mancuso M.D.  •  ondansetron (ZOFRAN) syringe/vial injection 4 mg, 4 mg, Intravenous, Q4HRS PRN, Andrew Mancuso M.D.  •  ondansetron (ZOFRAN ODT) dispertab 4 mg, 4 mg, Oral, Q4HRS PRN, Andrew Mancuso M.D.  •  promethazine (PHENERGAN) tablet 12.5-25 mg,  "12.5-25 mg, Oral, Q4HRS PRN, Andrew Mancuso M.D.  •  promethazine (PHENERGAN) suppository 12.5-25 mg, 12.5-25 mg, Rectal, Q4HRS PRN, Andrew Mancuso M.D.  •  prochlorperazine (COMPAZINE) injection 5-10 mg, 5-10 mg, Intravenous, Q4HRS PRN, Andrew Mancuso M.D.  •  ARIPiprazole (Abilify) tablet 30 mg, 30 mg, Oral, Q EVENING, Andrew Mancuso M.D.  •  atorvastatin (LIPITOR) tablet 20 mg, 20 mg, Oral, Nightly, Andrew Mancuso M.D., 20 mg at 02/26/22 0117  •  divalproex ER (DEPAKOTE ER) tablet 1,000 mg, 1,000 mg, Oral, QHS, Andrew Mancuso M.D., 1,000 mg at 02/26/22 0117  •  meloxicam (MOBIC) tablet 15 mg, 15 mg, Oral, QAM, Andrew Mancuso M.D.  •  oxybutynin SR (DITROPAN-XL) tablet 5 mg, 5 mg, Oral, Q EVENING, Andrew Mancuso M.D.  •  sertraline (Zoloft) tablet 100 mg, 100 mg, Oral, GELY, Andrew Mancuso M.D.  •  QUEtiapine (Seroquel) tablet 200 mg, 200 mg, Oral, Nightly, 200 mg at 02/26/22 0117 **AND** QUEtiapine (Seroquel) tablet 100 mg, 100 mg, Oral, QAM, Andrew Mancuso M.D.    ALLERGIES  Allergies   Allergen Reactions   • Risperdal        PHYSICAL EXAM  VITAL SIGNS: /78   Pulse (!) 120   Temp (!) 35.6 °C (96 °F) (Temporal)   Resp 16   Ht 1.651 m (5' 5\")   Wt 67.1 kg (148 lb)   SpO2 96%   BMI 24.63 kg/m²   Constitutional: severe distress. developemental delay. screaming in pain. Patient is well developed, well nourished. Non-toxic appearing.   HENT: Normocephalic, atraumatic.  Oropharynx moist without erythema or exudates.  Eyes: PERRL, EOMI, Conjunctiva without erythema   Neck: Supple with  Normal range of motion in flexion, extension and lateral rotation. No tenderness along the bony prominences or paraspinal muscles.  Lymphatic: No lymphadenopathy noted.   Cardiovascular: Normal heart rate and Regular rhythm. No murmur  Thorax & Lungs: Clear and equal breath sounds with good excursion. No respiratory distress, no rhonchi, wheezing  Abdomen: Bowel sounds normal in all four " quadrants. Soft,nontender, no rebound , guarding, palpable masses.   Skin: Warm, Dry  Back: No cervical, thoracic, or lumbosacral tenderness.   Extremities: Peripheral pulses 4/4 No edema  Musculoskeletal: severe pain in the left hip upon palpation with internal rotation.  No contusions or abrasions.  Neurologic: neurovascular intact. Alert & oriented x 3, Normal motor function, Normal sensory function, No lateralizing or focal deficits noted. DTR's 4/4 bilaterally.  Psychiatric: See constitutional. Affect agitated due to pain, judgement impaired due to developmental delay.    DIAGNOSTICS/PROCEDURES    LABS  Results for orders placed or performed during the hospital encounter of 02/25/22   CBC WITH DIFFERENTIAL   Result Value Ref Range    WBC 9.4 4.8 - 10.8 K/uL    RBC 3.92 (L) 4.20 - 5.40 M/uL    Hemoglobin 11.5 (L) 12.0 - 16.0 g/dL    Hematocrit 34.6 (L) 37.0 - 47.0 %    MCV 88.3 81.4 - 97.8 fL    MCH 29.3 27.0 - 33.0 pg    MCHC 33.2 (L) 33.6 - 35.0 g/dL    RDW 45.1 35.9 - 50.0 fL    Platelet Count 132 (L) 164 - 446 K/uL    MPV 8.5 (L) 9.0 - 12.9 fL    Neutrophils-Polys 65.70 44.00 - 72.00 %    Lymphocytes 13.80 (L) 22.00 - 41.00 %    Monocytes 19.30 (H) 0.00 - 13.40 %    Eosinophils 0.10 0.00 - 6.90 %    Basophils 0.20 0.00 - 1.80 %    Immature Granulocytes 0.90 0.00 - 0.90 %    Nucleated RBC 0.00 /100 WBC    Neutrophils (Absolute) 6.17 2.00 - 7.15 K/uL    Lymphs (Absolute) 1.30 1.00 - 4.80 K/uL    Monos (Absolute) 1.81 (H) 0.00 - 0.85 K/uL    Eos (Absolute) 0.01 0.00 - 0.51 K/uL    Baso (Absolute) 0.02 0.00 - 0.12 K/uL    Immature Granulocytes (abs) 0.08 0.00 - 0.11 K/uL    NRBC (Absolute) 0.00 K/uL   COMP METABOLIC PANEL   Result Value Ref Range    Sodium 131 (L) 135 - 145 mmol/L    Potassium 4.9 3.6 - 5.5 mmol/L    Chloride 99 96 - 112 mmol/L    Co2 20 20 - 33 mmol/L    Anion Gap 12.0 7.0 - 16.0    Glucose 147 (H) 65 - 99 mg/dL    Bun 20 8 - 22 mg/dL    Creatinine 0.84 0.50 - 1.40 mg/dL    Calcium 8.7 8.5 -  10.5 mg/dL    AST(SGOT) 21 12 - 45 U/L    ALT(SGPT) 11 2 - 50 U/L    Alkaline Phosphatase 74 30 - 99 U/L    Total Bilirubin 0.2 0.1 - 1.5 mg/dL    Albumin 3.9 3.2 - 4.9 g/dL    Total Protein 6.6 6.0 - 8.2 g/dL    Globulin 2.7 1.9 - 3.5 g/dL    A-G Ratio 1.4 g/dL   URINALYSIS (UA)    Specimen: Urine   Result Value Ref Range    Color Yellow     Character Cloudy (A)     Specific Gravity 1.027 <1.035    Ph 5.5 5.0 - 8.0    Glucose Negative Negative mg/dL    Ketones Negative Negative mg/dL    Protein Negative Negative mg/dL    Bilirubin Negative Negative    Urobilinogen, Urine 1.0 Negative    Nitrite Negative Negative    Leukocyte Esterase Negative Negative    Occult Blood Negative Negative    Micro Urine Req Microscopic    COV-2, FLU A/B, AND RSV BY PCR (2-4 HOURS CEPHEID): Collect NP swab in VTM    Specimen: Nasopharyngeal; Respirate   Result Value Ref Range    Influenza virus A RNA Negative Negative    Influenza virus B, PCR Negative Negative    RSV, PCR Negative Negative    SARS-CoV-2 by PCR NotDetected     SARS-CoV-2 Source NP Swab    URINE MICROSCOPIC (W/UA)   Result Value Ref Range    WBC 0-2 /hpf    RBC 0-2 /hpf    Bacteria Negative None /hpf    Epithelial Cells Negative /hpf    Hyaline Cast 3-5 (A) /lpf   ESTIMATED GFR   Result Value Ref Range    GFR If African American >60 >60 mL/min/1.73 m 2    GFR If Non African American >60 >60 mL/min/1.73 m 2   EKG (NOW)   Result Value Ref Range    Report       [facility] Emergency Dept.    Test Date:  2022  Pt Name:    JASSON MENDEZ              Department: ER  MRN:        [id number]                      Room:        17  Gender:     Female                       Technician: [id number]  :        [id number]                   Requested By:WINSTON SALCEDO  Order #:    [id number]                    Lane FINCH:    Measurements  Intervals                                Axis  Rate:       125                          P:          59  LA:         152                           QRS:        54  QRSD:       91                           T:          59  QT:         290  QTc:        419    Interpretive Statements  Sinus tachycardia  Compared to ECG 05/22/2020 00:18:52  Sinus rhythm no longer present  T-wave abnormality no longer present     Labs reviewed by me    RADIOLOGY/PROCEDURES  DX-FEMUR-2+ LEFT   Final Result      1.  Mildly displaced left femoral neck fracture.   2.  No distal femur fracture.      DX-HIP-UNILATERAL-WITH PELVIS-1 VIEW LEFT   Final Result      Left femoral neck fracture again noted.      DX-CHEST-PORTABLE (1 VIEW)   Final Result      Hypoinflation with bibasilar atelectasis.          Results and radiologist interpretation reviewed by me.     COURSE & MEDICAL DECISION MAKING  Pertinent Labs & Imaging studies reviewed. (See chart for details)    8:12 PM - Patient seen and evaluated at bedside. Ordered for DX-hip unilateral, DX-chest, CBC with differential, CMP, UA, and EKG to evaluate. Patient will be treated with NS infusion, Dilaudid, and Zofran for her symptoms. Informed the patient that she would more than likely be hospitalized as a means of further intervention. Patient verbalizes understanding and agreement to this plan of care.     Differential diagnoses include, but are not limited to, femoral head fracture.    X-rays reveal a mildly displaced left femoral neck fracture.  Preop labs, EKG and chest x-ray were unremarkable.  10:23 PM I discussed the patient's case and the above findings with Dr. Marquis (Ortho) who agrees to see the patient in the morning and advises hospitalization as a means of further intervention.     10:32 PM- I discussed the patient's case and the above findings with Dr. Mancuso (hospitalist) who agrees to hospitalize the patient and take over the plan of care moving forward.     10:44 PM- Ativan and Dilaudid ordered for anxiety and pain control.     HYDRATION: Based on the patient's presentation of Dehydration the patient was  given IV fluids. IV Hydration was used because oral hydration was not adequate alone. Upon recheck following hydration, the patient was mildly improved.    DISPOSITION:  Patient will be hospitalized by Dr. Mancuso in guarded condition.    FINAL IMPRESSION  1. Closed displaced fracture of left femoral neck (HCC)         Digna CORNEJO (Rosa), am scribing for, and in the presence of, Laly Maloney D.O..    Electronically signed by: Digna Medina (Rosa), 2/25/2022    Laly CORNEJO D.O. personally performed the services described in this documentation, as scribed by Digna Medina in my presence, and it is both accurate and complete.    The note accurately reflects work and decisions made by me.  Laly Maloney D.O.  2/26/2022  5:17 AM

## 2022-02-26 NOTE — ANESTHESIA PREPROCEDURE EVALUATION
Case: [id number] Date/Time: 02/26/22 0918    Procedure: HEMIARTHROPLASTY, HIP (Left ) - Jamn    Location: [facility] / SURGERY [facility]    Surgeons: Ubaldo Marquis M.D.          Relevant Problems   CARDIAC   (positive) Essential hypertension         (positive) KYLE (acute kidney injury) (HCC)       Physical Exam    Airway   Mallampati: II  TM distance: >3 FB  Neck ROM: full       Cardiovascular - normal exam  Rhythm: regular  Rate: normal  (-) murmur     Dental - normal exam           Pulmonary - normal exam  Breath sounds clear to auscultation     Abdominal    Neurological - normal exam                 Anesthesia Plan    ASA 3 (chart)       Plan - general       Airway plan will be LMA          Induction: intravenous    Postoperative Plan: Postoperative administration of opioids is intended.    Pertinent diagnostic labs and testing reviewed    Informed Consent:    Anesthetic plan and risks discussed with patient.    Use of blood products discussed with: patient whom consented to blood products.

## 2022-02-26 NOTE — PROGRESS NOTES
I spoke with the on call public guardian and discussed the patient's injury and treatment recommendations, specifically hip hemiarthroplasty.  We discussed pros and cons as well as potential risks of surgery and he provided verbal consent to have her proceed with surgery today.

## 2022-02-26 NOTE — H&P
Hospital Medicine History & Physical Note    Date of Service  2/25/2022    Primary Care Physician  Whitney Graham M.D.    Consultants  orthopedics    Specialist Names: Dr Marquis    Code Status  Full  Chief Complaint  Chief Complaint   Patient presents with   • Leg Pain     PT BIB EMS from home. PT was seen at urgent care yesterday and was diagnosed with a femoral head fracture.  Pt care giver called due to increased pain. PT was referred for follow up with ortho but has not had follow up.        History of Presenting Illness  Erlinda Verde is a 56 y.o. female with past medical history of developmental disability, schizoaffective disorder, parkinsonism, hypertension, diabetes type 2, who presented 2/25/2022 with left hip pain worsening with movement since ground-level fall yesterday at the group home where the patient is residing.  Patient is presented with her caregiver.  She was seen at urgent care yesterday with x-ray showing femoral head fracture and was discharged home and was told to follow-up with orthopedic surgery as outpatient.  Reportedly, patient normally does not walk and uses a wheelchair.  ERP repeated imaging confirming left hip fracture, consulted with orthopedic surgery.  Plan to obtain consent from legal guardian tomorrow morning.  Will make patient n.p.o. at midnight.  She is tachycardic with heart rate up to 120, presumably secondary to pain and hypovolemia.  Patient was given IV fluids and opiates.  She does not appear to be in acute distress..    I discussed the plan of care with bedside RN.    Review of Systems  Review of Systems   Unable to perform ROS: Other   Developmental delay    Past Medical History   has a past medical history of Dysthymic disorder, HTN (hypertension), Intellectual disability, Parkinsonism (HCC), and Schizoaffective disorder (HCC).    Surgical History   has no past surgical history on file.     Family History     Family history reviewed with patient. There is no  family history that is pertinent to the chief complaint.     Social History   reports that she has never smoked. She has never used smokeless tobacco. She reports that she does not drink alcohol and does not use drugs.    Allergies  Allergies   Allergen Reactions   • Risperdal        Medications  Prior to Admission Medications   Prescriptions Last Dose Informant Patient Reported? Taking?   Calcium 600-200 MG-UNIT Tab 2/25/2022 at AM Friend Yes No   Sig: Take 1 Tab by mouth 2 Times a Day.   aripiprazole (ABILIFY) 30 MG tablet 2/24/2022 at PM Friend Yes No   Sig: Take 30 mg by mouth every evening.   atorvastatin (LIPITOR) 20 MG Tab 2/24/2022 at PM Friend Yes No   Sig: Take 20 mg by mouth every evening.   divalproex ER (DEPAKOTE ER) 500 MG TABLET SR 24 HR 2/24/2022 at PM Friend Yes No   Sig: Take 1,000 mg by mouth at bedtime. 2 tablets = 1000 mg   donepezil (ARICEPT) 10 MG tablet 2/24/2022 at PM Friend Yes No   Sig: Take 10 mg by mouth every evening.   lisinopril (PRINIVIL) 20 MG Tab 2/25/2022 at AM Friend No No   Sig: Take 1 Tab by mouth every day.   meloxicam (MOBIC) 15 MG tablet 2/25/2022 at AM Friend Yes No   Sig: Take 15 mg by mouth every morning.   metFORMIN ER (GLUCOPHAGE XR) 500 MG TABLET SR 24 HR 2/25/2022 at AM Friend Yes No   Sig: Take 500 mg by mouth every morning.   oxybutynin SR (DITROPAN-XL) 5 MG TABLET SR 24 HR 2/24/2022 at PM Friend Yes No   Sig: Take 5 mg by mouth every evening.   quetiapine (SEROQUEL XR) 300 MG XR tablet 2/24/2022 at PM Friend Yes No   Sig: Take 300 mg by mouth every evening.   sertraline (ZOLOFT) 100 MG Tab 2/25/2022 at AM Friend Yes No   Sig: Take 100 mg by mouth every morning.   therapeutic multivitamin-minerals (THERAGRAN-M) Tab 2/25/2022 at AM Friend Yes No   Sig: Take 1 Tab by mouth every day.   tolterodine ER (DETROL LA) 4 MG CAPSULE SR 24 HR 2/24/2022 at PM Friend Yes No   Sig: Take 4 mg by mouth every bedtime.      Facility-Administered Medications: None       Physical  Exam  Temp:  [35.6 °C (96 °F)] 35.6 °C (96 °F)  Pulse:  [116-125] 125  Resp:  [16-22] 22  BP: (120-140)/(72-78) 120/74  SpO2:  [94 %-96 %] 94 %  Blood Pressure: 120/74   Temperature: (!) 35.6 °C (96 °F)   Pulse: (!) 125   Respiration: (!) 22   Pulse Oximetry: 94 %       Physical Exam  Vitals and nursing note reviewed.   Constitutional:       General: She is not in acute distress.     Appearance: Normal appearance.   HENT:      Head: Normocephalic and atraumatic.      Nose: Nose normal.      Mouth/Throat:      Mouth: Mucous membranes are moist.   Eyes:      Extraocular Movements: Extraocular movements intact.      Pupils: Pupils are equal, round, and reactive to light.   Cardiovascular:      Rate and Rhythm: Normal rate and regular rhythm.   Pulmonary:      Effort: Pulmonary effort is normal.      Breath sounds: Normal breath sounds.   Abdominal:      General: Abdomen is flat. There is no distension.      Tenderness: There is no abdominal tenderness.   Musculoskeletal:         General: No swelling or deformity.      Cervical back: Normal range of motion and neck supple.      Left hip: Tenderness present. Decreased range of motion.   Skin:     General: Skin is warm and dry.   Neurological:      General: No focal deficit present.      Mental Status: She is alert.   Psychiatric:         Mood and Affect: Mood normal.         Behavior: Behavior normal.         Cognition and Memory: Cognition is impaired.         Laboratory:  Recent Labs     02/25/22 2115   WBC 9.4   RBC 3.92*   HEMOGLOBIN 11.5*   HEMATOCRIT 34.6*   MCV 88.3   MCH 29.3   MCHC 33.2*   RDW 45.1   PLATELETCT 132*   MPV 8.5*     Recent Labs     02/25/22 2115   SODIUM 131*   POTASSIUM 4.9   CHLORIDE 99   CO2 20   GLUCOSE 147*   BUN 20   CREATININE 0.84   CALCIUM 8.7     Recent Labs     02/25/22 2115   ALTSGPT 11   ASTSGOT 21   ALKPHOSPHAT 74   TBILIRUBIN 0.2   GLUCOSE 147*         No results for input(s): NTPROBNP in the last 72 hours.      No results for  input(s): TROPONINT in the last 72 hours.    Imaging:  DX-HIP-UNILATERAL-WITH PELVIS-1 VIEW LEFT   Final Result      Left femoral neck fracture again noted.      DX-CHEST-PORTABLE (1 VIEW)   Final Result      Hypoinflation with bibasilar atelectasis.      DX-FEMUR-2+ LEFT    (Results Pending)       X-Ray:  I have personally reviewed the images and compared with prior images.    Assessment/Plan:  I anticipate this patient will require at least two midnights for appropriate medical management, necessitating inpatient admission.    * Hip fracture requiring operative repair, left, closed, initial encounter (HCC)- (present on admission)  Assessment & Plan  Patient will be consulted by orthopedic surgery   Plan to obtain a consent for surgery from legal guardian tomorrow  N.p.o. at midnight  Pain control  Fall precaution  PT OT evaluation afterwards    Tachycardia  Assessment & Plan  Sinus tachycardia on EKG  Thought to be secondary to pain and hypovolemia  Plan: Opiates, IV fluids  Reassess    Essential hypertension- (present on admission)  Assessment & Plan  Will hold lisinopril due to perceived hypovolemia  IV labetalol as needed    Diabetes ([facility])- (present on admission)  Assessment & Plan  Plan to hold Metformin and monitor blood sugar    Schizophrenia ([facility])- (present on admission)  Assessment & Plan  Resume outpatient antipsychotic medications    Parkinson's disease (tremor, stiffness, slow motion, unstable posture) ([facility])- (present on admission)  Assessment & Plan  Fall precautions      VTE prophylaxis: enoxaparin ppx

## 2022-02-26 NOTE — ED NOTES
Patient unable to participate in interview  Med rec completed per friend at bedside who helps with meds  Allergies reviewed  No PO Antibiotics at home in the last 30 days

## 2022-02-26 NOTE — PROGRESS NOTES
0690 Report received from ROD Mckeon.    0100 Patient arrived on the unit.     4 Eyes Skin Assessment Completed by ROD Espino and ROD Hughes.    Head WDL  Ears WDL  Nose WDL  Mouth WDL  Neck WDL  Breast/Chest WDL  Shoulder Blades WDL  Spine WDL  (R) Arm/Elbow/Hand Redness, Bruising and Scab  (L) Arm/Elbow/Hand Redness and Bruising  Abdomen WDL  Groin WDL  Scrotum/Coccyx/Buttocks WDL  (R) Leg Bruising  (L) Leg Bruising  (R) Heel/Foot/Toe WDL  (L) Heel/Foot/Toe WDL          Devices In Places Blood Pressure Cuff, Pulse Ox, Monteiro, SCD's and Nasal Cannula      Interventions In Place NC W/Ear Foams, Pillows and Pressure Redistribution Mattress    Possible Skin Injury No    Pictures Uploaded Into Epic N/A  Wound Consult Placed N/A  RN Wound Prevention Protocol Ordered No

## 2022-02-26 NOTE — ASSESSMENT & PLAN NOTE
Glyco at 6.7   -- diabetic diet    -- on iss , hypoglycemia protocol and accu-checks ac and hs  Metformin resumed. Pt is medically cleared

## 2022-02-26 NOTE — ANESTHESIA TIME REPORT
Anesthesia Start and Stop Event Times     Date Time Event    2/26/2022 0947 Ready for Procedure     1032 Anesthesia Start     1207 Anesthesia Stop        Responsible Staff  02/26/22    Name Role Begin End    Paul De La Torre M.D. Anesth 1032 1207        Preop Diagnosis (Free Text):  Pre-op Diagnosis     left femoral neck fracture        Preop Diagnosis (Codes):    Premium Reason  E. Weekend    Comments:

## 2022-02-26 NOTE — PROGRESS NOTES
Report given to pre-op RN. Attempted to contact patient's legal guardian to obtain 2RN consent, but I was unable to reach her at this time. Pre-op RN and charge RN notified.

## 2022-02-26 NOTE — PROGRESS NOTES
[facility] Medicine Daily Progress Note    Date of Service  2/26/2022    Chief Complaint  Erlinda Verde is a 56 y.o. female admitted 2/25/2022 with   Chief Complaint   Patient presents with   • Leg Pain     PT BIB EMS from home. PT was seen at urgent care yesterday and was diagnosed with a femoral head fracture.  Pt care giver called due to increased pain. PT was referred for follow up with ortho but has not had follow up.        Hospital Course  This is a 56 year old female with pmhx of developmental disability, schizoaffective disorder, parkinsonism, hypertension, diabetes type 2, who presented 2/25/2022 after she had a ground level fall.  She landed on the left side, x-ray showing femoral head fracture. Ortho was consulted and patient underwent Open treatment of left femoral neck fracture with hemiarthroplasty on 2/26 by Dr Marquis.    Patient is noted to be hypotensive, tachycardic on presentation. H&H at 11.5/34.6, platelet 132    Interval Problem Update  No acute events overnight  , noted to be tachycardic and hypotensive; will continue IVF   -- continue OP anti-psychotic  meds  -- Underwent Open treatment of left femoral neck fracture with hemiarthroplasty on 2/26 by Dr Marquis.  -- PT and OT   -- DVT ppx     Patient abdomen noted to be firm, KUB stat ordered    I have personally seen and examined the patient at bedside. I discussed the plan of care with bedside RN and charge RN.    Consultants/Specialty  orthopedics    Code Status  Full Code    Disposition  Patient is not medically cleared for discharge.   Anticipate discharge to to skilled nursing facility.  I have placed the appropriate orders for post-discharge needs.    Review of Systems  ROS     Unable to obtain  2/2 patient mentation     Physical Exam  Temp:  [35.6 °C (96 °F)-36.5 °C (97.7 °F)] 36.3 °C (97.4 °F)  Pulse:  [] 107  Resp:  [12-22] 20  BP: ()/(49-78) 96/51  SpO2:  [92 %-100 %] 92 %    Physical Exam  Vitals and nursing note  reviewed.   Constitutional:       Comments: And awake   HENT:      Head: Normocephalic and atraumatic.   Eyes:      Pupils: Pupils are equal, round, and reactive to light.   Cardiovascular:      Rate and Rhythm: Tachycardia present.      Pulses: Normal pulses.   Pulmonary:      Effort: No respiratory distress.      Breath sounds: Normal breath sounds.   Abdominal:      Comments: Firm   NO TTP on palpation   Musculoskeletal:      Cervical back: Neck supple.      Comments: Decreased ROM on the Left side   Neurological:      Mental Status: She is alert.   Psychiatric:         Mood and Affect: Mood normal.         Fluids    Intake/Output Summary (Last 24 hours) at 2/26/2022 1331  Last data filed at 2/26/2022 1234  Gross per 24 hour   Intake 1215 ml   Output 50 ml   Net 1165 ml       Laboratory  Recent Labs     02/25/22 2115 02/26/22  0647   WBC 9.4 5.8   RBC 3.92* 3.45*   HEMOGLOBIN 11.5* 10.2*   HEMATOCRIT 34.6* 31.4*   MCV 88.3 91.0   MCH 29.3 29.6   MCHC 33.2* 32.5*   RDW 45.1 47.9   PLATELETCT 132* 101*   MPV 8.5* 8.3*     Recent Labs     02/25/22 2115 02/26/22  0647   SODIUM 131* 135   POTASSIUM 4.9 4.8   CHLORIDE 99 103   CO2 20 21   GLUCOSE 147* 109*   BUN 20 24*   CREATININE 0.84 0.96   CALCIUM 8.7 8.0*                   Imaging  DX-FEMUR-2+ LEFT   Final Result      1.  Mildly displaced left femoral neck fracture.   2.  No distal femur fracture.      DX-HIP-UNILATERAL-WITH PELVIS-1 VIEW LEFT   Final Result      Left femoral neck fracture again noted.      DX-CHEST-PORTABLE (1 VIEW)   Final Result      Hypoinflation with bibasilar atelectasis.      DX-PELVIS-1 OR 2 VIEWS    (Results Pending)        Assessment/Plan  * Hip fracture requiring operative repair, left, closed, initial encounter ([facility])- (present on admission)  Assessment & Plan  Patient will be consulted by orthopedic surgery    ---x-ray showing femoral head fracture. Ortho was consulted and patient underwent Open treatment of left femoral neck  fracture with hemiarthroplasty on 2/26 by Dr Marquis.   --- PT and OT    -- DVT ppx    Tachycardia  Assessment & Plan  Sinus tachycardia on EKG  Thought to be secondary to pain and hypovolemia  Plan: Opiates, IV fluids  2/26: Improving     Essential hypertension- (present on admission)  Assessment & Plan  Will hold lisinopril due to perceived hypovolemia  IV labetalol as needed   --- mildly hypotensive    Diabetes (HCC)- (present on admission)  Assessment & Plan  Glyco at 6.7   -- diabetic diet    -- on iss , hypoglycemia protocol and accu-checks ac and hs    Thrombocytopenia (HCC)- (present on admission)  Assessment & Plan  Monitor  Not actively bleeding     Schizophrenia ([facility])- (present on admission)  Assessment & Plan  Resume outpatient antipsychotic medications    Parkinson's disease (tremor, stiffness, slow motion, unstable posture) ([facility])- (present on admission)  Assessment & Plan  Fall precautions       VTE prophylaxis: SCDs/TEDs

## 2022-02-26 NOTE — THERAPY
Physical Therapy Contact Note    Patient Name: Erlinda Verde  Age:  56 y.o., Sex:  female  Medical Record #: [id number]  Today's Date: 2/26/2022    PT order received. Hold PT today pending surgery. PT to check back as able.

## 2022-02-26 NOTE — ANESTHESIA PROCEDURE NOTES
Airway    Date/Time: 2/26/2022 10:38 AM  Performed by: Paul De La Torre M.D.  Authorized by: Paul De La Torre M.D.     Location:  OR  Urgency:  Elective  Difficult Airway: No    Indications for Airway Management:  Anesthesia      Spontaneous Ventilation: absent    Sedation Level:  Deep  Preoxygenated: Yes    Final Airway Type:  Supraglottic airway  Final Supraglottic Airway:  Standard LMA    SGA Size:  3  Number of Attempts at Approach:  1  Number of Other Approaches Attempted:  0

## 2022-02-26 NOTE — CONSULTS
DATE OF SERVICE:  02/25/2022     ORTHOPEDIC CONSULTATION     REQUESTING PHYSICIAN:  Laly Maloney DO, Emergency Department.     REASON FOR CONSULTATION:  Left femoral neck fracture.     CHIEF COMPLAINT:  Generalized pain.     HISTORY OF PRESENT ILLNESS:  The patient is a 56-year-old female.  She   reportedly fell to the ground after being transferred.  She lives at a group   home and has caretakers with her.  She presented to the urgent care yesterday,   had an x-ray, which reportedly did show a femoral neck fracture but she was   discharged back to her group home.  She presented back to the emergency   department today, repeat x-rays confirmed displaced left femoral neck   fracture.  The patient does have history of schizoaffective disorder as well   as Parkinson's disease and she has a legal guardian appointed by the [facility].    She also has cognitive impairment.     PAST MEDICAL HISTORY:  ALLERGIES:  RISPERDAL.     PAST MEDICAL DIAGNOSES:  Schizoaffective disorder, Parkinson's disease,   intellectual disability, hypertension.     PAST SURGICAL HISTORY:  None listed.     SOCIAL HISTORY:  The patient is a nonsmoker, does not drink alcohol and does   not use illicit drugs.  She has a [facility] appointed legal guardian and lives in   a group home.     PHYSICAL EXAMINATION:  VITAL SIGNS:  Temperature is 96, heart rate 116, respiratory rate 18, blood   pressure 131/72 and pulse oximetry 96% on 2 liters nasal cannula.  GENERAL APPEARANCE:  The patient is talkative.  She is repetitive and somewhat   incoherent.  She is not in apparent distress.  Her caretakers with her at   bedside.  MUSCULOSKELETAL:  She is lying supine on the ER gurney.  She is moving her   upper extremities without discomfort.  She has scattered contusions on her   upper and lower extremities.  Left lower extremity, her hip is abducted and   her knee is flexed.  She has a well-perfused foot with palpable posterior   tibial pulses.  She is not following  commands or dorsi and plantar flexing   either her feet.  With palpation of any of her extremities including her upper   and lower extremities, she moans but does not have obvious discomfort and   no significant deformity other than that present with atypical positioning of   the left lower extremity.     DIAGNOSTIC IMAGING:  Plain x-rays, AP pelvis and left hip show a left   displaced femoral neck fracture.     ASSESSMENT:  The patient is a 56-year-old female.  She has some cognitive   impairment and intellectual disability as well as schizoaffective disorder,   Parkinson's disease and she has a left displaced femoral neck fracture.  She   is being evaluated for admission to the hospitalist service.     RECOMMENDATIONS:  1.  I discussed these findings with the patient's caretaker at bedside and I   do feel that she would benefit from surgical management.  Given her   psychiatric and medical issues, I feel that she would benefit more from   hemiarthroplasty as opposed to total hip arthroplasty to minimize the risk of   perioperative complication such as dislocation.  2.  I left a message for her appointed public guardian, Ann Urias, and we   will try to touch base with her in the morning to obtain consent, discussed   with her pros and cons of surgical versus nonsurgical management.  3.  The patient should be bed rest and n.p.o. after midnight in the   preparation for surgical management tomorrow.        ______________________________  MD KAVEH Manzo/YOGI/KEYONA    DD:  02/25/2022 22:45  DT:  02/25/2022 23:39    Job#:  [id number]

## 2022-02-26 NOTE — ASSESSMENT & PLAN NOTE
Will hold lisinopril   IV labetalol as needed   --- BP well controlled  3/1- increase. Start lisinopril and metoprolol. Continue to monitor   3/2- better, well controled. Continue current treatment   3/6- well controled

## 2022-02-27 LAB
ALBUMIN SERPL BCP-MCNC: 2.9 G/DL (ref 3.2–4.9)
BUN SERPL-MCNC: 15 MG/DL (ref 8–22)
CALCIUM SERPL-MCNC: 7.6 MG/DL (ref 8.5–10.5)
CHLORIDE SERPL-SCNC: 104 MMOL/L (ref 96–112)
CO2 SERPL-SCNC: 21 MMOL/L (ref 20–33)
CORTIS SERPL-MCNC: 35.1 UG/DL (ref 0–23)
CREAT SERPL-MCNC: 0.73 MG/DL (ref 0.5–1.4)
D DIMER PPP IA.FEU-MCNC: 1.44 UG/ML (FEU) (ref 0–0.5)
EKG IMPRESSION: NORMAL
ERYTHROCYTE [DISTWIDTH] IN BLOOD BY AUTOMATED COUNT: 48.8 FL (ref 35.9–50)
GLUCOSE SERPL-MCNC: 189 MG/DL (ref 65–99)
HCT VFR BLD AUTO: 27.6 % (ref 37–47)
HGB BLD-MCNC: 8.7 G/DL (ref 12–16)
MAGNESIUM SERPL-MCNC: 1.9 MG/DL (ref 1.5–2.5)
MCH RBC QN AUTO: 29.1 PG (ref 27–33)
MCHC RBC AUTO-ENTMCNC: 31.5 G/DL (ref 33.6–35)
MCV RBC AUTO: 92.3 FL (ref 81.4–97.8)
NT-PROBNP SERPL IA-MCNC: 341 PG/ML (ref 0–125)
PHOSPHATE SERPL-MCNC: 2.8 MG/DL (ref 2.5–4.5)
PLATELET # BLD AUTO: 103 K/UL (ref 164–446)
PMV BLD AUTO: 8.6 FL (ref 9–12.9)
POTASSIUM SERPL-SCNC: 4.6 MMOL/L (ref 3.6–5.5)
RBC # BLD AUTO: 2.99 M/UL (ref 4.2–5.4)
SODIUM SERPL-SCNC: 135 MMOL/L (ref 135–145)
WBC # BLD AUTO: 5.1 K/UL (ref 4.8–10.8)

## 2022-02-27 PROCEDURE — A9270 NON-COVERED ITEM OR SERVICE: HCPCS | Performed by: HOSPITALIST

## 2022-02-27 PROCEDURE — 700102 HCHG RX REV CODE 250 W/ 637 OVERRIDE(OP): Performed by: HOSPITALIST

## 2022-02-27 PROCEDURE — 85379 FIBRIN DEGRADATION QUANT: CPT

## 2022-02-27 PROCEDURE — 700101 HCHG RX REV CODE 250: Performed by: HOSPITALIST

## 2022-02-27 PROCEDURE — 83735 ASSAY OF MAGNESIUM: CPT

## 2022-02-27 PROCEDURE — 700102 HCHG RX REV CODE 250 W/ 637 OVERRIDE(OP): Performed by: INTERNAL MEDICINE

## 2022-02-27 PROCEDURE — 82533 TOTAL CORTISOL: CPT

## 2022-02-27 PROCEDURE — 700105 HCHG RX REV CODE 258

## 2022-02-27 PROCEDURE — 700111 HCHG RX REV CODE 636 W/ 250 OVERRIDE (IP): Performed by: ORTHOPAEDIC SURGERY

## 2022-02-27 PROCEDURE — 97162 PT EVAL MOD COMPLEX 30 MIN: CPT

## 2022-02-27 PROCEDURE — 700101 HCHG RX REV CODE 250: Performed by: ORTHOPAEDIC SURGERY

## 2022-02-27 PROCEDURE — 97166 OT EVAL MOD COMPLEX 45 MIN: CPT

## 2022-02-27 PROCEDURE — 770001 HCHG ROOM/CARE - MED/SURG/GYN PRIV*

## 2022-02-27 PROCEDURE — 36415 COLL VENOUS BLD VENIPUNCTURE: CPT

## 2022-02-27 PROCEDURE — 99233 SBSQ HOSP IP/OBS HIGH 50: CPT | Performed by: HOSPITALIST

## 2022-02-27 PROCEDURE — A9270 NON-COVERED ITEM OR SERVICE: HCPCS | Performed by: INTERNAL MEDICINE

## 2022-02-27 PROCEDURE — 83880 ASSAY OF NATRIURETIC PEPTIDE: CPT

## 2022-02-27 PROCEDURE — 93005 ELECTROCARDIOGRAM TRACING: CPT | Performed by: HOSPITALIST

## 2022-02-27 PROCEDURE — 700105 HCHG RX REV CODE 258: Performed by: HOSPITALIST

## 2022-02-27 PROCEDURE — 85027 COMPLETE CBC AUTOMATED: CPT

## 2022-02-27 PROCEDURE — 80069 RENAL FUNCTION PANEL: CPT

## 2022-02-27 PROCEDURE — 93010 ELECTROCARDIOGRAM REPORT: CPT | Performed by: INTERNAL MEDICINE

## 2022-02-27 RX ORDER — LEVOTHYROXINE SODIUM 0.03 MG/1
25 TABLET ORAL
Status: DISCONTINUED | OUTPATIENT
Start: 2022-02-27 | End: 2022-03-16 | Stop reason: HOSPADM

## 2022-02-27 RX ORDER — TAMSULOSIN HYDROCHLORIDE 0.4 MG/1
0.4 CAPSULE ORAL
Status: DISCONTINUED | OUTPATIENT
Start: 2022-02-27 | End: 2022-03-01

## 2022-02-27 RX ORDER — ENEMA 19; 7 G/133ML; G/133ML
1 ENEMA RECTAL ONCE
Status: COMPLETED | OUTPATIENT
Start: 2022-02-27 | End: 2022-02-27

## 2022-02-27 RX ORDER — BISACODYL 10 MG
10 SUPPOSITORY, RECTAL RECTAL DAILY
Status: DISCONTINUED | OUTPATIENT
Start: 2022-02-27 | End: 2022-03-16 | Stop reason: HOSPADM

## 2022-02-27 RX ORDER — SODIUM CHLORIDE 9 MG/ML
500 INJECTION, SOLUTION INTRAVENOUS ONCE
Status: COMPLETED | OUTPATIENT
Start: 2022-02-27 | End: 2022-02-27

## 2022-02-27 RX ORDER — POLYETHYLENE GLYCOL 3350 17 G/17G
1 POWDER, FOR SOLUTION ORAL DAILY
Status: DISCONTINUED | OUTPATIENT
Start: 2022-02-27 | End: 2022-03-02

## 2022-02-27 RX ADMIN — SODIUM CHLORIDE 500 ML: 9 INJECTION, SOLUTION INTRAVENOUS at 00:40

## 2022-02-27 RX ADMIN — Medication 10 MG: at 11:27

## 2022-02-27 RX ADMIN — SODIUM CHLORIDE: 9 INJECTION, SOLUTION INTRAVENOUS at 02:00

## 2022-02-27 RX ADMIN — LEVOTHYROXINE SODIUM 25 MCG: 25 TABLET ORAL at 07:37

## 2022-02-27 RX ADMIN — SODIUM PHOSPHATE 133 ML: 7; 19 ENEMA RECTAL at 15:35

## 2022-02-27 RX ADMIN — POLYETHYLENE GLYCOL 3350 1 PACKET: 17 POWDER, FOR SOLUTION ORAL at 07:37

## 2022-02-27 RX ADMIN — ATORVASTATIN CALCIUM 20 MG: 20 TABLET, FILM COATED ORAL at 21:12

## 2022-02-27 RX ADMIN — MELOXICAM 15 MG: 7.5 TABLET ORAL at 04:43

## 2022-02-27 RX ADMIN — ACETAMINOPHEN 650 MG: 325 TABLET, FILM COATED ORAL at 07:37

## 2022-02-27 RX ADMIN — SERTRALINE 100 MG: 100 TABLET, FILM COATED ORAL at 04:43

## 2022-02-27 RX ADMIN — WATER 2 G: 100 INJECTION, SOLUTION INTRAVENOUS at 01:18

## 2022-02-27 RX ADMIN — QUETIAPINE FUMARATE 100 MG: 100 TABLET ORAL at 04:43

## 2022-02-27 RX ADMIN — ARIPIPRAZOLE 30 MG: 10 TABLET ORAL at 18:31

## 2022-02-27 RX ADMIN — ACETAMINOPHEN 650 MG: 325 TABLET, FILM COATED ORAL at 18:30

## 2022-02-27 RX ADMIN — QUETIAPINE FUMARATE 200 MG: 100 TABLET ORAL at 21:12

## 2022-02-27 RX ADMIN — OXYBUTYNIN CHLORIDE 5 MG: 5 TABLET, EXTENDED RELEASE ORAL at 18:30

## 2022-02-27 RX ADMIN — OXYCODONE 2.5 MG: 5 TABLET ORAL at 05:01

## 2022-02-27 RX ADMIN — SENNOSIDES AND DOCUSATE SODIUM 2 TABLET: 50; 8.6 TABLET ORAL at 04:43

## 2022-02-27 RX ADMIN — DIVALPROEX SODIUM 1000 MG: 500 TABLET, EXTENDED RELEASE ORAL at 21:12

## 2022-02-27 RX ADMIN — MAGNESIUM CITRATE 296 ML: 1.75 LIQUID ORAL at 07:38

## 2022-02-27 RX ADMIN — TAMSULOSIN HYDROCHLORIDE 0.4 MG: 0.4 CAPSULE ORAL at 09:23

## 2022-02-27 RX ADMIN — SENNOSIDES AND DOCUSATE SODIUM 2 TABLET: 50; 8.6 TABLET ORAL at 18:30

## 2022-02-27 RX ADMIN — SODIUM CHLORIDE: 9 INJECTION, SOLUTION INTRAVENOUS at 07:36

## 2022-02-27 ASSESSMENT — PAIN DESCRIPTION - PAIN TYPE
TYPE: ACUTE PAIN
TYPE: ACUTE PAIN;SURGICAL PAIN
TYPE: ACUTE PAIN;SURGICAL PAIN
TYPE: ACUTE PAIN

## 2022-02-27 ASSESSMENT — COGNITIVE AND FUNCTIONAL STATUS - GENERAL
TOILETING: TOTAL
STANDING UP FROM CHAIR USING ARMS: A LOT
TURNING FROM BACK TO SIDE WHILE IN FLAT BAD: A LOT
MOVING FROM LYING ON BACK TO SITTING ON SIDE OF FLAT BED: A LOT
PERSONAL GROOMING: A LOT
HELP NEEDED FOR BATHING: A LOT
DRESSING REGULAR LOWER BODY CLOTHING: TOTAL
SUGGESTED CMS G CODE MODIFIER DAILY ACTIVITY: CL
EATING MEALS: A LITTLE
SUGGESTED CMS G CODE MODIFIER MOBILITY: CL
DAILY ACTIVITIY SCORE: 11
DRESSING REGULAR UPPER BODY CLOTHING: A LOT
MOVING TO AND FROM BED TO CHAIR: A LOT
CLIMB 3 TO 5 STEPS WITH RAILING: TOTAL
WALKING IN HOSPITAL ROOM: A LOT
MOBILITY SCORE: 11

## 2022-02-27 ASSESSMENT — PAIN SCALES - PAIN ASSESSMENT IN ADVANCED DEMENTIA (PAINAD)
FACIALEXPRESSION: SAD, FRIGHTENED, FROWN
TOTALSCORE: 4
CONSOLABILITY: DISTRACTED OR REASSURED BY VOICE/TOUCH
BODYLANGUAGE: TENSE, DISTRESSED PACING, FIDGETING
NEGVOCALIZATION: OCCASIONAL MOAN/GROAN, LOW SPEECH, NEGATIVE/DISAPPROVING QUALITY
BREATHING: NORMAL

## 2022-02-27 ASSESSMENT — GAIT ASSESSMENTS: GAIT LEVEL OF ASSIST: UNABLE TO PARTICIPATE

## 2022-02-27 NOTE — ASSESSMENT & PLAN NOTE
Her hemoglobin hematocrit, today at 7.8.  Will transfuse if less than 7  --Tender to acute blood loss anemia likely 2/2 acute blood loss anemia  3/1- hgb 8.4- improving. No need for transfusion

## 2022-02-27 NOTE — THERAPY
"Occupational Therapy   Initial Evaluation     Patient Name: Erlinda Verde  Age:  56 y.o., Sex:  female  Medical Record #: [id number]  Today's Date: 2/27/2022     Precautions  Precautions: Posterior Hip Precautions,Fall Risk,Weight Bearing As Tolerated Left Lower Extremity (hip adductor pillow in bed, per nurse)    Assessment  Patient s/p L beba on 2/26 2/2 fall, now L LE WBAT with posterior precautions. Patient caregiver report patient min A with ADLs and mobility (SPT only) at baseline. Patient, upon eval, presents with decreased ADLs, mobility, endurance, tolerance, balance and increased pain necessitating Total A x 2 standing payam care and Max A SPT.     Plan    Recommend Occupational Therapy 4 times per week until therapy goals are met for the following treatments:  Self Care/Activities of Daily Living, Therapeutic Activities, and Therapeutic Exercises.    DC Equipment Recommendations: Unable to determine at this time  Discharge Recommendations: Recommend post-acute placement for additional occupational therapy services prior to discharge home     Subjective    Okay, no, okay, no, okay   Patient agreeable however report pain during movements     Objective       02/27/22 1112   Prior Living Situation   Prior Services Continuous (24 Hour) Care Giving Per Service  (group home with two other residents. 24 hr s/a)   Housing / Facility Group Home   Equipment Owned Wheelchair   Lives with - Patient's Self Care Capacity Unrelated Adult;Attendant / Paid Care Giver   Comments Caregiver present t/o session and patient positively respond to caregiver interacting with team. Caregiver report baseline WC and SPT for toileting and to dinner chair. Assist with ADL thoroughness however can dress self and toilet \"mostly on her own\". After session, discuss POC with caregiver who reports patient would benefit from post acute placement to max gains as she is not at her baseline.   Cognition    Comments delayed at baseline. agreeable " however reports pain and verbalizes expletives t/o session while participating. caregiver assists   Balance Assessment   Comments therapist assist from front. poor FWW management   Bed Mobility    Supine to Sit Maximal Assist   Comments bed bar, elevated HOB, cues and encouragement. caregiver assists participation as patient most agreeable with familiar face   ADL Assessment   Eating Supervision   Grooming Maximal Assist;Seated   Bathing Maximal Assist   Upper Body Dressing Maximal Assist   Lower Body Dressing Total Assist   Toileting Total Assist  (standing payam care, two person assist, salvador)   Functional Mobility   Bed, Chair, Wheelchair Transfer Maximal Assist  (SPT, caregiver report baseline is S/Min A)   Patient / Family Goals   Patient / Family Goal #1 none stated   Short Term Goals   Short Term Goal # 1 Min A toileting   Short Term Goal # 2 Min A toilet transfer   Short Term Goal # 3 Min A LB ADLs   Short Term Goal # 4 5 minute stand for self care

## 2022-02-27 NOTE — PROGRESS NOTES
0008 Pt blood pressure at midnight was 82/42 with HR at 129. Rechecked BP and still low at 78/36 with HR at 123. Paged and spoke to Nury Collier and updated on pt status and vitals. Received order for 500 mL NS bolus.   0110 Pt BP now at 95/40 after bolus. Pt laying comfortable in bed, call light within reach.

## 2022-02-27 NOTE — PROGRESS NOTES
4 Eyes Skin Assessment Completed by ROD Espino and ROD Bolanos.    Head WDL  Ears WDL  Nose WDL  Mouth WDL  Neck WDL  Breast/Chest WDL  Shoulder Blades WDL  Spine WDL  (R) Arm/Elbow/Hand Bruising and Scab  (L) Arm/Elbow/Hand Bruising and Scab-bruising L elbow  Abdomen WDL  Groin WDL  Scrotum/Coccyx/Buttocks WDL  (R) Leg Scab to R shin  (L) Leg Scab and surgical incision to L hip  (R) Heel/Foot/Toe WDL  (L) Heel/Foot/Toe Blanching and Swelling          Devices In Places Pulse Ox, Monteiro, SCD's and Nasal Cannula and abductor pillow      Interventions In Place Pillows and Pressure Redistribution Mattress    Possible Skin Injury No    Pictures Uploaded Into Epic N/A  Wound Consult Placed N/A  RN Wound Prevention Protocol Ordered No

## 2022-02-27 NOTE — CARE PLAN
The patient is Stable - Low risk of patient condition declining or worsening    Shift Goals  Clinical Goals: Pain control, safety  Patient Goals: pain control    Progress made toward(s) clinical / shift goals:      Problem: Knowledge Deficit - Standard  Goal: Patient and family/care givers will demonstrate understanding of plan of care, disease process/condition, diagnostic tests and medications  Outcome: Progressing  Note: Pt updated on plan of care. Pt encouraged to ask questions and questions were answered. Call light within reach. Pt reminded to use call light whenever needing assistance.       Problem: Pain - Standard  Goal: Alleviation of pain or a reduction in pain to the patient’s comfort goal  Outcome: Progressing  Note: Pt was experiencing some pain earlier in the night. Pain medication was given as needed and as ordered. Pt then able to sleep comfortably.         Patient is not progressing towards the following goals:    N/a

## 2022-02-27 NOTE — CARE PLAN
Problem: Pain - Standard  Goal: Alleviation of pain or a reduction in pain to the patient’s comfort goal  Outcome: Progressing  Note:  Patient educated on 0-10 pain scale, and non-pharmacological pain control. Encouraged to verbalize and contact staff when in pain.  Administered PRN medication as needed.       Problem: Fall Risk  Goal: Patient will remain free from falls  Outcome: Progressing  Note:  Verified bed is locked and in lowest position, hourly rounding in place, bed alarm on, call light with in reach, slip socks on, educated patient on use of call light for assistance.     The patient is Stable - Low risk of patient condition declining or worsening    Shift Goals  Clinical Goals: Pain control, safety  Patient Goals: pain control    Progress made toward(s) clinical / shift goals:

## 2022-02-27 NOTE — PROGRESS NOTES
[facility] Medicine Daily Progress Note    Date of Service  2/27/2022    Chief Complaint  Erlinda Verde is a 56 y.o. female admitted 2/25/2022 with   Chief Complaint   Patient presents with   • Leg Pain     PT BIB EMS from home. PT was seen at urgent care yesterday and was diagnosed with a femoral head fracture.  Pt care giver called due to increased pain. PT was referred for follow up with ortho but has not had follow up.        Hospital Course  This is a 56 year old female with pmhx of developmental disability, schizoaffective disorder, parkinsonism, hypertension, diabetes type 2, who presented 2/25/2022 after she had a ground level fall.  She landed on the left side, x-ray showing femoral head fracture. Ortho was consulted and patient underwent Open treatment of left femoral neck fracture with hemiarthroplasty on 2/26 by Dr Marquis.    Patient is noted to be hypotensive, tachycardic on presentation. H&H at 11.5/34.6, platelet 132    Interval Problem Update  No acute events overnight  , noted to be tachycardic and hypotensive; will continue IVF   -- continue OP anti-psychotic  meds  -- Underwent Open treatment of left femoral neck fracture with hemiarthroplasty on 2/26 by Dr Marquis.  -- PT and OT   -- DVT ppx     Patient abdomen noted to be firm, KUB stat ordered    I have personally seen and examined the patient at bedside. I discussed the plan of care with bedside RN and charge RN.    2/27:  --No acute events overnight, patient underwent surgery yesterday.  We will continue DVT prophylaxis.  PT/OT has evaluate the patient, pending recommendation.  --Of note, patient abdomen is distended , KUB showing constipation;  Will provide aggressive  Bowel regemin  --Patient continues remain persistent tachycardia along with elevated D-dimer, will obtain CT of the chest.  --Has been requiring 2.5 L of oxygen, will titrate oxygen as needed.  Provide incentive spirometry    Plan of care has been discussed with the nursing  staff , caretaker at bedside.    Consultants/Specialty  orthopedics    Code Status  Full Code    Disposition  Patient is not medically cleared for discharge.   Anticipate discharge to to skilled nursing facility.  I have placed the appropriate orders for post-discharge needs.    Review of Systems  ROS     Unable to obtain  2/2 patient mentation     Physical Exam  Temp:  [36.1 °C (97 °F)-37 °C (98.6 °F)] 37 °C (98.6 °F)  Pulse:  [] 107  Resp:  [12-20] 16  BP: ()/(36-67) 130/67  SpO2:  [92 %-100 %] 95 %    Physical Exam  Vitals and nursing note reviewed.   Constitutional:       Comments: And awake   HENT:      Head: Normocephalic and atraumatic.   Eyes:      Pupils: Pupils are equal, round, and reactive to light.   Cardiovascular:      Rate and Rhythm: Tachycardia present.      Pulses: Normal pulses.   Pulmonary:      Effort: No respiratory distress.      Breath sounds: Normal breath sounds.   Abdominal:      Comments: Firm   NO TTP on palpation   Musculoskeletal:      Cervical back: Neck supple.      Comments: Decreased ROM on the Left side   Neurological:      Mental Status: She is alert.   Psychiatric:         Mood and Affect: Mood normal.         Fluids    Intake/Output Summary (Last 24 hours) at 2/27/2022 1220  Last data filed at 2/26/2022 2100  Gross per 24 hour   Intake 1215 ml   Output 940 ml   Net 275 ml       Laboratory  Recent Labs     02/25/22 2115 02/26/22  0647 02/27/22  0803   WBC 9.4 5.8 5.1   RBC 3.92* 3.45* 2.99*   HEMOGLOBIN 11.5* 10.2* 8.7*   HEMATOCRIT 34.6* 31.4* 27.6*   MCV 88.3 91.0 92.3   MCH 29.3 29.6 29.1   MCHC 33.2* 32.5* 31.5*   RDW 45.1 47.9 48.8   PLATELETCT 132* 101* 103*   MPV 8.5* 8.3* 8.6*     Recent Labs     02/25/22 2115 02/26/22  0647 02/27/22  0803   SODIUM 131* 135 135   POTASSIUM 4.9 4.8 4.6   CHLORIDE 99 103 104   CO2 20 21 21   GLUCOSE 147* 109* 189*   BUN 20 24* 15   CREATININE 0.84 0.96 0.73   CALCIUM 8.7 8.0* 7.6*                   Imaging  [id number] VIEW    Final Result      1.  No evidence of bowel obstruction.      2.  Moderate constipation.      DX-PELVIS-1 OR 2 VIEWS   Final Result      Post left hip hemiarthroplasty.      DX-FEMUR-2+ LEFT   Final Result      1.  Mildly displaced left femoral neck fracture.   2.  No distal femur fracture.      DX-HIP-UNILATERAL-WITH PELVIS-1 VIEW LEFT   Final Result      Left femoral neck fracture again noted.      DX-CHEST-PORTABLE (1 VIEW)   Final Result      Hypoinflation with bibasilar atelectasis.      CT-CTA CHEST PULMONARY ARTERY W/ RECONS    (Results Pending)        Assessment/Plan  * Hip fracture requiring operative repair, left, closed, initial encounter (HCC)- (present on admission)  Assessment & Plan  Patient will be consulted by orthopedic surgery    ---x-ray showing femoral head fracture. Ortho was consulted and patient underwent Open treatment of left femoral neck fracture with hemiarthroplasty on 2/26 by Dr Marquis.   --- PT and OT    -- DVT ppx    Tachycardia  Assessment & Plan  Sinus tachycardia on EKG  Thought to be secondary to pain and hypovolemia  Plan: Opiates, IV fluids  2/26: Improving   Tachy with elevated D-dimer; obtain CTA of the chest     Essential hypertension- (present on admission)  Assessment & Plan  Will hold lisinopril due to perceived hypovolemia  IV labetalol as needed   --- mildly hypotensive    Anemia- (present on admission)  Assessment & Plan  Her hemoglobin hematocrit, today at 8.  Will transfuse if less than 7    Diabetes ([facility])- (present on admission)  Assessment & Plan  Glyco at 6.7   -- diabetic diet    -- on iss , hypoglycemia protocol and accu-checks ac and hs    Thrombocytopenia ([facility])- (present on admission)  Assessment & Plan  At 103 , monitor    Schizophrenia ([facility])- (present on admission)  Assessment & Plan  Resume outpatient antipsychotic medications  New every 530 mg p.o. daily, Depakote 1000 g at p.m.  Quetiapine 200 at p.m. and 100 at am   Obtain QTc    Parkinson's disease  (tremor, stiffness, slow motion, unstable posture) ([facility])- (present on admission)  Assessment & Plan  Fall precautions       VTE prophylaxis: Lovenox  I have performed a physical exam and reviewed and updated ROS and Plan today (2/27/2022). In review of yesterday's note (2/26/2022), there are no changes except as documented above.

## 2022-02-27 NOTE — THERAPY
"Physical Therapy   Initial Evaluation     Patient Name: Erlinda Verde  Age:  56 y.o., Sex:  female  Medical Record #: [id number]  Today's Date: 2/27/2022     Precautions  Precautions: Posterior Hip Precautions;Fall Risk;Weight Bearing As Tolerated Left Lower Extremity (hip adductor pillow in bed, per nurse)  Comments: baseline developmental delay    Assessment  This is a 56 year old female with pmhx of developmental disability, schizoaffective disorder, parkinsonism, hypertension, diabetes type 2, who presented 2/25/2022 after she had a ground level fall.  She landed on the left side, x-ray showing femoral head fracture. Ortho was consulted and patient underwent Open treatment of left femoral neck fracture with hemiarthroplasty on 2/26 by Dr Marquis. Today she's seen with caregiver at baseline and is limited by pain and impaired attention. At baseline she is able to squat pivot from her bed to her WC and needs assist for toilet transfers. Caregiver reports that she will need to be one person assist for safe DC back to group home.  Patient will do best at home in her own familiar environment 2/2 her many commorbidities. However, at this time she will need placement as she needs 2 person assist. She will need SNF vs HHPT depending on in house progress.  Will continue to follow.     Plan    Recommend Physical Therapy 4 times per week until therapy goals are met for the following treatments:  Bed Mobility, Equipment, Gait Training, Neuro Re-Education / Balance, Therapeutic Activities, and Therapeutic Exercises    DC Equipment Recommendations: Unable to determine at this time  Discharge Recommendations: Recommend post-acute placement for additional physical therapy services prior to discharge home       Subjective    \"Hi Doll, what's your name?\"     Objective       02/27/22 1030   Precautions   Precautions Posterior Hip Precautions;Weight Bearing As Tolerated Left Lower Extremity;Fall Risk   Comments baseline " developmental delay   Pain 0 - 10 Group   Therapist Pain Assessment During Activity;Nurse Notified   Non Verbal Descriptors   Non Verbal Scale  Grimacing;Crying   Prior Living Situation   Prior Services Continuous (24 Hour) Care Giving Per Service   Housing / Facility Group Home   Steps Into Home 0   Steps In Home 0   Equipment Owned Wheelchair   Lives with - Patient's Self Care Capacity Attendant / Paid Care Giver   Comments caregiver reports one person assist would be available, but not 2 person assist.   Prior Level of Functional Mobility   Bed Mobility Independent   Transfer Status Independent   Ambulation Dependent   Assistive Devices Used Wheelchair   Wheelchair Independent   Comments squat pivot to WC at baseline, needs set-up and one person assist for toilet transfers   History of Falls   History of Falls Yes   Date of Last Fall   (admitted for falls)   Cognition    Cognition / Consciousness X   Level of Consciousness Alert   Safety Awareness Impaired   New Learning Impaired   Attention Impaired   Comments delayed at baseline, swearing throughout and needing assistance and reassurance from caregiver   Passive ROM Lower Body   Comments B hip and knee flexion limitations at baseline   Active ROM Lower Body    Active ROM Lower Body  X   Comments see above   Strength Lower Body   Lower Body Strength  X   Gross Strength Generalized Weakness, Equal Bilaterally   Comments limited by pain and volition   Strength Upper Body   Upper Body Strength  WDL   Balance Assessment   Sitting Balance (Static) Fair -   Sitting Balance (Dynamic) Poor +   Standing Balance (Static) Poor -   Standing Balance (Dynamic) Trace +   Weight Shift Sitting Poor   Weight Shift Standing Poor   Comments refused FWW   Gait Analysis   Gait Level Of Assist Unable to Participate   Bed Mobility    Sit to Supine Maximal Assist   Scooting Maximal Assist   Functional Mobility   Sit to Stand Maximal Assist   Bed, Chair, Wheelchair Transfer Maximal  Assist   Transfer Method Squat Pivot   How much difficulty does the patient currently have...   Turning over in bed (including adjusting bedclothes, sheets and blankets)? 2   Sitting down on and standing up from a chair with arms (e.g., wheelchair, bedside commode, etc.) 2   Moving from lying on back to sitting on the side of the bed? 2   How much help from another person does the patient currently need...   Moving to and from a bed to a chair (including a wheelchair)? 2   Need to walk in a hospital room? 2   Climbing 3-5 steps with a railing? 1   6 clicks Mobility Score 11   Activity Tolerance   Sitting in Chair 30 min   Sitting Edge of Bed 2 min   Standing 1 min   Patient / Family Goals    Patient / Family Goal #1 to go home   Short Term Goals    Short Term Goal # 1 in 6 visits patient will demo bed mobility with Jose Ramon for safe DC   Short Term Goal # 2 in 6 visits patient will complete all functional transfers with Jose Ramon and LRAD for safe DC   Short Term Goal # 3 in 6 visits patient will demo WC mobility for 200' with supervision   Education Group   Education Provided Role of Physical Therapist;Hip Precautions Posterior;Use of Assistive Device   Hip Precautions Posterior Patient Response Patient;Acceptance;Demonstration;Handout;Explanation;Caregiver;Reinforcement Needed   Role of Physical Therapist Patient Response Acceptance;Explanation;Demonstration;Verbal Demonstration;Action Demonstration;Caregiver   Use of Assistive Device Patient Response Patient;Explanation;Demonstration;Refuses;No Learning Evidence   Problem List    Problems Pain;Impaired Transfers;Impaired Bed Mobility;Impaired Ambulation;Functional Strength Deficit;Impaired Balance;Decreased Activity Tolerance;Safety Awareness Deficits / Cognition;Motor Planning / Sequencing   Anticipated Discharge Equipment and Recommendations   DC Equipment Recommendations Unable to determine at this time   Discharge Recommendations Recommend post-acute placement for  additional physical therapy services prior to discharge home   Latosha Gilbert, PT, DPT, GCS

## 2022-02-28 ENCOUNTER — PATIENT OUTREACH (OUTPATIENT)
Dept: HEALTH INFORMATION MANAGEMENT | Facility: OTHER | Age: 57
End: 2022-02-28
Payer: MEDICARE

## 2022-02-28 ENCOUNTER — APPOINTMENT (OUTPATIENT)
Dept: RADIOLOGY | Facility: MEDICAL CENTER | Age: 57
DRG: 522 | End: 2022-02-28
Attending: HOSPITALIST
Payer: MEDICARE

## 2022-02-28 LAB
ERYTHROCYTE [DISTWIDTH] IN BLOOD BY AUTOMATED COUNT: 49.6 FL (ref 35.9–50)
FERRITIN SERPL-MCNC: 117 NG/ML (ref 10–291)
HCT VFR BLD AUTO: 24.8 % (ref 37–47)
HGB BLD-MCNC: 7.6 G/DL (ref 12–16)
IRON SATN MFR SERPL: 11 % (ref 15–55)
IRON SERPL-MCNC: 20 UG/DL (ref 40–170)
MCH RBC QN AUTO: 28.8 PG (ref 27–33)
MCHC RBC AUTO-ENTMCNC: 30.6 G/DL (ref 33.6–35)
MCV RBC AUTO: 93.9 FL (ref 81.4–97.8)
NT-PROBNP SERPL IA-MCNC: 613 PG/ML (ref 0–125)
PLATELET # BLD AUTO: 114 K/UL (ref 164–446)
PMV BLD AUTO: 8.7 FL (ref 9–12.9)
RBC # BLD AUTO: 2.64 M/UL (ref 4.2–5.4)
TIBC SERPL-MCNC: 190 UG/DL (ref 250–450)
UIBC SERPL-MCNC: 170 UG/DL (ref 110–370)
WBC # BLD AUTO: 5.3 K/UL (ref 4.8–10.8)

## 2022-02-28 PROCEDURE — 71275 CT ANGIOGRAPHY CHEST: CPT | Mod: ME

## 2022-02-28 PROCEDURE — 770001 HCHG ROOM/CARE - MED/SURG/GYN PRIV*

## 2022-02-28 PROCEDURE — 83540 ASSAY OF IRON: CPT

## 2022-02-28 PROCEDURE — 36415 COLL VENOUS BLD VENIPUNCTURE: CPT

## 2022-02-28 PROCEDURE — 83880 ASSAY OF NATRIURETIC PEPTIDE: CPT

## 2022-02-28 PROCEDURE — A9270 NON-COVERED ITEM OR SERVICE: HCPCS | Performed by: HOSPITALIST

## 2022-02-28 PROCEDURE — 700105 HCHG RX REV CODE 258: Performed by: HOSPITALIST

## 2022-02-28 PROCEDURE — 700111 HCHG RX REV CODE 636 W/ 250 OVERRIDE (IP): Performed by: INTERNAL MEDICINE

## 2022-02-28 PROCEDURE — 82728 ASSAY OF FERRITIN: CPT

## 2022-02-28 PROCEDURE — 700102 HCHG RX REV CODE 250 W/ 637 OVERRIDE(OP): Performed by: INTERNAL MEDICINE

## 2022-02-28 PROCEDURE — 99233 SBSQ HOSP IP/OBS HIGH 50: CPT | Performed by: HOSPITALIST

## 2022-02-28 PROCEDURE — 700102 HCHG RX REV CODE 250 W/ 637 OVERRIDE(OP): Performed by: HOSPITALIST

## 2022-02-28 PROCEDURE — 700117 HCHG RX CONTRAST REV CODE 255: Performed by: HOSPITALIST

## 2022-02-28 PROCEDURE — 85027 COMPLETE CBC AUTOMATED: CPT

## 2022-02-28 PROCEDURE — 700111 HCHG RX REV CODE 636 W/ 250 OVERRIDE (IP): Mod: JG | Performed by: HOSPITALIST

## 2022-02-28 PROCEDURE — 83550 IRON BINDING TEST: CPT

## 2022-02-28 PROCEDURE — A9270 NON-COVERED ITEM OR SERVICE: HCPCS | Performed by: INTERNAL MEDICINE

## 2022-02-28 RX ORDER — AMOXICILLIN 250 MG
2 CAPSULE ORAL 2 TIMES DAILY
Qty: 30 TABLET | Refills: 0 | Status: SHIPPED | OUTPATIENT
Start: 2022-02-28 | End: 2022-07-28

## 2022-02-28 RX ORDER — OXYCODONE HYDROCHLORIDE 5 MG/1
2.5 TABLET ORAL EVERY 8 HOURS PRN
Qty: 6 TABLET | Refills: 0 | Status: SHIPPED | OUTPATIENT
Start: 2022-02-28 | End: 2022-03-02

## 2022-02-28 RX ORDER — DIPHENHYDRAMINE HYDROCHLORIDE 50 MG/ML
25 INJECTION INTRAMUSCULAR; INTRAVENOUS ONCE
Status: COMPLETED | OUTPATIENT
Start: 2022-02-28 | End: 2022-02-28

## 2022-02-28 RX ORDER — ACETAMINOPHEN 325 MG/1
650 TABLET ORAL ONCE
Status: COMPLETED | OUTPATIENT
Start: 2022-02-28 | End: 2022-02-28

## 2022-02-28 RX ORDER — ASPIRIN 81 MG/1
81 TABLET, CHEWABLE ORAL 2 TIMES DAILY
Qty: 50 TABLET | Status: SHIPPED
Start: 2022-02-28 | End: 2022-03-25

## 2022-02-28 RX ORDER — LEVOTHYROXINE SODIUM 0.03 MG/1
25 TABLET ORAL
Qty: 30 TABLET | Status: SHIPPED
Start: 2022-03-01 | End: 2022-07-28

## 2022-02-28 RX ORDER — POLYETHYLENE GLYCOL 3350 17 G/17G
17 POWDER, FOR SOLUTION ORAL
Qty: 15 EACH | Refills: 3 | Status: SHIPPED | OUTPATIENT
Start: 2022-02-28 | End: 2022-07-28

## 2022-02-28 RX ORDER — OMEPRAZOLE 20 MG/1
20 CAPSULE, DELAYED RELEASE ORAL DAILY
Status: DISCONTINUED | OUTPATIENT
Start: 2022-02-28 | End: 2022-03-16 | Stop reason: HOSPADM

## 2022-02-28 RX ORDER — DIPHENHYDRAMINE HCL 25 MG
25 TABLET ORAL ONCE
Status: COMPLETED | OUTPATIENT
Start: 2022-02-28 | End: 2022-02-28

## 2022-02-28 RX ADMIN — IOHEXOL 50 ML: 350 INJECTION, SOLUTION INTRAVENOUS at 14:15

## 2022-02-28 RX ADMIN — ACETAMINOPHEN 650 MG: 325 TABLET, FILM COATED ORAL at 17:19

## 2022-02-28 RX ADMIN — SODIUM CHLORIDE 1525 MG: 9 INJECTION, SOLUTION INTRAVENOUS at 18:31

## 2022-02-28 RX ADMIN — Medication 10 MG: at 06:00

## 2022-02-28 RX ADMIN — ATORVASTATIN CALCIUM 20 MG: 20 TABLET, FILM COATED ORAL at 22:56

## 2022-02-28 RX ADMIN — SENNOSIDES AND DOCUSATE SODIUM 2 TABLET: 50; 8.6 TABLET ORAL at 17:19

## 2022-02-28 RX ADMIN — SENNOSIDES AND DOCUSATE SODIUM 2 TABLET: 50; 8.6 TABLET ORAL at 04:25

## 2022-02-28 RX ADMIN — ACETAMINOPHEN 650 MG: 325 TABLET, FILM COATED ORAL at 04:25

## 2022-02-28 RX ADMIN — SODIUM CHLORIDE 25 MG: 9 INJECTION, SOLUTION INTRAVENOUS at 17:43

## 2022-02-28 RX ADMIN — DIVALPROEX SODIUM 1000 MG: 500 TABLET, EXTENDED RELEASE ORAL at 22:56

## 2022-02-28 RX ADMIN — ENOXAPARIN SODIUM 40 MG: 40 INJECTION SUBCUTANEOUS at 04:25

## 2022-02-28 RX ADMIN — QUETIAPINE FUMARATE 100 MG: 100 TABLET ORAL at 04:25

## 2022-02-28 RX ADMIN — SERTRALINE 100 MG: 100 TABLET, FILM COATED ORAL at 04:34

## 2022-02-28 RX ADMIN — LEVOTHYROXINE SODIUM 25 MCG: 25 TABLET ORAL at 04:25

## 2022-02-28 RX ADMIN — TAMSULOSIN HYDROCHLORIDE 0.4 MG: 0.4 CAPSULE ORAL at 10:43

## 2022-02-28 RX ADMIN — QUETIAPINE FUMARATE 200 MG: 100 TABLET ORAL at 22:56

## 2022-02-28 RX ADMIN — OMEPRAZOLE 20 MG: 20 CAPSULE, DELAYED RELEASE ORAL at 14:14

## 2022-02-28 RX ADMIN — OXYBUTYNIN CHLORIDE 5 MG: 5 TABLET, EXTENDED RELEASE ORAL at 17:20

## 2022-02-28 RX ADMIN — POLYETHYLENE GLYCOL 3350 1 PACKET: 17 POWDER, FOR SOLUTION ORAL at 04:25

## 2022-02-28 RX ADMIN — ARIPIPRAZOLE 30 MG: 10 TABLET ORAL at 17:19

## 2022-02-28 RX ADMIN — DIPHENHYDRAMINE HYDROCHLORIDE 25 MG: 25 TABLET ORAL at 17:19

## 2022-02-28 RX ADMIN — ACETAMINOPHEN 650 MG: 325 TABLET, FILM COATED ORAL at 13:28

## 2022-02-28 ASSESSMENT — PAIN SCALES - PAIN ASSESSMENT IN ADVANCED DEMENTIA (PAINAD)
CONSOLABILITY: NO NEED TO CONSOLE
CONSOLABILITY: NO NEED TO CONSOLE
TOTALSCORE: 0
BODYLANGUAGE: RELAXED
FACIALEXPRESSION: SMILING OR INEXPRESSIVE
BREATHING: NORMAL
BODYLANGUAGE: TENSE, DISTRESSED PACING, FIDGETING
NEGVOCALIZATION: OCCASIONAL MOAN/GROAN, LOW SPEECH, NEGATIVE/DISAPPROVING QUALITY
BREATHING: NORMAL

## 2022-02-28 ASSESSMENT — PATIENT HEALTH QUESTIONNAIRE - PHQ9
2. FEELING DOWN, DEPRESSED, IRRITABLE, OR HOPELESS: NOT AT ALL
1. LITTLE INTEREST OR PLEASURE IN DOING THINGS: NOT AT ALL
SUM OF ALL RESPONSES TO PHQ9 QUESTIONS 1 AND 2: 0

## 2022-02-28 ASSESSMENT — PAIN DESCRIPTION - PAIN TYPE
TYPE: ACUTE PAIN
TYPE: ACUTE PAIN

## 2022-02-28 NOTE — DISCHARGE PLANNING
[facility] Transitional Care Coordination    Referral from:  Dr. Mota    Insurance Provider on Facesheet: MCR/TONY FFS    Potential Rehab Diagnosis: TBD    Chart review indicates patient may have on going medical management and may have therapy needs to possibly meet inpatient rehab facility criteria with the goal of returning to community.    D/C support: TBD     Physiatry consultation forwarded per protocol.      Thank you for the referral.     1418-Case discussed with Administration.  Case is denied d/t PMH and Public Guardianship.  Dr. Mota & Ann, Guardian are aware. [facility] will not follow.  Please reach out to myself with any questions.

## 2022-02-28 NOTE — PROGRESS NOTES
4 Eyes Skin Assessment Completed by ROD Brown and ROD Bermudez.     Head WDL  Ears WDL  Nose WDL  Mouth WDL  Neck WDL  Breast/Chest WDL  Shoulder Blades WDL  Spine WDL  (R) Arm/Elbow/Hand Bruising and Scab  (L) Arm/Elbow/Hand Bruising and Scab-bruising L elbow  Abdomen WDL  Groin WDL  Scrotum/Coccyx/Buttocks WDL  (R) Leg Scab to R shin  (L) Leg Scab and surgical incision to L hip, dressing is CD & I  (R) Heel/Foot/Toe WDL with some swelling  (L) Heel/Foot/Toe Blanching and Swelling              Devices In Places Pulse Ox, Monte[place], SCD's and Nasal Cannula and abductor pillow        Interventions In Place Pillows and Pressure Redistribution Mattress     Possible Skin Injury No     Pictures Uploaded Into Epic N/A  Wound Consult Placed N/A

## 2022-02-28 NOTE — PROGRESS NOTES
56yoF with left displaced femoral neck s/p hemiarthroplasty 2/26.    S: Caregiver at bedside.  Erlinda seems comfortable.    O:    Vitals:    02/27/22 1500   BP: 118/77   Pulse: (!) 109   Resp: 16   Temp: 36.6 °C (97.9 °F)   SpO2: 92%     Exam:  General-NAD, alert and following commands  LLE-hip dressing c/d/i, +EHL/FHL/TA/GS motor, foot warm and well perfused    Hct: 27.6    A: 56yoF with left displaced femoral neck s/p hemiarthroplasty 2/26.    Recs:  --WBAT LLE  --posterior hip precautions  --PT/OT for mobilization  --heparin and SCDs while inpatient  --fu 2 weeks postop

## 2022-02-28 NOTE — CARE PLAN
Problem: Pain - Standard  Goal: Alleviation of pain or a reduction in pain to the patient’s comfort goal  Outcome: Progressing  Note:  Patient educated on 0-10 pain scale, and non-pharmacological pain control. Encouraged to verbalize and contact staff when in pain.  Administered PRN medication as needed.       Problem: Skin Integrity  Goal: Skin integrity is maintained or improved  Outcome: Progressing  Note:  Turned & repositioned every 2 hours,kept dry and clean, mepilex, waffle mattress overlay, barrier paste and pillows on bony prominences to prevent skin breakdown     The patient is Stable - Low risk of patient condition declining or worsening    Shift Goals  Clinical Goals: Pain control, safety, bowel movement  Patient Goals: pain control    Progress made toward(s) clinical / shift goals: Patient remain clean and dry through the day, linens remain clean and dry. Patient is tolerating [id number] at this time.

## 2022-02-28 NOTE — PROGRESS NOTES
[facility] Medicine Daily Progress Note    Date of Service  2/28/2022    Chief Complaint  Erlinda Verde is a 56 y.o. female admitted 2/25/2022 with   Chief Complaint   Patient presents with   • Leg Pain     PT BIB EMS from home. PT was seen at urgent care yesterday and was diagnosed with a femoral head fracture.  Pt care giver called due to increased pain. PT was referred for follow up with ortho but has not had follow up.        Hospital Course  This 56-year-old female with a developmental disability, size affective disorder, parkinsonism, hypertension, type 2 diabetes mellitus presented to the ER on 2/25/2022 after she had a ground-level fall.     She is a resident of [facility].  She landed on the left side, imaging showed femoral head fracture; orthopedic surgery was consulted, patient underwent open treatment of left femoral neck fracture with hemiarthroplasty on 2/26/2022.  PT/OT has evaluate the patient, recommend postacute.  SNF/rehab referral in place     Of note, patient noted to be tachycardic, D-dimer is elevated, CTA of the chest ordered, pending.     Hospital course was complicated with acute blood loss anemia, today her hemoglobin noted to be 7.6.  Will transfuse if less than 7.  Iron study was obtained consistent with iron deficiency, will provide IV iron.  Will provide PPI.  Patient need to follow-up with primary care physician as an outpatient     Interval Problem Update    2/27:  --No acute events overnight, patient underwent surgery yesterday.  We will continue DVT prophylaxis.  PT/OT has evaluate the patient, pending recommendation.  --Of note, patient abdomen is distended , KUB showing constipation;  Will provide aggressive  Bowel regemin  --Patient continues remain persistent tachycardia along with elevated D-dimer, will obtain CT of the chest.  --Has been requiring 2.5 L of oxygen, will titrate oxygen as needed.  Provide incentive spirometry    Plan of care has been discussed with the  nursing staff , caretaker at bedside.    2/28:  --No acute events overnight, patient tachycardia continues to improve.  CT of the chest ordered, discussed with patient and guardian Ann.  --Patient did have a good bowel movement yesterday  --Her hemoglobin to be 7.6, iron studies consistent with iron deficiency.  Will provide IV iron.  Monitor hemoglobin medically, if less than 7, transfuse  --DVT prophylaxis with Lovenox  --PT/OT has evaluate the patient, recommend postacute, SNF/physiatry referral in place    Plan of care has been discussed with  Nursing staff, case management.    Consultants/Specialty  orthopedics    Code Status  Full Code    Disposition  Patient is medically cleared for discharge.   Anticipate discharge to to skilled nursing facility.  I have placed the appropriate orders for post-discharge needs.    Review of Systems  ROS     Unable to obtain  2/2 patient mentation     Physical Exam  Temp:  [35.8 °C (96.5 °F)-37.1 °C (98.7 °F)] 37.1 °C (98.7 °F)  Pulse:  [] 92  Resp:  [11-17] 11  BP: (118-135)/(66-81) 135/77  SpO2:  [92 %-97 %] 97 %    Physical Exam  Vitals and nursing note reviewed.   Constitutional:       Comments: And awake   HENT:      Head: Normocephalic and atraumatic.   Eyes:      Pupils: Pupils are equal, round, and reactive to light.   Cardiovascular:      Rate and Rhythm: Tachycardia present.      Pulses: Normal pulses.   Pulmonary:      Effort: No respiratory distress.      Breath sounds: Normal breath sounds.   Abdominal:      Comments: Soft   Bs +ve   Musculoskeletal:      Cervical back: Neck supple.      Right lower leg: No edema.      Left lower leg: No edema.      Comments: Decreased ROM on the Left side   Skin:     General: Skin is warm.   Neurological:      Mental Status: She is alert.      Comments: Alert and awake   Does folow commands    Psychiatric:         Mood and Affect: Mood normal.         Fluids  No intake or output data in the 24 hours ending 02/28/22  1335    Laboratory  Recent Labs     02/26/22  0647 02/27/22  0803 02/28/22  0532   WBC 5.8 5.1 5.3   RBC 3.45* 2.99* 2.64*   HEMOGLOBIN 10.2* 8.7* 7.6*   HEMATOCRIT 31.4* 27.6* 24.8*   MCV 91.0 92.3 93.9   MCH 29.6 29.1 28.8   MCHC 32.5* 31.5* 30.6*   RDW 47.9 48.8 49.6   PLATELETCT 101* 103* 114*   MPV 8.3* 8.6* 8.7*     Recent Labs     02/25/22  2115 02/26/22  0647 02/27/22  0803   SODIUM 131* 135 135   POTASSIUM 4.9 4.8 4.6   CHLORIDE 99 103 104   CO2 20 21 21   GLUCOSE 147* 109* 189*   BUN 20 24* 15   CREATININE 0.84 0.96 0.73   CALCIUM 8.7 8.0* 7.6*                   Imaging  [id number] VIEW   Final Result      1.  No evidence of bowel obstruction.      2.  Moderate constipation.      DX-PELVIS-1 OR 2 VIEWS   Final Result      Post left hip hemiarthroplasty.      DX-FEMUR-2+ LEFT   Final Result      1.  Mildly displaced left femoral neck fracture.   2.  No distal femur fracture.      DX-HIP-UNILATERAL-WITH PELVIS-1 VIEW LEFT   Final Result      Left femoral neck fracture again noted.      DX-CHEST-PORTABLE (1 VIEW)   Final Result      Hypoinflation with bibasilar atelectasis.      CT-CTA CHEST PULMONARY ARTERY W/ RECONS    (Results Pending)        Assessment/Plan  * Hip fracture requiring operative repair, left, closed, initial encounter ([facility])- (present on admission)  Assessment & Plan  Patient will be consulted by orthopedic surgery    ---x-ray showing femoral head fracture. Ortho was consulted and patient underwent Open treatment of left femoral neck fracture with hemiarthroplasty on 2/26 by Dr Marquis.   --- PT and OT    -- DVT ppx    Tachycardia  Assessment & Plan  Sinus tachycardia on EKG  Thought to be secondary to pain and hypovolemia  Plan: Opiates, IV fluids  2/26: Improving   Tachy with elevated D-dimer; obtain CTA of the chest     Essential hypertension- (present on admission)  Assessment & Plan  Will hold lisinopril   IV labetalol as needed   --- BP well controlled      Anemia- (present on  admission)  Assessment & Plan  Her hemoglobin hematocrit, today at 7.8.  Will transfuse if less than 7  --Tender to acute blood loss anemia likely 2/2 acute blood loss anemia    Diabetes ([facility])- (present on admission)  Assessment & Plan  Glyco at 6.7   -- diabetic diet    -- on iss , hypoglycemia protocol and accu-checks ac and hs    Thrombocytopenia ([facility])- (present on admission)  Assessment & Plan  At 114 , monitor    Schizophrenia ([facility])- (present on admission)  Assessment & Plan  Resume outpatient antipsychotic medications  New every 530 mg p.o. daily, Depakote 1000 g at p.m.  Quetiapine 200 at p.m. and 100 at am    QTc 395    Parkinson's disease (tremor, stiffness, slow motion, unstable posture) ([facility])- (present on admission)  Assessment & Plan  Fall precautions       VTE prophylaxis: Lovenox  I have performed a physical exam and reviewed and updated ROS and Plan today (2/28/2022). In review of yesterday's note (2/27/2022), there are no changes except as documented above.

## 2022-02-28 NOTE — PROGRESS NOTES
IV Iron Per Pharmacy Note    Patient Lean Body Weight:  57 kg  Reason for Iron Replacement: iron deficiency anemia       Lab Results   Component Value Date/Time    WBC 5.3 02/28/2022 05:32 AM    RBC 2.64 (L) 02/28/2022 05:32 AM    HEMOGLOBIN 7.6 (L) 02/28/2022 05:32 AM    HEMATOCRIT 24.8 (L) 02/28/2022 05:32 AM    MCV 93.9 02/28/2022 05:32 AM    MCH 28.8 02/28/2022 05:32 AM    MCHC 30.6 (L) 02/28/2022 05:32 AM    MPV 8.7 (L) 02/28/2022 05:32 AM       Recent Labs     02/28/22  0532   FERRITIN 117.0   IRON 20*         Recent Labs     02/27/22  0803   CREATININE 0.73          Assessment/Plan:  1. IV Iron Indicated.   2. Following diphenhydramine/acetaminophen premeds, administer Iron Dextran 25 mg IV test dose over 30 minutes.  3. If no reaction (Anaphylaxis, Hypotension/Hypertension, N/V/D, Chest pain/Back Pain, Urticaria/Pruritis) in the next hour, proceed to full dose.   4. Full dose: Iron Dextran 1,525 mg IV over 4 hours. Continue to monitor for delayed ADR including: Arthralgia/myalgia, Headache/backache, chills/dizziness/malaise, moderate to high fever and n/v.      Otoniel Victor, PharmD

## 2022-02-28 NOTE — CARE PLAN
The patient is Stable - Low risk of patient condition declining or worsening    Shift Goals  Clinical Goals: Pain control, safety, bowel movement  Patient Goals: pain control    Progress made toward(s) clinical / shift goals:      Problem: Knowledge Deficit - Standard  Goal: Patient and family/care givers will demonstrate understanding of plan of care, disease process/condition, diagnostic tests and medications  Outcome: Progressing  Note: Pt updated on plan of care. Pt encouraged to ask questions and questions were answered. Call light within reach. Pt reminded to use call light whenever needing assistance.        Problem: Fall Risk  Goal: Patient will remain free from falls  Outcome: Progressing  Note: Non-slip socks in use. Bed locked and in lowest position. Call light is within reach.  Pt reminded to call before getting out of bed.  Bed alarm in place.       Patient is not progressing towards the following goals:    N/a

## 2022-02-28 NOTE — DISCHARGE PLANNING
Anticipated Discharge Disposition: SNF    Action: Spoke with patient's guardian Ann Urias ([phone]) about SNF choices. Choices obtained.    Choice form faxed to Yahaira OCASIO at [phone].    Barriers to Discharge: SNF acceptance    Plan: Will continue to follow for any discharge needs/barriers

## 2022-02-28 NOTE — DISCHARGE PLANNING
Received Choice form at 0966  Agency/Facility Name: 1. [facility] 2. [facility]  Referral sent per Choice form @ [phone]     Jaswant ackerman.

## 2022-02-28 NOTE — DISCHARGE SUMMARY
Discharge Summary    CHIEF COMPLAINT ON ADMISSION  Chief Complaint   Patient presents with   • Leg Pain     PT BIB EMS from home. PT was seen at urgent care yesterday and was diagnosed with a femoral head fracture.  Pt care giver called due to increased pain. PT was referred for follow up with ortho but has not had follow up.        Reason for Admission  Hip fracture requiring operative r*     Admission Date  2/25/2022    CODE STATUS  Full Code    HPI & HOSPITAL COURSE  This 56-year-old female with a developmental disability, size affective disorder, parkinsonism, hypertension, type 2 diabetes mellitus presented to the ER on 2/25/2022 after she had a ground-level fall.    She is a resident of [facility].  She landed on the left side, imaging showed femoral head fracture; orthopedic surgery was consulted, patient underwent open treatment of left femoral neck fracture with hemiarthroplasty on 2/26/2022.  PT/OT has evaluate the patient, recommend postacute.  SNF/rehab referral in place    Of note, patient noted to be tachycardic, D-dimer is elevated, CTA of the chest ordered, pending.    Hospital course was complicated with acute blood loss anemia, today her hemoglobin noted to be 7.6.  Will transfuse if less than 7.  Iron study was obtained consistent with iron deficiency, will provide IV iron.  Will provide PPI.  Patient need to follow-up with primary care physician as an outpatient      Therefore, she is discharged in fair and stable condition to skilled nursing facility.    The patient met 2-midnight criteria for an inpatient stay at the time of discharge.    Discharge Date  ***    FOLLOW UP ITEMS POST DISCHARGE  ***    DISCHARGE DIAGNOSES  Principal Problem:    Hip fracture requiring operative repair, left, closed, initial encounter ([facility]) POA: Yes  Active Problems:    Parkinson's disease (tremor, stiffness, slow motion, unstable posture) ([facility]) POA: Yes    Schizophrenia ([facility]) POA: Yes    Thrombocytopenia ([facility]) POA:  Yes    Diabetes ([facility]CC) POA: Yes    Anemia POA: Yes    Essential hypertension POA: Yes    Tachycardia POA: Unknown  Resolved Problems:    * No resolved hospital problems. *      FOLLOW UP  No future appointments.  Ubaldo Marquis M.D.  [address]  [address]  [place] [id number] [phone]      Call ASAP to schedule fu appt for 2 weeks postop      MEDICATIONS ON DISCHARGE     Medication List      START taking these medications      Instructions   aspirin 81 MG Chew chewable tablet  Commonly known as: ASA   Chew 1 Tablet 2 times a day for 25 days.  Dose: 81 mg     levothyroxine 25 MCG Tabs  Start taking on: March 1, 2022  Commonly known as: SYNTHROID   Take 1 Tablet by mouth every morning on an empty stomach.  Dose: 25 mcg     oxyCODONE immediate-release 5 MG Tabs  Commonly known as: ROXICODONE   Take 0.5 Tablets by mouth every 8 hours as needed for up to 2 days.  Dose: 2.5 mg     polyethylene glycol/lytes 17 g Pack  Commonly known as: MIRALAX   Take 1 Packet by mouth 1 time a day as needed (if sennosides and docusate ineffective after 24 hours).  Dose: 17 g     senna-docusate 8.6-50 MG Tabs  Commonly known as: PERICOLACE or SENOKOT S   Take 2 Tablets by mouth 2 times a day.  Dose: 2 Tablet        CONTINUE taking these medications      Instructions   aripiprazole 30 MG tablet  Commonly known as: ABILIFY   Take 30 mg by mouth every evening.  Dose: 30 mg     atorvastatin 20 MG Tabs  Commonly known as: LIPITOR   Take 20 mg by mouth every evening.  Dose: 20 mg     divalproex  MG Tb24  Commonly known as: DEPAKOTE ER   Take 1,000 mg by mouth at bedtime. 2 tablets = 1000 mg  Dose: 1,000 mg     donepezil 10 MG tablet  Commonly known as: ARICEPT   Take 10 mg by mouth every evening.  Dose: 10 mg     metFORMIN  MG Tb24  Commonly known as: GLUCOPHAGE XR   Take 500 mg by mouth every morning.  Dose: 500 mg     oxybutynin SR 5 MG Tb24  Commonly known as: DITROPAN-XL   Take 5 mg by mouth every evening.  Dose:  5 mg     quetiapine 300 MG XR tablet  Commonly known as: SEROQUEL XR   Take 300 mg by mouth every evening.  Dose: 300 mg     sertraline 100 MG Tabs  Commonly known as: Zoloft   Take 100 mg by mouth every morning.  Dose: 100 mg     therapeutic multivitamin-minerals Tabs   Take 1 Tab by mouth every day.  Dose: 1 Tablet     tolterodine ER 4 MG Cp24  Commonly known as: DETROL LA   Take 4 mg by mouth every bedtime.  Dose: 4 mg        STOP taking these medications    Calcium 600-200 MG-UNIT Tabs     lisinopril 20 MG Tabs  Commonly known as: PRINIVIL     meloxicam 15 MG tablet  Commonly known as: MOBIC            Allergies  Allergies   Allergen Reactions   • Risperdal        DIET  Orders Placed This Encounter   Procedures   • Diet Order Diet: Level 6 - Soft and Bite Sized; Liquid level: Level 0 - Thin     Standing Status:   Standing     Number of Occurrences:   1     Order Specific Question:   Diet:     Answer:   Level 6 - Soft and Bite Sized [23]     Order Specific Question:   Liquid level     Answer:   Level 0 - Thin       ACTIVITY  As tolerated.  Weight bearing as tolerated with posterior hip precaution     CONSULTATIONS  ortho    PROCEDURES  Open treatment of left femoral neck fracture with   hemiarthroplasty.    LABORATORY  Lab Results   Component Value Date    SODIUM 135 02/27/2022    POTASSIUM 4.6 02/27/2022    CHLORIDE 104 02/27/2022    CO2 21 02/27/2022    GLUCOSE 189 (H) 02/27/2022    BUN 15 02/27/2022    CREATININE 0.73 02/27/2022        Lab Results   Component Value Date    WBC 5.3 02/28/2022    HEMOGLOBIN 7.6 (L) 02/28/2022    HEMATOCRIT 24.8 (L) 02/28/2022    PLATELETCT 114 (L) 02/28/2022        Total time of the discharge process exceeds 35 minutes.

## 2022-02-28 NOTE — CARE PLAN
The patient is Stable - Low risk of patient condition declining or worsening    Shift Goals  Clinical Goals: Pain control, safety, bowel movement  Patient Goals: pain control    Progress made toward(s) clinical / shift goals:  Fall risk and Pain control interventions in place     Patient is not progressing towards the following goals:      Problem: Fall Risk  Goal: Patient will remain free from falls  Outcome: Progressing  Note: Bed in lowest position, locked, upper side rails up, checking on pt. Frequently, caregiver at bedside with pt.      Problem: Pain - Standard  Goal: Alleviation of pain or a reduction in pain to the patient’s comfort goal  Outcome: Progressing  Note: Educated about the pain scale and non pharmacological pain methods, check the MAR

## 2022-03-01 LAB
ERYTHROCYTE [DISTWIDTH] IN BLOOD BY AUTOMATED COUNT: 48.7 FL (ref 35.9–50)
HCT VFR BLD AUTO: 26.3 % (ref 37–47)
HGB BLD-MCNC: 8.4 G/DL (ref 12–16)
MCH RBC QN AUTO: 29.4 PG (ref 27–33)
MCHC RBC AUTO-ENTMCNC: 31.9 G/DL (ref 33.6–35)
MCV RBC AUTO: 92 FL (ref 81.4–97.8)
PLATELET # BLD AUTO: 157 K/UL (ref 164–446)
PMV BLD AUTO: 8.4 FL (ref 9–12.9)
RBC # BLD AUTO: 2.86 M/UL (ref 4.2–5.4)
WBC # BLD AUTO: 6.3 K/UL (ref 4.8–10.8)

## 2022-03-01 PROCEDURE — A9270 NON-COVERED ITEM OR SERVICE: HCPCS | Performed by: INTERNAL MEDICINE

## 2022-03-01 PROCEDURE — 700102 HCHG RX REV CODE 250 W/ 637 OVERRIDE(OP): Performed by: INTERNAL MEDICINE

## 2022-03-01 PROCEDURE — A9270 NON-COVERED ITEM OR SERVICE: HCPCS | Performed by: HOSPITALIST

## 2022-03-01 PROCEDURE — 97535 SELF CARE MNGMENT TRAINING: CPT | Mod: CO

## 2022-03-01 PROCEDURE — 700111 HCHG RX REV CODE 636 W/ 250 OVERRIDE (IP): Performed by: INTERNAL MEDICINE

## 2022-03-01 PROCEDURE — 97530 THERAPEUTIC ACTIVITIES: CPT | Mod: CO

## 2022-03-01 PROCEDURE — 770001 HCHG ROOM/CARE - MED/SURG/GYN PRIV*

## 2022-03-01 PROCEDURE — 99222 1ST HOSP IP/OBS MODERATE 55: CPT | Performed by: PHYSICAL MEDICINE & REHABILITATION

## 2022-03-01 PROCEDURE — 700101 HCHG RX REV CODE 250: Performed by: PHYSICAL MEDICINE & REHABILITATION

## 2022-03-01 PROCEDURE — 36415 COLL VENOUS BLD VENIPUNCTURE: CPT

## 2022-03-01 PROCEDURE — A9270 NON-COVERED ITEM OR SERVICE: HCPCS | Performed by: PHYSICAL MEDICINE & REHABILITATION

## 2022-03-01 PROCEDURE — 85027 COMPLETE CBC AUTOMATED: CPT

## 2022-03-01 PROCEDURE — 99232 SBSQ HOSP IP/OBS MODERATE 35: CPT | Performed by: INTERNAL MEDICINE

## 2022-03-01 PROCEDURE — 700102 HCHG RX REV CODE 250 W/ 637 OVERRIDE(OP): Performed by: HOSPITALIST

## 2022-03-01 PROCEDURE — 700102 HCHG RX REV CODE 250 W/ 637 OVERRIDE(OP): Performed by: PHYSICAL MEDICINE & REHABILITATION

## 2022-03-01 RX ORDER — LISINOPRIL 20 MG/1
20 TABLET ORAL
Status: DISCONTINUED | OUTPATIENT
Start: 2022-03-01 | End: 2022-03-16 | Stop reason: HOSPADM

## 2022-03-01 RX ORDER — LIDOCAINE 50 MG/G
1 PATCH TOPICAL EVERY 24 HOURS
Status: DISCONTINUED | OUTPATIENT
Start: 2022-03-01 | End: 2022-03-16 | Stop reason: HOSPADM

## 2022-03-01 RX ORDER — ACETAMINOPHEN 500 MG
1000 TABLET ORAL 3 TIMES DAILY
Status: DISCONTINUED | OUTPATIENT
Start: 2022-03-01 | End: 2022-03-16 | Stop reason: HOSPADM

## 2022-03-01 RX ORDER — FERROUS GLUCONATE 324(38)MG
324 TABLET ORAL
Status: DISCONTINUED | OUTPATIENT
Start: 2022-03-01 | End: 2022-03-01

## 2022-03-01 RX ADMIN — POLYETHYLENE GLYCOL 3350 1 PACKET: 17 POWDER, FOR SOLUTION ORAL at 04:15

## 2022-03-01 RX ADMIN — DIVALPROEX SODIUM 1000 MG: 500 TABLET, EXTENDED RELEASE ORAL at 20:56

## 2022-03-01 RX ADMIN — ARIPIPRAZOLE 30 MG: 10 TABLET ORAL at 17:05

## 2022-03-01 RX ADMIN — ENOXAPARIN SODIUM 40 MG: 40 INJECTION SUBCUTANEOUS at 04:15

## 2022-03-01 RX ADMIN — OXYBUTYNIN CHLORIDE 5 MG: 5 TABLET, EXTENDED RELEASE ORAL at 17:07

## 2022-03-01 RX ADMIN — METOPROLOL TARTRATE 25 MG: 25 TABLET, FILM COATED ORAL at 08:58

## 2022-03-01 RX ADMIN — ACETAMINOPHEN 650 MG: 325 TABLET, FILM COATED ORAL at 11:51

## 2022-03-01 RX ADMIN — SERTRALINE 100 MG: 100 TABLET, FILM COATED ORAL at 04:15

## 2022-03-01 RX ADMIN — ACETAMINOPHEN 1000 MG: 500 TABLET ORAL at 15:32

## 2022-03-01 RX ADMIN — METOPROLOL TARTRATE 25 MG: 25 TABLET, FILM COATED ORAL at 17:05

## 2022-03-01 RX ADMIN — LISINOPRIL 20 MG: 20 TABLET ORAL at 15:01

## 2022-03-01 RX ADMIN — LIDOCAINE 1 PATCH: 50 PATCH TOPICAL at 20:55

## 2022-03-01 RX ADMIN — QUETIAPINE FUMARATE 100 MG: 100 TABLET ORAL at 04:15

## 2022-03-01 RX ADMIN — TAMSULOSIN HYDROCHLORIDE 0.4 MG: 0.4 CAPSULE ORAL at 08:58

## 2022-03-01 RX ADMIN — ATORVASTATIN CALCIUM 20 MG: 20 TABLET, FILM COATED ORAL at 20:56

## 2022-03-01 RX ADMIN — SENNOSIDES AND DOCUSATE SODIUM 2 TABLET: 50; 8.6 TABLET ORAL at 17:05

## 2022-03-01 RX ADMIN — QUETIAPINE FUMARATE 200 MG: 100 TABLET ORAL at 20:56

## 2022-03-01 RX ADMIN — LEVOTHYROXINE SODIUM 25 MCG: 25 TABLET ORAL at 04:15

## 2022-03-01 RX ADMIN — ACETAMINOPHEN 1000 MG: 500 TABLET ORAL at 20:57

## 2022-03-01 RX ADMIN — SENNOSIDES AND DOCUSATE SODIUM 2 TABLET: 50; 8.6 TABLET ORAL at 04:15

## 2022-03-01 RX ADMIN — OMEPRAZOLE 20 MG: 20 CAPSULE, DELAYED RELEASE ORAL at 04:15

## 2022-03-01 ASSESSMENT — COGNITIVE AND FUNCTIONAL STATUS - GENERAL
TOILETING: TOTAL
DAILY ACTIVITIY SCORE: 13
DRESSING REGULAR UPPER BODY CLOTHING: A LITTLE
CLIMB 3 TO 5 STEPS WITH RAILING: TOTAL
STANDING UP FROM CHAIR USING ARMS: A LOT
DAILY ACTIVITIY SCORE: 13
DRESSING REGULAR UPPER BODY CLOTHING: A LITTLE
PERSONAL GROOMING: A LOT
DRESSING REGULAR LOWER BODY CLOTHING: A LOT
SUGGESTED CMS G CODE MODIFIER MOBILITY: CM
MOVING TO AND FROM BED TO CHAIR: UNABLE
PERSONAL GROOMING: A LOT
HELP NEEDED FOR BATHING: A LOT
HELP NEEDED FOR BATHING: A LOT
DRESSING REGULAR LOWER BODY CLOTHING: A LOT
SUGGESTED CMS G CODE MODIFIER DAILY ACTIVITY: CL
EATING MEALS: A LITTLE
EATING MEALS: A LITTLE
MOVING FROM LYING ON BACK TO SITTING ON SIDE OF FLAT BED: UNABLE
WALKING IN HOSPITAL ROOM: TOTAL
TURNING FROM BACK TO SIDE WHILE IN FLAT BAD: A LOT
TOILETING: TOTAL
MOBILITY SCORE: 8
SUGGESTED CMS G CODE MODIFIER DAILY ACTIVITY: CL

## 2022-03-01 ASSESSMENT — PAIN DESCRIPTION - PAIN TYPE: TYPE: ACUTE PAIN

## 2022-03-01 ASSESSMENT — FIBROSIS 4 INDEX: FIB4 SCORE: 2.74

## 2022-03-01 ASSESSMENT — GAIT ASSESSMENTS: GAIT LEVEL OF ASSIST: UNABLE TO PARTICIPATE

## 2022-03-01 NOTE — PROGRESS NOTES
2240: Assumed care of pt @ 2240. Pt A&Ox self, VSS, on 2L NC, denying pain or discomfort. Pt was oriented to room, educated on use of call light, bed locked and in lowest position. No further questions at this time.

## 2022-03-01 NOTE — PROGRESS NOTES
[facility] Medicine Daily Progress Note    Date of Service  3/1/2022    Chief Complaint  Erlinda Verde is a 56 y.o. female admitted 2/25/2022 with   Chief Complaint   Patient presents with   • Leg Pain     PT BIB EMS from home. PT was seen at urgent care yesterday and was diagnosed with a femoral head fracture.  Pt care giver called due to increased pain. PT was referred for follow up with ortho but has not had follow up.        Hospital Course  This 56-year-old female with a developmental disability, size affective disorder, parkinsonism, hypertension, type 2 diabetes mellitus presented to the ER on 2/25/2022 after she had a ground-level fall.     She is a resident of [facility].  She landed on the left side, imaging showed femoral head fracture; orthopedic surgery was consulted, patient underwent open treatment of left femoral neck fracture with hemiarthroplasty on 2/26/2022.  PT/OT has evaluate the patient, recommend postacute.  SNF/rehab referral in place     Of note, patient noted to be tachycardic, D-dimer is elevated, CTA of the chest ordered, pending.     Hospital course was complicated with acute blood loss anemia, today her hemoglobin noted to be 7.6.  Will transfuse if less than 7.  Iron study was obtained consistent with iron deficiency, will provide IV iron.  Will provide PPI.  Patient need to follow-up with primary care physician as an outpatient     Interval Problem Update    2/27:  --No acute events overnight, patient underwent surgery yesterday.  We will continue DVT prophylaxis.  PT/OT has evaluate the patient, pending recommendation.  --Of note, patient abdomen is distended , KUB showing constipation;  Will provide aggressive  Bowel regemin  --Patient continues remain persistent tachycardia along with elevated D-dimer, will obtain CT of the chest.  --Has been requiring 2.5 L of oxygen, will titrate oxygen as needed.  Provide incentive spirometry    Plan of care has been discussed with the  nursing staff , caretaker at bedside.    2/28:  --No acute events overnight, patient tachycardia continues to improve.  CT of the chest ordered, discussed with patient and guardian Ann.  --Patient did have a good bowel movement yesterday  --Her hemoglobin to be 7.6, iron studies consistent with iron deficiency.  Will provide IV iron.  Monitor hemoglobin medically, if less than 7, transfuse  --DVT prophylaxis with Lovenox  --PT/OT has evaluate the patient, recommend postacute, SNF/physiatry referral in place    3/1- no event overnight. She remain pleasantly confused. Sitter present. BP slight elevated. Sinus tachycardia. Started metoprolol 25 mg BID. Not symptomatic. EKG reviewed. CT/PE negative for PE  For hypertension, resume home meds, lisinopril 20 mg daily  hgb 8.4. improving. No need for transfusion   Medically cleared to transfer at SNF     Plan of care has been discussed with  Nursing staff, case management.    Consultants/Specialty  orthopedics    Code Status  Full Code    Disposition  Patient is medically cleared for discharge.   Anticipate discharge to to skilled nursing facility.  I have placed the appropriate orders for post-discharge needs.    Review of Systems  Review of Systems   Unable to perform ROS: Psychiatric disorder        Unable to obtain  2/2 patient mentation     Physical Exam  Temp:  [36.2 °C (97.2 °F)-37.1 °C (98.7 °F)] 36.4 °C (97.6 °F)  Pulse:  [107-122] 122  Resp:  [14-15] 15  BP: (133-156)/() 155/90  SpO2:  [92 %-96 %] 92 %    Physical Exam  Vitals and nursing note reviewed.   Constitutional:       Comments: And awake   HENT:      Head: Normocephalic and atraumatic.   Eyes:      Pupils: Pupils are equal, round, and reactive to light.   Cardiovascular:      Rate and Rhythm: Tachycardia present.      Pulses: Normal pulses.   Pulmonary:      Effort: No respiratory distress.      Breath sounds: Normal breath sounds.   Abdominal:      Comments: Soft   Bs +ve   Musculoskeletal:       Cervical back: Neck supple.      Right lower leg: No edema.      Left lower leg: No edema.      Comments: Decreased ROM on the Left side   Skin:     General: Skin is warm.   Neurological:      Mental Status: She is alert.      Comments: Alert and awake   Does folow commands    Psychiatric:         Mood and Affect: Mood normal.         Fluids    Intake/Output Summary (Last 24 hours) at 3/1/2022 1337  Last data filed at 3/1/2022 0600  Gross per 24 hour   Intake --   Output 900 ml   Net -900 ml       Laboratory  Recent Labs     02/27/22  0803 02/28/22  0532 03/01/22  0827   WBC 5.1 5.3 6.3   RBC 2.99* 2.64* 2.86*   HEMOGLOBIN 8.7* 7.6* 8.4*   HEMATOCRIT 27.6* 24.8* 26.3*   MCV 92.3 93.9 92.0   MCH 29.1 28.8 29.4   MCHC 31.5* 30.6* 31.9*   RDW 48.8 49.6 48.7   PLATELETCT 103* 114* 157*   MPV 8.6* 8.7* 8.4*     Recent Labs     02/27/22  0803   SODIUM 135   POTASSIUM 4.6   CHLORIDE 104   CO2 21   GLUCOSE 189*   BUN 15   CREATININE 0.73   CALCIUM 7.6*                   Imaging  CT-CTA CHEST PULMONARY ARTERY W/ RECONS   Final Result      1.  No evidence of pulmonary embolism to the segmental branches. Subsegmental branches poorly evaluated due to motion artifact.   2.  Small bilateral pleural effusions.   3.  Atherosclerotic changes. Coronary artery atherosclerotic disease.   4.  3 pulmonary nodules measuring up to 3 mm in the left upper lobe. Recommendations below.      Low Risk: No routine follow-up      High Risk: Optional CT at 12 months      Comments: Use most suspicious nodule as guide to management. Follow-up intervals may vary according to size and risk.      Low Risk - Minimal or absent history of smoking and of other known risk factors.      High Risk - History of smoking or of other known risk factors.      Note: These recommendations do not apply to lung cancer screening, patients with immunosuppression, or patients with known primary cancer.      Fleischner Society 2017 Guidelines for Management of Incidentally  Detected Pulmonary Nodules in Adults      [id number] VIEW   Final Result      1.  No evidence of bowel obstruction.      2.  Moderate constipation.      DX-PELVIS-1 OR 2 VIEWS   Final Result      Post left hip hemiarthroplasty.      DX-FEMUR-2+ LEFT   Final Result      1.  Mildly displaced left femoral neck fracture.   2.  No distal femur fracture.      DX-HIP-UNILATERAL-WITH PELVIS-1 VIEW LEFT   Final Result      Left femoral neck fracture again noted.      DX-CHEST-PORTABLE (1 VIEW)   Final Result      Hypoinflation with bibasilar atelectasis.           Assessment/Plan  * Hip fracture requiring operative repair, left, closed, initial encounter ([facility]CC)- (present on admission)  Assessment & Plan  Patient will be consulted by orthopedic surgery    ---x-ray showing femoral head fracture. Ortho was consulted and patient underwent Open treatment of left femoral neck fracture with hemiarthroplasty on 2/26 by Dr Marquis.   --- PT and OT    -- DVT ppx    Tachycardia- (present on admission)  Assessment & Plan  Sinus tachycardia on EKG  Thought to be secondary to pain and hypovolemia  Plan: Opiates, IV fluids  2/26: Improving   Tachy with elevated D-dimer; obtain CTA of the chest negative   3/1- sinus tachy. Not symptomatic. No other explanation. Start metoprolol 25 mg BID . Monitor with PCP and cardiology as OP     Essential hypertension- (present on admission)  Assessment & Plan  Will hold lisinopril   IV labetalol as needed   --- BP well controlled  3/1- increase. Start lisinopril and metoprolol. Continue to monitor       Anemia- (present on admission)  Assessment & Plan  Her hemoglobin hematocrit, today at 7.8.  Will transfuse if less than 7  --Tender to acute blood loss anemia likely 2/2 acute blood loss anemia  3/1- hgb 8.4- improving. No need for transfusion     Diabetes ([facility])- (present on admission)  Assessment & Plan  Glyco at 6.7   -- diabetic diet    -- on iss , hypoglycemia protocol and accu-checks ac and  hs  Resume metformin on discharge     Thrombocytopenia (HCC)- (present on admission)  Assessment & Plan  Reactive, possible due to anemia   3/1- improving     Schizophrenia (HCC)- (present on admission)  Assessment & Plan  Resume outpatient antipsychotic medications  New every 530 mg p.o. daily, Depakote 1000 g at p.m.  Quetiapine 200 at p.m. and 100 at am    QTc 395    Parkinson's disease (tremor, stiffness, slow motion, unstable posture) (HCC)- (present on admission)  Assessment & Plan  Fall precautions       VTE prophylaxis: Lovenox  I have performed a physical exam and reviewed and updated ROS and Plan today (3/1/2022). In review of yesterday's note (2/28/2022), there are no changes except as documented above.

## 2022-03-01 NOTE — CARE PLAN
The patient is Stable - Low risk of patient condition declining or worsening    Shift Goals  Clinical Goals: pain control, safety  Patient Goals: sleep    Progress made toward(s) clinical / shift goals:  pt updated on POC, bed locked and in lowest position, educated on use of call light, Q2 turns in place, educated on non pharm pain management    Patient is not progressing towards the following goals:

## 2022-03-01 NOTE — DISCHARGE PLANNING
Anticipated Discharge Disposition: SNF    Action: PASRR is still in review and additional information has been requested. Escalated to leadership.     Barriers to Discharge: pending PASRR clearance.     Plan: cont to f/u for DC needs.

## 2022-03-01 NOTE — CONSULTS
Physical Medicine and Rehabilitation Consultation              Date of initial consultation: 3/1/2022  Requested by: Blessing Mota MD  Consulting physician: Rio Morales D.O.  Reason for consultation: assessment of rehabilitation needs  LOS: 4 Day(s)    Chief complaint: left hip pain    This history was prepared after interviewing the patient and caregivers, who was also present, and reviewing the patient's chart at [facility].    Patient is poor historian so most information obtained from caregivers.    HPI: The patient is a 56 y.o. female with a past medical history of hypertension, diabetes mellitus type II, Schizoaffective disorder, Parkinsonism, developmental disability, left femoral head fracture diagnosed 2/24/2022 at urgent care;  who presented on 2/25/2022  7:22 PM with worsening left leg pain and found to have Mildly displaced left femoral neck fracture. Per documentation, she had a ground level fall on 2/24/2022. Was seen in urgent care and found to have a left femoral head fracture.    The patient was found to have the following:  Mildly displaced left femoral neck fracture    The patient underwent the following procedures on 2/26/2022 by Dr. Ubaldo Marquis MD:   Open treatment of left femoral neck fracture with hemiarthroplasty.    Recovery was complicated by:   Impaired adl and mobility,  acute blood loss anemia,  iron deficiency,  Elevated D dimer, CTA showing 3 pulmonary nodules measuring up to 3 mm in the left upper lobe. Recommendations below.       Physiatry was consulted to assess the patient's rehabilitation needs.    Caregivers report patient eating well. At baseline, primarily continent in bowel and bladder and does not use a suppository on a regular basis. Occasionally has some sundowning but redirectable. Difficult to assess pain as patient only reports pain as it is happening. Pain worse with activity.    Goals for rehabilitation include: improving  independence and pain and returning to group home safely    Social and functional history:  Prior to this hospitalization, patient was independent with ADLS and mobility without an assistive device. Patient lives in a group home She has 24 hour supervision    Current function during therapy include:  Restrictions: weight bearing as tolerated to left lower limb  PT: Functional mobility   Cognition    Cognition / Consciousness X   Level of Consciousness Alert   Safety Awareness Impaired   New Learning Impaired   Attention Impaired   Comments delayed at baseline, swearing throughout and needing assistance and reassurance from caregiver   Passive ROM Lower Body   Comments B hip and knee flexion limitations at baseline   Active ROM Lower Body    Active ROM Lower Body  X   Comments see above   Strength Lower Body   Lower Body Strength  X   Gross Strength Generalized Weakness, Equal Bilaterally   Comments limited by pain and volition   Strength Upper Body   Upper Body Strength  WDL   Balance Assessment   Sitting Balance (Static) Fair -   Sitting Balance (Dynamic) Poor +   Standing Balance (Static) Poor -   Standing Balance (Dynamic) Trace +   Weight Shift Sitting Poor   Weight Shift Standing Poor   Comments refused FWW   Gait Analysis   Gait Level Of Assist Unable to Participate   Bed Mobility    Sit to Supine Maximal Assist   Scooting Maximal Assist   Functional Mobility   Sit to Stand Maximal Assist   Bed, Chair, Wheelchair Transfer Maximal Assist   Transfer Method Squat Pivot         OT: Activities of daily living  Cognition    Comments delayed at baseline. agreeable however reports pain and verbalizes expletives t/o session while participating. caregiver assists   Balance Assessment   Comments therapist assist from front. poor FWW management   Bed Mobility    Supine to Sit Maximal Assist   Comments bed bar, elevated HOB, cues and encouragement. caregiver assists participation as patient most agreeable with familiar  face   ADL Assessment   Eating Supervision   Grooming Maximal Assist;Seated   Bathing Maximal Assist   Upper Body Dressing Maximal Assist   Lower Body Dressing Total Assist   Toileting Total Assist  (standing payam care, two person assist, salvador)   Functional Mobility   Bed, Chair, Wheelchair Transfer Maximal Assist  (SPT, caregiver report baseline is S/Min A)       PMH:  Past Medical History:   Diagnosis Date   • Dysthymic disorder    • HTN (hypertension)    • Intellectual disability     moderate   • Parkinsonism (HCC)    • Schizoaffective disorder (HCC)        PSH:  Past Surgical History:   Procedure Laterality Date   • PB PARTIAL HIP REPLACEMENT Left 2/26/2022    Procedure: HEMIARTHROPLASTY, HIP;  Surgeon: Ubaldo Marquis M.D.;  Location: SURGERY [facility];  Service: Orthopedics       FHX:  History reviewed. No pertinent family history.    Medications:  Current Facility-Administered Medications   Medication Dose   • metoprolol tartrate (LOPRESSOR) tablet 25 mg  25 mg   • lisinopril (PRINIVIL) tablet 20 mg  20 mg   • omeprazole (PRILOSEC) capsule 20 mg  20 mg   • polyethylene glycol/lytes (MIRALAX) PACKET 1 Packet  1 Packet   • levothyroxine (SYNTHROID) tablet 25 mcg  25 mcg   • bisacodyl (DULCOLAX) suppository 10 mg  10 mg   • senna-docusate (PERICOLACE or SENOKOT S) 8.6-50 MG per tablet 2 Tablet  2 Tablet    And   • polyethylene glycol/lytes (MIRALAX) PACKET 1 Packet  1 Packet    And   • magnesium hydroxide (MILK OF MAGNESIA) suspension 30 mL  30 mL    And   • bisacodyl (DULCOLAX) suppository 10 mg  10 mg   • enoxaparin (LOVENOX) inj 40 mg  40 mg   • acetaminophen (Tylenol) tablet 650 mg  650 mg   • Pharmacy Consult Request ...Pain Management Review 1 Each  1 Each   • oxyCODONE immediate-release (ROXICODONE) tablet 2.5 mg  2.5 mg    Or   • oxyCODONE immediate-release (ROXICODONE) tablet 5 mg  5 mg    Or   • morphine 4 MG/ML injection 2 mg  2 mg   • labetalol (NORMODYNE/TRANDATE) injection 10 mg  10 mg   •  "ondansetron (ZOFRAN) syringe/vial injection 4 mg  4 mg   • ondansetron (ZOFRAN ODT) dispertab 4 mg  4 mg   • promethazine (PHENERGAN) tablet 12.5-25 mg  12.5-25 mg   • promethazine (PHENERGAN) suppository 12.5-25 mg  12.5-25 mg   • prochlorperazine (COMPAZINE) injection 5-10 mg  5-10 mg   • ARIPiprazole (Abilify) tablet 30 mg  30 mg   • atorvastatin (LIPITOR) tablet 20 mg  20 mg   • divalproex ER (DEPAKOTE ER) tablet 1,000 mg  1,000 mg   • oxybutynin SR (DITROPAN-XL) tablet 5 mg  5 mg   • sertraline (Zoloft) tablet 100 mg  100 mg   • QUEtiapine (Seroquel) tablet 200 mg  200 mg    And   • QUEtiapine (Seroquel) tablet 100 mg  100 mg       Review of systems:  Constitutional: denies fevers and chills  Eyes: denies change in vision  Ears, nose, mouth, throat: denies sore throat  Cardiovascular: denies palpitations  Respiratory: denies respiratory discomfort, does not use oxygen at home  Gastrointestinal: stooling with suppository  Genitourinary: using purewick  Musculoskeletal: admits to pain at surgical site when moved  Integumentary: denies pressure ulcer, has incision site from surgery  Neurological: unsteady gait at baseline so primarily uses wheelchair for mobility - with some foot scooting  Endocrine: has diabetes mellitus  Hematologic/lymphatic: denies history of blood disorders  Allergic/immunologic: has allergies to medications as listed below    Allergies:  Allergies   Allergen Reactions   • Risperdal        Physical Exam:  Vitals: /90   Pulse (!) 122   Temp 36.4 °C (97.6 °F) (Temporal)   Resp 15   Ht 1.651 m (5' 5\")   Wt 67.1 kg (148 lb)   SpO2 92%   General: well-groomed in no acute distress, caregivers present  Eyes: no scleral icterus or conjunctival injection  Ears, nose, mouth and throat: moist oral mucosa  Cardiovascular: regular rate, good peripheral perfusion, +swelling in bilateral feet  Respiratory: breathing comfortably without use of accessory muscles, +NC at 92% " saturation  Gastrointestinal: soft, nontender, nondistended  Genitourinary: no indwelling salvador - purewick in place  Musculoskeletal: good symmetry in bilateral shoulders, tender to palpation in left leg  Skin: no wounds seen on exposed skin    Neurologic:  Mental status: alert, unreliable historian,   Speech: no aphasia or dysarthria  Motor - good motor strength throughout bilateral upper and lower limbs as she is moving them spontaneously against gravity  Sensory:   intact to light touch through out  Tone: no spasticity noted  Psychiatric: flat affect  Hematologic/lymphatic/immunologic: ++IV access, no bruises seen on exposed skin    Labs: Reviewed and significant for   Recent Labs     02/27/22  0803 02/28/22  0532 03/01/22  0827   RBC 2.99* 2.64* 2.86*   HEMOGLOBIN 8.7* 7.6* 8.4*   HEMATOCRIT 27.6* 24.8* 26.3*   PLATELETCT 103* 114* 157*   IRON  --  20*  --    FERRITIN  --  117.0  --    TOTIRONBC  --  190*  --      Recent Labs     02/27/22  0803   SODIUM 135   POTASSIUM 4.6   CHLORIDE 104   CO2 21   GLUCOSE 189*   BUN 15   CREATININE 0.73   CALCIUM 7.6*     Recent Results (from the past 24 hour(s))   CBC WITHOUT DIFFERENTIAL    Collection Time: 03/01/22  8:27 AM   Result Value Ref Range    WBC 6.3 4.8 - 10.8 K/uL    RBC 2.86 (L) 4.20 - 5.40 M/uL    Hemoglobin 8.4 (L) 12.0 - 16.0 g/dL    Hematocrit 26.3 (L) 37.0 - 47.0 %    MCV 92.0 81.4 - 97.8 fL    MCH 29.4 27.0 - 33.0 pg    MCHC 31.9 (L) 33.6 - 35.0 g/dL    RDW 48.7 35.9 - 50.0 fL    Platelet Count 157 (L) 164 - 446 K/uL    MPV 8.4 (L) 9.0 - 12.9 fL         ASSESSMENT:  IMPRESSION: The patient is a 56 y.o. female with a past medical history of hypertension, diabetes mellitus type II, Schizoaffective disorder, Parkinsonism, developmental disability, left femoral head fracture diagnosed 2/24/2022 at urgent care;  who presented on 2/25/2022  7:22 PM with worsening left leg pain and found to have Mildly displaced left femoral neck fracture s/p Open treatment of  left femoral neck fracture with hemiarthroplasty on 2/26/2022 by Dr. Ubaldo Marquis MD.    [facility] Code: 0008.11 - Orthopaedic Disorders: Status Post Unilateral Hip Fracture  -With acute secondary complications of: impaired ADLs and mobility, posttraumatic and postsurgical pain  -with chronic conditions of: hypertension, diabetes mellitus type II, Schizoaffective disorder, Parkinsonism, developmental disability,  -and:     Medical Complexity:  Anemia  Hyperglycemia  Thrombocytopenia, mild    Data points:  Reviewed results of radiology tests  Reviewed clinical lab tests  Reviewed old records    RECOMMENDATIONS:  ##MSK  #Impaired ADLs and mobility: Agree with continuing OT/PT while admitted here.    Patient is currently not functioning at baseline as detailed on PT and OT evaluations. Patient has good home support with 24 hour assistance. Therefore, it is my medical opinion that she would benefit from aggressive therapies in OT and PT  in acute inpatient rehabilitation with goal of returning group home with caregivers.    Current barrier to rehabilitation include:   -Confirmation of availability of caregivers in acute inpatient rehabilitation - if available, would assist in understanding patient's wants and needs    Estimated length of stay: 14-16 days  Anticipated discharge destination: group home with caregivers  Prognosis: good  Code: full resuscitation    #Posttraumatic pain, acute, uncontrolled  #Postsurgical pain, acute, uncontrolled  #Neuropathic pain, acute, uncontrolled  Patient has difficulty conveying pain - recommend scheduled pain medications to improve therapy tolerance  Ordered tylenol 1000mg TID for basal pain management  Ordered lidocaine 5% 1 patch daily to left hip - avoid incision area  Recommend premedicating patient with oxycodone prior to therapies  Recommend ice modality to left hip for better pain management and swelling management    #Mildly displaced left femoral neck fracture s/p Open treatment  of left femoral neck fracture with hemiarthroplasty on 2/26/2022 by Dr. Ubaldo Marquis MD  Weight bearing as tolerated to left lower limb    Recommend out of bed and in chair for meals    ##NEURO  #History of developmental disability  Caregiver support for assistance with understanding patient's wants and needs    ##CARDIAC  #Foot swelling, acute  Ordered KATIE hose for swelling management  Agree with SCDs     ##GI  Last BM: daily   GI prophylaxis: omeprazole 20mg daily  #Neurogenic bowel, controlled  Agree with daily bisacodyl suppository for social continence, pericolace    ##  #Neurogenic bladder, controlled  Agree with oxybutynin SR 5mg QHS  Recommend starting timed voids Q4hr while awake, purewick at night    ##SKIN  #Incision site  Routine wound care    DVT chemoprophlaxis: lovenox 40mg daily  Code: full resuscitation    Thank you for allowing us to participate in the care of this patient. Physiatry will continue to follow and provide recommendations, as needed.    Patient was seen for 56 minutes on unit/floor of which > 50% of time was spent on counseling and coordination of care regarding the above, including prognosis, risk reduction, benefits of treatment, and options for next stage of care.    Rio Morales D.O.   Physical Medicine and Rehabilitation     I have performed a physical exam and reviewed and updated history and plan today (3/1/2022).     Please note that this dictation was created using voice recognition software. I have made every reasonable attempt to correct obvious errors, but there may be errors of grammar and possibly content that I did not discover before finalizing the note.

## 2022-03-01 NOTE — DISCHARGE PLANNING
"KAITLIN in manual review, requesting more information due to intellectual disability and MH diagnosis.     Patient is a 56-year old women who has a [facility] public guardian and is connected to [facility], with qualifying diagnosis of intellectual disability. Patient has lived in [facility] since 2020.     Patient is a part of the Supported Living Arrangement Services (SLA): The Supported Living Arrangement Program offers residential supports to individuals who require assistance to live in the least restrictive community setting possible.  SLA offers habilitative, and skill building supports to individuals designed to maximize independence in the community.  Possible living arrangement settings include an individual’s family home, apartments, host homes, foster homes and shared residences.  This service is provided through agencies that are contracted with the [facility].    [facility] head pain due to ground fall. Patient is not being treated for any developmental related diagnosis or mental health diagnosis per this admission. Patient is cooperative, stable, and guardian is available to make medical decisions. Therefore, there is no reason for any psych consults or evals.     Attached H&P, last 2 MD notes, and SW note explaining living situation.     Pending LOC/PASRR    Called Ann and asked for [facility] contact for records: She was able to provide number to psychiatrists.   COLLEEN called main number at [facility]: [phone] they connected me to her , \"Josie Boateng\": Left message: for IQ and psychosocial assessment.       12:00pm: Josie FRY Called back will email me IQ and psychosocial information on patient.     2:00pm: COLLEEN called back Josie FRY Leaving message informing her of timeless of needed documents. Still have not received IQ and psychosocial assessment.        " Bilateral Helical Rim Advancement Flap Text: The defect edges were debeveled with a #15 blade scalpel.  Given the location of the defect and the proximity to free margins (helical rim) a bilateral helical rim advancement flap was deemed most appropriate.  Using a sterile surgical marker, the appropriate advancement flaps were drawn incorporating the defect and placing the expected incisions between the helical rim and antihelix where possible.  The area thus outlined was incised through and through with a #15 scalpel blade.  With a skin hook and iris scissors, the flaps were gently and sharply undermined and freed up.

## 2022-03-02 PROCEDURE — 700101 HCHG RX REV CODE 250: Performed by: PHYSICAL MEDICINE & REHABILITATION

## 2022-03-02 PROCEDURE — A9270 NON-COVERED ITEM OR SERVICE: HCPCS | Performed by: INTERNAL MEDICINE

## 2022-03-02 PROCEDURE — 700111 HCHG RX REV CODE 636 W/ 250 OVERRIDE (IP): Performed by: INTERNAL MEDICINE

## 2022-03-02 PROCEDURE — 700102 HCHG RX REV CODE 250 W/ 637 OVERRIDE(OP): Performed by: INTERNAL MEDICINE

## 2022-03-02 PROCEDURE — 700102 HCHG RX REV CODE 250 W/ 637 OVERRIDE(OP): Performed by: HOSPITALIST

## 2022-03-02 PROCEDURE — A9270 NON-COVERED ITEM OR SERVICE: HCPCS | Performed by: PHYSICAL MEDICINE & REHABILITATION

## 2022-03-02 PROCEDURE — 99231 SBSQ HOSP IP/OBS SF/LOW 25: CPT | Performed by: INTERNAL MEDICINE

## 2022-03-02 PROCEDURE — 770001 HCHG ROOM/CARE - MED/SURG/GYN PRIV*

## 2022-03-02 PROCEDURE — 700102 HCHG RX REV CODE 250 W/ 637 OVERRIDE(OP): Performed by: PHYSICAL MEDICINE & REHABILITATION

## 2022-03-02 PROCEDURE — A9270 NON-COVERED ITEM OR SERVICE: HCPCS | Performed by: HOSPITALIST

## 2022-03-02 PROCEDURE — 99232 SBSQ HOSP IP/OBS MODERATE 35: CPT | Performed by: PHYSICAL MEDICINE & REHABILITATION

## 2022-03-02 RX ORDER — METFORMIN HYDROCHLORIDE 500 MG/1
500 TABLET, EXTENDED RELEASE ORAL EVERY MORNING
Status: DISCONTINUED | OUTPATIENT
Start: 2022-03-02 | End: 2022-03-16 | Stop reason: HOSPADM

## 2022-03-02 RX ORDER — POLYETHYLENE GLYCOL 3350 17 G/17G
1 POWDER, FOR SOLUTION ORAL 2 TIMES DAILY
Status: DISCONTINUED | OUTPATIENT
Start: 2022-03-02 | End: 2022-03-16 | Stop reason: HOSPADM

## 2022-03-02 RX ORDER — FUROSEMIDE 10 MG/ML
40 INJECTION INTRAMUSCULAR; INTRAVENOUS ONCE
Status: COMPLETED | OUTPATIENT
Start: 2022-03-02 | End: 2022-03-02

## 2022-03-02 RX ORDER — DONEPEZIL HYDROCHLORIDE 5 MG/1
10 TABLET, FILM COATED ORAL NIGHTLY
Status: DISCONTINUED | OUTPATIENT
Start: 2022-03-02 | End: 2022-03-16 | Stop reason: HOSPADM

## 2022-03-02 RX ORDER — M-VIT,TX,IRON,MINS/CALC/FOLIC 27MG-0.4MG
1 TABLET ORAL DAILY
Status: DISCONTINUED | OUTPATIENT
Start: 2022-03-02 | End: 2022-03-16 | Stop reason: HOSPADM

## 2022-03-02 RX ADMIN — METFORMIN HYDROCHLORIDE 500 MG: 500 TABLET, EXTENDED RELEASE ORAL at 13:15

## 2022-03-02 RX ADMIN — LEVOTHYROXINE SODIUM 25 MCG: 25 TABLET ORAL at 06:07

## 2022-03-02 RX ADMIN — OXYBUTYNIN CHLORIDE 5 MG: 5 TABLET, EXTENDED RELEASE ORAL at 18:12

## 2022-03-02 RX ADMIN — QUETIAPINE FUMARATE 200 MG: 100 TABLET ORAL at 20:28

## 2022-03-02 RX ADMIN — Medication 1 TABLET: at 13:15

## 2022-03-02 RX ADMIN — ATORVASTATIN CALCIUM 20 MG: 20 TABLET, FILM COATED ORAL at 20:28

## 2022-03-02 RX ADMIN — ACETAMINOPHEN 1000 MG: 500 TABLET ORAL at 20:29

## 2022-03-02 RX ADMIN — FUROSEMIDE 40 MG: 10 INJECTION, SOLUTION INTRAMUSCULAR; INTRAVENOUS at 08:09

## 2022-03-02 RX ADMIN — QUETIAPINE FUMARATE 100 MG: 100 TABLET ORAL at 06:07

## 2022-03-02 RX ADMIN — METOPROLOL TARTRATE 25 MG: 25 TABLET, FILM COATED ORAL at 18:08

## 2022-03-02 RX ADMIN — ENOXAPARIN SODIUM 40 MG: 40 INJECTION SUBCUTANEOUS at 06:08

## 2022-03-02 RX ADMIN — LISINOPRIL 20 MG: 20 TABLET ORAL at 06:07

## 2022-03-02 RX ADMIN — SENNOSIDES AND DOCUSATE SODIUM 2 TABLET: 50; 8.6 TABLET ORAL at 18:08

## 2022-03-02 RX ADMIN — DONEPEZIL HYDROCHLORIDE 10 MG: 5 TABLET, FILM COATED ORAL at 20:29

## 2022-03-02 RX ADMIN — ARIPIPRAZOLE 30 MG: 10 TABLET ORAL at 18:08

## 2022-03-02 RX ADMIN — SENNOSIDES AND DOCUSATE SODIUM 2 TABLET: 50; 8.6 TABLET ORAL at 06:06

## 2022-03-02 RX ADMIN — LIDOCAINE 1 PATCH: 50 PATCH TOPICAL at 20:29

## 2022-03-02 RX ADMIN — SERTRALINE 100 MG: 100 TABLET, FILM COATED ORAL at 06:07

## 2022-03-02 RX ADMIN — DIVALPROEX SODIUM 1000 MG: 500 TABLET, EXTENDED RELEASE ORAL at 20:28

## 2022-03-02 RX ADMIN — ACETAMINOPHEN 1000 MG: 500 TABLET ORAL at 16:17

## 2022-03-02 RX ADMIN — OXYCODONE 2.5 MG: 5 TABLET ORAL at 06:08

## 2022-03-02 RX ADMIN — ACETAMINOPHEN 1000 MG: 500 TABLET ORAL at 08:09

## 2022-03-02 RX ADMIN — METOPROLOL TARTRATE 25 MG: 25 TABLET, FILM COATED ORAL at 06:08

## 2022-03-02 RX ADMIN — POLYETHYLENE GLYCOL 3350 1 PACKET: 17 POWDER, FOR SOLUTION ORAL at 13:15

## 2022-03-02 RX ADMIN — OMEPRAZOLE 20 MG: 20 CAPSULE, DELAYED RELEASE ORAL at 06:06

## 2022-03-02 RX ADMIN — Medication 10 MG: at 06:06

## 2022-03-02 ASSESSMENT — PAIN SCALES - PAIN ASSESSMENT IN ADVANCED DEMENTIA (PAINAD)
TOTALSCORE: 2
CONSOLABILITY: NO NEED TO CONSOLE
TOTALSCORE: 1
CONSOLABILITY: NO NEED TO CONSOLE
NEGVOCALIZATION: OCCASIONAL MOAN/GROAN, LOW SPEECH, NEGATIVE/DISAPPROVING QUALITY
BODYLANGUAGE: RELAXED
BREATHING: NORMAL
FACIALEXPRESSION: SMILING OR INEXPRESSIVE
BODYLANGUAGE: TENSE, DISTRESSED PACING, FIDGETING
BREATHING: NORMAL
NEGVOCALIZATION: OCCASIONAL MOAN/GROAN, LOW SPEECH, NEGATIVE/DISAPPROVING QUALITY
FACIALEXPRESSION: SMILING OR INEXPRESSIVE

## 2022-03-02 ASSESSMENT — PAIN DESCRIPTION - PAIN TYPE: TYPE: ACUTE PAIN;SURGICAL PAIN

## 2022-03-02 NOTE — CARE PLAN
Problem: Pain - Standard  Goal: Alleviation of pain or a reduction in pain to the patient’s comfort goal  Outcome: Progressing  Note:  Patient educated on 0-10 pain scale, and non-pharmacological pain control. Encouraged to verbalize and contact staff when in pain.  Administered PRN medication as needed.       Problem: Skin Integrity  Goal: Skin integrity is maintained or improved  Outcome: Progressing  Note:  Turned & repositioned every 2 hours,kept dry and clean, mepilex, waffle mattress overlay, barrier paste and pillows on bony prominences to prevent skin breakdown       Problem: Fall Risk  Goal: Patient will remain free from falls  Outcome: Progressing  Note:  Verified bed is locked and in lowest position, hourly rounding in place, bed alarm on, call light with in reach, slip socks on, educated patient on use of call light for assistance.     The patient is Stable - Low risk of patient condition declining or worsening    Shift Goals  Clinical Goals: Safety, pain control  Patient Goals: Comfort, rest, pain control  Family Goals: ARLETTE    Progress made toward(s) clinical / shift goals:  Pain continues to be difficult to assess due to disability.  Using nonverbal signs of communication for pain assessment.  Patient continues to stay clean and dry with dry linens throughout the day.  [id number] in place.  Patient unable to use call light appropriately however care aide from group home is at bedside though out the day.

## 2022-03-02 NOTE — PROGRESS NOTES
Physical Medicine and Rehabilitation Consultation              Date of initial consultation: 3/1/2022  Requested by: Blessing Mota MD  Consulting physician: Rio Morales D.O.  Reason for consultation: assessment of rehabilitation needs  LOS: 5 Day(s)    SUBJECTIVE  Patient seen in room with caregiver  GI: last 2/28  Psych: slept well last night per patient but Patient is unreliable historian  MSK: does not have ice on her left leg  Please use ice to improve pain control  Please use KATIE hose to improve leg swelling    Medications:  Current Facility-Administered Medications   Medication Dose   • donepezil (ARICEPT) tablet 10 mg  10 mg   • metFORMIN ER (GLUCOPHAGE XR) tablet 500 mg  500 mg   • therapeutic multivitamin-minerals (THERAGRAN-M) tablet 1 Tablet  1 Tablet   • metoprolol tartrate (LOPRESSOR) tablet 25 mg  25 mg   • lisinopril (PRINIVIL) tablet 20 mg  20 mg   • acetaminophen (TYLENOL) tablet 1,000 mg  1,000 mg   • lidocaine (LIDODERM) 5 % 1 Patch  1 Patch   • omeprazole (PRILOSEC) capsule 20 mg  20 mg   • polyethylene glycol/lytes (MIRALAX) PACKET 1 Packet  1 Packet   • levothyroxine (SYNTHROID) tablet 25 mcg  25 mcg   • bisacodyl (DULCOLAX) suppository 10 mg  10 mg   • senna-docusate (PERICOLACE or SENOKOT S) 8.6-50 MG per tablet 2 Tablet  2 Tablet    And   • polyethylene glycol/lytes (MIRALAX) PACKET 1 Packet  1 Packet    And   • magnesium hydroxide (MILK OF MAGNESIA) suspension 30 mL  30 mL    And   • bisacodyl (DULCOLAX) suppository 10 mg  10 mg   • enoxaparin (LOVENOX) inj 40 mg  40 mg   • acetaminophen (Tylenol) tablet 650 mg  650 mg   • Pharmacy Consult Request ...Pain Management Review 1 Each  1 Each   • oxyCODONE immediate-release (ROXICODONE) tablet 2.5 mg  2.5 mg    Or   • oxyCODONE immediate-release (ROXICODONE) tablet 5 mg  5 mg    Or   • morphine 4 MG/ML injection 2 mg  2 mg   • labetalol (NORMODYNE/TRANDATE) injection 10 mg  10 mg   • ondansetron (ZOFRAN) syringe/vial injection 4 mg  " 4 mg   • ondansetron (ZOFRAN ODT) dispertab 4 mg  4 mg   • promethazine (PHENERGAN) tablet 12.5-25 mg  12.5-25 mg   • promethazine (PHENERGAN) suppository 12.5-25 mg  12.5-25 mg   • prochlorperazine (COMPAZINE) injection 5-10 mg  5-10 mg   • ARIPiprazole (Abilify) tablet 30 mg  30 mg   • atorvastatin (LIPITOR) tablet 20 mg  20 mg   • divalproex ER (DEPAKOTE ER) tablet 1,000 mg  1,000 mg   • oxybutynin SR (DITROPAN-XL) tablet 5 mg  5 mg   • sertraline (Zoloft) tablet 100 mg  100 mg   • QUEtiapine (Seroquel) tablet 200 mg  200 mg    And   • QUEtiapine (Seroquel) tablet 100 mg  100 mg     Allergies:  Allergies   Allergen Reactions   • Risperdal        Physical Exam:  Vitals: /73   Pulse 80   Temp 36.6 °C (97.8 °F) (Temporal)   Resp 16   Ht 1.651 m (5' 5\")   Wt 69.6 kg (153 lb 7 oz)   SpO2 96%   General: well-groomed in no acute distress, caregiver present  Eyes: no scleral icterus or conjunctival injection  Ears, nose, mouth and throat: moist oral mucosa  Cardiovascular: regular rate, good peripheral perfusion, +swelling in bilateral feet (unchanged)  Respiratory: breathing comfortably without use of accessory muscles, +NC  Gastrointestinal: soft, nontender, nondistended  Genitourinary: no indwelling salvador  Musculoskeletal: good symmetry in bilateral shoulders, tender to palpation in left leg  Skin: no wounds seen on exposed skin    Neurologic:  Mental status: alert, unreliable historian,   Speech: no aphasia or dysarthria  Motor - good motor strength throughout bilateral upper and lower limbs as she is moving them spontaneously against gravity  Sensory:   intact to light touch through out  Tone: no spasticity noted  Psychiatric: flat affect  Hematologic/lymphatic/immunologic: ++IV access, no bruises seen on exposed skin    Labs: Reviewed and significant for   Recent Labs     02/28/22  0532 03/01/22  0827   RBC 2.64* 2.86*   HEMOGLOBIN 7.6* 8.4*   HEMATOCRIT 24.8* 26.3*   PLATELETCT 114* 157*   IRON 20*  " --    FERRITIN 117.0  --    [facility] 190*  --          No results found for this or any previous visit (from the past 24 hour(s)).      ASSESSMENT:  IMPRESSION: The patient is a 56 y.o. female with a past medical history of hypertension, diabetes mellitus type II, Schizoaffective disorder, Parkinsonism, developmental disability, left femoral head fracture diagnosed 2/24/2022 at urgent care;  who presented on 2/25/2022  7:22 PM with worsening left leg pain and found to have Mildly displaced left femoral neck fracture s/p Open treatment of left femoral neck fracture with hemiarthroplasty on 2/26/2022 by Dr. Ubaldo Marquis MD.    [facility] Code: 0008.11 - Orthopaedic Disorders: Status Post Unilateral Hip Fracture  -With acute secondary complications of: impaired ADLs and mobility, posttraumatic and postsurgical pain  -with chronic conditions of: hypertension, diabetes mellitus type II, Schizoaffective disorder, Parkinsonism, developmental disability,  -and:     Medical Complexity:  Anemia  Hyperglycemia  Thrombocytopenia, mild    Data points:  Reviewed results of radiology tests  Reviewed clinical lab tests  Reviewed old records    RECOMMENDATIONS:  ##MSK  #Impaired ADLs and mobility: Agree with continuing OT/PT while admitted here.    Patient is currently not functioning at baseline as detailed on PT and OT evaluations. Patient has good home support with 24 hour assistance. Therefore, it is my medical opinion that she would benefit from aggressive therapies in OT and PT  in acute inpatient rehabilitation with goal of returning group home with caregivers.    Current barrier to rehabilitation include:   -Administrative denial    Estimated length of stay: 14-16 days  Anticipated discharge destination: group home with caregivers  Prognosis: good  Code: full resuscitation    #Posttraumatic pain, acute, uncontrolled  #Postsurgical pain, acute, uncontrolled  #Neuropathic pain, acute, uncontrolled  Patient has difficulty conveying  pain - recommend scheduled pain medications to improve therapy tolerance  Ordered tylenol 1000mg TID for basal pain management  Ordered lidocaine 5% 1 patch daily to left hip - avoid incision area  Recommend premedicating patient with oxycodone prior to therapies  Recommend ice modality to left hip for better pain management and swelling management    #Mildly displaced left femoral neck fracture s/p Open treatment of left femoral neck fracture with hemiarthroplasty on 2/26/2022 by Dr. Ubaldo Marquis MD  Weight bearing as tolerated to left lower limb    Recommend out of bed and in chair for meals    ##NEURO  #History of developmental disability  Caregiver support for assistance with understanding patient's wants and needs    ##CARDIAC  #Foot swelling, acute  Ordered KATIE hose for swelling management - put on in AM, remove before sleeping  Agree with SCDs     ##GI  Last BM: daily   GI prophylaxis: omeprazole 20mg daily  #Neurogenic bowel, controlled  Continue daily bisacodyl suppository for social continence, pericolace  Ordered increase to miralax 17g BID    ##  #Neurogenic bladder, controlled  Agree with oxybutynin SR 5mg QHS  Recommend starting timed voids Q4hr while awake, purewick at night    ##SKIN  #Incision site  Routine wound care    DVT chemoprophlaxis: lovenox 40mg daily  Code: full resuscitation    Thank you for allowing us to participate in the care of this patient. Physiatry will continue to follow and provide recommendations, as needed.    Patient was seen for 27 minutes on unit/floor of which > 50% of time was spent on counseling and coordination of care regarding the above, including prognosis, risk reduction, benefits of treatment, and options for next stage of care.    Rio Morales D.O.   Physical Medicine and Rehabilitation     I have performed a physical exam and reviewed and updated history and plan today (3/2/2022).     Please note that this dictation was created using voice recognition  software. I have made every reasonable attempt to correct obvious errors, but there may be errors of grammar and possibly content that I did not discover before finalizing the note.

## 2022-03-02 NOTE — THERAPY
"Physical Therapy   Daily Treatment     Patient Name: Erlinda Verde  Age:  56 y.o., Sex:  female  Medical Record #: [id number]  Today's Date: 3/1/2022          Assessment    Pt smiling, \"what's your name\", w/CG BS. Pt developmentally delayed @ baseline @  setting. Pt did take shuffle steps for her transfers, w/c level @ baseline. Pt difficult @ times to keep on task but cooperative. Needing re-directing throughout her tx session. Pt does curse spontaneously during her tx efforts. Mod assist to get seated EOB and for her transfers. Pt attempting to come to stand once seated EOB, needing cueing to wait for assist.     Plan    Continue current treatment plan.    DC Equipment Recommendations: Unable to determine at this time  Discharge Recommendations: Recommend post-acute placement for additional physical therapy services prior to discharge home     Objective     03/01/22 1556   Balance   Sitting Balance (Static) Fair -   Sitting Balance (Dynamic) Poor +   Standing Balance (Static) Poor   Standing Balance (Dynamic) Poor   Weight Shift Sitting Fair   Weight Shift Standing Poor   Gait Analysis   Gait Level Of Assist Unable to Participate   Comments Pt was primarily non-amb PTA, did take shuffle steps for her transfers.   Bed Mobility    Supine to Sit Moderate Assist   Sit to Supine   (pt left seated in the chair)   Scooting Maximal Assist   Rolling Minimal Assist to Rt.;Minimum Assist to Lt.  (w/railing)   Skilled Intervention Verbal Cuing;Postural Facilitation;Compensatory Strategies   Comments rolling to constantine brief before OOB.   Functional Mobility   Sit to Stand Moderate Assist  (from EOB)   Bed, Chair, Wheelchair Transfer Moderate Assist  (bed->chair)   Transfer Method Stand Step   Skilled Intervention Verbal Cuing;Compensatory Strategies   Comments Pt coming to stand from EOB, impulsively w/no c/o pain. Per CG, pt did take shuffle steps for her transfers.   How much difficulty does the patient currently have... "   Turning over in bed (including adjusting bedclothes, sheets and blankets)? 2   Sitting down on and standing up from a chair with arms (e.g., wheelchair, bedside commode, etc.) 1   Moving from lying on back to sitting on the side of the bed? 1   How much help from another person does the patient currently need...   Moving to and from a bed to a chair (including a wheelchair)? 2   Need to walk in a hospital room? 1   Climbing 3-5 steps with a railing? 1   6 clicks Mobility Score 8   Short Term Goals    Short Term Goal # 1 in 6 visits patient will demo bed mobility with Jose Ramon for safe DC   Goal Outcome # 1 goal not met   Short Term Goal # 2 in 6 visits patient will complete all functional transfers with Jose Ramon and LRAD for safe DC   Goal Outcome # 2 Goal not met   Short Term Goal # 3 in 6 visits patient will demo WC mobility for 200' with supervision   Goal Outcome # 3 Goal not met

## 2022-03-02 NOTE — DISCHARGE PLANNING
Anticipated Discharge Disposition: SNF    Action: We cont to wait for PASRR to be approved. It was escalated to CM leadership yesterday and COLLEEN Tucker Supervisor has been working on it since yesterday (please, refer to her note from 3/1/2022 at 1105). Garrett is waiting for a form that needs to be filled out, but it has not been provided by [facility] coordinator as of yet.     Barriers to Discharge: pending PASRR review and approval    Plan: cont to f/u for DC needs.

## 2022-03-02 NOTE — CARE PLAN
The patient is Stable - Low risk of patient condition declining or worsening    Shift Goals  Clinical Goals: safety, pain management  Patient Goals: rest  Family Goals: ARLETTE    Progress made toward(s) clinical / shift goals:  Yes    Patient is not progressing towards the following goals:      Problem: Knowledge Deficit - Standard  Goal: Patient and family/care givers will demonstrate understanding of plan of care, disease process/condition, diagnostic tests and medications  Outcome: Progressing       Problem: Pain - Standard  Goal: Alleviation of pain or a reduction in pain to the patient’s comfort goal  Outcome: Progressing     Problem: Skin Integrity  Goal: Skin integrity is maintained or improved  Outcome: Progressing     Problem: Fall Risk  Goal: Patient will remain free from falls  Outcome: Progressing

## 2022-03-02 NOTE — CARE PLAN
Problem: Pain - Standard  Goal: Alleviation of pain or a reduction in pain to the patient’s comfort goal  Outcome: Progressing     Problem: Skin Integrity  Goal: Skin integrity is maintained or improved  Outcome: Progressing     Problem: Fall Risk  Goal: Patient will remain free from falls  Outcome: Progressing   The patient is Stable - Low risk of patient condition declining or worsening    Shift Goals  Clinical Goals: safety  Patient Goals: rest  Family Goals: billy    Progress made toward(s) clinical / shift goals:  progressing    Patient is not progressing towards the following goals:

## 2022-03-02 NOTE — THERAPY
Occupational Therapy  Daily Treatment     Patient Name: Erlinda Verde  Age:  56 y.o., Sex:  female  Medical Record #: [id number]  Today's Date: 3/1/2022     Precautions  Precautions: Fall Risk,Posterior Hip Precautions,Weight Bearing As Tolerated Left Lower Extremity,Other (See Comments) (Hip Abductor pillow while in bed per RN.  )  Comments: baseline developmentally delayed.    Assessment    Pt seen for OT treatment. Care giver present. Pt needed increased encouragement to participate.  Pt is developmentally delayed and responded at times in swearing. Pt demo Mod A for bed mobility. Max A for LB dressing and Min A for UB dressing seated EOB. Mod A for H/G. Pt c/o pain with moving LLE. Hip abductor pillow placed with rolling in bed for toilet hygiene.. Did not attempt ADL transfers this OT session. Will continue to follow. Pt will need 24/7 care at this time due to decreased functional cognition., ability to problem solve and  for safety awareness. RN updated on OT treatment findings and recommendations.      Plan    Continue current treatment plan.    DC Equipment Recommendations: (P) Unable to determine at this time  Discharge Recommendations: (P) Recommend post-acute placement for additional occupational therapy services prior to discharge home    Subjective    Pt agreeable to attempt simple self care tasks.      Objective       03/01/22 1558   Cognition    Cognition / Consciousness X   Level of Consciousness Alert   Safety Awareness Impaired   New Learning Impaired   Attention Impaired   Comments Pt delayed at baseline, some swearing during OT session needing encouragement, reassurance to use better words. Care giver present and assisted with encouragement.   Passive ROM Upper Body   Passive ROM Upper Body WDL   Comments WFL   Active ROM Upper Body   Dominant Hand Right   Comments WFL   Strength Upper Body   Comments WFL   Other Treatments   Other Treatments Provided Care giver training. Psychosocial  intervention addressed.   Balance   Sitting Balance (Static) Fair   Sitting Balance (Dynamic) Fair -   Weight Shift Sitting Fair   Skilled Intervention Verbal Cuing;Tactile Cuing;Sequencing;Compensatory Strategies   Comments EOB treatment   Bed Mobility    Supine to Sit Moderate Assist   Sit to Supine   (left seated EOB with Caregiver and PTA)   Scooting Moderate Assist   Rolling Moderate Assist to Rt.;Moderate Assist to Lt.   Comments due to decreased functional cognition.   Activities of Daily Living   Eating Supervision   Grooming Moderate Assist;Seated   Bathing   (declined)   Upper Body Dressing Minimal Assist   Lower Body Dressing Maximal Assist   Toileting Maximal Assist   Comments Pt will need 24/7 assist. Pt presently has full time Care givers.   Functional Mobility   Comments Pt seen for EOB treatment only today.   Activity Tolerance   Comments low activity tolerance.   Patient / Family Goals   Patient / Family Goal #1 none stated   Short Term Goals   Short Term Goal # 1 Min A toileting   Goal Outcome # 1 Progressing slower than expected   Short Term Goal # 2 Min A toilet transfer   Goal Outcome # 2 Progressing slower than expected   Short Term Goal # 3 Min A LB ADLs   Goal Outcome # 3 Progressing slower than expected   Short Term Goal # 4 5 minute stand for self care   Goal Outcome # 4 Goal not met   Anticipated Discharge Equipment and Recommendations   DC Equipment Recommendations Unable to determine at this time   Discharge Recommendations Recommend post-acute placement for additional occupational therapy services prior to discharge home   Interdisciplinary Plan of Care Collaboration   IDT Collaboration with  Nursing;Family / Caregiver   Collaboration Comments RN updated

## 2022-03-02 NOTE — PROGRESS NOTES
[facility] Medicine Daily Progress Note    Date of Service  3/2/2022    Chief Complaint  Erlinda Verde is a 56 y.o. female admitted 2/25/2022 with   Chief Complaint   Patient presents with   • Leg Pain     PT BIB EMS from home. PT was seen at urgent care yesterday and was diagnosed with a femoral head fracture.  Pt care giver called due to increased pain. PT was referred for follow up with ortho but has not had follow up.        Hospital Course  This 56-year-old female with a developmental disability, size affective disorder, parkinsonism, hypertension, type 2 diabetes mellitus presented to the ER on 2/25/2022 after she had a ground-level fall.     She is a resident of [facility].  She landed on the left side, imaging showed femoral head fracture; orthopedic surgery was consulted, patient underwent open treatment of left femoral neck fracture with hemiarthroplasty on 2/26/2022.  PT/OT has evaluate the patient, recommend postacute.  SNF/rehab referral in place     Of note, patient noted to be tachycardic, D-dimer is elevated, CTA of the chest ordered, pending.     Hospital course was complicated with acute blood loss anemia, today her hemoglobin noted to be 7.6.  Will transfuse if less than 7.  Iron study was obtained consistent with iron deficiency, will provide IV iron.  Will provide PPI.  Patient need to follow-up with primary care physician as an outpatient     Interval Problem Update    2/27:  --No acute events overnight, patient underwent surgery yesterday.  We will continue DVT prophylaxis.  PT/OT has evaluate the patient, pending recommendation.  --Of note, patient abdomen is distended , KUB showing constipation;  Will provide aggressive  Bowel regemin  --Patient continues remain persistent tachycardia along with elevated D-dimer, will obtain CT of the chest.  --Has been requiring 2.5 L of oxygen, will titrate oxygen as needed.  Provide incentive spirometry    Plan of care has been discussed with the  nursing staff , caretaker at bedside.    2/28:  --No acute events overnight, patient tachycardia continues to improve.  CT of the chest ordered, discussed with patient and guardian Ann.  --Patient did have a good bowel movement yesterday  --Her hemoglobin to be 7.6, iron studies consistent with iron deficiency.  Will provide IV iron.  Monitor hemoglobin medically, if less than 7, transfuse  --DVT prophylaxis with Lovenox  --PT/OT has evaluate the patient, recommend postacute, SNF/physiatry referral in place    3/1- no event overnight. She remain pleasantly confused. Sitter present. BP slight elevated. Sinus tachycardia. Started metoprolol 25 mg BID. Not symptomatic. EKG reviewed. CT/PE negative for PE  For hypertension, resume home meds, lisinopril 20 mg daily  hgb 8.4. improving. No need for transfusion   Medically cleared to transfer at SNF     3/2- no event overnight. She is at her baseline. Able to say her name. Complaining of pain, but difficult to rate. Tachycardia improving. BP stable. Last bowel movement three days ago. Bowel protocol in place Medically cleared for placement     Plan of care has been discussed with  Nursing staff, case management.    Consultants/Specialty  orthopedics    Code Status  Full Code    Disposition  Patient is medically cleared for discharge.   Anticipate discharge to to skilled nursing facility.  I have placed the appropriate orders for post-discharge needs.    Review of Systems  Review of Systems   Unable to perform ROS: Psychiatric disorder        Unable to obtain  2/2 patient mentation     Physical Exam  Temp:  [35.9 °C (96.7 °F)-36.6 °C (97.8 °F)] 36.6 °C (97.8 °F)  Pulse:  [] 80  Resp:  [16] 16  BP: (115-149)/(73-83) 115/73  SpO2:  [94 %-96 %] 96 %    Physical Exam  Vitals and nursing note reviewed.   Constitutional:       Comments: And awake   HENT:      Head: Normocephalic and atraumatic.   Eyes:      Pupils: Pupils are equal, round, and reactive to light.    Cardiovascular:      Rate and Rhythm: Normal rate.      Pulses: Normal pulses.   Pulmonary:      Effort: No respiratory distress.      Breath sounds: Normal breath sounds.   Abdominal:      General: Bowel sounds are normal. There is distension.      Comments: Slight hard   Musculoskeletal:      Cervical back: Neck supple.      Right lower leg: No edema.      Left lower leg: No edema.      Comments: Decreased ROM on the Left side   Skin:     General: Skin is warm.   Neurological:      Mental Status: She is alert.      Comments: Alert and awake   Does folow commands    Psychiatric:         Mood and Affect: Mood normal.         Fluids    Intake/Output Summary (Last 24 hours) at 3/2/2022 1143  Last data filed at 3/1/2022 1600  Gross per 24 hour   Intake 300 ml   Output 500 ml   Net -200 ml       Laboratory  Recent Labs     02/28/22  0532 03/01/22  0827   WBC 5.3 6.3   RBC 2.64* 2.86*   HEMOGLOBIN 7.6* 8.4*   HEMATOCRIT 24.8* 26.3*   MCV 93.9 92.0   MCH 28.8 29.4   MCHC 30.6* 31.9*   RDW 49.6 48.7   PLATELETCT 114* 157*   MPV 8.7* 8.4*                       Imaging  CT-CTA CHEST PULMONARY ARTERY W/ RECONS   Final Result      1.  No evidence of pulmonary embolism to the segmental branches. Subsegmental branches poorly evaluated due to motion artifact.   2.  Small bilateral pleural effusions.   3.  Atherosclerotic changes. Coronary artery atherosclerotic disease.   4.  3 pulmonary nodules measuring up to 3 mm in the left upper lobe. Recommendations below.      Low Risk: No routine follow-up      High Risk: Optional CT at 12 months      Comments: Use most suspicious nodule as guide to management. Follow-up intervals may vary according to size and risk.      Low Risk - Minimal or absent history of smoking and of other known risk factors.      High Risk - History of smoking or of other known risk factors.      Note: These recommendations do not apply to lung cancer screening, patients with immunosuppression, or patients  with known primary cancer.      [employer] Society 2017 Guidelines for Management of Incidentally Detected Pulmonary Nodules in Adults      QK-HCMJCVJ-0 VIEW   Final Result      1.  No evidence of bowel obstruction.      2.  Moderate constipation.      DX-PELVIS-1 OR 2 VIEWS   Final Result      Post left hip hemiarthroplasty.      DX-FEMUR-2+ LEFT   Final Result      1.  Mildly displaced left femoral neck fracture.   2.  No distal femur fracture.      DX-HIP-UNILATERAL-WITH PELVIS-1 VIEW LEFT   Final Result      Left femoral neck fracture again noted.      DX-CHEST-PORTABLE (1 VIEW)   Final Result      Hypoinflation with bibasilar atelectasis.           Assessment/Plan  * Hip fracture requiring operative repair, left, closed, initial encounter (HCC)- (present on admission)  Assessment & Plan  Patient will be consulted by orthopedic surgery    ---x-ray showing femoral head fracture. Ortho was consulted and patient underwent Open treatment of left femoral neck fracture with hemiarthroplasty on 2/26 by Dr Marquis.   --- PT and OT    -- DVT ppx    Tachycardia- (present on admission)  Assessment & Plan  Sinus tachycardia on EKG  Thought to be secondary to pain and hypovolemia  Plan: Opiates, IV fluids  2/26: Improving   Tachy with elevated D-dimer; obtain CTA of the chest negative   3/1- sinus tachy. Not symptomatic. No other explanation. Start metoprolol 25 mg BID . Monitor with PCP and cardiology as OP   3/2- resolving     Essential hypertension- (present on admission)  Assessment & Plan  Will hold lisinopril   IV labetalol as needed   --- BP well controlled  3/1- increase. Start lisinopril and metoprolol. Continue to monitor   3/2- better, well controled. Continue current treatment       Anemia- (present on admission)  Assessment & Plan  Her hemoglobin hematocrit, today at 7.8.  Will transfuse if less than 7  --Tender to acute blood loss anemia likely 2/2 acute blood loss anemia  3/1- hgb 8.4- improving. No need for  transfusion     Diabetes (HCC)- (present on admission)  Assessment & Plan  Glyco at 6.7   -- diabetic diet    -- on iss , hypoglycemia protocol and accu-checks ac and hs  Resume metformin on discharge     Thrombocytopenia (HCC)- (present on admission)  Assessment & Plan  Reactive, possible due to anemia   3/1- improving     Schizophrenia (HCC)- (present on admission)  Assessment & Plan  Resume outpatient antipsychotic medications  New every 530 mg p.o. daily, Depakote 1000 g at p.m.  Quetiapine 200 at p.m. and 100 at am    QTc 395    Parkinson's disease (tremor, stiffness, slow motion, unstable posture) ([facility])- (present on admission)  Assessment & Plan  Fall precautions       VTE prophylaxis: Lovenox  I have performed a physical exam and reviewed and updated ROS and Plan today (3/2/2022). In review of yesterday's note (3/1/2022), there are no changes except as documented above.

## 2022-03-03 PROCEDURE — A9270 NON-COVERED ITEM OR SERVICE: HCPCS | Performed by: INTERNAL MEDICINE

## 2022-03-03 PROCEDURE — 99231 SBSQ HOSP IP/OBS SF/LOW 25: CPT | Performed by: INTERNAL MEDICINE

## 2022-03-03 PROCEDURE — 700102 HCHG RX REV CODE 250 W/ 637 OVERRIDE(OP): Performed by: INTERNAL MEDICINE

## 2022-03-03 PROCEDURE — A9270 NON-COVERED ITEM OR SERVICE: HCPCS | Performed by: HOSPITALIST

## 2022-03-03 PROCEDURE — 770001 HCHG ROOM/CARE - MED/SURG/GYN PRIV*

## 2022-03-03 PROCEDURE — 700111 HCHG RX REV CODE 636 W/ 250 OVERRIDE (IP): Performed by: INTERNAL MEDICINE

## 2022-03-03 PROCEDURE — 700101 HCHG RX REV CODE 250: Performed by: PHYSICAL MEDICINE & REHABILITATION

## 2022-03-03 PROCEDURE — 700102 HCHG RX REV CODE 250 W/ 637 OVERRIDE(OP): Performed by: PHYSICAL MEDICINE & REHABILITATION

## 2022-03-03 PROCEDURE — 700102 HCHG RX REV CODE 250 W/ 637 OVERRIDE(OP): Performed by: HOSPITALIST

## 2022-03-03 PROCEDURE — A9270 NON-COVERED ITEM OR SERVICE: HCPCS | Performed by: PHYSICAL MEDICINE & REHABILITATION

## 2022-03-03 PROCEDURE — 97530 THERAPEUTIC ACTIVITIES: CPT | Mod: CQ

## 2022-03-03 RX ADMIN — ACETAMINOPHEN 1000 MG: 500 TABLET ORAL at 21:05

## 2022-03-03 RX ADMIN — Medication 10 MG: at 05:37

## 2022-03-03 RX ADMIN — LEVOTHYROXINE SODIUM 25 MCG: 25 TABLET ORAL at 05:37

## 2022-03-03 RX ADMIN — POLYETHYLENE GLYCOL 3350 1 PACKET: 17 POWDER, FOR SOLUTION ORAL at 05:35

## 2022-03-03 RX ADMIN — ATORVASTATIN CALCIUM 20 MG: 20 TABLET, FILM COATED ORAL at 21:06

## 2022-03-03 RX ADMIN — QUETIAPINE FUMARATE 200 MG: 100 TABLET ORAL at 21:06

## 2022-03-03 RX ADMIN — ACETAMINOPHEN 1000 MG: 500 TABLET ORAL at 16:57

## 2022-03-03 RX ADMIN — METOPROLOL TARTRATE 25 MG: 25 TABLET, FILM COATED ORAL at 05:37

## 2022-03-03 RX ADMIN — SERTRALINE 100 MG: 100 TABLET, FILM COATED ORAL at 05:36

## 2022-03-03 RX ADMIN — Medication 1 TABLET: at 05:36

## 2022-03-03 RX ADMIN — METFORMIN HYDROCHLORIDE 500 MG: 500 TABLET, EXTENDED RELEASE ORAL at 05:36

## 2022-03-03 RX ADMIN — SENNOSIDES AND DOCUSATE SODIUM 2 TABLET: 50; 8.6 TABLET ORAL at 16:58

## 2022-03-03 RX ADMIN — LISINOPRIL 20 MG: 20 TABLET ORAL at 05:36

## 2022-03-03 RX ADMIN — OMEPRAZOLE 20 MG: 20 CAPSULE, DELAYED RELEASE ORAL at 05:36

## 2022-03-03 RX ADMIN — SENNOSIDES AND DOCUSATE SODIUM 2 TABLET: 50; 8.6 TABLET ORAL at 05:36

## 2022-03-03 RX ADMIN — METOPROLOL TARTRATE 25 MG: 25 TABLET, FILM COATED ORAL at 16:58

## 2022-03-03 RX ADMIN — ARIPIPRAZOLE 30 MG: 10 TABLET ORAL at 16:58

## 2022-03-03 RX ADMIN — DIVALPROEX SODIUM 1000 MG: 500 TABLET, EXTENDED RELEASE ORAL at 21:06

## 2022-03-03 RX ADMIN — QUETIAPINE FUMARATE 100 MG: 100 TABLET ORAL at 05:36

## 2022-03-03 RX ADMIN — ACETAMINOPHEN 1000 MG: 500 TABLET ORAL at 09:12

## 2022-03-03 RX ADMIN — OXYBUTYNIN CHLORIDE 5 MG: 5 TABLET, EXTENDED RELEASE ORAL at 16:59

## 2022-03-03 RX ADMIN — ENOXAPARIN SODIUM 40 MG: 40 INJECTION SUBCUTANEOUS at 05:35

## 2022-03-03 RX ADMIN — LIDOCAINE 1 PATCH: 50 PATCH TOPICAL at 21:07

## 2022-03-03 RX ADMIN — DONEPEZIL HYDROCHLORIDE 10 MG: 5 TABLET, FILM COATED ORAL at 21:07

## 2022-03-03 ASSESSMENT — PAIN DESCRIPTION - PAIN TYPE: TYPE: ACUTE PAIN

## 2022-03-03 ASSESSMENT — COGNITIVE AND FUNCTIONAL STATUS - GENERAL
MOVING TO AND FROM BED TO CHAIR: UNABLE
SUGGESTED CMS G CODE MODIFIER MOBILITY: CM
STANDING UP FROM CHAIR USING ARMS: A LOT
MOBILITY SCORE: 9
CLIMB 3 TO 5 STEPS WITH RAILING: TOTAL
MOVING FROM LYING ON BACK TO SITTING ON SIDE OF FLAT BED: UNABLE
TURNING FROM BACK TO SIDE WHILE IN FLAT BAD: A LOT
WALKING IN HOSPITAL ROOM: A LOT

## 2022-03-03 ASSESSMENT — GAIT ASSESSMENTS: GAIT LEVEL OF ASSIST: UNABLE TO PARTICIPATE

## 2022-03-03 NOTE — PROGRESS NOTES
[facility] Medicine Daily Progress Note    Date of Service  3/3/2022    Chief Complaint  Erlinda Verde is a 56 y.o. female admitted 2/25/2022 with   Chief Complaint   Patient presents with   • Leg Pain     PT BIB EMS from home. PT was seen at urgent care yesterday and was diagnosed with a femoral head fracture.  Pt care giver called due to increased pain. PT was referred for follow up with ortho but has not had follow up.        Hospital Course  This 56-year-old female with a developmental disability, size affective disorder, parkinsonism, hypertension, type 2 diabetes mellitus presented to the ER on 2/25/2022 after she had a ground-level fall.     She is a resident of [facility].  She landed on the left side, imaging showed femoral head fracture; orthopedic surgery was consulted, patient underwent open treatment of left femoral neck fracture with hemiarthroplasty on 2/26/2022.  PT/OT has evaluate the patient, recommend postacute.  SNF/rehab referral in place     Of note, patient noted to be tachycardic, D-dimer is elevated, CTA of the chest ordered, pending.     Hospital course was complicated with acute blood loss anemia, today her hemoglobin noted to be 7.6.  Will transfuse if less than 7.  Iron study was obtained consistent with iron deficiency, will provide IV iron.  Will provide PPI.  Patient need to follow-up with primary care physician as an outpatient     Interval Problem Update    2/27:  --No acute events overnight, patient underwent surgery yesterday.  We will continue DVT prophylaxis.  PT/OT has evaluate the patient, pending recommendation.  --Of note, patient abdomen is distended , KUB showing constipation;  Will provide aggressive  Bowel regemin  --Patient continues remain persistent tachycardia along with elevated D-dimer, will obtain CT of the chest.  --Has been requiring 2.5 L of oxygen, will titrate oxygen as needed.  Provide incentive spirometry    Plan of care has been discussed with the  nursing staff , caretaker at bedside.    2/28:  --No acute events overnight, patient tachycardia continues to improve.  CT of the chest ordered, discussed with patient and guardian Ann.  --Patient did have a good bowel movement yesterday  --Her hemoglobin to be 7.6, iron studies consistent with iron deficiency.  Will provide IV iron.  Monitor hemoglobin medically, if less than 7, transfuse  --DVT prophylaxis with Lovenox  --PT/OT has evaluate the patient, recommend postacute, SNF/physiatry referral in place    3/1- no event overnight. She remain pleasantly confused. Sitter present. BP slight elevated. Sinus tachycardia. Started metoprolol 25 mg BID. Not symptomatic. EKG reviewed. CT/PE negative for PE  For hypertension, resume home meds, lisinopril 20 mg daily  hgb 8.4. improving. No need for transfusion   Medically cleared to transfer at SNF     3/2- no event overnight. She is at her baseline. Able to say her name. Complaining of pain, but difficult to rate. Tachycardia improving. BP stable. Last bowel movement three days ago. Bowel protocol in place Medically cleared for placement     3/3- she is little more talkative and awake today. No event overnight. Vitals are stable. Pending rehab/snf transfer     Plan of care has been discussed with  Nursing staff, case management.    Consultants/Specialty  orthopedics    Code Status  Full Code    Disposition  Patient is medically cleared for discharge.   Anticipate discharge to to skilled nursing facility. Vs rehab   I have placed the appropriate orders for post-discharge needs.    Review of Systems  Review of Systems   Unable to perform ROS: Psychiatric disorder        Unable to obtain  2/2 patient mentation     Physical Exam  Temp:  [36.2 °C (97.1 °F)-36.9 °C (98.5 °F)] 36.2 °C (97.1 °F)  Pulse:  [] 95  Resp:  [14-18] 14  BP: (110-127)/(61-82) 110/67  SpO2:  [92 %-97 %] 96 %    Physical Exam  Vitals and nursing note reviewed.   Constitutional:       Comments: And  awake   HENT:      Head: Normocephalic and atraumatic.   Eyes:      Pupils: Pupils are equal, round, and reactive to light.   Cardiovascular:      Rate and Rhythm: Normal rate.      Pulses: Normal pulses.   Pulmonary:      Effort: No respiratory distress.      Breath sounds: Normal breath sounds.   Abdominal:      General: Bowel sounds are normal. There is distension.      Comments: Slight hard   Musculoskeletal:      Cervical back: Neck supple.      Right lower leg: No edema.      Left lower leg: No edema.      Comments: Decreased ROM on the Left side   Skin:     General: Skin is warm.   Neurological:      Mental Status: She is alert.      Comments: Alert and awake   Does folow commands    Psychiatric:         Mood and Affect: Mood normal.         Fluids  No intake or output data in the 24 hours ending 03/03/22 1301    Laboratory  Recent Labs     03/01/22  0827   WBC 6.3   RBC 2.86*   HEMOGLOBIN 8.4*   HEMATOCRIT 26.3*   MCV 92.0   MCH 29.4   MCHC 31.9*   RDW 48.7   PLATELETCT 157*   MPV 8.4*                       Imaging  CT-CTA CHEST PULMONARY ARTERY W/ RECONS   Final Result      1.  No evidence of pulmonary embolism to the segmental branches. Subsegmental branches poorly evaluated due to motion artifact.   2.  Small bilateral pleural effusions.   3.  Atherosclerotic changes. Coronary artery atherosclerotic disease.   4.  3 pulmonary nodules measuring up to 3 mm in the left upper lobe. Recommendations below.      Low Risk: No routine follow-up      High Risk: Optional CT at 12 months      Comments: Use most suspicious nodule as guide to management. Follow-up intervals may vary according to size and risk.      Low Risk - Minimal or absent history of smoking and of other known risk factors.      High Risk - History of smoking or of other known risk factors.      Note: These recommendations do not apply to lung cancer screening, patients with immunosuppression, or patients with known primary cancer.      [place]  Society 2017 Guidelines for Management of Incidentally Detected Pulmonary Nodules in Adults      [id number] VIEW   Final Result      1.  No evidence of bowel obstruction.      2.  Moderate constipation.      DX-PELVIS-1 OR 2 VIEWS   Final Result      Post left hip hemiarthroplasty.      DX-FEMUR-2+ LEFT   Final Result      1.  Mildly displaced left femoral neck fracture.   2.  No distal femur fracture.      DX-HIP-UNILATERAL-WITH PELVIS-1 VIEW LEFT   Final Result      Left femoral neck fracture again noted.      DX-CHEST-PORTABLE (1 VIEW)   Final Result      Hypoinflation with bibasilar atelectasis.           Assessment/Plan  * Hip fracture requiring operative repair, left, closed, initial encounter (HCC)- (present on admission)  Assessment & Plan  Patient will be consulted by orthopedic surgery    ---x-ray showing femoral head fracture. Ortho was consulted and patient underwent Open treatment of left femoral neck fracture with hemiarthroplasty on 2/26 by Dr Marquis.   --- PT and OT    -- DVT ppx    Tachycardia- (present on admission)  Assessment & Plan  Sinus tachycardia on EKG  Thought to be secondary to pain and hypovolemia  Plan: Opiates, IV fluids  2/26: Improving   Tachy with elevated D-dimer; obtain CTA of the chest negative   3/1- sinus tachy. Not symptomatic. No other explanation. Start metoprolol 25 mg BID . Monitor with PCP and cardiology as OP   3/2- resolving     Essential hypertension- (present on admission)  Assessment & Plan  Will hold lisinopril   IV labetalol as needed   --- BP well controlled  3/1- increase. Start lisinopril and metoprolol. Continue to monitor   3/2- better, well controled. Continue current treatment       Anemia- (present on admission)  Assessment & Plan  Her hemoglobin hematocrit, today at 7.8.  Will transfuse if less than 7  --Tender to acute blood loss anemia likely 2/2 acute blood loss anemia  3/1- hgb 8.4- improving. No need for transfusion     Diabetes (HCC)- (present on  admission)  Assessment & Plan  Glyco at 6.7   -- diabetic diet    -- on iss , hypoglycemia protocol and accu-checks ac and hs  Resume metformin on discharge     Thrombocytopenia (HCC)- (present on admission)  Assessment & Plan  Reactive, possible due to anemia   3/1- improving     Schizophrenia (HCC)- (present on admission)  Assessment & Plan  Resume outpatient antipsychotic medications  New every 530 mg p.o. daily, Depakote 1000 g at p.m.  Quetiapine 200 at p.m. and 100 at am    QTc 395    Parkinson's disease (tremor, stiffness, slow motion, unstable posture) ([facility])- (present on admission)  Assessment & Plan  Fall precautions       VTE prophylaxis: Lovenox  I have performed a physical exam and reviewed and updated ROS and Plan today (3/3/2022). In review of yesterday's note (3/2/2022), there are no changes except as documented above.

## 2022-03-03 NOTE — THERAPY
Physical Therapy   Daily Treatment     Patient Name: Erlinda Verde  Age:  56 y.o., Sex:  female  Medical Record #: [id number]  Today's Date: 3/3/2022          Assessment    Pt pleasant upon entry into the room, smiling. Pt conts to require mod assist w/bed mobility and her transfers. Per  caregiver BS, the pt only requiring CGA for her transfers and could get seated EOB wo/assist. Pt needing grooming/hygiene assistance PTA and did wear a pull-up. Pt curses during her mobility 2* pain/fear. Pt is below her baseline function, recommend rehab before returning her to her  setting.     Plan    Continue current treatment plan.    DC Equipment Recommendations: None  Discharge Recommendations: Recommend post-acute placement for additional physical therapy services prior to discharge home     Objective       03/03/22 1205   Other Treatments   Other Treatments Provided Multiple rolls for donning brief and pj bottoms w/caregiver assist.   Balance   Sitting Balance (Static) Fair   Sitting Balance (Dynamic) Fair -   Standing Balance (Static) Poor   Standing Balance (Dynamic) Poor   Weight Shift Sitting Fair   Weight Shift Standing Poor   Comments standing w/HHA   Gait Analysis   Gait Level Of Assist Unable to Participate   Comments Pt was non-ambulatory PTA, does shuffle steps w/her transfers   Bed Mobility    Supine to Sit Moderate Assist  (HOB flat, use of railing, from Rt side)   Sit to Supine   (pt left seated in the chair)   Scooting Moderate Assist  (seated)   Rolling Moderate Assist to Rt.;Moderate Assist to Lt.   Skilled Intervention Verbal Cuing;Postural Facilitation;Compensatory Strategies   Functional Mobility   Sit to Stand Moderate Assist  (from EOB)   Bed, Chair, Wheelchair Transfer Moderate Assist  (bed->recliner chair)   Transfer Method   (shuffle steps)   Skilled Intervention Verbal Cuing;Postural Facilitation   Comments Convinced pt to try sit<->stand to FWW once seated in chair. Pt managed w/min assist  but would not maintain the stand.   How much difficulty does the patient currently have...   Turning over in bed (including adjusting bedclothes, sheets and blankets)? 2   Sitting down on and standing up from a chair with arms (e.g., wheelchair, bedside commode, etc.) 1   Moving from lying on back to sitting on the side of the bed? 1   How much help from another person does the patient currently need...   Moving to and from a bed to a chair (including a wheelchair)? 2   Need to walk in a hospital room? 2   Climbing 3-5 steps with a railing? 1   6 clicks Mobility Score 9   Short Term Goals    Short Term Goal # 1 in 6 visits patient will demo bed mobility with Jose Ramon for safe DC   Goal Outcome # 1 Progressing as expected   Short Term Goal # 2 in 6 visits patient will complete all functional transfers with Jose Ramon and LRAD for safe DC   Goal Outcome # 2 Progressing as expected   Short Term Goal # 3 in 6 visits patient will demo WC mobility for 200' with supervision   Goal Outcome # 3 Goal not met

## 2022-03-03 NOTE — CARE PLAN
The patient is Stable - Low risk of patient condition declining or worsening    Shift Goals  Clinical Goals: Safety, pain control  Patient Goals: Comfort, rest, pain control  Family Goals: ARLETTE    Progress made toward(s) clinical / shift goals:      Problem: Pain - Standard  Goal: Alleviation of pain or a reduction in pain to the patient’s comfort goal  Outcome: Progressing  Note: Pt was experiencing some pain earlier in the night. Pain medication was given as needed and as ordered. Pt then able to sleep comfortably.        Problem: Fall Risk  Goal: Patient will remain free from falls  Outcome: Progressing  Note: Non-slip socks in use. Bed locked and in lowest position. Call light is within reach.  Pt reminded to call before getting out of bed.  Bed alarm in place.       Patient is not progressing towards the following goals:    N/a

## 2022-03-04 PROCEDURE — A9270 NON-COVERED ITEM OR SERVICE: HCPCS | Performed by: INTERNAL MEDICINE

## 2022-03-04 PROCEDURE — 700102 HCHG RX REV CODE 250 W/ 637 OVERRIDE(OP): Performed by: PHYSICAL MEDICINE & REHABILITATION

## 2022-03-04 PROCEDURE — 770001 HCHG ROOM/CARE - MED/SURG/GYN PRIV*

## 2022-03-04 PROCEDURE — 700111 HCHG RX REV CODE 636 W/ 250 OVERRIDE (IP): Performed by: INTERNAL MEDICINE

## 2022-03-04 PROCEDURE — 700102 HCHG RX REV CODE 250 W/ 637 OVERRIDE(OP): Performed by: INTERNAL MEDICINE

## 2022-03-04 PROCEDURE — 99231 SBSQ HOSP IP/OBS SF/LOW 25: CPT | Performed by: INTERNAL MEDICINE

## 2022-03-04 PROCEDURE — 99232 SBSQ HOSP IP/OBS MODERATE 35: CPT | Performed by: PHYSICAL MEDICINE & REHABILITATION

## 2022-03-04 PROCEDURE — A9270 NON-COVERED ITEM OR SERVICE: HCPCS | Performed by: HOSPITALIST

## 2022-03-04 PROCEDURE — A9270 NON-COVERED ITEM OR SERVICE: HCPCS | Performed by: PHYSICAL MEDICINE & REHABILITATION

## 2022-03-04 PROCEDURE — 700102 HCHG RX REV CODE 250 W/ 637 OVERRIDE(OP): Performed by: HOSPITALIST

## 2022-03-04 PROCEDURE — 700101 HCHG RX REV CODE 250: Performed by: PHYSICAL MEDICINE & REHABILITATION

## 2022-03-04 RX ORDER — OXYCODONE HYDROCHLORIDE 5 MG/1
2.5 TABLET ORAL EVERY MORNING
Status: DISCONTINUED | OUTPATIENT
Start: 2022-03-05 | End: 2022-03-16 | Stop reason: HOSPADM

## 2022-03-04 RX ADMIN — QUETIAPINE FUMARATE 100 MG: 100 TABLET ORAL at 06:11

## 2022-03-04 RX ADMIN — DONEPEZIL HYDROCHLORIDE 10 MG: 5 TABLET, FILM COATED ORAL at 20:53

## 2022-03-04 RX ADMIN — LEVOTHYROXINE SODIUM 25 MCG: 25 TABLET ORAL at 06:10

## 2022-03-04 RX ADMIN — POLYETHYLENE GLYCOL 3350 1 PACKET: 17 POWDER, FOR SOLUTION ORAL at 06:11

## 2022-03-04 RX ADMIN — ACETAMINOPHEN 1000 MG: 500 TABLET ORAL at 14:09

## 2022-03-04 RX ADMIN — OXYCODONE 5 MG: 5 TABLET ORAL at 09:49

## 2022-03-04 RX ADMIN — SERTRALINE 100 MG: 100 TABLET, FILM COATED ORAL at 06:10

## 2022-03-04 RX ADMIN — METOPROLOL TARTRATE 25 MG: 25 TABLET, FILM COATED ORAL at 06:09

## 2022-03-04 RX ADMIN — METFORMIN HYDROCHLORIDE 500 MG: 500 TABLET, EXTENDED RELEASE ORAL at 06:09

## 2022-03-04 RX ADMIN — SENNOSIDES AND DOCUSATE SODIUM 2 TABLET: 50; 8.6 TABLET ORAL at 17:25

## 2022-03-04 RX ADMIN — SENNOSIDES AND DOCUSATE SODIUM 2 TABLET: 50; 8.6 TABLET ORAL at 06:10

## 2022-03-04 RX ADMIN — DIVALPROEX SODIUM 1000 MG: 500 TABLET, EXTENDED RELEASE ORAL at 20:53

## 2022-03-04 RX ADMIN — ARIPIPRAZOLE 30 MG: 10 TABLET ORAL at 17:25

## 2022-03-04 RX ADMIN — LISINOPRIL 20 MG: 20 TABLET ORAL at 06:10

## 2022-03-04 RX ADMIN — Medication 1 TABLET: at 06:10

## 2022-03-04 RX ADMIN — OMEPRAZOLE 20 MG: 20 CAPSULE, DELAYED RELEASE ORAL at 06:11

## 2022-03-04 RX ADMIN — QUETIAPINE FUMARATE 200 MG: 100 TABLET ORAL at 20:54

## 2022-03-04 RX ADMIN — ENOXAPARIN SODIUM 40 MG: 40 INJECTION SUBCUTANEOUS at 06:11

## 2022-03-04 RX ADMIN — OXYBUTYNIN CHLORIDE 5 MG: 5 TABLET, EXTENDED RELEASE ORAL at 18:06

## 2022-03-04 RX ADMIN — ACETAMINOPHEN 1000 MG: 500 TABLET ORAL at 20:52

## 2022-03-04 RX ADMIN — LIDOCAINE 1 PATCH: 50 PATCH TOPICAL at 20:54

## 2022-03-04 RX ADMIN — ACETAMINOPHEN 1000 MG: 500 TABLET ORAL at 09:49

## 2022-03-04 RX ADMIN — METOPROLOL TARTRATE 25 MG: 25 TABLET, FILM COATED ORAL at 17:25

## 2022-03-04 RX ADMIN — OXYCODONE 5 MG: 5 TABLET ORAL at 06:09

## 2022-03-04 RX ADMIN — POLYETHYLENE GLYCOL 3350 1 PACKET: 17 POWDER, FOR SOLUTION ORAL at 17:25

## 2022-03-04 RX ADMIN — OXYCODONE 2.5 MG: 5 TABLET ORAL at 17:24

## 2022-03-04 RX ADMIN — ATORVASTATIN CALCIUM 20 MG: 20 TABLET, FILM COATED ORAL at 20:54

## 2022-03-04 RX ADMIN — MAGNESIUM HYDROXIDE 30 ML: 400 SUSPENSION ORAL at 14:10

## 2022-03-04 ASSESSMENT — PAIN DESCRIPTION - PAIN TYPE
TYPE: ACUTE PAIN;SURGICAL PAIN

## 2022-03-04 NOTE — DISCHARGE PLANNING
Anticipated Discharge Disposition: SNF    Action: Per IDR, pt cont to be medically cleared for DC to SNF. PASRR pending, went to level 2 review.     Barriers to Discharge: pending PASRR    Plan: cont to f/u for DC needs.

## 2022-03-04 NOTE — PROGRESS NOTES
56yoF with left displaced femoral neck s/p hemiarthroplasty 2/26.    S: Caregiver at bedside.  Erlinda is awake and alert    O:    Vitals:    03/04/22 0952   BP:    Pulse:    Resp:    Temp:    SpO2: 93%     Exam:  General-NAD, alert and following commands  LLE-hip dressing c/d/i, +EHL/FHL/TA/GS motor, foot warm and well perfused    A: 56yoF with left displaced femoral neck s/p hemiarthroplasty 2/26.    Recs:  --WBAT LLE  --posterior hip precautions  --PT/OT for mobilization  --heparin and SCDs while inpatient  --fu 2 weeks postop

## 2022-03-04 NOTE — PROGRESS NOTES
Physical Medicine and Rehabilitation Consultation              Date of initial consultation: 3/1/2022  Requested by: Blessing Mota MD  Consulting physician: Rio Morales D.O.  Reason for consultation: assessment of rehabilitation needs  LOS: 7 Day(s)    SUBJECTIVE  Patient seen in room with caregiver  GI: last 3/4  Psych: slept well last night per patient but Patient is unreliable historian  MSK: does not have ice on her left leg, reports pain in left leg    Medications:  Current Facility-Administered Medications   Medication Dose   • oxyCODONE immediate-release (ROXICODONE) tablet 2.5 mg  2.5 mg   • donepezil (ARICEPT) tablet 10 mg  10 mg   • metFORMIN ER (GLUCOPHAGE XR) tablet 500 mg  500 mg   • therapeutic multivitamin-minerals (THERAGRAN-M) tablet 1 Tablet  1 Tablet   • polyethylene glycol/lytes (MIRALAX) PACKET 1 Packet  1 Packet   • metoprolol tartrate (LOPRESSOR) tablet 25 mg  25 mg   • lisinopril (PRINIVIL) tablet 20 mg  20 mg   • acetaminophen (TYLENOL) tablet 1,000 mg  1,000 mg   • lidocaine (LIDODERM) 5 % 1 Patch  1 Patch   • omeprazole (PRILOSEC) capsule 20 mg  20 mg   • levothyroxine (SYNTHROID) tablet 25 mcg  25 mcg   • bisacodyl (DULCOLAX) suppository 10 mg  10 mg   • senna-docusate (PERICOLACE or SENOKOT S) 8.6-50 MG per tablet 2 Tablet  2 Tablet    And   • polyethylene glycol/lytes (MIRALAX) PACKET 1 Packet  1 Packet    And   • magnesium hydroxide (MILK OF MAGNESIA) suspension 30 mL  30 mL    And   • bisacodyl (DULCOLAX) suppository 10 mg  10 mg   • enoxaparin (LOVENOX) inj 40 mg  40 mg   • acetaminophen (Tylenol) tablet 650 mg  650 mg   • Pharmacy Consult Request ...Pain Management Review 1 Each  1 Each   • oxyCODONE immediate-release (ROXICODONE) tablet 2.5 mg  2.5 mg    Or   • oxyCODONE immediate-release (ROXICODONE) tablet 5 mg  5 mg    Or   • morphine 4 MG/ML injection 2 mg  2 mg   • labetalol (NORMODYNE/TRANDATE) injection 10 mg  10 mg   • ondansetron (ZOFRAN) syringe/vial  "injection 4 mg  4 mg   • ondansetron (ZOFRAN ODT) dispertab 4 mg  4 mg   • promethazine (PHENERGAN) tablet 12.5-25 mg  12.5-25 mg   • promethazine (PHENERGAN) suppository 12.5-25 mg  12.5-25 mg   • prochlorperazine (COMPAZINE) injection 5-10 mg  5-10 mg   • ARIPiprazole (Abilify) tablet 30 mg  30 mg   • atorvastatin (LIPITOR) tablet 20 mg  20 mg   • divalproex ER (DEPAKOTE ER) tablet 1,000 mg  1,000 mg   • oxybutynin SR (DITROPAN-XL) tablet 5 mg  5 mg   • sertraline (Zoloft) tablet 100 mg  100 mg   • QUEtiapine (Seroquel) tablet 200 mg  200 mg    And   • QUEtiapine (Seroquel) tablet 100 mg  100 mg     Allergies:  Allergies   Allergen Reactions   • Risperdal        Physical Exam:  Vitals: /60   Pulse 80   Temp 36.3 °C (97.4 °F) (Temporal)   Resp 18   Ht 1.651 m (5' 5\")   Wt 69.6 kg (153 lb 7 oz)   SpO2 93%   General: well-groomed in no acute distress, caregiver present  Eyes: no scleral icterus or conjunctival injection  Ears, nose, mouth and throat: moist oral mucosa  Cardiovascular: good peripheral perfusion, no palor, swelling unchanged in left leg  Respiratory: breathing comfortably without use of accessory muscles, +NC (no oxygen running)  Gastrointestinal: soft, nontender, nondistended  Genitourinary: no indwelling salvador  Musculoskeletal: good symmetry in bilateral shoulders, tender to palpation in left leg  Skin: no wounds seen on exposed skin    Neurologic:  Mental status: alert, unreliable historian  Speech: no aphasia or dysarthria  Motor - good motor strength throughout bilateral upper and lower limbs as she is moving them spontaneously against gravity  Sensory:   intact to light touch through out  Tone: no spasticity noted  Psychiatric: flat affect  Hematologic/lymphatic/immunologic: ++IV access, no bruises seen on exposed skin    Labs: Reviewed and significant for   No results for input(s): RBC, HEMOGLOBIN, HEMATOCRIT, PLATELETCT, PROTHROMBTM, APTT, INR, IRON, FERRITIN, TOTIRONBC in the last " 72 hours.      No results found for this or any previous visit (from the past 24 hour(s)).      ASSESSMENT:  IMPRESSION: The patient is a 56 y.o. female with a past medical history of hypertension, diabetes mellitus type II, Schizoaffective disorder, Parkinsonism, developmental disability, left femoral head fracture diagnosed 2/24/2022 at urgent care;  who presented on 2/25/2022  7:22 PM with worsening left leg pain and found to have Mildly displaced left femoral neck fracture s/p Open treatment of left femoral neck fracture with hemiarthroplasty on 2/26/2022 by Dr. Ubaldo Marquis MD.    [facility] Code: 0008.11 - Orthopaedic Disorders: Status Post Unilateral Hip Fracture  -With acute secondary complications of: impaired ADLs and mobility, posttraumatic and postsurgical pain  -with chronic conditions of: hypertension, diabetes mellitus type II, Schizoaffective disorder, Parkinsonism, developmental disability,  -and:     Medical Complexity:  Anemia  Hyperglycemia  Thrombocytopenia, mild    Data points:  Reviewed results of radiology tests  Reviewed clinical lab tests  Reviewed old records    RECOMMENDATIONS:  ##MSK  #Impaired ADLs and mobility: Agree with continuing OT/PT while admitted here.    Primary barrier to therapies has been uncontrolled pain. Highly recommend ice modality as swelling in left leg unchanged. Also gave patient exercise to bend and straighten left knee while in bed to pump out fluid from left leg. Recommend out of bed with meals - start with breakfast - pain likely barrier so scheduled oxycodone for morning.    Patient is currently not functioning at baseline as detailed on PT and OT evaluations. Patient has good home support with 24 hour assistance. Therefore, it is my medical opinion that she would benefit from aggressive therapies in OT and PT  in acute inpatient rehabilitation with goal of returning group home with caregivers.    Current barrier to rehabilitation include:   -Administrative  denial    Estimated length of stay: 14-16 days  Anticipated discharge destination: group home with caregivers  Prognosis: good  Code: full resuscitation    #Posttraumatic pain, acute, uncontrolled  #Postsurgical pain, acute, uncontrolled  #Neuropathic pain, acute, uncontrolled  Patient has difficulty conveying pain - recommend scheduled pain medications to improve therapy tolerance  Continue tylenol 1000mg TID for basal pain management, lidocaine 5% 1 patch daily to left hip - avoid incision area  Recommend premedicating patient with oxycodone prior to therapies  Recommend ice modality to left hip for better pain management and swelling management    #Mildly displaced left femoral neck fracture s/p Open treatment of left femoral neck fracture with hemiarthroplasty on 2/26/2022 by Dr. Ubaldo Marquis MD  Weight bearing as tolerated to left lower limb    Recommend out of bed and in chair for meals    ##NEURO  #History of developmental disability  Caregiver support for assistance with understanding patient's wants and needs    ##CARDIAC  #Foot swelling, acute  Ordered KATIE hose for swelling management - put on in AM, remove before sleeping  Agree with SCDs     ##GI  Last BM: daily   GI prophylaxis: omeprazole 20mg daily  #Neurogenic bowel, controlled  Continue daily bisacodyl suppository for social continence, pericolace, miralax 17g BID    ##  #Neurogenic bladder, controlled  Continue oxybutynin SR 5mg QHS  Recommend starting timed voids Q4hr while awake, purewick at night    ##SKIN  #Incision site  Routine wound care    DVT chemoprophlaxis: lovenox 40mg daily  Code: full resuscitation    Thank you for allowing us to participate in the care of this patient. Physiatry will continue to follow and provide recommendations, as needed.    Patient was seen for 26 minutes on unit/floor of which > 50% of time was spent on counseling and coordination of care regarding the above, including prognosis, risk reduction, benefits of  treatment, and options for next stage of care.    Rio Morales D.O.   Physical Medicine and Rehabilitation     I have performed a physical exam and reviewed and updated history and plan today (3/4/2022).     Please note that this dictation was created using voice recognition software. I have made every reasonable attempt to correct obvious errors, but there may be errors of grammar and possibly content that I did not discover before finalizing the note.

## 2022-03-04 NOTE — CARE PLAN
The patient is Stable - Low risk of patient condition declining or worsening    Shift Goals  Clinical Goals: safetycomfort  Patient Goals: comfort, rest  Family Goals: billy    Progress made toward(s) clinical / shift goals:  yes    Patient took medications as charted, appeared a bit teary reassurance provided.   Pain controlled with scheduled and PRN medications.     Problem: Knowledge Deficit - Standard  Goal: Patient and family/care givers will demonstrate understanding of plan of care, disease process/condition, diagnostic tests and medications  Outcome: Progressing     Problem: Pain - Standard  Goal: Alleviation of pain or a reduction in pain to the patient’s comfort goal  Outcome: Progressing

## 2022-03-04 NOTE — PROGRESS NOTES
[facility] Medicine Daily Progress Note    Date of Service  3/4/2022    Chief Complaint  Erlinda Verde is a 56 y.o. female admitted 2/25/2022 with   Chief Complaint   Patient presents with   • Leg Pain     PT BIB EMS from home. PT was seen at urgent care yesterday and was diagnosed with a femoral head fracture.  Pt care giver called due to increased pain. PT was referred for follow up with ortho but has not had follow up.        Hospital Course  This 56-year-old female with a developmental disability, size affective disorder, parkinsonism, hypertension, type 2 diabetes mellitus presented to the ER on 2/25/2022 after she had a ground-level fall.     She is a resident of [facility].  She landed on the left side, imaging showed femoral head fracture; orthopedic surgery was consulted, patient underwent open treatment of left femoral neck fracture with hemiarthroplasty on 2/26/2022.  PT/OT has evaluate the patient, recommend postacute.  SNF/rehab referral in place     Of note, patient noted to be tachycardic, D-dimer is elevated, CTA of the chest ordered, pending.     Hospital course was complicated with acute blood loss anemia, today her hemoglobin noted to be 7.6.  Will transfuse if less than 7.  Iron study was obtained consistent with iron deficiency, will provide IV iron.  Will provide PPI.  Patient need to follow-up with primary care physician as an outpatient     Interval Problem Update    2/27:  --No acute events overnight, patient underwent surgery yesterday.  We will continue DVT prophylaxis.  PT/OT has evaluate the patient, pending recommendation.  --Of note, patient abdomen is distended , KUB showing constipation;  Will provide aggressive  Bowel regemin  --Patient continues remain persistent tachycardia along with elevated D-dimer, will obtain CT of the chest.  --Has been requiring 2.5 L of oxygen, will titrate oxygen as needed.  Provide incentive spirometry    Plan of care has been discussed with the  nursing staff , caretaker at bedside.    2/28:  --No acute events overnight, patient tachycardia continues to improve.  CT of the chest ordered, discussed with patient and guardian Ann.  --Patient did have a good bowel movement yesterday  --Her hemoglobin to be 7.6, iron studies consistent with iron deficiency.  Will provide IV iron.  Monitor hemoglobin medically, if less than 7, transfuse  --DVT prophylaxis with Lovenox  --PT/OT has evaluate the patient, recommend postacute, SNF/physiatry referral in place    3/1- no event overnight. She remain pleasantly confused. Sitter present. BP slight elevated. Sinus tachycardia. Started metoprolol 25 mg BID. Not symptomatic. EKG reviewed. CT/PE negative for PE  For hypertension, resume home meds, lisinopril 20 mg daily  hgb 8.4. improving. No need for transfusion   Medically cleared to transfer at SNF     3/2- no event overnight. She is at her baseline. Able to say her name. Complaining of pain, but difficult to rate. Tachycardia improving. BP stable. Last bowel movement three days ago. Bowel protocol in place Medically cleared for placement     3/3- she is little more talkative and awake today. No event overnight. Vitals are stable. Pending rehab/snf transfer     3/4- no event. Report hip pain, meds available. Pending placement. No change on medications. Vitals are stable. She did have bowel movement a day prior.     Plan of care has been discussed with  Nursing staff, case management.    Consultants/Specialty  orthopedics    Code Status  Full Code    Disposition  Patient is medically cleared for discharge.   Anticipate discharge to to skilled nursing facility. Vs rehab   I have placed the appropriate orders for post-discharge needs.    Review of Systems  Review of Systems   Unable to perform ROS: Psychiatric disorder        Unable to obtain  2/2 patient mentation     Physical Exam  Temp:  [35.8 °C (96.5 °F)-36.4 °C (97.6 °F)] 36.3 °C (97.4 °F)  Pulse:  [80-95] 80  Resp:   [16-18] 18  BP: (100-128)/(60-76) 100/60  SpO2:  [92 %-94 %] 93 %    Physical Exam  Vitals and nursing note reviewed.   Constitutional:       Comments: And awake   HENT:      Head: Normocephalic and atraumatic.   Eyes:      Pupils: Pupils are equal, round, and reactive to light.   Cardiovascular:      Rate and Rhythm: Normal rate.      Pulses: Normal pulses.   Pulmonary:      Effort: No respiratory distress.      Breath sounds: Normal breath sounds.   Abdominal:      General: Bowel sounds are normal. There is distension.      Comments: Slight hard   Musculoskeletal:      Cervical back: Neck supple.      Right lower leg: No edema.      Left lower leg: No edema.      Comments: Decreased ROM on the Left side   Skin:     General: Skin is warm.   Neurological:      Mental Status: She is alert.      Comments: Alert and awake   Does folow commands    Psychiatric:         Mood and Affect: Mood normal.         Fluids    Intake/Output Summary (Last 24 hours) at 3/4/2022 1353  Last data filed at 3/4/2022 0952  Gross per 24 hour   Intake 240 ml   Output 500 ml   Net -260 ml       Laboratory                        Imaging  CT-CTA CHEST PULMONARY ARTERY W/ RECONS   Final Result      1.  No evidence of pulmonary embolism to the segmental branches. Subsegmental branches poorly evaluated due to motion artifact.   2.  Small bilateral pleural effusions.   3.  Atherosclerotic changes. Coronary artery atherosclerotic disease.   4.  3 pulmonary nodules measuring up to 3 mm in the left upper lobe. Recommendations below.      Low Risk: No routine follow-up      High Risk: Optional CT at 12 months      Comments: Use most suspicious nodule as guide to management. Follow-up intervals may vary according to size and risk.      Low Risk - Minimal or absent history of smoking and of other known risk factors.      High Risk - History of smoking or of other known risk factors.      Note: These recommendations do not apply to lung cancer screening,  patients with immunosuppression, or patients with known primary cancer.      Fleischner Society 2017 Guidelines for Management of Incidentally Detected Pulmonary Nodules in Adults      [id number] VIEW   Final Result      1.  No evidence of bowel obstruction.      2.  Moderate constipation.      DX-PELVIS-1 OR 2 VIEWS   Final Result      Post left hip hemiarthroplasty.      DX-FEMUR-2+ LEFT   Final Result      1.  Mildly displaced left femoral neck fracture.   2.  No distal femur fracture.      DX-HIP-UNILATERAL-WITH PELVIS-1 VIEW LEFT   Final Result      Left femoral neck fracture again noted.      DX-CHEST-PORTABLE (1 VIEW)   Final Result      Hypoinflation with bibasilar atelectasis.           Assessment/Plan  * Hip fracture requiring operative repair, left, closed, initial encounter ([facility])- (present on admission)  Assessment & Plan  Patient will be consulted by orthopedic surgery    ---x-ray showing femoral head fracture. Ortho was consulted and patient underwent Open treatment of left femoral neck fracture with hemiarthroplasty on 2/26 by Dr Marquis.   --- PT and OT    -- DVT ppx    Tachycardia- (present on admission)  Assessment & Plan  Sinus tachycardia on EKG  Thought to be secondary to pain and hypovolemia  Plan: Opiates, IV fluids  2/26: Improving   Tachy with elevated D-dimer; obtain CTA of the chest negative   3/1- sinus tachy. Not symptomatic. No other explanation. Start metoprolol 25 mg BID . Monitor with PCP and cardiology as OP   3/2- resolving     Essential hypertension- (present on admission)  Assessment & Plan  Will hold lisinopril   IV labetalol as needed   --- BP well controlled  3/1- increase. Start lisinopril and metoprolol. Continue to monitor   3/2- better, well controled. Continue current treatment       Anemia- (present on admission)  Assessment & Plan  Her hemoglobin hematocrit, today at 7.8.  Will transfuse if less than 7  --Tender to acute blood loss anemia likely 2/2 acute blood loss  anemia  3/1- hgb 8.4- improving. No need for transfusion     Diabetes (HCC)- (present on admission)  Assessment & Plan  Glyco at 6.7   -- diabetic diet    -- on iss , hypoglycemia protocol and accu-checks ac and hs  Resume metformin on discharge     Thrombocytopenia (HCC)- (present on admission)  Assessment & Plan  Reactive, possible due to anemia   3/1- improving     Schizophrenia (HCC)- (present on admission)  Assessment & Plan  Resume outpatient antipsychotic medications  New every 530 mg p.o. daily, Depakote 1000 g at p.m.  Quetiapine 200 at p.m. and 100 at am    QTc 395    Parkinson's disease (tremor, stiffness, slow motion, unstable posture) ([facility])- (present on admission)  Assessment & Plan  Fall precautions       VTE prophylaxis: Lovenox  I have performed a physical exam and reviewed and updated ROS and Plan today (3/4/2022). In review of yesterday's note (3/3/2022), there are no changes except as documented above.

## 2022-03-04 NOTE — CARE PLAN
The patient is Stable - Low risk of patient condition declining or worsening    Shift Goals  Clinical Goals: safety  Patient Goals: comfort, rest  Family Goals: ARLETTE    Progress made toward(s) clinical / shift goals:  Patient is tolerating turns.  Caregiver at bedside.  Caregiver helps patient with ADL's and will call for assistance.    Problem: Knowledge Deficit - Standard  Goal: Patient and family/care givers will demonstrate understanding of plan of care, disease process/condition, diagnostic tests and medications  Outcome: Progressing     Problem: Pain - Standard  Goal: Alleviation of pain or a reduction in pain to the patient’s comfort goal  Outcome: Progressing     Problem: Skin Integrity  Goal: Skin integrity is maintained or improved  Outcome: Progressing     Problem: Fall Risk  Goal: Patient will remain free from falls  Outcome: Progressing       Patient is not progressing towards the following goals:

## 2022-03-04 NOTE — DISCHARGE PLANNING
Anticipated Discharge Disposition: SNF    Action: PASRR is still pending. All the requested information has been submitted yesterday. Pt cont to be medically cleared for DC    Barriers to Discharge: pending PASRR    Plan: cont to f/u for DC needs.

## 2022-03-05 PROCEDURE — 700102 HCHG RX REV CODE 250 W/ 637 OVERRIDE(OP): Performed by: HOSPITALIST

## 2022-03-05 PROCEDURE — 770001 HCHG ROOM/CARE - MED/SURG/GYN PRIV*

## 2022-03-05 PROCEDURE — A9270 NON-COVERED ITEM OR SERVICE: HCPCS | Performed by: HOSPITALIST

## 2022-03-05 PROCEDURE — A9270 NON-COVERED ITEM OR SERVICE: HCPCS | Performed by: INTERNAL MEDICINE

## 2022-03-05 PROCEDURE — 700111 HCHG RX REV CODE 636 W/ 250 OVERRIDE (IP): Performed by: INTERNAL MEDICINE

## 2022-03-05 PROCEDURE — 700102 HCHG RX REV CODE 250 W/ 637 OVERRIDE(OP): Performed by: INTERNAL MEDICINE

## 2022-03-05 PROCEDURE — A9270 NON-COVERED ITEM OR SERVICE: HCPCS | Performed by: PHYSICAL MEDICINE & REHABILITATION

## 2022-03-05 PROCEDURE — 99231 SBSQ HOSP IP/OBS SF/LOW 25: CPT | Performed by: INTERNAL MEDICINE

## 2022-03-05 PROCEDURE — 700102 HCHG RX REV CODE 250 W/ 637 OVERRIDE(OP): Performed by: PHYSICAL MEDICINE & REHABILITATION

## 2022-03-05 PROCEDURE — 700101 HCHG RX REV CODE 250: Performed by: PHYSICAL MEDICINE & REHABILITATION

## 2022-03-05 RX ADMIN — ATORVASTATIN CALCIUM 20 MG: 20 TABLET, FILM COATED ORAL at 21:36

## 2022-03-05 RX ADMIN — METOPROLOL TARTRATE 25 MG: 25 TABLET, FILM COATED ORAL at 16:46

## 2022-03-05 RX ADMIN — OXYCODONE 5 MG: 5 TABLET ORAL at 21:36

## 2022-03-05 RX ADMIN — LEVOTHYROXINE SODIUM 25 MCG: 25 TABLET ORAL at 04:41

## 2022-03-05 RX ADMIN — ACETAMINOPHEN 1000 MG: 500 TABLET ORAL at 21:37

## 2022-03-05 RX ADMIN — METFORMIN HYDROCHLORIDE 500 MG: 500 TABLET, EXTENDED RELEASE ORAL at 04:40

## 2022-03-05 RX ADMIN — POLYETHYLENE GLYCOL 3350 1 PACKET: 17 POWDER, FOR SOLUTION ORAL at 16:47

## 2022-03-05 RX ADMIN — SERTRALINE 100 MG: 100 TABLET, FILM COATED ORAL at 04:42

## 2022-03-05 RX ADMIN — METOPROLOL TARTRATE 25 MG: 25 TABLET, FILM COATED ORAL at 04:42

## 2022-03-05 RX ADMIN — LIDOCAINE 1 PATCH: 50 PATCH TOPICAL at 21:37

## 2022-03-05 RX ADMIN — SENNOSIDES AND DOCUSATE SODIUM 2 TABLET: 50; 8.6 TABLET ORAL at 04:41

## 2022-03-05 RX ADMIN — DIVALPROEX SODIUM 1000 MG: 500 TABLET, EXTENDED RELEASE ORAL at 21:36

## 2022-03-05 RX ADMIN — OMEPRAZOLE 20 MG: 20 CAPSULE, DELAYED RELEASE ORAL at 04:42

## 2022-03-05 RX ADMIN — OXYBUTYNIN CHLORIDE 5 MG: 5 TABLET, EXTENDED RELEASE ORAL at 16:46

## 2022-03-05 RX ADMIN — Medication 10 MG: at 04:42

## 2022-03-05 RX ADMIN — Medication 1 TABLET: at 04:41

## 2022-03-05 RX ADMIN — SENNOSIDES AND DOCUSATE SODIUM 2 TABLET: 50; 8.6 TABLET ORAL at 16:46

## 2022-03-05 RX ADMIN — OXYCODONE 2.5 MG: 5 TABLET ORAL at 04:43

## 2022-03-05 RX ADMIN — POLYETHYLENE GLYCOL 3350 1 PACKET: 17 POWDER, FOR SOLUTION ORAL at 04:42

## 2022-03-05 RX ADMIN — DONEPEZIL HYDROCHLORIDE 10 MG: 5 TABLET, FILM COATED ORAL at 21:37

## 2022-03-05 RX ADMIN — ARIPIPRAZOLE 30 MG: 10 TABLET ORAL at 16:47

## 2022-03-05 RX ADMIN — QUETIAPINE FUMARATE 100 MG: 100 TABLET ORAL at 04:42

## 2022-03-05 RX ADMIN — LISINOPRIL 20 MG: 20 TABLET ORAL at 04:41

## 2022-03-05 RX ADMIN — ACETAMINOPHEN 1000 MG: 500 TABLET ORAL at 09:54

## 2022-03-05 RX ADMIN — ACETAMINOPHEN 1000 MG: 500 TABLET ORAL at 16:47

## 2022-03-05 RX ADMIN — ENOXAPARIN SODIUM 40 MG: 40 INJECTION SUBCUTANEOUS at 04:41

## 2022-03-05 RX ADMIN — QUETIAPINE FUMARATE 200 MG: 100 TABLET ORAL at 21:36

## 2022-03-05 ASSESSMENT — PAIN DESCRIPTION - PAIN TYPE
TYPE: ACUTE PAIN;SURGICAL PAIN
TYPE: ACUTE PAIN;SURGICAL PAIN

## 2022-03-05 NOTE — PROGRESS NOTES
4 Eyes Skin Assessment Completed by Megan RN and ROD Clay.    Head WDL  Ears WDL  Nose WDL  Mouth WDL  Neck WDL  Breast/Chest WDL  Shoulder Blades WDL  Spine WDL  (R) Arm/Elbow/Hand Scabs and bruising  (L) Arm/Elbow/Hand Scabs and bruising  Abdomen WDL  Groin WDL  Scrotum/Coccyx/Buttocks Pink, blanching  (R) Leg Scab to shin, swelling  (L) Leg Swelling, surgical dressing to hip   (R) Heel/Foot/Toe Swelling  (L) Heel/Foot/Toe Swelling          Devices In Places Pulse Ox, SCD's and Nasal Cannula      Interventions In Place NC W/Ear Foams, Waffle Overlay, Pillows and Pressure Redistribution Mattress    Possible Skin Injury No    Pictures Uploaded Into Epic N/A  Wound Consult Placed N/A  RN Wound Prevention Protocol Ordered No

## 2022-03-05 NOTE — CARE PLAN
The patient is Stable - Low risk of patient condition declining or worsening    Shift Goals  Clinical Goals: Pain management  Patient Goals: comfort  Family Goals: billy    Progress made toward(s) clinical / shift goals:  Yes  Pain well controled with scheduled and PRN medications.  Continues on Q2 turns, bed low , locked position, bed alarm on.     Problem: Pain - Standard  Goal: Alleviation of pain or a reduction in pain to the patient’s comfort goal  Outcome: Progressing     Problem: Skin Integrity  Goal: Skin integrity is maintained or improved  Outcome: Progressing

## 2022-03-05 NOTE — CARE PLAN
The patient is Stable - Low risk of patient condition declining or worsening    Shift Goals  Clinical Goals: Pain management, safety  Patient Goals: Comfort  Family Goals: billy    Progress made toward(s) clinical / shift goals:  Administered pain medications as needed. Bed locked and in lowest position. Safety and fall precautions in place. Bed alarm on. Hourly rounding. Call light and belongings within reach. Caregiver at bedside.     Patient is not progressing towards the following goals:

## 2022-03-05 NOTE — PROGRESS NOTES
[facility] Medicine Daily Progress Note    Date of Service  3/5/2022    Chief Complaint  Erlinda Verde is a 56 y.o. female admitted 2/25/2022 with   Chief Complaint   Patient presents with   • Leg Pain     PT BIB EMS from home. PT was seen at urgent care yesterday and was diagnosed with a femoral head fracture.  Pt care giver called due to increased pain. PT was referred for follow up with ortho but has not had follow up.        Hospital Course  This 56-year-old female with a developmental disability, size affective disorder, parkinsonism, hypertension, type 2 diabetes mellitus presented to the ER on 2/25/2022 after she had a ground-level fall.     She is a resident of [facility].  She landed on the left side, imaging showed femoral head fracture; orthopedic surgery was consulted, patient underwent open treatment of left femoral neck fracture with hemiarthroplasty on 2/26/2022.  PT/OT has evaluate the patient, recommend postacute.  SNF/rehab referral in place     Of note, patient noted to be tachycardic, D-dimer is elevated, CTA of the chest ordered, pending.     Hospital course was complicated with acute blood loss anemia, today her hemoglobin noted to be 7.6.  Will transfuse if less than 7.  Iron study was obtained consistent with iron deficiency, will provide IV iron.  Will provide PPI.  Patient need to follow-up with primary care physician as an outpatient     Interval Problem Update    2/27:  --No acute events overnight, patient underwent surgery yesterday.  We will continue DVT prophylaxis.  PT/OT has evaluate the patient, pending recommendation.  --Of note, patient abdomen is distended , KUB showing constipation;  Will provide aggressive  Bowel regemin  --Patient continues remain persistent tachycardia along with elevated D-dimer, will obtain CT of the chest.  --Has been requiring 2.5 L of oxygen, will titrate oxygen as needed.  Provide incentive spirometry    Plan of care has been discussed with the  nursing staff , caretaker at bedside.    2/28:  --No acute events overnight, patient tachycardia continues to improve.  CT of the chest ordered, discussed with patient and guardian Ann.  --Patient did have a good bowel movement yesterday  --Her hemoglobin to be 7.6, iron studies consistent with iron deficiency.  Will provide IV iron.  Monitor hemoglobin medically, if less than 7, transfuse  --DVT prophylaxis with Lovenox  --PT/OT has evaluate the patient, recommend postacute, SNF/physiatry referral in place    3/1- no event overnight. She remain pleasantly confused. Sitter present. BP slight elevated. Sinus tachycardia. Started metoprolol 25 mg BID. Not symptomatic. EKG reviewed. CT/PE negative for PE  For hypertension, resume home meds, lisinopril 20 mg daily  hgb 8.4. improving. No need for transfusion   Medically cleared to transfer at SNF     3/2- no event overnight. She is at her baseline. Able to say her name. Complaining of pain, but difficult to rate. Tachycardia improving. BP stable. Last bowel movement three days ago. Bowel protocol in place Medically cleared for placement     3/3- she is little more talkative and awake today. No event overnight. Vitals are stable. Pending rehab/snf transfer     3/4- no event. Report hip pain, meds available. Pending placement. No change on medications. Vitals are stable. She did have bowel movement a day prior.     3/5- no event. Able to understand better. Looks more comfortable. Vitals are stable. Oxycodone used only one time 2.5 mg yesterday. Pending [facility], placement     Plan of care has been discussed with  Nursing staff, case management.    Consultants/Specialty  orthopedics    Code Status  Full Code    Disposition  Patient is medically cleared for discharge.   Anticipate discharge to to skilled nursing facility. Vs rehab   I have placed the appropriate orders for post-discharge needs.    Review of Systems  Review of Systems   Unable to perform ROS: Psychiatric  disorder        Unable to obtain  2/2 patient mentation     Physical Exam  Temp:  [36.1 °C (96.9 °F)-36.7 °C (98.1 °F)] 36.7 °C (98.1 °F)  Pulse:  [79-91] 89  Resp:  [15-18] 15  BP: (107-124)/(63-82) 113/63  SpO2:  [90 %-94 %] 91 %    Physical Exam  Vitals and nursing note reviewed.   Constitutional:       Comments: And awake   HENT:      Head: Normocephalic and atraumatic.   Eyes:      Pupils: Pupils are equal, round, and reactive to light.   Cardiovascular:      Rate and Rhythm: Normal rate.      Pulses: Normal pulses.   Pulmonary:      Effort: No respiratory distress.      Breath sounds: Normal breath sounds.   Abdominal:      General: Bowel sounds are normal. There is distension.      Comments: Slight hard   Musculoskeletal:      Cervical back: Neck supple.      Right lower leg: No edema.      Left lower leg: No edema.      Comments: Decreased ROM on the Left side   Skin:     General: Skin is warm.   Neurological:      Mental Status: She is alert.      Comments: Alert and awake   Does folow commands    Psychiatric:         Mood and Affect: Mood normal.         Fluids    Intake/Output Summary (Last 24 hours) at 3/5/2022 1045  Last data filed at 3/5/2022 0638  Gross per 24 hour   Intake --   Output 1700 ml   Net -1700 ml       Laboratory                        Imaging  CT-CTA CHEST PULMONARY ARTERY W/ RECONS   Final Result      1.  No evidence of pulmonary embolism to the segmental branches. Subsegmental branches poorly evaluated due to motion artifact.   2.  Small bilateral pleural effusions.   3.  Atherosclerotic changes. Coronary artery atherosclerotic disease.   4.  3 pulmonary nodules measuring up to 3 mm in the left upper lobe. Recommendations below.      Low Risk: No routine follow-up      High Risk: Optional CT at 12 months      Comments: Use most suspicious nodule as guide to management. Follow-up intervals may vary according to size and risk.      Low Risk - Minimal or absent history of smoking and of  other known risk factors.      High Risk - History of smoking or of other known risk factors.      Note: These recommendations do not apply to lung cancer screening, patients with immunosuppression, or patients with known primary cancer.      Fleischner Society 2017 Guidelines for Management of Incidentally Detected Pulmonary Nodules in Adults      [id number] VIEW   Final Result      1.  No evidence of bowel obstruction.      2.  Moderate constipation.      DX-PELVIS-1 OR 2 VIEWS   Final Result      Post left hip hemiarthroplasty.      DX-FEMUR-2+ LEFT   Final Result      1.  Mildly displaced left femoral neck fracture.   2.  No distal femur fracture.      DX-HIP-UNILATERAL-WITH PELVIS-1 VIEW LEFT   Final Result      Left femoral neck fracture again noted.      DX-CHEST-PORTABLE (1 VIEW)   Final Result      Hypoinflation with bibasilar atelectasis.           Assessment/Plan  * Hip fracture requiring operative repair, left, closed, initial encounter (HCC)- (present on admission)  Assessment & Plan  Patient will be consulted by orthopedic surgery    ---x-ray showing femoral head fracture. Ortho was consulted and patient underwent Open treatment of left femoral neck fracture with hemiarthroplasty on 2/26 by Dr Marquis.   --- PT and OT    -- DVT ppx    Tachycardia- (present on admission)  Assessment & Plan  Sinus tachycardia on EKG  Thought to be secondary to pain and hypovolemia  Plan: Opiates, IV fluids  2/26: Improving   Tachy with elevated D-dimer; obtain CTA of the chest negative   3/1- sinus tachy. Not symptomatic. No other explanation. Start metoprolol 25 mg BID . Monitor with PCP and cardiology as OP   3/2- resolving     Essential hypertension- (present on admission)  Assessment & Plan  Will hold lisinopril   IV labetalol as needed   --- BP well controlled  3/1- increase. Start lisinopril and metoprolol. Continue to monitor   3/2- better, well controled. Continue current treatment       Anemia- (present on  admission)  Assessment & Plan  Her hemoglobin hematocrit, today at 7.8.  Will transfuse if less than 7  --Tender to acute blood loss anemia likely 2/2 acute blood loss anemia  3/1- hgb 8.4- improving. No need for transfusion     Diabetes (HCC)- (present on admission)  Assessment & Plan  Glyco at 6.7   -- diabetic diet    -- on iss , hypoglycemia protocol and accu-checks ac and hs  Resume metformin on discharge     Thrombocytopenia ([facility])- (present on admission)  Assessment & Plan  Reactive, possible due to anemia   3/1- improving     Schizophrenia ([facility])- (present on admission)  Assessment & Plan  Resume outpatient antipsychotic medications  New every 530 mg p.o. daily, Depakote 1000 g at p.m.  Quetiapine 200 at p.m. and 100 at am    QTc 395    Parkinson's disease (tremor, stiffness, slow motion, unstable posture) ([facility])- (present on admission)  Assessment & Plan  Fall precautions       VTE prophylaxis: Lovenox  I have performed a physical exam and reviewed and updated ROS and Plan today (3/5/2022). In review of yesterday's note (3/4/2022), there are no changes except as documented above.

## 2022-03-05 NOTE — CARE PLAN
Problem: Skin Integrity  Goal: Skin integrity is maintained or improved  Outcome: Progressing     Problem: Fall Risk  Goal: Patient will remain free from falls  Outcome: Progressing   The patient is Stable - Low risk of patient condition declining or worsening    Shift Goals  Clinical Goals: Pain control and comfort  Patient Goals: comfort  Family Goals: billy    Progress made toward(s) clinical / shift goals: All fall precautions are in place. Will continue to reorient patient and provide frequent ADLs assistance.     Patient is not progressing towards the following goals:n/a

## 2022-03-06 PROCEDURE — 302146: Performed by: INTERNAL MEDICINE

## 2022-03-06 PROCEDURE — A9270 NON-COVERED ITEM OR SERVICE: HCPCS | Performed by: HOSPITALIST

## 2022-03-06 PROCEDURE — A9270 NON-COVERED ITEM OR SERVICE: HCPCS | Performed by: INTERNAL MEDICINE

## 2022-03-06 PROCEDURE — 700102 HCHG RX REV CODE 250 W/ 637 OVERRIDE(OP): Performed by: HOSPITALIST

## 2022-03-06 PROCEDURE — A9270 NON-COVERED ITEM OR SERVICE: HCPCS | Performed by: PHYSICAL MEDICINE & REHABILITATION

## 2022-03-06 PROCEDURE — 99231 SBSQ HOSP IP/OBS SF/LOW 25: CPT | Performed by: INTERNAL MEDICINE

## 2022-03-06 PROCEDURE — 700111 HCHG RX REV CODE 636 W/ 250 OVERRIDE (IP): Performed by: INTERNAL MEDICINE

## 2022-03-06 PROCEDURE — 700101 HCHG RX REV CODE 250: Performed by: PHYSICAL MEDICINE & REHABILITATION

## 2022-03-06 PROCEDURE — 700102 HCHG RX REV CODE 250 W/ 637 OVERRIDE(OP): Performed by: PHYSICAL MEDICINE & REHABILITATION

## 2022-03-06 PROCEDURE — 770001 HCHG ROOM/CARE - MED/SURG/GYN PRIV*

## 2022-03-06 PROCEDURE — 700102 HCHG RX REV CODE 250 W/ 637 OVERRIDE(OP): Performed by: INTERNAL MEDICINE

## 2022-03-06 RX ADMIN — OXYCODONE 5 MG: 5 TABLET ORAL at 14:19

## 2022-03-06 RX ADMIN — OXYBUTYNIN CHLORIDE 5 MG: 5 TABLET, EXTENDED RELEASE ORAL at 17:37

## 2022-03-06 RX ADMIN — QUETIAPINE FUMARATE 200 MG: 100 TABLET ORAL at 20:34

## 2022-03-06 RX ADMIN — METFORMIN HYDROCHLORIDE 500 MG: 500 TABLET, EXTENDED RELEASE ORAL at 04:49

## 2022-03-06 RX ADMIN — LISINOPRIL 20 MG: 20 TABLET ORAL at 04:49

## 2022-03-06 RX ADMIN — ACETAMINOPHEN 1000 MG: 500 TABLET ORAL at 14:19

## 2022-03-06 RX ADMIN — METOPROLOL TARTRATE 25 MG: 25 TABLET, FILM COATED ORAL at 17:37

## 2022-03-06 RX ADMIN — SENNOSIDES AND DOCUSATE SODIUM 2 TABLET: 50; 8.6 TABLET ORAL at 04:49

## 2022-03-06 RX ADMIN — ENOXAPARIN SODIUM 40 MG: 40 INJECTION SUBCUTANEOUS at 04:49

## 2022-03-06 RX ADMIN — ATORVASTATIN CALCIUM 20 MG: 20 TABLET, FILM COATED ORAL at 20:34

## 2022-03-06 RX ADMIN — ACETAMINOPHEN 1000 MG: 500 TABLET ORAL at 20:34

## 2022-03-06 RX ADMIN — OXYCODONE 2.5 MG: 5 TABLET ORAL at 04:50

## 2022-03-06 RX ADMIN — METOPROLOL TARTRATE 25 MG: 25 TABLET, FILM COATED ORAL at 04:49

## 2022-03-06 RX ADMIN — SERTRALINE 100 MG: 100 TABLET, FILM COATED ORAL at 04:49

## 2022-03-06 RX ADMIN — POLYETHYLENE GLYCOL 3350 1 PACKET: 17 POWDER, FOR SOLUTION ORAL at 04:51

## 2022-03-06 RX ADMIN — QUETIAPINE FUMARATE 100 MG: 100 TABLET ORAL at 04:49

## 2022-03-06 RX ADMIN — LIDOCAINE 1 PATCH: 50 PATCH TOPICAL at 20:35

## 2022-03-06 RX ADMIN — OMEPRAZOLE 20 MG: 20 CAPSULE, DELAYED RELEASE ORAL at 04:50

## 2022-03-06 RX ADMIN — ACETAMINOPHEN 1000 MG: 500 TABLET ORAL at 08:23

## 2022-03-06 RX ADMIN — OXYCODONE 5 MG: 5 TABLET ORAL at 09:29

## 2022-03-06 RX ADMIN — Medication 1 TABLET: at 04:49

## 2022-03-06 RX ADMIN — DONEPEZIL HYDROCHLORIDE 10 MG: 5 TABLET, FILM COATED ORAL at 20:35

## 2022-03-06 RX ADMIN — OXYCODONE 5 MG: 5 TABLET ORAL at 17:37

## 2022-03-06 RX ADMIN — DIVALPROEX SODIUM 1000 MG: 500 TABLET, EXTENDED RELEASE ORAL at 20:34

## 2022-03-06 RX ADMIN — ARIPIPRAZOLE 30 MG: 10 TABLET ORAL at 17:37

## 2022-03-06 RX ADMIN — LEVOTHYROXINE SODIUM 25 MCG: 25 TABLET ORAL at 04:49

## 2022-03-06 ASSESSMENT — PAIN DESCRIPTION - PAIN TYPE: TYPE: ACUTE PAIN;SURGICAL PAIN

## 2022-03-06 NOTE — CARE PLAN
The patient is Stable - Low risk of patient condition declining or worsening    Shift Goals  Clinical Goals: Pain cotrol, Q2 turns  Patient Goals: Comfort  Family Goals: N/A    Progress made toward(s) clinical / shift goals:    Problem: Fall Risk  Goal: Patient will remain free from falls  Outcome: Not Progressing  Note: Call light & belongings in reach, bed in low position and locked, front wheel walker out of sight, siderails up x 3, pt wearing non-slip footwear, adequate lighting, clutter free environment, bed alarm on. Educated on level of fall risk, oriented to use of call light and encouraged pt to call before attempting to get out of bed.       Problem: Pain - Standard  Goal: Alleviation of pain or a reduction in pain to the patient’s comfort goal  Outcome: Progressing  Note:    Administering pain mediations as ordered (see MAR). Providing patient with cold packs, and repositioning as needed.           Patient is not progressing towards the following goals:

## 2022-03-06 NOTE — PROGRESS NOTES
56yoF with left displaced femoral neck s/p hemiarthroplasty 2/26.    S: Caregiver at bedside.  Erlinda is awake and alert. Denies chest pain, dyspnea or fever on ROS.    O:    Vitals:    03/06/22 0840   BP: 116/70   Pulse: 80   Resp: 17   Temp: 36.3 °C (97.4 °F)   SpO2: 94%     Exam:  General-NAD, alert and following commands  LLE-hip dressing c/d/i, +EHL/FHL/TA/GS motor, foot warm and well perfused    A: 56yoF with left displaced femoral neck   s/p hemiarthroplasty 2/26.    Recs:  --WBAT LLE  --posterior hip precautions  --PT/OT for mobilization  --heparin and SCDs while inpatient  --follow up Dr. Marquis 2 weeks postop  -- dispo per Med

## 2022-03-06 NOTE — PROGRESS NOTES
Received bedside report from day shift nurse. Patient resting in bed, call light within reach, bed in the low and locked position with upper side rails up. Assessment complete, vital signs stable, all needs met at this time. Hourly rounding in place, plan of care discussed with patient.

## 2022-03-06 NOTE — PROGRESS NOTES
[facility] Medicine Daily Progress Note    Date of Service  3/6/2022    Chief Complaint  Erlinda Verde is a 56 y.o. female admitted 2/25/2022 with   Chief Complaint   Patient presents with   • Leg Pain     PT BIB EMS from home. PT was seen at urgent care yesterday and was diagnosed with a femoral head fracture.  Pt care giver called due to increased pain. PT was referred for follow up with ortho but has not had follow up.        Hospital Course  This 56-year-old female with a developmental disability, size affective disorder, parkinsonism, hypertension, type 2 diabetes mellitus presented to the ER on 2/25/2022 after she had a ground-level fall.     She is a resident of [facility].  She landed on the left side, imaging showed femoral head fracture; orthopedic surgery was consulted, patient underwent open treatment of left femoral neck fracture with hemiarthroplasty on 2/26/2022.  PT/OT has evaluate the patient, recommend postacute.  SNF/rehab referral in place     Of note, patient noted to be tachycardic, D-dimer is elevated, CTA of the chest ordered, pending.     Hospital course was complicated with acute blood loss anemia, today her hemoglobin noted to be 7.6.  Will transfuse if less than 7.  Iron study was obtained consistent with iron deficiency, will provide IV iron.  Will provide PPI.  Patient need to follow-up with primary care physician as an outpatient     Interval Problem Update    2/27:  --No acute events overnight, patient underwent surgery yesterday.  We will continue DVT prophylaxis.  PT/OT has evaluate the patient, pending recommendation.  --Of note, patient abdomen is distended , KUB showing constipation;  Will provide aggressive  Bowel regemin  --Patient continues remain persistent tachycardia along with elevated D-dimer, will obtain CT of the chest.  --Has been requiring 2.5 L of oxygen, will titrate oxygen as needed.  Provide incentive spirometry    Plan of care has been discussed with the  nursing staff , caretaker at bedside.    2/28:  --No acute events overnight, patient tachycardia continues to improve.  CT of the chest ordered, discussed with patient and guardian Ann.  --Patient did have a good bowel movement yesterday  --Her hemoglobin to be 7.6, iron studies consistent with iron deficiency.  Will provide IV iron.  Monitor hemoglobin medically, if less than 7, transfuse  --DVT prophylaxis with Lovenox  --PT/OT has evaluate the patient, recommend postacute, SNF/physiatry referral in place    3/1- no event overnight. She remain pleasantly confused. Sitter present. BP slight elevated. Sinus tachycardia. Started metoprolol 25 mg BID. Not symptomatic. EKG reviewed. CT/PE negative for PE  For hypertension, resume home meds, lisinopril 20 mg daily  hgb 8.4. improving. No need for transfusion   Medically cleared to transfer at SNF     3/2- no event overnight. She is at her baseline. Able to say her name. Complaining of pain, but difficult to rate. Tachycardia improving. BP stable. Last bowel movement three days ago. Bowel protocol in place Medically cleared for placement     3/3- she is little more talkative and awake today. No event overnight. Vitals are stable. Pending rehab/snf transfer     3/4- no event. Report hip pain, meds available. Pending placement. No change on medications. Vitals are stable. She did have bowel movement a day prior.     3/5- no event. Able to understand better. Looks more comfortable. Vitals are stable. Oxycodone used only one time 2.5 mg yesterday. Pending [facility], placement     3/6- no event. She tells me that pain is better. She did have loose stool yesterday. BP stable. She is still on 1L of oxygen. She had used oxycodone 2.5 mg -5 mg twice yesterday. Medically cleared for SNF transferring      Plan of care has been discussed with  Nursing staff, case management.    Consultants/Specialty  orthopedics    Code Status  Full Code    Disposition  Patient is medically cleared  for discharge.   Anticipate discharge to to skilled nursing facility. Vs rehab   I have placed the appropriate orders for post-discharge needs.    Review of Systems  Review of Systems   Unable to perform ROS: Psychiatric disorder        Unable to obtain  2/2 patient mentation     Physical Exam  Temp:  [36.2 °C (97.1 °F)-36.7 °C (98.1 °F)] 36.2 °C (97.1 °F)  Pulse:  [74-94] 74  Resp:  [16] 16  BP: (128-136)/(64-86) 128/77  SpO2:  [91 %-94 %] 91 %    Physical Exam  Vitals and nursing note reviewed.   Constitutional:       Comments: And awake   HENT:      Head: Normocephalic and atraumatic.   Eyes:      Pupils: Pupils are equal, round, and reactive to light.   Cardiovascular:      Rate and Rhythm: Normal rate.      Pulses: Normal pulses.   Pulmonary:      Effort: No respiratory distress.      Breath sounds: Normal breath sounds.   Abdominal:      General: Bowel sounds are normal. There is distension.      Comments: Slight hard   Musculoskeletal:      Cervical back: Neck supple.      Right lower leg: No edema.      Left lower leg: No edema.      Comments: Decreased ROM on the Left side   Skin:     General: Skin is warm.   Neurological:      Mental Status: She is alert.      Comments: Alert and awake   Does folow commands    Psychiatric:         Mood and Affect: Mood normal.         Fluids    Intake/Output Summary (Last 24 hours) at 3/6/2022 1025  Last data filed at 3/6/2022 0600  Gross per 24 hour   Intake --   Output 1700 ml   Net -1700 ml       Laboratory                        Imaging  CT-CTA CHEST PULMONARY ARTERY W/ RECONS   Final Result      1.  No evidence of pulmonary embolism to the segmental branches. Subsegmental branches poorly evaluated due to motion artifact.   2.  Small bilateral pleural effusions.   3.  Atherosclerotic changes. Coronary artery atherosclerotic disease.   4.  3 pulmonary nodules measuring up to 3 mm in the left upper lobe. Recommendations below.      Low Risk: No routine follow-up       High Risk: Optional CT at 12 months      Comments: Use most suspicious nodule as guide to management. Follow-up intervals may vary according to size and risk.      Low Risk - Minimal or absent history of smoking and of other known risk factors.      High Risk - History of smoking or of other known risk factors.      Note: These recommendations do not apply to lung cancer screening, patients with immunosuppression, or patients with known primary cancer.      Fleischner Society 2017 Guidelines for Management of Incidentally Detected Pulmonary Nodules in Adults      [id number] VIEW   Final Result      1.  No evidence of bowel obstruction.      2.  Moderate constipation.      DX-PELVIS-1 OR 2 VIEWS   Final Result      Post left hip hemiarthroplasty.      DX-FEMUR-2+ LEFT   Final Result      1.  Mildly displaced left femoral neck fracture.   2.  No distal femur fracture.      DX-HIP-UNILATERAL-WITH PELVIS-1 VIEW LEFT   Final Result      Left femoral neck fracture again noted.      DX-CHEST-PORTABLE (1 VIEW)   Final Result      Hypoinflation with bibasilar atelectasis.           Assessment/Plan  * Hip fracture requiring operative repair, left, closed, initial encounter (HCC)- (present on admission)  Assessment & Plan  Patient will be consulted by orthopedic surgery    ---x-ray showing femoral head fracture. Ortho was consulted and patient underwent Open treatment of left femoral neck fracture with hemiarthroplasty on 2/26 by Dr Marquis.   --- PT and OT    -- DVT ppx    Tachycardia- (present on admission)  Assessment & Plan  Sinus tachycardia on EKG  Thought to be secondary to pain and hypovolemia  Plan: Opiates, IV fluids  2/26: Improving   Tachy with elevated D-dimer; obtain CTA of the chest negative   3/1- sinus tachy. Not symptomatic. No other explanation. Start metoprolol 25 mg BID . Monitor with PCP and cardiology as OP   3/2- resolving   3/6- resolved     Essential hypertension- (present on  admission)  Assessment & Plan  Will hold lisinopril   IV labetalol as needed   --- BP well controlled  3/1- increase. Start lisinopril and metoprolol. Continue to monitor   3/2- better, well controled. Continue current treatment   3/6- well controled       Anemia- (present on admission)  Assessment & Plan  Her hemoglobin hematocrit, today at 7.8.  Will transfuse if less than 7  --Tender to acute blood loss anemia likely 2/2 acute blood loss anemia  3/1- hgb 8.4- improving. No need for transfusion     Diabetes (HCC)- (present on admission)  Assessment & Plan  Glyco at 6.7   -- diabetic diet    -- on iss , hypoglycemia protocol and accu-checks ac and hs  Metformin resumed. Pt is medically cleared     Thrombocytopenia ([facility])- (present on admission)  Assessment & Plan  Reactive, possible due to anemia   3/1- improving     Schizophrenia ([facility])- (present on admission)  Assessment & Plan  Resume outpatient antipsychotic medications  New every 530 mg p.o. daily, Depakote 1000 g at p.m.  Quetiapine 200 at p.m. and 100 at am    QTc 395    Parkinson's disease (tremor, stiffness, slow motion, unstable posture) ([facility])- (present on admission)  Assessment & Plan  Fall precautions       VTE prophylaxis: Lovenox  I have performed a physical exam and reviewed and updated ROS and Plan today (3/6/2022). In review of yesterday's note (3/5/2022), there are no changes except as documented above.

## 2022-03-06 NOTE — PROGRESS NOTES
56yoF with left displaced femoral neck s/p hemiarthroplasty 2/26.    S: Caregiver at bedside.  Erlinda is awake and alert. Denies chest pain, dyspnea or fever on ROS.    O:    Vitals:    03/05/22 1600   BP: 136/64   Pulse: 94   Resp: 16   Temp: 36.7 °C (98.1 °F)   SpO2: 94%     No results for input(s): WBC, RBC, HEMOGLOBIN, HEMATOCRIT, MCV, MCH, RDW, PLATELETCT, MPV, NEUTSPOLYS, LYMPHOCYTES, MONOCYTES, EOSINOPHILS, BASOPHILS, RBCMORPHOLO in the last 72 hours.    Exam:  General-NAD, alert and following commands  LLE-hip dressing c/d/i, +EHL/FHL/TA/GS motor, foot warm and well perfused    A: 56yoF with left displaced femoral neck s/p hemiarthroplasty 2/26.    Recs:  --WBAT LLE  --posterior hip precautions  --PT/OT for mobilization  --heparin and SCDs while inpatient  --follow up Dr. Marquis 2 weeks postop  -- dispo per Med

## 2022-03-06 NOTE — CARE PLAN
The patient is Watcher - Medium risk of patient condition declining or worsening    Shift Goals  Clinical Goals: pain control  Patient Goals: rest  Family Goals: N/A    Progress made toward(s) clinical / shift goals:    Problem: Knowledge Deficit - Standard  Goal: Patient and family/care givers will demonstrate understanding of plan of care, disease process/condition, diagnostic tests and medications  Outcome: Progressing     Problem: Pain - Standard  Goal: Alleviation of pain or a reduction in pain to the patient’s comfort goal  Outcome: Progressing     Problem: Skin Integrity  Goal: Skin integrity is maintained or improved  Outcome: Progressing     Problem: Fall Risk  Goal: Patient will remain free from falls  Outcome: Progressing       Patient is not progressing towards the following goals:

## 2022-03-07 PROCEDURE — 700102 HCHG RX REV CODE 250 W/ 637 OVERRIDE(OP): Performed by: INTERNAL MEDICINE

## 2022-03-07 PROCEDURE — 99232 SBSQ HOSP IP/OBS MODERATE 35: CPT | Performed by: PHYSICAL MEDICINE & REHABILITATION

## 2022-03-07 PROCEDURE — A9270 NON-COVERED ITEM OR SERVICE: HCPCS | Performed by: PHYSICAL MEDICINE & REHABILITATION

## 2022-03-07 PROCEDURE — 700102 HCHG RX REV CODE 250 W/ 637 OVERRIDE(OP): Performed by: PHYSICAL MEDICINE & REHABILITATION

## 2022-03-07 PROCEDURE — 700101 HCHG RX REV CODE 250: Performed by: PHYSICAL MEDICINE & REHABILITATION

## 2022-03-07 PROCEDURE — 99231 SBSQ HOSP IP/OBS SF/LOW 25: CPT | Performed by: INTERNAL MEDICINE

## 2022-03-07 PROCEDURE — A9270 NON-COVERED ITEM OR SERVICE: HCPCS | Performed by: HOSPITALIST

## 2022-03-07 PROCEDURE — 770001 HCHG ROOM/CARE - MED/SURG/GYN PRIV*

## 2022-03-07 PROCEDURE — A9270 NON-COVERED ITEM OR SERVICE: HCPCS | Performed by: INTERNAL MEDICINE

## 2022-03-07 PROCEDURE — 700102 HCHG RX REV CODE 250 W/ 637 OVERRIDE(OP): Performed by: HOSPITALIST

## 2022-03-07 PROCEDURE — 700111 HCHG RX REV CODE 636 W/ 250 OVERRIDE (IP): Performed by: INTERNAL MEDICINE

## 2022-03-07 RX ORDER — POLYETHYLENE GLYCOL 3350 17 G/17G
1 POWDER, FOR SOLUTION ORAL
Status: DISCONTINUED | OUTPATIENT
Start: 2022-03-07 | End: 2022-03-16 | Stop reason: HOSPADM

## 2022-03-07 RX ORDER — AMOXICILLIN 250 MG
2 CAPSULE ORAL 2 TIMES DAILY
Status: DISCONTINUED | OUTPATIENT
Start: 2022-03-07 | End: 2022-03-16 | Stop reason: HOSPADM

## 2022-03-07 RX ORDER — BISACODYL 10 MG
10 SUPPOSITORY, RECTAL RECTAL
Status: DISCONTINUED | OUTPATIENT
Start: 2022-03-07 | End: 2022-03-16 | Stop reason: HOSPADM

## 2022-03-07 RX ADMIN — OXYCODONE 2.5 MG: 5 TABLET ORAL at 05:10

## 2022-03-07 RX ADMIN — POLYETHYLENE GLYCOL 3350 1 PACKET: 17 POWDER, FOR SOLUTION ORAL at 17:00

## 2022-03-07 RX ADMIN — LIDOCAINE 1 PATCH: 50 PATCH TOPICAL at 21:03

## 2022-03-07 RX ADMIN — DIVALPROEX SODIUM 1000 MG: 500 TABLET, EXTENDED RELEASE ORAL at 21:03

## 2022-03-07 RX ADMIN — ACETAMINOPHEN 1000 MG: 500 TABLET ORAL at 14:29

## 2022-03-07 RX ADMIN — MAGNESIUM HYDROXIDE 30 ML: 400 SUSPENSION ORAL at 17:01

## 2022-03-07 RX ADMIN — LEVOTHYROXINE SODIUM 25 MCG: 25 TABLET ORAL at 05:09

## 2022-03-07 RX ADMIN — ATORVASTATIN CALCIUM 20 MG: 20 TABLET, FILM COATED ORAL at 21:03

## 2022-03-07 RX ADMIN — OXYCODONE 2.5 MG: 5 TABLET ORAL at 14:29

## 2022-03-07 RX ADMIN — ENOXAPARIN SODIUM 40 MG: 40 INJECTION SUBCUTANEOUS at 05:08

## 2022-03-07 RX ADMIN — OXYCODONE 5 MG: 5 TABLET ORAL at 21:03

## 2022-03-07 RX ADMIN — SERTRALINE 100 MG: 100 TABLET, FILM COATED ORAL at 05:09

## 2022-03-07 RX ADMIN — OMEPRAZOLE 20 MG: 20 CAPSULE, DELAYED RELEASE ORAL at 05:08

## 2022-03-07 RX ADMIN — METOPROLOL TARTRATE 25 MG: 25 TABLET, FILM COATED ORAL at 17:01

## 2022-03-07 RX ADMIN — DONEPEZIL HYDROCHLORIDE 10 MG: 5 TABLET, FILM COATED ORAL at 21:03

## 2022-03-07 RX ADMIN — LISINOPRIL 20 MG: 20 TABLET ORAL at 05:09

## 2022-03-07 RX ADMIN — ARIPIPRAZOLE 30 MG: 10 TABLET ORAL at 17:01

## 2022-03-07 RX ADMIN — POLYETHYLENE GLYCOL 3350 1 PACKET: 17 POWDER, FOR SOLUTION ORAL at 05:08

## 2022-03-07 RX ADMIN — QUETIAPINE FUMARATE 100 MG: 100 TABLET ORAL at 05:09

## 2022-03-07 RX ADMIN — ACETAMINOPHEN 1000 MG: 500 TABLET ORAL at 09:31

## 2022-03-07 RX ADMIN — ACETAMINOPHEN 1000 MG: 500 TABLET ORAL at 21:03

## 2022-03-07 RX ADMIN — SENNOSIDES AND DOCUSATE SODIUM 2 TABLET: 50; 8.6 TABLET ORAL at 05:09

## 2022-03-07 RX ADMIN — Medication 1 TABLET: at 05:08

## 2022-03-07 RX ADMIN — METFORMIN HYDROCHLORIDE 500 MG: 500 TABLET, EXTENDED RELEASE ORAL at 05:08

## 2022-03-07 RX ADMIN — SENNOSIDES AND DOCUSATE SODIUM 2 TABLET: 50; 8.6 TABLET ORAL at 18:12

## 2022-03-07 RX ADMIN — METOPROLOL TARTRATE 25 MG: 25 TABLET, FILM COATED ORAL at 05:09

## 2022-03-07 RX ADMIN — OXYBUTYNIN CHLORIDE 5 MG: 5 TABLET, EXTENDED RELEASE ORAL at 18:12

## 2022-03-07 RX ADMIN — QUETIAPINE FUMARATE 200 MG: 100 TABLET ORAL at 21:03

## 2022-03-07 ASSESSMENT — PAIN SCALES - PAIN ASSESSMENT IN ADVANCED DEMENTIA (PAINAD)
NEGVOCALIZATION: OCCASIONAL MOAN/GROAN, LOW SPEECH, NEGATIVE/DISAPPROVING QUALITY
FACIALEXPRESSION: SMILING OR INEXPRESSIVE
BREATHING: NORMAL
TOTALSCORE: 2
CONSOLABILITY: NO NEED TO CONSOLE
BODYLANGUAGE: RELAXED
BODYLANGUAGE: RELAXED
FACIALEXPRESSION: SMILING OR INEXPRESSIVE
CONSOLABILITY: DISTRACTED OR REASSURED BY VOICE/TOUCH
TOTALSCORE: 1
BREATHING: NORMAL
NEGVOCALIZATION: OCCASIONAL MOAN/GROAN, LOW SPEECH, NEGATIVE/DISAPPROVING QUALITY

## 2022-03-07 ASSESSMENT — FIBROSIS 4 INDEX: FIB4 SCORE: 2.74

## 2022-03-07 ASSESSMENT — PAIN DESCRIPTION - PAIN TYPE
TYPE: ACUTE PAIN
TYPE: ACUTE PAIN
TYPE: ACUTE PAIN;SURGICAL PAIN

## 2022-03-07 NOTE — PROGRESS NOTES
Physical Medicine and Rehabilitation Consultation              Date of initial consultation: 3/1/2022  Requested by: Blessing Mota MD  Consulting physician: Rio Morales D.O.  Reason for consultation: assessment of rehabilitation needs  LOS: 10 Day(s)    SUBJECTIVE  Patient seen in room with caregiver  GI: last 3/5, has not been taking suppository  Psych: slept well last night per patient but Patient is unreliable historian  MSK: crying and reporting pain in left hip    Medications:  Current Facility-Administered Medications   Medication Dose   • oxyCODONE immediate-release (ROXICODONE) tablet 2.5 mg  2.5 mg   • donepezil (ARICEPT) tablet 10 mg  10 mg   • metFORMIN ER (GLUCOPHAGE XR) tablet 500 mg  500 mg   • therapeutic multivitamin-minerals (THERAGRAN-M) tablet 1 Tablet  1 Tablet   • polyethylene glycol/lytes (MIRALAX) PACKET 1 Packet  1 Packet   • metoprolol tartrate (LOPRESSOR) tablet 25 mg  25 mg   • lisinopril (PRINIVIL) tablet 20 mg  20 mg   • acetaminophen (TYLENOL) tablet 1,000 mg  1,000 mg   • lidocaine (LIDODERM) 5 % 1 Patch  1 Patch   • omeprazole (PRILOSEC) capsule 20 mg  20 mg   • levothyroxine (SYNTHROID) tablet 25 mcg  25 mcg   • bisacodyl (DULCOLAX) suppository 10 mg  10 mg   • senna-docusate (PERICOLACE or SENOKOT S) 8.6-50 MG per tablet 2 Tablet  2 Tablet    And   • polyethylene glycol/lytes (MIRALAX) PACKET 1 Packet  1 Packet    And   • magnesium hydroxide (MILK OF MAGNESIA) suspension 30 mL  30 mL    And   • bisacodyl (DULCOLAX) suppository 10 mg  10 mg   • enoxaparin (LOVENOX) inj 40 mg  40 mg   • acetaminophen (Tylenol) tablet 650 mg  650 mg   • Pharmacy Consult Request ...Pain Management Review 1 Each  1 Each   • oxyCODONE immediate-release (ROXICODONE) tablet 2.5 mg  2.5 mg    Or   • oxyCODONE immediate-release (ROXICODONE) tablet 5 mg  5 mg    Or   • morphine 4 MG/ML injection 2 mg  2 mg   • labetalol (NORMODYNE/TRANDATE) injection 10 mg  10 mg   • ondansetron (ZOFRAN)  "syringe/vial injection 4 mg  4 mg   • ondansetron (ZOFRAN ODT) dispertab 4 mg  4 mg   • promethazine (PHENERGAN) tablet 12.5-25 mg  12.5-25 mg   • promethazine (PHENERGAN) suppository 12.5-25 mg  12.5-25 mg   • prochlorperazine (COMPAZINE) injection 5-10 mg  5-10 mg   • ARIPiprazole (Abilify) tablet 30 mg  30 mg   • atorvastatin (LIPITOR) tablet 20 mg  20 mg   • divalproex ER (DEPAKOTE ER) tablet 1,000 mg  1,000 mg   • oxybutynin SR (DITROPAN-XL) tablet 5 mg  5 mg   • sertraline (Zoloft) tablet 100 mg  100 mg   • QUEtiapine (Seroquel) tablet 200 mg  200 mg    And   • QUEtiapine (Seroquel) tablet 100 mg  100 mg     Allergies:  Allergies   Allergen Reactions   • Risperdal        Physical Exam:  Vitals: /57   Pulse 71   Temp 36.5 °C (97.7 °F) (Temporal)   Resp 16   Ht 1.651 m (5' 5\")   Wt 69.6 kg (153 lb 7 oz)   SpO2 92%   General: well-groomed in no acute distress, caregivers present  Eyes: no scleral icterus or conjunctival injection  Ears, nose, mouth and throat: moist oral mucosa  Cardiovascular: good peripheral perfusion, no palor, swelling improved in left leg  Respiratory: breathing comfortably without use of accessory muscles, +NC (1L oxygen running)  Gastrointestinal: soft, nontender, nondistended  Genitourinary: no indwelling salvador  Musculoskeletal: good symmetry in bilateral shoulders, tender to palpation in left leg  Skin: no wounds seen on exposed skin    Neurologic:  Mental status: alert, unreliable historian  Speech: no aphasia or dysarthria  Motor - good motor strength throughout bilateral upper and lower limbs as she is moving them spontaneously against gravity  Sensory:   intact to light touch through out  Tone: no spasticity noted  Psychiatric: flat affect  Hematologic/lymphatic/immunologic: ++IV access, no bruises seen on exposed skin    Labs: Reviewed and significant for   No results for input(s): RBC, HEMOGLOBIN, HEMATOCRIT, PLATELETCT, PROTHROMBTM, APTT, INR, IRON, FERRITIN, TOTIRONBC " in the last 72 hours.      No results found for this or any previous visit (from the past 24 hour(s)).      ASSESSMENT:  IMPRESSION: The patient is a 56 y.o. female with a past medical history of hypertension, diabetes mellitus type II, Schizoaffective disorder, Parkinsonism, developmental disability, left femoral head fracture diagnosed 2/24/2022 at urgent care;  who presented on 2/25/2022  7:22 PM with worsening left leg pain and found to have Mildly displaced left femoral neck fracture s/p Open treatment of left femoral neck fracture with hemiarthroplasty on 2/26/2022 by Dr. Ubaldo Marquis MD.    [facility] Code: 0008.11 - Orthopaedic Disorders: Status Post Unilateral Hip Fracture  -With acute secondary complications of: impaired ADLs and mobility, posttraumatic and postsurgical pain  -with chronic conditions of: hypertension, diabetes mellitus type II, Schizoaffective disorder, Parkinsonism, developmental disability,  -and:     Medical Complexity:  Anemia  Hyperglycemia  Thrombocytopenia, mild    Data points:  Reviewed results of radiology tests  Reviewed clinical lab tests  Reviewed old records    RECOMMENDATIONS:  ##MSK  #Impaired ADLs and mobility: Agree with continuing OT/PT while admitted here.    Primary barrier to therapies has been uncontrolled pain. Highly recommend ice modality as swelling in left leg unchanged. Also gave patient exercise to bend and straighten left knee while in bed to pump out fluid from left leg. Recommend out of bed with meals - start with breakfast - pain likely barrier so scheduled oxycodone for morning.    Patient is currently not functioning at baseline as detailed on PT and OT evaluations. Patient has good home support with 24 hour assistance. Therefore, it is my medical opinion that she would benefit from aggressive therapies in OT and PT  in acute inpatient rehabilitation with goal of returning group home with caregivers.    Current barrier to rehabilitation include:    -Administrative denial    Estimated length of stay: 14-16 days  Anticipated discharge destination: group home with caregivers  Prognosis: good  Code: full resuscitation    #Posttraumatic pain, acute, uncontrolled  #Postsurgical pain, acute, uncontrolled  #Neuropathic pain, acute, uncontrolled  Patient has difficulty conveying pain - recommend scheduled pain medications to improve therapy tolerance  Continue tylenol 1000mg TID for basal pain management, lidocaine 5% 1 patch daily to left hip - avoid incision area  Recommend premedicating patient with oxycodone prior to therapies  Recommend ice modality to left hip for better pain management and swelling management    #Mildly displaced left femoral neck fracture s/p Open treatment of left femoral neck fracture with hemiarthroplasty on 2/26/2022 by Dr. Ubaldo Marquis MD  Weight bearing as tolerated to left lower limb    Recommend out of bed and in chair for meals    ##NEURO  #History of developmental disability  Caregiver support for assistance with understanding patient's wants and needs    ##CARDIAC  #Foot swelling, acute  Ordered KATIE hose for swelling management - put on in AM, remove before sleeping  Agree with SCDs     ##GI  Last BM: 3/5  GI prophylaxis: omeprazole 20mg daily  #Neurogenic bowel, controlled  Continue daily bisacodyl suppository for social continence, pericolace, miralax 17g BID  Recommend administering milk of mag 2 hrs prior to suppository     ##  #Neurogenic bladder, controlled  Continue oxybutynin SR 5mg QHS  Recommend starting timed voids Q4hr while awake, purewick at night    ##SKIN  #Incision site  Routine wound care    DVT chemoprophlaxis: lovenox 40mg daily  Code: full resuscitation    Thank you for allowing us to participate in the care of this patient. Physiatry will continue to follow and provide recommendations, as needed.    Patient was seen for 27 minutes on unit/floor of which > 50% of time was spent on counseling and  coordination of care regarding the above, including prognosis, risk reduction, benefits of treatment, and options for next stage of care.    Rio Morales D.O.   Physical Medicine and Rehabilitation     I have performed a physical exam and reviewed and updated history and plan today (3/7/2022).     Please note that this dictation was created using voice recognition software. I have made every reasonable attempt to correct obvious errors, but there may be errors of grammar and possibly content that I did not discover before finalizing the note.

## 2022-03-07 NOTE — CARE PLAN
The patient is Stable - Low risk of patient condition declining or worsening    Shift Goals  Clinical Goals: Vitals WDL, pain control, safety  Patient Goals: rest  Family Goals: ARLETTE    Progress made toward(s) clinical / shift goals:    Problem: Pain - Standard  Goal: Alleviation of pain or a reduction in pain to the patient’s comfort goal  Outcome: Progressing     Problem: Skin Integrity  Goal: Skin integrity is maintained or improved  Outcome: Progressing     Problem: Fall Risk  Goal: Patient will remain free from falls  Outcome: Progressing       Patient is not progressing towards the following goals:      Problem: Knowledge Deficit - Standard  Goal: Patient and family/care givers will demonstrate understanding of plan of care, disease process/condition, diagnostic tests and medications  Outcome: Not Progressing

## 2022-03-07 NOTE — PROGRESS NOTES
Received report from devang RN. Patient in bed locked in lowest position with call light in reach. Caregiver at bedside and attentive to patient. POC discussed. All questions answered.

## 2022-03-07 NOTE — CARE PLAN
Problem: Pain - Standard  Goal: Alleviation of pain or a reduction in pain to the patient’s comfort goal  Outcome: Progressing  Note:  Patient educated on 0-10 pain scale, and non-pharmacological pain control. Encouraged to verbalize and contact staff when in pain.  Administered PRN medication as needed.       Problem: Skin Integrity  Goal: Skin integrity is maintained or improved  Outcome: Progressing  Note:  Turned & repositioned every 2 hours,kept dry and clean, mepilex, waffle mattress overlay, barrier paste and pillows on bony prominences to prevent skin breakdown       Problem: Fall Risk  Goal: Patient will remain free from falls  Outcome: Progressing  Note:  Verified bed is locked and in lowest position, hourly rounding in place, bed alarm on, call light with in reach, slip socks on, educated patient on use of call light for assistance.     The patient is Stable - Low risk of patient condition declining or worsening    Shift Goals  Clinical Goals: Pain control, safety, O2 wean  Patient Goals: comfort, pain control  Family Goals: ARLETTE    Progress made toward(s) clinical / shift goals: Pain is managed with current medication regimen.  Patient has a care giver at bedside to assist in call light usage for assistance throughout the day.    Patient is tolerating K1ieyll and remain clean and dry throughout the day.

## 2022-03-07 NOTE — PROGRESS NOTES
[facility] Medicine Daily Progress Note    Date of Service  3/7/2022    Chief Complaint  Erlinda Verde is a 56 y.o. female admitted 2/25/2022 with   Chief Complaint   Patient presents with   • Leg Pain     PT BIB EMS from home. PT was seen at urgent care yesterday and was diagnosed with a femoral head fracture.  Pt care giver called due to increased pain. PT was referred for follow up with ortho but has not had follow up.        Hospital Course  This 56-year-old female with a developmental disability, size affective disorder, parkinsonism, hypertension, type 2 diabetes mellitus presented to the ER on 2/25/2022 after she had a ground-level fall.     She is a resident of [facility].  She landed on the left side, imaging showed femoral head fracture; orthopedic surgery was consulted, patient underwent open treatment of left femoral neck fracture with hemiarthroplasty on 2/26/2022.  PT/OT has evaluate the patient, recommend postacute.  SNF/rehab referral in place     Of note, patient noted to be tachycardic, D-dimer is elevated, CTA of the chest ordered, pending.     Hospital course was complicated with acute blood loss anemia, today her hemoglobin noted to be 7.6.  Will transfuse if less than 7.  Iron study was obtained consistent with iron deficiency, will provide IV iron.  Will provide PPI.  Patient need to follow-up with primary care physician as an outpatient     Interval Problem Update    2/27:  --No acute events overnight, patient underwent surgery yesterday.  We will continue DVT prophylaxis.  PT/OT has evaluate the patient, pending recommendation.  --Of note, patient abdomen is distended , KUB showing constipation;  Will provide aggressive  Bowel regemin  --Patient continues remain persistent tachycardia along with elevated D-dimer, will obtain CT of the chest.  --Has been requiring 2.5 L of oxygen, will titrate oxygen as needed.  Provide incentive spirometry    Plan of care has been discussed with the  nursing staff , caretaker at bedside.    2/28:  --No acute events overnight, patient tachycardia continues to improve.  CT of the chest ordered, discussed with patient and guardian Ann.  --Patient did have a good bowel movement yesterday  --Her hemoglobin to be 7.6, iron studies consistent with iron deficiency.  Will provide IV iron.  Monitor hemoglobin medically, if less than 7, transfuse  --DVT prophylaxis with Lovenox  --PT/OT has evaluate the patient, recommend postacute, SNF/physiatry referral in place    3/1- no event overnight. She remain pleasantly confused. Sitter present. BP slight elevated. Sinus tachycardia. Started metoprolol 25 mg BID. Not symptomatic. EKG reviewed. CT/PE negative for PE  For hypertension, resume home meds, lisinopril 20 mg daily  hgb 8.4. improving. No need for transfusion   Medically cleared to transfer at SNF     3/2- no event overnight. She is at her baseline. Able to say her name. Complaining of pain, but difficult to rate. Tachycardia improving. BP stable. Last bowel movement three days ago. Bowel protocol in place Medically cleared for placement     3/3- she is little more talkative and awake today. No event overnight. Vitals are stable. Pending rehab/snf transfer     3/4- no event. Report hip pain, meds available. Pending placement. No change on medications. Vitals are stable. She did have bowel movement a day prior.     3/5- no event. Able to understand better. Looks more comfortable. Vitals are stable. Oxycodone used only one time 2.5 mg yesterday. Pending [facility], placement     3/6- no event. She tells me that pain is better. She did have loose stool yesterday. BP stable. She is still on 1L of oxygen. She had used oxycodone 2.5 mg -5 mg twice yesterday. Medically cleared for SNF transferring      3/7- no event. Vitals are stable. Last bowel movement since 3/5. She is taking stool softer. Discuss with RN to give miralax. Continue PT/OT. Pending transfer to SNF     Plan of  care has been discussed with  Nursing staff, case management.    Consultants/Specialty  orthopedics    Code Status  Full Code    Disposition  Patient is medically cleared for discharge.   Anticipate discharge to to skilled nursing facility. Vs rehab   I have placed the appropriate orders for post-discharge needs.    Review of Systems  Review of Systems   Unable to perform ROS: Psychiatric disorder        Unable to obtain  2/2 patient mentation     Physical Exam  Temp:  [36.4 °C (97.5 °F)-36.5 °C (97.7 °F)] 36.5 °C (97.7 °F)  Pulse:  [] 71  Resp:  [16-17] 16  BP: (102-118)/(57-76) 102/57  SpO2:  [91 %-95 %] 92 %    Physical Exam  Vitals and nursing note reviewed.   Constitutional:       Comments: And awake   HENT:      Head: Normocephalic and atraumatic.   Eyes:      Pupils: Pupils are equal, round, and reactive to light.   Cardiovascular:      Rate and Rhythm: Normal rate.      Pulses: Normal pulses.   Pulmonary:      Effort: No respiratory distress.      Breath sounds: Normal breath sounds.   Abdominal:      General: Bowel sounds are normal. There is distension.      Comments: Slight hard   Musculoskeletal:      Cervical back: Neck supple.      Right lower leg: No edema.      Left lower leg: No edema.      Comments: Decreased ROM on the Left side   Skin:     General: Skin is warm.   Neurological:      Mental Status: She is alert.      Comments: Alert and awake   Does folow commands    Psychiatric:         Mood and Affect: Mood normal.         Fluids    Intake/Output Summary (Last 24 hours) at 3/7/2022 1129  Last data filed at 3/7/2022 0600  Gross per 24 hour   Intake --   Output 600 ml   Net -600 ml       Laboratory                        Imaging  CT-CTA CHEST PULMONARY ARTERY W/ RECONS   Final Result      1.  No evidence of pulmonary embolism to the segmental branches. Subsegmental branches poorly evaluated due to motion artifact.   2.  Small bilateral pleural effusions.   3.  Atherosclerotic changes.  Coronary artery atherosclerotic disease.   4.  3 pulmonary nodules measuring up to 3 mm in the left upper lobe. Recommendations below.      Low Risk: No routine follow-up      High Risk: Optional CT at 12 months      Comments: Use most suspicious nodule as guide to management. Follow-up intervals may vary according to size and risk.      Low Risk - Minimal or absent history of smoking and of other known risk factors.      High Risk - History of smoking or of other known risk factors.      Note: These recommendations do not apply to lung cancer screening, patients with immunosuppression, or patients with known primary cancer.      Fleischner Society 2017 Guidelines for Management of Incidentally Detected Pulmonary Nodules in Adults      KE-YNZBVYL-0 VIEW   Final Result      1.  No evidence of bowel obstruction.      2.  Moderate constipation.      DX-PELVIS-1 OR 2 VIEWS   Final Result      Post left hip hemiarthroplasty.      DX-FEMUR-2+ LEFT   Final Result      1.  Mildly displaced left femoral neck fracture.   2.  No distal femur fracture.      DX-HIP-UNILATERAL-WITH PELVIS-1 VIEW LEFT   Final Result      Left femoral neck fracture again noted.      DX-CHEST-PORTABLE (1 VIEW)   Final Result      Hypoinflation with bibasilar atelectasis.           Assessment/Plan  * Hip fracture requiring operative repair, left, closed, initial encounter (HCC)- (present on admission)  Assessment & Plan  Patient will be consulted by orthopedic surgery    ---x-ray showing femoral head fracture. Ortho was consulted and patient underwent Open treatment of left femoral neck fracture with hemiarthroplasty on 2/26 by Dr Marquis.   --- PT and OT    -- DVT ppx    Tachycardia- (present on admission)  Assessment & Plan  Sinus tachycardia on EKG  Thought to be secondary to pain and hypovolemia  Plan: Opiates, IV fluids  2/26: Improving   Tachy with elevated D-dimer; obtain CTA of the chest negative   3/1- sinus tachy. Not symptomatic. No other  explanation. Start metoprolol 25 mg BID . Monitor with PCP and cardiology as OP   3/2- resolving   3/6- resolved     Essential hypertension- (present on admission)  Assessment & Plan  Will hold lisinopril   IV labetalol as needed   --- BP well controlled  3/1- increase. Start lisinopril and metoprolol. Continue to monitor   3/2- better, well controled. Continue current treatment   3/6- well controled       Anemia- (present on admission)  Assessment & Plan  Her hemoglobin hematocrit, today at 7.8.  Will transfuse if less than 7  --Tender to acute blood loss anemia likely 2/2 acute blood loss anemia  3/1- hgb 8.4- improving. No need for transfusion     Diabetes (HCC)- (present on admission)  Assessment & Plan  Glyco at 6.7   -- diabetic diet    -- on iss , hypoglycemia protocol and accu-checks ac and hs  Metformin resumed. Pt is medically cleared     Thrombocytopenia (HCC)- (present on admission)  Assessment & Plan  Reactive, possible due to anemia   3/1- improving     Schizophrenia (HCC)- (present on admission)  Assessment & Plan  Resume outpatient antipsychotic medications  New every 530 mg p.o. daily, Depakote 1000 g at p.m.  Quetiapine 200 at p.m. and 100 at am    QTc 395    Parkinson's disease (tremor, stiffness, slow motion, unstable posture) (HCC)- (present on admission)  Assessment & Plan  Fall precautions       VTE prophylaxis: Lovenox  I have performed a physical exam and reviewed and updated ROS and Plan today (3/7/2022). In review of yesterday's note (3/6/2022), there are no changes except as documented above.

## 2022-03-08 PROCEDURE — A9270 NON-COVERED ITEM OR SERVICE: HCPCS | Performed by: HOSPITALIST

## 2022-03-08 PROCEDURE — 700102 HCHG RX REV CODE 250 W/ 637 OVERRIDE(OP): Performed by: INTERNAL MEDICINE

## 2022-03-08 PROCEDURE — A9270 NON-COVERED ITEM OR SERVICE: HCPCS | Performed by: INTERNAL MEDICINE

## 2022-03-08 PROCEDURE — 700102 HCHG RX REV CODE 250 W/ 637 OVERRIDE(OP): Performed by: PHYSICAL MEDICINE & REHABILITATION

## 2022-03-08 PROCEDURE — 97535 SELF CARE MNGMENT TRAINING: CPT | Mod: CO

## 2022-03-08 PROCEDURE — 99231 SBSQ HOSP IP/OBS SF/LOW 25: CPT | Performed by: INTERNAL MEDICINE

## 2022-03-08 PROCEDURE — 97530 THERAPEUTIC ACTIVITIES: CPT | Mod: CO

## 2022-03-08 PROCEDURE — A9270 NON-COVERED ITEM OR SERVICE: HCPCS | Performed by: PHYSICAL MEDICINE & REHABILITATION

## 2022-03-08 PROCEDURE — 700101 HCHG RX REV CODE 250: Performed by: PHYSICAL MEDICINE & REHABILITATION

## 2022-03-08 PROCEDURE — 770001 HCHG ROOM/CARE - MED/SURG/GYN PRIV*

## 2022-03-08 PROCEDURE — 700111 HCHG RX REV CODE 636 W/ 250 OVERRIDE (IP): Performed by: INTERNAL MEDICINE

## 2022-03-08 PROCEDURE — 700102 HCHG RX REV CODE 250 W/ 637 OVERRIDE(OP): Performed by: HOSPITALIST

## 2022-03-08 RX ADMIN — METOPROLOL TARTRATE 25 MG: 25 TABLET, FILM COATED ORAL at 17:16

## 2022-03-08 RX ADMIN — LISINOPRIL 20 MG: 20 TABLET ORAL at 05:01

## 2022-03-08 RX ADMIN — ENOXAPARIN SODIUM 40 MG: 40 INJECTION SUBCUTANEOUS at 05:02

## 2022-03-08 RX ADMIN — SENNOSIDES AND DOCUSATE SODIUM 2 TABLET: 50; 8.6 TABLET ORAL at 05:02

## 2022-03-08 RX ADMIN — LEVOTHYROXINE SODIUM 25 MCG: 25 TABLET ORAL at 05:01

## 2022-03-08 RX ADMIN — ACETAMINOPHEN 1000 MG: 500 TABLET ORAL at 09:32

## 2022-03-08 RX ADMIN — ACETAMINOPHEN 1000 MG: 500 TABLET ORAL at 21:24

## 2022-03-08 RX ADMIN — METFORMIN HYDROCHLORIDE 500 MG: 500 TABLET, EXTENDED RELEASE ORAL at 05:02

## 2022-03-08 RX ADMIN — POLYETHYLENE GLYCOL 3350 1 PACKET: 17 POWDER, FOR SOLUTION ORAL at 17:17

## 2022-03-08 RX ADMIN — LIDOCAINE 1 PATCH: 50 PATCH TOPICAL at 21:23

## 2022-03-08 RX ADMIN — QUETIAPINE FUMARATE 100 MG: 100 TABLET ORAL at 05:01

## 2022-03-08 RX ADMIN — Medication 1 TABLET: at 05:01

## 2022-03-08 RX ADMIN — OXYCODONE 2.5 MG: 5 TABLET ORAL at 17:17

## 2022-03-08 RX ADMIN — OXYCODONE 5 MG: 5 TABLET ORAL at 04:29

## 2022-03-08 RX ADMIN — ARIPIPRAZOLE 30 MG: 10 TABLET ORAL at 17:16

## 2022-03-08 RX ADMIN — OXYBUTYNIN CHLORIDE 5 MG: 5 TABLET, EXTENDED RELEASE ORAL at 22:49

## 2022-03-08 RX ADMIN — METOPROLOL TARTRATE 25 MG: 25 TABLET, FILM COATED ORAL at 05:02

## 2022-03-08 RX ADMIN — ATORVASTATIN CALCIUM 20 MG: 20 TABLET, FILM COATED ORAL at 21:25

## 2022-03-08 RX ADMIN — SENNOSIDES AND DOCUSATE SODIUM 2 TABLET: 50; 8.6 TABLET ORAL at 17:16

## 2022-03-08 RX ADMIN — OXYCODONE 5 MG: 5 TABLET ORAL at 21:24

## 2022-03-08 RX ADMIN — DIVALPROEX SODIUM 1000 MG: 500 TABLET, EXTENDED RELEASE ORAL at 21:24

## 2022-03-08 RX ADMIN — QUETIAPINE FUMARATE 200 MG: 100 TABLET ORAL at 21:24

## 2022-03-08 RX ADMIN — DONEPEZIL HYDROCHLORIDE 10 MG: 5 TABLET, FILM COATED ORAL at 21:24

## 2022-03-08 RX ADMIN — SERTRALINE 100 MG: 100 TABLET, FILM COATED ORAL at 05:01

## 2022-03-08 RX ADMIN — ACETAMINOPHEN 1000 MG: 500 TABLET ORAL at 14:57

## 2022-03-08 RX ADMIN — OMEPRAZOLE 20 MG: 20 CAPSULE, DELAYED RELEASE ORAL at 05:02

## 2022-03-08 RX ADMIN — POLYETHYLENE GLYCOL 3350 1 PACKET: 17 POWDER, FOR SOLUTION ORAL at 05:02

## 2022-03-08 ASSESSMENT — PAIN DESCRIPTION - PAIN TYPE
TYPE: ACUTE PAIN

## 2022-03-08 ASSESSMENT — COGNITIVE AND FUNCTIONAL STATUS - GENERAL
PERSONAL GROOMING: A LITTLE
EATING MEALS: A LITTLE
HELP NEEDED FOR BATHING: A LOT
SUGGESTED CMS G CODE MODIFIER DAILY ACTIVITY: CK
DRESSING REGULAR UPPER BODY CLOTHING: A LITTLE
TOILETING: TOTAL
DRESSING REGULAR LOWER BODY CLOTHING: A LOT
DAILY ACTIVITIY SCORE: 14

## 2022-03-08 ASSESSMENT — PATIENT HEALTH QUESTIONNAIRE - PHQ9
SUM OF ALL RESPONSES TO PHQ9 QUESTIONS 1 AND 2: 0
2. FEELING DOWN, DEPRESSED, IRRITABLE, OR HOPELESS: NOT AT ALL
1. LITTLE INTEREST OR PLEASURE IN DOING THINGS: NOT AT ALL

## 2022-03-08 NOTE — DISCHARGE PLANNING
Anticipated Discharge Disposition: SNF     Action: Per IDR, pt cont to be medically cleared for DC to SNF. PASRR still pending, went to level 2 review.      Barriers to Discharge: pending PASRR     Plan: cont to f/u for DC needs.

## 2022-03-08 NOTE — PROGRESS NOTES
56yoF with left displaced femoral neck s/p hemiarthroplasty 2/26.    S: Caregiver at bedside.  Erlinda is awake and alert. Denies chest pain, dyspnea or fever on ROS.    O:    Vitals:    03/07/22 1701   BP: 115/68   Pulse: 81   Resp:    Temp:    SpO2:      Exam:  General-NAD, alert and following commands  LLE-hip dressing c/d/i, +EHL/FHL/TA/GS motor, foot warm and well perfused    A: 56yoF with left displaced femoral neck   s/p hemiarthroplasty 2/26.    Recs:  --WBAT LLE  --posterior hip precautions  --PT/OT for mobilization  --heparin and SCDs while inpatient  --follow up Dr. Marquis 2 weeks postop  -- dispo per Med

## 2022-03-08 NOTE — PROGRESS NOTES
[facility] Medicine Daily Progress Note    Date of Service  3/8/2022    Chief Complaint  Erlinda Verde is a 56 y.o. female admitted 2/25/2022 with   Chief Complaint   Patient presents with   • Leg Pain     PT BIB EMS from home. PT was seen at urgent care yesterday and was diagnosed with a femoral head fracture.  Pt care giver called due to increased pain. PT was referred for follow up with ortho but has not had follow up.        Hospital Course  This 56-year-old female with a developmental disability, size affective disorder, parkinsonism, hypertension, type 2 diabetes mellitus presented to the ER on 2/25/2022 after she had a ground-level fall.     She is a resident of [facility].  She landed on the left side, imaging showed femoral head fracture; orthopedic surgery was consulted, patient underwent open treatment of left femoral neck fracture with hemiarthroplasty on 2/26/2022.  PT/OT has evaluate the patient, recommend postacute.  SNF/rehab referral in place     Of note, patient noted to be tachycardic, D-dimer is elevated, CTA of the chest ordered, pending.     Hospital course was complicated with acute blood loss anemia, today her hemoglobin noted to be 7.6.  Will transfuse if less than 7.  Iron study was obtained consistent with iron deficiency, will provide IV iron.  Will provide PPI.  Patient need to follow-up with primary care physician as an outpatient     Interval Problem Update    2/27:  --No acute events overnight, patient underwent surgery yesterday.  We will continue DVT prophylaxis.  PT/OT has evaluate the patient, pending recommendation.  --Of note, patient abdomen is distended , KUB showing constipation;  Will provide aggressive  Bowel regemin  --Patient continues remain persistent tachycardia along with elevated D-dimer, will obtain CT of the chest.  --Has been requiring 2.5 L of oxygen, will titrate oxygen as needed.  Provide incentive spirometry    Plan of care has been discussed with the  nursing staff , caretaker at bedside.    2/28:  --No acute events overnight, patient tachycardia continues to improve.  CT of the chest ordered, discussed with patient and guardian Ann.  --Patient did have a good bowel movement yesterday  --Her hemoglobin to be 7.6, iron studies consistent with iron deficiency.  Will provide IV iron.  Monitor hemoglobin medically, if less than 7, transfuse  --DVT prophylaxis with Lovenox  --PT/OT has evaluate the patient, recommend postacute, SNF/physiatry referral in place    3/1- no event overnight. She remain pleasantly confused. Sitter present. BP slight elevated. Sinus tachycardia. Started metoprolol 25 mg BID. Not symptomatic. EKG reviewed. CT/PE negative for PE  For hypertension, resume home meds, lisinopril 20 mg daily  hgb 8.4. improving. No need for transfusion   Medically cleared to transfer at SNF     3/2- no event overnight. She is at her baseline. Able to say her name. Complaining of pain, but difficult to rate. Tachycardia improving. BP stable. Last bowel movement three days ago. Bowel protocol in place Medically cleared for placement     3/3- she is little more talkative and awake today. No event overnight. Vitals are stable. Pending rehab/snf transfer     3/4- no event. Report hip pain, meds available. Pending placement. No change on medications. Vitals are stable. She did have bowel movement a day prior.     3/5- no event. Able to understand better. Looks more comfortable. Vitals are stable. Oxycodone used only one time 2.5 mg yesterday. Pending [facility], placement     3/6- no event. She tells me that pain is better. She did have loose stool yesterday. BP stable. She is still on 1L of oxygen. She had used oxycodone 2.5 mg -5 mg twice yesterday. Medically cleared for SNF transferring      3/7- no event. Vitals are stable. Last bowel movement since 3/5. She is taking stool softer. Discuss with RN to give miralax. Continue PT/OT. Pending transfer to SNF     3/8- no  event. Denies pain. She did have bowel movement yesterday. Vitals stable. Pending SNF transfer     Plan of care has been discussed with  Nursing staff, case management.    Consultants/Specialty  orthopedics    Code Status  Full Code    Disposition  Patient is medically cleared for discharge.   Anticipate discharge to to skilled nursing facility. Vs rehab   I have placed the appropriate orders for post-discharge needs.    Review of Systems  Review of Systems   Unable to perform ROS: Psychiatric disorder        Unable to obtain  2/2 patient mentation     Physical Exam  Temp:  [35.9 °C (96.6 °F)-36.9 °C (98.4 °F)] 35.9 °C (96.6 °F)  Pulse:  [64-96] 75  Resp:  [16-17] 17  BP: (107-132)/(58-80) 107/60  SpO2:  [91 %-94 %] 91 %    Physical Exam  Vitals and nursing note reviewed.   Constitutional:       Comments: And awake   HENT:      Head: Normocephalic and atraumatic.   Eyes:      Pupils: Pupils are equal, round, and reactive to light.   Cardiovascular:      Rate and Rhythm: Normal rate.      Pulses: Normal pulses.   Pulmonary:      Effort: No respiratory distress.      Breath sounds: Normal breath sounds.   Abdominal:      General: Bowel sounds are normal. There is distension.      Comments: Slight hard   Musculoskeletal:      Cervical back: Neck supple.      Right lower leg: No edema.      Left lower leg: No edema.      Comments: Decreased ROM on the Left side   Skin:     General: Skin is warm.   Neurological:      Mental Status: She is alert.      Comments: Alert and awake   Does folow commands    Psychiatric:         Mood and Affect: Mood normal.         Fluids    Intake/Output Summary (Last 24 hours) at 3/8/2022 1336  Last data filed at 3/8/2022 0930  Gross per 24 hour   Intake 360 ml   Output 200 ml   Net 160 ml       Laboratory                        Imaging  CT-CTA CHEST PULMONARY ARTERY W/ RECONS   Final Result      1.  No evidence of pulmonary embolism to the segmental branches. Subsegmental branches poorly  evaluated due to motion artifact.   2.  Small bilateral pleural effusions.   3.  Atherosclerotic changes. Coronary artery atherosclerotic disease.   4.  3 pulmonary nodules measuring up to 3 mm in the left upper lobe. Recommendations below.      Low Risk: No routine follow-up      High Risk: Optional CT at 12 months      Comments: Use most suspicious nodule as guide to management. Follow-up intervals may vary according to size and risk.      Low Risk - Minimal or absent history of smoking and of other known risk factors.      High Risk - History of smoking or of other known risk factors.      Note: These recommendations do not apply to lung cancer screening, patients with immunosuppression, or patients with known primary cancer.      Fleischner Society 2017 Guidelines for Management of Incidentally Detected Pulmonary Nodules in Adults      [id number] VIEW   Final Result      1.  No evidence of bowel obstruction.      2.  Moderate constipation.      DX-PELVIS-1 OR 2 VIEWS   Final Result      Post left hip hemiarthroplasty.      DX-FEMUR-2+ LEFT   Final Result      1.  Mildly displaced left femoral neck fracture.   2.  No distal femur fracture.      DX-HIP-UNILATERAL-WITH PELVIS-1 VIEW LEFT   Final Result      Left femoral neck fracture again noted.      DX-CHEST-PORTABLE (1 VIEW)   Final Result      Hypoinflation with bibasilar atelectasis.           Assessment/Plan  * Hip fracture requiring operative repair, left, closed, initial encounter (HCC)- (present on admission)  Assessment & Plan  Patient will be consulted by orthopedic surgery    ---x-ray showing femoral head fracture. Ortho was consulted and patient underwent Open treatment of left femoral neck fracture with hemiarthroplasty on 2/26 by Dr Marquis.   --- PT and OT    -- DVT ppx    Tachycardia- (present on admission)  Assessment & Plan  Sinus tachycardia on EKG  Thought to be secondary to pain and hypovolemia  Plan: Opiates, IV fluids  2/26: Improving    Tachy with elevated D-dimer; obtain CTA of the chest negative   3/1- sinus tachy. Not symptomatic. No other explanation. Start metoprolol 25 mg BID . Monitor with PCP and cardiology as OP   3/2- resolving   3/6- resolved     Essential hypertension- (present on admission)  Assessment & Plan  Will hold lisinopril   IV labetalol as needed   --- BP well controlled  3/1- increase. Start lisinopril and metoprolol. Continue to monitor   3/2- better, well controled. Continue current treatment   3/6- well controled       Anemia- (present on admission)  Assessment & Plan  Her hemoglobin hematocrit, today at 7.8.  Will transfuse if less than 7  --Tender to acute blood loss anemia likely 2/2 acute blood loss anemia  3/1- hgb 8.4- improving. No need for transfusion     Diabetes (HCC)- (present on admission)  Assessment & Plan  Glyco at 6.7   -- diabetic diet    -- on iss , hypoglycemia protocol and accu-checks ac and hs  Metformin resumed. Pt is medically cleared     Thrombocytopenia (HCC)- (present on admission)  Assessment & Plan  Reactive, possible due to anemia   3/1- improving     Schizophrenia (HCC)- (present on admission)  Assessment & Plan  Resume outpatient antipsychotic medications  New every 530 mg p.o. daily, Depakote 1000 g at p.m.  Quetiapine 200 at p.m. and 100 at am    QTc 395    Parkinson's disease (tremor, stiffness, slow motion, unstable posture) (HCC)- (present on admission)  Assessment & Plan  Fall precautions       VTE prophylaxis: Lovenox  I have performed a physical exam and reviewed and updated ROS and Plan today (3/8/2022). In review of yesterday's note (3/7/2022), there are no changes except as documented above.

## 2022-03-08 NOTE — THERAPY
"Occupational Therapy  Daily Treatment     Patient Name: Erlinda Verde  Age:  56 y.o., Sex:  female  Medical Record #: [id number]  Today's Date: 3/8/2022     Precautions  Precautions: Fall Risk,Posterior Hip Precautions,Weight Bearing As Tolerated Left Lower Extremity  Comments: baseline developmentally delayed    Assessment    Pt seen for OT treatment . Pt willing to put on pants but was upset with moving in bed due to pain issues. Pt unaware of Post THP and does not understand. Pt required TLC and encouragement to follow through with task to completion.  Pt cursing throughout OT session with CG to assist with calming techs. Pt needed Mod A for most self care tasks and bed mobility. Total A for toilet hygiene Pt using Pure wick . Pt needs extended time to process and follow through with simple commands. Pt  able to comb hair and do oral hygiene with toothettes seated base with verbal cues. Mod A for change of gown and for LB dressing. Mod A for ADL transfers to chair at the bedside. Pt agreed to stay in chair to eat lunch. CG training is on going.  RN updated on OT treatment findings and recommendations. Will continue to follow.       Plan    Continue current treatment plan.    DC Equipment Recommendations: (P) Unable to determine at this time  Discharge Recommendations: (P) Recommend post-acute placement for additional occupational therapy services prior to discharge home.    Subjective    Pt stating, \"Are you mad at me?\" Pt upset and yelling due to pain with movement and fear of falling. Caregiver assists with calming pt down.      Objective       03/08/22 1246   Cognition    Cognition / Consciousness X   Level of Consciousness Alert   Safety Awareness Impaired   New Learning Impaired   Attention Impaired   Comments Pt delayed at baseline, some swearing during OT session needing encouragement, reassurance to use better words. Care giver present and assisted with encouragement.   Passive ROM Upper Body   Comments " WFL   Active ROM Upper Body   Dominant Hand Right   Comments WFL   Strength Upper Body   Comments WFL   Other Treatments   Other Treatments Provided Care giver present to assist with encouragement. Pt cussing, yelling with bed mobility. Multiple rolls to don brief and pants in bed for clothing management up over hips. Psychosocial intervention , relaxation techs with fair carry over to decrease anxiety and fear. .   Balance   Sitting Balance (Static) Fair   Sitting Balance (Dynamic) Fair -   Standing Balance (Static) Poor   Standing Balance (Dynamic) Poor   Weight Shift Sitting Fair   Weight Shift Standing Poor   Comments Flat HOB and use of bedrail   Bed Mobility    Supine to Sit Moderate Assist   Sit to Supine   (Pt left up in chair at the bedside)   Scooting Moderate Assist   Rolling Moderate Assist to Rt.;Moderate Assist to Lt.   Comments Pt fearful of pain with movement.   Activities of Daily Living   Grooming Minimal Assist;Seated   Bathing   (refused)   Upper Body Dressing Minimal Assist   Lower Body Dressing Maximal Assist   Toileting Maximal Assist   Skilled Intervention Verbal Cuing;Tactile Cuing;Sequencing;Postural Facilitation;Compensatory Strategies   Comments Pt will need 24/7 assist. Pt presently has full time Care givers.   Functional Mobility   Sit to Stand Moderate Assist   Bed, Chair, Wheelchair Transfer Moderate Assist   Transfer Method Stand Step   Comments with FWW   Activity Tolerance   Comments low activity tolerance.   Patient / Family Goals   Patient / Family Goal #1 none stated   Short Term Goals   Short Term Goal # 1 Min A toileting   Goal Outcome # 1 Progressing slower than expected   Short Term Goal # 2 Min A toilet transfer   Goal Outcome # 2 Progressing slower than expected   Short Term Goal # 3 Min A LB ADLs   Goal Outcome # 3 Progressing slower than expected   Short Term Goal # 4 5 minute stand for self care   Goal Outcome # 4 Goal not met   Anticipated Discharge Equipment and  Recommendations   DC Equipment Recommendations Unable to determine at this time   Discharge Recommendations Recommend post-acute placement for additional occupational therapy services prior to discharge home   Interdisciplinary Plan of Care Collaboration   IDT Collaboration with  Nursing;Family / Caregiver  (group Home Care giver.)   Collaboration Comments RN updated

## 2022-03-08 NOTE — CARE PLAN
Problem: Knowledge Deficit - Standard  Goal: Patient and family/care givers will demonstrate understanding of plan of care, disease process/condition, diagnostic tests and medications  Outcome: Not Progressing   The patient is Watcher - Medium risk of patient condition declining or worsening    Shift Goals  Clinical Goals: Pain control, safety, O2 wean  Patient Goals: unable to verbalize  Family Goals: ARLETTE    Progress made toward(s) clinical / shift goals:  vitals and pain are stable    Patient is not progressing towards the following goals:pt has cognitive deficits.       Problem: Knowledge Deficit - Standard  Goal: Patient and family/care givers will demonstrate understanding of plan of care, disease process/condition, diagnostic tests and medications  Outcome: Not Progressing

## 2022-03-08 NOTE — DISCHARGE PLANNING
Anticipated Discharge Disposition: SNF placement        Action: KATIA spoke with Ryann from [facility] in regards to patient and prior functioning status and the  requirements for patient to return. KATIA also left a VM for guardian Ann Urias to provide update on the patient and SNF placement. CM informed team that patient is not able to return to  at this time and will require SNF placement for therapy prior to return to . Patient  is [employer]  [phone].        Barriers to Discharge: [facility] level II        Plan: SNF placement once cleared from [facility]

## 2022-03-09 PROCEDURE — 700111 HCHG RX REV CODE 636 W/ 250 OVERRIDE (IP): Performed by: INTERNAL MEDICINE

## 2022-03-09 PROCEDURE — 700102 HCHG RX REV CODE 250 W/ 637 OVERRIDE(OP): Performed by: INTERNAL MEDICINE

## 2022-03-09 PROCEDURE — A9270 NON-COVERED ITEM OR SERVICE: HCPCS | Performed by: HOSPITALIST

## 2022-03-09 PROCEDURE — 700102 HCHG RX REV CODE 250 W/ 637 OVERRIDE(OP): Performed by: HOSPITALIST

## 2022-03-09 PROCEDURE — A9270 NON-COVERED ITEM OR SERVICE: HCPCS | Performed by: INTERNAL MEDICINE

## 2022-03-09 PROCEDURE — 97530 THERAPEUTIC ACTIVITIES: CPT | Mod: CQ

## 2022-03-09 PROCEDURE — 700101 HCHG RX REV CODE 250: Performed by: PHYSICAL MEDICINE & REHABILITATION

## 2022-03-09 PROCEDURE — 770001 HCHG ROOM/CARE - MED/SURG/GYN PRIV*

## 2022-03-09 PROCEDURE — 700102 HCHG RX REV CODE 250 W/ 637 OVERRIDE(OP): Performed by: PHYSICAL MEDICINE & REHABILITATION

## 2022-03-09 PROCEDURE — 97602 WOUND(S) CARE NON-SELECTIVE: CPT

## 2022-03-09 PROCEDURE — A9270 NON-COVERED ITEM OR SERVICE: HCPCS | Performed by: PHYSICAL MEDICINE & REHABILITATION

## 2022-03-09 PROCEDURE — 99231 SBSQ HOSP IP/OBS SF/LOW 25: CPT | Performed by: INTERNAL MEDICINE

## 2022-03-09 RX ADMIN — DIVALPROEX SODIUM 1000 MG: 500 TABLET, EXTENDED RELEASE ORAL at 20:09

## 2022-03-09 RX ADMIN — METOPROLOL TARTRATE 25 MG: 25 TABLET, FILM COATED ORAL at 16:56

## 2022-03-09 RX ADMIN — Medication 1 TABLET: at 06:10

## 2022-03-09 RX ADMIN — DONEPEZIL HYDROCHLORIDE 10 MG: 5 TABLET, FILM COATED ORAL at 20:09

## 2022-03-09 RX ADMIN — ACETAMINOPHEN 1000 MG: 500 TABLET ORAL at 20:10

## 2022-03-09 RX ADMIN — ACETAMINOPHEN 1000 MG: 500 TABLET ORAL at 10:29

## 2022-03-09 RX ADMIN — ARIPIPRAZOLE 30 MG: 10 TABLET ORAL at 16:56

## 2022-03-09 RX ADMIN — ENOXAPARIN SODIUM 40 MG: 40 INJECTION SUBCUTANEOUS at 06:10

## 2022-03-09 RX ADMIN — QUETIAPINE FUMARATE 200 MG: 100 TABLET ORAL at 20:10

## 2022-03-09 RX ADMIN — QUETIAPINE FUMARATE 100 MG: 100 TABLET ORAL at 06:10

## 2022-03-09 RX ADMIN — POLYETHYLENE GLYCOL 3350 1 PACKET: 17 POWDER, FOR SOLUTION ORAL at 16:55

## 2022-03-09 RX ADMIN — OXYCODONE 2.5 MG: 5 TABLET ORAL at 06:10

## 2022-03-09 RX ADMIN — SERTRALINE 100 MG: 100 TABLET, FILM COATED ORAL at 06:10

## 2022-03-09 RX ADMIN — OXYBUTYNIN CHLORIDE 5 MG: 5 TABLET, EXTENDED RELEASE ORAL at 16:56

## 2022-03-09 RX ADMIN — OMEPRAZOLE 20 MG: 20 CAPSULE, DELAYED RELEASE ORAL at 06:10

## 2022-03-09 RX ADMIN — ATORVASTATIN CALCIUM 20 MG: 20 TABLET, FILM COATED ORAL at 20:09

## 2022-03-09 RX ADMIN — LIDOCAINE 1 PATCH: 50 PATCH TOPICAL at 20:09

## 2022-03-09 RX ADMIN — METOPROLOL TARTRATE 25 MG: 25 TABLET, FILM COATED ORAL at 06:11

## 2022-03-09 RX ADMIN — LEVOTHYROXINE SODIUM 25 MCG: 25 TABLET ORAL at 06:12

## 2022-03-09 RX ADMIN — METFORMIN HYDROCHLORIDE 500 MG: 500 TABLET, EXTENDED RELEASE ORAL at 06:10

## 2022-03-09 RX ADMIN — POLYETHYLENE GLYCOL 3350 1 PACKET: 17 POWDER, FOR SOLUTION ORAL at 06:10

## 2022-03-09 RX ADMIN — Medication 10 MG: at 06:00

## 2022-03-09 RX ADMIN — ACETAMINOPHEN 1000 MG: 500 TABLET ORAL at 15:51

## 2022-03-09 RX ADMIN — SENNOSIDES AND DOCUSATE SODIUM 2 TABLET: 50; 8.6 TABLET ORAL at 16:56

## 2022-03-09 RX ADMIN — LISINOPRIL 20 MG: 20 TABLET ORAL at 06:11

## 2022-03-09 RX ADMIN — SENNOSIDES AND DOCUSATE SODIUM 2 TABLET: 50; 8.6 TABLET ORAL at 06:12

## 2022-03-09 ASSESSMENT — COGNITIVE AND FUNCTIONAL STATUS - GENERAL
SUGGESTED CMS G CODE MODIFIER MOBILITY: CM
STANDING UP FROM CHAIR USING ARMS: A LOT
TURNING FROM BACK TO SIDE WHILE IN FLAT BAD: A LOT
MOBILITY SCORE: 8
CLIMB 3 TO 5 STEPS WITH RAILING: TOTAL
MOVING FROM LYING ON BACK TO SITTING ON SIDE OF FLAT BED: UNABLE
MOVING TO AND FROM BED TO CHAIR: UNABLE
WALKING IN HOSPITAL ROOM: TOTAL

## 2022-03-09 ASSESSMENT — PAIN DESCRIPTION - PAIN TYPE
TYPE: ACUTE PAIN

## 2022-03-09 ASSESSMENT — GAIT ASSESSMENTS: GAIT LEVEL OF ASSIST: UNABLE TO PARTICIPATE

## 2022-03-09 NOTE — CARE PLAN
Problem: Knowledge Deficit - Standard  Goal: Patient and family/care givers will demonstrate understanding of plan of care, disease process/condition, diagnostic tests and medications  Outcome: Progressing     Problem: Pain - Standard  Goal: Alleviation of pain or a reduction in pain to the patient’s comfort goal  Outcome: Progressing     Problem: Skin Integrity  Goal: Skin integrity is maintained or improved  Outcome: Progressing     Problem: Fall Risk  Goal: Patient will remain free from falls  Outcome: Progressing   The patient is Stable - Low risk of patient condition declining or worsening    Shift Goals  Clinical Goals: Pain control, q2 turns  Patient Goals: pain control  Family Goals: n/a    Progress made toward(s) clinical / shift goals:  Educate patient of available PRN pain medication per MAR and maintain skin integrity via q2hr turning. Reinforce fall/safety precautions.     Patient is not progressing towards the following goals:

## 2022-03-09 NOTE — PROGRESS NOTES
[facility] Medicine Daily Progress Note    Date of Service  3/9/2022    Chief Complaint  Erlinda Verde is a 56 y.o. female admitted 2/25/2022 with   Chief Complaint   Patient presents with   • Leg Pain     PT BIB EMS from home. PT was seen at urgent care yesterday and was diagnosed with a femoral head fracture.  Pt care giver called due to increased pain. PT was referred for follow up with ortho but has not had follow up.        Hospital Course  This 56-year-old female with a developmental disability, size affective disorder, parkinsonism, hypertension, type 2 diabetes mellitus presented to the ER on 2/25/2022 after she had a ground-level fall.     She is a resident of [facility].  She landed on the left side, imaging showed femoral head fracture; orthopedic surgery was consulted, patient underwent open treatment of left femoral neck fracture with hemiarthroplasty on 2/26/2022.  PT/OT has evaluate the patient, recommend postacute.  SNF/rehab referral in place     Of note, patient noted to be tachycardic, D-dimer is elevated, CTA of the chest ordered, pending.     Hospital course was complicated with acute blood loss anemia, today her hemoglobin noted to be 7.6.  Will transfuse if less than 7.  Iron study was obtained consistent with iron deficiency, will provide IV iron.  Will provide PPI.  Patient need to follow-up with primary care physician as an outpatient     Interval Problem Update    2/27:  --No acute events overnight, patient underwent surgery yesterday.  We will continue DVT prophylaxis.  PT/OT has evaluate the patient, pending recommendation.  --Of note, patient abdomen is distended , KUB showing constipation;  Will provide aggressive  Bowel regemin  --Patient continues remain persistent tachycardia along with elevated D-dimer, will obtain CT of the chest.  --Has been requiring 2.5 L of oxygen, will titrate oxygen as needed.  Provide incentive spirometry    Plan of care has been discussed with the  nursing staff , caretaker at bedside.    2/28:  --No acute events overnight, patient tachycardia continues to improve.  CT of the chest ordered, discussed with patient and guardian Ann.  --Patient did have a good bowel movement yesterday  --Her hemoglobin to be 7.6, iron studies consistent with iron deficiency.  Will provide IV iron.  Monitor hemoglobin medically, if less than 7, transfuse  --DVT prophylaxis with Lovenox  --PT/OT has evaluate the patient, recommend postacute, SNF/physiatry referral in place    3/1- no event overnight. She remain pleasantly confused. Sitter present. BP slight elevated. Sinus tachycardia. Started metoprolol 25 mg BID. Not symptomatic. EKG reviewed. CT/PE negative for PE  For hypertension, resume home meds, lisinopril 20 mg daily  hgb 8.4. improving. No need for transfusion   Medically cleared to transfer at SNF     3/2- no event overnight. She is at her baseline. Able to say her name. Complaining of pain, but difficult to rate. Tachycardia improving. BP stable. Last bowel movement three days ago. Bowel protocol in place Medically cleared for placement     3/3- she is little more talkative and awake today. No event overnight. Vitals are stable. Pending rehab/snf transfer     3/4- no event. Report hip pain, meds available. Pending placement. No change on medications. Vitals are stable. She did have bowel movement a day prior.     3/5- no event. Able to understand better. Looks more comfortable. Vitals are stable. Oxycodone used only one time 2.5 mg yesterday. Pending [facility], placement     3/6- no event. She tells me that pain is better. She did have loose stool yesterday. BP stable. She is still on 1L of oxygen. She had used oxycodone 2.5 mg -5 mg twice yesterday. Medically cleared for SNF transferring      3/7- no event. Vitals are stable. Last bowel movement since 3/5. She is taking stool softer. Discuss with RN to give miralax. Continue PT/OT. Pending transfer to SNF     3/8- no  event. Denies pain. She did have bowel movement yesterday. Vitals stable. Pending SNF transfer     3/9- no event. Eating breakfast. Denies pain. Vitals are stable. Oxycodone 2.5-5 mg only three times     Plan of care has been discussed with  Nursing staff, case management.    Consultants/Specialty  orthopedics    Code Status  Full Code    Disposition  Patient is medically cleared for discharge.   Anticipate discharge to to skilled nursing facility. Vs rehab   I have placed the appropriate orders for post-discharge needs.    Review of Systems  Review of Systems   Unable to perform ROS: Psychiatric disorder        Unable to obtain  2/2 patient mentation     Physical Exam  Temp:  [36.2 °C (97.1 °F)-37 °C (98.6 °F)] 36.2 °C (97.1 °F)  Pulse:  [42-95] 95  Resp:  [18-19] 18  BP: (100-120)/(62-75) 100/71  SpO2:  [90 %-99 %] 94 %    Physical Exam  Vitals and nursing note reviewed.   Constitutional:       Comments: And awake   HENT:      Head: Normocephalic and atraumatic.   Eyes:      Pupils: Pupils are equal, round, and reactive to light.   Cardiovascular:      Rate and Rhythm: Normal rate.      Pulses: Normal pulses.   Pulmonary:      Effort: No respiratory distress.      Breath sounds: Normal breath sounds.   Abdominal:      General: Bowel sounds are normal. There is distension.      Comments: Slight hard   Musculoskeletal:      Cervical back: Neck supple.      Right lower leg: No edema.      Left lower leg: No edema.      Comments: Decreased ROM on the Left side   Skin:     General: Skin is warm.   Neurological:      Mental Status: She is alert.      Comments: Alert and awake   Does folow commands    Psychiatric:         Mood and Affect: Mood normal.         Fluids    Intake/Output Summary (Last 24 hours) at 3/9/2022 1310  Last data filed at 3/9/2022 0620  Gross per 24 hour   Intake 480 ml   Output 1000 ml   Net -520 ml       Laboratory                        Imaging  CT-CTA CHEST PULMONARY ARTERY W/ RECONS   Final  Result      1.  No evidence of pulmonary embolism to the segmental branches. Subsegmental branches poorly evaluated due to motion artifact.   2.  Small bilateral pleural effusions.   3.  Atherosclerotic changes. Coronary artery atherosclerotic disease.   4.  3 pulmonary nodules measuring up to 3 mm in the left upper lobe. Recommendations below.      Low Risk: No routine follow-up      High Risk: Optional CT at 12 months      Comments: Use most suspicious nodule as guide to management. Follow-up intervals may vary according to size and risk.      Low Risk - Minimal or absent history of smoking and of other known risk factors.      High Risk - History of smoking or of other known risk factors.      Note: These recommendations do not apply to lung cancer screening, patients with immunosuppression, or patients with known primary cancer.      Fleischner Society 2017 Guidelines for Management of Incidentally Detected Pulmonary Nodules in Adults      SB-LEPSZRQ-1 VIEW   Final Result      1.  No evidence of bowel obstruction.      2.  Moderate constipation.      DX-PELVIS-1 OR 2 VIEWS   Final Result      Post left hip hemiarthroplasty.      DX-FEMUR-2+ LEFT   Final Result      1.  Mildly displaced left femoral neck fracture.   2.  No distal femur fracture.      DX-HIP-UNILATERAL-WITH PELVIS-1 VIEW LEFT   Final Result      Left femoral neck fracture again noted.      DX-CHEST-PORTABLE (1 VIEW)   Final Result      Hypoinflation with bibasilar atelectasis.           Assessment/Plan  * Hip fracture requiring operative repair, left, closed, initial encounter (HCC)- (present on admission)  Assessment & Plan  Patient will be consulted by orthopedic surgery    ---x-ray showing femoral head fracture. Ortho was consulted and patient underwent Open treatment of left femoral neck fracture with hemiarthroplasty on 2/26 by Dr Marquis.   --- PT and OT    -- DVT ppx    Tachycardia- (present on admission)  Assessment & Plan  Sinus  tachycardia on EKG  Thought to be secondary to pain and hypovolemia  Plan: Opiates, IV fluids  2/26: Improving   Tachy with elevated D-dimer; obtain CTA of the chest negative   3/1- sinus tachy. Not symptomatic. No other explanation. Start metoprolol 25 mg BID . Monitor with PCP and cardiology as OP   3/2- resolving   3/6- resolved     Essential hypertension- (present on admission)  Assessment & Plan  Will hold lisinopril   IV labetalol as needed   --- BP well controlled  3/1- increase. Start lisinopril and metoprolol. Continue to monitor   3/2- better, well controled. Continue current treatment   3/6- well controled       Anemia- (present on admission)  Assessment & Plan  Her hemoglobin hematocrit, today at 7.8.  Will transfuse if less than 7  --Tender to acute blood loss anemia likely 2/2 acute blood loss anemia  3/1- hgb 8.4- improving. No need for transfusion     Diabetes (HCC)- (present on admission)  Assessment & Plan  Glyco at 6.7   -- diabetic diet    -- on iss , hypoglycemia protocol and accu-checks ac and hs  Metformin resumed. Pt is medically cleared     Thrombocytopenia (HCC)- (present on admission)  Assessment & Plan  Reactive, possible due to anemia   3/1- improving     Schizophrenia (HCC)- (present on admission)  Assessment & Plan  Resume outpatient antipsychotic medications  New every 530 mg p.o. daily, Depakote 1000 g at p.m.  Quetiapine 200 at p.m. and 100 at am    QTc 395    Parkinson's disease (tremor, stiffness, slow motion, unstable posture) (HCC)- (present on admission)  Assessment & Plan  Fall precautions       VTE prophylaxis: Lovenox  I have performed a physical exam and reviewed and updated ROS and Plan today (3/9/2022). In review of yesterday's note (3/8/2022), there are no changes except as documented above.

## 2022-03-09 NOTE — CARE PLAN
The patient is Stable - Low risk of patient condition declining or worsening    Shift Goals  Clinical Goals: Safety, comfort  Patient Goals: ARLETTE  Family Goals: ARLETTE    Progress made toward(s) clinical / shift goals: Bed locked and in lowest position. Safety and fall precautions in place. Bed alarm on. Hourly rounding. Call light and belongings within reach.     Patient is not progressing towards the following goals:

## 2022-03-09 NOTE — PROGRESS NOTES
"56yoF with left displaced femoral neck s/p hemiarthroplasty 2/26.    S: Caregiver at bedside.  Erlinda is awake and alert. Denies chest pain, dyspnea or fever.    O:  /60   Pulse 75   Temp 35.9 °C (96.6 °F) (Temporal)   Resp 17   Ht 1.651 m (5' 5\")   Wt 61.4 kg (135 lb 5.8 oz)   SpO2 91%   BMI 22.53 kg/m²     Exam:  General-NAD, alert and following commands  LLE-hip dressing c/d/i, +EHL/FHL/TA/GS motor, foot warm and well perfused    A: 56yoF with left displaced femoral neck   s/p hemiarthroplasty 2/26.    Recs:  --WBAT LLE  --posterior hip precautions  --PT/OT for mobilization  --heparin and SCDs while inpatient  --follow up Dr. Marquis 2 weeks postop  -- dispo per Med  "

## 2022-03-10 PROCEDURE — 700102 HCHG RX REV CODE 250 W/ 637 OVERRIDE(OP): Performed by: PHYSICAL MEDICINE & REHABILITATION

## 2022-03-10 PROCEDURE — 700102 HCHG RX REV CODE 250 W/ 637 OVERRIDE(OP): Performed by: INTERNAL MEDICINE

## 2022-03-10 PROCEDURE — A9270 NON-COVERED ITEM OR SERVICE: HCPCS | Performed by: INTERNAL MEDICINE

## 2022-03-10 PROCEDURE — A9270 NON-COVERED ITEM OR SERVICE: HCPCS | Performed by: PHYSICAL MEDICINE & REHABILITATION

## 2022-03-10 PROCEDURE — 700101 HCHG RX REV CODE 250: Performed by: PHYSICAL MEDICINE & REHABILITATION

## 2022-03-10 PROCEDURE — 700102 HCHG RX REV CODE 250 W/ 637 OVERRIDE(OP): Performed by: HOSPITALIST

## 2022-03-10 PROCEDURE — A9270 NON-COVERED ITEM OR SERVICE: HCPCS | Performed by: HOSPITALIST

## 2022-03-10 PROCEDURE — 99231 SBSQ HOSP IP/OBS SF/LOW 25: CPT | Performed by: INTERNAL MEDICINE

## 2022-03-10 PROCEDURE — 770001 HCHG ROOM/CARE - MED/SURG/GYN PRIV*

## 2022-03-10 PROCEDURE — 700111 HCHG RX REV CODE 636 W/ 250 OVERRIDE (IP): Performed by: INTERNAL MEDICINE

## 2022-03-10 RX ADMIN — LEVOTHYROXINE SODIUM 25 MCG: 25 TABLET ORAL at 05:25

## 2022-03-10 RX ADMIN — METOPROLOL TARTRATE 25 MG: 25 TABLET, FILM COATED ORAL at 05:24

## 2022-03-10 RX ADMIN — SENNOSIDES AND DOCUSATE SODIUM 2 TABLET: 50; 8.6 TABLET ORAL at 05:26

## 2022-03-10 RX ADMIN — OXYBUTYNIN CHLORIDE 5 MG: 5 TABLET, EXTENDED RELEASE ORAL at 17:47

## 2022-03-10 RX ADMIN — LIDOCAINE 1 PATCH: 50 PATCH TOPICAL at 20:58

## 2022-03-10 RX ADMIN — DONEPEZIL HYDROCHLORIDE 10 MG: 5 TABLET, FILM COATED ORAL at 20:58

## 2022-03-10 RX ADMIN — OMEPRAZOLE 20 MG: 20 CAPSULE, DELAYED RELEASE ORAL at 05:19

## 2022-03-10 RX ADMIN — QUETIAPINE FUMARATE 200 MG: 100 TABLET ORAL at 20:58

## 2022-03-10 RX ADMIN — LISINOPRIL 20 MG: 20 TABLET ORAL at 05:25

## 2022-03-10 RX ADMIN — SERTRALINE 100 MG: 100 TABLET, FILM COATED ORAL at 05:19

## 2022-03-10 RX ADMIN — SENNOSIDES AND DOCUSATE SODIUM 2 TABLET: 50; 8.6 TABLET ORAL at 17:43

## 2022-03-10 RX ADMIN — OXYCODONE 2.5 MG: 5 TABLET ORAL at 05:19

## 2022-03-10 RX ADMIN — POLYETHYLENE GLYCOL 3350 1 PACKET: 17 POWDER, FOR SOLUTION ORAL at 17:44

## 2022-03-10 RX ADMIN — ARIPIPRAZOLE 30 MG: 10 TABLET ORAL at 17:43

## 2022-03-10 RX ADMIN — ATORVASTATIN CALCIUM 20 MG: 20 TABLET, FILM COATED ORAL at 20:58

## 2022-03-10 RX ADMIN — Medication 1 TABLET: at 05:18

## 2022-03-10 RX ADMIN — QUETIAPINE FUMARATE 100 MG: 100 TABLET ORAL at 05:19

## 2022-03-10 RX ADMIN — METFORMIN HYDROCHLORIDE 500 MG: 500 TABLET, EXTENDED RELEASE ORAL at 05:19

## 2022-03-10 RX ADMIN — ACETAMINOPHEN 1000 MG: 500 TABLET ORAL at 16:05

## 2022-03-10 RX ADMIN — ACETAMINOPHEN 1000 MG: 500 TABLET ORAL at 20:58

## 2022-03-10 RX ADMIN — DIVALPROEX SODIUM 1000 MG: 500 TABLET, EXTENDED RELEASE ORAL at 20:58

## 2022-03-10 RX ADMIN — ENOXAPARIN SODIUM 40 MG: 40 INJECTION SUBCUTANEOUS at 05:18

## 2022-03-10 RX ADMIN — POLYETHYLENE GLYCOL 3350 1 PACKET: 17 POWDER, FOR SOLUTION ORAL at 05:19

## 2022-03-10 RX ADMIN — ACETAMINOPHEN 1000 MG: 500 TABLET ORAL at 08:39

## 2022-03-10 ASSESSMENT — PAIN DESCRIPTION - PAIN TYPE
TYPE: SURGICAL PAIN;ACUTE PAIN
TYPE: SURGICAL PAIN;ACUTE PAIN

## 2022-03-10 NOTE — PROGRESS NOTES
[facility] Medicine Daily Progress Note    Date of Service  3/10/2022    Chief Complaint  Erlinda Verde is a 56 y.o. female admitted 2/25/2022 with   Chief Complaint   Patient presents with   • Leg Pain     PT BIB EMS from home. PT was seen at urgent care yesterday and was diagnosed with a femoral head fracture.  Pt care giver called due to increased pain. PT was referred for follow up with ortho but has not had follow up.        Hospital Course  This 56-year-old female with a developmental disability, size affective disorder, parkinsonism, hypertension, type 2 diabetes mellitus presented to the ER on 2/25/2022 after she had a ground-level fall.     She is a resident of [facility].  She landed on the left side, imaging showed femoral head fracture; orthopedic surgery was consulted, patient underwent open treatment of left femoral neck fracture with hemiarthroplasty on 2/26/2022.  PT/OT has evaluate the patient, recommend postacute.  SNF/rehab referral in place     Of note, patient noted to be tachycardic, D-dimer is elevated, CTA of the chest ordered, pending.     Hospital course was complicated with acute blood loss anemia, today her hemoglobin noted to be 7.6.  Will transfuse if less than 7.  Iron study was obtained consistent with iron deficiency, will provide IV iron.  Will provide PPI.  Patient need to follow-up with primary care physician as an outpatient     Interval Problem Update    2/27:  --No acute events overnight, patient underwent surgery yesterday.  We will continue DVT prophylaxis.  PT/OT has evaluate the patient, pending recommendation.  --Of note, patient abdomen is distended , KUB showing constipation;  Will provide aggressive  Bowel regemin  --Patient continues remain persistent tachycardia along with elevated D-dimer, will obtain CT of the chest.  --Has been requiring 2.5 L of oxygen, will titrate oxygen as needed.  Provide incentive spirometry    Plan of care has been discussed with the  nursing staff , caretaker at bedside.    2/28:  --No acute events overnight, patient tachycardia continues to improve.  CT of the chest ordered, discussed with patient and guardian Ann.  --Patient did have a good bowel movement yesterday  --Her hemoglobin to be 7.6, iron studies consistent with iron deficiency.  Will provide IV iron.  Monitor hemoglobin medically, if less than 7, transfuse  --DVT prophylaxis with Lovenox  --PT/OT has evaluate the patient, recommend postacute, SNF/physiatry referral in place    3/1- no event overnight. She remain pleasantly confused. Sitter present. BP slight elevated. Sinus tachycardia. Started metoprolol 25 mg BID. Not symptomatic. EKG reviewed. CT/PE negative for PE  For hypertension, resume home meds, lisinopril 20 mg daily  hgb 8.4. improving. No need for transfusion   Medically cleared to transfer at SNF     3/2- no event overnight. She is at her baseline. Able to say her name. Complaining of pain, but difficult to rate. Tachycardia improving. BP stable. Last bowel movement three days ago. Bowel protocol in place Medically cleared for placement     3/3- she is little more talkative and awake today. No event overnight. Vitals are stable. Pending rehab/snf transfer     3/4- no event. Report hip pain, meds available. Pending placement. No change on medications. Vitals are stable. She did have bowel movement a day prior.     3/5- no event. Able to understand better. Looks more comfortable. Vitals are stable. Oxycodone used only one time 2.5 mg yesterday. Pending [facility], placement     3/6- no event. She tells me that pain is better. She did have loose stool yesterday. BP stable. She is still on 1L of oxygen. She had used oxycodone 2.5 mg -5 mg twice yesterday. Medically cleared for SNF transferring      3/7- no event. Vitals are stable. Last bowel movement since 3/5. She is taking stool softer. Discuss with RN to give miralax. Continue PT/OT. Pending transfer to SNF     3/8- no  event. Denies pain. She did have bowel movement yesterday. Vitals stable. Pending SNF transfer     3/9- no event. Eating breakfast. Denies pain. Vitals are stable. Oxycodone 2.5-5 mg only three times     3/10- no event. She said little to pain. Eating well. Vitals stable. Bowel movement yesterday. No other pain. Pending placement. PASSR     Plan of care has been discussed with  Nursing staff, case management.    Consultants/Specialty  orthopedics    Code Status  Full Code    Disposition  Patient is medically cleared for discharge.   Anticipate discharge to to skilled nursing facility. Vs rehab   I have placed the appropriate orders for post-discharge needs.    Review of Systems  Review of Systems   Unable to perform ROS: Psychiatric disorder        Unable to obtain  2/2 patient mentation     Physical Exam  Temp:  [35.8 °C (96.5 °F)-36.7 °C (98.1 °F)] 36.7 °C (98.1 °F)  Pulse:  [80-85] 81  Resp:  [16-18] 16  BP: (104-116)/(64-69) 115/64  SpO2:  [92 %-96 %] 96 %    Physical Exam  Vitals and nursing note reviewed.   Constitutional:       Comments: And awake   HENT:      Head: Normocephalic and atraumatic.   Eyes:      Pupils: Pupils are equal, round, and reactive to light.   Cardiovascular:      Rate and Rhythm: Normal rate.      Pulses: Normal pulses.   Pulmonary:      Effort: No respiratory distress.      Breath sounds: Normal breath sounds.   Abdominal:      General: Bowel sounds are normal. There is distension.      Comments: Slight hard   Musculoskeletal:      Cervical back: Neck supple.      Right lower leg: No edema.      Left lower leg: No edema.      Comments: Decreased ROM on the Left side   Skin:     General: Skin is warm.   Neurological:      Mental Status: She is alert.      Comments: Alert and awake   Does folow commands    Psychiatric:         Mood and Affect: Mood normal.         Fluids    Intake/Output Summary (Last 24 hours) at 3/10/2022 1254  Last data filed at 3/10/2022 0830  Gross per 24 hour    Intake 230 ml   Output 200 ml   Net 30 ml       Laboratory                        Imaging  CT-CTA CHEST PULMONARY ARTERY W/ RECONS   Final Result      1.  No evidence of pulmonary embolism to the segmental branches. Subsegmental branches poorly evaluated due to motion artifact.   2.  Small bilateral pleural effusions.   3.  Atherosclerotic changes. Coronary artery atherosclerotic disease.   4.  3 pulmonary nodules measuring up to 3 mm in the left upper lobe. Recommendations below.      Low Risk: No routine follow-up      High Risk: Optional CT at 12 months      Comments: Use most suspicious nodule as guide to management. Follow-up intervals may vary according to size and risk.      Low Risk - Minimal or absent history of smoking and of other known risk factors.      High Risk - History of smoking or of other known risk factors.      Note: These recommendations do not apply to lung cancer screening, patients with immunosuppression, or patients with known primary cancer.      Fleischner Society 2017 Guidelines for Management of Incidentally Detected Pulmonary Nodules in Adults      XH-UMRFUIZ-6 VIEW   Final Result      1.  No evidence of bowel obstruction.      2.  Moderate constipation.      DX-PELVIS-1 OR 2 VIEWS   Final Result      Post left hip hemiarthroplasty.      DX-FEMUR-2+ LEFT   Final Result      1.  Mildly displaced left femoral neck fracture.   2.  No distal femur fracture.      DX-HIP-UNILATERAL-WITH PELVIS-1 VIEW LEFT   Final Result      Left femoral neck fracture again noted.      DX-CHEST-PORTABLE (1 VIEW)   Final Result      Hypoinflation with bibasilar atelectasis.           Assessment/Plan  * Hip fracture requiring operative repair, left, closed, initial encounter (HCC)- (present on admission)  Assessment & Plan  Patient will be consulted by orthopedic surgery    ---x-ray showing femoral head fracture. Ortho was consulted and patient underwent Open treatment of left femoral neck fracture with  hemiarthroplasty on 2/26 by Dr Marquis.   --- PT and OT    -- DVT ppx    Tachycardia- (present on admission)  Assessment & Plan  Sinus tachycardia on EKG  Thought to be secondary to pain and hypovolemia  Plan: Opiates, IV fluids  2/26: Improving   Tachy with elevated D-dimer; obtain CTA of the chest negative   3/1- sinus tachy. Not symptomatic. No other explanation. Start metoprolol 25 mg BID . Monitor with PCP and cardiology as OP   3/2- resolving   3/6- resolved     Essential hypertension- (present on admission)  Assessment & Plan  Will hold lisinopril   IV labetalol as needed   --- BP well controlled  3/1- increase. Start lisinopril and metoprolol. Continue to monitor   3/2- better, well controled. Continue current treatment   3/6- well controled       Anemia- (present on admission)  Assessment & Plan  Her hemoglobin hematocrit, today at 7.8.  Will transfuse if less than 7  --Tender to acute blood loss anemia likely 2/2 acute blood loss anemia  3/1- hgb 8.4- improving. No need for transfusion     Diabetes (HCC)- (present on admission)  Assessment & Plan  Glyco at 6.7   -- diabetic diet    -- on iss , hypoglycemia protocol and accu-checks ac and hs  Metformin resumed. Pt is medically cleared     Thrombocytopenia (HCC)- (present on admission)  Assessment & Plan  Reactive, possible due to anemia   3/1- improving     Schizophrenia (HCC)- (present on admission)  Assessment & Plan  Resume outpatient antipsychotic medications  New every 530 mg p.o. daily, Depakote 1000 g at p.m.  Quetiapine 200 at p.m. and 100 at am    QTc 395    Parkinson's disease (tremor, stiffness, slow motion, unstable posture) (HCC)- (present on admission)  Assessment & Plan  Fall precautions       VTE prophylaxis: Lovenox  I have performed a physical exam and reviewed and updated ROS and Plan today (3/10/2022). In review of yesterday's note (3/9/2022), there are no changes except as documented above.

## 2022-03-10 NOTE — CARE PLAN
The patient is Stable - Low risk of patient condition declining or worsening    Shift Goals  Clinical Goals: Pain control, q2 turns  Patient Goals: pain control  Family Goals: n/a    Progress made toward(s) clinical / shift goals:  Wound care RN came in to reassess sacrum. Barrier cream, waffle overlay, and q 2 hour turns in place. Pain managed with oral pain medications.     Patient is not progressing towards the following goals:

## 2022-03-10 NOTE — PROGRESS NOTES
"   Orthopaedic Progress Note    Interval changes:  Patient doing well  Dressing CDI  Cleared for DC by ortho pending medicine clearance    ROS - Patient denies any new issues.  Pain well controlled.    /67   Pulse 85   Temp 36.2 °C (97.1 °F) (Temporal)   Resp 18   Ht 1.651 m (5' 5\")   Wt 61.4 kg (135 lb 5.8 oz)   SpO2 92%       Patient seen and examined  No acute distress  Breathing non labored  RRR  Left beba surgical dressing is clean, dry, and intact. Patient clearly fires tibialis anterior, EHL, and gastrocnemius/soleus. Sensation is intact to light touch throughout superficial peroneal, deep peroneal, tibial, saphenous, and sural nerve distributions. Strong and palpable 2+ dorsalis pedis and posterior tibial pulses with capillary refill less than 2 seconds. No lower leg tenderness or discomfort.           Active Hospital Problems    Diagnosis    • Thrombocytopenia ([facility]) [D69.6]      Priority: Medium   • Diabetes ([facility]) [E11.9]      Priority: Low   • Schizophrenia ([facility]) [F20.9]      Priority: Low   • Hip fracture requiring operative repair, left, closed, initial encounter ([facility]) [S72.002A]    • Tachycardia [R00.0]    • Essential hypertension [I10]    • Anemia [D64.9]    • Parkinson's disease (tremor, stiffness, slow motion, unstable posture) ([facility]) [G20]        Assessment/Plan:  Patient doing well  Dressing CDI  Cleared for DC by ortho pending medicine clearance  POD#11 S/P  Open treatment of left femoral neck fracture with hemiarthroplasty.  Wt bearing status - WBAT  Wound care/Drains - Dressings to be left in place  Future Procedures - none planned   Lovenox: Start 2/26, Duration-until ambulatory > 150'  Sutures/Staples out- 14 days post operatively  PT/OT-initiated  Antibiotics: Perioperative completed  DVT Prophylaxis- TEDS/SCDs/Foot pumps  [place]-none  Case Coordination for Discharge Planning - Disposition SNF   "

## 2022-03-10 NOTE — WOUND TEAM
Get high dose flu vaccine at pharmacy.   [facility]  Inpatient Services  Initial Wound and Skin Care Evaluation    Admission Date: 2/25/2022     Last order of IP CONSULT TO WOUND CARE was found on 3/8/2022 from Hospital Encounter on 2/25/2022     HPI, PMH, SH: Reviewed    Past Surgical History:   Procedure Laterality Date   • PB PARTIAL HIP REPLACEMENT Left 2/26/2022    Procedure: HEMIARTHROPLASTY, HIP;  Surgeon: Ubaldo Marquis M.D.;  Location: SURGERY [facility];  Service: Orthopedics     Social History     Tobacco Use   • Smoking status: Never Smoker   • Smokeless tobacco: Never Used   Substance Use Topics   • Alcohol use: Never     Chief Complaint   Patient presents with   • Leg Pain     PT BIB EMS from home. PT was seen at urgent care yesterday and was diagnosed with a femoral head fracture.  Pt care giver called due to increased pain. PT was referred for follow up with ortho but has not had follow up.      Diagnosis: Hip fracture requiring operative repair, left, closed, initial encounter ([facility]) [S72.002A]    Unit where seen by Wound Team: T316/02     WOUND CONSULT/FOLLOW UP RELATED TO:  sacrum     WOUND HISTORY:  Friction related skin breakdown with moisture breakdown    WOUND ASSESSMENT/LDA  Wound 03/09/22 Partial Thickness Wound Sacrum;Buttocks Left related to friction (Active)   Wound Image   03/09/22 1700   Site Assessment Red;[place] 03/09/22 1700   Periwound Assessment Clean;Dry;Intact 03/09/22 1700   Margins ARLETTE 03/09/22 1700   Closure Open to air 03/09/22 1700   Drainage Amount None 03/09/22 1700   Treatments Offloading 03/09/22 1700   Wound Cleansing Foam Cleanser/Washcloth 03/09/22 1700   Periwound Protectant Barrier Paste 03/09/22 1700   Dressing Cleansing/Solutions Not Applicable 03/09/22 1700   Dressing Options Open to Air 03/09/22 1700   Dressing Status Open to Air 03/09/22 1700   Wound Length (cm) 0.6 cm 03/09/22 1700   Wound Width (cm) 0.4 cm 03/09/22 1700   Wound Surface Area (cm^2) 0.24 cm^2 03/09/22 1700   Shape  irregular scattered circualr 03/09/22 1700   Wound Odor None 03/09/22 1700   Exposed Structures None 03/09/22 1700   WOUND NURSE ONLY - Time Spent with Patient (mins) 30 03/09/22 1700   Number of days: 0       Wound 02/26/22 Incision Hip Left Aquacel (Active)   Site Assessment ARLETTE 03/09/22 1029   Periwound Assessment ARLETTE 03/09/22 1029   Margins ARLETTE 03/09/22 1029   Closure ARLETTE 03/09/22 1029   Drainage Amount None 03/09/22 1029   Drainage Description Serosanguineous 03/02/22 0800   Treatments Offloading 03/07/22 0900   Wound Cleansing Not Applicable 03/07/22 0900   Periwound Protectant Not Applicable 03/07/22 0900   Dressing Cleansing/Solutions Not Applicable 03/07/22 0900   Dressing Options Mepilex Silver 03/09/22 1029   Dressing Changed Observed 03/09/22 1029   Dressing Status Clean;Dry;Intact 03/09/22 1029   Dressing Change/Treatment Frequency As Needed 03/07/22 0900   Number of days: 11        Vascular:    JOSE:   No results found.    Lab Values:    Lab Results   Component Value Date/Time    WBC 6.3 03/01/2022 08:27 AM    RBC 2.86 (L) 03/01/2022 08:27 AM    HEMOGLOBIN 8.4 (L) 03/01/2022 08:27 AM    HEMATOCRIT 26.3 (L) 03/01/2022 08:27 AM    CREACTPROT 0.39 08/19/2020 07:09 AM    HBA1C 6.7 (H) 06/16/2020 04:28 PM        Culture Results show:  No results found for this or any previous visit (from the past 720 hour(s)).    Pain Level/Medicated:  0/10       INTERVENTIONS BY WOUND TEAM:  Chart and images reviewed. Discussed with bedside RN. All areas of concern (based on picture review, LDA review and discussion with bedside RN) have been thoroughly assessed. Documentation of areas based on significant findings. This RN in to assess patient. Performed standard wound care which includes appropriate positioning, dressing removal and non-selective debridement. Pictures and measurements obtained weekly if/when required.  Preparation for Dressing removal: no dressing  Non-selectively Debrided with:  foam cleanser and  washcloth  Sharp debridement: NA  Shital wound: Cleansed with foam cleanser and washcloth  Primary Dressing: barrier paste  Secondary (Outer) Dressing: AFSANEH    Interdisciplinary consultation: Patient, Bedside RN (Cassie)    EVALUATION / RATIONALE FOR TREATMENT:  Most Recent Date:  3/9/22: partial thickness skin loss due to friction and shearing to the left buttock. Barrier paste and offloading.      Goals: Steady decrease in wound area and depth weekly.    WOUND TEAM PLAN OF CARE ([X] for frequency of wound follow up,):   Nursing to follow orders written for wound care. Contact wound team if area fails to progress, deteriorates or with any questions/concerns  Dressing changes by wound team:                   Follow up 3 times weekly:                NPWT change 3 times weekly:     Follow up 1-2 times weekly:      Follow up Bi-Monthly:                   Follow up as needed:   x  Other (explain):     NURSING PLAN OF CARE ORDERS (X):  Dressing changes: See Dressing Care orders:   Skin care: See Skin Care orders: x  RN Prevention Protocol: x  Rectal tube care: See Rectal Tube Care orders:   Other orders:    RSKIN:   CURRENTLY IN PLACE (X), APPLIED THIS VISIT (A), ORDERED (O):   Q shift Rohith:  X  Q shift pressure point assessments:  X    Surface/Positioning   Pressure redistribution mattress   x         Low Airloss          Bariatric foam      Bariatric FABBY     Waffle cushion        Waffle Overlay   x       Reposition q 2 hours  x    TAPs Turning system     Z Torrey Pillow     Offloading/Redistribution   Sacral Mepilex (Silicone dressing)     Heel Mepilex (Silicone dressing)   x      Heel float boots (Prevalon boot)             Float Heels off Bed with Pillows   x        Respiratory NA  Silicone O2 tubing         Gray Foam Ear protectors     Cannula fixation Device (Tender )          High flow offloading Clip    Elastic head band offloading device      Anchorfast                                                          Trach with Optifoam split foam             Containment/Moisture Prevention     Rectal tube or BMS    Purwick/Condom Cath      x  Monteiro Catheter    Barrier wipes           Barrier paste     x  Antifungal tx      Interdry        Mobilization       Up to chair      x  Ambulate      PT/OT  x    Nutrition       Dietician        Diabetes Education      PO   x  TF     TPN     NPO   # days     Other        Anticipated discharge plans:   LTACH:        SNF/Rehab:        x          Home Health Care:           Outpatient Wound Center:            Self/Family Care:        Other:                  Vac Discharge Needs: NA  Not Applicable Pt not on a wound vac:     x  Regular Vac while inpatient, alternative dressing at DC:        Regular Vac in use and continued at DC:            Reg. Vac w/ Skin Sub/Biologic in use. Will need to be changed 2x wkly:      Veraflo Vac while inpatient, ok to transition to Regular Vac on Discharge:           Veraflo Vac while inpatient, will need to remain on Veraflo Vac upon discharge:

## 2022-03-10 NOTE — CARE PLAN
Problem: Knowledge Deficit - Standard  Goal: Patient and family/care givers will demonstrate understanding of plan of care, disease process/condition, diagnostic tests and medications  Outcome: Progressing     Problem: Pain - Standard  Goal: Alleviation of pain or a reduction in pain to the patient’s comfort goal  Outcome: Progressing     Problem: Skin Integrity  Goal: Skin integrity is maintained or improved  Outcome: Progressing   The patient is Stable - Low risk of patient condition declining or worsening    Shift Goals  Clinical Goals: Pain control, q2 turns  Patient Goals: pain control  Family Goals: n/a    Progress made toward(s) clinical / shift goals:  Educate patient of available PRN pain medication per MAR and q2hr turning. Reinforce fall/safety precautions.     Patient is not progressing towards the following goals:

## 2022-03-10 NOTE — PROGRESS NOTES
"   Orthopaedic Progress Note    Interval changes:  Patient doing well  Dressing CDI  Cleared for DC by ortho pending medicine clearance    ROS - Patient denies any new issues, but has developmental delay.  Pain well controlled.    /64   Pulse 81   Temp 36.7 °C (98.1 °F) (Temporal)   Resp 16   Ht 1.651 m (5' 5\")   Wt 61.4 kg (135 lb 5.8 oz)   SpO2 96%       Patient seen and examined  No acute distress  Breathing non labored  RRR  Left beba surgical dressing is clean, dry, and intact. Patient clearly fires tibialis anterior, EHL, and gastrocnemius/soleus. Sensation is intact to light touch throughout superficial peroneal, deep peroneal, tibial, saphenous, and sural nerve distributions. Strong and palpable 2+ dorsalis pedis and posterior tibial pulses with capillary refill less than 2 seconds. No lower leg tenderness or discomfort.         Active Hospital Problems    Diagnosis    • Thrombocytopenia ([facility]) [D69.6]      Priority: Medium   • Diabetes ([facility]) [E11.9]      Priority: Low   • Schizophrenia ([facility]) [F20.9]      Priority: Low   • Hip fracture requiring operative repair, left, closed, initial encounter ([facility]) [S72.002A]    • Tachycardia [R00.0]    • Essential hypertension [I10]    • Anemia [D64.9]    • Parkinson's disease (tremor, stiffness, slow motion, unstable posture) ([facility]) [G20]        Assessment/Plan:  Patient doing well  Dressing CDI  Cleared for DC by ortho pending medicine clearance  POD#12 S/P  Open treatment of left femoral neck fracture with hemiarthroplasty.  Wt bearing status - WBAT  Wound care/Drains - Dressings to be left in place  Future Procedures - none planned   Lovenox: Start 2/26, Duration-until ambulatory > 150'  Sutures/Staples out- 14 days post operatively  PT/OT-initiated  Antibiotics: Perioperative completed  DVT Prophylaxis- TEDS/SCDs/Foot pumps  Monteiro-none  Case Coordination for Discharge Planning - Disposition SNF     I have performed a physical exam and reviewed and updated " ROS and Plan today (3/10). In review of yesterday's note (3/9), there are no changes except as documented above.

## 2022-03-10 NOTE — THERAPY
"Physical Therapy   Daily Treatment     Patient Name: Erlinda Verde  Age:  56 y.o., Sex:  female  Medical Record #: [id number]  Today's Date: 3/9/2022          Assessment    \"Hi doll\". Pt wanting to get into her own w/c. Improvement w/rolling today vs previous tx efforts, pt rolled to her Rt w/min assist. Min<->mod to the Lft, improvement w/initiation of the task. Mod assist still needed to get seated EOB and w/her transfers taking shuffled steps. The pt only needing CGA PTA w/her transfers, she was non-ambulatory. Recommend post acute placement to get pt back to her baseline function in order to return to her GH.     Plan    Continue current treatment plan.    DC Equipment Recommendations: None  Discharge Recommendations: Recommend post-acute placement for additional physical therapy services prior to discharge home     Objective       03/09/22 1145   Other Treatments   Other Treatments Provided Donned brief/pj bottoms working on rolling and bridging. Pt was able to bridge today and help pull pj bottoms over hips.   Balance   Sitting Balance (Static) Fair   Sitting Balance (Dynamic) Poor +   Standing Balance (Static) Poor   Standing Balance (Dynamic) Poor   Weight Shift Sitting Fair   Weight Shift Standing Poor   Gait Analysis   Gait Level Of Assist Unable to Participate   Comments Pt was non-ambulatory PTA.   Bed Mobility    Supine to Sit Moderate Assist  (HOB flat and no railing from the Lft side)   Sit to Supine   (pt left seated in her w/c)   Scooting Moderate Assist  (seated)   Rolling Minimal Assist to Rt.;Moderate Assist to Lt.   Skilled Intervention Verbal Cuing;Postural Facilitation   Comments Pt requires more assist to roll to her Lft w/railing support.   Functional Mobility   Sit to Stand Moderate Assist  (from EOB)   Bed, Chair, Wheelchair Transfer Moderate Assist  (bed->w/c)   Transfer Method Other (Comments)  (shuffle steps)   Comments Pt does not like the FWW, she did not use on PTA. Staff assisted " w/her transfers @ CGA level. Pt still requiring mod assist, does shuffle step to the w/c.   How much difficulty does the patient currently have...   Turning over in bed (including adjusting bedclothes, sheets and blankets)? 2   Sitting down on and standing up from a chair with arms (e.g., wheelchair, bedside commode, etc.) 1   Moving from lying on back to sitting on the side of the bed? 1   How much help from another person does the patient currently need...   Moving to and from a bed to a chair (including a wheelchair)? 2   Need to walk in a hospital room? 1   Climbing 3-5 steps with a railing? 1   6 clicks Mobility Score 8   Short Term Goals    Short Term Goal # 1 in 6 visits patient will demo bed mobility with Jose Ramon for safe DC   Goal Outcome # 1 Progressing slower than expected   Short Term Goal # 2 in 6 visits patient will complete all functional transfers with Jose Ramon and LRAD for safe DC   Goal Outcome # 2 Goal not met   Short Term Goal # 3 in 6 visits patient will demo WC mobility for 200' with supervision   Goal Outcome # 3 Goal not met

## 2022-03-11 PROCEDURE — 770001 HCHG ROOM/CARE - MED/SURG/GYN PRIV*

## 2022-03-11 PROCEDURE — 700102 HCHG RX REV CODE 250 W/ 637 OVERRIDE(OP): Performed by: PHYSICAL MEDICINE & REHABILITATION

## 2022-03-11 PROCEDURE — 700102 HCHG RX REV CODE 250 W/ 637 OVERRIDE(OP): Performed by: INTERNAL MEDICINE

## 2022-03-11 PROCEDURE — 97530 THERAPEUTIC ACTIVITIES: CPT | Mod: CQ

## 2022-03-11 PROCEDURE — A9270 NON-COVERED ITEM OR SERVICE: HCPCS | Performed by: PHYSICAL MEDICINE & REHABILITATION

## 2022-03-11 PROCEDURE — A9270 NON-COVERED ITEM OR SERVICE: HCPCS | Performed by: INTERNAL MEDICINE

## 2022-03-11 PROCEDURE — A9270 NON-COVERED ITEM OR SERVICE: HCPCS | Performed by: HOSPITALIST

## 2022-03-11 PROCEDURE — 99231 SBSQ HOSP IP/OBS SF/LOW 25: CPT | Performed by: INTERNAL MEDICINE

## 2022-03-11 PROCEDURE — 700101 HCHG RX REV CODE 250: Performed by: PHYSICAL MEDICINE & REHABILITATION

## 2022-03-11 PROCEDURE — 700111 HCHG RX REV CODE 636 W/ 250 OVERRIDE (IP): Performed by: INTERNAL MEDICINE

## 2022-03-11 PROCEDURE — 700102 HCHG RX REV CODE 250 W/ 637 OVERRIDE(OP): Performed by: HOSPITALIST

## 2022-03-11 RX ADMIN — QUETIAPINE FUMARATE 200 MG: 100 TABLET ORAL at 21:39

## 2022-03-11 RX ADMIN — DONEPEZIL HYDROCHLORIDE 10 MG: 5 TABLET, FILM COATED ORAL at 21:40

## 2022-03-11 RX ADMIN — ACETAMINOPHEN 1000 MG: 500 TABLET ORAL at 21:39

## 2022-03-11 RX ADMIN — METOPROLOL TARTRATE 25 MG: 25 TABLET, FILM COATED ORAL at 17:56

## 2022-03-11 RX ADMIN — Medication 1 TABLET: at 04:50

## 2022-03-11 RX ADMIN — POLYETHYLENE GLYCOL 3350 1 PACKET: 17 POWDER, FOR SOLUTION ORAL at 04:48

## 2022-03-11 RX ADMIN — ARIPIPRAZOLE 30 MG: 10 TABLET ORAL at 17:56

## 2022-03-11 RX ADMIN — LEVOTHYROXINE SODIUM 25 MCG: 25 TABLET ORAL at 04:50

## 2022-03-11 RX ADMIN — OXYBUTYNIN CHLORIDE 5 MG: 5 TABLET, EXTENDED RELEASE ORAL at 17:57

## 2022-03-11 RX ADMIN — OXYCODONE 2.5 MG: 5 TABLET ORAL at 04:49

## 2022-03-11 RX ADMIN — POLYETHYLENE GLYCOL 3350 1 PACKET: 17 POWDER, FOR SOLUTION ORAL at 04:43

## 2022-03-11 RX ADMIN — METFORMIN HYDROCHLORIDE 500 MG: 500 TABLET, EXTENDED RELEASE ORAL at 04:49

## 2022-03-11 RX ADMIN — OMEPRAZOLE 20 MG: 20 CAPSULE, DELAYED RELEASE ORAL at 04:50

## 2022-03-11 RX ADMIN — QUETIAPINE FUMARATE 100 MG: 100 TABLET ORAL at 04:50

## 2022-03-11 RX ADMIN — SERTRALINE 100 MG: 100 TABLET, FILM COATED ORAL at 04:50

## 2022-03-11 RX ADMIN — SENNOSIDES AND DOCUSATE SODIUM 2 TABLET: 50; 8.6 TABLET ORAL at 04:50

## 2022-03-11 RX ADMIN — ATORVASTATIN CALCIUM 20 MG: 20 TABLET, FILM COATED ORAL at 21:39

## 2022-03-11 RX ADMIN — ACETAMINOPHEN 1000 MG: 500 TABLET ORAL at 16:30

## 2022-03-11 RX ADMIN — LIDOCAINE 1 PATCH: 50 PATCH TOPICAL at 21:40

## 2022-03-11 RX ADMIN — DIVALPROEX SODIUM 1000 MG: 500 TABLET, EXTENDED RELEASE ORAL at 21:40

## 2022-03-11 RX ADMIN — METOPROLOL TARTRATE 25 MG: 25 TABLET, FILM COATED ORAL at 04:49

## 2022-03-11 RX ADMIN — ENOXAPARIN SODIUM 40 MG: 40 INJECTION SUBCUTANEOUS at 04:48

## 2022-03-11 RX ADMIN — ACETAMINOPHEN 1000 MG: 500 TABLET ORAL at 10:11

## 2022-03-11 RX ADMIN — LISINOPRIL 20 MG: 20 TABLET ORAL at 04:50

## 2022-03-11 ASSESSMENT — COGNITIVE AND FUNCTIONAL STATUS - GENERAL
MOBILITY SCORE: 8
STANDING UP FROM CHAIR USING ARMS: A LOT
SUGGESTED CMS G CODE MODIFIER MOBILITY: CM
MOVING FROM LYING ON BACK TO SITTING ON SIDE OF FLAT BED: UNABLE
WALKING IN HOSPITAL ROOM: TOTAL
MOVING TO AND FROM BED TO CHAIR: UNABLE
CLIMB 3 TO 5 STEPS WITH RAILING: TOTAL
TURNING FROM BACK TO SIDE WHILE IN FLAT BAD: A LOT

## 2022-03-11 ASSESSMENT — PAIN DESCRIPTION - PAIN TYPE
TYPE: ACUTE PAIN;SURGICAL PAIN
TYPE: ACUTE PAIN;SURGICAL PAIN

## 2022-03-11 ASSESSMENT — GAIT ASSESSMENTS: GAIT LEVEL OF ASSIST: UNABLE TO PARTICIPATE

## 2022-03-11 NOTE — PROGRESS NOTES
[facility] Medicine Daily Progress Note    Date of Service  3/11/2022    Chief Complaint  Erlinda Verde is a 56 y.o. female admitted 2/25/2022 with   Chief Complaint   Patient presents with   • Leg Pain     PT BIB EMS from home. PT was seen at urgent care yesterday and was diagnosed with a femoral head fracture.  Pt care giver called due to increased pain. PT was referred for follow up with ortho but has not had follow up.        Hospital Course  This 56-year-old female with a developmental disability, size affective disorder, parkinsonism, hypertension, type 2 diabetes mellitus presented to the ER on 2/25/2022 after she had a ground-level fall.     She is a resident of [facility].  She landed on the left side, imaging showed femoral head fracture; orthopedic surgery was consulted, patient underwent open treatment of left femoral neck fracture with hemiarthroplasty on 2/26/2022.  PT/OT has evaluate the patient, recommend postacute.  SNF/rehab referral in place     Of note, patient noted to be tachycardic, D-dimer is elevated, CTA of the chest ordered, pending.     Hospital course was complicated with acute blood loss anemia, today her hemoglobin noted to be 7.6.  Will transfuse if less than 7.  Iron study was obtained consistent with iron deficiency, will provide IV iron.  Will provide PPI.  Patient need to follow-up with primary care physician as an outpatient     Interval Problem Update    2/27:  --No acute events overnight, patient underwent surgery yesterday.  We will continue DVT prophylaxis.  PT/OT has evaluate the patient, pending recommendation.  --Of note, patient abdomen is distended , KUB showing constipation;  Will provide aggressive  Bowel regemin  --Patient continues remain persistent tachycardia along with elevated D-dimer, will obtain CT of the chest.  --Has been requiring 2.5 L of oxygen, will titrate oxygen as needed.  Provide incentive spirometry    Plan of care has been discussed with the  nursing staff , caretaker at bedside.    2/28:  --No acute events overnight, patient tachycardia continues to improve.  CT of the chest ordered, discussed with patient and guardian Ann.  --Patient did have a good bowel movement yesterday  --Her hemoglobin to be 7.6, iron studies consistent with iron deficiency.  Will provide IV iron.  Monitor hemoglobin medically, if less than 7, transfuse  --DVT prophylaxis with Lovenox  --PT/OT has evaluate the patient, recommend postacute, SNF/physiatry referral in place    3/1- no event overnight. She remain pleasantly confused. Sitter present. BP slight elevated. Sinus tachycardia. Started metoprolol 25 mg BID. Not symptomatic. EKG reviewed. CT/PE negative for PE  For hypertension, resume home meds, lisinopril 20 mg daily  hgb 8.4. improving. No need for transfusion   Medically cleared to transfer at SNF     3/2- no event overnight. She is at her baseline. Able to say her name. Complaining of pain, but difficult to rate. Tachycardia improving. BP stable. Last bowel movement three days ago. Bowel protocol in place Medically cleared for placement     3/3- she is little more talkative and awake today. No event overnight. Vitals are stable. Pending rehab/snf transfer     3/4- no event. Report hip pain, meds available. Pending placement. No change on medications. Vitals are stable. She did have bowel movement a day prior.     3/5- no event. Able to understand better. Looks more comfortable. Vitals are stable. Oxycodone used only one time 2.5 mg yesterday. Pending [facility], placement     3/6- no event. She tells me that pain is better. She did have loose stool yesterday. BP stable. She is still on 1L of oxygen. She had used oxycodone 2.5 mg -5 mg twice yesterday. Medically cleared for SNF transferring      3/7- no event. Vitals are stable. Last bowel movement since 3/5. She is taking stool softer. Discuss with RN to give miralax. Continue PT/OT. Pending transfer to SNF     3/8- no  event. Denies pain. She did have bowel movement yesterday. Vitals stable. Pending SNF transfer     3/9- no event. Eating breakfast. Denies pain. Vitals are stable. Oxycodone 2.5-5 mg only three times     3/10- no event. She said little to pain. Eating well. Vitals stable. Bowel movement yesterday. No other pain. Pending placement. PASSR     3/11- no event. Denies pain. Vitals stable. Bowel movement today. PASRR went through. Pending bed available     Plan of care has been discussed with  Nursing staff, case management.    Consultants/Specialty  orthopedics    Code Status  Full Code    Disposition  Patient is medically cleared for discharge.   Anticipate discharge to to skilled nursing facility. Vs rehab   I have placed the appropriate orders for post-discharge needs.    Review of Systems  Review of Systems   Unable to perform ROS: Psychiatric disorder        Unable to obtain  2/2 patient mentation     Physical Exam  Temp:  [35.8 °C (96.5 °F)-36.3 °C (97.4 °F)] 36.3 °C (97.4 °F)  Pulse:  [70-97] 70  Resp:  [16-17] 16  BP: ()/(58-71) 101/58  SpO2:  [94 %-96 %] 94 %    Physical Exam  Vitals and nursing note reviewed.   Constitutional:       Comments: And awake   HENT:      Head: Normocephalic and atraumatic.   Eyes:      Pupils: Pupils are equal, round, and reactive to light.   Cardiovascular:      Rate and Rhythm: Normal rate.      Pulses: Normal pulses.   Pulmonary:      Effort: No respiratory distress.      Breath sounds: Normal breath sounds.   Abdominal:      General: Bowel sounds are normal. There is distension.      Comments: Slight hard   Musculoskeletal:      Cervical back: Neck supple.      Right lower leg: No edema.      Left lower leg: No edema.      Comments: Decreased ROM on the Left side   Skin:     General: Skin is warm.   Neurological:      Mental Status: She is alert.      Comments: Alert and awake   Does folow commands    Psychiatric:         Mood and Affect: Mood normal.          Fluids    Intake/Output Summary (Last 24 hours) at 3/11/2022 1455  Last data filed at 3/11/2022 0742  Gross per 24 hour   Intake --   Output 1400 ml   Net -1400 ml       Laboratory                        Imaging  CT-CTA CHEST PULMONARY ARTERY W/ RECONS   Final Result      1.  No evidence of pulmonary embolism to the segmental branches. Subsegmental branches poorly evaluated due to motion artifact.   2.  Small bilateral pleural effusions.   3.  Atherosclerotic changes. Coronary artery atherosclerotic disease.   4.  3 pulmonary nodules measuring up to 3 mm in the left upper lobe. Recommendations below.      Low Risk: No routine follow-up      High Risk: Optional CT at 12 months      Comments: Use most suspicious nodule as guide to management. Follow-up intervals may vary according to size and risk.      Low Risk - Minimal or absent history of smoking and of other known risk factors.      High Risk - History of smoking or of other known risk factors.      Note: These recommendations do not apply to lung cancer screening, patients with immunosuppression, or patients with known primary cancer.      Fleischner Society 2017 Guidelines for Management of Incidentally Detected Pulmonary Nodules in Adults      [id number] VIEW   Final Result      1.  No evidence of bowel obstruction.      2.  Moderate constipation.      DX-PELVIS-1 OR 2 VIEWS   Final Result      Post left hip hemiarthroplasty.      DX-FEMUR-2+ LEFT   Final Result      1.  Mildly displaced left femoral neck fracture.   2.  No distal femur fracture.      DX-HIP-UNILATERAL-WITH PELVIS-1 VIEW LEFT   Final Result      Left femoral neck fracture again noted.      DX-CHEST-PORTABLE (1 VIEW)   Final Result      Hypoinflation with bibasilar atelectasis.           Assessment/Plan  * Hip fracture requiring operative repair, left, closed, initial encounter ([facility]CC)- (present on admission)  Assessment & Plan  Patient will be consulted by orthopedic surgery     ---x-ray showing femoral head fracture. Ortho was consulted and patient underwent Open treatment of left femoral neck fracture with hemiarthroplasty on 2/26 by Dr Marquis.   --- PT and OT    -- DVT ppx    Tachycardia- (present on admission)  Assessment & Plan  Sinus tachycardia on EKG  Thought to be secondary to pain and hypovolemia  Plan: Opiates, IV fluids  2/26: Improving   Tachy with elevated D-dimer; obtain CTA of the chest negative   3/1- sinus tachy. Not symptomatic. No other explanation. Start metoprolol 25 mg BID . Monitor with PCP and cardiology as OP   3/2- resolving   3/6- resolved     Essential hypertension- (present on admission)  Assessment & Plan  Will hold lisinopril   IV labetalol as needed   --- BP well controlled  3/1- increase. Start lisinopril and metoprolol. Continue to monitor   3/2- better, well controled. Continue current treatment   3/6- well controled       Anemia- (present on admission)  Assessment & Plan  Her hemoglobin hematocrit, today at 7.8.  Will transfuse if less than 7  --Tender to acute blood loss anemia likely 2/2 acute blood loss anemia  3/1- hgb 8.4- improving. No need for transfusion     Diabetes (HCC)- (present on admission)  Assessment & Plan  Glyco at 6.7   -- diabetic diet    -- on iss , hypoglycemia protocol and accu-checks ac and hs  Metformin resumed. Pt is medically cleared     Thrombocytopenia (HCC)- (present on admission)  Assessment & Plan  Reactive, possible due to anemia   3/1- improving     Schizophrenia (HCC)- (present on admission)  Assessment & Plan  Resume outpatient antipsychotic medications  New every 530 mg p.o. daily, Depakote 1000 g at p.m.  Quetiapine 200 at p.m. and 100 at am    QTc 395    Parkinson's disease (tremor, stiffness, slow motion, unstable posture) (HCC)- (present on admission)  Assessment & Plan  Fall precautions       VTE prophylaxis: Lovenox  I have performed a physical exam and reviewed and updated ROS and Plan today (3/11/2022). In  review of yesterday's note (3/10/2022), there are no changes except as documented above.

## 2022-03-11 NOTE — CARE PLAN
The patient is Stable - Low risk of patient condition declining or worsening    Shift Goals  Clinical Goals: safety, pain management, improved activity tolerance  Patient Goals: rest, pain control  Family Goals: n/a    Progress made toward(s) clinical / shift goals:  Pain well controlled with scheduled tylenol, turning [id number], bed alarm in use.      Problem: Pain - Standard  Goal: Alleviation of pain or a reduction in pain to the patient’s comfort goal  Outcome: Progressing     Problem: Skin Integrity  Goal: Skin integrity is maintained or improved  Outcome: Progressing     Problem: Fall Risk  Goal: Patient will remain free from falls  Outcome: Progressing       Patient is not progressing towards the following goals:

## 2022-03-11 NOTE — CARE PLAN
Problem: Knowledge Deficit - Standard  Goal: Patient and family/care givers will demonstrate understanding of plan of care, disease process/condition, diagnostic tests and medications  Outcome: Progressing     Problem: Pain - Standard  Goal: Alleviation of pain or a reduction in pain to the patient’s comfort goal  Outcome: Progressing     Problem: Skin Integrity  Goal: Skin integrity is maintained or improved  Outcome: Progressing     Problem: Fall Risk  Goal: Patient will remain free from falls  Outcome: Progressing   The patient is Stable - Low risk of patient condition declining or worsening    Shift Goals  Clinical Goals: safety, pain management  Patient Goals: rest, pain control  Family Goals: billy    Progress made toward(s) clinical / shift goals:  Pt educated on plan of care for tonight, no current reports of pain, Q2 turns in place, bed alarm in place and education provided    Patient is not progressing towards the following goals:N/A    Assumed care of patient at change of shift.   Report received at bedside rounding  Patient is calm, in bed, with no distress noted.  Call light and patient belongings are in reach, bed is locked and in lowest position- hourly rounding is in place. See flow sheets for further assessment.

## 2022-03-11 NOTE — DISCHARGE PLANNING
Spoke To: Carmel  Agency/Facility Name: [facility]  Plan or Request: KAITLIN will go thro management.

## 2022-03-11 NOTE — DISCHARGE PLANNING
Anticipated Discharge Disposition: SNF    Action: Danae w/ [facility] w/ [facility], pt still accepted with [facility]. They will have a bed available Monday or Tuesday next week     Barriers to Discharge: Bed availability    Plan: F/U with Kristal

## 2022-03-11 NOTE — DISCHARGE PLANNING
Anticipated Discharge Disposition: SNF    Action: [facility] completed #[id number]  Called and left VM w/ [facility] admissions to see if they have a bed available for pt today    Barriers to Discharge: Bed availability    Plan: F/U with SNF

## 2022-03-11 NOTE — PROGRESS NOTES
4 Eyes Skin Assessment Completed by ROD Hendricks and ROD Guzman.    Head WDL  Ears WDL  Nose WDL  Mouth WDL  Neck WDL  Breast/Chest WDL  Shoulder Blades WDL  Spine WDL  (R) Arm/Elbow/Hand WDL  (L) Arm/Elbow/Hand WDL  Abdomen WDL  Groin WDL  Scrotum/Coccyx/Buttocks Redness, blanching, little excoriation, barrier paste applied  (R) Leg WDL  (L) Leg WDL  (R) Heel/Foot/Toe WDL  (L) Heel/Foot/Toe WDL          Devices In Places : none      Interventions In Place Pressure Redistribution Mattress, dry eladio pads, purewick, frequent incontinence checks, q2hour turns, and barrier paste    Possible Skin Injury No    Pictures Uploaded Into Epic N/A  Wound Consult Placed N/A  RN Wound Prevention Protocol Ordered No

## 2022-03-12 PROCEDURE — 99232 SBSQ HOSP IP/OBS MODERATE 35: CPT | Performed by: INTERNAL MEDICINE

## 2022-03-12 PROCEDURE — 700101 HCHG RX REV CODE 250: Performed by: PHYSICAL MEDICINE & REHABILITATION

## 2022-03-12 PROCEDURE — 700111 HCHG RX REV CODE 636 W/ 250 OVERRIDE (IP): Performed by: INTERNAL MEDICINE

## 2022-03-12 PROCEDURE — A9270 NON-COVERED ITEM OR SERVICE: HCPCS | Performed by: INTERNAL MEDICINE

## 2022-03-12 PROCEDURE — 700102 HCHG RX REV CODE 250 W/ 637 OVERRIDE(OP): Performed by: INTERNAL MEDICINE

## 2022-03-12 PROCEDURE — 700102 HCHG RX REV CODE 250 W/ 637 OVERRIDE(OP): Performed by: HOSPITALIST

## 2022-03-12 PROCEDURE — A9270 NON-COVERED ITEM OR SERVICE: HCPCS | Performed by: HOSPITALIST

## 2022-03-12 PROCEDURE — 770001 HCHG ROOM/CARE - MED/SURG/GYN PRIV*

## 2022-03-12 PROCEDURE — A9270 NON-COVERED ITEM OR SERVICE: HCPCS | Performed by: PHYSICAL MEDICINE & REHABILITATION

## 2022-03-12 PROCEDURE — 700102 HCHG RX REV CODE 250 W/ 637 OVERRIDE(OP): Performed by: PHYSICAL MEDICINE & REHABILITATION

## 2022-03-12 RX ORDER — FUROSEMIDE 10 MG/ML
40 INJECTION INTRAMUSCULAR; INTRAVENOUS ONCE
Status: COMPLETED | OUTPATIENT
Start: 2022-03-12 | End: 2022-03-12

## 2022-03-12 RX ADMIN — Medication 1 TABLET: at 04:01

## 2022-03-12 RX ADMIN — ACETAMINOPHEN 1000 MG: 500 TABLET ORAL at 09:25

## 2022-03-12 RX ADMIN — DONEPEZIL HYDROCHLORIDE 10 MG: 5 TABLET, FILM COATED ORAL at 21:41

## 2022-03-12 RX ADMIN — ENOXAPARIN SODIUM 40 MG: 40 INJECTION SUBCUTANEOUS at 04:04

## 2022-03-12 RX ADMIN — OXYCODONE 2.5 MG: 5 TABLET ORAL at 04:02

## 2022-03-12 RX ADMIN — OXYBUTYNIN CHLORIDE 5 MG: 5 TABLET, EXTENDED RELEASE ORAL at 17:05

## 2022-03-12 RX ADMIN — ACETAMINOPHEN 1000 MG: 500 TABLET ORAL at 21:40

## 2022-03-12 RX ADMIN — SERTRALINE 100 MG: 100 TABLET, FILM COATED ORAL at 04:01

## 2022-03-12 RX ADMIN — LISINOPRIL 20 MG: 20 TABLET ORAL at 04:09

## 2022-03-12 RX ADMIN — QUETIAPINE FUMARATE 200 MG: 100 TABLET ORAL at 21:40

## 2022-03-12 RX ADMIN — OMEPRAZOLE 20 MG: 20 CAPSULE, DELAYED RELEASE ORAL at 04:03

## 2022-03-12 RX ADMIN — ATORVASTATIN CALCIUM 20 MG: 20 TABLET, FILM COATED ORAL at 21:41

## 2022-03-12 RX ADMIN — LEVOTHYROXINE SODIUM 25 MCG: 25 TABLET ORAL at 04:02

## 2022-03-12 RX ADMIN — OXYCODONE 5 MG: 5 TABLET ORAL at 17:35

## 2022-03-12 RX ADMIN — ARIPIPRAZOLE 30 MG: 10 TABLET ORAL at 17:05

## 2022-03-12 RX ADMIN — METOPROLOL TARTRATE 25 MG: 25 TABLET, FILM COATED ORAL at 04:10

## 2022-03-12 RX ADMIN — DIVALPROEX SODIUM 1000 MG: 500 TABLET, EXTENDED RELEASE ORAL at 21:41

## 2022-03-12 RX ADMIN — METOPROLOL TARTRATE 25 MG: 25 TABLET, FILM COATED ORAL at 17:05

## 2022-03-12 RX ADMIN — METFORMIN HYDROCHLORIDE 500 MG: 500 TABLET, EXTENDED RELEASE ORAL at 04:03

## 2022-03-12 RX ADMIN — OXYCODONE 5 MG: 5 TABLET ORAL at 12:32

## 2022-03-12 RX ADMIN — LIDOCAINE 1 PATCH: 50 PATCH TOPICAL at 21:39

## 2022-03-12 RX ADMIN — FUROSEMIDE 40 MG: 10 INJECTION, SOLUTION INTRAVENOUS at 11:43

## 2022-03-12 RX ADMIN — QUETIAPINE FUMARATE 100 MG: 100 TABLET ORAL at 04:01

## 2022-03-12 ASSESSMENT — PAIN DESCRIPTION - PAIN TYPE
TYPE: ACUTE PAIN;SURGICAL PAIN
TYPE: ACUTE PAIN
TYPE: ACUTE PAIN;SURGICAL PAIN

## 2022-03-12 ASSESSMENT — PAIN SCALES - WONG BAKER
WONGBAKER_NUMERICALRESPONSE: HURTS JUST A LITTLE BIT
WONGBAKER_NUMERICALRESPONSE: HURTS JUST A LITTLE BIT

## 2022-03-12 NOTE — PROGRESS NOTES
This RN bedside at the request of the patient's caregiver.  As per the caregiver, she has been asking to have the patient changed to a diabetic diet for a couple days but the patient is still receiving a regular (soft) diet.  This RN advised caregiver that I would contact the treating team to make sure it's okay, then have it changed with their approval.    [facility] Hospitalist MD Henriquez at this time, awaiting orders.

## 2022-03-12 NOTE — CARE PLAN
The patient is Stable - Low risk of patient condition declining or worsening    Shift Goals  Clinical Goals: Safety, comfort, rest  Patient Goals: Sleep well  Family Goals: Unable to determine, no family bedside at this time    Progress made toward(s) clinical / shift goals:    Problem: Pain - Standard  Goal: Alleviation of pain or a reduction in pain to the patient’s comfort goal  Outcome: Progressing  Note: Managed as per MAR     Problem: Skin Integrity  Goal: Skin integrity is maintained or improved  Outcome: Progressing     Problem: Fall Risk  Goal: Patient will remain free from falls  Outcome: Progressing

## 2022-03-12 NOTE — PROGRESS NOTES
"   Orthopaedic Progress Note    Interval changes:  Patient doing well  Dressing CDI  Staples out, steri strips today  Clear for disposition (home/SNF/IRF) from Orthopaedic team standpoint.    ROS - Patient denies any new issues, but has developmental delay.  Pain well controlled.    /71   Pulse 77   Temp 36.4 °C (97.5 °F) (Temporal)   Resp 16   Ht 1.651 m (5' 5\")   Wt 61.4 kg (135 lb 5.8 oz)   SpO2 94%     Patient seen and examined  No acute distress  Breathing non labored  RRR  Left beba surgical dressing is clean, dry, and intact. Patient clearly fires tibialis anterior, EHL, and gastrocnemius/soleus. Sensation is intact to light touch throughout superficial peroneal, deep peroneal, tibial, saphenous, and sural nerve distributions. Strong and palpable 2+ dorsalis pedis and posterior tibial pulses with capillary refill less than 2 seconds. No lower leg tenderness or discomfort.    Active Hospital Problems    Diagnosis    • Thrombocytopenia ([facility]) [D69.6]      Priority: Medium   • Diabetes ([facility]) [E11.9]      Priority: Low   • Schizophrenia ([facility]) [F20.9]      Priority: Low   • Hip fracture requiring operative repair, left, closed, initial encounter ([facility]) [S72.002A]    • Tachycardia [R00.0]    • Essential hypertension [I10]    • Anemia [D64.9]    • Parkinson's disease (tremor, stiffness, slow motion, unstable posture) ([facility]) [G20]      Assessment/Plan:  Patient doing well  Dressing CDI  Cleared for DC by ortho pending medicine clearance  POD#14 S/P  Open treatment of left femoral neck fracture with hemiarthroplasty.  Wt bearing status - WBAT  Wound care/Drains - Dressings to be left in place  Future Procedures - none planned   Lovenox: Start 2/26, Duration-until ambulatory > 150'  Sutures/Staples out- 14 days post operatively  PT/OT-initiated  Antibiotics: Perioperative completed  DVT Prophylaxis- TEDS/SCDs/Foot pumps  Monteiro-none  Case Coordination for Discharge Planning - Disposition SNF   Follow-Up: needs " appointment with Dr. Marquis at [facility] at 4 wks post-op for re-evaluation and radiographs.

## 2022-03-12 NOTE — PROGRESS NOTES
25 staples removed as per order of SARAH Goodwin, incision cleaned/irrigated with NS, incision dried with gauze, benzoin applied to each side of incision, steristrips applied as per orders.

## 2022-03-12 NOTE — PROGRESS NOTES
56yoF with left displaced femoral neck s/p hemiarthroplasty 2/26.    S: Caregiver at bedside.  Erlinda is awake and alert, no complaints    O:    Vitals:    03/12/22 0748   BP: 104/71   Pulse: 77   Resp: 16   Temp: 36.4 °C (97.5 °F)   SpO2: 94%     Exam:  General-NAD, alert and following commands  LLE-hip dressing c/d/i, +EHL/FHL/TA/GS motor, foot warm and well perfused    A: 56yoF with left displaced femoral neck s/p hemiarthroplasty 2/26.    Recs:  --WBAT LLE  --posterior hip precautions  --PT/OT for mobilization  --heparin and SCDs while inpatient, okay for ASA as outpatient  --staples out prior to discharge then fu appt 4-6 weeks postop

## 2022-03-12 NOTE — THERAPY
Physical Therapy   Daily Treatment     Patient Name: Erlinda Verde  Age:  56 y.o., Sex:  female  Medical Record #: [id number]  Today's Date: 3/11/2022          Assessment    Pt willing to participate, she is having less difficulty w/rolling and bridging to constantine brief and pj bottoms. Mod assist still required to get seated EOB and w/her transfers. Pt did agree to 1 stand to the FWW, able to hold w/CGA.   PT conts to recommend post acute therapy to assist pt back to her baseline function.     Plan    Continue current treatment plan.    DC Equipment Recommendations: None  Discharge Recommendations: Recommend post-acute placement for additional physical therapy services prior to discharge home     Objective     03/11/22 1541   Other Treatments   Other Treatments Provided Pt found in wet bed, incont of urine. Pericare provided while working on donning brief/pj bottoms.   Balance   Sitting Balance (Static) Fair -   Sitting Balance (Dynamic) Poor   Standing Balance (Static) Poor   Standing Balance (Dynamic) Poor   Weight Shift Sitting Poor   Weight Shift Standing Poor   Gait Analysis   Gait Level Of Assist Unable to Participate   Bed Mobility    Supine to Sit Moderate Assist  (HOB flat from Rt side of the bed)   Sit to Supine   (pt left seated in her w/c)   Scooting Moderate Assist  (seated)   Rolling Minimal Assist to Rt.;Minimum Assist to Lt.   Comments Improvement w/her rolling. Pt gets seated EOB from the Rt side easier than her Lft. Pt struggles w/forward scoot to get feet to the floor.   Functional Mobility   Sit to Stand Minimal Assist   Bed, Chair, Wheelchair Transfer Moderate Assist  (bed->w/c)   Comments Pt did complete 1 sit<->stand from EOB->FWW and was able to hold the stand. Will initiate amb using FWW next tx session. Pt is not always compliant w/standing to the FWW.   How much difficulty does the patient currently have...   Turning over in bed (including adjusting bedclothes, sheets and blankets)? 2    Sitting down on and standing up from a chair with arms (e.g., wheelchair, bedside commode, etc.) 1   Moving from lying on back to sitting on the side of the bed? 1   How much help from another person does the patient currently need...   Moving to and from a bed to a chair (including a wheelchair)? 2   Need to walk in a hospital room? 1   Climbing 3-5 steps with a railing? 1   6 clicks Mobility Score 8   Short Term Goals    Short Term Goal # 1 in 6 visits patient will demo bed mobility with Jose Ramon for safe DC   Goal Outcome # 1 goal not met   Short Term Goal # 2 in 6 visits patient will complete all functional transfers with Jose Ramon and LRAD for safe DC   Goal Outcome # 2 Goal not met   Short Term Goal # 3 in 6 visits patient will demo WC mobility for 200' with supervision   Goal Outcome # 3 Goal not met

## 2022-03-12 NOTE — CARE PLAN
The patient is Stable - Low risk of patient condition declining or worsening    Shift Goals  Clinical Goals: safety, pain management,  Patient Goals: rest, pain control  Family Goals: not present at bedside    Progress made toward(s) clinical / shift goals:  pain management and fall prevention interventions in place.       Problem: Pain - Standard  Goal: Alleviation of pain or a reduction in pain to the patient’s comfort goal  Outcome: Progressing     Problem: Fall Risk  Goal: Patient will remain free from falls  Outcome: Progressing

## 2022-03-12 NOTE — PROGRESS NOTES
"   Orthopaedic Progress Note    Interval changes:  Patient doing well  Dressing CDI  Cleared for DC by ortho pending medicine clearance    ROS - Patient denies any new issues, but has developmental delay.  Pain well controlled.    /72   Pulse 96   Temp 36.3 °C (97.3 °F) (Temporal)   Resp 15   Ht 1.651 m (5' 5\")   Wt 61.4 kg (135 lb 5.8 oz)   SpO2 96%       Patient seen and examined  No acute distress  Breathing non labored  RRR  Left beba surgical dressing is clean, dry, and intact. Patient clearly fires tibialis anterior, EHL, and gastrocnemius/soleus. Sensation is intact to light touch throughout superficial peroneal, deep peroneal, tibial, saphenous, and sural nerve distributions. Strong and palpable 2+ dorsalis pedis and posterior tibial pulses with capillary refill less than 2 seconds. No lower leg tenderness or discomfort.         Active Hospital Problems    Diagnosis    • Thrombocytopenia ([facility]) [D69.6]      Priority: Medium   • Diabetes ([facility]) [E11.9]      Priority: Low   • Schizophrenia ([facility]) [F20.9]      Priority: Low   • Hip fracture requiring operative repair, left, closed, initial encounter ([facility]) [S72.002A]    • Tachycardia [R00.0]    • Essential hypertension [I10]    • Anemia [D64.9]    • Parkinson's disease (tremor, stiffness, slow motion, unstable posture) ([facility]) [G20]        Assessment/Plan:  Patient doing well  Dressing CDI  Cleared for DC by ortho pending medicine clearance  POD#13 S/P  Open treatment of left femoral neck fracture with hemiarthroplasty.  Wt bearing status - WBAT  Wound care/Drains - Dressings to be left in place  Future Procedures - none planned   Lovenox: Start 2/26, Duration-until ambulatory > 150'  Sutures/Staples out- 14 days post operatively  PT/OT-initiated  Antibiotics: Perioperative completed  DVT Prophylaxis- TEDS/SCDs/Foot pumps  Monteiro-none  Case Coordination for Discharge Planning - Disposition SNF     I have performed a physical exam and reviewed and updated " ROS and Plan today (3/11). In review of yesterday's note (3/10), there are no changes except as documented above.

## 2022-03-12 NOTE — PROGRESS NOTES
[facility] Medicine Daily Progress Note    Date of Service  3/12/2022    Chief Complaint  Erlinda Verde is a 56 y.o. female admitted 2/25/2022 with   Chief Complaint   Patient presents with   • Leg Pain     PT BIB EMS from home. PT was seen at urgent care yesterday and was diagnosed with a femoral head fracture.  Pt care giver called due to increased pain. PT was referred for follow up with ortho but has not had follow up.        Hospital Course  This 56-year-old female with a developmental disability, size affective disorder, parkinsonism, hypertension, type 2 diabetes mellitus presented to the ER on 2/25/2022 after she had a ground-level fall.     She is a resident of [facility].  She landed on the left side, imaging showed femoral head fracture; orthopedic surgery was consulted, patient underwent open treatment of left femoral neck fracture with hemiarthroplasty on 2/26/2022.  PT/OT has evaluate the patient, recommend postacute.  SNF/rehab referral in place     Of note, patient noted to be tachycardic, D-dimer is elevated, CTA of the chest ordered, pending.     Hospital course was complicated with acute blood loss anemia, today her hemoglobin noted to be 7.6.  Will transfuse if less than 7.  Iron study was obtained consistent with iron deficiency, will provide IV iron.  Will provide PPI.  Patient need to follow-up with primary care physician as an outpatient     Interval Problem Update    2/27:  --No acute events overnight, patient underwent surgery yesterday.  We will continue DVT prophylaxis.  PT/OT has evaluate the patient, pending recommendation.  --Of note, patient abdomen is distended , KUB showing constipation;  Will provide aggressive  Bowel regemin  --Patient continues remain persistent tachycardia along with elevated D-dimer, will obtain CT of the chest.  --Has been requiring 2.5 L of oxygen, will titrate oxygen as needed.  Provide incentive spirometry    Plan of care has been discussed with the  nursing staff , caretaker at bedside.    2/28:  --No acute events overnight, patient tachycardia continues to improve.  CT of the chest ordered, discussed with patient and guardian Ann.  --Patient did have a good bowel movement yesterday  --Her hemoglobin to be 7.6, iron studies consistent with iron deficiency.  Will provide IV iron.  Monitor hemoglobin medically, if less than 7, transfuse  --DVT prophylaxis with Lovenox  --PT/OT has evaluate the patient, recommend postacute, SNF/physiatry referral in place    3/1- no event overnight. She remain pleasantly confused. Sitter present. BP slight elevated. Sinus tachycardia. Started metoprolol 25 mg BID. Not symptomatic. EKG reviewed. CT/PE negative for PE  For hypertension, resume home meds, lisinopril 20 mg daily  hgb 8.4. improving. No need for transfusion   Medically cleared to transfer at SNF     3/2- no event overnight. She is at her baseline. Able to say her name. Complaining of pain, but difficult to rate. Tachycardia improving. BP stable. Last bowel movement three days ago. Bowel protocol in place Medically cleared for placement     3/3- she is little more talkative and awake today. No event overnight. Vitals are stable. Pending rehab/snf transfer     3/4- no event. Report hip pain, meds available. Pending placement. No change on medications. Vitals are stable. She did have bowel movement a day prior.     3/5- no event. Able to understand better. Looks more comfortable. Vitals are stable. Oxycodone used only one time 2.5 mg yesterday. Pending [facility], placement     3/6- no event. She tells me that pain is better. She did have loose stool yesterday. BP stable. She is still on 1L of oxygen. She had used oxycodone 2.5 mg -5 mg twice yesterday. Medically cleared for SNF transferring      3/7- no event. Vitals are stable. Last bowel movement since 3/5. She is taking stool softer. Discuss with RN to give miralax. Continue PT/OT. Pending transfer to SNF     3/8- no  event. Denies pain. She did have bowel movement yesterday. Vitals stable. Pending SNF transfer     3/9- no event. Eating breakfast. Denies pain. Vitals are stable. Oxycodone 2.5-5 mg only three times     3/10- no event. She said little to pain. Eating well. Vitals stable. Bowel movement yesterday. No other pain. Pending placement. [facility]     3/11- no event. Denies pain. Vitals stable. Bowel movement today. [facility] went through. Pending bed available     3/12- no event. Reports pain when moving. Staples out today. Still some swelling on that leg from surgery. Lasix 40 mg IV once given. Pending SNF placement     Plan of care has been discussed with  Nursing staff, case management.    Consultants/Specialty  orthopedics    Code Status  Full Code    Disposition  Patient is medically cleared for discharge.   Anticipate discharge to to skilled nursing facility. Vs rehab   I have placed the appropriate orders for post-discharge needs.    Review of Systems  Review of Systems   Unable to perform ROS: Psychiatric disorder        Unable to obtain  2/2 patient mentation     Physical Exam  Temp:  [35.9 °C (96.6 °F)-36.4 °C (97.5 °F)] 36.4 °C (97.5 °F)  Pulse:  [] 77  Resp:  [15-16] 16  BP: (103-118)/(66-87) 104/71  SpO2:  [93 %-96 %] 94 %    Physical Exam  Vitals and nursing note reviewed.   Constitutional:       Comments: And awake   HENT:      Head: Normocephalic and atraumatic.   Eyes:      Pupils: Pupils are equal, round, and reactive to light.   Cardiovascular:      Rate and Rhythm: Normal rate.      Pulses: Normal pulses.   Pulmonary:      Effort: No respiratory distress.      Breath sounds: Normal breath sounds.   Abdominal:      General: Bowel sounds are normal. There is distension.      Comments: Slight hard   Musculoskeletal:      Cervical back: Neck supple.      Right lower leg: No edema.      Left lower leg: No edema.      Comments: Decreased ROM on the Left side   Skin:     General: Skin is warm.   Neurological:       Mental Status: She is alert.      Comments: Alert and awake   Does folow commands    Psychiatric:         Mood and Affect: Mood normal.         Fluids  No intake or output data in the 24 hours ending 03/12/22 1044    Laboratory                        Imaging  CT-CTA CHEST PULMONARY ARTERY W/ RECONS   Final Result      1.  No evidence of pulmonary embolism to the segmental branches. Subsegmental branches poorly evaluated due to motion artifact.   2.  Small bilateral pleural effusions.   3.  Atherosclerotic changes. Coronary artery atherosclerotic disease.   4.  3 pulmonary nodules measuring up to 3 mm in the left upper lobe. Recommendations below.      Low Risk: No routine follow-up      High Risk: Optional CT at 12 months      Comments: Use most suspicious nodule as guide to management. Follow-up intervals may vary according to size and risk.      Low Risk - Minimal or absent history of smoking and of other known risk factors.      High Risk - History of smoking or of other known risk factors.      Note: These recommendations do not apply to lung cancer screening, patients with immunosuppression, or patients with known primary cancer.      Fleischner Society 2017 Guidelines for Management of Incidentally Detected Pulmonary Nodules in Adults      [id number] VIEW   Final Result      1.  No evidence of bowel obstruction.      2.  Moderate constipation.      DX-PELVIS-1 OR 2 VIEWS   Final Result      Post left hip hemiarthroplasty.      DX-FEMUR-2+ LEFT   Final Result      1.  Mildly displaced left femoral neck fracture.   2.  No distal femur fracture.      DX-HIP-UNILATERAL-WITH PELVIS-1 VIEW LEFT   Final Result      Left femoral neck fracture again noted.      DX-CHEST-PORTABLE (1 VIEW)   Final Result      Hypoinflation with bibasilar atelectasis.           Assessment/Plan  * Hip fracture requiring operative repair, left, closed, initial encounter (HCC)- (present on admission)  Assessment & Plan  Patient will  be consulted by orthopedic surgery    ---x-ray showing femoral head fracture. Ortho was consulted and patient underwent Open treatment of left femoral neck fracture with hemiarthroplasty on 2/26 by Dr Marquis.   --- PT and OT    -- DVT ppx  3/12- one dose of lasix 40 mg IV once, to help with swelling     Tachycardia- (present on admission)  Assessment & Plan  Sinus tachycardia on EKG  Thought to be secondary to pain and hypovolemia  Plan: Opiates, IV fluids  2/26: Improving   Tachy with elevated D-dimer; obtain CTA of the chest negative   3/1- sinus tachy. Not symptomatic. No other explanation. Start metoprolol 25 mg BID . Monitor with PCP and cardiology as OP   3/2- resolving   3/6- resolved     Essential hypertension- (present on admission)  Assessment & Plan  Will hold lisinopril   IV labetalol as needed   --- BP well controlled  3/1- increase. Start lisinopril and metoprolol. Continue to monitor   3/2- better, well controled. Continue current treatment   3/6- well controled       Anemia- (present on admission)  Assessment & Plan  Her hemoglobin hematocrit, today at 7.8.  Will transfuse if less than 7  --Tender to acute blood loss anemia likely 2/2 acute blood loss anemia  3/1- hgb 8.4- improving. No need for transfusion     Diabetes (HCC)- (present on admission)  Assessment & Plan  Glyco at 6.7   -- diabetic diet    -- on iss , hypoglycemia protocol and accu-checks ac and hs  Metformin resumed. Pt is medically cleared     Thrombocytopenia (HCC)- (present on admission)  Assessment & Plan  Reactive, possible due to anemia   3/1- improving     Schizophrenia (HCC)- (present on admission)  Assessment & Plan  Resume outpatient antipsychotic medications  New every 530 mg p.o. daily, Depakote 1000 g at p.m.  Quetiapine 200 at p.m. and 100 at am    QTc 395    Parkinson's disease (tremor, stiffness, slow motion, unstable posture) (HCC)- (present on admission)  Assessment & Plan  Fall precautions       VTE prophylaxis:  Lovenox  I have performed a physical exam and reviewed and updated ROS and Plan today (3/12/2022). In review of yesterday's note (3/11/2022), there are no changes except as documented above.

## 2022-03-13 LAB
ALBUMIN SERPL BCP-MCNC: 3.8 G/DL (ref 3.2–4.9)
BUN SERPL-MCNC: 29 MG/DL (ref 8–22)
CALCIUM SERPL-MCNC: 9.3 MG/DL (ref 8.5–10.5)
CHLORIDE SERPL-SCNC: 99 MMOL/L (ref 96–112)
CO2 SERPL-SCNC: 22 MMOL/L (ref 20–33)
CREAT SERPL-MCNC: 0.98 MG/DL (ref 0.5–1.4)
ERYTHROCYTE [DISTWIDTH] IN BLOOD BY AUTOMATED COUNT: 58.1 FL (ref 35.9–50)
GLUCOSE SERPL-MCNC: 131 MG/DL (ref 65–99)
HCT VFR BLD AUTO: 33.8 % (ref 37–47)
HGB BLD-MCNC: 10.5 G/DL (ref 12–16)
MCH RBC QN AUTO: 29.7 PG (ref 27–33)
MCHC RBC AUTO-ENTMCNC: 31.1 G/DL (ref 33.6–35)
MCV RBC AUTO: 95.8 FL (ref 81.4–97.8)
PHOSPHATE SERPL-MCNC: 5.1 MG/DL (ref 2.5–4.5)
PLATELET # BLD AUTO: 376 K/UL (ref 164–446)
PMV BLD AUTO: 7.9 FL (ref 9–12.9)
POTASSIUM SERPL-SCNC: 4.7 MMOL/L (ref 3.6–5.5)
RBC # BLD AUTO: 3.53 M/UL (ref 4.2–5.4)
SODIUM SERPL-SCNC: 135 MMOL/L (ref 135–145)
WBC # BLD AUTO: 6.3 K/UL (ref 4.8–10.8)

## 2022-03-13 PROCEDURE — 700102 HCHG RX REV CODE 250 W/ 637 OVERRIDE(OP): Performed by: PHYSICAL MEDICINE & REHABILITATION

## 2022-03-13 PROCEDURE — 80069 RENAL FUNCTION PANEL: CPT

## 2022-03-13 PROCEDURE — A9270 NON-COVERED ITEM OR SERVICE: HCPCS | Performed by: PHYSICAL MEDICINE & REHABILITATION

## 2022-03-13 PROCEDURE — 700102 HCHG RX REV CODE 250 W/ 637 OVERRIDE(OP): Performed by: HOSPITALIST

## 2022-03-13 PROCEDURE — 99231 SBSQ HOSP IP/OBS SF/LOW 25: CPT | Performed by: INTERNAL MEDICINE

## 2022-03-13 PROCEDURE — A9270 NON-COVERED ITEM OR SERVICE: HCPCS | Performed by: HOSPITALIST

## 2022-03-13 PROCEDURE — 700102 HCHG RX REV CODE 250 W/ 637 OVERRIDE(OP): Performed by: INTERNAL MEDICINE

## 2022-03-13 PROCEDURE — 700111 HCHG RX REV CODE 636 W/ 250 OVERRIDE (IP): Performed by: INTERNAL MEDICINE

## 2022-03-13 PROCEDURE — 85027 COMPLETE CBC AUTOMATED: CPT

## 2022-03-13 PROCEDURE — 770001 HCHG ROOM/CARE - MED/SURG/GYN PRIV*

## 2022-03-13 PROCEDURE — 700101 HCHG RX REV CODE 250: Performed by: PHYSICAL MEDICINE & REHABILITATION

## 2022-03-13 PROCEDURE — A9270 NON-COVERED ITEM OR SERVICE: HCPCS | Performed by: INTERNAL MEDICINE

## 2022-03-13 PROCEDURE — 36415 COLL VENOUS BLD VENIPUNCTURE: CPT

## 2022-03-13 RX ADMIN — LISINOPRIL 20 MG: 20 TABLET ORAL at 04:19

## 2022-03-13 RX ADMIN — SENNOSIDES AND DOCUSATE SODIUM 2 TABLET: 50; 8.6 TABLET ORAL at 16:11

## 2022-03-13 RX ADMIN — DONEPEZIL HYDROCHLORIDE 10 MG: 5 TABLET, FILM COATED ORAL at 20:56

## 2022-03-13 RX ADMIN — ENOXAPARIN SODIUM 40 MG: 40 INJECTION SUBCUTANEOUS at 04:21

## 2022-03-13 RX ADMIN — ARIPIPRAZOLE 30 MG: 10 TABLET ORAL at 16:11

## 2022-03-13 RX ADMIN — Medication 10 MG: at 06:07

## 2022-03-13 RX ADMIN — POLYETHYLENE GLYCOL 3350 1 PACKET: 17 POWDER, FOR SOLUTION ORAL at 04:18

## 2022-03-13 RX ADMIN — Medication 10 MG: at 06:06

## 2022-03-13 RX ADMIN — QUETIAPINE FUMARATE 200 MG: 100 TABLET ORAL at 20:56

## 2022-03-13 RX ADMIN — ACETAMINOPHEN 1000 MG: 500 TABLET ORAL at 20:57

## 2022-03-13 RX ADMIN — POLYETHYLENE GLYCOL 3350 1 PACKET: 17 POWDER, FOR SOLUTION ORAL at 16:11

## 2022-03-13 RX ADMIN — OXYCODONE 5 MG: 5 TABLET ORAL at 20:57

## 2022-03-13 RX ADMIN — DIVALPROEX SODIUM 1000 MG: 500 TABLET, EXTENDED RELEASE ORAL at 20:56

## 2022-03-13 RX ADMIN — ACETAMINOPHEN 1000 MG: 500 TABLET ORAL at 09:54

## 2022-03-13 RX ADMIN — SERTRALINE 100 MG: 100 TABLET, FILM COATED ORAL at 04:21

## 2022-03-13 RX ADMIN — SENNOSIDES AND DOCUSATE SODIUM 2 TABLET: 50; 8.6 TABLET ORAL at 04:20

## 2022-03-13 RX ADMIN — ACETAMINOPHEN 1000 MG: 500 TABLET ORAL at 16:11

## 2022-03-13 RX ADMIN — Medication 1 TABLET: at 04:19

## 2022-03-13 RX ADMIN — METOPROLOL TARTRATE 25 MG: 25 TABLET, FILM COATED ORAL at 04:20

## 2022-03-13 RX ADMIN — LEVOTHYROXINE SODIUM 25 MCG: 25 TABLET ORAL at 04:21

## 2022-03-13 RX ADMIN — OXYCODONE 5 MG: 5 TABLET ORAL at 04:20

## 2022-03-13 RX ADMIN — METFORMIN HYDROCHLORIDE 500 MG: 500 TABLET, EXTENDED RELEASE ORAL at 04:18

## 2022-03-13 RX ADMIN — OXYBUTYNIN CHLORIDE 5 MG: 5 TABLET, EXTENDED RELEASE ORAL at 16:11

## 2022-03-13 RX ADMIN — ATORVASTATIN CALCIUM 20 MG: 20 TABLET, FILM COATED ORAL at 20:58

## 2022-03-13 RX ADMIN — QUETIAPINE FUMARATE 100 MG: 100 TABLET ORAL at 04:19

## 2022-03-13 RX ADMIN — OMEPRAZOLE 20 MG: 20 CAPSULE, DELAYED RELEASE ORAL at 04:18

## 2022-03-13 ASSESSMENT — PAIN DESCRIPTION - PAIN TYPE: TYPE: ACUTE PAIN;SURGICAL PAIN

## 2022-03-13 NOTE — PROGRESS NOTES
[facility] Medicine Daily Progress Note    Date of Service  3/13/2022    Chief Complaint  Erlinda Verde is a 56 y.o. female admitted 2/25/2022 with   Chief Complaint   Patient presents with   • Leg Pain     PT BIB EMS from home. PT was seen at urgent care yesterday and was diagnosed with a femoral head fracture.  Pt care giver called due to increased pain. PT was referred for follow up with ortho but has not had follow up.        Hospital Course  This 56-year-old female with a developmental disability, size affective disorder, parkinsonism, hypertension, type 2 diabetes mellitus presented to the ER on 2/25/2022 after she had a ground-level fall.     She is a resident of [facility].  She landed on the left side, imaging showed femoral head fracture; orthopedic surgery was consulted, patient underwent open treatment of left femoral neck fracture with hemiarthroplasty on 2/26/2022.  PT/OT has evaluate the patient, recommend postacute.  SNF/rehab referral in place     Of note, patient noted to be tachycardic, D-dimer is elevated, CTA of the chest ordered, pending.     Hospital course was complicated with acute blood loss anemia, today her hemoglobin noted to be 7.6.  Will transfuse if less than 7.  Iron study was obtained consistent with iron deficiency, will provide IV iron.  Will provide PPI.  Patient need to follow-up with primary care physician as an outpatient     Interval Problem Update    2/27:  --No acute events overnight, patient underwent surgery yesterday.  We will continue DVT prophylaxis.  PT/OT has evaluate the patient, pending recommendation.  --Of note, patient abdomen is distended , KUB showing constipation;  Will provide aggressive  Bowel regemin  --Patient continues remain persistent tachycardia along with elevated D-dimer, will obtain CT of the chest.  --Has been requiring 2.5 L of oxygen, will titrate oxygen as needed.  Provide incentive spirometry    Plan of care has been discussed with the  nursing staff , caretaker at bedside.    2/28:  --No acute events overnight, patient tachycardia continues to improve.  CT of the chest ordered, discussed with patient and guardian Ann.  --Patient did have a good bowel movement yesterday  --Her hemoglobin to be 7.6, iron studies consistent with iron deficiency.  Will provide IV iron.  Monitor hemoglobin medically, if less than 7, transfuse  --DVT prophylaxis with Lovenox  --PT/OT has evaluate the patient, recommend postacute, SNF/physiatry referral in place    3/1- no event overnight. She remain pleasantly confused. Sitter present. BP slight elevated. Sinus tachycardia. Started metoprolol 25 mg BID. Not symptomatic. EKG reviewed. CT/PE negative for PE  For hypertension, resume home meds, lisinopril 20 mg daily  hgb 8.4. improving. No need for transfusion   Medically cleared to transfer at SNF     3/2- no event overnight. She is at her baseline. Able to say her name. Complaining of pain, but difficult to rate. Tachycardia improving. BP stable. Last bowel movement three days ago. Bowel protocol in place Medically cleared for placement     3/3- she is little more talkative and awake today. No event overnight. Vitals are stable. Pending rehab/snf transfer     3/4- no event. Report hip pain, meds available. Pending placement. No change on medications. Vitals are stable. She did have bowel movement a day prior.     3/5- no event. Able to understand better. Looks more comfortable. Vitals are stable. Oxycodone used only one time 2.5 mg yesterday. Pending [facility], placement     3/6- no event. She tells me that pain is better. She did have loose stool yesterday. BP stable. She is still on 1L of oxygen. She had used oxycodone 2.5 mg -5 mg twice yesterday. Medically cleared for SNF transferring      3/7- no event. Vitals are stable. Last bowel movement since 3/5. She is taking stool softer. Discuss with RN to give miralax. Continue PT/OT. Pending transfer to SNF     3/8- no  event. Denies pain. She did have bowel movement yesterday. Vitals stable. Pending SNF transfer     3/9- no event. Eating breakfast. Denies pain. Vitals are stable. Oxycodone 2.5-5 mg only three times     3/10- no event. She said little to pain. Eating well. Vitals stable. Bowel movement yesterday. No other pain. Pending placement. PASSR     3/11- no event. Denies pain. Vitals stable. Bowel movement today. PASRR went through. Pending bed available     3/12- no event. Reports pain when moving. Staples out today. Still some swelling on that leg from surgery. Lasix 40 mg IV once given. Pending SNF placement     3/13- no event, pain 2/10. sutures removed yesterday. Vitals are stable.pendign placement      Plan of care has been discussed with  Nursing staff, case management.    Consultants/Specialty  orthopedics    Code Status  Full Code    Disposition  Patient is medically cleared for discharge.   Anticipate discharge to to skilled nursing facility. Vs rehab   I have placed the appropriate orders for post-discharge needs.    Review of Systems  Review of Systems   Unable to perform ROS: Psychiatric disorder        Unable to obtain  2/2 patient mentation     Physical Exam  Temp:  [36.2 °C (97.1 °F)-36.5 °C (97.7 °F)] 36.4 °C (97.5 °F)  Pulse:  [] 83  Resp:  [16-17] 17  BP: ()/(47-75) 93/47  SpO2:  [92 %-94 %] 94 %    Physical Exam  Vitals and nursing note reviewed.   Constitutional:       Comments: And awake   HENT:      Head: Normocephalic and atraumatic.   Eyes:      Pupils: Pupils are equal, round, and reactive to light.   Cardiovascular:      Rate and Rhythm: Normal rate.      Pulses: Normal pulses.   Pulmonary:      Effort: No respiratory distress.      Breath sounds: Normal breath sounds.   Abdominal:      General: Bowel sounds are normal. There is distension.      Comments: Slight hard   Musculoskeletal:      Cervical back: Neck supple.      Right lower leg: No edema.      Left lower leg: No edema.       Comments: Decreased ROM on the Left side   Skin:     General: Skin is warm.   Neurological:      Mental Status: She is alert.      Comments: Alert and awake   Does folow commands    Psychiatric:         Mood and Affect: Mood normal.         Fluids    Intake/Output Summary (Last 24 hours) at 3/13/2022 1154  Last data filed at 3/13/2022 0945  Gross per 24 hour   Intake 240 ml   Output 700 ml   Net -460 ml       Laboratory                        Imaging  CT-CTA CHEST PULMONARY ARTERY W/ RECONS   Final Result      1.  No evidence of pulmonary embolism to the segmental branches. Subsegmental branches poorly evaluated due to motion artifact.   2.  Small bilateral pleural effusions.   3.  Atherosclerotic changes. Coronary artery atherosclerotic disease.   4.  3 pulmonary nodules measuring up to 3 mm in the left upper lobe. Recommendations below.      Low Risk: No routine follow-up      High Risk: Optional CT at 12 months      Comments: Use most suspicious nodule as guide to management. Follow-up intervals may vary according to size and risk.      Low Risk - Minimal or absent history of smoking and of other known risk factors.      High Risk - History of smoking or of other known risk factors.      Note: These recommendations do not apply to lung cancer screening, patients with immunosuppression, or patients with known primary cancer.      Fleischner Society 2017 Guidelines for Management of Incidentally Detected Pulmonary Nodules in Adults      NM-FSOZZID-8 VIEW   Final Result      1.  No evidence of bowel obstruction.      2.  Moderate constipation.      DX-PELVIS-1 OR 2 VIEWS   Final Result      Post left hip hemiarthroplasty.      DX-FEMUR-2+ LEFT   Final Result      1.  Mildly displaced left femoral neck fracture.   2.  No distal femur fracture.      DX-HIP-UNILATERAL-WITH PELVIS-1 VIEW LEFT   Final Result      Left femoral neck fracture again noted.      DX-CHEST-PORTABLE (1 VIEW)   Final Result      Hypoinflation  with bibasilar atelectasis.           Assessment/Plan  * Hip fracture requiring operative repair, left, closed, initial encounter (HCC)- (present on admission)  Assessment & Plan  Patient will be consulted by orthopedic surgery    ---x-ray showing femoral head fracture. Ortho was consulted and patient underwent Open treatment of left femoral neck fracture with hemiarthroplasty on 2/26 by Dr Marquis.   --- PT and OT    -- DVT ppx  3/12- one dose of lasix 40 mg IV once, to help with swelling     Tachycardia- (present on admission)  Assessment & Plan  Sinus tachycardia on EKG  Thought to be secondary to pain and hypovolemia  Plan: Opiates, IV fluids  2/26: Improving   Tachy with elevated D-dimer; obtain CTA of the chest negative   3/1- sinus tachy. Not symptomatic. No other explanation. Start metoprolol 25 mg BID . Monitor with PCP and cardiology as OP   3/2- resolving   3/6- resolved     Essential hypertension- (present on admission)  Assessment & Plan  Will hold lisinopril   IV labetalol as needed   --- BP well controlled  3/1- increase. Start lisinopril and metoprolol. Continue to monitor   3/2- better, well controled. Continue current treatment   3/6- well controled       Anemia- (present on admission)  Assessment & Plan  Her hemoglobin hematocrit, today at 7.8.  Will transfuse if less than 7  --Tender to acute blood loss anemia likely 2/2 acute blood loss anemia  3/1- hgb 8.4- improving. No need for transfusion     Diabetes (HCC)- (present on admission)  Assessment & Plan  Glyco at 6.7   -- diabetic diet    -- on iss , hypoglycemia protocol and accu-checks ac and hs  Metformin resumed. Pt is medically cleared     Thrombocytopenia (HCC)- (present on admission)  Assessment & Plan  Reactive, possible due to anemia   3/1- improving     Schizophrenia (HCC)- (present on admission)  Assessment & Plan  Resume outpatient antipsychotic medications  New every 530 mg p.o. daily, Depakote 1000 g at p.m.  Quetiapine 200 at p.m.  and 100 at am    QTc 395    Parkinson's disease (tremor, stiffness, slow motion, unstable posture) ([facility])- (present on admission)  Assessment & Plan  Fall precautions       VTE prophylaxis: Lovenox  I have performed a physical exam and reviewed and updated ROS and Plan today (3/13/2022). In review of yesterday's note (3/12/2022), there are no changes except as documented above.

## 2022-03-13 NOTE — PROGRESS NOTES
"   Orthopaedic Progress Note    Interval changes:  Sutures out yesterday  Clear for disposition (home/SNF/IRF) from Orthopaedic team standpoint.  We will cease daily rounds, available PRN [phone]    ROS - Patient denies any new issues, but has developmental delay.  Pain well controlled.    BP (!) 93/47   Pulse 83   Temp 36.4 °C (97.5 °F) (Temporal)   Resp 17   Ht 1.651 m (5' 5\")   Wt 61.4 kg (135 lb 5.8 oz)   SpO2 94%     Patient seen and examined  No acute distress  Breathing non labored  RRR  Left beba surgical incision is clean, dry, and well-approximated. Steri-strips placed yesterday. Patient clearly fires tibialis anterior, EHL, and gastrocnemius/soleus. Sensation is intact to light touch throughout superficial peroneal, deep peroneal, tibial, saphenous, and sural nerve distributions. Strong and palpable 2+ dorsalis pedis and posterior tibial pulses with capillary refill less than 2 seconds. No lower leg tenderness or discomfort.    Active Hospital Problems    Diagnosis    • Thrombocytopenia ([facility]) [D69.6]      Priority: Medium   • Diabetes ([facility]) [E11.9]      Priority: Low   • Schizophrenia ([facility]) [F20.9]      Priority: Low   • Hip fracture requiring operative repair, left, closed, initial encounter ([facility]) [S72.002A]    • Tachycardia [R00.0]    • Essential hypertension [I10]    • Anemia [D64.9]    • Parkinson's disease (tremor, stiffness, slow motion, unstable posture) ([facility]) [G20]      Assessment/Plan:    POD#15 S/P  Open treatment of left femoral neck fracture with hemiarthroplasty.  Wt bearing status - WBAT  Wound care/Drains - Dressings to be left in place  Future Procedures - none planned   Lovenox: Start 2/26, Duration-until ambulatory > 150'  Sutures/Staples out- 14 days post operatively  PT/OT-initiated  Antibiotics: Perioperative completed  DVT Prophylaxis- TEDS/SCDs/Foot pumps  [place]-none  Case Coordination for Discharge Planning - Disposition SNF   Follow-Up: needs appointment with Dr. Marquis at " [facility] at 4 wks post-op for re-evaluation and radiographs.

## 2022-03-13 NOTE — CARE PLAN
The patient is Stable - Low risk of patient condition declining or worsening    Shift Goals  Clinical Goals: Safety, rest  Patient Goals: Comfort  Family Goals: unable to assess no family at bedside    Progress made toward(s) clinical / shift goals:    Problem: Skin Integrity  Goal: Skin integrity is maintained or improved  Outcome: Progressing  Note: Q2 turns with pillows, mepilex on all bony prominences, pillows for support/positioning, waffle overlay mattress in place for prevention of skin breakdown.      Problem: Fall Risk  Goal: Patient will remain free from falls  Outcome: Progressing  Note: Educated on fall risk and ensured fall precautions in place. Bed in lowest position, treaded socks in place, call light in reach, bed alarm in place, room free of clutter.        Patient is not progressing towards the following goals:

## 2022-03-13 NOTE — CARE PLAN
The patient is Stable - Low risk of patient condition declining or worsening    Shift Goals  Clinical Goals: safety, comfort, rest  Patient Goals: sleep well  Family Goals: unable to assess no family at bedside    Progress made toward(s) clinical / shift goals:  yes  Patient remains on regular position changes, bed alarm on, bed locked in lowest position.   Pain controlled with PRN and scheduled medications.  Hourly rounding in place.  Problem: Pain - Standard  Goal: Alleviation of pain or a reduction in pain to the patient’s comfort goal  Outcome: Progressing     Problem: Skin Integrity  Goal: Skin integrity is maintained or improved  Outcome: Progressing

## 2022-03-14 PROCEDURE — A9270 NON-COVERED ITEM OR SERVICE: HCPCS | Performed by: INTERNAL MEDICINE

## 2022-03-14 PROCEDURE — 700111 HCHG RX REV CODE 636 W/ 250 OVERRIDE (IP): Performed by: INTERNAL MEDICINE

## 2022-03-14 PROCEDURE — 99231 SBSQ HOSP IP/OBS SF/LOW 25: CPT | Performed by: INTERNAL MEDICINE

## 2022-03-14 PROCEDURE — 700101 HCHG RX REV CODE 250: Performed by: PHYSICAL MEDICINE & REHABILITATION

## 2022-03-14 PROCEDURE — 700102 HCHG RX REV CODE 250 W/ 637 OVERRIDE(OP): Performed by: PHYSICAL MEDICINE & REHABILITATION

## 2022-03-14 PROCEDURE — 700102 HCHG RX REV CODE 250 W/ 637 OVERRIDE(OP): Performed by: INTERNAL MEDICINE

## 2022-03-14 PROCEDURE — A9270 NON-COVERED ITEM OR SERVICE: HCPCS | Performed by: HOSPITALIST

## 2022-03-14 PROCEDURE — 770001 HCHG ROOM/CARE - MED/SURG/GYN PRIV*

## 2022-03-14 PROCEDURE — 97530 THERAPEUTIC ACTIVITIES: CPT | Mod: CQ

## 2022-03-14 PROCEDURE — 700102 HCHG RX REV CODE 250 W/ 637 OVERRIDE(OP): Performed by: HOSPITALIST

## 2022-03-14 PROCEDURE — A9270 NON-COVERED ITEM OR SERVICE: HCPCS | Performed by: PHYSICAL MEDICINE & REHABILITATION

## 2022-03-14 RX ADMIN — LEVOTHYROXINE SODIUM 25 MCG: 25 TABLET ORAL at 08:00

## 2022-03-14 RX ADMIN — OXYCODONE 2.5 MG: 5 TABLET ORAL at 08:00

## 2022-03-14 RX ADMIN — ACETAMINOPHEN 1000 MG: 500 TABLET ORAL at 20:12

## 2022-03-14 RX ADMIN — OXYBUTYNIN CHLORIDE 5 MG: 5 TABLET, EXTENDED RELEASE ORAL at 16:36

## 2022-03-14 RX ADMIN — ATORVASTATIN CALCIUM 20 MG: 20 TABLET, FILM COATED ORAL at 20:12

## 2022-03-14 RX ADMIN — ENOXAPARIN SODIUM 40 MG: 40 INJECTION SUBCUTANEOUS at 07:59

## 2022-03-14 RX ADMIN — ACETAMINOPHEN 1000 MG: 500 TABLET ORAL at 08:51

## 2022-03-14 RX ADMIN — DONEPEZIL HYDROCHLORIDE 10 MG: 5 TABLET, FILM COATED ORAL at 20:12

## 2022-03-14 RX ADMIN — METFORMIN HYDROCHLORIDE 500 MG: 500 TABLET, EXTENDED RELEASE ORAL at 07:59

## 2022-03-14 RX ADMIN — LIDOCAINE 1 PATCH: 50 PATCH TOPICAL at 20:13

## 2022-03-14 RX ADMIN — POLYETHYLENE GLYCOL 3350 1 PACKET: 17 POWDER, FOR SOLUTION ORAL at 08:02

## 2022-03-14 RX ADMIN — ACETAMINOPHEN 1000 MG: 500 TABLET ORAL at 16:36

## 2022-03-14 RX ADMIN — LISINOPRIL 20 MG: 20 TABLET ORAL at 08:00

## 2022-03-14 RX ADMIN — METOPROLOL TARTRATE 25 MG: 25 TABLET, FILM COATED ORAL at 08:00

## 2022-03-14 RX ADMIN — OXYCODONE 5 MG: 5 TABLET ORAL at 20:13

## 2022-03-14 RX ADMIN — SERTRALINE 100 MG: 100 TABLET, FILM COATED ORAL at 08:00

## 2022-03-14 RX ADMIN — QUETIAPINE FUMARATE 200 MG: 100 TABLET ORAL at 20:13

## 2022-03-14 RX ADMIN — OXYCODONE 5 MG: 5 TABLET ORAL at 12:05

## 2022-03-14 RX ADMIN — SENNOSIDES AND DOCUSATE SODIUM 2 TABLET: 50; 8.6 TABLET ORAL at 16:36

## 2022-03-14 RX ADMIN — DIVALPROEX SODIUM 1000 MG: 500 TABLET, EXTENDED RELEASE ORAL at 20:13

## 2022-03-14 RX ADMIN — ARIPIPRAZOLE 30 MG: 10 TABLET ORAL at 16:36

## 2022-03-14 RX ADMIN — Medication 1 TABLET: at 07:59

## 2022-03-14 RX ADMIN — Medication 10 MG: at 08:01

## 2022-03-14 RX ADMIN — POLYETHYLENE GLYCOL 3350 1 PACKET: 17 POWDER, FOR SOLUTION ORAL at 16:36

## 2022-03-14 RX ADMIN — SENNOSIDES AND DOCUSATE SODIUM 2 TABLET: 50; 8.6 TABLET ORAL at 07:59

## 2022-03-14 RX ADMIN — QUETIAPINE FUMARATE 100 MG: 100 TABLET ORAL at 07:59

## 2022-03-14 RX ADMIN — OMEPRAZOLE 20 MG: 20 CAPSULE, DELAYED RELEASE ORAL at 07:59

## 2022-03-14 ASSESSMENT — PAIN DESCRIPTION - PAIN TYPE
TYPE: ACUTE PAIN;SURGICAL PAIN
TYPE: SURGICAL PAIN;ACUTE PAIN
TYPE: ACUTE PAIN;SURGICAL PAIN

## 2022-03-14 ASSESSMENT — COGNITIVE AND FUNCTIONAL STATUS - GENERAL
TURNING FROM BACK TO SIDE WHILE IN FLAT BAD: A LOT
MOVING TO AND FROM BED TO CHAIR: UNABLE
STANDING UP FROM CHAIR USING ARMS: A LOT
MOVING FROM LYING ON BACK TO SITTING ON SIDE OF FLAT BED: UNABLE
CLIMB 3 TO 5 STEPS WITH RAILING: TOTAL
MOBILITY SCORE: 8
SUGGESTED CMS G CODE MODIFIER MOBILITY: CM
WALKING IN HOSPITAL ROOM: TOTAL

## 2022-03-14 ASSESSMENT — GAIT ASSESSMENTS: GAIT LEVEL OF ASSIST: UNABLE TO PARTICIPATE

## 2022-03-14 NOTE — CARE PLAN
The patient is Watcher - Medium risk of patient condition declining or worsening    Shift Goals  Clinical Goals: safety, rest  Patient Goals: comfort  Family Goals: unable to assess no family at bedside    Progress made toward(s) clinical / shift goals:      Problem: Knowledge Deficit - Standard  Goal: Patient and family/care givers will demonstrate understanding of plan of care, disease process/condition, diagnostic tests and medications  3/14/2022 0828 by Jo-Ann Rodriguez R.N.  Outcome: Progressing    Problem: Pain - Standard  Goal: Alleviation of pain or a reduction in pain to the patient’s comfort goal  3/14/2022 0828 by SCOTT Sutton.N.  Outcome: Progressing    Problem: Skin Integrity  Goal: Skin integrity is maintained or improved  3/14/2022 0828 by Jo-Ann Rodriguez R.N.  Outcome: Progressing    Problem: Fall Risk  Goal: Patient will remain free from falls  3/14/2022 0828 by Jo-Ann Rodriguez R.N.  Outcome: Progressing      Patient is not progressing towards the following goals:

## 2022-03-14 NOTE — PROGRESS NOTES
Hospital Medicine Daily Progress Note    Date of Service  3/14/2022    Chief Complaint  Erlinda Verde is a 56 y.o. female admitted 2/25/2022 with   Chief Complaint   Patient presents with   • Leg Pain     PT BIB EMS from home. PT was seen at urgent care yesterday and was diagnosed with a femoral head fracture.  Pt care giver called due to increased pain. PT was referred for follow up with ortho but has not had follow up.        Hospital Course  This 56-year-old female with a developmental disability, size affective disorder, parkinsonism, hypertension, type 2 diabetes mellitus presented to the ER on 2/25/2022 after she had a ground-level fall.     She is a resident of [facility].  She landed on the left side, imaging showed femoral head fracture; orthopedic surgery was consulted, patient underwent open treatment of left femoral neck fracture with hemiarthroplasty on 2/26/2022.  PT/OT has evaluate the patient, recommend postacute.  SNF/rehab referral in place     Of note, patient noted to be tachycardic, EKG sinus tachy. D-dimer is elevated, CTA of the chest negative for PE. She was started on metoprolol and tachycardia resolved.     Hospital course was complicated with acute blood loss anemia, today her hemoglobin noted to be 7.6.  Will transfuse if less than 7.  Iron study was obtained consistent with iron deficiency, IV iron given.  empiric PPI also given, to follow with primary care. HGb improving to 10.5      Medically cleared     Interval Problem Update    2/27:  --No acute events overnight, patient underwent surgery yesterday.  We will continue DVT prophylaxis.  PT/OT has evaluate the patient, pending recommendation.  --Of note, patient abdomen is distended , KUB showing constipation;  Will provide aggressive  Bowel regemin  --Patient continues remain persistent tachycardia along with elevated D-dimer, will obtain CT of the chest.  --Has been requiring 2.5 L of oxygen, will titrate oxygen as needed.   Provide incentive spirometry    Plan of care has been discussed with the nursing staff , caretaker at bedside.    2/28:  --No acute events overnight, patient tachycardia continues to improve.  CT of the chest ordered, discussed with patient and guardian Ann.  --Patient did have a good bowel movement yesterday  --Her hemoglobin to be 7.6, iron studies consistent with iron deficiency.  Will provide IV iron.  Monitor hemoglobin medically, if less than 7, transfuse  --DVT prophylaxis with Lovenox  --PT/OT has evaluate the patient, recommend postacute, SNF/physiatry referral in place    3/1- no event overnight. She remain pleasantly confused. Sitter present. BP slight elevated. Sinus tachycardia. Started metoprolol 25 mg BID. Not symptomatic. EKG reviewed. CT/PE negative for PE  For hypertension, resume home meds, lisinopril 20 mg daily  hgb 8.4. improving. No need for transfusion   Medically cleared to transfer at SNF     3/2- no event overnight. She is at her baseline. Able to say her name. Complaining of pain, but difficult to rate. Tachycardia improving. BP stable. Last bowel movement three days ago. Bowel protocol in place Medically cleared for placement     3/3- she is little more talkative and awake today. No event overnight. Vitals are stable. Pending rehab/snf transfer     3/4- no event. Report hip pain, meds available. Pending placement. No change on medications. Vitals are stable. She did have bowel movement a day prior.     3/5- no event. Able to understand better. Looks more comfortable. Vitals are stable. Oxycodone used only one time 2.5 mg yesterday. Pending PASRR, placement     3/6- no event. She tells me that pain is better. She did have loose stool yesterday. BP stable. She is still on 1L of oxygen. She had used oxycodone 2.5 mg -5 mg twice yesterday. Medically cleared for SNF transferring      3/7- no event. Vitals are stable. Last bowel movement since 3/5. She is taking stool softer. Discuss with RN  to give miralax. Continue PT/OT. Pending transfer to SNF     3/8- no event. Denies pain. She did have bowel movement yesterday. Vitals stable. Pending SNF transfer     3/9- no event. Eating breakfast. Denies pain. Vitals are stable. Oxycodone 2.5-5 mg only three times     3/10- no event. She said little to pain. Eating well. Vitals stable. Bowel movement yesterday. No other pain. Pending placement. PASSR     3/11- no event. Denies pain. Vitals stable. Bowel movement today. PASRR went through. Pending bed available     3/12- no event. Reports pain when moving. Staples out today. Still some swelling on that leg from surgery. Lasix 40 mg IV once given. Pending SNF placement     3/13- no event, pain 2/10. sutures removed yesterday. Vitals are stable.pendign placement    3/14- no event. Reports some pain on the left hip. She looks more tired today.  She has orders for oxycodone 2.5-5 mg q3hrs. She is not utilizing enough. Encourage to give pt pain meds if she states that she has pain so pt can get more therapy. Encourage oral hydration.       Plan of care has been discussed with  Nursing staff, case management.    Consultants/Specialty  orthopedics   PMR    Code Status  Full Code    Disposition  Patient is medically cleared for discharge.   Anticipate discharge to to skilled nursing facility. Vs rehab   I have placed the appropriate orders for post-discharge needs.    Review of Systems  Review of Systems   Unable to perform ROS: Psychiatric disorder        Unable to obtain  2/2 patient mentation     Physical Exam  Temp:  [35.9 °C (96.6 °F)-36.3 °C (97.3 °F)] 36.1 °C (97 °F)  Pulse:  [63-88] 80  Resp:  [16] 16  BP: ()/(58-67) 92/58  SpO2:  [93 %-95 %] 95 %    Physical Exam  Vitals and nursing note reviewed.   Constitutional:       Comments: And awake   HENT:      Head: Normocephalic and atraumatic.   Eyes:      Pupils: Pupils are equal, round, and reactive to light.   Cardiovascular:      Rate and Rhythm: Normal  rate.      Pulses: Normal pulses.   Pulmonary:      Effort: No respiratory distress.      Breath sounds: Normal breath sounds.   Abdominal:      General: Bowel sounds are normal. There is distension.      Comments: Slight hard   Musculoskeletal:      Cervical back: Neck supple.      Right lower leg: No edema.      Left lower leg: No edema.      Comments: Decreased ROM on the Left side   Skin:     General: Skin is warm.   Neurological:      Mental Status: She is alert.      Comments: Alert and awake   Does folow commands    Psychiatric:         Mood and Affect: Mood normal.         Fluids    Intake/Output Summary (Last 24 hours) at 3/14/2022 1217  Last data filed at 3/14/2022 0800  Gross per 24 hour   Intake --   Output 600 ml   Net -600 ml       Laboratory  Recent Labs     03/13/22  1225   WBC 6.3   RBC 3.53*   HEMOGLOBIN 10.5*   HEMATOCRIT 33.8*   MCV 95.8   MCH 29.7   MCHC 31.1*   RDW 58.1*   PLATELETCT 376   MPV 7.9*     Recent Labs     03/13/22  1225   SODIUM 135   POTASSIUM 4.7   CHLORIDE 99   CO2 22   GLUCOSE 131*   BUN 29*   CREATININE 0.98   CALCIUM 9.3                   Imaging  CT-CTA CHEST PULMONARY ARTERY W/ RECONS   Final Result      1.  No evidence of pulmonary embolism to the segmental branches. Subsegmental branches poorly evaluated due to motion artifact.   2.  Small bilateral pleural effusions.   3.  Atherosclerotic changes. Coronary artery atherosclerotic disease.   4.  3 pulmonary nodules measuring up to 3 mm in the left upper lobe. Recommendations below.      Low Risk: No routine follow-up      High Risk: Optional CT at 12 months      Comments: Use most suspicious nodule as guide to management. Follow-up intervals may vary according to size and risk.      Low Risk - Minimal or absent history of smoking and of other known risk factors.      High Risk - History of smoking or of other known risk factors.      Note: These recommendations do not apply to lung cancer screening, patients with  immunosuppression, or patients with known primary cancer.      Fleischner Society 2017 Guidelines for Management of Incidentally Detected Pulmonary Nodules in Adults      FM-[id number]-3 VIEW   Final Result      1.  No evidence of bowel obstruction.      2.  Moderate constipation.      DX-PELVIS-1 OR 2 VIEWS   Final Result      Post left hip hemiarthroplasty.      DX-FEMUR-2+ LEFT   Final Result      1.  Mildly displaced left femoral neck fracture.   2.  No distal femur fracture.      DX-HIP-UNILATERAL-WITH PELVIS-1 VIEW LEFT   Final Result      Left femoral neck fracture again noted.      DX-CHEST-PORTABLE (1 VIEW)   Final Result      Hypoinflation with bibasilar atelectasis.           Assessment/Plan  * Hip fracture requiring operative repair, left, closed, initial encounter ([facility])- (present on admission)  Assessment & Plan  Patient will be consulted by orthopedic surgery    ---x-ray showing femoral head fracture. Ortho was consulted and patient underwent Open treatment of left femoral neck fracture with hemiarthroplasty on 2/26 by Dr Marquis.   --- PT and OT    -- DVT ppx  3/12- one dose of lasix 40 mg IV once, to help with swelling     Tachycardia- (present on admission)  Assessment & Plan  Sinus tachycardia on EKG  Thought to be secondary to pain and hypovolemia  Plan: Opiates, IV fluids  2/26: Improving   Tachy with elevated D-dimer; obtain CTA of the chest negative   3/1- sinus tachy. Not symptomatic. No other explanation. Start metoprolol 25 mg BID . Monitor with PCP and cardiology as OP   3/2- resolving   3/6- resolved     Essential hypertension- (present on admission)  Assessment & Plan  Will hold lisinopril   IV labetalol as needed   --- BP well controlled  3/1- increase. Start lisinopril and metoprolol. Continue to monitor   3/2- better, well controled. Continue current treatment   3/6- well controled       Anemia- (present on admission)  Assessment & Plan  Her hemoglobin hematocrit, today at 7.8.  Will  transfuse if less than 7  --Tender to acute blood loss anemia likely 2/2 acute blood loss anemia  3/1- hgb 8.4- improving. No need for transfusion     Diabetes (HCC)- (present on admission)  Assessment & Plan  Glyco at 6.7   -- diabetic diet    -- on iss , hypoglycemia protocol and accu-checks ac and hs  Metformin resumed. Pt is medically cleared     Thrombocytopenia (HCC)- (present on admission)  Assessment & Plan  Reactive, possible due to anemia   3/1- improving     Schizophrenia ([facility])- (present on admission)  Assessment & Plan  Resume outpatient antipsychotic medications  New every 530 mg p.o. daily, Depakote 1000 g at p.m.  Quetiapine 200 at p.m. and 100 at am    QTc 395    Parkinson's disease (tremor, stiffness, slow motion, unstable posture) ([facility])- (present on admission)  Assessment & Plan  Fall precautions       VTE prophylaxis: Lovenox  I have performed a physical exam and reviewed and updated ROS and Plan today (3/14/2022). In review of yesterday's note (3/13/2022), there are no changes except as documented above.

## 2022-03-14 NOTE — THERAPY
Physical Therapy   Daily Treatment     Patient Name: Erlinda Verde  Age:  56 y.o., Sex:  female  Medical Record #: [id number]  Today's Date: 3/14/2022     Precautions  Precautions: Fall Risk;Posterior Hip Precautions;Weight Bearing As Tolerated Left Lower Extremity  Comments: baseline developmentally delayed.    Assessment    Pt gets does get fearful w/her mobility, w/fear she curses. Bed mobility from min<->mod assist, she was indep PTA. Functional transfers min<->mod assist, the pt gets fearful initially when coming to sit on EOB. Struggles to scoot to get feet on the floor, transfers from bed mod from her w/c min assist. Improvement w/static stand using the FWW, will only hold 3-5 seconds.   The pts CG stated she was CGA<->supervision w/her functional transfers.     Plan    Continue current treatment plan.    DC Equipment Recommendations: None  Discharge Recommendations: Recommend post-acute placement for additional physical therapy services prior to discharge home         03/14/22 1306   Balance   Sitting Balance (Static) Poor +   Sitting Balance (Dynamic) Poor -   Standing Balance (Static) Poor +   Standing Balance (Dynamic) Poor   Weight Shift Sitting Poor   Weight Shift Standing Poor   Comments HOB flat and use of bedrail. Standing w/FWW   Gait Analysis   Gait Level Of Assist Unable to Participate   Comments Per CG, pt was ambulatory w/FWW but was having falls. Pt now primarily non-amb, transfers only.   Bed Mobility    Supine to Sit Moderate Assist  (HOB flat from Rt side of the bed)   Sit to Supine   (pt left seated in her w/c)   Scooting Moderate Assist  (seated)   Rolling Minimal Assist to Rt.;Minimum Assist to Lt.  (pt rolls to her Rt using rail w/decreae assist)   Skilled Intervention Verbal Cuing;Postural Facilitation;Compensatory Strategies   Comments Rolling/bridging to constantine brief and yeimy bottoms.   Functional Mobility   Sit to Stand Minimal Assist  (from w/c->FWW)   Bed, Chair, Wheelchair Transfer  Minimal Assist  (from bed->w/c)   Skilled Intervention Verbal Cuing;Postural Facilitation   Comments Pt fearful once seated EOB, struggles to scoot to get feet on floor from bed height. She will come to stand once you help her initiate the task. No assist to come to stand from her w/c->FWW. She will not hold standing w/the walker for any length of time, becomes fearful. Although her static stand was improved from previous efforts. Pt becomes too fearful to allow for initiation of steps.   How much difficulty does the patient currently have...   Turning over in bed (including adjusting bedclothes, sheets and blankets)? 2   Sitting down on and standing up from a chair with arms (e.g., wheelchair, bedside commode, etc.) 1   Moving from lying on back to sitting on the side of the bed? 1   How much help from another person does the patient currently need...   Moving to and from a bed to a chair (including a wheelchair)? 2   Need to walk in a hospital room? 1   Climbing 3-5 steps with a railing? 1   6 clicks Mobility Score 8   Short Term Goals    Short Term Goal # 1 in 6 visits patient will demo bed mobility with Jose Ramon for safe DC   Goal Outcome # 1 goal not met   Short Term Goal # 2 in 6 visits patient will complete all functional transfers with Jose Ramon and LRAD for safe DC   Goal Outcome # 2 Goal not met   Short Term Goal # 3 in 6 visits patient will demo WC mobility for 200' with supervision   Goal Outcome # 3 Goal not met

## 2022-03-14 NOTE — CARE PLAN
The patient is Stable - Low risk of patient condition declining or worsening    Shift Goals  Clinical Goals: Safety, increase mobility tolerance  Patient Goals: Comfort  Family Goals: unable to assess no family at bedside    Progress made toward(s) clinical / shift goals:    Problem: Pain - Standard  Goal: Alleviation of pain or a reduction in pain to the patient’s comfort goal  Outcome: Progressing  Note: Patient melani to rate pain on a 0/10 scale. Pain being controlled with prn pain medication and rest.       Problem: Skin Integrity  Goal: Skin integrity is maintained or improved  Outcome: Progressing  Note: Q2 turns with pillows, mepilex on all bony prominences, pillows for support/positioning, waffle overlay mattress in place for prevention of skin breakdown.        Patient is not progressing towards the following goals:

## 2022-03-14 NOTE — DISCHARGE PLANNING
Call out to group home to discuss need for level of care to return to the group home pending call back.

## 2022-03-14 NOTE — CARE PLAN
The patient is Watcher - Medium risk of patient condition declining or worsening    Shift Goals  Clinical Goals: safety, rest  Patient Goals: comfort  Family Goals: unable to assess no family at bedside    Progress made toward(s) clinical / shift goals:    Problem: Knowledge Deficit - Standard  Goal: Patient and family/care givers will demonstrate understanding of plan of care, disease process/condition, diagnostic tests and medications  Outcome: Not Progressing     Problem: Pain - Standard  Goal: Alleviation of pain or a reduction in pain to the patient’s comfort goal  Outcome: Not Progressing     Problem: Skin Integrity  Goal: Skin integrity is maintained or improved  Outcome: Not Progressing     Problem: Fall Risk  Goal: Patient will remain free from falls  Outcome: Not Progressing       Patient is not progressing towards the following goals:      Problem: Knowledge Deficit - Standard  Goal: Patient and family/care givers will demonstrate understanding of plan of care, disease process/condition, diagnostic tests and medications  Outcome: Not Progressing     Problem: Pain - Standard  Goal: Alleviation of pain or a reduction in pain to the patient’s comfort goal  Outcome: Not Progressing     Problem: Skin Integrity  Goal: Skin integrity is maintained or improved  Outcome: Not Progressing     Problem: Fall Risk  Goal: Patient will remain free from falls  Outcome: Not Progressing

## 2022-03-15 PROCEDURE — A9270 NON-COVERED ITEM OR SERVICE: HCPCS | Performed by: PHYSICAL MEDICINE & REHABILITATION

## 2022-03-15 PROCEDURE — 700102 HCHG RX REV CODE 250 W/ 637 OVERRIDE(OP): Performed by: INTERNAL MEDICINE

## 2022-03-15 PROCEDURE — 99231 SBSQ HOSP IP/OBS SF/LOW 25: CPT | Performed by: STUDENT IN AN ORGANIZED HEALTH CARE EDUCATION/TRAINING PROGRAM

## 2022-03-15 PROCEDURE — 700101 HCHG RX REV CODE 250: Performed by: PHYSICAL MEDICINE & REHABILITATION

## 2022-03-15 PROCEDURE — 700111 HCHG RX REV CODE 636 W/ 250 OVERRIDE (IP): Performed by: INTERNAL MEDICINE

## 2022-03-15 PROCEDURE — 97530 THERAPEUTIC ACTIVITIES: CPT | Mod: CO

## 2022-03-15 PROCEDURE — A9270 NON-COVERED ITEM OR SERVICE: HCPCS | Performed by: INTERNAL MEDICINE

## 2022-03-15 PROCEDURE — 700102 HCHG RX REV CODE 250 W/ 637 OVERRIDE(OP): Performed by: PHYSICAL MEDICINE & REHABILITATION

## 2022-03-15 PROCEDURE — 97535 SELF CARE MNGMENT TRAINING: CPT | Mod: CO

## 2022-03-15 PROCEDURE — 97530 THERAPEUTIC ACTIVITIES: CPT | Mod: CQ

## 2022-03-15 PROCEDURE — 770001 HCHG ROOM/CARE - MED/SURG/GYN PRIV*

## 2022-03-15 PROCEDURE — A9270 NON-COVERED ITEM OR SERVICE: HCPCS | Performed by: HOSPITALIST

## 2022-03-15 PROCEDURE — 700102 HCHG RX REV CODE 250 W/ 637 OVERRIDE(OP): Performed by: HOSPITALIST

## 2022-03-15 RX ADMIN — LISINOPRIL 20 MG: 20 TABLET ORAL at 05:45

## 2022-03-15 RX ADMIN — ATORVASTATIN CALCIUM 20 MG: 20 TABLET, FILM COATED ORAL at 19:57

## 2022-03-15 RX ADMIN — POLYETHYLENE GLYCOL 3350 1 PACKET: 17 POWDER, FOR SOLUTION ORAL at 18:00

## 2022-03-15 RX ADMIN — ARIPIPRAZOLE 30 MG: 10 TABLET ORAL at 17:36

## 2022-03-15 RX ADMIN — ACETAMINOPHEN 1000 MG: 500 TABLET ORAL at 19:57

## 2022-03-15 RX ADMIN — METOPROLOL TARTRATE 25 MG: 25 TABLET, FILM COATED ORAL at 05:45

## 2022-03-15 RX ADMIN — DIVALPROEX SODIUM 1000 MG: 500 TABLET, EXTENDED RELEASE ORAL at 19:57

## 2022-03-15 RX ADMIN — ACETAMINOPHEN 1000 MG: 500 TABLET ORAL at 15:45

## 2022-03-15 RX ADMIN — OXYCODONE 5 MG: 5 TABLET ORAL at 05:45

## 2022-03-15 RX ADMIN — Medication 10 MG: at 05:40

## 2022-03-15 RX ADMIN — SENNOSIDES AND DOCUSATE SODIUM 2 TABLET: 50; 8.6 TABLET ORAL at 05:45

## 2022-03-15 RX ADMIN — OXYCODONE 2.5 MG: 5 TABLET ORAL at 08:24

## 2022-03-15 RX ADMIN — LEVOTHYROXINE SODIUM 25 MCG: 25 TABLET ORAL at 05:45

## 2022-03-15 RX ADMIN — METOPROLOL TARTRATE 25 MG: 25 TABLET, FILM COATED ORAL at 17:35

## 2022-03-15 RX ADMIN — Medication 1 TABLET: at 05:45

## 2022-03-15 RX ADMIN — POLYETHYLENE GLYCOL 3350 1 PACKET: 17 POWDER, FOR SOLUTION ORAL at 05:45

## 2022-03-15 RX ADMIN — LIDOCAINE 1 PATCH: 50 PATCH TOPICAL at 19:58

## 2022-03-15 RX ADMIN — OXYBUTYNIN CHLORIDE 5 MG: 5 TABLET, EXTENDED RELEASE ORAL at 17:38

## 2022-03-15 RX ADMIN — QUETIAPINE FUMARATE 200 MG: 100 TABLET ORAL at 19:58

## 2022-03-15 RX ADMIN — SERTRALINE 100 MG: 100 TABLET, FILM COATED ORAL at 05:45

## 2022-03-15 RX ADMIN — ENOXAPARIN SODIUM 40 MG: 40 INJECTION SUBCUTANEOUS at 05:46

## 2022-03-15 RX ADMIN — OXYCODONE 5 MG: 5 TABLET ORAL at 17:35

## 2022-03-15 RX ADMIN — ACETAMINOPHEN 1000 MG: 500 TABLET ORAL at 08:25

## 2022-03-15 RX ADMIN — OMEPRAZOLE 20 MG: 20 CAPSULE, DELAYED RELEASE ORAL at 05:45

## 2022-03-15 RX ADMIN — METFORMIN HYDROCHLORIDE 500 MG: 500 TABLET, EXTENDED RELEASE ORAL at 05:45

## 2022-03-15 RX ADMIN — SENNOSIDES AND DOCUSATE SODIUM 2 TABLET: 50; 8.6 TABLET ORAL at 17:36

## 2022-03-15 RX ADMIN — QUETIAPINE FUMARATE 100 MG: 100 TABLET ORAL at 05:45

## 2022-03-15 RX ADMIN — DONEPEZIL HYDROCHLORIDE 10 MG: 5 TABLET, FILM COATED ORAL at 19:57

## 2022-03-15 ASSESSMENT — PAIN DESCRIPTION - PAIN TYPE
TYPE: ACUTE PAIN;SURGICAL PAIN

## 2022-03-15 ASSESSMENT — PAIN SCALES - PAIN ASSESSMENT IN ADVANCED DEMENTIA (PAINAD)
CONSOLABILITY: NO NEED TO CONSOLE
BODYLANGUAGE: RELAXED
FACIALEXPRESSION: SMILING OR INEXPRESSIVE
BREATHING: NORMAL
TOTALSCORE: 0

## 2022-03-15 ASSESSMENT — COGNITIVE AND FUNCTIONAL STATUS - GENERAL
TURNING FROM BACK TO SIDE WHILE IN FLAT BAD: A LOT
TOILETING: TOTAL
EATING MEALS: A LITTLE
DAILY ACTIVITIY SCORE: 14
SUGGESTED CMS G CODE MODIFIER DAILY ACTIVITY: CK
STANDING UP FROM CHAIR USING ARMS: A LOT
MOVING TO AND FROM BED TO CHAIR: UNABLE
DRESSING REGULAR UPPER BODY CLOTHING: A LITTLE
CLIMB 3 TO 5 STEPS WITH RAILING: TOTAL
HELP NEEDED FOR BATHING: A LOT
SUGGESTED CMS G CODE MODIFIER MOBILITY: CM
MOVING FROM LYING ON BACK TO SITTING ON SIDE OF FLAT BED: A LOT
DRESSING REGULAR LOWER BODY CLOTHING: A LOT
WALKING IN HOSPITAL ROOM: TOTAL
MOBILITY SCORE: 9
PERSONAL GROOMING: A LITTLE

## 2022-03-15 ASSESSMENT — GAIT ASSESSMENTS: GAIT LEVEL OF ASSIST: UNABLE TO PARTICIPATE

## 2022-03-15 NOTE — DISCHARGE PLANNING
Agency/Facility Name: [facility]  Outcome: Left v-mail with [facility] regarding referral. This DPA awaiting call back.    KATIA Ag informed.

## 2022-03-15 NOTE — THERAPY
Physical Therapy   Daily Treatment     Patient Name: Erlinda Verde  Age:  56 y.o., Sex:  female  Medical Record #: [id number]  Today's Date: 3/15/2022     Precautions  Precautions: Fall Risk;Posterior Hip Precautions;Weight Bearing As Tolerated Left Lower Extremity  Comments: developmentally delayed at baseline.    Assessment    Pt found seated in chair, worked w/OT earlier. PT focusing on static sit on EOB, having pt get comfortable sitting w/feet on the floor. Pt is fearful of falling/pain w/mobility. Min assist w/sit->stands and mod assist w/the transfer. Pt struggles w/taking wt through her Lft LE to advance the Rt. Pt does participate w/gentle encouragement.   CG BS throughout her tx efforts    Plan    Continue current treatment plan.    DC Equipment Recommendations: None  Discharge Recommendations: Recommend post-acute placement for additional physical therapy services prior to discharge home     Objective       03/15/22 1143   Other Treatments   Other Treatments Provided EOB x 9 mins w/tray table while coloring. Pt supervision w/her static sit seated w/tray table. Pt shifted wt forward and keep feet on ground. Once tray table removed, her assist level increased to min, pushing posteriorly 2* fear.   Balance   Sitting Balance (Static) Fair -   Sitting Balance (Dynamic) Poor +   Standing Balance (Static) Poor +   Standing Balance (Dynamic) Poor   Weight Shift Sitting Poor   Weight Shift Standing Poor   Gait Analysis   Gait Level Of Assist Unable to Participate   Comments Cont to work on pre-gait, pt did step forward w/Lft LE, unable to take step w/Rt 2* pain taking wt through her Lft LE   Bed Mobility    Comments Did not assess. Pt found seated in the chair upon entry into the room.   Functional Mobility   Sit to Stand Minimal Assist  (from chair and bed height)   Bed, Chair, Wheelchair Transfer Moderate Assist  (chair->bed->w/c)   Transfer Method   (shuffled steps)   Skilled Intervention Verbal Cuing;Postural  Facilitation   Comments Transfers wo/FWW. Pt did manage 2 sit<->stands from w/c->FWW w/min assist. Pt slowly becoming more comfortable holding stand w/walker. Transfers remain mod assist, shuffled steps. Pt struggles w/wt shifting, pain/fear taking wt through Lft LE to advance Rt w/transfers.   How much difficulty does the patient currently have...   Turning over in bed (including adjusting bedclothes, sheets and blankets)? 2   Sitting down on and standing up from a chair with arms (e.g., wheelchair, bedside commode, etc.) 2   Moving from lying on back to sitting on the side of the bed? 1   How much help from another person does the patient currently need...   Moving to and from a bed to a chair (including a wheelchair)? 2   Need to walk in a hospital room? 1   Climbing 3-5 steps with a railing? 1   6 clicks Mobility Score 9   Short Term Goals    Short Term Goal # 1 in 6 visits patient will demo bed mobility with Jose Ramon for safe DC   Goal Outcome # 1 goal not met   Short Term Goal # 2 in 6 visits patient will complete all functional transfers with Jose Ramon and LRAD for safe DC   Goal Outcome # 2 Goal not met   Short Term Goal # 3 in 6 visits patient will demo WC mobility for 200' with supervision   Goal Outcome # 3 Goal not met

## 2022-03-15 NOTE — PROGRESS NOTES
4 Eyes Skin Assessment Completed by ROD Espino and ROD Logan.    Head WDL  Ears WDL  Nose WDL  Mouth WDL  Neck WDL  Breast/Chest WDL  Shoulder Blades WDL  Spine WDL  (R) Arm/Elbow/Hand WDL  (L) Arm/Elbow/Hand WDL  Abdomen WDL  Groin Redness and Blanching  Scrotum/Coccyx/Buttocks Redness and Blanching  (R) Leg WDL  (L) Leg Bruising, Edema and Incision  (R) Heel/Foot/Toe WDL  (L) Heel/Foot/Toe WDL          Devices In Places Pulse Ox and SCD's, purewick      Interventions In Place Waffle Overlay, Pillows, Q2 Turns, Barrier Cream, Dri-Torrey Pads, Heels Loaded W/Pillows and Pressure Redistribution Mattress    Possible Skin Injury No    Pictures Uploaded Into Epic N/A  Wound Consult Placed N/A  RN Wound Prevention Protocol Ordered No already ordered

## 2022-03-15 NOTE — CARE PLAN
Problem: Pain - Standard  Goal: Alleviation of pain or a reduction in pain to the patient’s comfort goal  Outcome: Progressing  Note:  Patient educated on 0-10 pain scale, and non-pharmacological pain control. Encouraged to verbalize and contact staff when in pain.  Administered PRN medication as needed.       Problem: Skin Integrity  Goal: Skin integrity is maintained or improved  Outcome: Progressing  Note:  Turned & repositioned every 2 hours,kept dry and clean, mepilex, waffle mattress overlay, barrier paste and pillows on bony prominences to prevent skin breakdown       Problem: Fall Risk  Goal: Patient will remain free from falls  Outcome: Progressing  Note:  Verified bed is locked and in lowest position, hourly rounding in place, bed alarm on, call light with in reach, slip socks on, educated patient on use of call light for assistance.     The patient is Stable - Low risk of patient condition declining or worsening    Shift Goals  Clinical Goals: Mobility, safety, pain management  Patient Goals: Pain management, comfort  Family Goals: n/a    Progress made toward(s) clinical / shift goals:  Patient up to chair for meals and tolerating [id number] when in bed.  Pain managed with current medication regimen.  Patient does not use call light appropriately however care giver at bedside to aide in use of call light for patient assistance.

## 2022-03-15 NOTE — THERAPY
"Occupational Therapy  Daily Treatment     Patient Name: Erlinda Verde  Age:  56 y.o., Sex:  female  Medical Record #: [id number]  Today's Date: 3/15/2022     Precautions  Precautions: Fall Risk,Posterior Hip Precautions,Weight Bearing As Tolerated Left Lower Extremity (hemiarthroplasty 2/26)  Comments: baseline developmentally delayed.    Assessment    Pt was seen for OT treatment. Pt gave good effort . Pt needed increased encouragement to participate. Pt is limited by pain. Pt needs TLC and reminder cues to use positive words with activity. Pt tends to swear when fearful or in pain with activity. CG present for pt's GH. CG stated pt was able to completely dress self prior to breaking hip. Pt required Mod A for bed mobility, supine to sit at EOB. Pt donned pants in bed /socks with Max A due to pain with movement . Pt bridged in bed for clothing management. Total A to don brief in bed for up in chair due to incontinence. Min A for H/G seated up in chair post set up. Min A for UB dressing. Pt stood with FWW ror nearly 45 secs before sitting down back on EOB stating, \"It hurts\". Pt referring to hip. Pt also fearful of falling. Pt is making progress towards OT goals. RN updated on OT treatment findings and recommendations. Pt up in chair in room with chair alarm on and CG present. Will continue OT POC.      Plan    Continue current treatment plan.    DC Equipment Recommendations: (P) Unable to determine at this time  Discharge Recommendations: (P) Recommend post-acute placement for additional occupational therapy services prior to discharge home    Subjective    \"Hello, Doll\". Pt stated , \"No\" when informed OT will work on LB dressing. Pt did work on LB dressing long sitting on bed.      Objective       03/15/22 1044   Cognition    Cognition / Consciousness X   Level of Consciousness Alert   Safety Awareness Impaired   New Learning Impaired   Attention Impaired   Comments Pt delayed at baseline, some swearing during OT " "session needing encouragement, reassurance to use better words. Care giver present and assisted with encouragement.   Passive ROM Upper Body   Comments WFL   Active ROM Upper Body   Dominant Hand Right   Comments WFL   Strength Upper Body   Comments WFL   Other Treatments   Other Treatments Provided Slight BM in bed unaware. Mod A for frontal  pericare, Total A for buttocks. Care giver training is ongoing. Psychosocial intervention addressed.   Balance   Sitting Balance (Static) Fair -   Sitting Balance (Dynamic) Poor +   Standing Balance (Static) Poor +   Standing Balance (Dynamic) Poor   Weight Shift Sitting Poor   Weight Shift Standing Poor   Comments HOB flat and use of bedrail. Stood with FWW for approx 30 secs before pt attempted to sit back on EOB.   Bed Mobility    Supine to Sit Moderate Assist   Sit to Supine Maximal Assist   Scooting Moderate Assist   Rolling Minimal Assist to Rt.;Minimum Assist to Lt.  (using bedrails.)   Comments rolling and bridging for LB dressing   Activities of Daily Living   Eating Supervision   Grooming Minimal Assist;Seated   Bathing   (refused)   Upper Body Dressing Minimal Assist   Lower Body Dressing Maximal Assist   Toileting Maximal Assist   Skilled Intervention Verbal Cuing;Tactile Cuing;Sequencing;Compensatory Strategies   Comments Pt will need 24/7 assist until pt increases in strength and ability without pain. CG stated pt was \"able to fully dress self prior to breaking hip\".   Functional Mobility   Sit to Stand Minimal Assist  (using FWW)   Bed, Chair, Wheelchair Transfer Moderate Assist   Toilet Transfers Unable to Participate  (refused BSC)   Transfer Method Stand Step  (shuffled steps)   Skilled Intervention Verbal Cuing;Tactile Cuing;Sequencing;Postural Facilitation;Compensatory Strategies   Comments with FWW. Pt very fearful of falling.   Activity Tolerance   Sitting in Chair 20 mins;  left up in chair.   Sitting Edge of Bed 20 mins   Standing <45 ses   Comments low " activity tolerance,  limited by pain   Patient / Family Goals   Patient / Family Goal #1 none stated   Short Term Goals   Short Term Goal # 1 Min A toileting   Goal Outcome # 1 Progressing slower than expected   Short Term Goal # 2 Min A toilet transfer   Goal Outcome # 2 Progressing slower than expected   Short Term Goal # 3 Min A LB ADLs   Goal Outcome # 3 Progressing slower than expected   Short Term Goal # 4 5 minute stand for self care   Goal Outcome # 4 Goal not met   Anticipated Discharge Equipment and Recommendations   DC Equipment Recommendations Unable to determine at this time   Discharge Recommendations Recommend post-acute placement for additional occupational therapy services prior to discharge home   Interdisciplinary Plan of Care Collaboration   IDT Collaboration with  Nursing;Family / Caregiver   Collaboration Comments RN updated

## 2022-03-15 NOTE — CARE PLAN
The patient is Stable - Low risk of patient condition declining or worsening    Shift Goals  Clinical Goals: Mobility, pain management, rest  Patient Goals: Pain management  Family Goals: unable to assess no family at bedside    Progress made toward(s) clinical / shift goals:    Problem: Knowledge Deficit - Standard  Goal: Patient and family/care givers will demonstrate understanding of plan of care, disease process/condition, diagnostic tests and medications  Outcome: Progressing  Note: Discussed POC with patient, including pain management, mobility, and skin preventative measures including turns. The patient indicated understanding and follows commands appropriately.      Problem: Fall Risk  Goal: Patient will remain free from falls  Outcome: Progressing  Note: Fall precautions in place, including bed alarm. Patient does not call for assistance but does not attempt to get up without help.

## 2022-03-15 NOTE — PROGRESS NOTES
Hospital Medicine Daily Progress Note    Date of Service  3/15/2022    Chief Complaint  Erlinda Verde is a 56 y.o. female admitted 2/25/2022 with   Chief Complaint   Patient presents with   • Leg Pain     PT BIB EMS from home. PT was seen at urgent care yesterday and was diagnosed with a femoral head fracture.  Pt care giver called due to increased pain. PT was referred for follow up with ortho but has not had follow up.        Hospital Course  This 56-year-old female with a developmental disability, size affective disorder, parkinsonism, hypertension, type 2 diabetes mellitus presented to the ER on 2/25/2022 after she had a ground-level fall.     She is a resident of [facility].  She landed on the left side, imaging showed femoral head fracture; orthopedic surgery was consulted, patient underwent open treatment of left femoral neck fracture with hemiarthroplasty on 2/26/2022.  PT/OT has evaluate the patient, recommend postacute.  SNF/rehab referral in place     Of note, patient noted to be tachycardic, EKG sinus tachy. D-dimer is elevated, CTA of the chest negative for PE. She was started on metoprolol and tachycardia resolved.     Hospital course was complicated with acute blood loss anemia, today her hemoglobin noted to be 7.6.  Will transfuse if less than 7.  Iron study was obtained consistent with iron deficiency, IV iron given.  empiric PPI also given, to follow with primary care. HGb improving to 10.5      Medically cleared     Interval Problem Update    2/27:  --No acute events overnight, patient underwent surgery yesterday.  We will continue DVT prophylaxis.  PT/OT has evaluate the patient, pending recommendation.  --Of note, patient abdomen is distended , KUB showing constipation;  Will provide aggressive  Bowel regemin  --Patient continues remain persistent tachycardia along with elevated D-dimer, will obtain CT of the chest.  --Has been requiring 2.5 L of oxygen, will titrate oxygen as needed.   Provide incentive spirometry    Plan of care has been discussed with the nursing staff , caretaker at bedside.    2/28:  --No acute events overnight, patient tachycardia continues to improve.  CT of the chest ordered, discussed with patient and guardian Ann.  --Patient did have a good bowel movement yesterday  --Her hemoglobin to be 7.6, iron studies consistent with iron deficiency.  Will provide IV iron.  Monitor hemoglobin medically, if less than 7, transfuse  --DVT prophylaxis with Lovenox  --PT/OT has evaluate the patient, recommend postacute, SNF/physiatry referral in place    3/1- no event overnight. She remain pleasantly confused. Sitter present. BP slight elevated. Sinus tachycardia. Started metoprolol 25 mg BID. Not symptomatic. EKG reviewed. CT/PE negative for PE  For hypertension, resume home meds, lisinopril 20 mg daily  hgb 8.4. improving. No need for transfusion   Medically cleared to transfer at SNF     3/2- no event overnight. She is at her baseline. Able to say her name. Complaining of pain, but difficult to rate. Tachycardia improving. BP stable. Last bowel movement three days ago. Bowel protocol in place Medically cleared for placement     3/3- she is little more talkative and awake today. No event overnight. Vitals are stable. Pending rehab/snf transfer     3/4- no event. Report hip pain, meds available. Pending placement. No change on medications. Vitals are stable. She did have bowel movement a day prior.     3/5- no event. Able to understand better. Looks more comfortable. Vitals are stable. Oxycodone used only one time 2.5 mg yesterday. Pending PASRR, placement     3/6- no event. She tells me that pain is better. She did have loose stool yesterday. BP stable. She is still on 1L of oxygen. She had used oxycodone 2.5 mg -5 mg twice yesterday. Medically cleared for SNF transferring      3/7- no event. Vitals are stable. Last bowel movement since 3/5. She is taking stool softer. Discuss with RN  to give miralax. Continue PT/OT. Pending transfer to SNF     3/8- no event. Denies pain. She did have bowel movement yesterday. Vitals stable. Pending SNF transfer     3/9- no event. Eating breakfast. Denies pain. Vitals are stable. Oxycodone 2.5-5 mg only three times     3/10- no event. She said little to pain. Eating well. Vitals stable. Bowel movement yesterday. No other pain. Pending placement. PASSR     3/11- no event. Denies pain. Vitals stable. Bowel movement today. PASRR went through. Pending bed available     3/12- no event. Reports pain when moving. Staples out today. Still some swelling on that leg from surgery. Lasix 40 mg IV once given. Pending SNF placement     3/13- no event, pain 2/10. sutures removed yesterday. Vitals are stable.pendign placement    3/14- no event. Reports some pain on the left hip. She looks more tired today.  She has orders for oxycodone 2.5-5 mg q3hrs. She is not utilizing enough. Encourage to give pt pain meds if she states that she has pain so pt can get more therapy. Encourage oral hydration.     3/15: No overnight events.  Vital stable.  Pain controlled.  Patient likely will discharge to [facility] tomorrow.      Plan of care has been discussed with  Nursing staff, case management.    Consultants/Specialty  orthopedics   PMR    Code Status  Full Code    Disposition  Patient is medically cleared for discharge.   Anticipate discharge to to skilled nursing facility. Vs rehab   I have placed the appropriate orders for post-discharge needs.    Review of Systems  Review of Systems   Unable to perform ROS: Psychiatric disorder        Unable to obtain  2/2 patient mentation     Physical Exam  Temp:  [35.9 °C (96.6 °F)-36.3 °C (97.4 °F)] 36.2 °C (97.2 °F)  Pulse:  [] 74  Resp:  [16] 16  BP: ()/(44-74) 104/58  SpO2:  [91 %-98 %] 91 %    Physical Exam  Vitals and nursing note reviewed.   Constitutional:       Comments: And awake   HENT:      Head: Normocephalic and  atraumatic.   Cardiovascular:      Rate and Rhythm: Normal rate and regular rhythm.   Pulmonary:      Effort: No respiratory distress.      Breath sounds: Normal breath sounds.   Abdominal:      General: Bowel sounds are normal. There is distension.      Comments: Slight hard   Musculoskeletal:      Cervical back: Neck supple.      Right lower leg: No edema.      Left lower leg: No edema.      Comments: Decreased ROM on the Left side   Neurological:      Mental Status: She is alert.      Comments: Alert and awake   Does folow commands          Fluids    Intake/Output Summary (Last 24 hours) at 3/15/2022 1332  Last data filed at 3/15/2022 0756  Gross per 24 hour   Intake 120 ml   Output 1000 ml   Net -880 ml       Laboratory  Recent Labs     03/13/22  1225   WBC 6.3   RBC 3.53*   HEMOGLOBIN 10.5*   HEMATOCRIT 33.8*   MCV 95.8   MCH 29.7   MCHC 31.1*   RDW 58.1*   PLATELETCT 376   MPV 7.9*     Recent Labs     03/13/22  1225   SODIUM 135   POTASSIUM 4.7   CHLORIDE 99   CO2 22   GLUCOSE 131*   BUN 29*   CREATININE 0.98   CALCIUM 9.3                   Imaging  CT-CTA CHEST PULMONARY ARTERY W/ RECONS   Final Result      1.  No evidence of pulmonary embolism to the segmental branches. Subsegmental branches poorly evaluated due to motion artifact.   2.  Small bilateral pleural effusions.   3.  Atherosclerotic changes. Coronary artery atherosclerotic disease.   4.  3 pulmonary nodules measuring up to 3 mm in the left upper lobe. Recommendations below.      Low Risk: No routine follow-up      High Risk: Optional CT at 12 months      Comments: Use most suspicious nodule as guide to management. Follow-up intervals may vary according to size and risk.      Low Risk - Minimal or absent history of smoking and of other known risk factors.      High Risk - History of smoking or of other known risk factors.      Note: These recommendations do not apply to lung cancer screening, patients with immunosuppression, or patients with known  primary cancer.      Fleischner Society 2017 Guidelines for Management of Incidentally Detected Pulmonary Nodules in Adults      FT-LBKRVAL-7 VIEW   Final Result      1.  No evidence of bowel obstruction.      2.  Moderate constipation.      DX-PELVIS-1 OR 2 VIEWS   Final Result      Post left hip hemiarthroplasty.      DX-FEMUR-2+ LEFT   Final Result      1.  Mildly displaced left femoral neck fracture.   2.  No distal femur fracture.      DX-HIP-UNILATERAL-WITH PELVIS-1 VIEW LEFT   Final Result      Left femoral neck fracture again noted.      DX-CHEST-PORTABLE (1 VIEW)   Final Result      Hypoinflation with bibasilar atelectasis.           Assessment/Plan  * Hip fracture requiring operative repair, left, closed, initial encounter (HCC)- (present on admission)  Assessment & Plan  Patient will be consulted by orthopedic surgery    ---x-ray showing femoral head fracture. Ortho was consulted and patient underwent Open treatment of left femoral neck fracture with hemiarthroplasty on 2/26 by Dr Marquis.   --- PT and OT    -- DVT ppx  3/12- one dose of lasix 40 mg IV once, to help with swelling     Tachycardia- (present on admission)  Assessment & Plan  Sinus tachycardia on EKG  Thought to be secondary to pain and hypovolemia  Plan: Opiates, IV fluids  2/26: Improving   Tachy with elevated D-dimer; obtain CTA of the chest negative   3/1- sinus tachy. Not symptomatic. No other explanation. Start metoprolol 25 mg BID . Monitor with PCP and cardiology as OP   3/2- resolving   3/6- resolved     Essential hypertension- (present on admission)  Assessment & Plan  Will hold lisinopril   IV labetalol as needed   --- BP well controlled  3/1- increase. Start lisinopril and metoprolol. Continue to monitor   3/2- better, well controled. Continue current treatment   3/6- well controled       Anemia- (present on admission)  Assessment & Plan  Her hemoglobin hematocrit, today at 7.8.  Will transfuse if less than 7  --Tender to acute  blood loss anemia likely 2/2 acute blood loss anemia  3/1- hgb 8.4- improving. No need for transfusion     Diabetes (HCC)- (present on admission)  Assessment & Plan  Glyco at 6.7   -- diabetic diet    -- on iss , hypoglycemia protocol and accu-checks ac and hs  Metformin resumed. Pt is medically cleared     Thrombocytopenia (HCC)- (present on admission)  Assessment & Plan  Reactive, possible due to anemia   3/1- improving     Schizophrenia (HCC)- (present on admission)  Assessment & Plan  Resume outpatient antipsychotic medications  New every 530 mg p.o. daily, Depakote 1000 g at p.m.  Quetiapine 200 at p.m. and 100 at am    QTc 395    Parkinson's disease (tremor, stiffness, slow motion, unstable posture) ([facility])- (present on admission)  Assessment & Plan  Fall precautions       VTE prophylaxis: Lovenox  I have performed a physical exam and reviewed and updated ROS and Plan today (3/15/2022). In review of yesterday's note (3/14/2022), there are no changes except as documented above.

## 2022-03-15 NOTE — PROGRESS NOTES
4 Eyes Skin Assessment Completed by ROD Escamilla and ROD Bermudez.    Head WDL  Ears WDL  Nose WDL  Mouth WDL  Neck WDL  Breast/Chest WDL  Shoulder Blades WDL  Spine WDL  (R) Arm/Elbow/Hand WDL  (L) Arm/Elbow/Hand WDL  Abdomen WDL  Groin WDL  Scrotum/Coccyx/Buttocks Redness and Blanching  (R) Leg WDL  (L) Leg Bruising, Edema and Incision  (R) Heel/Foot/Toe WDL  (L) Heel/Foot/Toe WDL          Devices In Places Pulse Ox, SCDs, Purewick      Interventions In Place Waffle Overlay, Pillows, Q2 Turns, Barrier Cream, Dri-Torrey Pads and Pressure Redistribution Mattress    Possible Skin Injury No    Pictures Uploaded Into Epic N/A  Wound Consult Placed N/A  RN Wound Prevention Protocol Ordered No

## 2022-03-16 VITALS
DIASTOLIC BLOOD PRESSURE: 74 MMHG | HEIGHT: 65 IN | TEMPERATURE: 96.9 F | BODY MASS INDEX: 22.55 KG/M2 | OXYGEN SATURATION: 96 % | WEIGHT: 135.36 LBS | SYSTOLIC BLOOD PRESSURE: 107 MMHG | RESPIRATION RATE: 18 BRPM | HEART RATE: 82 BPM

## 2022-03-16 PROCEDURE — 700102 HCHG RX REV CODE 250 W/ 637 OVERRIDE(OP): Performed by: HOSPITALIST

## 2022-03-16 PROCEDURE — 700102 HCHG RX REV CODE 250 W/ 637 OVERRIDE(OP): Performed by: INTERNAL MEDICINE

## 2022-03-16 PROCEDURE — A9270 NON-COVERED ITEM OR SERVICE: HCPCS | Performed by: PHYSICAL MEDICINE & REHABILITATION

## 2022-03-16 PROCEDURE — 700111 HCHG RX REV CODE 636 W/ 250 OVERRIDE (IP): Performed by: INTERNAL MEDICINE

## 2022-03-16 PROCEDURE — 99239 HOSP IP/OBS DSCHRG MGMT >30: CPT | Performed by: STUDENT IN AN ORGANIZED HEALTH CARE EDUCATION/TRAINING PROGRAM

## 2022-03-16 PROCEDURE — A9270 NON-COVERED ITEM OR SERVICE: HCPCS | Performed by: HOSPITALIST

## 2022-03-16 PROCEDURE — 700102 HCHG RX REV CODE 250 W/ 637 OVERRIDE(OP): Performed by: PHYSICAL MEDICINE & REHABILITATION

## 2022-03-16 PROCEDURE — A9270 NON-COVERED ITEM OR SERVICE: HCPCS | Performed by: INTERNAL MEDICINE

## 2022-03-16 RX ORDER — OXYCODONE HYDROCHLORIDE 5 MG/1
5 TABLET ORAL EVERY 6 HOURS PRN
Qty: 12 TABLET | Refills: 0 | Status: SHIPPED | OUTPATIENT
Start: 2022-03-16 | End: 2022-03-19

## 2022-03-16 RX ORDER — POLYETHYLENE GLYCOL 3350 17 G/17G
17 POWDER, FOR SOLUTION ORAL 2 TIMES DAILY
Refills: 3 | Status: SHIPPED
Start: 2022-03-16 | End: 2022-07-28

## 2022-03-16 RX ORDER — OMEPRAZOLE 20 MG/1
20 CAPSULE, DELAYED RELEASE ORAL DAILY
Qty: 30 CAPSULE | Status: SHIPPED
Start: 2022-03-17 | End: 2022-07-28

## 2022-03-16 RX ORDER — LISINOPRIL 20 MG/1
20 TABLET ORAL DAILY
Qty: 30 TABLET | Status: SHIPPED
Start: 2022-03-17 | End: 2023-10-12

## 2022-03-16 RX ADMIN — METFORMIN HYDROCHLORIDE 500 MG: 500 TABLET, EXTENDED RELEASE ORAL at 04:44

## 2022-03-16 RX ADMIN — SENNOSIDES AND DOCUSATE SODIUM 2 TABLET: 50; 8.6 TABLET ORAL at 04:45

## 2022-03-16 RX ADMIN — ACETAMINOPHEN 1000 MG: 500 TABLET ORAL at 09:22

## 2022-03-16 RX ADMIN — OMEPRAZOLE 20 MG: 20 CAPSULE, DELAYED RELEASE ORAL at 04:43

## 2022-03-16 RX ADMIN — ENOXAPARIN SODIUM 40 MG: 40 INJECTION SUBCUTANEOUS at 04:46

## 2022-03-16 RX ADMIN — OXYCODONE 2.5 MG: 5 TABLET ORAL at 09:22

## 2022-03-16 RX ADMIN — LEVOTHYROXINE SODIUM 25 MCG: 25 TABLET ORAL at 04:44

## 2022-03-16 RX ADMIN — POLYETHYLENE GLYCOL 3350 1 PACKET: 17 POWDER, FOR SOLUTION ORAL at 04:43

## 2022-03-16 RX ADMIN — QUETIAPINE FUMARATE 100 MG: 100 TABLET ORAL at 04:45

## 2022-03-16 RX ADMIN — Medication 1 TABLET: at 04:45

## 2022-03-16 RX ADMIN — LISINOPRIL 20 MG: 20 TABLET ORAL at 04:44

## 2022-03-16 RX ADMIN — SERTRALINE 100 MG: 100 TABLET, FILM COATED ORAL at 04:44

## 2022-03-16 RX ADMIN — ACETAMINOPHEN 1000 MG: 500 TABLET ORAL at 14:59

## 2022-03-16 RX ADMIN — OXYCODONE 5 MG: 5 TABLET ORAL at 04:43

## 2022-03-16 ASSESSMENT — PAIN DESCRIPTION - PAIN TYPE
TYPE: ACUTE PAIN;SURGICAL PAIN

## 2022-03-16 ASSESSMENT — PAIN SCALES - PAIN ASSESSMENT IN ADVANCED DEMENTIA (PAINAD)
BODYLANGUAGE: RELAXED
TOTALSCORE: 0
BREATHING: NORMAL
CONSOLABILITY: NO NEED TO CONSOLE
FACIALEXPRESSION: SMILING OR INEXPRESSIVE

## 2022-03-16 NOTE — DISCHARGE PLANNING
Agency/Facility Name: [facility]  Spoke To: Carmel  Outcome: Will call this DPA back after stand up for bed availability.     KATIA Ag informed.

## 2022-03-16 NOTE — DISCHARGE PLANNING
DC Transport Scheduled    Received request at: 1112    Transport Company Scheduled:  [employer]  Spoke with Talisha at [facility] to schedule transport.  [facility] Trip #: [id number]    Scheduled Date: 03/16/22  Scheduled Time: 1500    Destination: [address] [place] [address]    Notified care team of scheduled transport via [employer].     If there are any changes needed to the DC transportation scheduled, please contact [facility] at ext. [phone] between the hours of 1069-1159 Mon-Fri. If outside those hours, contact the ED Case Manager at ext. [phone].

## 2022-03-16 NOTE — CARE PLAN
Problem: Knowledge Deficit - Standard  Goal: Patient and family/care givers will demonstrate understanding of plan of care, disease process/condition, diagnostic tests and medications  Outcome: Met     Problem: Pain - Standard  Goal: Alleviation of pain or a reduction in pain to the patient’s comfort goal  Outcome: Met     Problem: Skin Integrity  Goal: Skin integrity is maintained or improved  Outcome: Met     Problem: Fall Risk  Goal: Patient will remain free from falls  Outcome: Met   The patient is Stable - Low risk of patient condition declining or worsening    Shift Goals  Clinical Goals: Mobility, Pain control, safety  Patient Goals: Pain control, rest  Family Goals: n/a    Progress made toward(s) clinical / shift goals:

## 2022-03-16 NOTE — PROGRESS NOTES
4 Eyes Skin Assessment Completed by ROD Mcclendon and ROD Espino.    Head WDL  Ears WDL  Nose WDL  Mouth WDL  Neck WDL  Breast/Chest WDL  Shoulder Blades WDL  Spine WDL  (R) Arm/Elbow/Hand WDL  (L) Arm/Elbow/Hand WDL  Abdomen WDL  Groin Redness and Blanching  Scrotum/Coccyx/Buttocks Redness Blanching  (R) Leg WDL  (L) Leg L hip steri strip incision  (R) Heel/Foot/Toe WDL  (L) Heel/Foot/Toe WDL          Devices In Places Pulse Ox and SCD's Purewick      Interventions In Place Waffle Overlay, Q2 Turns, Barrier Cream, Heels Loaded W/Pillows and Pressure Redistribution Mattress    Possible Skin Injury No    Pictures Uploaded Into Epic N/A  Wound Consult Placed N/A  RN Wound Prevention Protocol Ordered -ordered

## 2022-03-16 NOTE — DISCHARGE INSTRUCTIONS
Discharge Instructions    Discharged to [facility] by medical transportation with escort. Discharged via Ambulance, hospital escort: Yes.  Special equipment needed: Wheelchair (patient owned)    Be sure to schedule a follow-up appointment with your primary care doctor or any specialists as instructed.     Discharge Plan:   Influenza Vaccine Indication: Patient Refuses    I understand that a diet low in cholesterol, fat, and sodium is recommended for good health. Unless I have been given specific instructions below for another diet, I accept this instruction as my diet prescription.   Other diet: Diabetic    Special Instructions: None    · Is patient discharged on Warfarin / Coumadin?   No     Depression / Suicide Risk    As you are discharged from this [facility], it is important to learn how to keep safe from harming yourself.    Recognize the warning signs:  · Abrupt changes in personality, positive or negative- including increase in energy   · Giving away possessions  · Change in eating patterns- significant weight changes-  positive or negative  · Change in sleeping patterns- unable to sleep or sleeping all the time   · Unwillingness or inability to communicate  · Depression  · Unusual sadness, discouragement and loneliness  · Talk of wanting to die  · Neglect of personal appearance   · Rebelliousness- reckless behavior  · Withdrawal from people/activities they love  · Confusion- inability to concentrate     If you or a loved one observes any of these behaviors or has concerns about self-harm, here's what you can do:  · Talk about it- your feelings and reasons for harming yourself  · Remove any means that you might use to hurt yourself (examples: pills, rope, extension cords, firearm)  · Get professional help from the community (Mental Health, Substance Abuse, psychological counseling)  · Do not be alone:Call your Safe Contact- someone whom you trust who will be there for you.  · Call your local  "CRISIS HOTLINE 259-5571 or 736-147-1377  · Call your local Children's Mobile Crisis Response Team [place] (467) 420-9307 or www.[employer]  · Call the toll free National Suicide Prevention Hotlines   · National Suicide Prevention Lifeline 953-264-PVGO (5100)  · [facility] Line Network 800-SUICIDE (618-8456)      Hip Hemiarthroplasty  The hip joint is located where the upper end of the femur meets the pelvis socket (acetabulum). The femur, or thigh bone, looks like a long stem with a ball on the end. The acetabulum is a socket or cup-like structure in the pelvis, or hip bone. This \"ball and socket\" allows your hip to move.  During Hip hemiarthroplasty, your surgeon removes the diseased bone tissue and cartilage from the hip joint. The healthy parts of the hip are left intact. The head of the femur (the ball) and the socket (acetabulum) is replaced with new, artificial parts. The new hip is made of materials that allow a normal movement of the joint. This surgery usually lasts 2 to 3 hours.   The purpose of this surgery is to reduce pain and improve range of motion. It is one of the most successful joint replacement surgeries. It most often greatly improves the quality of life.  LET YOUR CAREGIVERS KNOW ABOUT:  · Allergies  · Medications taken including herbs, eye drops, over the counter medications, and creams  · Use of steroids (by mouth or creams)  · Previous problems with anesthetics or Novocaine  · Possibility of pregnancy, if this applies  · History of blood clots (thrombophlebitis)  · History of bleeding or blood problems  · Previous surgery  · Other health problems  BEFORE THE PROCEDURE  · Do not eat or drink anything for as long as directed by your caregiver prior to surgery.  · You should be present 60 minutes prior to your procedure or as directed.  Prior to surgery an IV (intravenous line for giving fluids) may be started. You may be given an anesthetic (medications and gas to help you " sleep) during the procedure.   AFTER THE PROCEDURE  After surgery, you will be taken to the recovery area. There a nurse will watch and check your progress. You may have a catheter (a long, narrow, hollow tube) in your bladder following surgery. The catheter helps you pass your water. Once you're awake, stable, and taking fluids well, barring other problems you'll be returned to your room. You will receive physical therapy until you are doing well and your caregiver feels it is safe for you to be transferred home or to an extended care facility.  HOME CARE INSTRUCTIONS   · You may resume normal diet and activities as directed or allowed.  · Change dressings if necessary or as directed.  · Only take over-the-counter or prescription medicines for pain, discomfort, or fever as directed by your caregiver.  SEEK IMMEDIATE MEDICAL CARE IF:  You develop:  · Swelling of your calf or leg or develop shortness of breath or chest pain.  · Redness, swelling, or increasing pain in the wound.  · Pus coming from wound.  · An unexplained oral temperature over 102° F (38.9° C) develops.  · A foul smell coming from the wound or dressing.  · A breaking open of the wound (edges not staying together) after sutures or staples have been removed.  MAKE SURE YOU:   · Understand these instructions.  · Will watch your condition.  · Will get help right away if you are not doing well or get worse.  Document Released: 01/02/2008 Document Revised: 03/11/2013 Document Reviewed: 01/29/2008  [employer]® Patient Information ©2014 [employer].

## 2022-03-16 NOTE — PROGRESS NOTES
4 Eyes Skin Assessment Completed by ROD Escamilla and ROD Samuels.    Head WDL  Ears WDL  Nose WDL  Mouth WDL  Neck WDL  Breast/Chest WDL  Shoulder Blades WDL  Spine WDL  (R) Arm/Elbow/Hand WDL  (L) Arm/Elbow/Hand WDL  Abdomen WDL  Groin Redness and Blanching  Scrotum/Coccyx/Buttocks Redness and Blanching  (R) Leg WDL  (L) Leg Incision, hip steri strips  (R) Heel/Foot/Toe WDL  (L) Heel/Foot/Toe WDL          Devices In Places Pulse Ox and SCD's, purewick      Interventions In Place Waffle Overlay, Pillows, Elbow Mepilex, Q2 Turns, Barrier Cream, Dri-Torrey Pads, Heels Loaded W/Pillows and Pressure Redistribution Mattress    Possible Skin Injury No    Pictures Uploaded Into Epic N/A  Wound Consult Placed N/A  RN Wound Prevention Protocol Ordered Already ordered and in place

## 2022-03-16 NOTE — DISCHARGE PLANNING
Anticipated Discharge Disposition: [facility]         Action: KATIA obtained verbal consent from [facility] Guardian supervisor, Jorge Juares who agreed to transfer of patient to [facility]. KATIA faxed DC summary to [facility] Guardian office as requested by Jorge.        Barriers to Discharge: Pending transport         Plan: [facility]

## 2022-03-16 NOTE — PROGRESS NOTES
Patient has left the floor via [place] to the skilled nursing facility [facility].     Left message at [facility] for report.

## 2022-03-16 NOTE — DISCHARGE PLANNING
Anticipated Discharge Disposition: [facility]        Action: CM reached out to the on call guardian for patient. Mrs. Smith @ [phone], but was unable to speak with guardian and left 2 VM's. CM then spoke with the Guardian  to inform Mrs. Smith of transport for patient and to call CM @ [phone]. CM is awaiting call back from guardian so consent for transfer can be obtained.        Barriers to Discharge: Guardian consent, transport pickup        Plan: [facility]

## 2022-03-16 NOTE — CARE PLAN
The patient is Stable - Low risk of patient condition declining or worsening    Shift Goals  Clinical Goals: Mobility, pain management, rest, skin preventative measures  Patient Goals: Pain management, rest, snacks  Family Goals: n/a    Progress made toward(s) clinical / shift goals:    Problem: Knowledge Deficit - Standard  Goal: Patient and family/care givers will demonstrate understanding of plan of care, disease process/condition, diagnostic tests and medications  Outcome: Progressing  Note: Discussed plan of care with patient, including pain medicine, skin preventative measures, and  mobility. The patient did not show evidence of learning but can somewhat communicate her needs regarding pain and if she has had a bowel movement.      Problem: Fall Risk  Goal: Patient will remain free from falls  Outcome: Progressing  Note: Fall precautions in place, including bed alarm. The patient does not call for assistance but does not attempt to leave the bed without assistance.

## 2022-03-16 NOTE — DISCHARGE SUMMARY
Discharge Summary    CHIEF COMPLAINT ON ADMISSION  Chief Complaint   Patient presents with   • Leg Pain     PT BIB EMS from home. PT was seen at urgent care yesterday and was diagnosed with a femoral head fracture.  Pt care giver called due to increased pain. PT was referred for follow up with ortho but has not had follow up.        Reason for Admission  Hip fracture requiring operative r*     CODE STATUS  Full Code    HPI & HOSPITAL COURSE    This is a 56 year old female with pmhx of developmental disability, schizoaffective disorder, parkinsonism, hypertension, diabetes type 2, who presented 2/25/2022 after she had a ground level fall.  She landed on the left side, x-ray showing femoral head fracture. Ortho was consulted and patient underwent Open treatment of left femoral neck fracture with hemiarthroplasty on 2/26 by Dr Marquis.    Patient is noted to be hypotensive, tachycardic on presentation. H&H at 11.5/34.6, platelet 132    Hospital course was complicated with acute blood loss anemia, today her hemoglobin noted to be 7.6.  Will transfuse if less than 7.  Iron study was obtained consistent with iron deficiency, will provide IV iron.  Will provide PPI.  Pain controlled with oxycodone.   Patient was seen and evaluated by PT and OT who recommended postacute placement prior to the patient returning back to her group home.  Patient will discharge to group home later today.        Therefore, she is discharged in good and stable condition to skilled nursing facility.    The patient met 2-midnight criteria for an inpatient stay at the time of discharge.    Discharge date 3/16/2022    FOLLOW UP ITEMS POST DISCHARGE  Left hip fracture-will discharge to SNF.  Follow-up with orthopedic surgery    DISCHARGE DIAGNOSES  Principal Problem:    Hip fracture requiring operative repair, left, closed, initial encounter ([facility]) POA: Yes  Active Problems:    Parkinson's disease (tremor, stiffness, slow motion, unstable posture)  (HCC) POA: Yes    Schizophrenia (HCC) POA: Yes    Thrombocytopenia (HCC) POA: Yes    Diabetes (HCC) POA: Yes    Anemia POA: Yes    Essential hypertension POA: Yes    Tachycardia POA: Yes  Resolved Problems:    * No resolved hospital problems. *      FOLLOW UP  No future appointments.  Ubaldo Marquis M.D.  [address]  [address]  [facility] [id number] [phone]      Call ASAP to schedule fu appt for 4-6 weeks postop    Whitney Graham M.D.  [address]  [facility] [id number] [phone]    Call  Please call your primary care provider to schedule a hospital follow up. Thank you.     [facility]  [address] [facility] [address]  [phone]          MEDICATIONS ON DISCHARGE     Medication List      START taking these medications      Instructions   aspirin 81 MG Chew chewable tablet  Commonly known as: ASA   Chew 1 Tablet 2 times a day for 25 days.  Dose: 81 mg     levothyroxine 25 MCG Tabs  Commonly known as: SYNTHROID   Take 1 Tablet by mouth every morning on an empty stomach.  Dose: 25 mcg     metoprolol tartrate 25 MG Tabs  Commonly known as: LOPRESSOR   Take 1 Tablet by mouth 2 times a day.  Dose: 25 mg     omeprazole 20 MG delayed-release capsule  Start taking on: March 17, 2022  Commonly known as: PRILOSEC   Take 1 Capsule by mouth every day.  Dose: 20 mg     * oxyCODONE immediate-release 5 MG Tabs  Commonly known as: ROXICODONE   Take 1 Tablet by mouth every 6 hours as needed for Severe Pain for up to 3 days.  Dose: 5 mg     * polyethylene glycol/lytes 17 g Pack  Commonly known as: MIRALAX   Take 1 Packet by mouth 1 time a day as needed (if sennosides and docusate ineffective after 24 hours).  Dose: 17 g     * polyethylene glycol/lytes 17 g Pack  Commonly known as: MIRALAX   Take 1 Packet by mouth 2 times a day.  Dose: 17 g     senna-docusate 8.6-50 MG Tabs  Commonly known as: PERICOLACE or SENOKOT S   Take 2 Tablets by mouth 2 times a day.  Dose: 2 Tablet          * This list has 3 medication(s) that are the same as other medications prescribed for you. Read the directions carefully, and ask your doctor or other care provider to review them with you.            CONTINUE taking these medications      Instructions   aripiprazole 30 MG tablet  Commonly known as: ABILIFY   Take 30 mg by mouth every evening.  Dose: 30 mg     atorvastatin 20 MG Tabs  Commonly known as: LIPITOR   Take 20 mg by mouth every evening.  Dose: 20 mg     divalproex  MG Tb24  Commonly known as: DEPAKOTE ER   Take 1,000 mg by mouth at bedtime. 2 tablets = 1000 mg  Dose: 1,000 mg     donepezil 10 MG tablet  Commonly known as: ARICEPT   Take 10 mg by mouth every evening.  Dose: 10 mg     lisinopril 20 MG Tabs  Start taking on: March 17, 2022  Commonly known as: PRINIVIL   Take 1 Tablet by mouth every day.  Dose: 20 mg     metFORMIN  MG Tb24  Commonly known as: GLUCOPHAGE XR   Take 500 mg by mouth every morning.  Dose: 500 mg     oxybutynin SR 5 MG Tb24  Commonly known as: DITROPAN-XL   Take 5 mg by mouth every evening.  Dose: 5 mg     quetiapine 300 MG XR tablet  Commonly known as: SEROQUEL XR   Take 300 mg by mouth every evening.  Dose: 300 mg     sertraline 100 MG Tabs  Commonly known as: Zoloft   Take 100 mg by mouth every morning.  Dose: 100 mg     therapeutic multivitamin-minerals Tabs   Take 1 Tab by mouth every day.  Dose: 1 Tablet     tolterodine ER 4 MG Cp24  Commonly known as: DETROL LA   Take 4 mg by mouth every bedtime.  Dose: 4 mg        STOP taking these medications    Calcium 600-200 MG-UNIT Tabs     meloxicam 15 MG tablet  Commonly known as: MOBIC        ASK your doctor about these medications      Instructions   * oxyCODONE immediate-release 5 MG Tabs  Commonly known as: ROXICODONE  Ask about: Should I take this medication?   Take 0.5 Tablets by mouth every 8 hours as needed for up to 2 days.  Dose: 2.5 mg         * This list has 1 medication(s) that are the same as other  medications prescribed for you. Read the directions carefully, and ask your doctor or other care provider to review them with you.                Allergies  Allergies   Allergen Reactions   • Risperdal        DIET  Orders Placed This Encounter   Procedures   • Diet Order Diet: Level 6 - Soft and Bite Sized (Diabetic); Liquid level: Level 0 - Thin; Second Modifier: (optional): Consistent CHO (Diabetic)     Standing Status:   Standing     Number of Occurrences:   1     Order Specific Question:   Diet:     Answer:   Level 6 - Soft and Bite Sized [23]     Comments:   Diabetic     Order Specific Question:   Liquid level     Answer:   Level 0 - Thin     Order Specific Question:   Second Modifier: (optional)     Answer:   Consistent CHO (Diabetic) [4]       ACTIVITY  As tolerated and directed by skilled nursing.  Weight bearing as tolerated    LINES, DRAINS, AND WOUNDS  This is an automated list. Peripheral IVs will be removed prior to discharge.       Wound 03/09/22 Partial Thickness Wound Sacrum;Buttocks Left related to friction (Active)   Wound Image   03/09/22 1700   Site Assessment Red;Pink 03/16/22 0815   Periwound Assessment Clean;Dry;Intact 03/16/22 0815   Margins Undefined edges 03/16/22 0815   Closure Open to air 03/16/22 0815   Drainage Amount None 03/16/22 0815   Drainage Description Yellow 03/10/22 2100   Treatments Offloading 03/16/22 0815   Wound Cleansing Foam Cleanser/Washcloth 03/15/22 0900   Periwound Protectant Barrier Paste 03/15/22 1957   Dressing Cleansing/Solutions Not Applicable 03/15/22 0900   Dressing Options Open to Air 03/16/22 0815   Dressing Changed Observed 03/16/22 0815   Dressing Status Open to Air 03/16/22 0815   Wound Length (cm) 0.6 cm 03/09/22 1700   Wound Width (cm) 0.4 cm 03/09/22 1700   Wound Surface Area (cm^2) 0.24 cm^2 03/09/22 1700   Shape irregular scattered circualr 03/09/22 1700   Wound Odor None 03/09/22 1700   Exposed Structures None 03/09/22 1700   WOUND NURSE ONLY - Time  Spent with Patient (mins) 30 03/09/22 1700       Wound 02/26/22 Incision Hip Left Aquacel (Active)   Site Assessment Clean;Dry;Intact 03/16/22 0815   Periwound Assessment Clean;Dry;Intact 03/16/22 0815   Margins Attached edges 03/16/22 0815   Closure Closure strips 03/16/22 0815   Drainage Amount None 03/16/22 0815   Drainage Description ARLETTE 03/12/22 2130   Treatments Offloading;Ice applied 03/16/22 0815   Wound Cleansing Approved Wound Cleanser 03/15/22 0900   Periwound Protectant Benzoin 03/15/22 0900   Dressing Cleansing/Solutions Not Applicable 03/15/22 0900   Dressing Options Steri-Strip 03/16/22 0815   Dressing Changed Observed 03/16/22 0815   Dressing Status Clean;Dry;Intact 03/16/22 0815   Dressing Change/Treatment Frequency As Needed 03/16/22 0815   Adhesive Closure Strips Applied 03/12/22 1400   Number of Staples Removed 25 03/12/22 1400                  MENTAL STATUS ON TRANSFER  Patient at mental baseline.  She is alert and oriented to person.          CONSULTATIONS  PMNR   Orthopedic surgery Dr. Marquis    PROCEDURES  2/26: Left hip hemiarthroplasty.    LABORATORY  Lab Results   Component Value Date    SODIUM 135 03/13/2022    POTASSIUM 4.7 03/13/2022    CHLORIDE 99 03/13/2022    CO2 22 03/13/2022    GLUCOSE 131 (H) 03/13/2022    BUN 29 (H) 03/13/2022    CREATININE 0.98 03/13/2022        Lab Results   Component Value Date    WBC 6.3 03/13/2022    HEMOGLOBIN 10.5 (L) 03/13/2022    HEMATOCRIT 33.8 (L) 03/13/2022    PLATELETCT 376 03/13/2022        Total time of the discharge process exceeds 34 minutes.

## 2022-07-28 ENCOUNTER — HOSPITAL ENCOUNTER (EMERGENCY)
Facility: MEDICAL CENTER | Age: 57
End: 2022-08-01
Attending: EMERGENCY MEDICINE
Payer: MEDICARE

## 2022-07-28 DIAGNOSIS — I10 ESSENTIAL HYPERTENSION: ICD-10-CM

## 2022-07-28 DIAGNOSIS — U07.1 COVID-19: ICD-10-CM

## 2022-07-28 DIAGNOSIS — F71 MODERATE INTELLECTUAL DISABILITY: ICD-10-CM

## 2022-07-28 DIAGNOSIS — E08.01 DIABETES MELLITUS DUE TO UNDERLYING CONDITION WITH HYPEROSMOLARITY AND COMA, WITHOUT LONG-TERM CURRENT USE OF INSULIN (HCC): ICD-10-CM

## 2022-07-28 DIAGNOSIS — F20.3 UNDIFFERENTIATED SCHIZOPHRENIA (HCC): ICD-10-CM

## 2022-07-28 DIAGNOSIS — G20.A1 PARKINSON'S DISEASE (TREMOR, STIFFNESS, SLOW MOTION, UNSTABLE POSTURE) (HCC): ICD-10-CM

## 2022-07-28 LAB
ALBUMIN SERPL BCP-MCNC: 3.8 G/DL (ref 3.2–4.9)
ALBUMIN/GLOB SERPL: 1.4 G/DL
ALP SERPL-CCNC: 98 U/L (ref 30–99)
ALT SERPL-CCNC: 12 U/L (ref 2–50)
ANION GAP SERPL CALC-SCNC: 13 MMOL/L (ref 7–16)
AST SERPL-CCNC: 13 U/L (ref 12–45)
BASOPHILS # BLD AUTO: 0.4 % (ref 0–1.8)
BASOPHILS # BLD: 0.02 K/UL (ref 0–0.12)
BILIRUB SERPL-MCNC: <0.2 MG/DL (ref 0.1–1.5)
BUN SERPL-MCNC: 32 MG/DL (ref 8–22)
CALCIUM SERPL-MCNC: 9.2 MG/DL (ref 8.5–10.5)
CHLORIDE SERPL-SCNC: 101 MMOL/L (ref 96–112)
CO2 SERPL-SCNC: 23 MMOL/L (ref 20–33)
CREAT SERPL-MCNC: 0.83 MG/DL (ref 0.5–1.4)
EKG IMPRESSION: NORMAL
EOSINOPHIL # BLD AUTO: 0.02 K/UL (ref 0–0.51)
EOSINOPHIL NFR BLD: 0.4 % (ref 0–6.9)
ERYTHROCYTE [DISTWIDTH] IN BLOOD BY AUTOMATED COUNT: 46.6 FL (ref 35.9–50)
GFR SERPLBLD CREATININE-BSD FMLA CKD-EPI: 82 ML/MIN/1.73 M 2
GLOBULIN SER CALC-MCNC: 2.8 G/DL (ref 1.9–3.5)
GLUCOSE SERPL-MCNC: 108 MG/DL (ref 65–99)
HCT VFR BLD AUTO: 33.2 % (ref 37–47)
HGB BLD-MCNC: 10.7 G/DL (ref 12–16)
IMM GRANULOCYTES # BLD AUTO: 0.02 K/UL (ref 0–0.11)
IMM GRANULOCYTES NFR BLD AUTO: 0.4 % (ref 0–0.9)
LYMPHOCYTES # BLD AUTO: 1.47 K/UL (ref 1–4.8)
LYMPHOCYTES NFR BLD: 25.8 % (ref 22–41)
MCH RBC QN AUTO: 26.6 PG (ref 27–33)
MCHC RBC AUTO-ENTMCNC: 32.2 G/DL (ref 33.6–35)
MCV RBC AUTO: 82.4 FL (ref 81.4–97.8)
MONOCYTES # BLD AUTO: 1.12 K/UL (ref 0–0.85)
MONOCYTES NFR BLD AUTO: 19.6 % (ref 0–13.4)
NEUTROPHILS # BLD AUTO: 3.05 K/UL (ref 2–7.15)
NEUTROPHILS NFR BLD: 53.4 % (ref 44–72)
NRBC # BLD AUTO: 0 K/UL
NRBC BLD-RTO: 0 /100 WBC
PLATELET # BLD AUTO: 122 K/UL (ref 164–446)
PMV BLD AUTO: 8.6 FL (ref 9–12.9)
POTASSIUM SERPL-SCNC: 3.6 MMOL/L (ref 3.6–5.5)
PROT SERPL-MCNC: 6.6 G/DL (ref 6–8.2)
RBC # BLD AUTO: 4.03 M/UL (ref 4.2–5.4)
SODIUM SERPL-SCNC: 137 MMOL/L (ref 135–145)
TROPONIN T SERPL-MCNC: 9 NG/L (ref 6–19)
WBC # BLD AUTO: 5.7 K/UL (ref 4.8–10.8)

## 2022-07-28 PROCEDURE — 36415 COLL VENOUS BLD VENIPUNCTURE: CPT

## 2022-07-28 PROCEDURE — 99285 EMERGENCY DEPT VISIT HI MDM: CPT

## 2022-07-28 PROCEDURE — 85025 COMPLETE CBC W/AUTO DIFF WBC: CPT

## 2022-07-28 PROCEDURE — 84484 ASSAY OF TROPONIN QUANT: CPT

## 2022-07-28 PROCEDURE — 93005 ELECTROCARDIOGRAM TRACING: CPT | Performed by: EMERGENCY MEDICINE

## 2022-07-28 PROCEDURE — 80053 COMPREHEN METABOLIC PANEL: CPT

## 2022-07-28 RX ORDER — ACETAMINOPHEN 325 MG/1
325 TABLET ORAL EVERY 4 HOURS PRN
Status: SHIPPED | COMMUNITY
End: 2023-10-12

## 2022-07-28 RX ORDER — QUETIAPINE 150 MG/1
150 TABLET, FILM COATED, EXTENDED RELEASE ORAL DAILY
Status: SHIPPED | COMMUNITY
End: 2023-10-12

## 2022-07-28 RX ORDER — LEVOTHYROXINE SODIUM 0.2 MG/1
200 TABLET ORAL
Status: ON HOLD | COMMUNITY
End: 2023-10-25

## 2022-07-28 RX ORDER — METOPROLOL TARTRATE AND HYDROCHLOROTHIAZIDE 50; 25 MG/1; MG/1
1 TABLET ORAL EVERY MORNING
Status: ON HOLD | COMMUNITY
End: 2023-10-25

## 2022-07-28 ASSESSMENT — FIBROSIS 4 INDEX: FIB4 SCORE: 1.15

## 2022-07-28 NOTE — ED TRIAGE NOTES
Chief Complaint   Patient presents with   • Other     Pt is refusing to drink water since yesterday. POA sent pt here to be checked out.      Pt BIB EMS for above. Pt is developmentally delayed and difficult to get answers from. Per pt she drank water yesterday and hurt her stomach. Pt now requesting sprite to drink. Pt is covid positive per EMS. FSBS 193 per EMS

## 2022-07-29 LAB
FLUAV RNA SPEC QL NAA+PROBE: NEGATIVE
FLUBV RNA SPEC QL NAA+PROBE: NEGATIVE
RSV RNA SPEC QL NAA+PROBE: NEGATIVE
SARS-COV-2 RNA RESP QL NAA+PROBE: DETECTED
SPECIMEN SOURCE: ABNORMAL

## 2022-07-29 PROCEDURE — A9270 NON-COVERED ITEM OR SERVICE: HCPCS | Performed by: STUDENT IN AN ORGANIZED HEALTH CARE EDUCATION/TRAINING PROGRAM

## 2022-07-29 PROCEDURE — 700102 HCHG RX REV CODE 250 W/ 637 OVERRIDE(OP): Performed by: STUDENT IN AN ORGANIZED HEALTH CARE EDUCATION/TRAINING PROGRAM

## 2022-07-29 PROCEDURE — C9803 HOPD COVID-19 SPEC COLLECT: HCPCS | Performed by: EMERGENCY MEDICINE

## 2022-07-29 PROCEDURE — 0241U HCHG SARS-COV-2 COVID-19 NFCT DS RESP RNA 4 TRGT MIC: CPT

## 2022-07-29 RX ORDER — SERTRALINE HYDROCHLORIDE 100 MG/1
100 TABLET, FILM COATED ORAL EVERY MORNING
Status: DISCONTINUED | OUTPATIENT
Start: 2022-07-29 | End: 2022-08-01 | Stop reason: HOSPADM

## 2022-07-29 RX ORDER — ATORVASTATIN CALCIUM 20 MG/1
20 TABLET, FILM COATED ORAL NIGHTLY
Status: DISCONTINUED | OUTPATIENT
Start: 2022-07-29 | End: 2022-08-01 | Stop reason: HOSPADM

## 2022-07-29 RX ORDER — DONEPEZIL HYDROCHLORIDE 5 MG/1
10 TABLET, FILM COATED ORAL NIGHTLY
Status: DISCONTINUED | OUTPATIENT
Start: 2022-07-29 | End: 2022-08-01 | Stop reason: HOSPADM

## 2022-07-29 RX ORDER — OXYBUTYNIN CHLORIDE 5 MG/1
5 TABLET, EXTENDED RELEASE ORAL EVERY EVENING
Status: DISCONTINUED | OUTPATIENT
Start: 2022-07-29 | End: 2022-08-01 | Stop reason: HOSPADM

## 2022-07-29 RX ORDER — LEVOTHYROXINE SODIUM 0.1 MG/1
200 TABLET ORAL
Status: DISCONTINUED | OUTPATIENT
Start: 2022-07-29 | End: 2022-08-01 | Stop reason: HOSPADM

## 2022-07-29 RX ORDER — METFORMIN HYDROCHLORIDE 500 MG/1
500 TABLET, EXTENDED RELEASE ORAL EVERY MORNING
Status: DISCONTINUED | OUTPATIENT
Start: 2022-07-29 | End: 2022-08-01 | Stop reason: HOSPADM

## 2022-07-29 RX ORDER — METOPROLOL TARTRATE 50 MG/1
50 TABLET, FILM COATED ORAL DAILY
Status: DISCONTINUED | OUTPATIENT
Start: 2022-07-29 | End: 2022-08-01 | Stop reason: HOSPADM

## 2022-07-29 RX ORDER — HYDROCHLOROTHIAZIDE 25 MG/1
25 TABLET ORAL
Status: DISCONTINUED | OUTPATIENT
Start: 2022-07-29 | End: 2022-08-01 | Stop reason: HOSPADM

## 2022-07-29 RX ORDER — DIVALPROEX SODIUM 500 MG/1
1000 TABLET, EXTENDED RELEASE ORAL
Status: DISCONTINUED | OUTPATIENT
Start: 2022-07-29 | End: 2022-08-01 | Stop reason: HOSPADM

## 2022-07-29 RX ORDER — LISINOPRIL 20 MG/1
20 TABLET ORAL DAILY
Status: DISCONTINUED | OUTPATIENT
Start: 2022-07-29 | End: 2022-08-01 | Stop reason: HOSPADM

## 2022-07-29 RX ORDER — ARIPIPRAZOLE 10 MG/1
30 TABLET ORAL EVERY EVENING
Status: DISCONTINUED | OUTPATIENT
Start: 2022-07-29 | End: 2022-08-01 | Stop reason: HOSPADM

## 2022-07-29 RX ADMIN — DIVALPROEX SODIUM 1000 MG: 500 TABLET, EXTENDED RELEASE ORAL at 21:24

## 2022-07-29 RX ADMIN — OXYBUTYNIN CHLORIDE 5 MG: 5 TABLET, EXTENDED RELEASE ORAL at 21:23

## 2022-07-29 RX ADMIN — DONEPEZIL HYDROCHLORIDE 10 MG: 5 TABLET, FILM COATED ORAL at 21:23

## 2022-07-29 RX ADMIN — QUETIAPINE FUMARATE 150 MG: 100 TABLET ORAL at 06:05

## 2022-07-29 RX ADMIN — METFORMIN HYDROCHLORIDE 500 MG: 500 TABLET, EXTENDED RELEASE ORAL at 06:57

## 2022-07-29 RX ADMIN — LISINOPRIL 20 MG: 20 TABLET ORAL at 06:04

## 2022-07-29 RX ADMIN — LEVOTHYROXINE SODIUM 200 MCG: 0.1 TABLET ORAL at 06:05

## 2022-07-29 RX ADMIN — ATORVASTATIN CALCIUM 20 MG: 20 TABLET, FILM COATED ORAL at 21:23

## 2022-07-29 RX ADMIN — SERTRALINE 100 MG: 100 TABLET, FILM COATED ORAL at 06:05

## 2022-07-29 RX ADMIN — ARIPIPRAZOLE 30 MG: 10 TABLET ORAL at 21:25

## 2022-07-29 NOTE — ED NOTES
"RN reached out to caregiver, Evangelina, regarding pending discharge. Evangelina stated, \"We have severely disable people living at our home and since she wont isolate in her room we were hoping you guys would be able to keep her until she doesn't have COVID.\" RN explained that a COIVD positive test does not require hospitalization. Evangelina asked RN to reach out to  Owner, Dianne Hensley [phone], about patients discharge.     , Erlinda, contacted.   "

## 2022-07-29 NOTE — ED NOTES
Patient's home medications have been reviewed by the pharmacy team.     Past Medical History:   Diagnosis Date   • Dysthymic disorder    • HTN (hypertension)    • Intellectual disability     moderate   • Parkinsonism (HCC)    • Schizoaffective disorder (HCC)        Patient's Medications   New Prescriptions    No medications on file   Previous Medications    ACETAMINOPHEN (TYLENOL) 325 MG TAB    Take 325 mg by mouth every four hours as needed for Mild Pain.    ARIPIPRAZOLE (ABILIFY) 30 MG TABLET    Take 30 mg by mouth every evening.    ATORVASTATIN (LIPITOR) 20 MG TAB    Take 20 mg by mouth every evening.    DIVALPROEX ER (DEPAKOTE ER) 500 MG TABLET SR 24 HR    Take 1,000 mg by mouth at bedtime. 2 tablets = 1000 mg    DONEPEZIL (ARICEPT) 10 MG TABLET    Take 10 mg by mouth every evening.    LEVOTHYROXINE (SYNTHROID) 200 MCG TAB    Take 200 mcg by mouth every morning on an empty stomach.    LISINOPRIL (PRINIVIL) 20 MG TAB    Take 1 Tablet by mouth every day.    METFORMIN ER (GLUCOPHAGE XR) 500 MG TABLET SR 24 HR    Take 500 mg by mouth every morning.    METOPROLOL-HYDROCHLOROTHIAZIDE (LOPRESSOR HCT) 50-25 MG PER TABLET    Take 1 Tablet by mouth every day.    OXYBUTYNIN SR (DITROPAN-XL) 5 MG TABLET SR 24 HR    Take 5 mg by mouth every evening.    QUETIAPINE FUMARATE 150 MG TABLET SR 24 HR    Take 150 mg by mouth every day.    SERTRALINE (ZOLOFT) 100 MG TAB    Take 100 mg by mouth every morning.    TOLTERODINE ER (DETROL LA) 4 MG CAPSULE SR 24 HR    Take 4 mg by mouth every bedtime.   Modified Medications    No medications on file   Discontinued Medications    LEVOTHYROXINE (SYNTHROID) 25 MCG TAB    Take 1 Tablet by mouth every morning on an empty stomach.    METOPROLOL TARTRATE (LOPRESSOR) 25 MG TAB    Take 1 Tablet by mouth 2 times a day.    OMEPRAZOLE (PRILOSEC) 20 MG DELAYED-RELEASE CAPSULE    Take 1 Capsule by mouth every day.    POLYETHYLENE GLYCOL/LYTES (MIRALAX) 17 G PACK    Take 1 Packet by mouth 1 time a day  as needed (if sennosides and docusate ineffective after 24 hours).    POLYETHYLENE GLYCOL/LYTES (MIRALAX) 17 G PACK    Take 1 Packet by mouth 2 times a day.    QUETIAPINE (SEROQUEL XR) 300 MG XR TABLET    Take 300 mg by mouth every evening.    SENNA-DOCUSATE (PERICOLACE OR SENOKOT S) 8.6-50 MG TAB    Take 2 Tablets by mouth 2 times a day.    THERAPEUTIC MULTIVITAMIN-MINERALS (THERAGRAN-M) TAB    Take 1 Tab by mouth every day.     A:  Therapeutic duplications identified (ditropan xl & detrol la)  Medications do not appear to be contributing to current complaints.     P:    No recommendations at this time. Home medications have been reordered as appropriate.    Margarito Lipscomb, PharmD, BCPS

## 2022-07-29 NOTE — DISCHARGE PLANNING
Medical Social Work     COLLEEN received a call from the RN advising COLLEEN that the group home will not take the pt back due to being Covid Positive. COLLEEN attempted to call the group home owner Dianne Hensley [phone] but there was not answer. COLLEEN left a voice mail for Dianne requesting a call back.     COLLEEN will try to call and make contact with Dianne.

## 2022-07-29 NOTE — ED NOTES
Med rec complete per hand written list from group home, as well as phone call with Caregiver (Evangelina [phone])  Allergies reviewed per list (However unknown reaction)    [facility] was not sent with patient, I have been advised they are currently closed and will not reopen until 0900 tomorrow morning. Times of last doses discussed with Evangelina via landline. Hand written list returned to patient chart.     Evangelina advised medications need to be placed in patients mouth before offering a drink, she will swallow the meds but is unable to place them in her own mouth.

## 2022-07-29 NOTE — ED PROVIDER NOTES
ED Provider Note    Scribed for Hayley Ortega M.D. by Shahana Boyd. 7/28/2022  5:11 PM    Primary care provider: Whitney Graham M.D.  Means of arrival: Ambulance  History obtained from: Patient  History limited by: Developmentally delayed    CHIEF COMPLAINT  Chief Complaint   Patient presents with   • Other     Pt is refusing to drink water since yesterday. POA sent pt here to be checked out.        HPI  Erlinda Verde is a 56 y.o. developmentally delayed female who presents to the Emergency Department for evaluation of refusal to drink water onset yesterday. Patient reports that she is not thirsty, however she would like sprite. She denies abdominal pain. No alleviating factors were reported. Per RN, patient has arrived from a group home. Patient is COVID positive.     Review of Records: Patient had a hemiarthroplasty of the left hip recently. Discharged from hospital on 2/25/21.     Further history of present illness cannot be obtained due to the patient's developmentally delayed.      REVIEW OF SYSTEMS  Further review of systems cannot be obtained due to the patient's developmentally delayed.       PAST MEDICAL HISTORY   has a past medical history of Dysthymic disorder, HTN (hypertension), Intellectual disability, Parkinsonism (HCC), and Schizoaffective disorder (HCC).    SURGICAL HISTORY   has a past surgical history that includes partial hip replacement (Left, 2/26/2022).    SOCIAL HISTORY  Social History     Tobacco Use   • Smoking status: Never Smoker   • Smokeless tobacco: Never Used   Vaping Use   • Vaping Use: Never used   Substance Use Topics   • Alcohol use: Never   • Drug use: Never      Social History     Substance and Sexual Activity   Drug Use Never       FAMILY HISTORY  None.     CURRENT MEDICATIONS  Current Outpatient Medications:   •  lisinopril (PRINIVIL) 20 MG Tab, Take 1 Tablet by mouth every day., Disp: 30 Tablet, Rfl:   •  metoprolol tartrate (LOPRESSOR) 25 MG Tab, Take 1 Tablet by  "mouth 2 times a day., Disp: 60 Tablet, Rfl:   •  omeprazole (PRILOSEC) 20 MG delayed-release capsule, Take 1 Capsule by mouth every day., Disp: 30 Capsule, Rfl:   •  polyethylene glycol/lytes (MIRALAX) 17 g Pack, Take 1 Packet by mouth 2 times a day., Disp: , Rfl: 3  •  polyethylene glycol/lytes (MIRALAX) 17 g Pack, Take 1 Packet by mouth 1 time a day as needed (if sennosides and docusate ineffective after 24 hours)., Disp: 15 Each, Rfl: 3  •  senna-docusate (PERICOLACE OR SENOKOT S) 8.6-50 MG Tab, Take 2 Tablets by mouth 2 times a day., Disp: 30 Tablet, Rfl: 0  •  levothyroxine (SYNTHROID) 25 MCG Tab, Take 1 Tablet by mouth every morning on an empty stomach., Disp: 30 Tablet, Rfl:   •  therapeutic multivitamin-minerals (THERAGRAN-M) Tab, Take 1 Tab by mouth every day., Disp: , Rfl:   •  tolterodine ER (DETROL LA) 4 MG CAPSULE SR 24 HR, Take 4 mg by mouth every bedtime., Disp: , Rfl:   •  donepezil (ARICEPT) 10 MG tablet, Take 10 mg by mouth every evening., Disp: , Rfl:   •  oxybutynin SR (DITROPAN-XL) 5 MG TABLET SR 24 HR, Take 5 mg by mouth every evening., Disp: , Rfl:   •  aripiprazole (ABILIFY) 30 MG tablet, Take 30 mg by mouth every evening., Disp: , Rfl:   •  divalproex ER (DEPAKOTE ER) 500 MG TABLET SR 24 HR, Take 1,000 mg by mouth at bedtime. 2 tablets = 1000 mg, Disp: , Rfl:   •  quetiapine (SEROQUEL XR) 300 MG XR tablet, Take 300 mg by mouth every evening., Disp: , Rfl:   •  sertraline (ZOLOFT) 100 MG Tab, Take 100 mg by mouth every morning., Disp: , Rfl:   •  metFORMIN ER (GLUCOPHAGE XR) 500 MG TABLET SR 24 HR, Take 500 mg by mouth every morning., Disp: , Rfl:   •  atorvastatin (LIPITOR) 20 MG Tab, Take 20 mg by mouth every evening., Disp: , Rfl:     ALLERGIES  Allergies   Allergen Reactions   • Risperdal        PHYSICAL EXAM  VITAL SIGNS: /78   Pulse 92   Temp 37.1 °C (98.8 °F) (Temporal)   Ht 1.6 m (5' 3\")   Wt 63.5 kg (140 lb)   SpO2 94%   BMI 24.80 kg/m²     Constitutional: Well developed, " Well nourished, No acute distress, Non-toxic appearance.   HEENT: Normocephalic, Atraumatic,  external ears normal, pharynx pink,  Mucous  Membranes moist, No rhinorrhea or mucosal edema  Eyes: PERRL, EOMI, Conjunctiva normal, No discharge.   Neck: Normal range of motion, No tenderness, Supple, No stridor.   Lymphatic: No lymphadenopathy    Cardiovascular: Regular Rate and Rhythm, No murmurs,  rubs, or gallops.   Thorax & Lungs: Lungs clear to auscultation bilaterally, No respiratory distress, No wheezes, rhales or rhonchi, No chest wall tenderness.   Abdomen: Bowel sounds normal, Soft, non tender, non distended,  No pulsatile masses., no rebound guarding or peritoneal signs.   Skin: Warm, Dry, No erythema, No rash,   Back:  No CVA tenderness,  No spinal tenderness, bony crepitance, step offs, or instability.   Neurologic: Alert, clear speech no focal deficits  Extremities: Trace edema bilateral lower extremity, Equal, intact distal pulses, No cyanosis, clubbing,  No tenderness.   Musculoskeletal: Left hip tenderness. No major deformities noted.     DIAGNOSTIC STUDIES / PROCEDURES    LABS  Results for orders placed or performed during the hospital encounter of 07/28/22   CBC WITH DIFFERENTIAL   Result Value Ref Range    WBC 5.7 4.8 - 10.8 K/uL    RBC 4.03 (L) 4.20 - 5.40 M/uL    Hemoglobin 10.7 (L) 12.0 - 16.0 g/dL    Hematocrit 33.2 (L) 37.0 - 47.0 %    MCV 82.4 81.4 - 97.8 fL    MCH 26.6 (L) 27.0 - 33.0 pg    MCHC 32.2 (L) 33.6 - 35.0 g/dL    RDW 46.6 35.9 - 50.0 fL    Platelet Count 122 (L) 164 - 446 K/uL    MPV 8.6 (L) 9.0 - 12.9 fL    Neutrophils-Polys 53.40 44.00 - 72.00 %    Lymphocytes 25.80 22.00 - 41.00 %    Monocytes 19.60 (H) 0.00 - 13.40 %    Eosinophils 0.40 0.00 - 6.90 %    Basophils 0.40 0.00 - 1.80 %    Immature Granulocytes 0.40 0.00 - 0.90 %    Nucleated RBC 0.00 /100 WBC    Neutrophils (Absolute) 3.05 2.00 - 7.15 K/uL    Lymphs (Absolute) 1.47 1.00 - 4.80 K/uL    Monos (Absolute) 1.12 (H) 0.00 -  0.85 K/uL    Eos (Absolute) 0.02 0.00 - 0.51 K/uL    Baso (Absolute) 0.02 0.00 - 0.12 K/uL    Immature Granulocytes (abs) 0.02 0.00 - 0.11 K/uL    NRBC (Absolute) 0.00 K/uL   Comp Metabolic Panel   Result Value Ref Range    Sodium 137 135 - 145 mmol/L    Potassium 3.6 3.6 - 5.5 mmol/L    Chloride 101 96 - 112 mmol/L    Co2 23 20 - 33 mmol/L    Anion Gap 13.0 7.0 - 16.0    Glucose 108 (H) 65 - 99 mg/dL    Bun 32 (H) 8 - 22 mg/dL    Creatinine 0.83 0.50 - 1.40 mg/dL    Calcium 9.2 8.5 - 10.5 mg/dL    AST(SGOT) 13 12 - 45 U/L    ALT(SGPT) 12 2 - 50 U/L    Alkaline Phosphatase 98 30 - 99 U/L    Total Bilirubin <0.2 0.1 - 1.5 mg/dL    Albumin 3.8 3.2 - 4.9 g/dL    Total Protein 6.6 6.0 - 8.2 g/dL    Globulin 2.8 1.9 - 3.5 g/dL    A-G Ratio 1.4 g/dL   TROPONIN   Result Value Ref Range    Troponin T 9 6 - 19 ng/L   ESTIMATED GFR   Result Value Ref Range    GFR (CKD-EPI) 82 >60 mL/min/1.73 m 2   EKG   Result Value Ref Range    Report       [facility] Emergency Dept.    Test Date:  2022  Pt Name:    JASSON MENDEZ              Department: ER  MRN:        [id number]                      Room:       [facility]  Gender:     Female                       Technician: [id number]  :        1965                   Requested By:KRISTA DAWSON  Order #:    [id number]                    Reading MD: KRISTA DAWSON MD    Measurements  Intervals                                Axis  Rate:       96                           P:          48  ID:         143                          QRS:        23  QRSD:       79                           T:          31  QT:         327  QTc:        414    Interpretive Statements  Sinus rhythm  Nonspecific T abnormalities, anterior leads  Baseline wander in lead(s) II,III,aVF  Compared to ECG 2022 12:32:23  Sinus tachycardia no longer present  T-wave abnormality still present  Electronically Signed On 2022 19:30:09 PDT by KRISTA DAWSON MD       All labs reviewed by me.    EKG  12  lead EKG; Interpreted by emergency department physician      COURSE & MEDICAL DECISION MAKING  Nursing notes, VS, PMSFHx reviewed in chart.    5:11 PM Patient seen and examined at bedside. Will plan to contact group home. The differential diagnoses include but are not limited to: Dehydration vs COVID.     6:12 PM - RN spoke with group home. Ordered CBC with diff, CMP, and EKG. Patient drank sprite without problems.     9:17 PM we called the group home to take her back and they refused to do so stating that she needed to not have COVID anymore in order to be sent back to the group home.  She states that they are having a hard time keeping her in the room and infecting other people.  At this time they are not currently answering their phones.  Case management and social work are aware of the situation.  On 7/28/2022 at 9:18 PM I have made her ED observation status.   FHX none      DISPOSITION:  Patient will be discharged home in stable condition.    FOLLOW UP:  Whitney Graham M.D.  [address] [facility] [id number] [phone]    In 3 days  for recheck    FINAL IMPRESSION  1. COVID-19          Shahana CORNEJO (Scribe), am scribing for, and in the presence of, Hayley Ortega M.D..    Electronically signed by: Shahana Boyd (Demarcoibe), 7/28/2022    Hayley CORNEJO M.D. personally performed the services described in this documentation, as scribed by Shahana Boyd in my presence, and it is both accurate and complete.    The note accurately reflects work and decisions made by me.  Hayley Ortega M.D.  7/28/2022  9:19 PM

## 2022-07-29 NOTE — PROGRESS NOTES
"ED Provider Progress Note    ED Observation Progress Note    Date of Service: 07/29/22    Interval History  Briefly this is a 56-year-old female who presented on 7/28/2022 for evaluation of decreased oral intake.  She is developmentally delayed and living at a group home.  Labs are unremarkable and she was found to be COVID-positive.  She is pending transfer back to her group home.  Home medications were resumed yesterday.    Physical Exam  /78   Pulse 98   Temp 37.1 °C (98.8 °F) (Temporal)   Resp 16   Ht 1.6 m (5' 3\")   Wt 63.5 kg (140 lb)   SpO2 94%   BMI 24.80 kg/m² .    Constitutional: Awake and alert. Nontoxic  HENT:  Grossly normal  Eyes: Grossly normal  Neck: Normal range of motion  Cardiovascular: Normal heart rate   Thorax & Lungs: No respiratory distress  Abdomen: Nontender  Skin:  No pathologic rash.   Extremities: Well perfused  Psychiatric: Affect normal    Labs  Results for orders placed or performed during the hospital encounter of 07/28/22   CBC WITH DIFFERENTIAL   Result Value Ref Range    WBC 5.7 4.8 - 10.8 K/uL    RBC 4.03 (L) 4.20 - 5.40 M/uL    Hemoglobin 10.7 (L) 12.0 - 16.0 g/dL    Hematocrit 33.2 (L) 37.0 - 47.0 %    MCV 82.4 81.4 - 97.8 fL    MCH 26.6 (L) 27.0 - 33.0 pg    MCHC 32.2 (L) 33.6 - 35.0 g/dL    RDW 46.6 35.9 - 50.0 fL    Platelet Count 122 (L) 164 - 446 K/uL    MPV 8.6 (L) 9.0 - 12.9 fL    Neutrophils-Polys 53.40 44.00 - 72.00 %    Lymphocytes 25.80 22.00 - 41.00 %    Monocytes 19.60 (H) 0.00 - 13.40 %    Eosinophils 0.40 0.00 - 6.90 %    Basophils 0.40 0.00 - 1.80 %    Immature Granulocytes 0.40 0.00 - 0.90 %    Nucleated RBC 0.00 /100 WBC    Neutrophils (Absolute) 3.05 2.00 - 7.15 K/uL    Lymphs (Absolute) 1.47 1.00 - 4.80 K/uL    Monos (Absolute) 1.12 (H) 0.00 - 0.85 K/uL    Eos (Absolute) 0.02 0.00 - 0.51 K/uL    Baso (Absolute) 0.02 0.00 - 0.12 K/uL    Immature Granulocytes (abs) 0.02 0.00 - 0.11 K/uL    NRBC (Absolute) 0.00 K/uL   Comp Metabolic Panel   Result " Value Ref Range    Sodium 137 135 - 145 mmol/L    Potassium 3.6 3.6 - 5.5 mmol/L    Chloride 101 96 - 112 mmol/L    Co2 23 20 - 33 mmol/L    Anion Gap 13.0 7.0 - 16.0    Glucose 108 (H) 65 - 99 mg/dL    Bun 32 (H) 8 - 22 mg/dL    Creatinine 0.83 0.50 - 1.40 mg/dL    Calcium 9.2 8.5 - 10.5 mg/dL    AST(SGOT) 13 12 - 45 U/L    ALT(SGPT) 12 2 - 50 U/L    Alkaline Phosphatase 98 30 - 99 U/L    Total Bilirubin <0.2 0.1 - 1.5 mg/dL    Albumin 3.8 3.2 - 4.9 g/dL    Total Protein 6.6 6.0 - 8.2 g/dL    Globulin 2.8 1.9 - 3.5 g/dL    A-G Ratio 1.4 g/dL   TROPONIN   Result Value Ref Range    Troponin T 9 6 - 19 ng/L   ESTIMATED GFR   Result Value Ref Range    GFR (CKD-EPI) 82 >60 mL/min/1.73 m 2   EKG   Result Value Ref Range    Report       [facility] Emergency Dept.    Test Date:  2022  Pt Name:    JASSON MENDEZ              Department: ER  MRN:        [id number]                      Room:       [facility]  Gender:     Female                       Technician: [id number]  :        1965                   Requested By:KRISTA DAWSON  Order #:    [id number]                    Reading MD: KRISTA DAWSON MD    Measurements  Intervals                                Axis  Rate:       96                           P:          48  NV:         143                          QRS:        23  QRSD:       79                           T:          31  QT:         327  QTc:        414    Interpretive Statements  Sinus rhythm  Nonspecific T abnormalities, anterior leads  Baseline wander in lead(s) II,III,aVF  Compared to ECG 2022 12:32:23  Sinus tachycardia no longer present  T-wave abnormality still present  Electronically Signed On 2022 19:30:09 PDT by KRISTA DAWSON MD         Radiology  No orders to display       Problem List  1.  COVID-19 infection-symptomatic management  2.  Developmental delay-pending placement back to [facility] facility  3.  Home medications have been resumed      Electronically signed by:  Whitney Wilkerson M.D., 7/29/2022 6:02 AM

## 2022-07-29 NOTE — DISCHARGE PLANNING
Medical Social Work     COLLEEN spoke to the pt guardian Ann Urias and the group home is not able to keep the pt in her room. The pt is wondering around the group home and won't stay in her room. COLLEEN advised Ann that the pt had been evaluated and is clear for discharge. COLLEEN advised her that we do not have a medical reason to admit her so the pt will be down in the ER until we come up with a discharge plan. The group home is willing to take her back but when she is covid negative.     COLLEEN explained to the pt guardian the pt is clear for discharge and we do not have a medical reason to admit the pt at this time. COLLEEN explained that they need to find another placement for the pt while she is sick until she can go back to her group home. Ann Urias stated she will work on finding placement for the pt in the morning, Ann plans to [facility] to see if they can do a respite for the pt.     COLLEEN advised Ann that if anything changes medically with the pt and she gets admitted the the hospital we will contact her.     Ann Urias can be reached at [phone] or during the day at [phone]

## 2022-07-29 NOTE — DISCHARGE PLANNING
Medical Social Work     SW called Dianne again and got voicemail, SW left another message requesting a call back.    oral

## 2022-07-30 PROCEDURE — A9270 NON-COVERED ITEM OR SERVICE: HCPCS | Performed by: STUDENT IN AN ORGANIZED HEALTH CARE EDUCATION/TRAINING PROGRAM

## 2022-07-30 PROCEDURE — 700102 HCHG RX REV CODE 250 W/ 637 OVERRIDE(OP): Performed by: STUDENT IN AN ORGANIZED HEALTH CARE EDUCATION/TRAINING PROGRAM

## 2022-07-30 PROCEDURE — A9270 NON-COVERED ITEM OR SERVICE: HCPCS | Performed by: EMERGENCY MEDICINE

## 2022-07-30 PROCEDURE — 700102 HCHG RX REV CODE 250 W/ 637 OVERRIDE(OP): Performed by: EMERGENCY MEDICINE

## 2022-07-30 RX ORDER — ACETAMINOPHEN 500 MG
1000 TABLET ORAL ONCE
Status: COMPLETED | OUTPATIENT
Start: 2022-07-30 | End: 2022-07-30

## 2022-07-30 RX ADMIN — DONEPEZIL HYDROCHLORIDE 10 MG: 5 TABLET, FILM COATED ORAL at 22:07

## 2022-07-30 RX ADMIN — METFORMIN HYDROCHLORIDE 500 MG: 500 TABLET, EXTENDED RELEASE ORAL at 04:49

## 2022-07-30 RX ADMIN — METOPROLOL TARTRATE 50 MG: 50 TABLET, FILM COATED ORAL at 04:49

## 2022-07-30 RX ADMIN — ACETAMINOPHEN 1000 MG: 500 TABLET, FILM COATED ORAL at 22:38

## 2022-07-30 RX ADMIN — DIVALPROEX SODIUM 1000 MG: 500 TABLET, EXTENDED RELEASE ORAL at 23:19

## 2022-07-30 RX ADMIN — OXYBUTYNIN CHLORIDE 5 MG: 5 TABLET, EXTENDED RELEASE ORAL at 19:11

## 2022-07-30 RX ADMIN — LEVOTHYROXINE SODIUM 200 MCG: 0.1 TABLET ORAL at 04:49

## 2022-07-30 RX ADMIN — ARIPIPRAZOLE 30 MG: 10 TABLET ORAL at 19:11

## 2022-07-30 RX ADMIN — SERTRALINE 100 MG: 100 TABLET, FILM COATED ORAL at 04:49

## 2022-07-30 RX ADMIN — ATORVASTATIN CALCIUM 20 MG: 20 TABLET, FILM COATED ORAL at 22:07

## 2022-07-30 RX ADMIN — LISINOPRIL 20 MG: 20 TABLET ORAL at 04:49

## 2022-07-30 RX ADMIN — QUETIAPINE FUMARATE 150 MG: 100 TABLET ORAL at 04:49

## 2022-07-30 NOTE — ED NOTES
Patient diaper changed and patient assisted with repositioning and given an ice pack for left hip.

## 2022-07-30 NOTE — ED NOTES
Rounded on patient. Patient resting in bed. No signs of distress noted. Equal chest rise and fall. No further needs at this time. One to one sitter in direct view of pt.

## 2022-07-30 NOTE — ED NOTES
Diaper changed on patient and she was provided with a sprite to drink. She is sitting up on stretcher watching TV. Sitter in doorway

## 2022-07-30 NOTE — ED NOTES
Rounded on patient. Patient sleeping in bed. No signs of distress noted. Equal chest rise and fall. No further needs at this time. Call light within reach.

## 2022-07-30 NOTE — ED NOTES
Pt cleaned and changed. Pt medicated as per MAR. Pt is in no apparent distress. Pt calm and cooperative. Breathing is non-labored with equal rise and fall of chest wall. VS stable in the monitor. Irving give and lights dimmed for comfort.

## 2022-07-30 NOTE — PROGRESS NOTES
"ED Provider Progress Note    ED Observation Progress Note    Date of Service: 07/30/22    Interval History  Briefly this is a 56-year-old female who presented on 7/28/2022 for evaluation of decreased oral intake.  She is developmentally delayed and living at a group home.  Patient is pending placement as her group home will not take her back since she is COVID-positive.  She has been tolerating oral intake in the emergency department and has been doing well.  She is resting comfortably this morning with no acute complaints.    Physical Exam  /64   Pulse 78   Temp 36.5 °C (97.7 °F) (Temporal)   Resp 20   Ht 1.6 m (5' 3\")   Wt 63.5 kg (140 lb)   SpO2 96%   BMI 24.80 kg/m² .    Constitutional: Awake and alert. Nontoxic  HENT:  Grossly normal  Eyes: Grossly normal  Neck: Normal range of motion  Cardiovascular: Normal heart rate   Thorax & Lungs: No respiratory distress  Abdomen: Nontender  Skin:  No pathologic rash.   Extremities: Well perfused  Psychiatric: Affect normal    Labs  Results for orders placed or performed during the hospital encounter of 07/28/22   CBC WITH DIFFERENTIAL   Result Value Ref Range    WBC 5.7 4.8 - 10.8 K/uL    RBC 4.03 (L) 4.20 - 5.40 M/uL    Hemoglobin 10.7 (L) 12.0 - 16.0 g/dL    Hematocrit 33.2 (L) 37.0 - 47.0 %    MCV 82.4 81.4 - 97.8 fL    MCH 26.6 (L) 27.0 - 33.0 pg    MCHC 32.2 (L) 33.6 - 35.0 g/dL    RDW 46.6 35.9 - 50.0 fL    Platelet Count 122 (L) 164 - 446 K/uL    MPV 8.6 (L) 9.0 - 12.9 fL    Neutrophils-Polys 53.40 44.00 - 72.00 %    Lymphocytes 25.80 22.00 - 41.00 %    Monocytes 19.60 (H) 0.00 - 13.40 %    Eosinophils 0.40 0.00 - 6.90 %    Basophils 0.40 0.00 - 1.80 %    Immature Granulocytes 0.40 0.00 - 0.90 %    Nucleated RBC 0.00 /100 WBC    Neutrophils (Absolute) 3.05 2.00 - 7.15 K/uL    Lymphs (Absolute) 1.47 1.00 - 4.80 K/uL    Monos (Absolute) 1.12 (H) 0.00 - 0.85 K/uL    Eos (Absolute) 0.02 0.00 - 0.51 K/uL    Baso (Absolute) 0.02 0.00 - 0.12 K/uL    Immature " Granulocytes (abs) 0.02 0.00 - 0.11 K/uL    NRBC (Absolute) 0.00 K/uL   Comp Metabolic Panel   Result Value Ref Range    Sodium 137 135 - 145 mmol/L    Potassium 3.6 3.6 - 5.5 mmol/L    Chloride 101 96 - 112 mmol/L    Co2 23 20 - 33 mmol/L    Anion Gap 13.0 7.0 - 16.0    Glucose 108 (H) 65 - 99 mg/dL    Bun 32 (H) 8 - 22 mg/dL    Creatinine 0.83 0.50 - 1.40 mg/dL    Calcium 9.2 8.5 - 10.5 mg/dL    AST(SGOT) 13 12 - 45 U/L    ALT(SGPT) 12 2 - 50 U/L    Alkaline Phosphatase 98 30 - 99 U/L    Total Bilirubin <0.2 0.1 - 1.5 mg/dL    Albumin 3.8 3.2 - 4.9 g/dL    Total Protein 6.6 6.0 - 8.2 g/dL    Globulin 2.8 1.9 - 3.5 g/dL    A-G Ratio 1.4 g/dL   TROPONIN   Result Value Ref Range    Troponin T 9 6 - 19 ng/L   ESTIMATED GFR   Result Value Ref Range    GFR (CKD-EPI) 82 >60 mL/min/1.73 m 2   CoV-2, FLU A/B, and RSV by PCR (2-4 Hours CEPHEID) : Collect NP swab in VTM    Specimen: Respirate   Result Value Ref Range    Influenza virus A RNA Negative Negative    Influenza virus B, PCR Negative Negative    RSV, PCR Negative Negative    SARS-CoV-2 by PCR DETECTED (AA)     SARS-CoV-2 Source NP Swab    EKG   Result Value Ref Range    Report       [facility] Emergency Dept.    Test Date:  2022  Pt Name:    JASSON MENDEZ              Department: ER  MRN:        [id number]                      Room:       [facility]  Gender:     Female                       Technician: [id number]  :        1965                   Requested By:KRISTA DAWSON  Order #:    [id number]                    Reading MD: KRISTA DAWSON MD    Measurements  Intervals                                Axis  Rate:       96                           P:          48  MT:         143                          QRS:        23  QRSD:       79                           T:          31  QT:         327  QTc:        414    Interpretive Statements  Sinus rhythm  Nonspecific T abnormalities, anterior leads  Baseline wander in lead(s) II,III,aVF  Compared to  ECG 02/27/2022 12:32:23  Sinus tachycardia no longer present  T-wave abnormality still present  Electronically Signed On 7- 19:30:09 PDT by KRISTA DAWSON MD         Radiology  No orders to display       Problem List  1.  Cognitive delay-pending placement back to group facility once COVID-negative  2.  COVID-19 infection-patient has minimal to no symptomatology, symptomatic management as needed  3.  Home medications have been resumed      Electronically signed by: Whitney Wilkerson M.D., 7/30/2022 6:10 AM

## 2022-07-30 NOTE — DISCHARGE PLANNING
MSW updated [facility] leadership and ER director regarding pt. Pt to remain in ER till group home can take pt back or guardian can find alternative placement. SW to call guardian on Monday for update on plan.

## 2022-07-30 NOTE — ED NOTES
Pt laying quietly in bed while watching TV. No apparent acute distress observed. Noted non-labored breathing with stable VS in the monitor.

## 2022-07-30 NOTE — ED NOTES
Patient resting on stretcher in no acute distress. Equal chest rise noted. VS stable on monitor. Bed locked and in low position. Call bell within reach. Sitter in doorway

## 2022-07-31 PROCEDURE — 0241U HCHG SARS-COV-2 COVID-19 NFCT DS RESP RNA 4 TRGT MIC: CPT

## 2022-07-31 PROCEDURE — A9270 NON-COVERED ITEM OR SERVICE: HCPCS | Performed by: STUDENT IN AN ORGANIZED HEALTH CARE EDUCATION/TRAINING PROGRAM

## 2022-07-31 PROCEDURE — C9803 HOPD COVID-19 SPEC COLLECT: HCPCS | Performed by: STUDENT IN AN ORGANIZED HEALTH CARE EDUCATION/TRAINING PROGRAM

## 2022-07-31 PROCEDURE — 700102 HCHG RX REV CODE 250 W/ 637 OVERRIDE(OP): Performed by: STUDENT IN AN ORGANIZED HEALTH CARE EDUCATION/TRAINING PROGRAM

## 2022-07-31 RX ADMIN — LEVOTHYROXINE SODIUM 200 MCG: 0.1 TABLET ORAL at 05:27

## 2022-07-31 RX ADMIN — DIVALPROEX SODIUM 1000 MG: 500 TABLET, EXTENDED RELEASE ORAL at 20:50

## 2022-07-31 RX ADMIN — HYDROCHLOROTHIAZIDE 25 MG: 25 TABLET ORAL at 05:27

## 2022-07-31 RX ADMIN — OXYBUTYNIN CHLORIDE 5 MG: 5 TABLET, EXTENDED RELEASE ORAL at 18:15

## 2022-07-31 RX ADMIN — ATORVASTATIN CALCIUM 20 MG: 20 TABLET, FILM COATED ORAL at 20:50

## 2022-07-31 RX ADMIN — ARIPIPRAZOLE 30 MG: 10 TABLET ORAL at 18:30

## 2022-07-31 RX ADMIN — LISINOPRIL 20 MG: 20 TABLET ORAL at 05:27

## 2022-07-31 RX ADMIN — DONEPEZIL HYDROCHLORIDE 10 MG: 5 TABLET, FILM COATED ORAL at 20:50

## 2022-07-31 RX ADMIN — SERTRALINE 100 MG: 100 TABLET, FILM COATED ORAL at 05:27

## 2022-07-31 RX ADMIN — METFORMIN HYDROCHLORIDE 500 MG: 500 TABLET, EXTENDED RELEASE ORAL at 05:27

## 2022-07-31 RX ADMIN — METOPROLOL TARTRATE 50 MG: 50 TABLET, FILM COATED ORAL at 05:27

## 2022-07-31 RX ADMIN — QUETIAPINE FUMARATE 150 MG: 100 TABLET ORAL at 05:27

## 2022-07-31 ASSESSMENT — PAIN SCALES - WONG BAKER: WONGBAKER_NUMERICALRESPONSE: HURTS A LITTLE MORE

## 2022-07-31 NOTE — PROGRESS NOTES
"ED Provider Progress Note    ED Observation Progress Note    Date of Service: 07/31/22    Interval History  56-year-old female with some developmental delay now in the emergency department for 67 hours while awaiting to return to group home.  Patient was sent here with decreased oral intake with known COVID infection.  Group home refused to take patient back while COVID-positive stating they had trouble isolating her from other housemates.  Patient has been tolerating oral fluids as well as diet and medication since her arrival at this facility.  Medically cleared after initial evaluation.    Physical Exam  /55   Pulse 65   Temp 36.5 °C (97.7 °F) (Temporal)   Resp 16   Ht 1.6 m (5' 3\")   Wt 63.5 kg (140 lb)   SpO2 96%   BMI 24.80 kg/m² .    Constitutional: Awake and alert. Nontoxic  HENT:  Grossly normal  Eyes: Grossly normal  Neck: Normal range of motion  Cardiovascular: Normal peripheral perfusion  Thorax & Lungs: Nonlabored respirations  Skin: Warm and dry  Extremities: Well perfused  Psychiatric:Cooperative.    Labs  Results for orders placed or performed during the hospital encounter of 07/28/22   CBC WITH DIFFERENTIAL   Result Value Ref Range    WBC 5.7 4.8 - 10.8 K/uL    RBC 4.03 (L) 4.20 - 5.40 M/uL    Hemoglobin 10.7 (L) 12.0 - 16.0 g/dL    Hematocrit 33.2 (L) 37.0 - 47.0 %    MCV 82.4 81.4 - 97.8 fL    MCH 26.6 (L) 27.0 - 33.0 pg    MCHC 32.2 (L) 33.6 - 35.0 g/dL    RDW 46.6 35.9 - 50.0 fL    Platelet Count 122 (L) 164 - 446 K/uL    MPV 8.6 (L) 9.0 - 12.9 fL    Neutrophils-Polys 53.40 44.00 - 72.00 %    Lymphocytes 25.80 22.00 - 41.00 %    Monocytes 19.60 (H) 0.00 - 13.40 %    Eosinophils 0.40 0.00 - 6.90 %    Basophils 0.40 0.00 - 1.80 %    Immature Granulocytes 0.40 0.00 - 0.90 %    Nucleated RBC 0.00 /100 WBC    Neutrophils (Absolute) 3.05 2.00 - 7.15 K/uL    Lymphs (Absolute) 1.47 1.00 - 4.80 K/uL    Monos (Absolute) 1.12 (H) 0.00 - 0.85 K/uL    Eos (Absolute) 0.02 0.00 - 0.51 K/uL    Baso " (Absolute) 0.02 0.00 - 0.12 K/uL    Immature Granulocytes (abs) 0.02 0.00 - 0.11 K/uL    NRBC (Absolute) 0.00 K/uL   Comp Metabolic Panel   Result Value Ref Range    Sodium 137 135 - 145 mmol/L    Potassium 3.6 3.6 - 5.5 mmol/L    Chloride 101 96 - 112 mmol/L    Co2 23 20 - 33 mmol/L    Anion Gap 13.0 7.0 - 16.0    Glucose 108 (H) 65 - 99 mg/dL    Bun 32 (H) 8 - 22 mg/dL    Creatinine 0.83 0.50 - 1.40 mg/dL    Calcium 9.2 8.5 - 10.5 mg/dL    AST(SGOT) 13 12 - 45 U/L    ALT(SGPT) 12 2 - 50 U/L    Alkaline Phosphatase 98 30 - 99 U/L    Total Bilirubin <0.2 0.1 - 1.5 mg/dL    Albumin 3.8 3.2 - 4.9 g/dL    Total Protein 6.6 6.0 - 8.2 g/dL    Globulin 2.8 1.9 - 3.5 g/dL    A-G Ratio 1.4 g/dL   TROPONIN   Result Value Ref Range    Troponin T 9 6 - 19 ng/L   ESTIMATED GFR   Result Value Ref Range    GFR (CKD-EPI) 82 >60 mL/min/1.73 m 2   CoV-2, FLU A/B, and RSV by PCR (2-4 Hours CEPHEID) : Collect NP swab in VTM    Specimen: Respirate   Result Value Ref Range    Influenza virus A RNA Negative Negative    Influenza virus B, PCR Negative Negative    RSV, PCR Negative Negative    SARS-CoV-2 by PCR DETECTED (AA)     SARS-CoV-2 Source NP Swab    EKG   Result Value Ref Range    Report       [facility] Emergency Dept.    Test Date:  2022  Pt Name:    JASSON MENDEZ              Department: ER  MRN:        [id number]                      Room:        38  Gender:     Female                       Technician: [id number]  :        1965                   Requested By:KRISTA DAWSON  Order #:    [id number]                    Reading MD: KRISTA DAWSON MD    Measurements  Intervals                                Axis  Rate:       96                           P:          48  CO:         143                          QRS:        23  QRSD:       79                           T:          31  QT:         327  QTc:        414    Interpretive Statements  Sinus rhythm  Nonspecific T abnormalities, anterior  leads  Baseline wander in lead(s) II,III,aVF  Compared to ECG 02/27/2022 12:32:23  Sinus tachycardia no longer present  T-wave abnormality still present  Electronically Signed On 7- 19:30:09 PDT by KRISTA DAWSON MD         Radiology  No orders to display       Problem List  1.  COVID-19 viral infection -asymptomatic.  Symptomatic management as needed  2.  Developmental delay -will transfer back to group home when accepted following COVID infection.      Electronically signed by: Karissa Yan D.O., 7/31/2022 11:17 AM

## 2022-07-31 NOTE — ED NOTES
Patient's diaper changed per patient request. Patient boosted in bed with assistance from one other RN

## 2022-07-31 NOTE — ED NOTES
Report received from Elena RN, assumed care of patient.  Call light and necessary belongings within reach.  Bed in lowest position.  Tele sitter in room with full view of Pt.

## 2022-08-01 VITALS
TEMPERATURE: 97.5 F | BODY MASS INDEX: 24.8 KG/M2 | RESPIRATION RATE: 16 BRPM | HEART RATE: 94 BPM | WEIGHT: 140 LBS | DIASTOLIC BLOOD PRESSURE: 60 MMHG | SYSTOLIC BLOOD PRESSURE: 129 MMHG | HEIGHT: 63 IN | OXYGEN SATURATION: 92 %

## 2022-08-01 PROCEDURE — 700102 HCHG RX REV CODE 250 W/ 637 OVERRIDE(OP): Performed by: STUDENT IN AN ORGANIZED HEALTH CARE EDUCATION/TRAINING PROGRAM

## 2022-08-01 PROCEDURE — A9270 NON-COVERED ITEM OR SERVICE: HCPCS | Performed by: STUDENT IN AN ORGANIZED HEALTH CARE EDUCATION/TRAINING PROGRAM

## 2022-08-01 RX ORDER — ACETAMINOPHEN 500 MG
1000 TABLET ORAL EVERY 6 HOURS PRN
Status: DISCONTINUED | OUTPATIENT
Start: 2022-08-01 | End: 2022-08-01 | Stop reason: HOSPADM

## 2022-08-01 RX ORDER — IBUPROFEN 600 MG/1
600 TABLET ORAL ONCE
Status: COMPLETED | OUTPATIENT
Start: 2022-08-01 | End: 2022-08-01

## 2022-08-01 RX ORDER — DIPHENHYDRAMINE HCL 25 MG
50 TABLET ORAL ONCE
Status: COMPLETED | OUTPATIENT
Start: 2022-08-01 | End: 2022-08-01

## 2022-08-01 RX ADMIN — METFORMIN HYDROCHLORIDE 500 MG: 500 TABLET, EXTENDED RELEASE ORAL at 05:45

## 2022-08-01 RX ADMIN — SERTRALINE 100 MG: 100 TABLET, FILM COATED ORAL at 05:45

## 2022-08-01 RX ADMIN — ACETAMINOPHEN 1000 MG: 500 TABLET ORAL at 18:00

## 2022-08-01 RX ADMIN — LEVOTHYROXINE SODIUM 200 MCG: 0.1 TABLET ORAL at 05:45

## 2022-08-01 RX ADMIN — IBUPROFEN 600 MG: 600 TABLET, FILM COATED ORAL at 01:15

## 2022-08-01 RX ADMIN — ARIPIPRAZOLE 30 MG: 10 TABLET ORAL at 17:56

## 2022-08-01 RX ADMIN — DIPHENHYDRAMINE HYDROCHLORIDE 50 MG: 25 TABLET ORAL at 01:15

## 2022-08-01 RX ADMIN — QUETIAPINE FUMARATE 150 MG: 100 TABLET ORAL at 05:45

## 2022-08-01 ASSESSMENT — PAIN SCALES - WONG BAKER: WONGBAKER_NUMERICALRESPONSE: HURTS EVEN MORE

## 2022-08-01 NOTE — ED NOTES
Pt. incontinent of urine and stool. Brief changed and payam care provided. Pt. Repositioned in bed. VSS.

## 2022-08-01 NOTE — DISCHARGE PLANNING
Received Choice form at 1120  Agency/Facility Name: [facility]   Referral sent per Choice form @ 1350 (Manually faxed to [phone])    1814-  Agency/Facility Name: [facility]   Spoke To: Abhi  Outcome: DPA notified that Pt has been accepted.     LSW notified.

## 2022-08-01 NOTE — ED NOTES
Pt started getting very agitated and began to cry saying her leg hurt. ERP made aware and placed orders or pain meds and something to help settle her nerves. Pt keeps asking for a new bed or to sit in a chair and I continued to reiterate that we can't do that. She is already in a hospital bed and it's not safe for her to sit in a chair. This Rn and the EDT repositioned the pt in bed to a sitting position with sail rails up. Will continue to monitor to see if this helps her.

## 2022-08-01 NOTE — DISCHARGE PLANNING
Received Choice form at 1120  Agency/Facility Name: [facility]  Referral sent per Choice form @ 1156  DPA faxed via right fax, fax #[phone]

## 2022-08-01 NOTE — DISCHARGE PLANNING
COLLEEN voalted the doctor requesting a COVID test for this patient. The  will not accept her back unless she has a negative COVID test. COLLEEN will contact the  provider in the morning. Nursing was also informed.

## 2022-08-01 NOTE — ED NOTES
Pt. Provided with meal tray. Repositioned in bed. Brief checked; clean at this time. Pt. Denies further needs.

## 2022-08-01 NOTE — ED NOTES
Pt medicated per mar, pillow placed under leg and given a cookie. She denies any further needs at this time.

## 2022-08-01 NOTE — FACE TO FACE
Face to Face Supporting Documentation - Home Health    The encounter with this patient was in whole or in part the primary reason for home health admission.    Date of encounter:   Patient:                    MRN:                       YOB: 2023  Erlinda Verde  [id number]  1965     Home health to see patient for:  Skilled Nursing care for assessment, interventions & education    Skilled need for:  Exacerbation of Chronic Disease State Debility    Skilled nursing interventions to include:  Comment: Evaluate and treat    Homebound status evidenced by:  Require the use of special transportation, Needs the assistance of another person in order to leave the home or Have a condition such that leaving his or her home is medically contraindicated. Leaving home requires a considerable and taxing effort. There is a normal inability to leave the home.    Community Physician to provide follow up care: Whitney Graham M.D.     Optional Interventions? No      I certify the face to face encounter for this home health care referral meets the CMS requirements and the encounter/clinical assessment with the patient was, in whole, or in part, for the medical condition(s) listed above, which is the primary reason for home health care. Based on my clinical findings: the service(s) are medically necessary, support the need for home health care, and the homebound criteria are met.  I certify that this patient has had a face to face encounter by myself.  Ever Matt M.D. - NPI: [id number]

## 2022-08-01 NOTE — DISCHARGE PLANNING
ADDENDUM  1230      owner is Shanika. She can be reached at [phone]. Patient scheduled to DC back to  at 1415 via [facility].  owner and PG notified.      SW contacted Ann (PG) to discuss discharge planning. COLLEEN also spoke to ER doctor who reported that the patient is medically cleared to return to the .     The PG will call the  provider to inform her that the patient has been cleared to return to the . PG will have  owner (Shanika) to return the call to this SW.     [place] [address]    PG is also requesting for a order so that  services can resume. COLLEEN placed CHOICE for [facility].     The SW emailed the DC summary to the PG as requested. COLLEEN is attempting to set up transportation.

## 2022-08-01 NOTE — DISCHARGE SUMMARY
"  ED Observation Discharge Summary    Patient:Erlinda Verde  Patient : 1965  Patient MRN: [id number]  Patient PCP: Whitney Graham M.D.    Admit Date: 2022  Discharge Date and Time: 22 11:32 AM  Discharge Diagnosis:   1. COVID-19    2. Parkinson's disease (tremor, stiffness, slow motion, unstable posture) ([facility])    3. Undifferentiated schizophrenia (HCC)    4. Moderate intellectual disability    5. Diabetes mellitus due to underlying condition with hyperosmolarity and coma, without long-term current use of insulin ([facility])    6. Essential hypertension        Discharge Attending: Ever Matt M.D.  Discharge Service: ED Observation    ED Course  Erlinda is a 56 y.o. female who was evaluated at [facility] for COVID-19.  Patient was treated in the emergency department.  Did not meet admission criteria.  There was delay in getting the patient accepted back to the [facility] home due to her COVID-19 positivity status and difficulty with following their infection prevention precautions with other residents.  She has now been accepted back to the care facility.  Discharged in stable condition.    Discharge Exam:  /60   Pulse 82   Temp 36.5 °C (97.7 °F) (Temporal)   Resp 16   Ht 1.6 m (5' 3\")   Wt 63.5 kg (140 lb)   SpO2 92%   BMI 24.80 kg/m² .    Constitutional: Awake and alert. Nontoxic  HENT:  Grossly normal  Eyes: Grossly normal  Neck: Normal range of motion  Cardiovascular: Normal heart rate   Thorax & Lungs: No respiratory distress  Abdomen: Nontender  Skin:  No pathologic rash.   Extremities: Well perfused  Psychiatric: Affect normal    Labs  Results for orders placed or performed during the hospital encounter of 22   CBC WITH DIFFERENTIAL   Result Value Ref Range    WBC 5.7 4.8 - 10.8 K/uL    RBC 4.03 (L) 4.20 - 5.40 M/uL    Hemoglobin 10.7 (L) 12.0 - 16.0 g/dL    Hematocrit 33.2 (L) 37.0 - 47.0 %    MCV 82.4 81.4 - 97.8 fL    MCH 26.6 (L) 27.0 - 33.0 pg    " MCHC 32.2 (L) 33.6 - 35.0 g/dL    RDW 46.6 35.9 - 50.0 fL    Platelet Count 122 (L) 164 - 446 K/uL    MPV 8.6 (L) 9.0 - 12.9 fL    Neutrophils-Polys 53.40 44.00 - 72.00 %    Lymphocytes 25.80 22.00 - 41.00 %    Monocytes 19.60 (H) 0.00 - 13.40 %    Eosinophils 0.40 0.00 - 6.90 %    Basophils 0.40 0.00 - 1.80 %    Immature Granulocytes 0.40 0.00 - 0.90 %    Nucleated RBC 0.00 /100 WBC    Neutrophils (Absolute) 3.05 2.00 - 7.15 K/uL    Lymphs (Absolute) 1.47 1.00 - 4.80 K/uL    Monos (Absolute) 1.12 (H) 0.00 - 0.85 K/uL    Eos (Absolute) 0.02 0.00 - 0.51 K/uL    Baso (Absolute) 0.02 0.00 - 0.12 K/uL    Immature Granulocytes (abs) 0.02 0.00 - 0.11 K/uL    NRBC (Absolute) 0.00 K/uL   Comp Metabolic Panel   Result Value Ref Range    Sodium 137 135 - 145 mmol/L    Potassium 3.6 3.6 - 5.5 mmol/L    Chloride 101 96 - 112 mmol/L    Co2 23 20 - 33 mmol/L    Anion Gap 13.0 7.0 - 16.0    Glucose 108 (H) 65 - 99 mg/dL    Bun 32 (H) 8 - 22 mg/dL    Creatinine 0.83 0.50 - 1.40 mg/dL    Calcium 9.2 8.5 - 10.5 mg/dL    AST(SGOT) 13 12 - 45 U/L    ALT(SGPT) 12 2 - 50 U/L    Alkaline Phosphatase 98 30 - 99 U/L    Total Bilirubin <0.2 0.1 - 1.5 mg/dL    Albumin 3.8 3.2 - 4.9 g/dL    Total Protein 6.6 6.0 - 8.2 g/dL    Globulin 2.8 1.9 - 3.5 g/dL    A-G Ratio 1.4 g/dL   TROPONIN   Result Value Ref Range    Troponin T 9 6 - 19 ng/L   ESTIMATED GFR   Result Value Ref Range    GFR (CKD-EPI) 82 >60 mL/min/1.73 m 2   CoV-2, FLU A/B, and RSV by PCR (2-4 Hours CEPHEID) : Collect NP swab in VTM    Specimen: Respirate   Result Value Ref Range    Influenza virus A RNA Negative Negative    Influenza virus B, PCR Negative Negative    RSV, PCR Negative Negative    SARS-CoV-2 by PCR DETECTED (AA)     SARS-CoV-2 Source NP Swab    COV-2, FLU A/B, AND RSV BY PCR (2-4 HOURS CEPHEID): Collect NP swab in VTM    Specimen: Respirate   Result Value Ref Range    Influenza virus A RNA Negative Negative    Influenza virus B, PCR Negative Negative    RSV, PCR  Negative Negative    SARS-CoV-2 by PCR DETECTED (AA)     SARS-CoV-2 Source NP Swab    EKG   Result Value Ref Range    Report       [facility] Emergency Dept.    Test Date:  2022  Pt Name:    JASSON MENDEZ              Department: ER  MRN:        [id number]                      Room:       [facility]  Gender:     Female                       Technician: [id number]  :        1965                   Requested By:KRISTA DAWSON  Order #:    [id number]                    Reading MD: KRISTA DAWSON MD    Measurements  Intervals                                Axis  Rate:       96                           P:          48  IA:         143                          QRS:        23  QRSD:       79                           T:          31  QT:         327  QTc:        414    Interpretive Statements  Sinus rhythm  Nonspecific T abnormalities, anterior leads  Baseline wander in lead(s) II,III,aVF  Compared to ECG 2022 12:32:23  Sinus tachycardia no longer present  T-wave abnormality still present  Electronically Signed On 2022 19:30:09 PDT by KRISTA DAWSON MD         Radiology  No orders to display         Medications:   New Prescriptions    No medications on file       Discharge Condition: Stable    Electronically signed by: Ever Matt M.D., 2022 11:32 AM

## 2022-08-02 NOTE — ED NOTES
Report given to [facility] for transport back to group home. Provided with paperwork supplied by social work. All questions answered and care transferred.

## 2022-08-02 NOTE — DISCHARGE PLANNING
RN notified COLLEEN that EMS has not picked Pt up for 1615 transport back to [facility].   COLLEEN contacted [facility] and they are behind in transport calls due to emergent calls.   COLLEEN called  Owner Shainka [phone]. She is in agreement for Pt to arrive to  when [facility] is able to transport.   She requested DC Summary be in transfer Paperwork stating Pt was medically cleared to return home.   COLLEEN printed DC Summary and ERP notes stating Pt is medically cleared and asymptomatic.  in agreement for Pt to transfer back with the above paperwork.     RN updated.

## 2022-08-02 NOTE — ED NOTES
Pt. incontinent of bowel. Shital care provided and all bed linens changed. Pt. Repositioned and bed and rolled to right side. Denies further needs at this time.

## 2022-11-02 ENCOUNTER — HOSPITAL ENCOUNTER (EMERGENCY)
Facility: MEDICAL CENTER | Age: 57
End: 2022-11-02
Attending: EMERGENCY MEDICINE
Payer: MEDICARE

## 2022-11-02 ENCOUNTER — APPOINTMENT (OUTPATIENT)
Dept: RADIOLOGY | Facility: MEDICAL CENTER | Age: 57
End: 2022-11-02
Attending: EMERGENCY MEDICINE
Payer: MEDICARE

## 2022-11-02 VITALS
OXYGEN SATURATION: 93 % | TEMPERATURE: 97.7 F | BODY MASS INDEX: 24.82 KG/M2 | SYSTOLIC BLOOD PRESSURE: 137 MMHG | DIASTOLIC BLOOD PRESSURE: 78 MMHG | HEART RATE: 102 BPM | WEIGHT: 140.12 LBS | RESPIRATION RATE: 18 BRPM

## 2022-11-02 DIAGNOSIS — M25.552 LEFT HIP PAIN: ICD-10-CM
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
## 2022-11-02 PROCEDURE — A9270 NON-COVERED ITEM OR SERVICE: HCPCS | Performed by: EMERGENCY MEDICINE

## 2022-11-02 PROCEDURE — 73501 X-RAY EXAM HIP UNI 1 VIEW: CPT | Mod: LT

## 2022-11-02 PROCEDURE — 99285 EMERGENCY DEPT VISIT HI MDM: CPT

## 2022-11-02 PROCEDURE — 700102 HCHG RX REV CODE 250 W/ 637 OVERRIDE(OP): Performed by: EMERGENCY MEDICINE

## 2022-11-02 RX ORDER — ACETAMINOPHEN 325 MG/1
650 TABLET ORAL ONCE
Status: COMPLETED | OUTPATIENT
Start: 2022-11-02 | End: 2022-11-02

## 2022-11-02 RX ADMIN — ACETAMINOPHEN 650 MG: 325 TABLET, FILM COATED ORAL at 15:08

## 2022-11-02 ASSESSMENT — PAIN SCALES - WONG BAKER: WONGBAKER_NUMERICALRESPONSE: HURTS A LITTLE MORE

## 2022-11-02 ASSESSMENT — FIBROSIS 4 INDEX: FIB4 SCORE: 1.78

## 2022-11-02 NOTE — ED NOTES
Pt able to stand w/ assist to remove pants.  C/O LT hip pain.  Pt awake, alert, oriented to first name only.  Pt wearing medical ID bracelet w/ name, , phone number & Diabetes listed.  No information sent from Group Home.  Per EMS, Group Home address: [address]

## 2022-11-02 NOTE — ED TRIAGE NOTES
PT SHRUTHI CEDILLO from a Group Home in [place].  C/O LT hip pain.  Pt reports falling, but changes date & time.  Hx: dementia.  Unknown by group home staff if pt actually fell.  Per EMS, pt able to stand & pivot to get on to their gurney.

## 2022-11-02 NOTE — ED PROVIDER NOTES
ED Provider Note    Scribed for Hayley Ortega M.D. by Marc De La Vega. 11/2/2022  1:20 PM    Primary care provider: Whitney Graham M.D.  Means of arrival: EMS  History obtained from: EMS and Patient  History limited by: Patient has a history of dementia.     CHIEF COMPLAINT  Chief Complaint   Patient presents with    Hip Pain     Left.         VINAY Verde is a 57 y.o. female who presents to the Emergency Department via EMS for evaluation of left hip pain onset two days ago. The patient states that fell recently and have been unable to walk since falling. The patient has a history of dementia and per EMS the patient lives at a group home in [place]. The patient reported an unwitnessed fall to staff, however, date and time of fall varies upon report.     History limited by: Patient has a history of dementia.     REVIEW OF SYSTEMS  Musculoskeletal: Positive for left hip pain and difficulty walking.     ROS limited by: Patient has a history of dementia. See history of present illness. E.    PAST MEDICAL HISTORY   has a past medical history of Diabetes (HCC), Dysthymic disorder, HTN (hypertension), Intellectual disability, Parkinsonism (HCC), and Schizoaffective disorder (HCC).    SURGICAL HISTORY   has a past surgical history that includes partial hip replacement (Left, 2/26/2022).    SOCIAL HISTORY  Social History     Tobacco Use    Smoking status: Never    Smokeless tobacco: Never   Vaping Use    Vaping Use: Never used   Substance Use Topics    Alcohol use: Never    Drug use: Never      Social History     Substance and Sexual Activity   Drug Use Never       FAMILY HISTORY  None noted.     CURRENT MEDICATIONS  Current Outpatient Medications   Medication Instructions    acetaminophen (TYLENOL) 325 mg, Oral, EVERY 4 HOURS PRN    aripiprazole (ABILIFY) 30 mg, Oral, EVERY EVENING    atorvastatin (LIPITOR) 20 mg, Oral, NIGHTLY    divalproex ER (DEPAKOTE ER) 1,000 mg, Oral, EVERY BEDTIME, 2 tablets = 1000 mg      donepezil (ARICEPT) 10 mg, Oral, NIGHTLY    levothyroxine (SYNTHROID) 200 mcg, Oral, EACH MORNING ON EMPTY STOMACH    lisinopril (PRINIVIL) 20 mg, Oral, DAILY    metFORMIN ER (GLUCOPHAGE XR) 500 mg, Oral, EVERY MORNING    metoprolol-hydrochlorothiazide (LOPRESSOR HCT) 50-25 MG per tablet 1 Tablet, Oral, DAILY    oxybutynin SR (DITROPAN-XL) 5 mg, Oral, EVERY EVENING    QUEtiapine Fumarate 150 mg, Oral, DAILY    sertraline (ZOLOFT) 100 mg, Oral, EVERY MORNING    tolterodine ER (DETROL LA) 4 mg, Oral, EVERY BEDTIME           ALLERGIES  Allergies   Allergen Reactions    Risperdal        PHYSICAL EXAM  VITAL SIGNS: /77   Pulse 93   Temp 36.4 °C (97.6 °F) (Temporal)   Resp 16   Wt 63.6 kg (140 lb 2 oz)   SpO2 95%   BMI 24.82 kg/m²     Constitutional: Well developed, Well nourished, No acute distress, Non-toxic appearance.   HEENT: Normocephalic, Atraumatic,  external ears normal, pharynx pink,  Mucous  Membranes moist, No rhinorrhea or mucosal edema  Eyes: PERRL, EOMI, Conjunctiva normal, No discharge.   Neck: Normal range of motion, No tenderness, Supple, No stridor.   Lymphatic: No lymphadenopathy    Cardiovascular: Regular Rate and Rhythm, No murmurs,  rubs, or gallops.   Thorax & Lungs: Lungs clear to auscultation bilaterally, No respiratory distress, No wheezes, rhales or rhonchi, No chest wall tenderness.   Abdomen: Bowel sounds normal, Soft, non tender, non distended,  No pulsatile masses., no rebound guarding or peritoneal signs.   Skin: Healed incision near top of left hip. Warm, Dry, No erythema, No rash,   Extremities: Able to lift leg and range of motion intact. Distally neurovascularly intact. Equal, intact distal pulses, No cyanosis, No tenderness.   Musculoskeletal: Good range of motion in all major joints. No tenderness to palpation or major deformities noted.   Neurologic: Alert & awake, no focal deficits  Psychiatric: Affect normal      DIAGNOSTIC STUDIES /  PROCEDURES    RADIOLOGY  DX-HIP-UNILATERAL-WITH PELVIS-1 VIEW LEFT   Final Result      No radiographic evidence of acute traumatic injury.        The radiologist's interpretation of all radiological studies have been reviewed by me.    COURSE & MEDICAL DECISION MAKING  Nursing notes, RIGOBERTO, FREDERICHx reviewed in chart.     1:20 PM - Patient seen and examined at bedside. Patient will be treated with tylenol tablet 650 mg . Ordered DX-Hip left to evaluate her symptoms. Differential diagnoses include but are not limited to: Hip fracture, Hip sprain, Hip arthritis.     2:16 PM - I reevaluated the patient at bedside. I discussed the patient's diagnostic study results which show the patients' x-ray was negative.. I discussed plan for discharge and follow up as outlined below. The patient is stable for discharge at this time and will return for any new or worsening symptoms. Patient verbalizes understanding and support with my plan for discharge.       Medical Decision Making  Problems (number and complexity of problems being simultaneously addressed)         Left hip pain after unwitnessed ground level fall with concern for hip fracture or sprain.   Data ( amount or complexity of data reviewed and analysed, discuss why you are or are not doing tests or giving medication)        Ordered left hip x-ray to evaluate for possible sprain or fracture. Patient medicated for pain. Patient's x-ray is negative and she is stable for discharge.   3. Risk (risk of complications and or morbidity/mortality of the patient managed. Discuss social determinants and compliance issues)        Low.       The patient will return for new or worsening symptoms and is stable at the time of discharge.    DISPOSITION:  Patient will be discharged home in stable condition.    FOLLOW UP:  Whitney Graham M.D.  [address] [id number] [phone]      As needed, If symptoms worsen      FINAL IMPRESSION  1. Fall, initial encounter    2.  Left hip pain          Marc CORNEJO (Scribe), am scribing for, and in the presence of, Hayley Ortega M.D..    Electronically signed by: Marc De La Vega (Scribpio), 11/2/2022    Hayley CORNEJO M.D. personally performed the services described in this documentation, as scribed by Marc De La Vega in my presence, and it is both accurate and complete.    The note accurately reflects work and decisions made by me.  Hayley Ortega M.D.  11/2/2022  7:12 PM       Warm

## 2022-11-02 NOTE — ED NOTES
Late entry for 1316.  Old surgical scar noted on LT hip.  Skin otherwise intact and without discoloration.

## 2022-11-02 NOTE — ED NOTES
Web search for [facility] showed different address than address provided by EMS this morning.  Attempted to reach  Eleni ([phone]) - VM listed different person.  Attempted to reach pt's , Ann Urias ([phone]) - call wouldn't go through. Will consult ED  for assistance.

## 2022-11-02 NOTE — ED NOTES
Pt report from ROD Sanchez.  Pt care resumed.  Per Laura, telephone number on pt's medical bracelet is pt's prior residence; they informed Laura that pt now resides at [facility] [phone]

## 2022-11-02 NOTE — ED NOTES
Per Melissa CHAVEZ, , Pt lives at [facility], [address]  [phone], care taker is Shanika.  Melissa will arrange transport.

## 2022-11-02 NOTE — ED NOTES
"Pt resting quietly on gurney, call light w/in reach, side rails up x2.  When asked if she has pain, pt replied \"yes\".  When asked where, pt replied \"I don't know yet\".  Pt able to move all extremities voluntarily.  Awaiting pt transportation information from .   "

## 2022-11-03 NOTE — ED NOTES
Called [facility], [address]  [phone].  Pt report given to Shanika, care taker.

## 2022-11-03 NOTE — DISCHARGE PLANNING
RN has notified SW that Pt. Is medically cleared and can return to her Group Home.     Pt resides at [facility] [address] [facility].  Owner is Shanika [phone].     Pt also has Public Guardian : Ann Urias [phone]. Voice Mail Left    PCS completed  MTM authorization given to [facility]  Transportation time arranged for 1745   RN updated on transfer and transfer time.     COLLEEN contacted [facility] Public guardian and spoke to Camacho Lemon in agreement for Pt to return to  and has asked for ED Summary to be faxed to [phone].

## 2022-11-27 ENCOUNTER — HOSPITAL ENCOUNTER (EMERGENCY)
Facility: MEDICAL CENTER | Age: 57
End: 2022-11-27
Attending: EMERGENCY MEDICINE
Payer: MEDICARE

## 2022-11-27 ENCOUNTER — APPOINTMENT (OUTPATIENT)
Dept: RADIOLOGY | Facility: MEDICAL CENTER | Age: 57
End: 2022-11-27
Attending: EMERGENCY MEDICINE
Payer: MEDICARE

## 2022-11-27 VITALS
OXYGEN SATURATION: 94 % | TEMPERATURE: 97.7 F | HEART RATE: 108 BPM | HEIGHT: 63 IN | RESPIRATION RATE: 18 BRPM | BODY MASS INDEX: 24.8 KG/M2 | WEIGHT: 140 LBS | SYSTOLIC BLOOD PRESSURE: 111 MMHG | DIASTOLIC BLOOD PRESSURE: 60 MMHG

## 2022-11-27 DIAGNOSIS — M25.552 LEFT HIP PAIN: ICD-10-CM

## 2022-11-27 DIAGNOSIS — J18.9 PNEUMONIA OF RIGHT LOWER LOBE DUE TO INFECTIOUS ORGANISM: ICD-10-CM

## 2022-11-27 LAB
ANION GAP SERPL CALC-SCNC: 12 MMOL/L (ref 7–16)
BASOPHILS # BLD AUTO: 0.3 % (ref 0–1.8)
BASOPHILS # BLD: 0.03 K/UL (ref 0–0.12)
BUN SERPL-MCNC: 27 MG/DL (ref 8–22)
CALCIUM SERPL-MCNC: 9.9 MG/DL (ref 8.5–10.5)
CHLORIDE SERPL-SCNC: 102 MMOL/L (ref 96–112)
CO2 SERPL-SCNC: 24 MMOL/L (ref 20–33)
CREAT SERPL-MCNC: 0.65 MG/DL (ref 0.5–1.4)
EKG IMPRESSION: NORMAL
EOSINOPHIL # BLD AUTO: 0 K/UL (ref 0–0.51)
EOSINOPHIL NFR BLD: 0 % (ref 0–6.9)
ERYTHROCYTE [DISTWIDTH] IN BLOOD BY AUTOMATED COUNT: 43.1 FL (ref 35.9–50)
GFR SERPLBLD CREATININE-BSD FMLA CKD-EPI: 102 ML/MIN/1.73 M 2
GLUCOSE SERPL-MCNC: 122 MG/DL (ref 65–99)
HCT VFR BLD AUTO: 36.9 % (ref 37–47)
HGB BLD-MCNC: 11.9 G/DL (ref 12–16)
IMM GRANULOCYTES # BLD AUTO: 0.07 K/UL (ref 0–0.11)
IMM GRANULOCYTES NFR BLD AUTO: 0.8 % (ref 0–0.9)
LYMPHOCYTES # BLD AUTO: 1.21 K/UL (ref 1–4.8)
LYMPHOCYTES NFR BLD: 13 % (ref 22–41)
MCH RBC QN AUTO: 27 PG (ref 27–33)
MCHC RBC AUTO-ENTMCNC: 32.2 G/DL (ref 33.6–35)
MCV RBC AUTO: 83.7 FL (ref 81.4–97.8)
MONOCYTES # BLD AUTO: 1.05 K/UL (ref 0–0.85)
MONOCYTES NFR BLD AUTO: 11.3 % (ref 0–13.4)
NEUTROPHILS # BLD AUTO: 6.96 K/UL (ref 2–7.15)
NEUTROPHILS NFR BLD: 74.6 % (ref 44–72)
NRBC # BLD AUTO: 0 K/UL
NRBC BLD-RTO: 0 /100 WBC
PLATELET # BLD AUTO: 171 K/UL (ref 164–446)
PMV BLD AUTO: 8.9 FL (ref 9–12.9)
POTASSIUM SERPL-SCNC: 4 MMOL/L (ref 3.6–5.5)
RBC # BLD AUTO: 4.41 M/UL (ref 4.2–5.4)
SODIUM SERPL-SCNC: 138 MMOL/L (ref 135–145)
TROPONIN T SERPL-MCNC: 9 NG/L (ref 6–19)
WBC # BLD AUTO: 9.3 K/UL (ref 4.8–10.8)

## 2022-11-27 PROCEDURE — 71045 X-RAY EXAM CHEST 1 VIEW: CPT

## 2022-11-27 PROCEDURE — 84484 ASSAY OF TROPONIN QUANT: CPT

## 2022-11-27 PROCEDURE — 80048 BASIC METABOLIC PNL TOTAL CA: CPT

## 2022-11-27 PROCEDURE — 93005 ELECTROCARDIOGRAM TRACING: CPT

## 2022-11-27 PROCEDURE — 36415 COLL VENOUS BLD VENIPUNCTURE: CPT

## 2022-11-27 PROCEDURE — 700102 HCHG RX REV CODE 250 W/ 637 OVERRIDE(OP): Performed by: EMERGENCY MEDICINE

## 2022-11-27 PROCEDURE — 99285 EMERGENCY DEPT VISIT HI MDM: CPT

## 2022-11-27 PROCEDURE — 93005 ELECTROCARDIOGRAM TRACING: CPT | Performed by: EMERGENCY MEDICINE

## 2022-11-27 PROCEDURE — 85025 COMPLETE CBC W/AUTO DIFF WBC: CPT

## 2022-11-27 PROCEDURE — A9270 NON-COVERED ITEM OR SERVICE: HCPCS | Performed by: EMERGENCY MEDICINE

## 2022-11-27 PROCEDURE — 73502 X-RAY EXAM HIP UNI 2-3 VIEWS: CPT | Mod: LT

## 2022-11-27 RX ORDER — HYDROCODONE BITARTRATE AND ACETAMINOPHEN 5; 325 MG/1; MG/1
1 TABLET ORAL ONCE
Status: COMPLETED | OUTPATIENT
Start: 2022-11-27 | End: 2022-11-27

## 2022-11-27 RX ORDER — AMOXICILLIN 500 MG/1
1000 CAPSULE ORAL 3 TIMES DAILY
Qty: 30 CAPSULE | Refills: 0 | Status: SHIPPED | OUTPATIENT
Start: 2022-11-27 | End: 2022-12-02

## 2022-11-27 RX ADMIN — HYDROCODONE BITARTRATE AND ACETAMINOPHEN 1 TABLET: 5; 325 TABLET ORAL at 11:57

## 2022-11-27 ASSESSMENT — ENCOUNTER SYMPTOMS
FALLS: 0
COUGH: 0
FEVER: 0

## 2022-11-27 ASSESSMENT — FIBROSIS 4 INDEX: FIB4 SCORE: 1.78

## 2022-11-27 NOTE — DISCHARGE INSTRUCTIONS
You have minor findings on your x-ray that may represent early pneumonia.  For this reason, I have prescribed antibiotics.  Vital signs, oxygenation and your white blood cell count are all normal.  Your EKG is reassuring and there  That chest pain that you previously had is related to a cardiac etiology.

## 2022-11-27 NOTE — ED NOTES
Discharged with [facility] to group home. Instructions given to follow up with pcp. abx sent to pharmacy

## 2022-11-27 NOTE — ED TRIAGE NOTES
.  Chief Complaint   Patient presents with    Hip Pain     Reports left hip pain, denies fall or trauma    Chest Pain     Sent by group [facility] for chest pain 2/10 given asa 324mg. Pt currently denies CP.     [place]iba from group home [address] [place].   Pt A&O to baseline.   Ekg complete on arrival pt denies cp at this time. Left hip pain pt reports unable to ambulate d/t pain.

## 2022-11-27 NOTE — DISCHARGE PLANNING
COLLEEN contacted [facility] and spoke with Laverne. [facility] transportation was set up for 1320 via [employer]. Medical team notified. COLLEEN has also contacted [facility] to inform that of the discharge from the hospital and that transportation will be set up by this SW.    PCS form scanned into the chart.

## 2022-11-27 NOTE — ED PROVIDER NOTES
ED Provider Note    ED Provider Note    Primary care provider: Whitney Graham M.D.  Means of arrival: EMS  History obtained from: patient  History limited by: Mentation    CHIEF COMPLAINT  Chief Complaint   Patient presents with    Hip Pain     Reports left hip pain, denies fall or trauma    Chest Pain     Sent by group [facility] for chest pain 2/10 given asa 324mg. Pt currently denies CP.       HPI  Erlinda Verde is a 57 y.o. female who presents to the Emergency Department via EMS.  She presents from her group home where she resides in [place].  Patient is complaining of left hip pain.  She denies any recent trauma or falls.  She states its been bothering her for a few days.  She does not have any other complaints.  She denies chest pain.  However, reportedly, she complained of chest pain this morning.  She was given aspirin in route.  Patient has a history of schizoaffective disorder, parkinsonian is him, hypertension, diabetes and intellectual disability.  History is limited.    REVIEW OF SYSTEMS  Review of Systems   Reason unable to perform ROS: mentation.   Constitutional:  Negative for fever.   Respiratory:  Negative for cough.    Cardiovascular:  Positive for chest pain.        Reported history   Musculoskeletal:  Positive for joint pain. Negative for falls.     PAST MEDICAL HISTORY   has a past medical history of Diabetes (HCC), Dysthymic disorder, HTN (hypertension), Intellectual disability, Parkinsonism (HCC), and Schizoaffective disorder (HCC).    SURGICAL HISTORY   has a past surgical history that includes partial hip replacement (Left, 2/26/2022).    SOCIAL HISTORY  Social History     Tobacco Use    Smoking status: Never    Smokeless tobacco: Never   Vaping Use    Vaping Use: Never used   Substance Use Topics    Alcohol use: Never    Drug use: Never      Social History     Substance and Sexual Activity   Drug Use Never       FAMILY HISTORY  No family history on file.    CURRENT MEDICATIONS  Home  "Medications       Reviewed by Hiwot Deleon R.N. (Registered Nurse) on 11/27/22 at 0729  Med List Status: Partial     Medication Last Dose Status   acetaminophen (TYLENOL) 325 MG Tab  Active   aripiprazole (ABILIFY) 30 MG tablet 11/27/2022 Active   atorvastatin (LIPITOR) 20 MG Tab 11/26/2022 Active   divalproex ER (DEPAKOTE ER) 500 MG TABLET SR 24 HR 11/26/2022 Active   donepezil (ARICEPT) 10 MG tablet 11/26/2022 Active   levothyroxine (SYNTHROID) 200 MCG Tab 11/27/2022 Active   lisinopril (PRINIVIL) 20 MG Tab 11/27/2022 Active   metFORMIN ER (GLUCOPHAGE XR) 500 MG TABLET SR 24 HR 11/27/2022 Active   metoprolol-hydrochlorothiazide (LOPRESSOR HCT) 50-25 MG per tablet  Active   oxybutynin SR (DITROPAN-XL) 5 MG TABLET SR 24 HR  Active   QUEtiapine Fumarate 150 MG TABLET SR 24 HR 11/27/2022 Active   sertraline (ZOLOFT) 100 MG Tab 11/27/2022 Active   tolterodine ER (DETROL LA) 4 MG CAPSULE SR 24 HR  Active                    ALLERGIES  Allergies   Allergen Reactions    Risperdal        PHYSICAL EXAM  VITAL SIGNS: /60   Pulse 74   Temp 37.6 °C (99.6 °F) (Temporal)   Resp 16   Ht 1.6 m (5' 3\")   Wt 63.5 kg (140 lb)   SpO2 98%   BMI 24.80 kg/m²   Vitals reviewed.  Constitutional: Patient is oriented to person. Appears well-developed and well-nourished. No distress.    Head: Normocephalic and atraumatic.   Mouth/Throat: Oropharynx is clear and moist  Eyes: Conjunctivae are normal. Pupils are equal, round, and reactive to light.   Neck: Normal range of motion. Neck supple.  Cardiovascular: Normal rate, regular rhythm and normal heart sounds. Normal peripheral pulses extremities x4.  Pulmonary/Chest: Effort normal and breath sounds normal. No respiratory distress, no wheezes, rhonchi, or rales. No chest wall tenderness.  Abdominal: Soft. Bowel sounds are normal. There is no tenderness. No rebound or guarding, or peritoneal signs.   Musculoskeletal: No edema and no tenderness.  No shortening or rotation.  " There is pain, noted on flexion of the left hip.  Lymphadenopathy: No cervical adenopathy.   Neurological: No focal deficits.   Skin: Skin is warm and dry. No erythema. No pallor.   Psychiatric: Patient has a normal mood and affect.     LABS  Results for orders placed or performed during the hospital encounter of 11/27/22   TROPONIN   Result Value Ref Range    Troponin T 9 6 - 19 ng/L   Basic Metabolic Panel   Result Value Ref Range    Sodium 138 135 - 145 mmol/L    Potassium 4.0 3.6 - 5.5 mmol/L    Chloride 102 96 - 112 mmol/L    Co2 24 20 - 33 mmol/L    Glucose 122 (H) 65 - 99 mg/dL    Bun 27 (H) 8 - 22 mg/dL    Creatinine 0.65 0.50 - 1.40 mg/dL    Calcium 9.9 8.5 - 10.5 mg/dL    Anion Gap 12.0 7.0 - 16.0   CBC WITH DIFFERENTIAL   Result Value Ref Range    WBC 9.3 4.8 - 10.8 K/uL    RBC 4.41 4.20 - 5.40 M/uL    Hemoglobin 11.9 (L) 12.0 - 16.0 g/dL    Hematocrit 36.9 (L) 37.0 - 47.0 %    MCV 83.7 81.4 - 97.8 fL    MCH 27.0 27.0 - 33.0 pg    MCHC 32.2 (L) 33.6 - 35.0 g/dL    RDW 43.1 35.9 - 50.0 fL    Platelet Count 171 164 - 446 K/uL    MPV 8.9 (L) 9.0 - 12.9 fL    Neutrophils-Polys 74.60 (H) 44.00 - 72.00 %    Lymphocytes 13.00 (L) 22.00 - 41.00 %    Monocytes 11.30 0.00 - 13.40 %    Eosinophils 0.00 0.00 - 6.90 %    Basophils 0.30 0.00 - 1.80 %    Immature Granulocytes 0.80 0.00 - 0.90 %    Nucleated RBC 0.00 /100 WBC    Neutrophils (Absolute) 6.96 2.00 - 7.15 K/uL    Lymphs (Absolute) 1.21 1.00 - 4.80 K/uL    Monos (Absolute) 1.05 (H) 0.00 - 0.85 K/uL    Eos (Absolute) 0.00 0.00 - 0.51 K/uL    Baso (Absolute) 0.03 0.00 - 0.12 K/uL    Immature Granulocytes (abs) 0.07 0.00 - 0.11 K/uL    NRBC (Absolute) 0.00 K/uL   ESTIMATED GFR   Result Value Ref Range    GFR (CKD-EPI) 102 >60 mL/min/1.73 m 2   EKG   Result Value Ref Range    Report       [facility] Emergency Dept.    Test Date:  2022-11-27  Pt Name:    JASSON ANDREA              Department: ER  MRN:        [id number]                      Room:        RD 11  Gender:     Female                       Technician: [id number]  :        1965                   Requested By:ER TRIAGE PROTOCOL  Order #:    [id number]                    Reading MD: DANIEL GILMORE DO    Measurements  Intervals                                Axis  Rate:       106                          P:          42  WV:         147                          QRS:        15  QRSD:       72                           T:          56  QT:         310  QTc:        412    Interpretive Statements  Sinus tachycardia  Compared to ECG 2022 18:33:30  Sinus rhythm no longer present  T-wave abnormality no longer present  Electronically Signed On 2022 8:43:59 PST by DANIEL GILMORE DO         All labs reviewed by me.    EKG Interpretation  Interpreted by me    RADIOLOGY  DX-HIP-COMPLETE - UNILATERAL 2+ LEFT   Final Result         1. Status post left hip arthroplasty without evidence of hardware complication.      DX-CHEST-PORTABLE (1 VIEW)   Final Result         Hazy bibasilar opacities, atelectasis or infection.        The radiologist's interpretation of all radiological studies have been reviewed by me.    COURSE & MEDICAL DECISION MAKING  Pertinent Labs & Imaging studies reviewed. (See chart for details)    Obtained and reviewed past medical records.  Patient's last encounter was , this year she was seen in the emergency department for left hip pain.  This was in the setting of a recent fall.  Patient has a history of dementia and lives at a group home in [place].  Plain film imaging at the time of the left hip show no evidence of injury.    8:19 AM - Patient seen and examined at bedside.  This is a pleasant 57-year-old female.  She has a history of intellectual disability.  Her history is limited.  She is complaining of left hip pain which she states she has had for a few days.  She denies a fall.  She has no other complaints of pain.  She denies chest pain at this time.  Given that her  history is limited, I ordered labs including troponin, CBC and BNP.  Vital signs are reassuring.  She was tachycardic but this is resolved.  EKG is reassuring.    1:30 AM patient's reevaluated bedside.  She has no complaints at this time.  She denies recent cough or fever.  I have advised her of x-ray findings that may represent early pneumonia.  For this reason, I have advised treatment with antibiotics.  However, her white blood cell count, heart rate, oxygenation, respiratory rate overall reassuring.  Patient is tolerating fluids here in the department.  I feel she can safely be discharged home.  She is in stable condition.    FINAL IMPRESSION  1. Left hip pain    2. Pneumonia of right lower lobe due to infectious organism

## 2023-10-12 ENCOUNTER — APPOINTMENT (OUTPATIENT)
Dept: CARDIOLOGY | Facility: MEDICAL CENTER | Age: 58
DRG: 189 | End: 2023-10-12
Attending: EMERGENCY MEDICINE
Payer: MEDICARE

## 2023-10-12 ENCOUNTER — HOSPITAL ENCOUNTER (INPATIENT)
Facility: MEDICAL CENTER | Age: 58
LOS: 12 days | DRG: 189 | End: 2023-10-25
Attending: EMERGENCY MEDICINE | Admitting: INTERNAL MEDICINE
Payer: MEDICARE

## 2023-10-12 ENCOUNTER — APPOINTMENT (OUTPATIENT)
Dept: RADIOLOGY | Facility: MEDICAL CENTER | Age: 58
DRG: 189 | End: 2023-10-12
Attending: EMERGENCY MEDICINE
Payer: MEDICARE

## 2023-10-12 DIAGNOSIS — J96.01 ACUTE RESPIRATORY FAILURE WITH HYPOXIA (HCC): ICD-10-CM

## 2023-10-12 DIAGNOSIS — E03.9 ACQUIRED HYPOTHYROIDISM: ICD-10-CM

## 2023-10-12 DIAGNOSIS — I31.39 PERICARDIAL EFFUSION: ICD-10-CM

## 2023-10-12 DIAGNOSIS — J18.9 COMMUNITY ACQUIRED PNEUMONIA, UNSPECIFIED LATERALITY: ICD-10-CM

## 2023-10-12 DIAGNOSIS — J90 PLEURAL EFFUSION, LEFT: ICD-10-CM

## 2023-10-12 DIAGNOSIS — R00.0 TACHYCARDIA: ICD-10-CM

## 2023-10-12 DIAGNOSIS — J90 BILATERAL PLEURAL EFFUSION: ICD-10-CM

## 2023-10-12 LAB
ALBUMIN SERPL BCP-MCNC: 3.1 G/DL (ref 3.2–4.9)
ALBUMIN/GLOB SERPL: 0.9 G/DL
ALP SERPL-CCNC: 87 U/L (ref 30–99)
ALT SERPL-CCNC: 8 U/L (ref 2–50)
ANION GAP SERPL CALC-SCNC: 12 MMOL/L (ref 7–16)
AST SERPL-CCNC: 10 U/L (ref 12–45)
BASOPHILS # BLD AUTO: 0 % (ref 0–1.8)
BASOPHILS # BLD: 0 K/UL (ref 0–0.12)
BILIRUB SERPL-MCNC: <0.2 MG/DL (ref 0.1–1.5)
BUN SERPL-MCNC: 38 MG/DL (ref 8–22)
CALCIUM ALBUM COR SERPL-MCNC: 9.2 MG/DL (ref 8.5–10.5)
CALCIUM SERPL-MCNC: 8.5 MG/DL (ref 8.5–10.5)
CHLORIDE SERPL-SCNC: 105 MMOL/L (ref 96–112)
CO2 SERPL-SCNC: 21 MMOL/L (ref 20–33)
CREAT SERPL-MCNC: 1 MG/DL (ref 0.5–1.4)
EOSINOPHIL # BLD AUTO: 0 K/UL (ref 0–0.51)
EOSINOPHIL NFR BLD: 0 % (ref 0–6.9)
ERYTHROCYTE [DISTWIDTH] IN BLOOD BY AUTOMATED COUNT: 55.2 FL (ref 35.9–50)
FLUAV RNA SPEC QL NAA+PROBE: NEGATIVE
FLUBV RNA SPEC QL NAA+PROBE: NEGATIVE
GFR SERPLBLD CREATININE-BSD FMLA CKD-EPI: 65 ML/MIN/1.73 M 2
GLOBULIN SER CALC-MCNC: 3.5 G/DL (ref 1.9–3.5)
GLUCOSE BLD STRIP.AUTO-MCNC: 142 MG/DL (ref 65–99)
GLUCOSE SERPL-MCNC: 146 MG/DL (ref 65–99)
HCT VFR BLD AUTO: 29.1 % (ref 37–47)
HGB BLD-MCNC: 8.3 G/DL (ref 12–16)
LACTATE SERPL-SCNC: 1.4 MMOL/L (ref 0.5–2)
LACTATE SERPL-SCNC: 1.4 MMOL/L (ref 0.5–2)
LYMPHOCYTES # BLD AUTO: 1.8 K/UL (ref 1–4.8)
LYMPHOCYTES NFR BLD: 20 % (ref 22–41)
MAGNESIUM SERPL-MCNC: 2.3 MG/DL (ref 1.5–2.5)
MANUAL DIFF BLD: NORMAL
MCH RBC QN AUTO: 25.2 PG (ref 27–33)
MCHC RBC AUTO-ENTMCNC: 28.5 G/DL (ref 32.2–35.5)
MCV RBC AUTO: 88.4 FL (ref 81.4–97.8)
MONOCYTES # BLD AUTO: 0.94 K/UL (ref 0–0.85)
MONOCYTES NFR BLD AUTO: 10.4 % (ref 0–13.4)
MORPHOLOGY BLD-IMP: NORMAL
NEUTROPHILS # BLD AUTO: 6.26 K/UL (ref 1.82–7.42)
NEUTROPHILS NFR BLD: 69.6 % (ref 44–72)
NRBC # BLD AUTO: 0 K/UL
NRBC BLD-RTO: 0 /100 WBC (ref 0–0.2)
NT-PROBNP SERPL IA-MCNC: 4064 PG/ML (ref 0–125)
PHOSPHATE SERPL-MCNC: 3 MG/DL (ref 2.5–4.5)
PLATELET # BLD AUTO: 347 K/UL (ref 164–446)
PLATELET BLD QL SMEAR: NORMAL
PMV BLD AUTO: 8.1 FL (ref 9–12.9)
POTASSIUM SERPL-SCNC: 4.5 MMOL/L (ref 3.6–5.5)
PROT SERPL-MCNC: 6.6 G/DL (ref 6–8.2)
RBC # BLD AUTO: 3.29 M/UL (ref 4.2–5.4)
RBC BLD AUTO: NORMAL
RSV RNA SPEC QL NAA+PROBE: NEGATIVE
SARS-COV-2 RNA RESP QL NAA+PROBE: NOTDETECTED
SODIUM SERPL-SCNC: 138 MMOL/L (ref 135–145)
SPECIMEN SOURCE: NORMAL
TROPONIN T SERPL-MCNC: 27 NG/L (ref 6–19)
WBC # BLD AUTO: 9 K/UL (ref 4.8–10.8)

## 2023-10-12 PROCEDURE — 82962 GLUCOSE BLOOD TEST: CPT

## 2023-10-12 PROCEDURE — 700105 HCHG RX REV CODE 258: Mod: UD | Performed by: EMERGENCY MEDICINE

## 2023-10-12 PROCEDURE — 87633 RESP VIRUS 12-25 TARGETS: CPT

## 2023-10-12 PROCEDURE — 83605 ASSAY OF LACTIC ACID: CPT

## 2023-10-12 PROCEDURE — 84484 ASSAY OF TROPONIN QUANT: CPT

## 2023-10-12 PROCEDURE — 85027 COMPLETE CBC AUTOMATED: CPT

## 2023-10-12 PROCEDURE — 87486 CHLMYD PNEUM DNA AMP PROBE: CPT

## 2023-10-12 PROCEDURE — 700117 HCHG RX CONTRAST REV CODE 255: Performed by: EMERGENCY MEDICINE

## 2023-10-12 PROCEDURE — 84100 ASSAY OF PHOSPHORUS: CPT

## 2023-10-12 PROCEDURE — 71275 CT ANGIOGRAPHY CHEST: CPT

## 2023-10-12 PROCEDURE — 71045 X-RAY EXAM CHEST 1 VIEW: CPT

## 2023-10-12 PROCEDURE — 93005 ELECTROCARDIOGRAM TRACING: CPT | Performed by: EMERGENCY MEDICINE

## 2023-10-12 PROCEDURE — 83880 ASSAY OF NATRIURETIC PEPTIDE: CPT

## 2023-10-12 PROCEDURE — 93306 TTE W/DOPPLER COMPLETE: CPT

## 2023-10-12 PROCEDURE — 0241U HCHG SARS-COV-2 COVID-19 NFCT DS RESP RNA 4 TRGT MIC: CPT

## 2023-10-12 PROCEDURE — 700111 HCHG RX REV CODE 636 W/ 250 OVERRIDE (IP): Mod: JZ,UD | Performed by: EMERGENCY MEDICINE

## 2023-10-12 PROCEDURE — 700102 HCHG RX REV CODE 250 W/ 637 OVERRIDE(OP): Mod: UD | Performed by: EMERGENCY MEDICINE

## 2023-10-12 PROCEDURE — 96365 THER/PROPH/DIAG IV INF INIT: CPT

## 2023-10-12 PROCEDURE — 87040 BLOOD CULTURE FOR BACTERIA: CPT

## 2023-10-12 PROCEDURE — 87798 DETECT AGENT NOS DNA AMP: CPT

## 2023-10-12 PROCEDURE — 83735 ASSAY OF MAGNESIUM: CPT

## 2023-10-12 PROCEDURE — 85007 BL SMEAR W/DIFF WBC COUNT: CPT

## 2023-10-12 PROCEDURE — 99285 EMERGENCY DEPT VISIT HI MDM: CPT

## 2023-10-12 PROCEDURE — 36415 COLL VENOUS BLD VENIPUNCTURE: CPT

## 2023-10-12 PROCEDURE — 87581 M.PNEUMON DNA AMP PROBE: CPT

## 2023-10-12 PROCEDURE — 80053 COMPREHEN METABOLIC PANEL: CPT

## 2023-10-12 PROCEDURE — A9270 NON-COVERED ITEM OR SERVICE: HCPCS | Mod: UD | Performed by: EMERGENCY MEDICINE

## 2023-10-12 PROCEDURE — C9803 HOPD COVID-19 SPEC COLLECT: HCPCS | Performed by: EMERGENCY MEDICINE

## 2023-10-12 RX ORDER — QUETIAPINE 200 MG/1
200 TABLET, FILM COATED, EXTENDED RELEASE ORAL
COMMUNITY
Start: 2023-10-05

## 2023-10-12 RX ORDER — SODIUM CHLORIDE, SODIUM LACTATE, POTASSIUM CHLORIDE, AND CALCIUM CHLORIDE .6; .31; .03; .02 G/100ML; G/100ML; G/100ML; G/100ML
30 INJECTION, SOLUTION INTRAVENOUS ONCE
Status: COMPLETED | OUTPATIENT
Start: 2023-10-12 | End: 2023-10-12

## 2023-10-12 RX ORDER — ARIPIPRAZOLE 20 MG/1
20 TABLET ORAL
COMMUNITY
End: 2023-11-20

## 2023-10-12 RX ORDER — LISINOPRIL 20 MG/1
20 TABLET ORAL EVERY MORNING
Status: ON HOLD | COMMUNITY
End: 2023-11-22

## 2023-10-12 RX ORDER — EMPAGLIFLOZIN, METFORMIN HYDROCHLORIDE 12.5; 1 MG/1; MG/1
2 TABLET, EXTENDED RELEASE ORAL EVERY MORNING
Status: ON HOLD | COMMUNITY
End: 2023-11-22

## 2023-10-12 RX ORDER — QUETIAPINE 150 MG/1
150 TABLET, FILM COATED, EXTENDED RELEASE ORAL EVERY MORNING
COMMUNITY

## 2023-10-12 RX ORDER — GLIPIZIDE 5 MG/1
5 TABLET, FILM COATED, EXTENDED RELEASE ORAL EVERY MORNING
Status: ON HOLD | COMMUNITY
End: 2023-11-22

## 2023-10-12 RX ORDER — AZITHROMYCIN 250 MG/1
500 TABLET, FILM COATED ORAL ONCE
Status: COMPLETED | OUTPATIENT
Start: 2023-10-12 | End: 2023-10-12

## 2023-10-12 RX ORDER — DIVALPROEX SODIUM 500 MG/1
1000 TABLET, DELAYED RELEASE ORAL
COMMUNITY

## 2023-10-12 RX ORDER — CEPHALEXIN 500 MG/1
500 CAPSULE ORAL 4 TIMES DAILY
Status: ON HOLD | COMMUNITY
Start: 2023-09-13 | End: 2023-10-25

## 2023-10-12 RX ADMIN — AZITHROMYCIN DIHYDRATE 500 MG: 250 TABLET, FILM COATED ORAL at 20:21

## 2023-10-12 RX ADMIN — AMPICILLIN AND SULBACTAM 3 G: 1; 2 INJECTION, POWDER, FOR SOLUTION INTRAMUSCULAR; INTRAVENOUS at 20:27

## 2023-10-12 RX ADMIN — IOHEXOL 56 ML: 350 INJECTION, SOLUTION INTRAVENOUS at 21:09

## 2023-10-12 RX ADMIN — SODIUM CHLORIDE, POTASSIUM CHLORIDE, SODIUM LACTATE AND CALCIUM CHLORIDE 1809 ML: 600; 310; 30; 20 INJECTION, SOLUTION INTRAVENOUS at 18:52

## 2023-10-12 ASSESSMENT — LIFESTYLE VARIABLES: DO YOU DRINK ALCOHOL: NO

## 2023-10-12 ASSESSMENT — FIBROSIS 4 INDEX: FIB4 SCORE: 1.29

## 2023-10-13 PROBLEM — J90 PLEURAL EFFUSION, LEFT: Status: ACTIVE | Noted: 2023-10-13

## 2023-10-13 PROBLEM — J18.9 PNEUMONIA DUE TO INFECTIOUS AGENT: Status: ACTIVE | Noted: 2023-10-13

## 2023-10-13 PROBLEM — J96.01 ACUTE RESPIRATORY FAILURE WITH HYPOXIA (HCC): Status: ACTIVE | Noted: 2023-10-13

## 2023-10-13 PROBLEM — I95.9 HYPOTENSION: Status: ACTIVE | Noted: 2023-10-13

## 2023-10-13 LAB
B PARAP IS1001 DNA NPH QL NAA+NON-PROBE: NOT DETECTED
B PERT.PT PRMT NPH QL NAA+NON-PROBE: NOT DETECTED
C PNEUM DNA NPH QL NAA+NON-PROBE: NOT DETECTED
CK SERPL-CCNC: 58 U/L (ref 0–154)
CRP SERPL HS-MCNC: 22.56 MG/DL (ref 0–0.75)
EKG IMPRESSION: NORMAL
EST. AVERAGE GLUCOSE BLD GHB EST-MCNC: 266 MG/DL
FERRITIN SERPL-MCNC: 715 NG/ML (ref 10–291)
FLUAV RNA NPH QL NAA+NON-PROBE: NOT DETECTED
FLUBV RNA NPH QL NAA+NON-PROBE: NOT DETECTED
GLUCOSE BLD STRIP.AUTO-MCNC: 102 MG/DL (ref 65–99)
GLUCOSE BLD STRIP.AUTO-MCNC: 183 MG/DL (ref 65–99)
GLUCOSE BLD STRIP.AUTO-MCNC: 215 MG/DL (ref 65–99)
GLUCOSE BLD STRIP.AUTO-MCNC: 218 MG/DL (ref 65–99)
HADV DNA NPH QL NAA+NON-PROBE: NOT DETECTED
HBA1C MFR BLD: 10.9 % (ref 4–5.6)
HCOV 229E RNA NPH QL NAA+NON-PROBE: NOT DETECTED
HCOV HKU1 RNA NPH QL NAA+NON-PROBE: NOT DETECTED
HCOV NL63 RNA NPH QL NAA+NON-PROBE: NOT DETECTED
HCOV OC43 RNA NPH QL NAA+NON-PROBE: NOT DETECTED
HGB RETIC QN AUTO: 21.8 PG/CELL (ref 29–35)
HMPV RNA NPH QL NAA+NON-PROBE: NOT DETECTED
HPIV1 RNA NPH QL NAA+NON-PROBE: NOT DETECTED
HPIV2 RNA NPH QL NAA+NON-PROBE: NOT DETECTED
HPIV3 RNA NPH QL NAA+NON-PROBE: NOT DETECTED
HPIV4 RNA NPH QL NAA+NON-PROBE: NOT DETECTED
IMM RETICS NFR: 26.9 % (ref 2.6–16.1)
IRON SATN MFR SERPL: 6 % (ref 15–55)
IRON SERPL-MCNC: 10 UG/DL (ref 40–170)
LDH SERPL L TO P-CCNC: 134 U/L (ref 107–266)
LV EJECT FRACT  99904: 61
LV EJECT FRACT MOD 2C 99903: 63.06
LV EJECT FRACT MOD 4C 99902: 61.05
LV EJECT FRACT MOD BP 99901: 61.92
M PNEUMO DNA NPH QL NAA+NON-PROBE: NOT DETECTED
PROCALCITONIN SERPL-MCNC: 0.24 NG/ML
RETICS # AUTO: 0.04 M/UL (ref 0.04–0.12)
RETICS/RBC NFR: 1.6 % (ref 0.8–2.6)
RSV RNA NPH QL NAA+NON-PROBE: NOT DETECTED
RV+EV RNA NPH QL NAA+NON-PROBE: NOT DETECTED
S PYO DNA SPEC NAA+PROBE: NOT DETECTED
SARS-COV-2 RNA NPH QL NAA+NON-PROBE: NOTDETECTED
SCCMEC + MECA PNL NOSE NAA+PROBE: NEGATIVE
T4 FREE SERPL-MCNC: 1.39 NG/DL (ref 0.93–1.7)
TIBC SERPL-MCNC: 181 UG/DL (ref 250–450)
TROPONIN T SERPL-MCNC: 14 NG/L (ref 6–19)
TSH SERPL DL<=0.005 MIU/L-ACNC: 0.04 UIU/ML (ref 0.38–5.33)
UIBC SERPL-MCNC: 171 UG/DL (ref 110–370)
VIT B12 SERPL-MCNC: 402 PG/ML (ref 211–911)

## 2023-10-13 PROCEDURE — 700105 HCHG RX REV CODE 258: Performed by: INTERNAL MEDICINE

## 2023-10-13 PROCEDURE — 84484 ASSAY OF TROPONIN QUANT: CPT

## 2023-10-13 PROCEDURE — 82728 ASSAY OF FERRITIN: CPT

## 2023-10-13 PROCEDURE — 87086 URINE CULTURE/COLONY COUNT: CPT

## 2023-10-13 PROCEDURE — 99291 CRITICAL CARE FIRST HOUR: CPT | Performed by: INTERNAL MEDICINE

## 2023-10-13 PROCEDURE — 83540 ASSAY OF IRON: CPT

## 2023-10-13 PROCEDURE — 85046 RETICYTE/HGB CONCENTRATE: CPT

## 2023-10-13 PROCEDURE — 87449 NOS EACH ORGANISM AG IA: CPT

## 2023-10-13 PROCEDURE — 86140 C-REACTIVE PROTEIN: CPT

## 2023-10-13 PROCEDURE — 700102 HCHG RX REV CODE 250 W/ 637 OVERRIDE(OP): Performed by: INTERNAL MEDICINE

## 2023-10-13 PROCEDURE — 86431 RHEUMATOID FACTOR QUANT: CPT

## 2023-10-13 PROCEDURE — 83036 HEMOGLOBIN GLYCOSYLATED A1C: CPT

## 2023-10-13 PROCEDURE — 84443 ASSAY THYROID STIM HORMONE: CPT

## 2023-10-13 PROCEDURE — 700105 HCHG RX REV CODE 258: Mod: UD | Performed by: EMERGENCY MEDICINE

## 2023-10-13 PROCEDURE — 82962 GLUCOSE BLOOD TEST: CPT

## 2023-10-13 PROCEDURE — 84145 PROCALCITONIN (PCT): CPT

## 2023-10-13 PROCEDURE — 81003 URINALYSIS AUTO W/O SCOPE: CPT

## 2023-10-13 PROCEDURE — 86200 CCP ANTIBODY: CPT

## 2023-10-13 PROCEDURE — 99223 1ST HOSP IP/OBS HIGH 75: CPT | Mod: AI | Performed by: INTERNAL MEDICINE

## 2023-10-13 PROCEDURE — 94640 AIRWAY INHALATION TREATMENT: CPT

## 2023-10-13 PROCEDURE — 86235 NUCLEAR ANTIGEN ANTIBODY: CPT

## 2023-10-13 PROCEDURE — 36415 COLL VENOUS BLD VENIPUNCTURE: CPT

## 2023-10-13 PROCEDURE — 700111 HCHG RX REV CODE 636 W/ 250 OVERRIDE (IP): Performed by: INTERNAL MEDICINE

## 2023-10-13 PROCEDURE — 82550 ASSAY OF CK (CPK): CPT

## 2023-10-13 PROCEDURE — 87641 MR-STAPH DNA AMP PROBE: CPT

## 2023-10-13 PROCEDURE — 83550 IRON BINDING TEST: CPT

## 2023-10-13 PROCEDURE — 84439 ASSAY OF FREE THYROXINE: CPT

## 2023-10-13 PROCEDURE — 83516 IMMUNOASSAY NONANTIBODY: CPT | Mod: 91

## 2023-10-13 PROCEDURE — 770000 HCHG ROOM/CARE - INTERMEDIATE ICU *

## 2023-10-13 PROCEDURE — 82607 VITAMIN B-12: CPT

## 2023-10-13 PROCEDURE — A9270 NON-COVERED ITEM OR SERVICE: HCPCS | Performed by: INTERNAL MEDICINE

## 2023-10-13 PROCEDURE — 86038 ANTINUCLEAR ANTIBODIES: CPT

## 2023-10-13 PROCEDURE — 87899 AGENT NOS ASSAY W/OPTIC: CPT

## 2023-10-13 PROCEDURE — 87651 STREP A DNA AMP PROBE: CPT

## 2023-10-13 PROCEDURE — 93306 TTE W/DOPPLER COMPLETE: CPT | Mod: 26 | Performed by: INTERNAL MEDICINE

## 2023-10-13 PROCEDURE — 83615 LACTATE (LD) (LDH) ENZYME: CPT

## 2023-10-13 PROCEDURE — 83010 ASSAY OF HAPTOGLOBIN QUANT: CPT

## 2023-10-13 RX ORDER — POLYETHYLENE GLYCOL 3350 17 G/17G
1 POWDER, FOR SOLUTION ORAL
Status: DISCONTINUED | OUTPATIENT
Start: 2023-10-13 | End: 2023-10-25 | Stop reason: HOSPADM

## 2023-10-13 RX ORDER — PROMETHAZINE HYDROCHLORIDE 25 MG/1
12.5-25 TABLET ORAL EVERY 4 HOURS PRN
Status: DISCONTINUED | OUTPATIENT
Start: 2023-10-13 | End: 2023-10-25 | Stop reason: HOSPADM

## 2023-10-13 RX ORDER — SERTRALINE HYDROCHLORIDE 100 MG/1
100 TABLET, FILM COATED ORAL EVERY MORNING
Status: DISCONTINUED | OUTPATIENT
Start: 2023-10-13 | End: 2023-10-25 | Stop reason: HOSPADM

## 2023-10-13 RX ORDER — ENOXAPARIN SODIUM 100 MG/ML
40 INJECTION SUBCUTANEOUS DAILY
Status: DISCONTINUED | OUTPATIENT
Start: 2023-10-13 | End: 2023-10-13

## 2023-10-13 RX ORDER — ARIPIPRAZOLE 10 MG/1
20 TABLET ORAL
Status: DISCONTINUED | OUTPATIENT
Start: 2023-10-13 | End: 2023-10-25 | Stop reason: HOSPADM

## 2023-10-13 RX ORDER — OXYBUTYNIN CHLORIDE 5 MG/1
10 TABLET, EXTENDED RELEASE ORAL DAILY
Status: DISCONTINUED | OUTPATIENT
Start: 2023-10-13 | End: 2023-10-25 | Stop reason: HOSPADM

## 2023-10-13 RX ORDER — AZITHROMYCIN 250 MG/1
500 TABLET, FILM COATED ORAL DAILY
Status: COMPLETED | OUTPATIENT
Start: 2023-10-13 | End: 2023-10-15

## 2023-10-13 RX ORDER — ATORVASTATIN CALCIUM 20 MG/1
20 TABLET, FILM COATED ORAL
Status: DISCONTINUED | OUTPATIENT
Start: 2023-10-13 | End: 2023-10-25 | Stop reason: HOSPADM

## 2023-10-13 RX ORDER — LEVOTHYROXINE SODIUM 0.2 MG/1
200 TABLET ORAL
Status: DISCONTINUED | OUTPATIENT
Start: 2023-10-13 | End: 2023-10-14

## 2023-10-13 RX ORDER — SODIUM CHLORIDE, SODIUM LACTATE, POTASSIUM CHLORIDE, CALCIUM CHLORIDE 600; 310; 30; 20 MG/100ML; MG/100ML; MG/100ML; MG/100ML
1000 INJECTION, SOLUTION INTRAVENOUS ONCE
Status: COMPLETED | OUTPATIENT
Start: 2023-10-13 | End: 2023-10-13

## 2023-10-13 RX ORDER — ACETAMINOPHEN 325 MG/1
650 TABLET ORAL EVERY 6 HOURS PRN
Status: DISCONTINUED | OUTPATIENT
Start: 2023-10-13 | End: 2023-10-25 | Stop reason: HOSPADM

## 2023-10-13 RX ORDER — BISACODYL 10 MG
10 SUPPOSITORY, RECTAL RECTAL
Status: DISCONTINUED | OUTPATIENT
Start: 2023-10-13 | End: 2023-10-25 | Stop reason: HOSPADM

## 2023-10-13 RX ORDER — DEXTROSE MONOHYDRATE 25 G/50ML
25 INJECTION, SOLUTION INTRAVENOUS
Status: DISCONTINUED | OUTPATIENT
Start: 2023-10-13 | End: 2023-10-17

## 2023-10-13 RX ORDER — QUETIAPINE FUMARATE 200 MG/1
200 TABLET, FILM COATED ORAL
Status: DISCONTINUED | OUTPATIENT
Start: 2023-10-13 | End: 2023-10-25 | Stop reason: HOSPADM

## 2023-10-13 RX ORDER — TOLTERODINE 4 MG/1
4 CAPSULE, EXTENDED RELEASE ORAL EVERY MORNING
Status: DISCONTINUED | OUTPATIENT
Start: 2023-10-13 | End: 2023-10-13

## 2023-10-13 RX ORDER — PROMETHAZINE HYDROCHLORIDE 25 MG/1
12.5-25 SUPPOSITORY RECTAL EVERY 4 HOURS PRN
Status: DISCONTINUED | OUTPATIENT
Start: 2023-10-13 | End: 2023-10-25 | Stop reason: HOSPADM

## 2023-10-13 RX ORDER — DIVALPROEX SODIUM 500 MG/1
1000 TABLET, DELAYED RELEASE ORAL
Status: DISCONTINUED | OUTPATIENT
Start: 2023-10-13 | End: 2023-10-25 | Stop reason: HOSPADM

## 2023-10-13 RX ORDER — PROCHLORPERAZINE EDISYLATE 5 MG/ML
5-10 INJECTION INTRAMUSCULAR; INTRAVENOUS EVERY 4 HOURS PRN
Status: DISCONTINUED | OUTPATIENT
Start: 2023-10-13 | End: 2023-10-25 | Stop reason: HOSPADM

## 2023-10-13 RX ORDER — ONDANSETRON 4 MG/1
4 TABLET, ORALLY DISINTEGRATING ORAL EVERY 4 HOURS PRN
Status: DISCONTINUED | OUTPATIENT
Start: 2023-10-13 | End: 2023-10-25 | Stop reason: HOSPADM

## 2023-10-13 RX ORDER — ONDANSETRON 2 MG/ML
4 INJECTION INTRAMUSCULAR; INTRAVENOUS EVERY 4 HOURS PRN
Status: DISCONTINUED | OUTPATIENT
Start: 2023-10-13 | End: 2023-10-25 | Stop reason: HOSPADM

## 2023-10-13 RX ORDER — SODIUM CHLORIDE 9 MG/ML
INJECTION, SOLUTION INTRAVENOUS CONTINUOUS
Status: ACTIVE | OUTPATIENT
Start: 2023-10-13 | End: 2023-10-13

## 2023-10-13 RX ORDER — AMOXICILLIN 250 MG
2 CAPSULE ORAL 2 TIMES DAILY
Status: DISCONTINUED | OUTPATIENT
Start: 2023-10-13 | End: 2023-10-25 | Stop reason: HOSPADM

## 2023-10-13 RX ORDER — LABETALOL HYDROCHLORIDE 5 MG/ML
10 INJECTION, SOLUTION INTRAVENOUS EVERY 4 HOURS PRN
Status: DISCONTINUED | OUTPATIENT
Start: 2023-10-13 | End: 2023-10-25 | Stop reason: HOSPADM

## 2023-10-13 RX ORDER — QUETIAPINE FUMARATE 50 MG/1
150 TABLET, FILM COATED ORAL EVERY MORNING
Status: DISCONTINUED | OUTPATIENT
Start: 2023-10-13 | End: 2023-10-25 | Stop reason: HOSPADM

## 2023-10-13 RX ADMIN — ATORVASTATIN CALCIUM 20 MG: 20 TABLET, FILM COATED ORAL at 04:16

## 2023-10-13 RX ADMIN — LEVOTHYROXINE SODIUM 200 MCG: 0.2 TABLET ORAL at 06:31

## 2023-10-13 RX ADMIN — ARIPIPRAZOLE 20 MG: 10 TABLET ORAL at 04:15

## 2023-10-13 RX ADMIN — SODIUM CHLORIDE: 9 INJECTION, SOLUTION INTRAVENOUS at 16:18

## 2023-10-13 RX ADMIN — INSULIN HUMAN 2 UNITS: 100 INJECTION, SOLUTION PARENTERAL at 17:46

## 2023-10-13 RX ADMIN — SODIUM CHLORIDE, POTASSIUM CHLORIDE, SODIUM LACTATE AND CALCIUM CHLORIDE 1000 ML: 600; 310; 30; 20 INJECTION, SOLUTION INTRAVENOUS at 00:32

## 2023-10-13 RX ADMIN — ARIPIPRAZOLE 20 MG: 10 TABLET ORAL at 20:07

## 2023-10-13 RX ADMIN — INSULIN HUMAN 1 UNITS: 100 INJECTION, SOLUTION PARENTERAL at 12:51

## 2023-10-13 RX ADMIN — CEFTRIAXONE SODIUM 2000 MG: 10 INJECTION, POWDER, FOR SOLUTION INTRAVENOUS at 06:29

## 2023-10-13 RX ADMIN — INSULIN HUMAN 2 UNITS: 100 INJECTION, SOLUTION PARENTERAL at 20:07

## 2023-10-13 RX ADMIN — AZITHROMYCIN DIHYDRATE 500 MG: 250 TABLET, FILM COATED ORAL at 18:01

## 2023-10-13 RX ADMIN — OXYBUTYNIN CHLORIDE 10 MG: 10 TABLET, EXTENDED RELEASE ORAL at 06:31

## 2023-10-13 RX ADMIN — ATORVASTATIN CALCIUM 20 MG: 20 TABLET, FILM COATED ORAL at 20:07

## 2023-10-13 RX ADMIN — DIVALPROEX SODIUM 1000 MG: 500 TABLET, DELAYED RELEASE ORAL at 20:06

## 2023-10-13 RX ADMIN — DIVALPROEX SODIUM 1000 MG: 500 TABLET, DELAYED RELEASE ORAL at 04:19

## 2023-10-13 RX ADMIN — DOCUSATE SODIUM 50 MG AND SENNOSIDES 8.6 MG 2 TABLET: 8.6; 5 TABLET, FILM COATED ORAL at 06:32

## 2023-10-13 RX ADMIN — QUETIAPINE FUMARATE 150 MG: 50 TABLET ORAL at 10:50

## 2023-10-13 RX ADMIN — QUETIAPINE FUMARATE 200 MG: 200 TABLET ORAL at 20:07

## 2023-10-13 RX ADMIN — SERTRALINE 100 MG: 100 TABLET, FILM COATED ORAL at 06:30

## 2023-10-13 RX ADMIN — QUETIAPINE FUMARATE 200 MG: 200 TABLET ORAL at 04:16

## 2023-10-13 ASSESSMENT — PAIN DESCRIPTION - PAIN TYPE
TYPE: ACUTE PAIN
TYPE: ACUTE PAIN

## 2023-10-13 ASSESSMENT — COPD QUESTIONNAIRES
COPD SCREENING SCORE: 1
DO YOU EVER COUGH UP ANY MUCUS OR PHLEGM?: NO/ONLY WITH OCCASIONAL COLDS OR INFECTIONS
HAVE YOU SMOKED AT LEAST 100 CIGARETTES IN YOUR ENTIRE LIFE: NO/DON'T KNOW
DURING THE PAST 4 WEEKS HOW MUCH DID YOU FEEL SHORT OF BREATH: NONE/LITTLE OF THE TIME

## 2023-10-13 ASSESSMENT — PATIENT HEALTH QUESTIONNAIRE - PHQ9
2. FEELING DOWN, DEPRESSED, IRRITABLE, OR HOPELESS: NOT AT ALL
SUM OF ALL RESPONSES TO PHQ9 QUESTIONS 1 AND 2: 0
1. LITTLE INTEREST OR PLEASURE IN DOING THINGS: NOT AT ALL

## 2023-10-13 ASSESSMENT — FIBROSIS 4 INDEX
FIB4 SCORE: 0.6
FIB4 SCORE: 0.6

## 2023-10-13 NOTE — ED NOTES
[facility] public guardians office called to request update on pt.  They have legal guardianship of pt.  Provided contact information for pt in addition to listed contact Ann.  On call phone number is [phone], Ann work phone is [phone].

## 2023-10-13 NOTE — PROGRESS NOTES
4 Eyes Skin Assessment Completed by ROD Chavez and ROD Miranda.    Head WDL  Ears WDL  Nose WDL  Mouth WDL  Neck WDL  Breast/Chest WDL  Shoulder Blades WDL  Spine WDL  (R) Arm/Elbow/Hand WDL  (L) Arm/Elbow/Hand WDL  Abdomen WDL  Groin WDL  Scrotum/Coccyx/Buttocks Redness and Blanching  (R) Leg WDL  (L) Leg Bruising  (R) Heel/Foot/Toe Redness and Blanching  (L) Heel/Foot/Toe Redness and Blanching          Devices In Places Tele Box, Blood Pressure Cuff, Pulse Ox, and Nasal Cannula      Interventions In Place NC W/Ear Foams, Pillows, Q2 Turns, Low Air Loss Mattress, Heels Loaded W/Pillows, and Pressure Redistribution Mattress    Possible Skin Injury No    Pictures Uploaded Into Epic N/A  Wound Consult Placed N/A  RN Wound Prevention Protocol Ordered No

## 2023-10-13 NOTE — ASSESSMENT & PLAN NOTE
10/24/2023  Secondary to pneumonia and pleural effusion  CT the chest negative for PE.    Echo showed grade 1 diastolic dysfunction with a normal ejection fraction.  Has been tapped on the L side on 10/14 of 750ml and again on 10/21 of 1,000ml  agressively diuresing  Repeat CXR  Cont O2/RT protocols

## 2023-10-13 NOTE — ASSESSMENT & PLAN NOTE
10/24/2023  Will hold Synjardy and glipizide  Continue insulin sliding scale with hypoglycemia protocol.  Carb consistent diet  Hemoglobin A1c is elevated at 10.9.

## 2023-10-13 NOTE — CARE PLAN
Problem: Humidified High Flow Nasal Cannula  Goal: Maintain adequate oxygenation dependent on patient condition  Description: Target End Date:  resolve prior to discharge or when underlying condition is resolved/stabilized    1.  Implement humidified high flow oxygen therapy  2.  Titrate high flow oxygen to maintain appropriate SpO2  Flowsheets  Taken 10/13/2023 1224  Outcome: Normoxia  Taken 10/13/2023 1002  O2 (LPM): 30  FiO2%: 80 %  Note:     Respiratory Update    Treatment modality: HHFNC    Frequency:Q4    Pt tolerating current treatments well with no adverse reactions.

## 2023-10-13 NOTE — DISCHARGE PLANNING
Trupti from [facility] came in and the patient is on service with [facility], and will take her back if she is DC's home

## 2023-10-13 NOTE — CONSULTS
Pulmonary Consultation    Date of consult: 10/13/2023    Referring Physician  MANJIT Yu*    Reason for Consultation  Pleural effusion, respiratory failure    History of Presenting Illness  58 y.o. female who presented 10/12/2023 with complaints of cough and chest pain for the past 3 days.  She has a past medical history of type 2 diabetes, hypertension, schizoaffective disorder, Parkinson's disease and intellectual disability.  She lives in a group home.  She has been having chest pain and productive cough for the past 3 days.  EMS was contacted, she was saturating 88% on room air per report and brought to the ER.  In the ER she was tachycardic, mildly hypotensive which responded to fluid resuscitation.  Flu/COVID/RSV swab was negative.  No leukocytosis, BNP 4000, hemoglobin 8.3 below baseline.  CT angiogram of the chest demonstrated moderate to large pericardial effusion, moderate left and small right pleural effusion, and bilateral compressive atelectasis.  Prior CT 2022 demonstrated small effusions and atelectasis. Incidental 3 mm LEFT upper lobe pulmonary nodule is unchanged since 2022    Stat echocardiogram demonstrated small posterior pericardial effusion no tamponade, not amendable to percutaneous drainage.  EF 61%.    BNP 4000  Renal function showing KYLE  Procal pending  Bcx collected  Started on C3/azithromycin    Tachycardic 120s  -120s, intermittent hypotension  HFNC 30L/80%    Code Status  Full Code    Review of Systems  Review of Systems   Unable to perform ROS: Mental status change     Past Medical History   has a past medical history of Diabetes (HCC), Dysthymic disorder, HTN (hypertension), Intellectual disability, Parkinsonism, and Schizoaffective disorder (HCC).    Surgical History   has a past surgical history that includes pr partial hip replacement (Left, 2/26/2022).    Family History  Family history reviewed and no significant conditions or diseases relevant to the chief  complaint were identified    Social History   reports that she has never smoked. She has never used smokeless tobacco. She reports that she does not drink alcohol and does not use drugs.    Medications  Home Medications       Reviewed by Brigette Yousif (Pharmacy [facility]) on 10/12/23 at 2057  Med List Status: Complete     Medication Last Dose Status   aripiprazole (ABILIFY) 20 MG tablet 10/11/2023 Active   atorvastatin (LIPITOR) 20 MG Tab 10/11/2023 Active   cephALEXin (KEFLEX) 500 MG Cap 9/20/2023 Active   divalproex (DEPAKOTE) 500 MG Tablet Delayed Response 10/11/2023 Active   donepezil (ARICEPT) 10 MG tablet 10/12/2023 Active   glipiZIDE SR (GLUCOTROL) 5 MG TABLET SR 24 HR 10/12/2023 Active   levothyroxine (SYNTHROID) 200 MCG Tab 10/12/2023 Active   lisinopril (PRINIVIL) 20 MG Tab 10/12/2023 Active   metoprolol-hydrochlorothiazide (LOPRESSOR HCT) 50-25 MG per tablet 10/12/2023 Active   quetiapine (SEROQUEL XR) 200 MG XR tablet 10/11/2023 Active   QUEtiapine Fumarate (SEROQUEL XR) 150 MG TABLET SR 24 HR 10/12/2023 Active   sertraline (ZOLOFT) 100 MG Tab 10/12/2023 Active   SYNJARDY XR 12.5-1000 MG TABLET SR 24 HR 10/12/2023 Active   tolterodine ER (DETROL LA) 4 MG CAPSULE SR 24 HR 10/12/2023 Active                  Current Facility-Administered Medications   Medication Dose Route Frequency Provider Last Rate Last Admin    senna-docusate (Pericolace Or Senokot S) 8.6-50 MG per tablet 2 Tablet  2 Tablet Oral BID Andrew Mancuso M.D.   2 Tablet at 10/13/23 0632    And    polyethylene glycol/lytes (Miralax) PACKET 1 Packet  1 Packet Oral QDAY PRN Andrew Mancuso M.D.        And    magnesium hydroxide (Milk Of Magnesia) suspension 30 mL  30 mL Oral QDAY PRN Andrew Mancuso M.D.        And    bisacodyl (Dulcolax) suppository 10 mg  10 mg Rectal QDAY PRN Andrew Mancuso M.D.        Respiratory Therapy Consult   Nebulization Continuous RT Andrew Mancuso M.D.        enoxaparin (Lovenox) inj 40 mg  40 mg  Subcutaneous DAILY AT 1800 Andrew Mancuso M.D.        acetaminophen (Tylenol) tablet 650 mg  650 mg Oral Q6HRS PRN Andrew Mancuso M.D.        labetalol (Normodyne/Trandate) injection 10 mg  10 mg Intravenous Q4HRS PRN Andrew Mancuso M.D.        ondansetron (Zofran) syringe/vial injection 4 mg  4 mg Intravenous Q4HRS PRN Andrew Mancuso M.D.        ondansetron (Zofran ODT) dispertab 4 mg  4 mg Oral Q4HRS PRN Andrew Mancuso M.D.        promethazine (Phenergan) tablet 12.5-25 mg  12.5-25 mg Oral Q4HRS PRN Andrew Mancuso M.D.        promethazine (Phenergan) suppository 12.5-25 mg  12.5-25 mg Rectal Q4HRS PRN Andrew Mancuso M.D.        prochlorperazine (Compazine) injection 5-10 mg  5-10 mg Intravenous Q4HRS PRN Andrew Mancuso M.D.        insulin regular (HumuLIN R,NovoLIN R) injection  1-6 Units Subcutaneous 4X/DAY MOY Mancuso M.D.   1 Units at 10/13/23 1251    And    dextrose 50% (D50W) injection 25 g  25 g Intravenous Q15 MIN PRN Andrew Mancuso M.D.        cefTRIAXone (Rocephin) syringe 2,000 mg  2,000 mg Intravenous Q24HRS Andrew Mancuso M.D.   2,000 mg at 10/13/23 0629    azithromycin (Zithromax) tablet 500 mg  500 mg Oral DAILY Andrew Mancuso M.D.        ARIPiprazole (Abilify) tablet 20 mg  20 mg Oral QHS Andrew Mancuso M.D.   20 mg at 10/13/23 0415    atorvastatin (Lipitor) tablet 20 mg  20 mg Oral QHS Andrew Mancuso M.D.   20 mg at 10/13/23 0416    divalproex (Depakote) delayed-release tablet 1,000 mg  1,000 mg Oral QHS Andrew Mancuso M.D.   1,000 mg at 10/13/23 0419    levothyroxine (Synthroid) tablet 200 mcg  200 mcg Oral AM ES Andrew Mancuso M.D.   200 mcg at 10/13/23 0631    QUEtiapine (SEROquel) tablet 150 mg  150 mg Oral GELY Mancuso M.D.   150 mg at 10/13/23 1050    QUEtiapine (SEROquel) tablet 200 mg  200 mg Oral QHS Andrew Mancuso M.D.   200 mg at 10/13/23 0416    sertraline (Zoloft) tablet 100 mg  100 mg Oral GELY Mancuso M.D.    100 mg at 10/13/23 0630    oxybutynin SR (Ditropan-XL) tablet 10 mg  10 mg Oral DAILY Andrew Mancuso M.D.   10 mg at 10/13/23 0631    NS infusion   Intravenous Continuous Trip Lombardo M.D. 75 mL/hr at 10/13/23 1618 New Bag at 10/13/23 1618       Allergies  Allergies   Allergen Reactions    Risperdal Unspecified     No reaction documented     Vital Signs last 24 hours  Temp:  [36.8 °C (98.3 °F)-37.2 °C (98.9 °F)] 36.9 °C (98.4 °F)  Pulse:  [103-130] 130  Resp:  [14-46] 24  BP: ()/(50-68) 99/51  SpO2:  [89 %-98 %] 98 %    Physical Exam  Physical Exam  Vitals and nursing note reviewed.   Constitutional:       General: She is in acute distress.      Appearance: She is well-developed and normal weight. She is ill-appearing. She is not diaphoretic.      Comments: Very pleasant   Eyes:      General: No scleral icterus.        Right eye: No discharge.         Left eye: No discharge.      Conjunctiva/sclera: Conjunctivae normal.      Pupils: Pupils are equal, round, and reactive to light.   Neck:      Thyroid: No thyromegaly.      Vascular: No JVD.   Cardiovascular:      Rate and Rhythm: Normal rate and regular rhythm.      Heart sounds: Normal heart sounds. No murmur heard.     No gallop.   Pulmonary:      Effort: Respiratory distress present.      Breath sounds: Rhonchi (LLL) present. No wheezing or rales.   Abdominal:      General: There is no distension.      Palpations: Abdomen is soft.      Tenderness: There is no abdominal tenderness. There is no guarding.   Musculoskeletal:         General: No tenderness.      Right lower leg: Edema present.      Left lower leg: Edema present.   Lymphadenopathy:      Cervical: No cervical adenopathy.   Skin:     General: Skin is warm.      Capillary Refill: Capillary refill takes less than 2 seconds.      Findings: No erythema or rash.   Neurological:      Mental Status: She is alert. Mental status is at baseline.      Cranial Nerves: No cranial nerve deficit.       Sensory: No sensory deficit.      Motor: No abnormal muscle tone.   Psychiatric:         Behavior: Behavior normal.       Fluids    Intake/Output Summary (Last 24 hours) at 10/13/2023 1622  Last data filed at 10/12/2023 2107  Gross per 24 hour   Intake 1000 ml   Output --   Net 1000 ml       Laboratory  Recent Results (from the past 48 hour(s))   CBC With Differential    Collection Time: 10/12/23  6:21 PM   Result Value Ref Range    WBC 9.0 4.8 - 10.8 K/uL    RBC 3.29 (L) 4.20 - 5.40 M/uL    Hemoglobin 8.3 (L) 12.0 - 16.0 g/dL    Hematocrit 29.1 (L) 37.0 - 47.0 %    MCV 88.4 81.4 - 97.8 fL    MCH 25.2 (L) 27.0 - 33.0 pg    MCHC 28.5 (L) 32.2 - 35.5 g/dL    RDW 55.2 (H) 35.9 - 50.0 fL    Platelet Count 347 164 - 446 K/uL    MPV 8.1 (L) 9.0 - 12.9 fL    Neutrophils-Polys 69.60 44.00 - 72.00 %    Lymphocytes 20.00 (L) 22.00 - 41.00 %    Monocytes 10.40 0.00 - 13.40 %    Eosinophils 0.00 0.00 - 6.90 %    Basophils 0.00 0.00 - 1.80 %    Nucleated RBC 0.00 0.00 - 0.20 /100 WBC    Neutrophils (Absolute) 6.26 1.82 - 7.42 K/uL    Lymphs (Absolute) 1.80 1.00 - 4.80 K/uL    Monos (Absolute) 0.94 (H) 0.00 - 0.85 K/uL    Eos (Absolute) 0.00 0.00 - 0.51 K/uL    Baso (Absolute) 0.00 0.00 - 0.12 K/uL    NRBC (Absolute) 0.00 K/uL   Comp Metabolic Panel    Collection Time: 10/12/23  6:21 PM   Result Value Ref Range    Sodium 138 135 - 145 mmol/L    Potassium 4.5 3.6 - 5.5 mmol/L    Chloride 105 96 - 112 mmol/L    Co2 21 20 - 33 mmol/L    Anion Gap 12.0 7.0 - 16.0    Glucose 146 (H) 65 - 99 mg/dL    Bun 38 (H) 8 - 22 mg/dL    Creatinine 1.00 0.50 - 1.40 mg/dL    Calcium 8.5 8.5 - 10.5 mg/dL    Correct Calcium 9.2 8.5 - 10.5 mg/dL    AST(SGOT) 10 (L) 12 - 45 U/L    ALT(SGPT) 8 2 - 50 U/L    Alkaline Phosphatase 87 30 - 99 U/L    Total Bilirubin <0.2 0.1 - 1.5 mg/dL    Albumin 3.1 (L) 3.2 - 4.9 g/dL    Total Protein 6.6 6.0 - 8.2 g/dL    Globulin 3.5 1.9 - 3.5 g/dL    A-G Ratio 0.9 g/dL   Lactic Acid    Collection Time: 10/12/23  6:21  PM   Result Value Ref Range    Lactic Acid 1.4 0.5 - 2.0 mmol/L   Magnesium    Collection Time: 10/12/23  6:21 PM   Result Value Ref Range    Magnesium 2.3 1.5 - 2.5 mg/dL   Phosphorus    Collection Time: 10/12/23  6:21 PM   Result Value Ref Range    Phosphorus 3.0 2.5 - 4.5 mg/dL   Troponin    Collection Time: 10/12/23  6:21 PM   Result Value Ref Range    Troponin T 27 (H) 6 - 19 ng/L   proBrain Natriuretic Peptide, NT    Collection Time: 10/12/23  6:21 PM   Result Value Ref Range    NT-proBNP 4064 (H) 0 - 125 pg/mL   ESTIMATED GFR    Collection Time: 10/12/23  6:21 PM   Result Value Ref Range    GFR (CKD-EPI) 65 >60 mL/min/1.73 m 2   MORPHOLOGY    Collection Time: 10/12/23  6:21 PM   Result Value Ref Range    RBC Morphology Normal    PERIPHERAL SMEAR REVIEW    Collection Time: 10/12/23  6:21 PM   Result Value Ref Range    Peripheral Smear Review see below    DIFFERENTIAL MANUAL    Collection Time: 10/12/23  6:21 PM   Result Value Ref Range    Manual Diff Status PERFORMED    PLATELET ESTIMATE    Collection Time: 10/12/23  6:21 PM   Result Value Ref Range    Plt Estimation Normal    CoV-2, Flu A/B, And RSV by PCR ([employer])    Collection Time: 10/12/23  6:43 PM    Specimen: Respirate   Result Value Ref Range    Influenza virus A RNA Negative Negative    Influenza virus B, PCR Negative Negative    RSV, PCR Negative Negative    SARS-CoV-2 by PCR NotDetected     SARS-CoV-2 Source NP Swab    Respiratory Panel by PCR (Inpatient ONLY)    Collection Time: 10/12/23  6:43 PM    Specimen: Nasopharyngeal   Result Value Ref Range    Adenovirus, PCR Not Detected     SARS-CoV-2 (COVID-19) RNA by DOMINIC NotDetected     Coronavirus 229E, PCR Not Detected     Coronavirus HKU1, PCR Not Detected     Coronavirus NL63, PCR Not Detected     Coronavirus OC43, PCR Not Detected     Human Metapneumovirus, PCR Not Detected     Rhino/Enterovirus, PCR Not Detected     Influenza A, PCR Not Detected     Influenza B, PCR Not Detected     Parainfluenza  1, PCR Not Detected     Parainfluenza 2, PCR Not Detected     Parainfluenza 3, PCR Not Detected     Parainfluenza 4, PCR Not Detected     RSV (Respiratory Syncytial Virus), PCR Not Detected     Bordetella parapertussis ([id number]), PCR Not Detected     Bordetella pertussis (ptxP), PCR Not Detected     Mycoplasma pneumoniae, PCR Not Detected     Chlamydia pneumoniae, PCR Not Detected    Lactic Acid    Collection Time: 10/12/23  6:48 PM   Result Value Ref Range    Lactic Acid 1.4 0.5 - 2.0 mmol/L   Blood Culture    Collection Time: 10/12/23  6:48 PM    Specimen: Peripheral; Blood   Result Value Ref Range    Significant Indicator NEG     Source BLD     Site PERIPHERAL     Culture Result       No Growth  Note: Blood cultures are incubated for 5 days and  are monitored continuously.Positive blood cultures  are called to the RN and reported as soon as  they are identified.     EKG    Collection Time: 10/12/23  7:58 PM   Result Value Ref Range    Report       [facility] Emergency Dept.    Test Date:  2023-10-12  Pt Name:    JASSON MENDEZ              Department: ER  MRN:        [id number]                      Room:       [facility]  Gender:     Female                       Technician: [id number]  :        [id number]                   Requested By:KARISSA WHITE  Order #:    [id number]                    Reading MD: Karissa White MD    Measurements  Intervals                                Axis  Rate:       123                          P:          49  RI:         130                          QRS:        26  QRSD:       49                           T:          0  QT:         341  QTc:        488    Interpretive Statements  Sinus tachycardia rate of 123, no st elevations or st depressions, no abn t  waves or q waves  Low voltage, extremity leads  Abnormal R-wave progression, early transition  Compared to ECG 2022 07:27:48  Low QRS voltage now present  Electronically Signed On 10- 01:00:02 PDT by  Karissa Armenta MD     Blood Culture    Collection Time: 10/12/23  8:16 PM    Specimen: Peripheral; Blood   Result Value Ref Range    Significant Indicator NEG     Source BLD     Site PERIPHERAL     Culture Result       No Growth  Note: Blood cultures are incubated for 5 days and  are monitored continuously.Positive blood cultures  are called to the RN and reported as soon as  they are identified.     POCT glucose device results    Collection Time: 10/12/23 11:00 PM   Result Value Ref Range    POC Glucose, Blood 142 (H) 65 - 99 mg/dL   EC-ECHOCARDIOGRAM COMPLETE W/O CONT    Collection Time: 10/12/23 11:57 PM   Result Value Ref Range    Eject.Frac. MOD BP 61.92     Eject.Frac. MOD 4C 61.05     Eject.Frac. MOD 2C 63.06     Left Ventrical Ejection Fraction 61    IRON/TOTAL IRON BIND    Collection Time: 10/13/23  1:55 AM   Result Value Ref Range    Iron 10 (L) 40 - 170 ug/dL    Total Iron Binding 181 (L) 250 - 450 ug/dL    Unsat Iron Binding 171 110 - 370 ug/dL    % Saturation 6 (L) 15 - 55 %   FERRITIN    Collection Time: 10/13/23  1:55 AM   Result Value Ref Range    Ferritin 715.0 (H) 10.0 - 291.0 ng/mL   VITAMIN B12    Collection Time: 10/13/23  1:55 AM   Result Value Ref Range    Vitamin B12 -True Cobalamin 402 211 - 911 pg/mL   RETICULOCYTES COUNT    Collection Time: 10/13/23  1:55 AM   Result Value Ref Range    Reticulocyte Count 1.6 0.8 - 2.6 %    Retic, Absolute 0.04 0.04 - 0.12 M/uL    Imm. Reticulocyte Fraction 26.9 (H) 2.6 - 16.1 %    Retic Hgb Equivalent 21.8 (L) 29.0 - 35.0 pg/cell   CREATINE KINASE    Collection Time: 10/13/23  1:55 AM   Result Value Ref Range    CPK Total 58 0 - 154 U/L   FREE THYROXINE    Collection Time: 10/13/23  1:55 AM   Result Value Ref Range    Free T-4 1.39 0.93 - 1.70 ng/dL   TSH    Collection Time: 10/13/23  1:55 AM   Result Value Ref Range    TSH 0.040 (L) 0.380 - 5.330 uIU/mL   PROCALCITONIN    Collection Time: 10/13/23  1:55 AM   Result Value Ref Range    Procalcitonin 0.24  <0.25 ng/mL   HEMOGLOBIN A1C    Collection Time: 10/13/23  1:55 AM   Result Value Ref Range    Glycohemoglobin 10.9 (H) 4.0 - 5.6 %    Est Avg Glucose 266 mg/dL   CRP QUANTITIVE (NON-CARDIAC)    Collection Time: 10/13/23  1:55 AM   Result Value Ref Range    Stat C-Reactive Protein 22.56 (H) 0.00 - 0.75 mg/dL   POCT glucose device results    Collection Time: 10/13/23  8:39 AM   Result Value Ref Range    POC Glucose, Blood 102 (H) 65 - 99 mg/dL   POCT glucose device results    Collection Time: 10/13/23 12:49 PM   Result Value Ref Range    POC Glucose, Blood 183 (H) 65 - 99 mg/dL   MRSA By PCR (Amp)    Collection Time: 10/13/23  1:02 PM    Specimen: Nares; Respirate   Result Value Ref Range    MRSA by PCR Negative Negative     Imaging  EC-ECHOCARDIOGRAM COMPLETE W/O CONT   Final Result      CT-CTA CHEST PULMONARY ARTERY W/ RECONS   Final Result      1.  No central or segmental pulmonary embolus   2.  Moderate to large pericardial effusion   3.  Small RIGHT pleural effusion   4.  Moderate LEFT pleural effusion   5.  BILATERAL compressive atelectasis   6.  Unchanged 3 mm LEFT upper lobe pulmonary nodule, very likely benign   7.  Enlarged pretracheal lymph node      Findings were communicated with and acknowledged by JEAN CLAUDE WHITE via [employer] on 10/12/2023 9:17 PM.            DX-CHEST-PORTABLE (1 VIEW)   Final Result      1.  Hypoinflation and bibasilar atelectasis.  Superimposed pneumonia is not excluded.   2.  Stable cardiomegaly.        ECG sinus tach, no ischemic changes no electrical alternans    Assessment/Plan    # Acute hypoxemic respiratory failure  # Bilateral L > R pleural effusions of unclear etiology  # Pericardial Effusion, elevated BNP, no echocardiogram evidence of tamponade, preserved EF, no prior cardiac disease, ECG reassuring  # Normocytic Anemia of unclear etiology  # KYLE  # Poorly controlled Type II DM- A1c 10.9%  # Incidental pulmonary nodule 3mm left upper lobe- stable since 2022    Sudden  onset of symptoms and respiratory failure with new pleural and pericardial effusions of unclear etiology. Significantly elevated inflammatory markers, perhaps underlying subclinical autoimmune disease, pleuro/pericarditis? Meets no SIRS critieria, afebrile, WBC wnl, procal wnl, resp path PCR negative. New anemia, no trauma to chest per report.     -- L diagnostic/therapeutic thoracentesis  -- Cont high flow, titrate supplemental O2 to maintain saturations 88-92%  -- Low suspicion for infection, however with unclear diagnosis I agree with empiric abx until further clarity. MRSA nares neg, f/u bcx, sputum cx, ucx/UA  -- sent hemolysis labs, connective tissue disease panel, DAGO reflex, RF/CCP,  ESR, strep PCR, legionella/strep UAG  -- hold on steroids in setting of hyperglycemia 2/2 poorly controlled/newly dx DM2. Insulin regimen to maintain goal FSBG 140-180  -- ferritin modestly elevated, however LFTs wnl, low suspicion for HLH     Discussed patient condition and risk of morbidity and/or mortality with Hospitalist, RN, and Patient.      The patient remains critically ill.  Critical care time = 50 minutes in directly providing and coordinating critical care and extensive data review.  No time overlap and excludes procedures.    This note was generated using voice recognition software which has a chance of producing errors of grammar and content.  I have made every reasonable attempt to find and correct any errors, but it should be expected that some may not be found prior to finalization of this note.  __________  Alejandro Armando MD  Pulmonary and Critical Care Medicine  [facility]

## 2023-10-13 NOTE — ED NOTES
Pt resting in gurney, connected to monitor. Pt awake and having verbal outbursts.  No acute distress at this time.

## 2023-10-13 NOTE — ED PROVIDER NOTES
ED Provider Note    CHIEF COMPLAINT  Chief Complaint   Patient presents with    Cough     Chest pain with cough for 3 days       EXTERNAL RECORDS REVIEWED  Outpatient Notes patient was last seen in our department in November 2022 for chest pain from a group home.  Patient has a history of schizoaffective disorder parkinsonism and intellectual disability    HPI/ROS  LIMITATION TO HISTORY   Select: Altered mental status / Confusion  OUTSIDE HISTORIAN(S):  EMS brought in from group home by EMS for cough was 88 to 89% on room air placed on 2 L nasal cannula    Erlinda Verde is a 58 y.o. female who [facility] to the [facility] with a couple of days of cough the patient is kind of a difficult historian cannot really tell me how long she is been coughing for just keeps asking me to sit her bed up because she does not like laying down flat she does state it is a little hard to breathe.  Other than that she is kind of a difficult historian to started asking me for ice cream    PAST MEDICAL HISTORY   has a past medical history of Diabetes (HCC), Dysthymic disorder, HTN (hypertension), Intellectual disability, Parkinsonism, and Schizoaffective disorder (HCC).    SURGICAL HISTORY   has a past surgical history that includes partial hip replacement (Left, 2/26/2022).    FAMILY HISTORY  No family history on file.    SOCIAL HISTORY  Social History     Tobacco Use    Smoking status: Never    Smokeless tobacco: Never   Vaping Use    Vaping Use: Never used   Substance and Sexual Activity    Alcohol use: Never    Drug use: Never    Sexual activity: Not on file       CURRENT MEDICATIONS  Home Medications    **Home medications have not yet been reviewed for this encounter**         ALLERGIES  Allergies   Allergen Reactions    Risperdal        PHYSICAL EXAM  VITAL SIGNS: /57   Pulse (!) 124   Temp 36.8 °C (98.3 °F) (Temporal)   Resp 20   Wt 60.3 kg (133 lb)   SpO2 97%   BMI 23.56 kg/m²    Pulse OX: Pulse Oxygen level  is normal on 2 L nasal cannula  Constitutional: Alert in mild distress  HENT: Normocephalic, Atraumatic, mucous membranes  Eyes: PERound. Conjunctiva normal, non-icteric.   Heart: The rhythm tachycardic mildly diminished heart sounds, intact distal pulses   Lungs: Symmetrical movement, no tachypnea bilateral diminished lung sounds with a little bit of rhonchi bilaterally left greater than right  Abdomen: Non-tender, non-distended, normal bowel sounds  EXT/Back edema no CVA tenderness  Skin: Warm, Dry, No erythema, No rash. Trace edema  Neurologic: Alert and oriented, Grossly non-focal.       DIAGNOSTIC STUDIES / PROCEDURES  EKG  I have independently interpreted this EKG  Results for orders placed or performed during the hospital encounter of 10/12/23   EKG   Result Value Ref Range    Report       [facility] Emergency Dept.    Test Date:  2023-10-12  Pt Name:    JASSON MENDEZ              Department: ER  MRN:        [id number]                      Room:       [facility]  Gender:     Female                       Technician: [id number]  :        1965                   Requested By:KARISSA WHITE  Order #:    [id number]                    Reading MD: Karissa White MD    Measurements  Intervals                                Axis  Rate:       123                          P:          49  GA:         130                          QRS:        26  QRSD:       49                           T:          0  QT:         341  QTc:        488    Interpretive Statements  Sinus tachycardia rate of 123, no st elevations or st depressions, no abn t  waves or q waves  Low voltage, extremity leads  Abnormal R-wave progression, early transition  Compared to ECG 2022 07:27:48  Low QRS voltage now present  Electronically Signed On 10- 01:00:02 PDT by Karissa White MD           LABS  Labs Reviewed   CBC WITH DIFFERENTIAL - Abnormal; Notable for the following components:       Result Value    RBC 3.29 (*)      Hemoglobin 8.3 (*)     Hematocrit 29.1 (*)     MCH 25.2 (*)     MCHC 28.5 (*)     RDW 55.2 (*)     MPV 8.1 (*)     Lymphocytes 20.00 (*)     Monos (Absolute) 0.94 (*)     All other components within normal limits    Narrative:     Biotin intake of greater than 5 mg per day may interfere with  troponin levels, causing false low values.   COMP METABOLIC PANEL - Abnormal; Notable for the following components:    Glucose 146 (*)     Bun 38 (*)     AST(SGOT) 10 (*)     Albumin 3.1 (*)     All other components within normal limits    Narrative:     Biotin intake of greater than 5 mg per day may interfere with  troponin levels, causing false low values.   TROPONIN - Abnormal; Notable for the following components:    Troponin T 27 (*)     All other components within normal limits    Narrative:     Biotin intake of greater than 5 mg per day may interfere with  troponin levels, causing false low values.   PROBRAIN NATRIURETIC PEPTIDE, NT - Abnormal; Notable for the following components:    NT-proBNP 4064 (*)     All other components within normal limits    Narrative:     Biotin intake of greater than 5 mg per day may interfere with  troponin levels, causing false low values.   POCT GLUCOSE DEVICE RESULTS - Abnormal; Notable for the following components:    POC Glucose, Blood 142 (*)     All other components within normal limits   LACTIC ACID    Narrative:     Biotin intake of greater than 5 mg per day may interfere with  troponin levels, causing false low values.   LACTIC ACID   COV-2, FLU A/B, AND RSV BY PCR (CEPHEID)    Narrative:     Release to patient->Immediate   MAGNESIUM    Narrative:     Biotin intake of greater than 5 mg per day may interfere with  troponin levels, causing false low values.   PHOSPHORUS    Narrative:     Biotin intake of greater than 5 mg per day may interfere with  troponin levels, causing false low values.   ESTIMATED GFR    Narrative:     Biotin intake of greater than 5 mg per day may interfere  "with  troponin levels, causing false low values.   MORPHOLOGY    Narrative:     Biotin intake of greater than 5 mg per day may interfere with  troponin levels, causing false low values.   PERIPHERAL SMEAR REVIEW    Narrative:     Biotin intake of greater than 5 mg per day may interfere with  troponin levels, causing false low values.   DIFFERENTIAL MANUAL    Narrative:     Biotin intake of greater than 5 mg per day may interfere with  troponin levels, causing false low values.   PLATELET ESTIMATE    Narrative:     Biotin intake of greater than 5 mg per day may interfere with  troponin levels, causing false low values.   BLOOD CULTURE    Narrative:     1 of 2 for Blood Culture x 2 sites order. Per Hospital  Policy: Only change Specimen Src: to \"Line\" if specified by  physician order.  Release to patient->Immediate   BLOOD CULTURE    Narrative:     2 of 2 blood culture x2  Sites order. Per Hospital Policy:  Only change Specimen Src: to \"Line\" if specified by physician  order.  Release to patient->Immediate   CULTURE RESPIRATORY W/ GRM STN   TSH WITH REFLEX TO FT4   FREE THYROXINE   IRON/TOTAL IRON BIND   FERRITIN   VITAMIN B12   RETICULOCYTES COUNT   CREATINE KINASE         RADIOLOGY  I have independently interpreted the diagnostic imaging associated with this visit and am waiting the final reading from the radiologist.   My preliminary interpretation is as follows:       Chest x-ray with cardiomegaly bilateral effusions left greater than right concerning for pneumonia or atelectasis    CTA of the chest no evidence of large pulmonary malaise him with bilateral effusions and pericardial effusion present    Radiologist interpretation:     EC-ECHOCARDIOGRAM COMPLETE W/O CONT   Final Result      CT-CTA CHEST PULMONARY ARTERY W/ RECONS   Final Result      1.  No central or segmental pulmonary embolus   2.  Moderate to large pericardial effusion   3.  Small RIGHT pleural effusion   4.  Moderate LEFT pleural effusion   5.  " BILATERAL compressive atelectasis   6.  Unchanged 3 mm LEFT upper lobe pulmonary nodule, very likely benign   7.  Enlarged pretracheal lymph node      Findings were communicated with and acknowledged by JEAN CLAUDE WHITE via Voalte Me on 10/12/2023 9:17 PM.            DX-CHEST-PORTABLE (1 VIEW)   Final Result      1.  Hypoinflation and bibasilar atelectasis.  Superimposed pneumonia is not excluded.   2.  Stable cardiomegaly.            COURSE & MEDICAL DECISION MAKING    ED Observation Status? Yes; I am placing the patient in to an observation status due to a diagnostic uncertainty as well as therapeutic intensity. Patient placed in observation status at 6:26 PM, 10/12/2023.     Observation plan is as follows: Sepsis resuscitation IV fluids evaluation for infection    Upon Reevaluation, the patient's condition has: not improved; and will be escalated to hospitalization.    Patient discharged from ED Observation status at 9:30 PM (Time) 10/12/23  (Date).     INITIAL ASSESSMENT, COURSE AND PLAN/ DISPOSITION AND DISCUSSIONS  Care Narrative: Patient is a 58-year-old poor historian who presents with shortness of breath she is tachycardic with a little bit tachypneic with diminished breath sounds hypoxic and a soft blood pressure meeting criteria for sepsis.  She will be treated with IV antibiotics IV fluids resuscitation evaluation for infectious course as well as possible pulmonary embolism she does have some trace edema.      9:30 Pm   spoke w dr jose contreras radiology guarding the patient CT scan findings no evidence of pulmonary embolism and there is a moderate to large pericardial effusion bilateral pleural effusions    9:59 PM  I spoke with Dr. Stone of the medicine service and he will come evaluate the patient    10:25 PM  Patients BP has become softened a little bit is no longer systolic in the 120s is now kind of down into the 90s and after discussion with the hospitalist I agree that we will get a stat echo or at  least consult cardiology for rule out tamponade as her blood pressure is getting worse instead of better with IV fluids.,      10:34 PM  Spoke w Dr cotton w cardiology = agreed requesting a voalte when imaging is done     12:16 AM  Echo without tamponade as reviewed by Dr. Cotton the patient still has had mild blood pressure but she is tolerating and resting comfortably only on 2 L nasal cannula and we did speak with ICU for possible IMCU hospitalization.      12:30 AM  Spoke with Dr Sandoval after reviewing the patient's case he agrees with an IMCU admission at this time.    12:47 AM  I spoke with Dr Saunders who has accepted the patient to the IM.      Patient continues to rest with a systolic blood pressure in the mid 90s with a MAP greater than 65 she is requiring 2 L nasal cannula only has not required any further she still receiving IV fluid resuscitation and continues to be in a sinus rhythm in the 120s.  She is not in any severe respiratory distress or extremis but if she is in critical condition and may worsen at any time.  She will be hospitalized for further evaluation as to the cause of her bilateral effusions and her pericardial effusion.  She is not in any renal failure I did treat her for sepsis protocol with IV fluids and antibiotics but her white blood cell count is normal.  Cardiac enzyme is really unremarkable.  Patient understands she is being admitted otherwise she is kind of difficult to discuss her case with    HYDRATION: Based on the patient's presentation of Sepsis the patient was given IV fluids. IV Hydration was used because oral hydration was not as rapid as required. Upon recheck following hydration, the patient was not significantly improved.      CRITICAL CARE  The very real possibilty of a deterioration of this patient's condition required the highest level of my preparedness for sudden, emergent intervention.  I provided critical care services, which included medication orders, frequent  reevaluations of the patient's condition and response to treatment, ordering and reviewing test results, and discussing the case with various consultants.  The critical care time associated with the care of the patient was 90 minutes. Review chart for interventions. This time is exclusive of any other billable procedures.       I have discussed management of the patient with the following physicians and JOSE ALFREDO's:  Dr Maloney, Dr Stone, Dr Shelton, Dr Saunders, DR Valle    Discussion of management with other [facility] or appropriate source(s): Pharmacy for antibx        Decision tools and prescription drugs considered including, but not limited to: HEART Score 4 .wells score 1.5     FINAL DIAGNOSIS  1. Community acquired pneumonia B/l lower lobes, unspecified laterality    2. Pericardial effusion    3. Bilateral pleural effusion    4. Anemia  5. KYLE     Cct 90 min        Electronically signed by: Karissa Armenta M.D., 10/12/2023 6:26 PM

## 2023-10-13 NOTE — PROGRESS NOTES
Pt transferred to T635 on monitor with RN. Pt is A&OX3. Pt medications and belongings brought with pt. Pt updated with POC and oriented to room.

## 2023-10-13 NOTE — H&P
Hospital Medicine History & Physical Note    Date of Service  10/13/2023    Primary Care Physician  Whitney Graham M.D.        Code Status  Full Code    Chief Complaint  Chief Complaint   Patient presents with    Cough     Chest pain with cough for 3 days       History of Presenting Illness  Erlinda Verde is a 58 y.o. female with past medical history of type 2 diabetes, hypertension, schizoaffective disorder, Parkinson disease who presented 10/12/2023 from group home for cough for 3 days, desaturation to 88% on room air per EMS report, some chest pain with cough.  Patient is poor historian due to her intellectual disability.  In ER she noted to have tachycardia 124, which appears to be sinus tachycardia with nonspecific T wave flattening, but soft blood pressure even after given septic bolus.  On 2 L nasal cannula, SPO2 91%.  B19/influenza/RSV screen negative.  CBC does not showed WBC elevation, did showed hemoglobin 8.3 which is below baseline.  Troponin 27, proBNP 4000, lactic acid 1.4.  CTA of the chest negative for PE showed moderate to large pericardial effusion, moderate left and small right pleural effusion, bilateral compressive atelectasis.  Follow-up stat echo did show small  effusion in the pericardium, no tamponade.  EF 61%, grade 1 diastolic dysfunction.  ERP spoke to cardiology, Dr. Maloney, there was no additional recommendations.  Patient also was decided to be admitted to IMCU per conversation with Dr. Sandoval/critical care    It appears patient is not ambulatory.    I discussed the plan of care with patient and ERP, cardiology, critical care .    Review of Systems  Review of Systems   Unable to perform ROS: Dementia       Past Medical History   has a past medical history of Diabetes (HCC), Dysthymic disorder, HTN (hypertension), Intellectual disability, Parkinsonism, and Schizoaffective disorder (HCC).    Surgical History   has a past surgical history that includes pr partial hip replacement  (Left, 2/26/2022).     Family History  family history is not on file.   Family history reviewed with patient. There is no family history that is pertinent to the chief complaint.     Social History   reports that she has never smoked. She has never used smokeless tobacco. She reports that she does not drink alcohol and does not use drugs.    Allergies  Allergies   Allergen Reactions    Risperdal Unspecified     No reaction documented       Medications  Prior to Admission Medications   Prescriptions Last Dose Informant Patient Reported? Taking?   QUEtiapine Fumarate (SEROQUEL XR) 150 MG TABLET SR 24 HR 10/12/2023 at 0700 Other Facility Yes Yes   Sig: Take 150 mg by mouth every morning.   SYNJARDY XR 12.5-1000 MG TABLET SR 24 HR 10/12/2023 at 0700 Other Facility Yes No   Sig: Take 1 Tablet by mouth every morning.   aripiprazole (ABILIFY) 20 MG tablet 10/11/2023 at 2100 Other Facility Yes No   Sig: Take 20 mg by mouth at bedtime.   atorvastatin (LIPITOR) 20 MG Tab 10/11/2023 at 2100 Other Facility Yes No   Sig: Take 20 mg by mouth at bedtime.   cephALEXin (KEFLEX) 500 MG Cap 9/20/2023 at FINISHED Other Facility Yes No   Sig: Take 500 mg by mouth 4 times a day. 7 day course prescribed 9/13/2023. (FINISHED)   divalproex (DEPAKOTE) 500 MG Tablet Delayed Response 10/11/2023 at 2100 Other Facility Yes No   Sig: Take 1,000 mg by mouth at bedtime. 2 tablets = 1,000 mg.   donepezil (ARICEPT) 10 MG tablet 10/12/2023 at 0700 Other Facility Yes No   Sig: Take 10 mg by mouth every morning.   glipiZIDE SR (GLUCOTROL) 5 MG TABLET SR 24 HR 10/12/2023 at 0700 Other Facility Yes No   Sig: Take 5 mg by mouth every morning.   levothyroxine (SYNTHROID) 200 MCG Tab 10/12/2023 at 0600 Other Facility Yes No   Sig: Take 200 mcg by mouth every morning on an empty stomach.   lisinopril (PRINIVIL) 20 MG Tab 10/12/2023 at 0700 Other Facility Yes Yes   Sig: Take 20 mg by mouth every morning.   metoprolol-hydrochlorothiazide (LOPRESSOR HCT) 50-25  MG per tablet 10/12/2023 at 0700 Other Facility Yes No   Sig: Take 1 Tablet by mouth every morning.   quetiapine (SEROQUEL XR) 200 MG XR tablet 10/11/2023 at 2100 Other Facility Yes No   Sig: Take 200 mg by mouth at bedtime.   sertraline (ZOLOFT) 100 MG Tab 10/12/2023 at 0700 Other Facility Yes No   Sig: Take 100 mg by mouth every morning.   tolterodine ER (DETROL LA) 4 MG CAPSULE SR 24 HR 10/12/2023 at 0700 Other Facility Yes No   Sig: Take 4 mg by mouth every morning.      Facility-Administered Medications: None       Physical Exam  Temp:  [36.8 °C (98.3 °F)] 36.8 °C (98.3 °F)  Pulse:  [121-125] 121  Resp:  [16-46] 16  BP: ()/(50-66) 94/51  SpO2:  [91 %-97 %] 92 %  Blood Pressure: 94/51   Temperature: 36.8 °C (98.3 °F)   Pulse: (!) 121   Respiration: 16   Pulse Oximetry: 92 %       Physical Exam  Vitals and nursing note reviewed.   Constitutional:       General: She is not in acute distress.     Appearance: Normal appearance.   HENT:      Head: Normocephalic and atraumatic.      Nose: Nose normal.      Mouth/Throat:      Mouth: Mucous membranes are moist.   Eyes:      Extraocular Movements: Extraocular movements intact.      Pupils: Pupils are equal, round, and reactive to light.   Cardiovascular:      Rate and Rhythm: Regular rhythm. Tachycardia present.   Pulmonary:      Effort: Pulmonary effort is normal.      Breath sounds: Examination of the right-lower field reveals decreased breath sounds. Examination of the left-lower field reveals decreased breath sounds. Decreased breath sounds present.   Abdominal:      General: Abdomen is flat. There is no distension.      Tenderness: There is no abdominal tenderness.   Musculoskeletal:         General: No swelling or deformity. Normal range of motion.      Cervical back: Normal range of motion and neck supple.      Right lower leg: Edema present.      Left lower leg: Edema present.   Skin:     General: Skin is warm and dry.   Neurological:      General: No focal  deficit present.      Mental Status: She is alert and oriented to person, place, and time.   Psychiatric:         Mood and Affect: Affect is inappropriate.         Judgment: Judgment is impulsive.         Laboratory:  Recent Labs     10/12/23  1821   WBC 9.0   RBC 3.29*   HEMOGLOBIN 8.3*   HEMATOCRIT 29.1*   MCV 88.4   MCH 25.2*   MCHC 28.5*   RDW 55.2*   PLATELETCT 347   MPV 8.1*     Recent Labs     10/12/23  1821   SODIUM 138   POTASSIUM 4.5   CHLORIDE 105   CO2 21   GLUCOSE 146*   BUN 38*   CREATININE 1.00   CALCIUM 8.5     Recent Labs     10/12/23  1821   ALTSGPT 8   ASTSGOT 10*   ALKPHOSPHAT 87   TBILIRUBIN <0.2   GLUCOSE 146*         Recent Labs     10/12/23  1821   NTPROBNP 4064*         Recent Labs     10/12/23  1821   TROPONINT 27*       Imaging:  EC-ECHOCARDIOGRAM COMPLETE W/O CONT   Final Result      CT-CTA CHEST PULMONARY ARTERY W/ RECONS   Final Result      1.  No central or segmental pulmonary embolus   2.  Moderate to large pericardial effusion   3.  Small RIGHT pleural effusion   4.  Moderate LEFT pleural effusion   5.  BILATERAL compressive atelectasis   6.  Unchanged 3 mm LEFT upper lobe pulmonary nodule, very likely benign   7.  Enlarged pretracheal lymph node      Findings were communicated with and acknowledged by JEAN CLAUDE WHITE via Voalte Me on 10/12/2023 9:17 PM.            DX-CHEST-PORTABLE (1 VIEW)   Final Result      1.  Hypoinflation and bibasilar atelectasis.  Superimposed pneumonia is not excluded.   2.  Stable cardiomegaly.          X-Ray:  I have personally reviewed the images and compared with prior images.    Assessment/Plan:  Justification for Admission Status  I anticipate this patient will require at least two midnights for appropriate medical management, necessitating inpatient admission because respiratory failure with hypoxia    Patient will need a Intermediate Care (Adult and Pediatrics) bed on MEDICAL service .  The need is secondary to resp failure with hypoxia.    *  Pneumonia due to infectious agent- (present on admission)  Assessment & Plan  Patient started on ceftriaxone and azithromycin for possible pneumonia, will continue for now  Obtain sputum and blood culture, procalcitonin, consider de-escalation of antibiotics    Acute respiratory failure with hypoxia ([facility])  Assessment & Plan  Secondary to pneumonia versus atelectasis  CT the chest negative for PE.  Echo showed grade 1 diastolic dysfunction.  Will not be able to do use Lasix due to hypotension  We will treat with antibiotics for now  Incentive spirometry  Wean off oxygen as able    Tachycardia- (present on admission)  Assessment & Plan  Possibly rebound tachycardia due to withholding or not taking metoprolol  We will continue holding metoprolol due to soft blood pressure  Check TSH, replace electrolytes as needed    Essential hypertension- (present on admission)  Assessment & Plan  Will hold her lisinopril, metoprolol and hydrochlorothiazide due to soft blood pressure  Consider restarting metoprolol as soon as blood pressure improves.    Anemia- (present on admission)  Assessment & Plan  Hemoglobin 8.2, down from ten 1 year ago, with no evidence of bleeding observed  Check iron studies    Diabetes ([facility])- (present on admission)  Assessment & Plan  Will hold Synjardy and glipizide  Ordered insulin sliding scale    Schizophrenia ([facility])- (present on admission)  Assessment & Plan  Depakote    Parkinson's disease (tremor, stiffness, slow motion, unstable posture)- (present on admission)  Assessment & Plan  Continue Depakote        VTE prophylaxis: heparin ppx

## 2023-10-13 NOTE — ED NOTES
"Pt resting in gurney, A+Ox3.  Pt having continuous verbal outbursts. Pt reports a \"small pain\" in abdomen.  Cough noted.  No acute distress at this time. Infusion running.  "

## 2023-10-13 NOTE — ASSESSMENT & PLAN NOTE
10/24/2023  Hemoglobin went down to 6.8 despite evidence of bleeding.  She was transfused 1 unit packed red blood cells.  Iron panel does not show iron deficiency anemia.  Reticulocyte count is elevated however adjusted for her degree of anemia, is still on the low side  Hemoglobin is staying stable in the 10-11 range which is her baseline.  She may need further work-up as an outpatient

## 2023-10-13 NOTE — ED NOTES
Pt reports feeling like she is choking. No visible obstruction found, pt drank some water without problems.  Reports her throat hurts. Pt continues to cough.  MD notified.

## 2023-10-13 NOTE — CARE PLAN
The patient is Watcher - Medium risk of patient condition declining or worsening    Shift Goals  Clinical Goals: hemodynamic stability  Patient Goals: ARLETTE  Family Goals: ARLETTE    Progress made toward(s) clinical / shift goals:       Problem: Knowledge Deficit - Standard  Goal: Patient and family/care givers will demonstrate understanding of plan of care, disease process/condition, diagnostic tests and medications  Outcome: Progressing     Problem: Hemodynamics  Goal: Patient's hemodynamics, fluid balance and neurologic status will be stable or improve  Outcome: Progressing     Problem: Respiratory  Goal: Patient will achieve/maintain optimum respiratory ventilation and gas exchange  Outcome: Progressing       Patient is not progressing towards the following goals:

## 2023-10-13 NOTE — ED NOTES
PT sleeping in gurney, chest rise noted.  Connected to monitor, VSS. No acute distress at this time.

## 2023-10-13 NOTE — ED NOTES
Med rec completed per 3-way call with Dianne with [facility] [employer] ([phone]) and on-duty staff at patient's group home. Staff state unable to fax copy of MAR, but provided details of MAR over phone.  Allergies reviewed with Dianne and staff at patient's group home.  Patient's pharmacy: [facility].    Staff at [facility] state that patient takes quetiapine  mg x 1 tablet every morning AND quetiapine  mg x 1 tablet at bedtime.    On 9/13/2023 patient was prescribed a 7 day course of cephalexin, which staff at [facility] state that she finished.

## 2023-10-13 NOTE — ASSESSMENT & PLAN NOTE
10/24/2023  Over replaced thyroid vs BB withdrawal vs pleural effusion  Have decreased dose of synthroid  Addressing effusion  Pt had a neg CTA chest on 10/12  Repeated TTE to confirm no siginificant pericardial effusion 10/22  Cont Tele

## 2023-10-13 NOTE — ED NOTES
Pt transported off unit on portable monitor with IMCU RN. Pt awake and breathing with even, unlabored breaths on 2L O2 via NC. All belongings with Pt.

## 2023-10-13 NOTE — ED NOTES
Pt medicated per MAR.  Pt resting in [facility], attached to monitor without acute distress.

## 2023-10-13 NOTE — ED NOTES
Bedside report received from off going RN: Vipin, assumed care of patient.  POC discussed with patient. Call light within reach, all needs addressed at this time.       Fall risk interventions in place: Move the patient closer to the nurse's station, Patient's personal possessions are with in their safe reach, Place socks on patient, Place fall risk sign on patient's door, and Keep floor surfaces clean and dry (all applicable per [place] Fall risk assessment)   Continuous monitoring: Cardiac Leads, Pulse Ox, or Blood Pressure  IVF/IV medications: LR bolus running  Oxygen: Room Air  Bedside sitter: Not Applicable   Isolation: Not Applicable

## 2023-10-14 ENCOUNTER — APPOINTMENT (OUTPATIENT)
Dept: RADIOLOGY | Facility: MEDICAL CENTER | Age: 58
DRG: 189 | End: 2023-10-14
Attending: INTERNAL MEDICINE
Payer: MEDICARE

## 2023-10-14 LAB
ALBUMIN SERPL BCP-MCNC: 2.3 G/DL (ref 3.2–4.9)
ALBUMIN/GLOB SERPL: 0.7 G/DL
ALP SERPL-CCNC: 70 U/L (ref 30–99)
ALT SERPL-CCNC: 7 U/L (ref 2–50)
AMYLASE FLD-CCNC: 15 U/L
ANION GAP SERPL CALC-SCNC: 13 MMOL/L (ref 7–16)
APPEARANCE FLD: NORMAL
APPEARANCE UR: CLEAR
APTT PPP: 31.8 SEC (ref 24.7–36)
AST SERPL-CCNC: 8 U/L (ref 12–45)
BASOPHILS # BLD AUTO: 0 % (ref 0–1.8)
BASOPHILS # BLD: 0 K/UL (ref 0–0.12)
BILIRUB SERPL-MCNC: <0.2 MG/DL (ref 0.1–1.5)
BILIRUB UR QL STRIP.AUTO: NEGATIVE
BODY FLD TYPE: NORMAL
BUN SERPL-MCNC: 20 MG/DL (ref 8–22)
BURR CELLS BLD QL SMEAR: NORMAL
CALCIUM ALBUM COR SERPL-MCNC: 9.4 MG/DL (ref 8.5–10.5)
CALCIUM SERPL-MCNC: 8 MG/DL (ref 8.5–10.5)
CELLS FLD: 5
CHLORIDE SERPL-SCNC: 102 MMOL/L (ref 96–112)
CO2 SERPL-SCNC: 21 MMOL/L (ref 20–33)
COLOR FLD: NORMAL
COLOR UR: YELLOW
CREAT SERPL-MCNC: 0.62 MG/DL (ref 0.5–1.4)
CYTOLOGY REG CYTOL: NORMAL
EOSINOPHIL # BLD AUTO: 0 K/UL (ref 0–0.51)
EOSINOPHIL NFR BLD: 0 % (ref 0–6.9)
ERYTHROCYTE [DISTWIDTH] IN BLOOD BY AUTOMATED COUNT: 58.9 FL (ref 35.9–50)
ERYTHROCYTE [SEDIMENTATION RATE] IN BLOOD BY WESTERGREN METHOD: 91 MM/HOUR (ref 0–25)
FUNGUS SPEC FUNGUS STN: NORMAL
GFR SERPLBLD CREATININE-BSD FMLA CKD-EPI: 102 ML/MIN/1.73 M 2
GLOBULIN SER CALC-MCNC: 3.2 G/DL (ref 1.9–3.5)
GLUCOSE BLD STRIP.AUTO-MCNC: 174 MG/DL (ref 65–99)
GLUCOSE BLD STRIP.AUTO-MCNC: 193 MG/DL (ref 65–99)
GLUCOSE BLD STRIP.AUTO-MCNC: 207 MG/DL (ref 65–99)
GLUCOSE BLD STRIP.AUTO-MCNC: 227 MG/DL (ref 65–99)
GLUCOSE FLD-MCNC: 230 MG/DL
GLUCOSE SERPL-MCNC: 239 MG/DL (ref 65–99)
GLUCOSE UR STRIP.AUTO-MCNC: >=1000 MG/DL
HCT VFR BLD AUTO: 35.1 % (ref 37–47)
HGB BLD-MCNC: 9.6 G/DL (ref 12–16)
INR PPP: 1.25 (ref 0.87–1.13)
KETONES UR STRIP.AUTO-MCNC: ABNORMAL MG/DL
LACTATE SERPL-SCNC: 2.3 MMOL/L (ref 0.5–2)
LDH FLD L TO P-CCNC: 158 U/L
LEUKOCYTE ESTERASE UR QL STRIP.AUTO: NEGATIVE
LYMPHOCYTES # BLD AUTO: 1.42 K/UL (ref 1–4.8)
LYMPHOCYTES NFR BLD: 12.8 % (ref 22–41)
LYMPHOCYTES NFR FLD: 21 %
MANUAL DIFF BLD: NORMAL
MCH RBC QN AUTO: 25.8 PG (ref 27–33)
MCHC RBC AUTO-ENTMCNC: 27.4 G/DL (ref 32.2–35.5)
MCV RBC AUTO: 94.4 FL (ref 81.4–97.8)
MICRO URNS: ABNORMAL
MONOCYTES # BLD AUTO: 0.67 K/UL (ref 0–0.85)
MONOCYTES NFR BLD AUTO: 6 % (ref 0–13.4)
MONOS+MACROS NFR FLD MANUAL: 21 %
MORPHOLOGY BLD-IMP: NORMAL
NEUTROPHILS # BLD AUTO: 9.01 K/UL (ref 1.82–7.42)
NEUTROPHILS NFR BLD: 81.2 % (ref 44–72)
NEUTROPHILS NFR FLD: 53 %
NITRITE UR QL STRIP.AUTO: NEGATIVE
NRBC # BLD AUTO: 0 K/UL
NRBC BLD-RTO: 0 /100 WBC (ref 0–0.2)
NUC CELL # FLD: 2605 CELLS/UL
PH FLD: 8 [PH]
PH UR STRIP.AUTO: 5 [PH] (ref 5–8)
PLATELET # BLD AUTO: 204 K/UL (ref 164–446)
PLATELET BLD QL SMEAR: NORMAL
PMV BLD AUTO: 8.6 FL (ref 9–12.9)
POIKILOCYTOSIS BLD QL SMEAR: NORMAL
POTASSIUM SERPL-SCNC: 3.7 MMOL/L (ref 3.6–5.5)
PROCALCITONIN SERPL-MCNC: 0.3 NG/ML
PROT FLD-MCNC: 3.9 G/DL
PROT SERPL-MCNC: 5.5 G/DL (ref 6–8.2)
PROT UR QL STRIP: NEGATIVE MG/DL
PROTHROMBIN TIME: 15.9 SEC (ref 12–14.6)
RBC # BLD AUTO: 3.72 M/UL (ref 4.2–5.4)
RBC # FLD: NORMAL CELLS/UL
RBC BLD AUTO: PRESENT
RBC UR QL AUTO: NEGATIVE
SIGNIFICANT IND 70042: NORMAL
SITE SITE: NORMAL
SODIUM SERPL-SCNC: 136 MMOL/L (ref 135–145)
SOURCE SOURCE: NORMAL
SP GR UR STRIP.AUTO: 1.04
UROBILINOGEN UR STRIP.AUTO-MCNC: 0.2 MG/DL
WBC # BLD AUTO: 11.1 K/UL (ref 4.8–10.8)

## 2023-10-14 PROCEDURE — 87070 CULTURE OTHR SPECIMN AEROBIC: CPT

## 2023-10-14 PROCEDURE — 87102 FUNGUS ISOLATION CULTURE: CPT

## 2023-10-14 PROCEDURE — 83605 ASSAY OF LACTIC ACID: CPT

## 2023-10-14 PROCEDURE — 83986 ASSAY PH BODY FLUID NOS: CPT

## 2023-10-14 PROCEDURE — 700102 HCHG RX REV CODE 250 W/ 637 OVERRIDE(OP): Performed by: INTERNAL MEDICINE

## 2023-10-14 PROCEDURE — 0W9B3ZZ DRAINAGE OF LEFT PLEURAL CAVITY, PERCUTANEOUS APPROACH: ICD-10-PCS | Performed by: RADIOLOGY

## 2023-10-14 PROCEDURE — 71045 X-RAY EXAM CHEST 1 VIEW: CPT

## 2023-10-14 PROCEDURE — 88305 TISSUE EXAM BY PATHOLOGIST: CPT

## 2023-10-14 PROCEDURE — 85652 RBC SED RATE AUTOMATED: CPT

## 2023-10-14 PROCEDURE — 85730 THROMBOPLASTIN TIME PARTIAL: CPT

## 2023-10-14 PROCEDURE — 87206 SMEAR FLUORESCENT/ACID STAI: CPT

## 2023-10-14 PROCEDURE — 82150 ASSAY OF AMYLASE: CPT

## 2023-10-14 PROCEDURE — 87015 SPECIMEN INFECT AGNT CONCNTJ: CPT

## 2023-10-14 PROCEDURE — 85027 COMPLETE CBC AUTOMATED: CPT

## 2023-10-14 PROCEDURE — C1729 CATH, DRAINAGE: HCPCS

## 2023-10-14 PROCEDURE — 700101 HCHG RX REV CODE 250: Performed by: INTERNAL MEDICINE

## 2023-10-14 PROCEDURE — 700105 HCHG RX REV CODE 258: Performed by: INTERNAL MEDICINE

## 2023-10-14 PROCEDURE — 87205 SMEAR GRAM STAIN: CPT

## 2023-10-14 PROCEDURE — 80053 COMPREHEN METABOLIC PANEL: CPT

## 2023-10-14 PROCEDURE — 700111 HCHG RX REV CODE 636 W/ 250 OVERRIDE (IP): Performed by: INTERNAL MEDICINE

## 2023-10-14 PROCEDURE — 82945 GLUCOSE OTHER FLUID: CPT

## 2023-10-14 PROCEDURE — 94640 AIRWAY INHALATION TREATMENT: CPT

## 2023-10-14 PROCEDURE — 84145 PROCALCITONIN (PCT): CPT

## 2023-10-14 PROCEDURE — 99291 CRITICAL CARE FIRST HOUR: CPT | Performed by: INTERNAL MEDICINE

## 2023-10-14 PROCEDURE — 88112 CYTOPATH CELL ENHANCE TECH: CPT

## 2023-10-14 PROCEDURE — 82962 GLUCOSE BLOOD TEST: CPT | Mod: 91

## 2023-10-14 PROCEDURE — 87116 MYCOBACTERIA CULTURE: CPT

## 2023-10-14 PROCEDURE — 770000 HCHG ROOM/CARE - INTERMEDIATE ICU *

## 2023-10-14 PROCEDURE — 85610 PROTHROMBIN TIME: CPT

## 2023-10-14 PROCEDURE — A9270 NON-COVERED ITEM OR SERVICE: HCPCS | Performed by: INTERNAL MEDICINE

## 2023-10-14 PROCEDURE — 85007 BL SMEAR W/DIFF WBC COUNT: CPT

## 2023-10-14 PROCEDURE — 700111 HCHG RX REV CODE 636 W/ 250 OVERRIDE (IP): Mod: JZ | Performed by: INTERNAL MEDICINE

## 2023-10-14 PROCEDURE — 83615 LACTATE (LD) (LDH) ENZYME: CPT

## 2023-10-14 PROCEDURE — 89051 BODY FLUID CELL COUNT: CPT

## 2023-10-14 PROCEDURE — 84157 ASSAY OF PROTEIN OTHER: CPT

## 2023-10-14 RX ORDER — ENOXAPARIN SODIUM 100 MG/ML
40 INJECTION SUBCUTANEOUS DAILY
Status: DISCONTINUED | OUTPATIENT
Start: 2023-10-14 | End: 2023-10-18

## 2023-10-14 RX ORDER — SODIUM CHLORIDE 9 MG/ML
INJECTION, SOLUTION INTRAVENOUS CONTINUOUS
Status: ACTIVE | OUTPATIENT
Start: 2023-10-14 | End: 2023-10-14

## 2023-10-14 RX ORDER — MIDODRINE HYDROCHLORIDE 5 MG/1
5 TABLET ORAL
Status: DISCONTINUED | OUTPATIENT
Start: 2023-10-14 | End: 2023-10-17

## 2023-10-14 RX ADMIN — DOCUSATE SODIUM 50 MG AND SENNOSIDES 8.6 MG 2 TABLET: 8.6; 5 TABLET, FILM COATED ORAL at 05:55

## 2023-10-14 RX ADMIN — DIVALPROEX SODIUM 1000 MG: 500 TABLET, DELAYED RELEASE ORAL at 20:08

## 2023-10-14 RX ADMIN — MIDODRINE HYDROCHLORIDE 5 MG: 5 TABLET ORAL at 17:32

## 2023-10-14 RX ADMIN — LEVOTHYROXINE SODIUM 200 MCG: 0.2 TABLET ORAL at 05:55

## 2023-10-14 RX ADMIN — QUETIAPINE FUMARATE 200 MG: 200 TABLET ORAL at 20:08

## 2023-10-14 RX ADMIN — ENOXAPARIN SODIUM 40 MG: 100 INJECTION SUBCUTANEOUS at 20:08

## 2023-10-14 RX ADMIN — INSULIN HUMAN 2 UNITS: 100 INJECTION, SOLUTION PARENTERAL at 17:41

## 2023-10-14 RX ADMIN — INSULIN HUMAN 2 UNITS: 100 INJECTION, SOLUTION PARENTERAL at 12:11

## 2023-10-14 RX ADMIN — DOCUSATE SODIUM 50 MG AND SENNOSIDES 8.6 MG 2 TABLET: 8.6; 5 TABLET, FILM COATED ORAL at 17:32

## 2023-10-14 RX ADMIN — INSULIN HUMAN 1 UNITS: 100 INJECTION, SOLUTION PARENTERAL at 08:31

## 2023-10-14 RX ADMIN — CEFTRIAXONE SODIUM 2000 MG: 10 INJECTION, POWDER, FOR SOLUTION INTRAVENOUS at 05:56

## 2023-10-14 RX ADMIN — ACETAMINOPHEN 650 MG: 325 TABLET, FILM COATED ORAL at 15:41

## 2023-10-14 RX ADMIN — INSULIN HUMAN 1 UNITS: 100 INJECTION, SOLUTION PARENTERAL at 20:17

## 2023-10-14 RX ADMIN — SODIUM CHLORIDE: 9 INJECTION, SOLUTION INTRAVENOUS at 17:40

## 2023-10-14 RX ADMIN — OXYBUTYNIN CHLORIDE 10 MG: 10 TABLET, EXTENDED RELEASE ORAL at 05:55

## 2023-10-14 RX ADMIN — ARIPIPRAZOLE 20 MG: 10 TABLET ORAL at 20:08

## 2023-10-14 RX ADMIN — AZITHROMYCIN DIHYDRATE 500 MG: 250 TABLET, FILM COATED ORAL at 17:32

## 2023-10-14 RX ADMIN — ATORVASTATIN CALCIUM 20 MG: 20 TABLET, FILM COATED ORAL at 20:08

## 2023-10-14 RX ADMIN — METOPROLOL TARTRATE 12.5 MG: 25 TABLET, FILM COATED ORAL at 10:29

## 2023-10-14 RX ADMIN — QUETIAPINE FUMARATE 150 MG: 50 TABLET ORAL at 08:28

## 2023-10-14 RX ADMIN — SERTRALINE 100 MG: 100 TABLET, FILM COATED ORAL at 05:55

## 2023-10-14 ASSESSMENT — PATIENT HEALTH QUESTIONNAIRE - PHQ9
SUM OF ALL RESPONSES TO PHQ9 QUESTIONS 1 AND 2: 0
1. LITTLE INTEREST OR PLEASURE IN DOING THINGS: NOT AT ALL
2. FEELING DOWN, DEPRESSED, IRRITABLE, OR HOPELESS: NOT AT ALL

## 2023-10-14 ASSESSMENT — COGNITIVE AND FUNCTIONAL STATUS - GENERAL
TURNING FROM BACK TO SIDE WHILE IN FLAT BAD: A LOT
SUGGESTED CMS G CODE MODIFIER DAILY ACTIVITY: CL
TOILETING: A LOT
HELP NEEDED FOR BATHING: A LOT
MOVING FROM LYING ON BACK TO SITTING ON SIDE OF FLAT BED: A LOT
SUGGESTED CMS G CODE MODIFIER MOBILITY: CL
MOVING TO AND FROM BED TO CHAIR: A LOT
MOBILITY SCORE: 12
DAILY ACTIVITIY SCORE: 12
CLIMB 3 TO 5 STEPS WITH RAILING: A LOT
DRESSING REGULAR LOWER BODY CLOTHING: A LOT
EATING MEALS: A LOT
STANDING UP FROM CHAIR USING ARMS: A LOT
PERSONAL GROOMING: A LOT
WALKING IN HOSPITAL ROOM: A LOT
DRESSING REGULAR UPPER BODY CLOTHING: A LOT

## 2023-10-14 ASSESSMENT — PAIN DESCRIPTION - PAIN TYPE
TYPE: ACUTE PAIN

## 2023-10-14 ASSESSMENT — FIBROSIS 4 INDEX: FIB4 SCORE: 0.87

## 2023-10-14 NOTE — ASSESSMENT & PLAN NOTE
10/24/2023  Has been tapped on the L twice: 10/14 750ml and 10/21 1,000ml  fluid was removed from left lung negative for malignancy and negative for infection consistent with a transudate.   Cytology showing inflamatory cells, no neoplastic findings  Cultures neg  RUQ US neg for signs of cirrhosis/ascites  UA with trivial protein though pt is hypoabluminemic likely due to nutritional status vs chronic inflamatory process  Now off Abxs  CHF vs chronic inflamatory process vs chronic inflamatory process  Follow CXR  At present am diuresing agressively with IV lasix  I&Os not in chart as pt incontinent: will place salvador to track accurate UOP  DW pulm   May need pleurodesis vs lymphangiogram if fails to respond to diuresis

## 2023-10-14 NOTE — CARE PLAN
Problem: Humidified High Flow Nasal Cannula  Goal: Maintain adequate oxygenation dependent on patient condition  Description: Target End Date:  resolve prior to discharge or when underlying condition is resolved/stabilized    1.  Implement humidified high flow oxygen therapy  2.  Titrate high flow oxygen to maintain appropriate SpO2  Flowsheets  Taken 10/13/2023 1224  Outcome: Normoxia  Taken 10/13/2023 1002  O2 (LPM): 30  FiO2%: 80 %  Note:     Respiratory Update    Treatment modality: HHFNC 30 L 80%    Frequency:Q4    Pt tolerating current treatments well with no adverse reactions.

## 2023-10-14 NOTE — PROGRESS NOTES
Pulmonary Progress Note    Date of admission  10/12/2023    Chief Complaint  58 y.o. female admitted 10/12/2023 with pleuropericardial effusion and respiratory failure    Hospital Course  58 y.o. female who presented 10/12/2023 with complaints of cough and chest pain for the past 3 days.  She has a past medical history of type 2 diabetes, hypertension, schizoaffective disorder, Parkinson's disease and intellectual disability.  She lives in a group home.  She has been having chest pain and productive cough for the past 3 days.  EMS was contacted, she was saturating 88% on room air per report and brought to the ER.  In the ER she was tachycardic, mildly hypotensive which responded to fluid resuscitation.  Flu/COVID/RSV swab was negative.  No leukocytosis, BNP 4000, hemoglobin 8.3 below baseline.  CT angiogram of the chest demonstrated moderate to large pericardial effusion, moderate left and small right pleural effusion, and bilateral compressive atelectasis.  Prior CT 2022 demonstrated small effusions and atelectasis. Incidental 3 mm LEFT upper lobe pulmonary nodule is unchanged since 2022     Stat echocardiogram demonstrated small posterior pericardial effusion no tamponade, not amendable to percutaneous drainage.  EF 61%.     BNP 4000  Renal function showing KYLE  Procal pending  Bcx collected  Started on C3/azithromycin    Interval Problem Update  Reviewed last 24 hour events:  No clinical changes  ST, 100s  -120s, intermittent hypotension  Net +1.05L  HFNC 30L/80 -> 70%  LDH wnl, tbili < 0.2, haptoglobin pending, suspicion for hemolysis low    Review of Systems  Review of Systems   Unable to perform ROS: Mental acuity      Vital Signs for last 24 hours   Temp:  [36.4 °C (97.6 °F)-37.1 °C (98.7 °F)] 36.9 °C (98.4 °F)  Pulse:  [108-135] 108  Resp:  [17-43] 31  BP: ()/(51-71) 91/52  SpO2:  [89 %-98 %] 93 %    Physical Exam   Physical Exam  Vitals and nursing note reviewed.   Constitutional:        General: She is in acute distress.      Appearance: She is well-developed and normal weight. She is ill-appearing. She is not diaphoretic.      Comments: Very pleasant   Eyes:      General: No scleral icterus.        Right eye: No discharge.         Left eye: No discharge.      Conjunctiva/sclera: Conjunctivae normal.      Pupils: Pupils are equal, round, and reactive to light.   Neck:      Thyroid: No thyromegaly.      Vascular: No JVD.   Cardiovascular:      Rate and Rhythm: Normal rate and regular rhythm.      Heart sounds: Normal heart sounds. No murmur heard.     No gallop.   Pulmonary:      Effort: Respiratory distress present.      Breath sounds: Rhonchi (LLL) present. No wheezing or rales.   Abdominal:      General: There is no distension.      Palpations: Abdomen is soft.      Tenderness: There is no abdominal tenderness. There is no guarding.   Musculoskeletal:         General: No tenderness.      Right lower leg: Edema present.      Left lower leg: Edema present.   Lymphadenopathy:      Cervical: No cervical adenopathy.   Skin:     General: Skin is warm.      Capillary Refill: Capillary refill takes less than 2 seconds.      Findings: No erythema or rash.   Neurological:      Mental Status: She is alert. Mental status is at baseline.      Cranial Nerves: No cranial nerve deficit.      Sensory: No sensory deficit.      Motor: No abnormal muscle tone.   Psychiatric:         Behavior: Behavior normal.         Medications  Current Facility-Administered Medications   Medication Dose Route Frequency Provider Last Rate Last Admin    metoprolol tartrate (Lopressor) tablet 12.5 mg  12.5 mg Oral TWICE DAILY Trip Lombardo M.D.   12.5 mg at 10/14/23 1029    [START ON 10/15/2023] levothyroxine (Synthroid) tablet 175 mcg  175 mcg Oral AM ES Trip Lombardo M.D.        senna-docusate (Pericolace Or Senokot S) 8.6-50 MG per tablet 2 Tablet  2 Tablet Oral BID Andrew Mancuso M.D.   2 Tablet at 10/14/23  0555    And    polyethylene glycol/lytes (Miralax) PACKET 1 Packet  1 Packet Oral QDAY PRN Andrew Mancuso M.D.        And    magnesium hydroxide (Milk Of Magnesia) suspension 30 mL  30 mL Oral QDAY PRN Andrew Mancuso M.D.        And    bisacodyl (Dulcolax) suppository 10 mg  10 mg Rectal QDAY PRN Andrew Mancuso M.D.        Respiratory Therapy Consult   Nebulization Continuous RT Andrew Mancsuo M.D.        acetaminophen (Tylenol) tablet 650 mg  650 mg Oral Q6HRS PRN Andrew Mancuso M.D.        labetalol (Normodyne/Trandate) injection 10 mg  10 mg Intravenous Q4HRS PRN Andrew Mancuso M.D.        ondansetron (Zofran) syringe/vial injection 4 mg  4 mg Intravenous Q4HRS PRN Andrew Mancuso M.D.        ondansetron (Zofran ODT) dispertab 4 mg  4 mg Oral Q4HRS PRN Andrew Mancuso M.D.        promethazine (Phenergan) tablet 12.5-25 mg  12.5-25 mg Oral Q4HRS PRN Andrew Mancuso M.D.        promethazine (Phenergan) suppository 12.5-25 mg  12.5-25 mg Rectal Q4HRS PRN Andrew Mancuso M.D.        prochlorperazine (Compazine) injection 5-10 mg  5-10 mg Intravenous Q4HRS PRN Andrew Mancuso M.D.        insulin regular (HumuLIN R,NovoLIN R) injection  1-6 Units Subcutaneous 4X/DAY MOY Mancuso M.D.   2 Units at 10/14/23 1211    And    dextrose 50% (D50W) injection 25 g  25 g Intravenous Q15 MIN PRN Andrew Mancuso M.D.        cefTRIAXone (Rocephin) syringe 2,000 mg  2,000 mg Intravenous Q24HRS Andrew Mancuso M.D.   2,000 mg at 10/14/23 0556    azithromycin (Zithromax) tablet 500 mg  500 mg Oral DAILY Andrew Mancuso M.D.   500 mg at 10/13/23 1801    ARIPiprazole (Abilify) tablet 20 mg  20 mg Oral QHS Andrew Mancuso M.D.   20 mg at 10/13/23 2007    atorvastatin (Lipitor) tablet 20 mg  20 mg Oral QGEMA Mancuso M.D.   20 mg at 10/13/23 2007    divalproex (Depakote) delayed-release tablet 1,000 mg  1,000 mg Oral QHS Andrew Mancuso M.D.   1,000 mg at 10/13/23 2006    QUEtiapine  (SEROquel) tablet 150 mg  150 mg Oral QAMANJIT Mancuso M.D.   150 mg at 10/14/23 0828    QUEtiapine (SEROquel) tablet 200 mg  200 mg Oral QHS Andrew Mancuso M.D.   200 mg at 10/13/23 2007    sertraline (Zoloft) tablet 100 mg  100 mg Oral QAMANJIT Mancuso M.D.   100 mg at 10/14/23 0555    oxybutynin SR (Ditropan-XL) tablet 10 mg  10 mg Oral DAILY Andrew Mancuso M.D.   10 mg at 10/14/23 0555       Fluids    Intake/Output Summary (Last 24 hours) at 10/14/2023 1300  Last data filed at 10/14/2023 0600  Gross per 24 hour   Intake 347.79 ml   Output 300 ml   Net 47.79 ml       Laboratory      Recent Labs     10/13/23  0155   CPKTOTAL 58     Recent Labs     10/12/23  1821 10/14/23  0928   SODIUM 138 136   POTASSIUM 4.5 3.7   CHLORIDE 105 102   CO2 21 21   BUN 38* 20   CREATININE 1.00 0.62   MAGNESIUM 2.3  --    PHOSPHORUS 3.0  --    CALCIUM 8.5 8.0*     Recent Labs     10/12/23  1821 10/14/23  0928   ALTSGPT 8 7   ASTSGOT 10* 8*   ALKPHOSPHAT 87 70   TBILIRUBIN <0.2 <0.2   GLUCOSE 146* 239*     Recent Labs     10/12/23  1821 10/14/23  0351 10/14/23  0928   WBC 9.0 11.1*  --    NEUTSPOLYS 69.60 81.20*  --    LYMPHOCYTES 20.00* 12.80*  --    MONOCYTES 10.40 6.00  --    EOSINOPHILS 0.00 0.00  --    BASOPHILS 0.00 0.00  --    ASTSGOT 10*  --  8*   ALTSGPT 8  --  7   ALKPHOSPHAT 87  --  70   TBILIRUBIN <0.2  --  <0.2     Recent Labs     10/12/23  1821 10/13/23  0155 10/14/23  0351 10/14/23  1005   RBC 3.29*  --  3.72*  --    HEMOGLOBIN 8.3*  --  9.6*  --    HEMATOCRIT 29.1*  --  35.1*  --    PLATELETCT 347  --  204  --    PROTHROMBTM  --   --   --  15.9*   APTT  --   --   --  31.8   INR  --   --   --  1.25*   IRON  --  10*  --   --    FERRITIN  --  715.0*  --   --    TOTIRONBC  --  181*  --   --      Imaging  X-Ray:  No film today    Assessment/Plan    # Acute hypoxemic respiratory failure  # Bilateral L > R pleural effusions of unclear etiology  # Pericardial Effusion, elevated BNP, no echocardiogram evidence of  tamponade, preserved EF, no prior cardiac disease, ECG reassuring  # Normocytic Anemia of unclear etiology  # KYLE  # Poorly controlled Type II DM- A1c 10.9%  # Incidental pulmonary nodule 3mm left upper lobe- stable since 2022     Sudden onset of symptoms and respiratory failure with new pleural and pericardial effusions of unclear etiology. Significantly elevated inflammatory markers, perhaps underlying subclinical autoimmune disease, pleuro/pericarditis? Meets no SIRS critieria, afebrile, WBC wnl, procal wnl, resp path PCR negative. New anemia, no trauma to chest per report.      -- L diagnostic/therapeutic thoracentesis pending  -- f/u connective tissue disease panel, DAGO reflex, RF/CCP,  ESR, strep PCR, legionella/strep UAG; hold on steroids in setting of hyperglycemia 2/2 poorly controlled/newly dx DM2. Insulin regimen to maintain goal FSBG 140-180  -- Cont high flow, titrate supplemental O2 to maintain saturations 88-92%  -- Low suspicion for infection, however with unclear diagnosis I agree with empiric abx until further clarity. MRSA nares neg, f/u bcx, sputum cx, ucx/UA, legionella and strep uag  -- ferritin modestly elevated, however LFTs wnl and hemolysis labs reassuring; low suspicion for HLH      Discussed patient condition and risk of morbidity and/or mortality with Hospitalist, RN, and Patient.       The patient remains critically ill.  Critical care time = 37 minutes in directly providing and coordinating critical care and extensive data review.  No time overlap and excludes procedures.     This note was generated using voice recognition software which has a chance of producing errors of grammar and content.  I have made every reasonable attempt to find and correct any errors, but it should be expected that some may not be found prior to finalization of this note.  __________  Alejandro Armando MD  Pulmonary and Critical Care Medicine  [facility]

## 2023-10-14 NOTE — CARE PLAN
The patient is Watcher - Medium risk of patient condition declining or worsening    Shift Goals  Clinical Goals: maintain O2 sat  Patient Goals: ARLETTE  Family Goals: ARLETTE    Progress made toward(s) clinical / shift goals:       Problem: Hemodynamics  Goal: Patient's hemodynamics, fluid balance and neurologic status will be stable or improve  Outcome: Progressing       Patient is not progressing towards the following goals:      Problem: Respiratory  Goal: Patient will achieve/maintain optimum respiratory ventilation and gas exchange  Outcome: Not Progressing

## 2023-10-14 NOTE — CARE PLAN
The patient is Stable - Low risk of patient condition declining or worsening    Shift Goals  Clinical Goals: maintain O2 sat  Patient Goals: ARLETTE  Family Goals: ARLETTE    Progress made toward(s) clinical / shift goals:  continues to require high flow supplement oxygen, weaning as able.     Patient is not progressing towards the following goals:      Problem: Knowledge Deficit - Standard  Goal: Patient and family/care givers will demonstrate understanding of plan of care, disease process/condition, diagnostic tests and medications  Outcome: Progressing     Problem: Respiratory  Goal: Patient will achieve/maintain optimum respiratory ventilation and gas exchange  Outcome: Progressing

## 2023-10-14 NOTE — PROGRESS NOTES
Hospital Medicine Daily Progress Note    Date of Service  10/14/2023    Chief Complaint  Erlinda Verde is a 58 y.o. female admitted 10/12/2023 with cough and shortness of breath    Hospital Course    Erlinda Verde is a 58 y.o. female with past medical history of type 2 diabetes, hypertension, schizoaffective disorder, Parkinson disease who presented 10/12/2023 from group home for cough for 3 days, desaturation to 88% on room air per EMS report, some chest pain with cough.  Patient is poor historian due to her intellectual disability.  In ER she noted to have tachycardia 124, which appears to be sinus tachycardia with nonspecific T wave flattening, but soft blood pressure even after given septic bolus.  On 2 L nasal cannula, SPO2 91%.  B19/influenza/RSV screen negative.  CBC does not showed WBC elevation, did showed hemoglobin 8.3 which is below baseline.  Troponin 27, proBNP 4000, lactic acid 1.4.  CTA of the chest negative for PE showed moderate to large pericardial effusion, moderate left and small right pleural effusion, bilateral compressive atelectasis.  Follow-up stat echo did show small  effusion in the pericardium, no tamponade.  EF 61%, grade 1 diastolic dysfunction.  ERP spoke to cardiology, Dr. Maloney, there was no additional recommendations.  Patient also was decided to be admitted to IMCU per conversation with Dr. Sandoval/critical care    Patient became hypoxic in IMCU and she was placed on high flow nasal cannula.  I requested consultation with pulmonology.  I called patient's legal guardian service and I spoke with Ani Chaudhary.    Interval Problem Update  10/13/23  I evaluated and examined her at the bedside.  She was unable to provide me detailed information.  I called patient legal guardian and I spoke with Ani Diaz home on-call for patient renal guardian and discussed plan of care with her.  I called her again and updated her that plan is to perform thoracentesis.  I discussed plan of care  with her and answered all her questions.  Patient does not have post and by default she is full code.  I requested consultation with pulmonology and discussed plan of care with pulmonologist Dr. Armando.  She became hypoxic and she was placed on high flow nasal cannula.    10/14/23    I evaluated and examined her at the bedside.  She is still requiring high flow nasal cannula to maintain oxygen saturation.  She underwent left thoracentesis and 750 cc of fluid was removed.  Echocardiogram showed ejection fraction of 61%.  She became hypotensive and I started her on 75 cc/h fluid and IV fluids started on midodrine 5 mg 3 times daily.  I am continuing IV antibiotics for now.  I discussed plan of care with infectious disease Dr. Armando.        I have discussed this patient's plan of care and discharge plan at IDT rounds today with Case Management, Nursing, Nursing leadership, and other members of the IDT team.    Consultants/Specialty  pulmonary    Code Status  Full Code    Disposition  Medically Cleared  I have placed the appropriate orders for post-discharge needs.    Review of Systems  Review of Systems   Unable to perform ROS: Medical condition        Physical Exam  Temp:  [36.9 °C (98.4 °F)-37.2 °C (98.9 °F)] 37.1 °C (98.7 °F)  Pulse:  [118-133] 120  Resp:  [19-43] 27  BP: ()/(50-71) 101/68  SpO2:  [89 %-98 %] 94 %    Physical Exam  Vitals reviewed.   Constitutional:       General: She is in acute distress.      Appearance: Normal appearance. She is ill-appearing.   HENT:      Head: Normocephalic and atraumatic.      Nose: No congestion.      Comments: High flow nasal cannula  Eyes:      General:         Right eye: No discharge.         Left eye: No discharge.      Pupils: Pupils are equal, round, and reactive to light.   Cardiovascular:      Rate and Rhythm: Normal rate and regular rhythm.      Pulses: Normal pulses.      Heart sounds: Normal heart sounds. No murmur heard.  Pulmonary:      Effort: Pulmonary  effort is normal. No respiratory distress.      Breath sounds: No stridor.      Comments: Decreased breath sounds at the bases  Abdominal:      General: Bowel sounds are normal. There is no distension.      Palpations: Abdomen is soft.      Tenderness: There is no abdominal tenderness.   Musculoskeletal:         General: No swelling or tenderness. Normal range of motion.      Cervical back: Normal range of motion. No rigidity.   Skin:     General: Skin is warm.      Capillary Refill: Capillary refill takes less than 2 seconds.      Coloration: Skin is not jaundiced or pale.      Findings: No bruising.   Neurological:      General: No focal deficit present.      Mental Status: She is alert and oriented to person, place, and time.      Cranial Nerves: No cranial nerve deficit.   Psychiatric:         Mood and Affect: Mood normal.         Behavior: Behavior normal.         Fluids    Intake/Output Summary (Last 24 hours) at 10/14/2023 0759  Last data filed at 10/14/2023 0600  Gross per 24 hour   Intake 347.79 ml   Output 300 ml   Net 47.79 ml         Laboratory  Recent Labs     10/12/23  1821 10/14/23  0351   WBC 9.0 11.1*   RBC 3.29* 3.72*   HEMOGLOBIN 8.3* 9.6*   HEMATOCRIT 29.1* 35.1*   MCV 88.4 94.4   MCH 25.2* 25.8*   MCHC 28.5* 27.4*   RDW 55.2* 58.9*   PLATELETCT 347 204   MPV 8.1* 8.6*       Recent Labs     10/12/23  1821   SODIUM 138   POTASSIUM 4.5   CHLORIDE 105   CO2 21   GLUCOSE 146*   BUN 38*   CREATININE 1.00   CALCIUM 8.5                     Imaging  EC-ECHOCARDIOGRAM COMPLETE W/O CONT   Final Result      CT-CTA CHEST PULMONARY ARTERY W/ RECONS   Final Result      1.  No central or segmental pulmonary embolus   2.  Moderate to large pericardial effusion   3.  Small RIGHT pleural effusion   4.  Moderate LEFT pleural effusion   5.  BILATERAL compressive atelectasis   6.  Unchanged 3 mm LEFT upper lobe pulmonary nodule, very likely benign   7.  Enlarged pretracheal lymph node      Findings were communicated  with and acknowledged by JEAN CLAUDE WHITE via [employer] on 10/12/2023 9:17 PM.            DX-CHEST-PORTABLE (1 VIEW)   Final Result      1.  Hypoinflation and bibasilar atelectasis.  Superimposed pneumonia is not excluded.   2.  Stable cardiomegaly.      US-THORACENTESIS PUNCTURE LEFT    (Results Pending)        Assessment/Plan  * Pneumonia due to infectious agent- (present on admission)  Assessment & Plan  Patient started on ceftriaxone and azithromycin for possible pneumonia, will continue for now  Obtain sputum and blood culture, procalcitonin, consider de-escalation of antibiotics  I discussed plan of care with pulmonologist  I am continuing antibiotic.  Patient found to have elevated procalcitonin level.  Follow culture results.      Hypotension  Assessment & Plan  10/14/23    Patient found to hypotension I started her on gentle IV fluid administration.  I started her on midodrine.      Pleural effusion, left  Assessment & Plan  Patient found to have significant left pleural effusion.  I discussed with pulmonologist and I ordered ultrasound-guided thoracentesis.  Ordered lab work-up on pleural fluid.  Discussed plan of care with pulmonologist  Patient underwent left thoracentesis and 750 cc of fluid was removed.    Acute respiratory failure with hypoxia (HCC)  Assessment & Plan  Secondary to pneumonia versus atelectasis  CT the chest negative for PE.  Echo showed grade 1 diastolic dysfunction.  Patient developed worsening of her respiratory status and she was placed on high flow nasal cannula.  On October 13 June 23 I called patient guardian and updated them regarding patient current medical condition and plan of care.  Patient underwent thoracentesis and 750 cc of fluid was removed from left lung.  Hypoxic discussed case with pulmonologist Dr. Armando.      Tachycardia- (present on admission)  Assessment & Plan  Possibly rebound tachycardia due to withholding or not taking metoprolol  I started her on low-dose  of metoprolol due to ongoing tachycardia  Patient found to have low TSH decrease the dose of levothyroxine.    Essential hypertension- (present on admission)  Assessment & Plan  Holding outpatient blood pressure medications  Patient became hypotensive again this afternoon I started on IV fluid and started on midodrine.      Anemia- (present on admission)  Assessment & Plan  Hemoglobin 8.2, down from ten 1 year ago, with no evidence of bleeding observed  Iron panel does not show iron deficiency anemia.  No signs of active bleeding.  Ordered CBC for tomorrow.    Diabetes (HCC)- (present on admission)  Assessment & Plan  10/14/23    Will hold Synjardy and glipizide  I am continue insulin sliding scale with hypoglycemia protocol.    Lactic acidosis  Assessment & Plan  Patient found to have lactic acidosis   Started on IV fluid  Ordered repeat lactic acid level.    Schizophrenia (HCC)- (present on admission)  Assessment & Plan  10/14/23    Depakote    Parkinson's disease (tremor, stiffness, slow motion, unstable posture)- (present on admission)  Assessment & Plan  10/14/23    Continue Depakote          10/14/23    Patient is critically ill.   The patient continues to have: Acute respiratory failure with hypoxia  The vital organ system that is affected is the: Pulmonary  If untreated there is a high chance of deterioration into: Respiratory failure and cardiopulmonary arrest  And eventually death.   The critical care that I am providing today is: 10/14/23  I am titrating and continuing-nasal cannula currently she is on 40 L of oxygen with 70% of FiO2.  The critical that has been undertaken is medically complex.   There has been no overlap in critical care time.   Critical Care Time not including procedures: 41 minutes     I discussed plan of care with pulmonologist Dr. Armando regarding patient current medical condition and plan of care.      I discussed plan of care during multidisciplinary rounds regarding patient's  current medical condition and plan of care.      VTE prophylaxis:    enoxaparin ppx      I have performed a physical exam and reviewed and updated ROS and Plan today (10/14/2023). In review of yesterday's note (10/13/2023), there are no changes except as documented above.

## 2023-10-14 NOTE — PROGRESS NOTES
Hospital Medicine Daily Progress Note    Date of Service  10/13/2023    Chief Complaint  Erlinda Verde is a 58 y.o. female admitted 10/12/2023 with cough and shortness of breath    Hospital Course    Erlinda Verde is a 58 y.o. female with past medical history of type 2 diabetes, hypertension, schizoaffective disorder, Parkinson disease who presented 10/12/2023 from group home for cough for 3 days, desaturation to 88% on room air per EMS report, some chest pain with cough.  Patient is poor historian due to her intellectual disability.  In ER she noted to have tachycardia 124, which appears to be sinus tachycardia with nonspecific T wave flattening, but soft blood pressure even after given septic bolus.  On 2 L nasal cannula, SPO2 91%.  B19/influenza/RSV screen negative.  CBC does not showed WBC elevation, did showed hemoglobin 8.3 which is below baseline.  Troponin 27, proBNP 4000, lactic acid 1.4.  CTA of the chest negative for PE showed moderate to large pericardial effusion, moderate left and small right pleural effusion, bilateral compressive atelectasis.  Follow-up stat echo did show small  effusion in the pericardium, no tamponade.  EF 61%, grade 1 diastolic dysfunction.  ERP spoke to cardiology, Dr. Maloney, there was no additional recommendations.  Patient also was decided to be admitted to IMCU per conversation with Dr. Sandoval/critical care    Patient became hypoxic in IMCU and she was placed on high flow nasal cannula.  I requested consultation with pulmonology.  I called patient's legal guardian service and I spoke with Ani Chaudhary.    Interval Problem Update  10/13/23  I evaluated and examined her at the bedside.  She was unable to provide me detailed information.  I called patient legal guardian and I spoke with Ani Chaudhary home on-call for patient renal guardian and discussed plan of care with her.  I called her again and updated her that plan is to perform thoracentesis.  I discussed plan of  care with her and answered all her questions.  Patient does not have post and by default she is full code.  I requested consultation with pulmonology and discussed plan of care with pulmonologist Dr. Armando.  She became hypoxic and she was placed on high flow nasal cannula.    I have discussed this patient's plan of care and discharge plan at IDT rounds today with Case Management, Nursing, Nursing leadership, and other members of the IDT team.    Consultants/Specialty  pulmonary    Code Status  Full Code    Disposition  The patient is not medically cleared for discharge to home or a post-acute facility.  Anticipate discharge to: home with organized home healthcare and close outpatient follow-up    I have placed the appropriate orders for post-discharge needs.    Review of Systems  Review of Systems   Unable to perform ROS: Medical condition        Physical Exam  Temp:  [36.8 °C (98.3 °F)-37.2 °C (98.9 °F)] 36.9 °C (98.4 °F)  Pulse:  [103-130] 130  Resp:  [14-46] 24  BP: ()/(50-68) 99/51  SpO2:  [89 %-98 %] 98 %    Physical Exam  Vitals reviewed.   Constitutional:       General: She is in acute distress.      Appearance: Normal appearance. She is ill-appearing.   HENT:      Head: Normocephalic and atraumatic.      Nose: No congestion.      Comments: High flow nasal cannula  Eyes:      General:         Right eye: No discharge.         Left eye: No discharge.      Pupils: Pupils are equal, round, and reactive to light.   Cardiovascular:      Rate and Rhythm: Normal rate and regular rhythm.      Pulses: Normal pulses.      Heart sounds: Normal heart sounds. No murmur heard.  Pulmonary:      Effort: Pulmonary effort is normal. No respiratory distress.      Breath sounds: Normal breath sounds. No stridor.   Abdominal:      General: Bowel sounds are normal. There is no distension.      Palpations: Abdomen is soft.      Tenderness: There is no abdominal tenderness.   Musculoskeletal:         General: No swelling or  tenderness. Normal range of motion.      Cervical back: Normal range of motion. No rigidity.   Skin:     General: Skin is warm.      Capillary Refill: Capillary refill takes less than 2 seconds.      Coloration: Skin is not jaundiced or pale.      Findings: No bruising.   Neurological:      General: No focal deficit present.      Mental Status: She is alert and oriented to person, place, and time.      Cranial Nerves: No cranial nerve deficit.   Psychiatric:         Mood and Affect: Mood normal.         Behavior: Behavior normal.         Fluids    Intake/Output Summary (Last 24 hours) at 10/13/2023 1731  Last data filed at 10/12/2023 2107  Gross per 24 hour   Intake 1000 ml   Output --   Net 1000 ml       Laboratory  Recent Labs     10/12/23  1821   WBC 9.0   RBC 3.29*   HEMOGLOBIN 8.3*   HEMATOCRIT 29.1*   MCV 88.4   MCH 25.2*   MCHC 28.5*   RDW 55.2*   PLATELETCT 347   MPV 8.1*     Recent Labs     10/12/23  1821   SODIUM 138   POTASSIUM 4.5   CHLORIDE 105   CO2 21   GLUCOSE 146*   BUN 38*   CREATININE 1.00   CALCIUM 8.5                   Imaging  EC-ECHOCARDIOGRAM COMPLETE W/O CONT   Final Result      CT-CTA CHEST PULMONARY ARTERY W/ RECONS   Final Result      1.  No central or segmental pulmonary embolus   2.  Moderate to large pericardial effusion   3.  Small RIGHT pleural effusion   4.  Moderate LEFT pleural effusion   5.  BILATERAL compressive atelectasis   6.  Unchanged 3 mm LEFT upper lobe pulmonary nodule, very likely benign   7.  Enlarged pretracheal lymph node      Findings were communicated with and acknowledged by JEAN CLAUDE WHITE via [employer] on 10/12/2023 9:17 PM.            DX-CHEST-PORTABLE (1 VIEW)   Final Result      1.  Hypoinflation and bibasilar atelectasis.  Superimposed pneumonia is not excluded.   2.  Stable cardiomegaly.      US-THORACENTESIS PUNCTURE LEFT    (Results Pending)        Assessment/Plan  * Pneumonia due to infectious agent- (present on admission)  Assessment & Plan  Patient  started on ceftriaxone and azithromycin for possible pneumonia, will continue for now  Obtain sputum and blood culture, procalcitonin, consider de-escalation of antibiotics  I requested consultation with pulmonology  I am continuing antibiotic  Follow culture results.      Hypotension  Assessment & Plan  Patient found to hypotension I started her on gentle IV fluid administration.  Use IV fluid with caution in the setting of pleural effusion.    Pleural effusion, left  Assessment & Plan  Patient found to have significant left pleural effusion.  I discussed with pulmonologist and I ordered ultrasound-guided thoracentesis.  Ordered lab work-up on pleural fluid.  Updated patient guardian.    Acute respiratory failure with hypoxia (HCC)  Assessment & Plan  Secondary to pneumonia versus atelectasis  CT the chest negative for PE.  Echo showed grade 1 diastolic dysfunction.  Patient developed worsening of her respiratory status and she was placed on high flow nasal cannula.  I called patient guardian and updated them regarding patient current medical condition and plan of care.  I called him again and updated them patient will need thoracentesis and after discussion I placed thoracentesis orders.  I requested consultation with pulmonology Dr. Armando and discussed plan of care with him.      Tachycardia- (present on admission)  Assessment & Plan  Possibly rebound tachycardia due to withholding or not taking metoprolol  We will continue holding metoprolol due to soft blood pressure  Check TSH, replace electrolytes as needed    Essential hypertension- (present on admission)  Assessment & Plan  Holding outpatient blood pressure medications  Patient blood pressure is running on the lower side I started her on gentle fluid hydration with IV fluid of 75 cc/h for total of 5 hours.      Anemia- (present on admission)  Assessment & Plan  Hemoglobin 8.2, down from ten 1 year ago, with no evidence of bleeding observed  Check iron  studies    Diabetes (HCC)- (present on admission)  Assessment & Plan  10/13/23    Will hold Synjardy and glipizide  I am continue insulin sliding scale with hypoglycemia protocol.    Schizophrenia (HCC)- (present on admission)  Assessment & Plan  10/13/23    Depakote    Parkinson's disease (tremor, stiffness, slow motion, unstable posture)- (present on admission)  Assessment & Plan  10/13/23    Continue Depakote        I personally discussed case with pulmonologist Dr. Armando and requested consultation.    I updated patient legal guardian service twice regarding patient current medical condition and updated them regarding order of thoracentesis.    Patient is critically ill.   The patient continues to have: Acute respiratory failure with hypoxia  The vital organ system that is affected is the: Pulmonary  If untreated there is a high chance of deterioration into: Respiratory failure and cardiopulmonary arrest  And eventually death.   The critical care that I am providing today is: I am titrating and continuing-nasal cannula currently she is on 30 L of oxygen with 80% of FiO2.  The critical that has been undertaken is medically complex.   There has been no overlap in critical care time.   Critical Care Time not including procedures: 39 minutes     I am holding pharmacological DVT prophylaxis as patient will need thoracentesis.  VTE prophylaxis:   SCDs/TEDs      I have performed a physical exam and reviewed and updated ROS and Plan today (10/13/2023). In review of yesterday's note (10/12/2023), there are no changes except as documented above.

## 2023-10-15 ENCOUNTER — APPOINTMENT (OUTPATIENT)
Dept: RADIOLOGY | Facility: MEDICAL CENTER | Age: 58
DRG: 189 | End: 2023-10-15
Attending: INTERNAL MEDICINE
Payer: MEDICARE

## 2023-10-15 LAB
ALBUMIN SERPL BCP-MCNC: 2.4 G/DL (ref 3.2–4.9)
ALBUMIN/GLOB SERPL: 0.6 G/DL
ALP SERPL-CCNC: 79 U/L (ref 30–99)
ALT SERPL-CCNC: 10 U/L (ref 2–50)
ANION GAP SERPL CALC-SCNC: 14 MMOL/L (ref 7–16)
AST SERPL-CCNC: 9 U/L (ref 12–45)
BACTERIA UR CULT: NORMAL
BILIRUB SERPL-MCNC: <0.2 MG/DL (ref 0.1–1.5)
BUN SERPL-MCNC: 16 MG/DL (ref 8–22)
CALCIUM ALBUM COR SERPL-MCNC: 9.6 MG/DL (ref 8.5–10.5)
CALCIUM SERPL-MCNC: 8.3 MG/DL (ref 8.5–10.5)
CHLORIDE SERPL-SCNC: 104 MMOL/L (ref 96–112)
CO2 SERPL-SCNC: 21 MMOL/L (ref 20–33)
CREAT SERPL-MCNC: 0.68 MG/DL (ref 0.5–1.4)
ERYTHROCYTE [DISTWIDTH] IN BLOOD BY AUTOMATED COUNT: 55.8 FL (ref 35.9–50)
GFR SERPLBLD CREATININE-BSD FMLA CKD-EPI: 100 ML/MIN/1.73 M 2
GLOBULIN SER CALC-MCNC: 3.9 G/DL (ref 1.9–3.5)
GLUCOSE BLD STRIP.AUTO-MCNC: 150 MG/DL (ref 65–99)
GLUCOSE BLD STRIP.AUTO-MCNC: 159 MG/DL (ref 65–99)
GLUCOSE BLD STRIP.AUTO-MCNC: 184 MG/DL (ref 65–99)
GLUCOSE SERPL-MCNC: 149 MG/DL (ref 65–99)
GRAM STN SPEC: NORMAL
HAPTOGLOB SERPL-MCNC: 581 MG/DL (ref 30–200)
HCT VFR BLD AUTO: 31.5 % (ref 37–47)
HGB BLD-MCNC: 8.7 G/DL (ref 12–16)
L PNEUMO AG UR QL IA: NEGATIVE
MAGNESIUM SERPL-MCNC: 2.1 MG/DL (ref 1.5–2.5)
MCH RBC QN AUTO: 24.9 PG (ref 27–33)
MCHC RBC AUTO-ENTMCNC: 27.6 G/DL (ref 32.2–35.5)
MCV RBC AUTO: 90 FL (ref 81.4–97.8)
PLATELET # BLD AUTO: 265 K/UL (ref 164–446)
PMV BLD AUTO: 9.2 FL (ref 9–12.9)
POTASSIUM SERPL-SCNC: 4.1 MMOL/L (ref 3.6–5.5)
PROT SERPL-MCNC: 6.3 G/DL (ref 6–8.2)
RBC # BLD AUTO: 3.5 M/UL (ref 4.2–5.4)
RHODAMINE-AURAMINE STN SPEC: NORMAL
S PNEUM AG UR QL: NEGATIVE
SIGNIFICANT IND 70042: NORMAL
SITE SITE: NORMAL
SODIUM SERPL-SCNC: 139 MMOL/L (ref 135–145)
SOURCE SOURCE: NORMAL
WBC # BLD AUTO: 10.2 K/UL (ref 4.8–10.8)

## 2023-10-15 PROCEDURE — A9270 NON-COVERED ITEM OR SERVICE: HCPCS | Performed by: INTERNAL MEDICINE

## 2023-10-15 PROCEDURE — 82962 GLUCOSE BLOOD TEST: CPT

## 2023-10-15 PROCEDURE — 99291 CRITICAL CARE FIRST HOUR: CPT | Performed by: INTERNAL MEDICINE

## 2023-10-15 PROCEDURE — 74018 RADEX ABDOMEN 1 VIEW: CPT

## 2023-10-15 PROCEDURE — 700102 HCHG RX REV CODE 250 W/ 637 OVERRIDE(OP): Performed by: INTERNAL MEDICINE

## 2023-10-15 PROCEDURE — 770000 HCHG ROOM/CARE - INTERMEDIATE ICU *

## 2023-10-15 PROCEDURE — 700111 HCHG RX REV CODE 636 W/ 250 OVERRIDE (IP): Mod: JZ | Performed by: INTERNAL MEDICINE

## 2023-10-15 PROCEDURE — 700111 HCHG RX REV CODE 636 W/ 250 OVERRIDE (IP): Performed by: INTERNAL MEDICINE

## 2023-10-15 PROCEDURE — 80053 COMPREHEN METABOLIC PANEL: CPT

## 2023-10-15 PROCEDURE — 85027 COMPLETE CBC AUTOMATED: CPT

## 2023-10-15 PROCEDURE — 700101 HCHG RX REV CODE 250: Performed by: INTERNAL MEDICINE

## 2023-10-15 PROCEDURE — 83735 ASSAY OF MAGNESIUM: CPT

## 2023-10-15 PROCEDURE — 94640 AIRWAY INHALATION TREATMENT: CPT

## 2023-10-15 RX ADMIN — DIVALPROEX SODIUM 1000 MG: 500 TABLET, DELAYED RELEASE ORAL at 21:10

## 2023-10-15 RX ADMIN — ATORVASTATIN CALCIUM 20 MG: 20 TABLET, FILM COATED ORAL at 21:11

## 2023-10-15 RX ADMIN — ENOXAPARIN SODIUM 40 MG: 100 INJECTION SUBCUTANEOUS at 17:24

## 2023-10-15 RX ADMIN — METOPROLOL TARTRATE 25 MG: 25 TABLET, FILM COATED ORAL at 17:24

## 2023-10-15 RX ADMIN — DOCUSATE SODIUM 50 MG AND SENNOSIDES 8.6 MG 2 TABLET: 8.6; 5 TABLET, FILM COATED ORAL at 18:00

## 2023-10-15 RX ADMIN — LEVOTHYROXINE SODIUM 175 MCG: 0.17 TABLET ORAL at 05:32

## 2023-10-15 RX ADMIN — MAGNESIUM HYDROXIDE 30 ML: 1200 LIQUID ORAL at 17:24

## 2023-10-15 RX ADMIN — MIDODRINE HYDROCHLORIDE 5 MG: 5 TABLET ORAL at 17:25

## 2023-10-15 RX ADMIN — CEFTRIAXONE SODIUM 2000 MG: 10 INJECTION, POWDER, FOR SOLUTION INTRAVENOUS at 08:20

## 2023-10-15 RX ADMIN — AZITHROMYCIN DIHYDRATE 500 MG: 250 TABLET, FILM COATED ORAL at 17:25

## 2023-10-15 RX ADMIN — METOPROLOL TARTRATE 25 MG: 25 TABLET, FILM COATED ORAL at 08:26

## 2023-10-15 RX ADMIN — INSULIN HUMAN 1 UNITS: 100 INJECTION, SOLUTION PARENTERAL at 17:34

## 2023-10-15 RX ADMIN — ARIPIPRAZOLE 20 MG: 10 TABLET ORAL at 21:11

## 2023-10-15 RX ADMIN — DOCUSATE SODIUM 50 MG AND SENNOSIDES 8.6 MG 2 TABLET: 8.6; 5 TABLET, FILM COATED ORAL at 05:32

## 2023-10-15 RX ADMIN — POLYETHYLENE GLYCOL 3350 1 PACKET: 17 POWDER, FOR SOLUTION ORAL at 05:32

## 2023-10-15 RX ADMIN — SERTRALINE 100 MG: 100 TABLET, FILM COATED ORAL at 05:33

## 2023-10-15 RX ADMIN — QUETIAPINE FUMARATE 150 MG: 50 TABLET ORAL at 08:22

## 2023-10-15 RX ADMIN — INSULIN HUMAN 1 UNITS: 100 INJECTION, SOLUTION PARENTERAL at 21:11

## 2023-10-15 RX ADMIN — OXYBUTYNIN CHLORIDE 10 MG: 10 TABLET, EXTENDED RELEASE ORAL at 06:00

## 2023-10-15 RX ADMIN — MIDODRINE HYDROCHLORIDE 5 MG: 5 TABLET ORAL at 11:31

## 2023-10-15 RX ADMIN — QUETIAPINE FUMARATE 200 MG: 200 TABLET ORAL at 21:10

## 2023-10-15 ASSESSMENT — PATIENT HEALTH QUESTIONNAIRE - PHQ9
1. LITTLE INTEREST OR PLEASURE IN DOING THINGS: NOT AT ALL
SUM OF ALL RESPONSES TO PHQ9 QUESTIONS 1 AND 2: 0
2. FEELING DOWN, DEPRESSED, IRRITABLE, OR HOPELESS: NOT AT ALL
SUM OF ALL RESPONSES TO PHQ9 QUESTIONS 1 AND 2: 0
1. LITTLE INTEREST OR PLEASURE IN DOING THINGS: NOT AT ALL
2. FEELING DOWN, DEPRESSED, IRRITABLE, OR HOPELESS: NOT AT ALL

## 2023-10-15 ASSESSMENT — FIBROSIS 4 INDEX: FIB4 SCORE: 0.63

## 2023-10-15 ASSESSMENT — PAIN DESCRIPTION - PAIN TYPE
TYPE: ACUTE PAIN

## 2023-10-15 NOTE — CARE PLAN
Problem: Humidified High Flow Nasal Cannula  Goal: Maintain adequate oxygenation dependent on patient condition  Description: Target End Date:  resolve prior to discharge or when underlying condition is resolved/stabilized    1.  Implement humidified high flow oxygen therapy  2.  Titrate high flow oxygen to maintain appropriate SpO2  Outcome: Not Met     HHF 30/70%

## 2023-10-15 NOTE — CARE PLAN
Problem: Hemodynamics  Goal: Patient's hemodynamics, fluid balance and neurologic status will be stable or improve  Outcome: Progressing     Problem: Respiratory  Goal: Patient will achieve/maintain optimum respiratory ventilation and gas exchange  Outcome: Not Progressing     Problem: Fall Risk  Goal: Patient will remain free from falls  Outcome: Progressing   The patient is Watcher - Medium risk of patient condition declining or worsening    Shift Goals  Clinical Goals: decrease supplemental O2, maintain SPO2  Patient Goals: billy  Family Goals: billy    Progress made toward(s) clinical / shift goals:  SPo2 maintained above 90%     Patient is not progressing towards the following goals:supplemental O2 increased to 40L and 90% on HFNC      Problem: Respiratory  Goal: Patient will achieve/maintain optimum respiratory ventilation and gas exchange  Outcome: Not Progressing

## 2023-10-16 ENCOUNTER — APPOINTMENT (OUTPATIENT)
Dept: RADIOLOGY | Facility: MEDICAL CENTER | Age: 58
DRG: 189 | End: 2023-10-16
Attending: INTERNAL MEDICINE
Payer: MEDICARE

## 2023-10-16 LAB
ABO GROUP BLD: NORMAL
ALBUMIN SERPL BCP-MCNC: 2.1 G/DL (ref 3.2–4.9)
ALBUMIN/GLOB SERPL: 0.6 G/DL
ALP SERPL-CCNC: 73 U/L (ref 30–99)
ALT SERPL-CCNC: 7 U/L (ref 2–50)
ANION GAP SERPL CALC-SCNC: 9 MMOL/L (ref 7–16)
ANISOCYTOSIS BLD QL SMEAR: ABNORMAL
AST SERPL-CCNC: 10 U/L (ref 12–45)
BARCODED ABORH UBTYP: 1700
BARCODED PRD CODE UBPRD: NORMAL
BARCODED UNIT NUM UBUNT: NORMAL
BASOPHILS # BLD AUTO: 0.1 % (ref 0–1.8)
BASOPHILS # BLD: 0.01 K/UL (ref 0–0.12)
BILIRUB SERPL-MCNC: <0.2 MG/DL (ref 0.1–1.5)
BLD GP AB SCN SERPL QL: NORMAL
BUN SERPL-MCNC: 15 MG/DL (ref 8–22)
CALCIUM ALBUM COR SERPL-MCNC: 10 MG/DL (ref 8.5–10.5)
CALCIUM SERPL-MCNC: 8.5 MG/DL (ref 8.5–10.5)
CARBAMYLATED PROTEIN ANTIBODY, IGG Q6043: 4 UNITS (ref 0–19)
CCP IGA+IGG SERPL IA-ACNC: 3 UNITS (ref 0–19)
CHLORIDE SERPL-SCNC: 103 MMOL/L (ref 96–112)
CO2 SERPL-SCNC: 24 MMOL/L (ref 20–33)
COMMENT 1642: NORMAL
COMPONENT R 8504R: NORMAL
CREAT SERPL-MCNC: 0.64 MG/DL (ref 0.5–1.4)
EOSINOPHIL # BLD AUTO: 0.01 K/UL (ref 0–0.51)
EOSINOPHIL NFR BLD: 0.1 % (ref 0–6.9)
ERYTHROCYTE [DISTWIDTH] IN BLOOD BY AUTOMATED COUNT: 52.6 FL (ref 35.9–50)
ERYTHROCYTE [DISTWIDTH] IN BLOOD BY AUTOMATED COUNT: 53.1 FL (ref 35.9–50)
ERYTHROCYTE [DISTWIDTH] IN BLOOD BY AUTOMATED COUNT: 55.8 FL (ref 35.9–50)
FOLATE SERPL-MCNC: 7 NG/ML
GFR SERPLBLD CREATININE-BSD FMLA CKD-EPI: 102 ML/MIN/1.73 M 2
GLOBULIN SER CALC-MCNC: 3.6 G/DL (ref 1.9–3.5)
GLUCOSE BLD STRIP.AUTO-MCNC: 156 MG/DL (ref 65–99)
GLUCOSE BLD STRIP.AUTO-MCNC: 158 MG/DL (ref 65–99)
GLUCOSE BLD STRIP.AUTO-MCNC: 175 MG/DL (ref 65–99)
GLUCOSE BLD STRIP.AUTO-MCNC: 186 MG/DL (ref 65–99)
GLUCOSE SERPL-MCNC: 112 MG/DL (ref 65–99)
HCT VFR BLD AUTO: 24.4 % (ref 37–47)
HCT VFR BLD AUTO: 33 % (ref 37–47)
HCT VFR BLD AUTO: 33.8 % (ref 37–47)
HGB BLD-MCNC: 10.2 G/DL (ref 12–16)
HGB BLD-MCNC: 6.8 G/DL (ref 12–16)
HGB BLD-MCNC: 9.8 G/DL (ref 12–16)
IMM GRANULOCYTES # BLD AUTO: 0.04 K/UL (ref 0–0.11)
IMM GRANULOCYTES NFR BLD AUTO: 0.6 % (ref 0–0.9)
LACTATE SERPL-SCNC: 1.1 MMOL/L (ref 0.5–2)
LYMPHOCYTES # BLD AUTO: 1.06 K/UL (ref 1–4.8)
LYMPHOCYTES NFR BLD: 15.1 % (ref 22–41)
MCH RBC QN AUTO: 25 PG (ref 27–33)
MCH RBC QN AUTO: 26.5 PG (ref 27–33)
MCH RBC QN AUTO: 26.7 PG (ref 27–33)
MCHC RBC AUTO-ENTMCNC: 27.9 G/DL (ref 32.2–35.5)
MCHC RBC AUTO-ENTMCNC: 29.7 G/DL (ref 32.2–35.5)
MCHC RBC AUTO-ENTMCNC: 30.2 G/DL (ref 32.2–35.5)
MCV RBC AUTO: 88.5 FL (ref 81.4–97.8)
MCV RBC AUTO: 89.2 FL (ref 81.4–97.8)
MCV RBC AUTO: 89.7 FL (ref 81.4–97.8)
MICROCYTES BLD QL SMEAR: ABNORMAL
MONOCYTES # BLD AUTO: 0.77 K/UL (ref 0–0.85)
MONOCYTES NFR BLD AUTO: 11 % (ref 0–13.4)
MORPHOLOGY BLD-IMP: NORMAL
NEUTROPHILS # BLD AUTO: 5.11 K/UL (ref 1.82–7.42)
NEUTROPHILS NFR BLD: 73.1 % (ref 44–72)
NRBC # BLD AUTO: 0 K/UL
NRBC BLD-RTO: 0 /100 WBC (ref 0–0.2)
NUCLEAR IGG SER QL IA: NORMAL
PATHOLOGY CONSULT NOTE: NORMAL
PLATELET # BLD AUTO: 177 K/UL (ref 164–446)
PLATELET # BLD AUTO: 225 K/UL (ref 164–446)
PLATELET # BLD AUTO: 268 K/UL (ref 164–446)
PLATELET BLD QL SMEAR: NORMAL
PMV BLD AUTO: 10.4 FL (ref 9–12.9)
PMV BLD AUTO: 9.1 FL (ref 9–12.9)
PMV BLD AUTO: 9.2 FL (ref 9–12.9)
POTASSIUM SERPL-SCNC: 3.9 MMOL/L (ref 3.6–5.5)
PRODUCT TYPE UPROD: NORMAL
PROT SERPL-MCNC: 5.7 G/DL (ref 6–8.2)
RBC # BLD AUTO: 2.72 M/UL (ref 4.2–5.4)
RBC # BLD AUTO: 3.7 M/UL (ref 4.2–5.4)
RBC # BLD AUTO: 3.82 M/UL (ref 4.2–5.4)
RBC BLD AUTO: PRESENT
RH BLD: NORMAL
RHEUMATOID FACT SER NEPH-ACNC: 15 IU/ML (ref 0–14)
SODIUM SERPL-SCNC: 136 MMOL/L (ref 135–145)
UNIT STATUS USTAT: NORMAL
WBC # BLD AUTO: 6.2 K/UL (ref 4.8–10.8)
WBC # BLD AUTO: 7 K/UL (ref 4.8–10.8)
WBC # BLD AUTO: 7 K/UL (ref 4.8–10.8)

## 2023-10-16 PROCEDURE — 85027 COMPLETE CBC AUTOMATED: CPT

## 2023-10-16 PROCEDURE — 700111 HCHG RX REV CODE 636 W/ 250 OVERRIDE (IP): Mod: JZ | Performed by: INTERNAL MEDICINE

## 2023-10-16 PROCEDURE — A9270 NON-COVERED ITEM OR SERVICE: HCPCS | Performed by: INTERNAL MEDICINE

## 2023-10-16 PROCEDURE — 700102 HCHG RX REV CODE 250 W/ 637 OVERRIDE(OP): Performed by: INTERNAL MEDICINE

## 2023-10-16 PROCEDURE — 94640 AIRWAY INHALATION TREATMENT: CPT

## 2023-10-16 PROCEDURE — 82746 ASSAY OF FOLIC ACID SERUM: CPT

## 2023-10-16 PROCEDURE — 700111 HCHG RX REV CODE 636 W/ 250 OVERRIDE (IP): Performed by: INTERNAL MEDICINE

## 2023-10-16 PROCEDURE — 86923 COMPATIBILITY TEST ELECTRIC: CPT

## 2023-10-16 PROCEDURE — 86901 BLOOD TYPING SEROLOGIC RH(D): CPT

## 2023-10-16 PROCEDURE — 86900 BLOOD TYPING SEROLOGIC ABO: CPT

## 2023-10-16 PROCEDURE — 99232 SBSQ HOSP IP/OBS MODERATE 35: CPT | Performed by: INTERNAL MEDICINE

## 2023-10-16 PROCEDURE — P9016 RBC LEUKOCYTES REDUCED: HCPCS

## 2023-10-16 PROCEDURE — 83605 ASSAY OF LACTIC ACID: CPT

## 2023-10-16 PROCEDURE — 82962 GLUCOSE BLOOD TEST: CPT | Mod: 91

## 2023-10-16 PROCEDURE — 99291 CRITICAL CARE FIRST HOUR: CPT | Performed by: INTERNAL MEDICINE

## 2023-10-16 PROCEDURE — 36430 TRANSFUSION BLD/BLD COMPNT: CPT

## 2023-10-16 PROCEDURE — 86850 RBC ANTIBODY SCREEN: CPT

## 2023-10-16 PROCEDURE — 71045 X-RAY EXAM CHEST 1 VIEW: CPT

## 2023-10-16 PROCEDURE — 85025 COMPLETE CBC W/AUTO DIFF WBC: CPT

## 2023-10-16 PROCEDURE — 80053 COMPREHEN METABOLIC PANEL: CPT

## 2023-10-16 PROCEDURE — 770000 HCHG ROOM/CARE - INTERMEDIATE ICU *

## 2023-10-16 RX ORDER — FUROSEMIDE 10 MG/ML
20 INJECTION INTRAMUSCULAR; INTRAVENOUS ONCE
Status: COMPLETED | OUTPATIENT
Start: 2023-10-16 | End: 2023-10-16

## 2023-10-16 RX ADMIN — ATORVASTATIN CALCIUM 20 MG: 20 TABLET, FILM COATED ORAL at 20:50

## 2023-10-16 RX ADMIN — ONDANSETRON 4 MG: 4 TABLET, ORALLY DISINTEGRATING ORAL at 13:51

## 2023-10-16 RX ADMIN — DOCUSATE SODIUM 50 MG AND SENNOSIDES 8.6 MG 2 TABLET: 8.6; 5 TABLET, FILM COATED ORAL at 17:26

## 2023-10-16 RX ADMIN — INSULIN HUMAN 1 UNITS: 100 INJECTION, SOLUTION PARENTERAL at 17:31

## 2023-10-16 RX ADMIN — DIVALPROEX SODIUM 1000 MG: 500 TABLET, DELAYED RELEASE ORAL at 20:50

## 2023-10-16 RX ADMIN — OXYBUTYNIN CHLORIDE 10 MG: 10 TABLET, EXTENDED RELEASE ORAL at 05:24

## 2023-10-16 RX ADMIN — FUROSEMIDE 20 MG: 10 INJECTION, SOLUTION INTRAVENOUS at 09:45

## 2023-10-16 RX ADMIN — METOPROLOL TARTRATE 25 MG: 25 TABLET, FILM COATED ORAL at 05:24

## 2023-10-16 RX ADMIN — ENOXAPARIN SODIUM 40 MG: 100 INJECTION SUBCUTANEOUS at 17:26

## 2023-10-16 RX ADMIN — QUETIAPINE FUMARATE 150 MG: 50 TABLET ORAL at 07:41

## 2023-10-16 RX ADMIN — METOPROLOL TARTRATE 25 MG: 25 TABLET, FILM COATED ORAL at 17:26

## 2023-10-16 RX ADMIN — SERTRALINE 100 MG: 100 TABLET, FILM COATED ORAL at 05:24

## 2023-10-16 RX ADMIN — INSULIN HUMAN 1 UNITS: 100 INJECTION, SOLUTION PARENTERAL at 07:48

## 2023-10-16 RX ADMIN — INSULIN HUMAN 1 UNITS: 100 INJECTION, SOLUTION PARENTERAL at 12:04

## 2023-10-16 RX ADMIN — MIDODRINE HYDROCHLORIDE 5 MG: 5 TABLET ORAL at 07:41

## 2023-10-16 RX ADMIN — LEVOTHYROXINE SODIUM 175 MCG: 0.17 TABLET ORAL at 05:24

## 2023-10-16 RX ADMIN — INSULIN HUMAN 1 UNITS: 100 INJECTION, SOLUTION PARENTERAL at 20:51

## 2023-10-16 RX ADMIN — QUETIAPINE FUMARATE 200 MG: 200 TABLET ORAL at 20:50

## 2023-10-16 RX ADMIN — ACETAMINOPHEN 650 MG: 325 TABLET, FILM COATED ORAL at 13:51

## 2023-10-16 RX ADMIN — ARIPIPRAZOLE 20 MG: 10 TABLET ORAL at 20:50

## 2023-10-16 RX ADMIN — CEFTRIAXONE SODIUM 2000 MG: 10 INJECTION, POWDER, FOR SOLUTION INTRAVENOUS at 05:24

## 2023-10-16 ASSESSMENT — LIFESTYLE VARIABLES
HAVE YOU EVER FELT YOU SHOULD CUT DOWN ON YOUR DRINKING: NO
TOTAL SCORE: 0
ON A TYPICAL DAY WHEN YOU DRINK ALCOHOL HOW MANY DRINKS DO YOU HAVE: 0
HAVE PEOPLE ANNOYED YOU BY CRITICIZING YOUR DRINKING: NO
CONSUMPTION TOTAL: NEGATIVE
HOW MANY TIMES IN THE PAST YEAR HAVE YOU HAD 5 OR MORE DRINKS IN A DAY: 0
EVER FELT BAD OR GUILTY ABOUT YOUR DRINKING: NO
DOES PATIENT WANT TO STOP DRINKING: NO
ALCOHOL_USE: NO
TOTAL SCORE: 0
EVER HAD A DRINK FIRST THING IN THE MORNING TO STEADY YOUR NERVES TO GET RID OF A HANGOVER: NO
AVERAGE NUMBER OF DAYS PER WEEK YOU HAVE A DRINK CONTAINING ALCOHOL: 0
TOTAL SCORE: 0

## 2023-10-16 ASSESSMENT — PAIN DESCRIPTION - PAIN TYPE
TYPE: ACUTE PAIN

## 2023-10-16 ASSESSMENT — FIBROSIS 4 INDEX
FIB4 SCORE: 0.99
FIB4 SCORE: 0.63

## 2023-10-16 NOTE — THERAPY
Physical Therapy Contact Note    Patient Name: Erlinda Verde  Age:  58 y.o., Sex:  female  Medical Record #: [id number]  Today's Date: 10/16/2023       10/16/23 1017   Initial Contact Note    Initial Contact Note Order Received and Verified, Physical Therapy Evaluation in Progress with Full Report to Follow.   Interdisciplinary Plan of Care Collaboration   Collaboration Comments PT consult recieved. Per review of medical records and discussion with RN, patient is semi-following commands. Does not appear to be appropriate for participating in evaluation. Will attempt at a later date, as able & appropriate.   Session Information   Date / Session Number  10/16-Attempt (EVAL)     Received blood transfusion this AM, continues to have elevated HR (110s) at rest.

## 2023-10-16 NOTE — PROGRESS NOTES
Hemoglobin of 6.8. Notified MD Mancuso. Received orders to transfuse 1 unit PRBC. Called on call [facility] guardian and received consent for blood, form uploaded to chart.

## 2023-10-16 NOTE — CARE PLAN
The patient is Watcher - Medium risk of patient condition declining or worsening    Shift Goals  Clinical Goals: Wean O2, comfort  Patient Goals: ARLETTE  Family Goals: ARLETTE    Progress made toward(s) clinical / shift goals:    Problem: Knowledge Deficit - Standard  Goal: Patient and family/care givers will demonstrate understanding of plan of care, disease process/condition, diagnostic tests and medications  Outcome: Progressing     Problem: Hemodynamics  Goal: Patient's hemodynamics, fluid balance and neurologic status will be stable or improve  Outcome: Progressing     Problem: Respiratory  Goal: Patient will achieve/maintain optimum respiratory ventilation and gas exchange  Outcome: Progressing  Flowsheets  Taken 10/16/2023 0204 by Yane Choi RRT  O2 Delivery Device: Heated High Flow Nasal Cannula  Taken 10/15/2023 2000 by Ledy Lemos RBOZENA  Deep Breathe and Cough: Needs Coaching     Problem: Skin Integrity  Goal: Skin integrity is maintained or improved  Outcome: Progressing     Problem: Fall Risk  Goal: Patient will remain free from falls  Outcome: Progressing

## 2023-10-16 NOTE — DISCHARGE PLANNING
Care Transition Team Assessment    RNCM completed assessment with information obtained from chart review. Pt resides at [address]. GH Owner is Shanika [phone]. Pt also has Public Guardian : Ann Urias [phone]. Patient is pending PT/OT eval, however it is not likely patient will have any needs upon DC. She's currently on 40L O2. Patient is currently on service with Sandra ESCALANTE. HCM will continue to follow to assist with any DC planning needs.     Information Source  Orientation Level: Oriented to person, Oriented to place, Disoriented to time, Disoriented to situation  Information Given By:  (EMR)  Who is responsible for making decisions for patient? : Guardian    Readmission Evaluation  Is this a readmission?: No    Elopement Risk  Legal Hold: No  Ambulatory or Self Mobile in Wheelchair: No-Not an Elopement Risk  Elopement Risk: Not at Risk for Elopement    Interdisciplinary Discharge Planning  Lives with - Patient's Self Care Capacity: Attendant / Paid Care Giver  Patient or legal guardian wants to designate a caregiver: Yes  Support Systems: Home Care Staff  Housing / Facility: Group Home  Durable Medical Equipment: Home Oxygen, Walker    Discharge Preparedness  What is your plan after discharge?: senior care  What are your discharge supports?: Other (comment), Guardian  Prior Functional Level: Needs Assist with Activities of Daily Living, Needs Assist with Medication Management, Total Assist  Difficulity with ADLs: Bathing, Brushing teeth, Dressing, Eating, Toileting, Walking  Difficulity with IADLs: Cooking, Driving, Keeping track of finances, Laundry, Managing medication, Shopping, Using the telephone or computer    Functional Assesment  Prior Functional Level: Needs Assist with Activities of Daily Living, Needs Assist with Medication Management, Total Assist    Finances  Financial Barriers to Discharge: No  Prescription Coverage: Yes              Advance  Directive  Advance Directive?: None    Domestic Abuse  Have you ever been the victim of abuse or violence?: No  Physical Abuse or Sexual Abuse: No  Verbal Abuse or Emotional Abuse: No  Possible Abuse/Neglect Reported to:: Not Applicable    Psychological Assessment  History of Substance Abuse: None  History of Psychiatric Problems: No  Non-compliant with Treatment: No  Newly Diagnosed Illness: No    Discharge Risks or Barriers  Discharge risks or barriers?: Mental health, Complex medical needs  Patient risk factors: Cognitive / sensory / physical deficit, Complex medical needs, Vulnerable adult    Anticipated Discharge Information  Discharge Disposition: Discharged to home/self care (01)

## 2023-10-16 NOTE — CARE PLAN
Problem: Humidified High Flow Nasal Cannula  Goal: Maintain adequate oxygenation dependent on patient condition  Description: Target End Date:  resolve prior to discharge or when underlying condition is resolved/stabilized    1.  Implement humidified high flow oxygen therapy  2.  Titrate high flow oxygen to maintain appropriate SpO2  Outcome: Not Progressing     HHF 40/70%

## 2023-10-16 NOTE — CARE PLAN
Problem: Fall Risk  Goal: Patient will remain free from falls  Outcome: Progressing     Problem: Hemodynamics  Goal: Patient's hemodynamics, fluid balance and neurologic status will be stable or improve  Outcome: Progressing   The patient is Stable - Low risk of patient condition declining or worsening    Shift Goals  Clinical Goals: wean o2, safety  Patient Goals: ARLETTE  Family Goals: ARLETTE    Progress made toward(s) clinical / shift goals:  hmg stabilizing.  Hourly rounding in place.    Patient is not progressing towards the following goals:

## 2023-10-16 NOTE — PROGRESS NOTES
"Pulmonary Progress Note    Date of admission  10/12/2023    Chief Complaint  58 y.o. female admitted 10/12/2023 with hypoxic respiratory failure    Hospital Course  \"58 y.o. female who presented 10/12/2023 with complaints of cough and chest pain for the past 3 days.  She has a past medical history of type 2 diabetes, hypertension, schizoaffective disorder, Parkinson's disease and intellectual disability.  She lives in a group home.  She has been having chest pain and productive cough for the past 3 days.  EMS was contacted, she was saturating 88% on room air per report and brought to the ER.  In the ER she was tachycardic, mildly hypotensive which responded to fluid resuscitation.  Flu/COVID/RSV swab was negative.  No leukocytosis, BNP 4000, hemoglobin 8.3 below baseline.  CT angiogram of the chest demonstrated moderate to large pericardial effusion, moderate left and small right pleural effusion, and bilateral compressive atelectasis.  Prior CT 2022 demonstrated small effusions and atelectasis. Incidental 3 mm LEFT upper lobe pulmonary nodule is unchanged since 2022\" - Dr. Armando    Interval Problem Update  10/16/23 - s/p 750ml out from left chest, appears to be a chronic transudate (appears exudative but without worrisome features).  Path pending, cultures negative to date.        Review of Systems  Review of Systems   Unable to perform ROS: Mental acuity    Denies pain, mumbles incoherently to questions.     Vital Signs for last 24 hours   Temp:  [36.8 °C (98.2 °F)-37.8 °C (100.1 °F)] 36.9 °C (98.4 °F)  Pulse:  [] 113  Resp:  [19-29] 19  BP: ()/(56-85) 106/66  SpO2:  [87 %-96 %] 88 %    Hemodynamic parameters for last 24 hours       Respiratory Information for the last 24 hours       Physical Exam   Physical Exam  Constitutional:       Appearance: She is ill-appearing.   HENT:      Head: Atraumatic.   Cardiovascular:      Rate and Rhythm: Regular rhythm. Tachycardia present.   Pulmonary:      Effort: " Pulmonary effort is normal.      Breath sounds: Normal breath sounds. No wheezing, rhonchi or rales.   Abdominal:      Palpations: Abdomen is soft.   Musculoskeletal:         General: No swelling.   Skin:     General: Skin is warm and dry.   Neurological:      General: No focal deficit present.         Medications  Current Facility-Administered Medications   Medication Dose Route Frequency Provider Last Rate Last Admin    metoprolol tartrate (Lopressor) tablet 25 mg  25 mg Oral TWICE DAILY Trip Lombardo M.D.   25 mg at 10/16/23 0524    levothyroxine (Synthroid) tablet 175 mcg  175 mcg Oral AM ES Trip Lombardo M.D.   175 mcg at 10/16/23 0524    midodrine (Proamatine) tablet 5 mg  5 mg Oral TID WITH MEALS Trip Lombardo M.D.   5 mg at 10/16/23 0741    enoxaparin (Lovenox) inj 40 mg  40 mg Subcutaneous DAILY AT 1800 Trip Lombardo M.D.   40 mg at 10/15/23 1724    senna-docusate (Pericolace Or Senokot S) 8.6-50 MG per tablet 2 Tablet  2 Tablet Oral BID Andrew Mancuso M.D.   2 Tablet at 10/15/23 1800    And    polyethylene glycol/lytes (Miralax) PACKET 1 Packet  1 Packet Oral QDAY PRN Andrew Mancuso M.D.   1 Packet at 10/15/23 0532    And    magnesium hydroxide (Milk Of Magnesia) suspension 30 mL  30 mL Oral QDAY PRN Andrew Mancuso M.D.   30 mL at 10/15/23 1724    And    bisacodyl (Dulcolax) suppository 10 mg  10 mg Rectal QDAY PRN Andrew Mancuso M.D.        Respiratory Therapy Consult   Nebulization Continuous RT Andrew Mancuso M.D.        acetaminophen (Tylenol) tablet 650 mg  650 mg Oral Q6HRS PRN Andrew Mancuso M.D.   650 mg at 10/16/23 1351    labetalol (Normodyne/Trandate) injection 10 mg  10 mg Intravenous Q4HRS PRN Andrew Mancuso M.D.        ondansetron (Zofran) syringe/vial injection 4 mg  4 mg Intravenous Q4HRS PRN Andrew Mancuso M.D.        ondansetron (Zofran ODT) dispertab 4 mg  4 mg Oral Q4HRS PRN Andrew Mancuos M.D.   4 mg at 10/16/23 8995     promethazine (Phenergan) tablet 12.5-25 mg  12.5-25 mg Oral Q4HRS PRN Andrew Mancuso M.D.        promethazine (Phenergan) suppository 12.5-25 mg  12.5-25 mg Rectal Q4HRS PRN Andrew Mancuso M.D.        prochlorperazine (Compazine) injection 5-10 mg  5-10 mg Intravenous Q4HRS PRN Andrew Mancuso M.D.        insulin regular (HumuLIN R,NovoLIN R) injection  1-6 Units Subcutaneous 4X/DAY ACHNOE Mancuso M.D.   1 Units at 10/16/23 1204    And    dextrose 50% (D50W) injection 25 g  25 g Intravenous Q15 MIN PRN Andrew Mancuso M.D.        cefTRIAXone (Rocephin) syringe 2,000 mg  2,000 mg Intravenous Q24HRS Andrew Mancuso M.D.   2,000 mg at 10/16/23 0524    ARIPiprazole (Abilify) tablet 20 mg  20 mg Oral QGEMA Mancuso M.D.   20 mg at 10/15/23 2111    atorvastatin (Lipitor) tablet 20 mg  20 mg Oral QHS Andrew Mancuso M.D.   20 mg at 10/15/23 2111    divalproex (Depakote) delayed-release tablet 1,000 mg  1,000 mg Oral QGEMA Mancuso M.D.   1,000 mg at 10/15/23 2110    QUEtiapine (SEROquel) tablet 150 mg  150 mg Oral GELY Mancuso M.D.   150 mg at 10/16/23 0741    QUEtiapine (SEROquel) tablet 200 mg  200 mg Oral QHS Andrew Mancuso M.D.   200 mg at 10/15/23 2110    sertraline (Zoloft) tablet 100 mg  100 mg Oral QAMANJIT Mancuso M.D.   100 mg at 10/16/23 0524    oxybutynin SR (Ditropan-XL) tablet 10 mg  10 mg Oral DAILY Andrew Mancuso M.D.   10 mg at 10/16/23 0524       Fluids    Intake/Output Summary (Last 24 hours) at 10/16/2023 1402  Last data filed at 10/16/2023 0914  Gross per 24 hour   Intake 1015 ml   Output 200 ml   Net 815 ml       Laboratory          Recent Labs     10/14/23  0928 10/15/23  0425 10/16/23  0230   SODIUM 136 139 136   POTASSIUM 3.7 4.1 3.9   CHLORIDE 102 104 103   CO2 21 21 24   BUN 20 16 15   CREATININE 0.62 0.68 0.64   MAGNESIUM  --  2.1  --    CALCIUM 8.0* 8.3* 8.5     Recent Labs     10/14/23  0928 10/15/23  0425 10/16/23  0230   ALTSGPT 7 10 7    ASTSGOT 8* 9* 10*   ALKPHOSPHAT 70 79 73   TBILIRUBIN <0.2 <0.2 <0.2   GLUCOSE 239* 149* 112*     Recent Labs     10/14/23  0351 10/14/23  0928 10/15/23  0425 10/16/23  0230 10/16/23  0345 10/16/23  1131   WBC 11.1*  --  10.2  --  6.2 7.0   NEUTSPOLYS 81.20*  --   --   --   --   --    LYMPHOCYTES 12.80*  --   --   --   --   --    MONOCYTES 6.00  --   --   --   --   --    EOSINOPHILS 0.00  --   --   --   --   --    BASOPHILS 0.00  --   --   --   --   --    ASTSGOT  --  8* 9* 10*  --   --    ALTSGPT  --  7 10 7  --   --    ALKPHOSPHAT  --  70 79 73  --   --    TBILIRUBIN  --  <0.2 <0.2 <0.2  --   --      Recent Labs     10/14/23  1005 10/15/23  0425 10/16/23  0345 10/16/23  1131   RBC  --  3.50* 2.72* 3.82*   HEMOGLOBIN  --  8.7* 6.8* 10.2*   HEMATOCRIT  --  31.5* 24.4* 33.8*   PLATELETCT  --  265 177 268   PROTHROMBTM 15.9*  --   --   --    APTT 31.8  --   --   --    INR 1.25*  --   --   --        Imaging  X-Ray:  I have personally reviewed the images and compared with prior images.  CT:    Reviewed  Echo:   Reviewed    Assessment/Plan  #Bilateral pleural effusions  #Hypoxic respiratory failure  Plan:  - Aggressive diuresis  - follow up CTD studies and path  - Continue abx coverage  - HOB elevation  - Aspiration precautions  - Titrate oxygen to an SpO2 of 88-94%  - Airway clearance as needed  - Pulmonary following    Laura Garcia MD RD  Pulmonary and Critical Care    Available on Voalte

## 2023-10-17 ENCOUNTER — APPOINTMENT (OUTPATIENT)
Dept: RADIOLOGY | Facility: MEDICAL CENTER | Age: 58
DRG: 189 | End: 2023-10-17
Attending: HOSPITALIST
Payer: MEDICARE

## 2023-10-17 LAB
ALBUMIN SERPL BCP-MCNC: 2.5 G/DL (ref 3.2–4.9)
ALBUMIN/GLOB SERPL: 0.6 G/DL
ALP SERPL-CCNC: 98 U/L (ref 30–99)
ALT SERPL-CCNC: 16 U/L (ref 2–50)
ANION GAP SERPL CALC-SCNC: 13 MMOL/L (ref 7–16)
AST SERPL-CCNC: 24 U/L (ref 12–45)
BACTERIA BLD CULT: NORMAL
BACTERIA BLD CULT: NORMAL
BILIRUB SERPL-MCNC: <0.2 MG/DL (ref 0.1–1.5)
BUN SERPL-MCNC: 19 MG/DL (ref 8–22)
CALCIUM ALBUM COR SERPL-MCNC: 10.1 MG/DL (ref 8.5–10.5)
CALCIUM SERPL-MCNC: 8.9 MG/DL (ref 8.5–10.5)
CENTROMERE IGG TITR SER IF: 0 AU/ML (ref 0–40)
CHLORIDE SERPL-SCNC: 102 MMOL/L (ref 96–112)
CO2 SERPL-SCNC: 24 MMOL/L (ref 20–33)
CREAT SERPL-MCNC: 0.81 MG/DL (ref 0.5–1.4)
ENA JO1 AB TITR SER: 1 AU/ML (ref 0–40)
ENA SCL70 IGG SER QL: 0 AU/ML (ref 0–40)
ENA SM IGG SER-ACNC: 1 AU/ML (ref 0–40)
ENA SS-B IGG SER IA-ACNC: 52 AU/ML (ref 0–40)
ERYTHROCYTE [DISTWIDTH] IN BLOOD BY AUTOMATED COUNT: 53.2 FL (ref 35.9–50)
GFR SERPLBLD CREATININE-BSD FMLA CKD-EPI: 84 ML/MIN/1.73 M 2
GLOBULIN SER CALC-MCNC: 4.4 G/DL (ref 1.9–3.5)
GLUCOSE BLD STRIP.AUTO-MCNC: 135 MG/DL (ref 65–99)
GLUCOSE SERPL-MCNC: 144 MG/DL (ref 65–99)
HCT VFR BLD AUTO: 33.2 % (ref 37–47)
HGB BLD-MCNC: 9.9 G/DL (ref 12–16)
MAGNESIUM SERPL-MCNC: 2.2 MG/DL (ref 1.5–2.5)
MCH RBC QN AUTO: 26.5 PG (ref 27–33)
MCHC RBC AUTO-ENTMCNC: 29.8 G/DL (ref 32.2–35.5)
MCV RBC AUTO: 89 FL (ref 81.4–97.8)
PHOSPHATE SERPL-MCNC: 4.3 MG/DL (ref 2.5–4.5)
PLATELET # BLD AUTO: 308 K/UL (ref 164–446)
PMV BLD AUTO: 9.1 FL (ref 9–12.9)
POTASSIUM SERPL-SCNC: 4.1 MMOL/L (ref 3.6–5.5)
PROT SERPL-MCNC: 6.9 G/DL (ref 6–8.2)
RBC # BLD AUTO: 3.73 M/UL (ref 4.2–5.4)
RIBOSOMAL P AB SER-ACNC: 3 AU/ML (ref 0–40)
SIGNIFICANT IND 70042: NORMAL
SIGNIFICANT IND 70042: NORMAL
SITE SITE: NORMAL
SITE SITE: NORMAL
SODIUM SERPL-SCNC: 139 MMOL/L (ref 135–145)
SOURCE SOURCE: NORMAL
SOURCE SOURCE: NORMAL
SSA52 R0ENA AB IGG Q0420: 2 AU/ML (ref 0–40)
SSA60 R0ENA AB IGG Q0419: 0 AU/ML (ref 0–40)
U1 SNRNP IGG SER QL: 5 UNITS (ref 0–19)
WBC # BLD AUTO: 7.4 K/UL (ref 4.8–10.8)

## 2023-10-17 PROCEDURE — 85027 COMPLETE CBC AUTOMATED: CPT

## 2023-10-17 PROCEDURE — 99233 SBSQ HOSP IP/OBS HIGH 50: CPT | Performed by: HOSPITALIST

## 2023-10-17 PROCEDURE — A9270 NON-COVERED ITEM OR SERVICE: HCPCS | Performed by: INTERNAL MEDICINE

## 2023-10-17 PROCEDURE — 84100 ASSAY OF PHOSPHORUS: CPT

## 2023-10-17 PROCEDURE — 94640 AIRWAY INHALATION TREATMENT: CPT

## 2023-10-17 PROCEDURE — 80053 COMPREHEN METABOLIC PANEL: CPT

## 2023-10-17 PROCEDURE — 770000 HCHG ROOM/CARE - INTERMEDIATE ICU *

## 2023-10-17 PROCEDURE — 71045 X-RAY EXAM CHEST 1 VIEW: CPT

## 2023-10-17 PROCEDURE — 700102 HCHG RX REV CODE 250 W/ 637 OVERRIDE(OP): Performed by: INTERNAL MEDICINE

## 2023-10-17 PROCEDURE — 82962 GLUCOSE BLOOD TEST: CPT

## 2023-10-17 PROCEDURE — 700101 HCHG RX REV CODE 250: Performed by: INTERNAL MEDICINE

## 2023-10-17 PROCEDURE — 700111 HCHG RX REV CODE 636 W/ 250 OVERRIDE (IP): Performed by: INTERNAL MEDICINE

## 2023-10-17 PROCEDURE — 83735 ASSAY OF MAGNESIUM: CPT

## 2023-10-17 RX ADMIN — DOCUSATE SODIUM 50 MG AND SENNOSIDES 8.6 MG 2 TABLET: 8.6; 5 TABLET, FILM COATED ORAL at 05:26

## 2023-10-17 RX ADMIN — SERTRALINE 100 MG: 100 TABLET, FILM COATED ORAL at 05:26

## 2023-10-17 RX ADMIN — OXYBUTYNIN CHLORIDE 10 MG: 10 TABLET, EXTENDED RELEASE ORAL at 05:26

## 2023-10-17 RX ADMIN — METOPROLOL TARTRATE 25 MG: 25 TABLET, FILM COATED ORAL at 17:11

## 2023-10-17 RX ADMIN — QUETIAPINE FUMARATE 200 MG: 200 TABLET ORAL at 21:49

## 2023-10-17 RX ADMIN — DIVALPROEX SODIUM 1000 MG: 500 TABLET, DELAYED RELEASE ORAL at 21:49

## 2023-10-17 RX ADMIN — CEFTRIAXONE SODIUM 2000 MG: 10 INJECTION, POWDER, FOR SOLUTION INTRAVENOUS at 06:25

## 2023-10-17 RX ADMIN — ARIPIPRAZOLE 20 MG: 10 TABLET ORAL at 21:49

## 2023-10-17 RX ADMIN — LEVOTHYROXINE SODIUM 175 MCG: 0.17 TABLET ORAL at 05:26

## 2023-10-17 RX ADMIN — METOPROLOL TARTRATE 25 MG: 25 TABLET, FILM COATED ORAL at 05:26

## 2023-10-17 RX ADMIN — ATORVASTATIN CALCIUM 20 MG: 20 TABLET, FILM COATED ORAL at 21:49

## 2023-10-17 RX ADMIN — DOCUSATE SODIUM 50 MG AND SENNOSIDES 8.6 MG 2 TABLET: 8.6; 5 TABLET, FILM COATED ORAL at 17:11

## 2023-10-17 RX ADMIN — QUETIAPINE FUMARATE 150 MG: 50 TABLET ORAL at 07:57

## 2023-10-17 ASSESSMENT — PAIN SCALES - PAIN ASSESSMENT IN ADVANCED DEMENTIA (PAINAD)
NEGVOCALIZATION: REPEATED TROUBLED CALLING OUT, LOUD MOANING/GROANING, CRYING
TOTALSCORE: 3
BREATHING: NORMAL
FACIALEXPRESSION: SMILING OR INEXPRESSIVE
BODYLANGUAGE: RELAXED
CONSOLABILITY: DISTRACTED OR REASSURED BY VOICE/TOUCH

## 2023-10-17 ASSESSMENT — PAIN DESCRIPTION - PAIN TYPE
TYPE: ACUTE PAIN

## 2023-10-17 ASSESSMENT — PAIN SCALES - WONG BAKER: WONGBAKER_NUMERICALRESPONSE: DOESN'T HURT AT ALL

## 2023-10-17 NOTE — CARE PLAN
Problem: Humidified High Flow Nasal Cannula  Goal: Maintain adequate oxygenation dependent on patient condition  Description: Target End Date:  resolve prior to discharge or when underlying condition is resolved/stabilized    1.  Implement humidified high flow oxygen therapy  2.  Titrate high flow oxygen to maintain appropriate SpO2  Outcome: Not Met   Patient receiving 45L, 80% FIO2 via high flow nasal cannula.

## 2023-10-17 NOTE — CARE PLAN
The patient is Watcher - Medium risk of patient condition declining or worsening    Shift Goals  Clinical Goals: Oxygen saturation will remain within goal range this shift  Patient Goals: N/A  Family Goals: N/A    Progress made toward(s) clinical / shift goals:  patient continues to require 40L HHF O2. Pt denies feeling short of breath. Q2h turns in use, skin is intact. Patient encouraged to move all extremities and heels are floated.      Problem: Respiratory  Goal: Patient will achieve/maintain optimum respiratory ventilation and gas exchange  Outcome: Progressing     Problem: Skin Integrity  Goal: Skin integrity is maintained or improved  Outcome: Progressing     Problem: Fall Risk  Goal: Patient will remain free from falls  Outcome: Progressing        Patient is not progressing towards the following goals:

## 2023-10-17 NOTE — PROGRESS NOTES
Hospital Medicine Daily Progress Note    Date of Service  10/16/2023    Chief Complaint  Erlinda Verde is a 58 y.o. female admitted 10/12/2023 with cough and shortness of breath    Hospital Course    Erlinda Verde is a 58 y.o. female with past medical history of type 2 diabetes, hypertension, schizoaffective disorder, Parkinson disease who presented 10/12/2023 from group home for cough for 3 days, desaturation to 88% on room air per EMS report, some chest pain with cough.  Patient is poor historian due to her intellectual disability.  In ER she noted to have tachycardia 124, which appears to be sinus tachycardia with nonspecific T wave flattening, but soft blood pressure even after given septic bolus.  On 2 L nasal cannula, SPO2 91%.  B19/influenza/RSV screen negative.  CBC does not showed WBC elevation, did showed hemoglobin 8.3 which is below baseline.  Troponin 27, proBNP 4000, lactic acid 1.4.  CTA of the chest negative for PE showed moderate to large pericardial effusion, moderate left and small right pleural effusion, bilateral compressive atelectasis.  Follow-up stat echo did show small  effusion in the pericardium, no tamponade.  EF 61%, grade 1 diastolic dysfunction.  ERP spoke to cardiology, Dr. Maloney, there was no additional recommendations.  Patient also was decided to be admitted to IMCU per conversation with Dr. Sandoval/critical care    Patient became hypoxic in IMCU and she was placed on high flow nasal cannula.  I requested consultation with pulmonology.  I called patient's legal guardian service and I spoke with Ani Chaudhary.    Interval Problem Update  10/13/23  I evaluated and examined her at the bedside.  She was unable to provide me detailed information.  I called patient legal guardian and I spoke with Ani Diaz home on-call for patient renal guardian and discussed plan of care with her.  I called her again and updated her that plan is to perform thoracentesis.  I discussed plan of care  with her and answered all her questions.  Patient does not have post and by default she is full code.  I requested consultation with pulmonology and discussed plan of care with pulmonologist Dr. Armando.  She became hypoxic and she was placed on high flow nasal cannula.    10/14/23    I evaluated and examined her at the bedside.  She is still requiring high flow nasal cannula to maintain oxygen saturation.  She underwent left thoracentesis and 750 cc of fluid was removed.  Echocardiogram showed ejection fraction of 61%.  She became hypotensive and I started her on 75 cc/h fluid and IV fluids started on midodrine 5 mg 3 times daily.  I am continuing IV antibiotics for now.  I discussed plan of care with infectious disease Dr. Armando.    10/15/23  I ordered and examined her at the bedside  She was significantly tachycardic this morning.  I increase the dose of metoprolol.  Continue midodrine for hypotension.  Time continue metoprolol to avoid reflex tachycardia.  Continue IV antibiotics  Cultures negative to date.      10/16/23  Overnight patient found to have low hemoglobin and she received blood transfusion.  Repeat hemoglobin has significantly improved.  I reviewed her x-ray and I ordered 1 dose of IV Lasix.  I discussed finding of patient current medical condition with pulmonologist Dr. Garcia.  I discussed plan of care with bedside RN.  Continue IV antibiotics.  Continue providing her oxygen via high flow nasal cannula.    I have discussed this patient's plan of care and discharge plan at IDT rounds today with Case Management, Nursing, Nursing leadership, and other members of the IDT team.    Consultants/Specialty  pulmonary    Code Status  Full Code    Disposition  Medically Cleared  I have placed the appropriate orders for post-discharge needs.    Review of Systems  Review of Systems   Unable to perform ROS: Medical condition        Physical Exam  Temp:  [36.8 °C (98.2 °F)-37.8 °C (100.1 °F)] 36.9 °C (98.4  °F)  Pulse:  [] 125  Resp:  [19-26] 23  BP: ()/(56-85) 106/66  SpO2:  [87 %-96 %] 93 %    Physical Exam  Vitals reviewed.   Constitutional:       General: She is in acute distress.      Appearance: Normal appearance. She is ill-appearing.   HENT:      Head: Normocephalic and atraumatic.      Nose: No congestion.      Comments: High flow nasal cannula  Eyes:      General:         Right eye: No discharge.         Left eye: No discharge.      Pupils: Pupils are equal, round, and reactive to light.   Cardiovascular:      Rate and Rhythm: Regular rhythm. Tachycardia present.      Pulses: Normal pulses.      Heart sounds: Normal heart sounds. No murmur heard.  Pulmonary:      Effort: Pulmonary effort is normal. No respiratory distress.      Breath sounds: No stridor.      Comments: Decreased breath sounds at the bases  Abdominal:      General: Bowel sounds are normal. There is no distension.      Palpations: Abdomen is soft.      Tenderness: There is no abdominal tenderness.   Musculoskeletal:         General: No swelling or tenderness. Normal range of motion.      Cervical back: Normal range of motion. No rigidity.   Skin:     General: Skin is warm.      Capillary Refill: Capillary refill takes less than 2 seconds.      Coloration: Skin is not jaundiced or pale.      Findings: No bruising.   Neurological:      General: No focal deficit present.      Mental Status: She is alert and oriented to person, place, and time.      Cranial Nerves: No cranial nerve deficit.   Psychiatric:         Mood and Affect: Mood normal.         Behavior: Behavior normal.       I performed physical exam on October 16, 2023 no acute changes.  She remains in respiratory distress  Fluids    Intake/Output Summary (Last 24 hours) at 10/16/2023 1958  Last data filed at 10/16/2023 0914  Gross per 24 hour   Intake 895 ml   Output --   Net 895 ml         Laboratory  Recent Labs     10/16/23  0345 10/16/23  1131 10/16/23  1716   WBC 6.2 7.0  7.0   RBC 2.72* 3.82* 3.70*   HEMOGLOBIN 6.8* 10.2* 9.8*   HEMATOCRIT 24.4* 33.8* 33.0*   MCV 89.7 88.5 89.2   MCH 25.0* 26.7* 26.5*   MCHC 27.9* 30.2* 29.7*   RDW 55.8* 52.6* 53.1*   PLATELETCT 177 268 225   MPV 10.4 9.1 9.2       Recent Labs     10/14/23  0928 10/15/23  0425 10/16/23  0230   SODIUM 136 139 136   POTASSIUM 3.7 4.1 3.9   CHLORIDE 102 104 103   CO2 21 21 24   GLUCOSE 239* 149* 112*   BUN 20 16 15   CREATININE 0.62 0.68 0.64   CALCIUM 8.0* 8.3* 8.5       Recent Labs     10/14/23  1005   APTT 31.8   INR 1.25*                 Imaging  DX-CHEST-LIMITED (1 VIEW)   Final Result         1.  Pulmonary edema and/or infiltrates are identified, which are stable since the prior exam.   2.  Layering bilateral pleural effusions, appear somewhat increased since prior study   3.  Cardiomegaly      [id number] VIEW   Final Result      Nonobstructive bowel gas pattern. Small to moderate amount of stool throughout the colon.      DX-CHEST-PORTABLE (1 VIEW)   Final Result      1.  Hypoinflation with small RIGHT pleural effusion, increased from prior with associated infiltrate or atelectasis.   2.  Minimal LEFT pleural fluid with basilar consolidation again seen.   3.  No pneumothorax.      US-THORACENTESIS PUNCTURE LEFT   Final Result      1. Ultrasound guided left sided diagnostic/therapeutic thoracentesis.      2. 750 mL of fluid withdrawn.      EC-ECHOCARDIOGRAM COMPLETE W/O CONT   Final Result      CT-CTA CHEST PULMONARY ARTERY W/ RECONS   Final Result      1.  No central or segmental pulmonary embolus   2.  Moderate to large pericardial effusion   3.  Small RIGHT pleural effusion   4.  Moderate LEFT pleural effusion   5.  BILATERAL compressive atelectasis   6.  Unchanged 3 mm LEFT upper lobe pulmonary nodule, very likely benign   7.  Enlarged pretracheal lymph node      Findings were communicated with and acknowledged by JEAN CLAUDE WHITE via [employer] on 10/12/2023 9:17 PM.            DX-CHEST-PORTABLE (1 VIEW)    Final Result      1.  Hypoinflation and bibasilar atelectasis.  Superimposed pneumonia is not excluded.   2.  Stable cardiomegaly.           Assessment/Plan  * Pneumonia due to infectious agent- (present on admission)  Assessment & Plan  10/16/23    Patient started on ceftriaxone and azithromycin for possible pneumonia, will continue for now  Obtain sputum and blood culture, procalcitonin, consider de-escalation of antibiotics  I discussed plan of care with pulmonologist  Patient found to have elevated procalcitonin level.  I am continuing IV antibiotics  Cultures remain negative to date.      Hypotension  Assessment & Plan  10/16/23    Continuing midodrine with holding parameters..      Pleural effusion, left  Assessment & Plan  10/16/23    Patient found to have significant left pleural effusion.  I discussed with pulmonologist and I ordered ultrasound-guided thoracentesis.  Ordered lab work-up on pleural fluid.  Patient underwent left thoracentesis and 750 cc of fluid was removed.  Follow culture results.  I ordered 1 dose of IV Lasix today.  Use IV Lasix with caution to avoid hypotension.    Acute respiratory failure with hypoxia (HCC)  Assessment & Plan  10/16/23    Secondary to pneumonia versus atelectasis  CT the chest negative for PE.  Echo showed grade 1 diastolic dysfunction.  Patient developed worsening of her respiratory status and she was placed on high flow nasal cannula.  On October 13 June 23 I called patient guardian and updated them regarding patient current medical condition and plan of care.  Patient underwent thoracentesis and 750 cc of fluid was removed from left lung.  Follow culture results.  Patient remained hypoxic.  She is requiring 45 L of oxygen with 70% of FiO2 to maintain oxygen saturation.  I personally discussed case with pulmonologist regarding patient current medical condition and plan of care.      Tachycardia- (present on admission)  Assessment & Plan  10/16/23    Possibly  rebound tachycardia due to withholding or not taking metoprolol  Continue metoprolol.  Patient found to have low TSH decrease the dose of levothyroxine.      Essential hypertension- (present on admission)  Assessment & Plan  Holding outpatient blood pressure medications  Has been having off-and-on hypotension.  I started her on midodrine.  I am continuing metoprolol to avoid reflex tachycardia as patient has been having off-and-on tachycardia.    Anemia- (present on admission)  Assessment & Plan  10/16/23    Hemoglobin 8.2, down from ten 1 year ago, with no evidence of bleeding observed  Iron panel does not show iron deficiency anemia.  Patient found to hemoglobin below 7 overnight and blood transfusion order was placed.  Repeat hemoglobin remained stable  I placed her on hold on her Lovenox for DVT prophylaxis if her hemoglobin remained stable we will resume her Lovenox.  Ordered CBC for tomorrow.    Diabetes (HCC)- (present on admission)  Assessment & Plan  10/16/23    Will hold Synjardy and glipizide  I am continue insulin sliding scale with hypoglycemia protocol.    Lactic acidosis  Assessment & Plan  10/16/23    Patient found to have lactic acidosis   Now resolved.    Schizophrenia (HCC)- (present on admission)  Assessment & Plan  10/16/23    Depakote    Parkinson's disease (tremor, stiffness, slow motion, unstable posture)- (present on admission)  Assessment & Plan  10/16/23    Continue Depakote       I discussed plan of care during multidisciplinary rounds regarding patient's current medical condition and plan of care.    I discussed plan of care with pulmonologist Dr. Garcia regarding patient current medical condition and plan of care.      10/16/23    Patient is critically ill.   The patient continues to have: 10/16/23 persistent acute respiratory failure with hypoxia  The vital organ system that is affected is the: Pulmonary  If untreated there is a high chance of deterioration into: Respiratory failure  and cardiopulmonary arrest  And eventually death.   The critical care that I am providing today is: 10/16/23  I am titrating and continuing-nasal cannula currently she is on 45 L of oxygen with 70% of FiO2.  The critical that has been undertaken is medically complex.   There has been no overlap in critical care time.   Critical Care Time not including procedures: 38 minutes        I held her Lovenox due to low hemoglobin.  If her hemoglobin remained stable will resume her pharmacological DVT prophylaxis.  VTE prophylaxis:   SCDs/TEDs      I have performed a physical exam and reviewed and updated ROS and Plan today (10/16/2023). In review of yesterday's note (10/15/2023), there are no changes except as documented above.

## 2023-10-17 NOTE — CARE PLAN
The patient is Watcher - Medium risk of patient condition declining or worsening    Shift Goals  Clinical Goals: wean o2  Patient Goals: Snacks  Family Goals: ARLETTE    Progress made toward(s) clinical / shift goals:    Problem: Fall Risk  Goal: Patient will remain free from falls  Outcome: Progressing, pt remains free from falls.      Problem: Pain - Standard  Goal: Alleviation of pain or a reduction in pain to the patient’s comfort goal  Outcome: Progressing, pt has no complaints of pain.        Patient is not progressing towards the following goals:

## 2023-10-17 NOTE — PROGRESS NOTES
Hospital Medicine Daily Progress Note    Date of Service  10/17/2023    Chief Complaint  Erlinda Verde is a 58 y.o. female admitted 10/12/2023 with cough and hypoxia.    Hospital Course  Ms. Verde is a 58 y.o. female with past medical history of type 2 diabetes, hypertension, schizoaffective disorder, Parkinson disease who presented 10/12/2023 from group home for cough for 3 days, desaturation to 88% on room air per EMS report, some chest pain with cough.  Patient is poor historian due to her intellectual disability.  In ER she noted to have tachycardia 124, which appears to be sinus tachycardia with nonspecific T wave flattening, but soft blood pressure even after given septic bolus.  On 2 L nasal cannula, SPO2 91%.  B19/influenza/RSV screen negative.  CBC does not showed WBC elevation, did showed hemoglobin 8.3 which is below baseline.  Troponin 27, proBNP 4000, lactic acid 1.4.  CTA of the chest negative for PE showed moderate to large pericardial effusion, moderate left and small right pleural effusion, bilateral compressive atelectasis.  Follow-up stat echo did show small  effusion in the pericardium, no tamponade.  EF 61%, grade 1 diastolic dysfunction.  ERP spoke to cardiology, Dr. Maloney, there was no additional recommendations.  Patient also was decided to be admitted to [facility] per conversation with Dr. Sandoval/critical care     Patient became hypoxic in IMCU and she was placed on high flow nasal cannula.  I requested consultation with pulmonology.  I called patient's legal guardian service and I spoke with Ani Chaudhary.   Pulmonology was consulted. She underwent a left thoracentesis with 750 mL removed. Echocardiogram revealed an EF of 75%. Due to hypotension she was placed on midodrine. Hemoglobin went down to 6.8 and she was transfused one unit of RBCs. She was initially treated with empiric antibiotics which were stopped. She was treated with diuretics.     Interval Problem Update  10/17: Ms.  Ammon was evaluated and examined in the IMCU. She is on high flow oxygen 40 liters 70%. Due to hypotension, she is on midodrine 5 mg three times a day. She denies pain. Liver ultrasound has been ordered. Chest xray ordered for tomorrow to eval the bilat pleural effusions.     I have discussed this patient's plan of care and discharge plan at IDT rounds today with Case Management, Nursing, Nursing leadership, and other members of the IDT team.    Consultants/Specialty  Pulmonology. I discussed with Dr. Garcia in person    Code Status  Full Code    Disposition  The patient is not medically cleared for discharge to home or a post-acute facility.  Anticipate discharge to: skilled nursing facility    I have placed the appropriate orders for post-discharge needs.    Review of Systems  Review of Systems   Unable to perform ROS: Mental acuity        Physical Exam  Temp:  [36.7 °C (98.1 °F)-36.9 °C (98.4 °F)] 36.7 °C (98.1 °F)  Pulse:  [] 101  Resp:  [18-33] 33  BP: (118-178)/(73-92) 138/81  SpO2:  [86 %-94 %] 94 %    Physical Exam  Vitals and nursing note reviewed.   Constitutional:       General: She is not in acute distress.     Appearance: She is ill-appearing.   HENT:      Mouth/Throat:      Mouth: Mucous membranes are dry.   Cardiovascular:      Rate and Rhythm: Tachycardia present.   Pulmonary:      Comments: High flow oxygen  Tachypnea  Crackles throughout  Moderate air movement  No wheezing  Abdominal:      General: There is distension.      Tenderness: There is no abdominal tenderness.   Neurological:      Mental Status: She is alert.      Comments: She is oriented only to person   Psychiatric:      Comments: Compliant with exam         Fluids    Intake/Output Summary (Last 24 hours) at 10/17/2023 0714  Last data filed at 10/16/2023 2000  Gross per 24 hour   Intake 595 ml   Output --   Net 595 ml       Laboratory  Recent Labs     10/16/23  1131 10/16/23  1716 10/17/23  0251   WBC 7.0 7.0 7.4   RBC 3.82*  3.70* 3.73*   HEMOGLOBIN 10.2* 9.8* 9.9*   HEMATOCRIT 33.8* 33.0* 33.2*   MCV 88.5 89.2 89.0   MCH 26.7* 26.5* 26.5*   MCHC 30.2* 29.7* 29.8*   RDW 52.6* 53.1* 53.2*   PLATELETCT 268 225 308   MPV 9.1 9.2 9.1     Recent Labs     10/15/23  0425 10/16/23  0230 10/17/23  0251   SODIUM 139 136 139   POTASSIUM 4.1 3.9 4.1   CHLORIDE 104 103 102   CO2 21 24 24   GLUCOSE 149* 112* 144*   BUN 16 15 19   CREATININE 0.68 0.64 0.81   CALCIUM 8.3* 8.5 8.9     Recent Labs     10/14/23  1005   APTT 31.8   INR 1.25*               Imaging  DX-CHEST-LIMITED (1 VIEW)   Final Result         1.  Pulmonary edema and/or infiltrates are identified, which are stable since the prior exam.   2.  Layering bilateral pleural effusions, appear somewhat increased since prior study   3.  Cardiomegaly      [id number] VIEW   Final Result      Nonobstructive bowel gas pattern. Small to moderate amount of stool throughout the colon.      DX-CHEST-PORTABLE (1 VIEW)   Final Result      1.  Hypoinflation with small RIGHT pleural effusion, increased from prior with associated infiltrate or atelectasis.   2.  Minimal LEFT pleural fluid with basilar consolidation again seen.   3.  No pneumothorax.      US-THORACENTESIS PUNCTURE LEFT   Final Result      1. Ultrasound guided left sided diagnostic/therapeutic thoracentesis.      2. 750 mL of fluid withdrawn.      EC-ECHOCARDIOGRAM COMPLETE W/O CONT   Final Result      CT-CTA CHEST PULMONARY ARTERY W/ RECONS   Final Result      1.  No central or segmental pulmonary embolus   2.  Moderate to large pericardial effusion   3.  Small RIGHT pleural effusion   4.  Moderate LEFT pleural effusion   5.  BILATERAL compressive atelectasis   6.  Unchanged 3 mm LEFT upper lobe pulmonary nodule, very likely benign   7.  Enlarged pretracheal lymph node      Findings were communicated with and acknowledged by JEAN CLAUDE WHITE via [employer] on 10/12/2023 9:17 PM.            DX-CHEST-PORTABLE (1 VIEW)   Final Result      1.   Hypoinflation and bibasilar atelectasis.  Superimposed pneumonia is not excluded.   2.  Stable cardiomegaly.           Assessment/Plan  * Pneumonia due to infectious agent- (present on admission)  Assessment & Plan  Treated with a course of antibiotics.      Hypotension  Assessment & Plan  She has required midodrine which will be held today and restarted if indicated.  She has been transfused 1 unit packed red blood cells      Pleural effusion, left  Assessment & Plan  Patient found to have significant left pleural effusion.  Patient underwent left thoracentesis and 750 cc of fluid was removed.  Cells are negative for malignancy as well as for infection.  Liver ultrasound has been ordered to evaluate for possible occult cirrhosis.    Acute respiratory failure with hypoxia (HCC)  Assessment & Plan  Secondary to pneumonia and pleural effusion  CT the chest negative for PE.  Echo showed grade 1 diastolic dysfunction with a normal ejection fraction.  Patient developed worsening of her respiratory status and she was placed on high flow nasal cannula.  Patient underwent thoracentesis and 750 cc of fluid was removed from left lung negative for malignancy and negative for infection consistent with a transudate  Patient remained hypoxic requiring high flow oxygen.  She is requiring 45 L of oxygen with 70% of FiO2 to maintain oxygen saturation.  Pulmonology consulted      Tachycardia- (present on admission)  Assessment & Plan  10/16/23    Possibly rebound tachycardia due to withholding or not taking metoprolol  Continue metoprolol.  Patient found to have low TSH decrease the dose of levothyroxine.      Essential hypertension- (present on admission)  Assessment & Plan  History of, she is now hypotensive    Anemia- (present on admission)  Assessment & Plan  Hemoglobin went down to 6.8 despite evidence of bleeding.  She was transfused 1 unit packed red blood cells.  Iron panel does not show iron deficiency anemia.  This appears  to be a production problem.    Diabetes ([facility])- (present on admission)  Assessment & Plan  Will hold Synjardy and glipizide  Continue insulin sliding scale with hypoglycemia protocol.  Hemoglobin A1c is elevated at 10.9.    Lactic acidosis  Assessment & Plan  This has resolved    Schizophrenia ([facility])- (present on admission)  Assessment & Plan  Reported history of.  With significant cognitive impairment    Depakote    Parkinson's disease (tremor, stiffness, slow motion, unstable posture)- (present on admission)  Assessment & Plan  10/16/23    Continue Depakote         VTE prophylaxis:   SCDs/TEDs      I have performed a physical exam and reviewed and updated ROS and Plan today (10/17/2023). In review of yesterday's note (10/16/2023), there are no changes except as documented above.

## 2023-10-17 NOTE — PROGRESS NOTES
Bedside report received. Assumed care of patient this morning. Assessment completed      Patient is A&O to self. Educated patient to use call button for assistance  Patient repositioned and turned  Pt is on HHF oxygen, baseline is RA.   Mobility: max assist. BLE contractures. Bed alarm in use.  Voiding + incontinent. Pads changed and barrier cream applied  Flatus +    Bed is locked and in the lowest position. Call light is within reach. Patient encouraged to voice needs and concerns, all needs met at this time. Hourly rounding in place.

## 2023-10-18 ENCOUNTER — APPOINTMENT (OUTPATIENT)
Dept: RADIOLOGY | Facility: MEDICAL CENTER | Age: 58
DRG: 189 | End: 2023-10-18
Attending: HOSPITALIST
Payer: MEDICARE

## 2023-10-18 PROCEDURE — 99291 CRITICAL CARE FIRST HOUR: CPT | Performed by: HOSPITALIST

## 2023-10-18 PROCEDURE — 97163 PT EVAL HIGH COMPLEX 45 MIN: CPT

## 2023-10-18 PROCEDURE — 99232 SBSQ HOSP IP/OBS MODERATE 35: CPT | Performed by: INTERNAL MEDICINE

## 2023-10-18 PROCEDURE — 700102 HCHG RX REV CODE 250 W/ 637 OVERRIDE(OP): Performed by: INTERNAL MEDICINE

## 2023-10-18 PROCEDURE — A9270 NON-COVERED ITEM OR SERVICE: HCPCS | Performed by: INTERNAL MEDICINE

## 2023-10-18 PROCEDURE — 700111 HCHG RX REV CODE 636 W/ 250 OVERRIDE (IP): Mod: JZ | Performed by: INTERNAL MEDICINE

## 2023-10-18 PROCEDURE — 76705 ECHO EXAM OF ABDOMEN: CPT

## 2023-10-18 PROCEDURE — 94640 AIRWAY INHALATION TREATMENT: CPT

## 2023-10-18 PROCEDURE — 770000 HCHG ROOM/CARE - INTERMEDIATE ICU *

## 2023-10-18 RX ORDER — HALOPERIDOL 5 MG/ML
5 INJECTION INTRAMUSCULAR ONCE
Status: COMPLETED | OUTPATIENT
Start: 2023-10-18 | End: 2023-10-18

## 2023-10-18 RX ADMIN — QUETIAPINE FUMARATE 150 MG: 50 TABLET ORAL at 08:41

## 2023-10-18 RX ADMIN — LEVOTHYROXINE SODIUM 175 MCG: 0.17 TABLET ORAL at 05:47

## 2023-10-18 RX ADMIN — DIVALPROEX SODIUM 1000 MG: 500 TABLET, DELAYED RELEASE ORAL at 20:07

## 2023-10-18 RX ADMIN — HALOPERIDOL LACTATE 5 MG: 5 INJECTION, SOLUTION INTRAMUSCULAR at 01:23

## 2023-10-18 RX ADMIN — OXYBUTYNIN CHLORIDE 10 MG: 10 TABLET, EXTENDED RELEASE ORAL at 05:47

## 2023-10-18 RX ADMIN — METOPROLOL TARTRATE 25 MG: 25 TABLET, FILM COATED ORAL at 17:17

## 2023-10-18 RX ADMIN — METOPROLOL TARTRATE 25 MG: 25 TABLET, FILM COATED ORAL at 05:46

## 2023-10-18 RX ADMIN — ARIPIPRAZOLE 20 MG: 10 TABLET ORAL at 20:07

## 2023-10-18 RX ADMIN — ATORVASTATIN CALCIUM 20 MG: 20 TABLET, FILM COATED ORAL at 20:07

## 2023-10-18 RX ADMIN — QUETIAPINE FUMARATE 200 MG: 200 TABLET ORAL at 20:07

## 2023-10-18 RX ADMIN — SERTRALINE 100 MG: 100 TABLET, FILM COATED ORAL at 05:46

## 2023-10-18 ASSESSMENT — GAIT ASSESSMENTS: GAIT LEVEL OF ASSIST: UNABLE TO PARTICIPATE

## 2023-10-18 ASSESSMENT — PAIN SCALES - PAIN ASSESSMENT IN ADVANCED DEMENTIA (PAINAD)
BREATHING: NORMAL
CONSOLABILITY: DISTRACTED OR REASSURED BY VOICE/TOUCH
BODYLANGUAGE: RELAXED
FACIALEXPRESSION: SMILING OR INEXPRESSIVE
TOTALSCORE: 1

## 2023-10-18 ASSESSMENT — COGNITIVE AND FUNCTIONAL STATUS - GENERAL
STANDING UP FROM CHAIR USING ARMS: A LOT
MOBILITY SCORE: 7
TURNING FROM BACK TO SIDE WHILE IN FLAT BAD: UNABLE
MOVING TO AND FROM BED TO CHAIR: UNABLE
SUGGESTED CMS G CODE MODIFIER MOBILITY: CM
CLIMB 3 TO 5 STEPS WITH RAILING: TOTAL
MOVING FROM LYING ON BACK TO SITTING ON SIDE OF FLAT BED: UNABLE
WALKING IN HOSPITAL ROOM: TOTAL

## 2023-10-18 ASSESSMENT — PAIN DESCRIPTION - PAIN TYPE
TYPE: ACUTE PAIN

## 2023-10-18 NOTE — THERAPY
Physical Therapy   Initial Evaluation     Patient Name: Erlinda Verde  Age:  58 y.o., Sex:  female  Medical Record #: [id number]  Today's Date: 10/18/2023          Assessment  Patient is 58 y.o. female admitted for cough and hypoxia with PMH of  DM II, hypertension, schizoaffective disorder, Parkinson disease and intellectual disability.   Pt unable to provide much PLOF baseline history at this time. Able to provide yes / no responses to some questions but appears to be unreliable historian. This PT attempted to call pt's public guardian for information regarding PLOF baseline, but able to reach them. P    Today, pt required Mod to Min A to complete supine <> sit EOB. Once seated EOB pt able to maintain seated balance with UE support. Reports inability to stand and presents with B hip and knee flexion contractures along with B ankle PF which leads PT to believe pt may be a WC user at baseline. Unable to determine how pt transfers to WC or AD use at this time. Pt will benefit from acute PT interventions to address impairments noted below.      Need to determine Pt's functional baseline to best assess current impairments and need for post acute therapy.     Plan    Physical Therapy Initial Treatment Plan   Treatment Plan : Bed Mobility, Equipment, Gait Training, Neuro Re-Education / Balance, Self Care / Home Evaluation, Stair Training, Therapeutic Activities, Therapeutic Exercise  Treatment Frequency: 3 Times per Week  Duration: Until Therapy Goals Met    DC Equipment Recommendations: Unable to determine at this time  Discharge Recommendations:  TBD, dependent on pt's PLOF baseline and if group home can provide care at current level of function.      10/18/23 7702   Vitals   O2 Delivery Device High Flow Nasal Cannula   Pain 0 - 10 Group   Therapist Pain Assessment Nurse Notified  (B LE pain noted, not rated or specified)   Prior Living Situation   Housing / Facility Group Home   Lives with - Patient's Self Care  Capacity Attendant / Paid Care Giver   Comments per EMR   Prior Level of Functional Mobility   Bed Mobility Unable To Determine At This Time   Comments Phone call placed to pt's public guardian (Ann Urias) to determine PLOF baseline, unable to reach   Cognition    Level of Consciousness Alert   Comments receptive to PT, repetitive at times. Intermittent No's and profanity likely due to schizoaffective disorder but pt apologetic   Active ROM Lower Body    Active ROM Lower Body  X   Comments appears to have B hip and knee flexion contractures, and ankle PF contractures. suspect pt may be a WC user at baseline   Strength Lower Body   Lower Body Strength  X   Comments grossly assessed at 3/5, difficulty managing LEs in bed   Balance Assessment   Sitting Balance (Static) Fair   Sitting Balance (Dynamic) Fair   Weight Shift Sitting Poor   Bed Mobility    Supine to Sit Moderate Assist   Sit to Supine Minimal Assist   Scooting Moderate Assist   Gait Analysis   Gait Level Of Assist Unable to Participate   Functional Mobility   Sit to Stand Unable to Participate   How much difficulty does the patient currently have...   Turning over in bed (including adjusting bedclothes, sheets and blankets)? 1   Sitting down on and standing up from a chair with arms (e.g., wheelchair, bedside commode, etc.) 1   Moving from lying on back to sitting on the side of the bed? 1   How much help from another person does the patient currently need...   Moving to and from a bed to a chair (including a wheelchair)? 2   Need to walk in a hospital room? 1   Climbing 3-5 steps with a railing? 1   6 clicks Mobility Score 7   Short Term Goals    Short Term Goal # 1 pt will perform supine <> sit with HOB flat, no railing and SPV in 6 visits   Short Term Goal # 2 pt will perform sit <> stand and functional transfers with LRAD and Jose Ramon to improve mobility in 6 visits   Education Group   Education Provided Role of Physical Therapist   Role of Physical  Therapist Patient Response Patient;Acceptance;Explanation;Reinforcement Needed   Physical Therapy Initial Treatment Plan    Treatment Plan  Bed Mobility;Equipment;Gait Training;Neuro Re-Education / Balance;Self Care / Home Evaluation;Stair Training;Therapeutic Activities;Therapeutic Exercise   Treatment Frequency 3 Times per Week   Duration Until Therapy Goals Met   Problem List    Problems Pain;Impaired Bed Mobility;Impaired Transfers;Decreased Activity Tolerance;Safety Awareness Deficits / Cognition   Anticipated Discharge Equipment and Recommendations   DC Equipment Recommendations Unable to determine at this time   Discharge Recommendations   (TBD, dependent on pt's PLOF baseline and if group home can provide care at current level of function.)   Interdisciplinary Plan of Care Collaboration   IDT Collaboration with  Nursing   Patient Position at End of Therapy In Bed;Call Light within Reach;Tray Table within Reach   Collaboration Comments aware of PT eval, positioned upright in chair position of bed   Session Information   Date / Session Number  10/18-1 (1/3, 10/22)

## 2023-10-18 NOTE — DISCHARGE PLANNING
Case Management Discharge Planning    Admission Date: 10/12/2023  GMLOS: 3.6  ALOS: 5    6-Clicks ADL Score: 12  6-Clicks Mobility Score: 12  PT and/or OT Eval ordered: No  Post-acute Referrals Ordered: No  Post-acute Choice Obtained: No  Has referral(s) been sent to post-acute provider:  No      Anticipated Discharge Dispo: Discharge Disposition: Discharged to home/self care (01)    DME Needed: No    Action(s) Taken: Updated Provider/Nurse on Discharge Plan    Escalations Completed: None    Medically Clear: No    Next Steps: Per IDT rounds, patient is not medically stable, patient is AOx1; possible SNF placement, pending PT/OT; patient on high flow oxygen at 40 L and 60%. Patient has a repeat cxr reviewed, repeat thoracentesis ordered, patient is tolerating a diet without apparent aspiration.     Barriers to Discharge: Medical clearance and Pending PT Evaluation    Is the patient up for discharge tomorrow: No

## 2023-10-18 NOTE — PROGRESS NOTES
Hospital Medicine Daily Progress Note    Date of Service  10/18/2023    Chief Complaint  Erlinda Verde is a 58 y.o. female admitted 10/12/2023 with cough and hypoxia.    Hospital Course  Ms. Verde is a 58 y.o. female with past medical history of type 2 diabetes, hypertension, schizoaffective disorder, Parkinson disease who presented 10/12/2023 from group home for cough for 3 days, desaturation to 88% on room air per EMS report, some chest pain with cough.  Patient is poor historian due to her intellectual disability.  In ER she noted to have tachycardia 124, which appears to be sinus tachycardia with nonspecific T wave flattening, but soft blood pressure even after given septic bolus.  On 2 L nasal cannula, SPO2 91%.  B19/influenza/RSV screen negative.  CBC does not showed WBC elevation, did showed hemoglobin 8.3 which is below baseline.  Troponin 27, proBNP 4000, lactic acid 1.4.  CTA of the chest negative for PE showed moderate to large pericardial effusion, moderate left and small right pleural effusion, bilateral compressive atelectasis.  Follow-up stat echo did show small  effusion in the pericardium, no tamponade.  EF 61%, grade 1 diastolic dysfunction.  ERP spoke to cardiology, Dr. Maloney, there was no additional recommendations.  Patient also was decided to be admitted to [facility] per conversation with Dr. Sandoval/critical care     Patient became hypoxic in IMCU and she was placed on high flow nasal cannula.  I requested consultation with pulmonology.  I called patient's legal guardian service and I spoke with Ani Chaudhary.   Pulmonology was consulted. She underwent a left thoracentesis with 750 mL removed. Echocardiogram revealed an EF of 75%. Due to hypotension she was placed on midodrine. Hemoglobin went down to 6.8 and she was transfused one unit of RBCs. She was initially treated with empiric antibiotics which were stopped. She was treated with diuretics.     Interval Problem Update  10/17: Ms.  Ammon was evaluated and examined in the IMCU. She is on high flow oxygen 40 liters 70%. Due to hypotension, she is on midodrine 5 mg three times a day. She denies pain. Liver ultrasound has been ordered. Chest xray ordered for tomorrow to eval the bilat pleural effusions.   10/18: Ms. Verde was seen and evaluated in the IMCU. She is on high flow oxygen at 40 L and 60%. Repeat cxr reviewed. Repeat thoracentesis ordered. She is tolerating a diet without apparent aspiration. She remains a non-historian.     I have discussed this patient's plan of care and discharge plan at IDT rounds today with Case Management, Nursing, Nursing leadership, and other members of the IDT team.    Consultants/Specialty  Pulmonology. I discussed with Dr. Garcia in person    Code Status  Full Code    Disposition  The patient is not medically cleared for discharge to home or a post-acute facility.  Anticipate discharge to: skilled nursing facility    I have placed the appropriate orders for post-discharge needs.    Review of Systems  Review of Systems   Unable to perform ROS: Mental acuity        Physical Exam  Temp:  [36.8 °C (98.2 °F)] 36.8 °C (98.2 °F)  Pulse:  [] 120  Resp:  [10-46] 46  BP: (109-167)/(61-95) 149/85  SpO2:  [84 %-97 %] 93 %    Physical Exam  Vitals and nursing note reviewed.   Constitutional:       General: She is not in acute distress.     Appearance: She is ill-appearing and toxic-appearing.   HENT:      Mouth/Throat:      Mouth: Mucous membranes are dry.   Cardiovascular:      Rate and Rhythm: Tachycardia present.   Pulmonary:      Comments: High flow oxygen  Tachypnea  Crackles throughout  Moderate air movement  No wheezing  Abdominal:      General: There is no distension.      Tenderness: There is no abdominal tenderness.   Musculoskeletal:      Right lower leg: No edema.      Left lower leg: No edema.   Skin:     Coloration: Skin is pale.   Neurological:      Mental Status: She is alert.      Comments:  She is oriented only to person  Speech is minimal    Psychiatric:      Comments: Compliant with exam         Fluids  No intake or output data in the 24 hours ending 10/18/23 0829      Laboratory  Recent Labs     10/16/23  1131 10/16/23  1716 10/17/23  0251   WBC 7.0 7.0 7.4   RBC 3.82* 3.70* 3.73*   HEMOGLOBIN 10.2* 9.8* 9.9*   HEMATOCRIT 33.8* 33.0* 33.2*   MCV 88.5 89.2 89.0   MCH 26.7* 26.5* 26.5*   MCHC 30.2* 29.7* 29.8*   RDW 52.6* 53.1* 53.2*   PLATELETCT 268 225 308   MPV 9.1 9.2 9.1       Recent Labs     10/16/23  0230 10/17/23  0251   SODIUM 136 139   POTASSIUM 3.9 4.1   CHLORIDE 103 102   CO2 24 24   GLUCOSE 112* 144*   BUN 15 19   CREATININE 0.64 0.81   CALCIUM 8.5 8.9                       Imaging  DX-CHEST-LIMITED (1 VIEW)   Final Result      1.  There are stable changes of pulmonary edema with moderate bilateral layering pleural effusions.      DX-CHEST-LIMITED (1 VIEW)   Final Result         1.  Pulmonary edema and/or infiltrates are identified, which are stable since the prior exam.   2.  Layering bilateral pleural effusions, appear somewhat increased since prior study   3.  Cardiomegaly      [id number] VIEW   Final Result      Nonobstructive bowel gas pattern. Small to moderate amount of stool throughout the colon.      DX-CHEST-PORTABLE (1 VIEW)   Final Result      1.  Hypoinflation with small RIGHT pleural effusion, increased from prior with associated infiltrate or atelectasis.   2.  Minimal LEFT pleural fluid with basilar consolidation again seen.   3.  No pneumothorax.      US-THORACENTESIS PUNCTURE LEFT   Final Result      1. Ultrasound guided left sided diagnostic/therapeutic thoracentesis.      2. 750 mL of fluid withdrawn.      EC-ECHOCARDIOGRAM COMPLETE W/O CONT   Final Result      CT-CTA CHEST PULMONARY ARTERY W/ RECONS   Final Result      1.  No central or segmental pulmonary embolus   2.  Moderate to large pericardial effusion   3.  Small RIGHT pleural effusion   4.  Moderate LEFT  pleural effusion   5.  BILATERAL compressive atelectasis   6.  Unchanged 3 mm LEFT upper lobe pulmonary nodule, very likely benign   7.  Enlarged pretracheal lymph node      Findings were communicated with and acknowledged by JEAN CLAUDE WHITE via [employer] on 10/12/2023 9:17 PM.            DX-CHEST-PORTABLE (1 VIEW)   Final Result      1.  Hypoinflation and bibasilar atelectasis.  Superimposed pneumonia is not excluded.   2.  Stable cardiomegaly.      US-RUQ    (Results Pending)        Assessment/Plan  * Acute respiratory failure with hypoxia (HCC)  Assessment & Plan  Secondary to pneumonia and pleural effusion  CT the chest negative for PE.  Echo showed grade 1 diastolic dysfunction with a normal ejection fraction.  Patient developed worsening of her respiratory status and she was placed on high flow nasal cannula.  Patient underwent thoracentesis and 750 cc of fluid was removed from left lung negative for malignancy and negative for infection consistent with a transudate. Repeat xray reveals that the effusion is back. Another thoracentesis has been ordered.  Patient remained hypoxic requiring high flow oxygen.  She is requiring 40 L of oxygen with 60% of FiO2 to maintain oxygen saturation.  Pulmonology consulted  An ultrasound of the liver has been ordered to evaluate for occult cirrhosis.      Pneumonia due to infectious agent- (present on admission)  Assessment & Plan  Treated with a course of antibiotics.      Hypotension  Assessment & Plan  She has required midodrine which has been held.  She has been transfused 1 unit packed red blood cells      Pleural effusion, left  Assessment & Plan  Patient found to have significant left pleural effusion.  Patient underwent left thoracentesis and 750 cc of fluid was removed.  Cells are negative for malignancy as well as for infection.  Liver ultrasound has been ordered to evaluate for possible occult cirrhosis.    Tachycardia- (present on admission)  Assessment &  Plan  10/16/23    Possibly rebound tachycardia due to withholding or not taking metoprolol  Continue metoprolol.  Patient found to have low TSH decrease the dose of levothyroxine.      Essential hypertension- (present on admission)  Assessment & Plan  History of  Home lisinopril, metoprolol, and HCTZ have been held.     Anemia- (present on admission)  Assessment & Plan  Hemoglobin went down to 6.8 despite evidence of bleeding.  She was transfused 1 unit packed red blood cells.  Iron panel does not show iron deficiency anemia.  This appears to be a production problem.    Diabetes (HCC)- (present on admission)  Assessment & Plan  Will hold Synjardy and glipizide  Continue insulin sliding scale with hypoglycemia protocol.  Hemoglobin A1c is elevated at 10.9.    Lactic acidosis  Assessment & Plan  This has resolved    Schizophrenia ([facility])- (present on admission)  Assessment & Plan  Reported history of.  With significant cognitive impairment    Depakote    Parkinson's disease (tremor, stiffness, slow motion, unstable posture)- (present on admission)  Assessment & Plan  Continue Depakote         VTE prophylaxis:   SCDs/TEDs      I have performed a physical exam and reviewed and updated ROS and Plan today (10/18/2023). In review of yesterday's note (10/17/2023), there are no changes except as documented above.    Ms. Verde is critically ill. 34 minutes of critical care time were spent with patient, nursing, pharmacy, and in specific management of acute respiratory failure requiring high flow oxygen in the IMCU. Prognosis is guarded. Please see orders.

## 2023-10-18 NOTE — PROGRESS NOTES
Bedside report received. Assumed care of patient this morning. Assessment completed      Patient is A&O x self and place  Reports 0 /10 pain  Pt is on HHF 40L and 70% oxygen, baseline is RA.   Mobility: max Bed alarm in use.  Voiding +  Flatus +    Plan of care reviewed with the patient. Bed is locked and in the lowest position. Call light is within reach. Patient encouraged to voice needs and concerns, all needs met at this time. Hourly rounding in place.

## 2023-10-18 NOTE — CARE PLAN
The patient is Watcher - Medium risk of patient condition declining or worsening    Shift Goals  Clinical Goals: Oxygen saturation will remain within goal range this shift  Patient Goals: N/A  Family Goals: N/A    Progress made toward(s) clinical / shift goals:    Problem: Knowledge Deficit - Standard  Goal: Patient and family/care givers will demonstrate understanding of plan of care, disease process/condition, diagnostic tests and medications  Outcome: Progressing     Problem: Hemodynamics  Goal: Patient's hemodynamics, fluid balance and neurologic status will be stable or improve  Outcome: Progressing     Problem: Respiratory  Goal: Patient will achieve/maintain optimum respiratory ventilation and gas exchange  Outcome: Progressing     Problem: Skin Integrity  Goal: Skin integrity is maintained or improved  Outcome: Progressing     Problem: Fall Risk  Goal: Patient will remain free from falls  Outcome: Progressing     Problem: Pain - Standard  Goal: Alleviation of pain or a reduction in pain to the patient’s comfort goal  Outcome: Progressing       Patient is not progressing towards the following goals:

## 2023-10-18 NOTE — CARE PLAN
The patient is Watcher - Medium risk of patient condition declining or worsening    Shift Goals  Clinical Goals: O2 saturation will remain within goal range this shift  Patient Goals: ARLETTE  Family Goals: Not present    Progress made toward(s) clinical / shift goals:  patient continues to require HHF at 40L 60%. Pt O2 saturtion remains above 90% on current supplemental O2.       Problem: Respiratory  Goal: Patient will achieve/maintain optimum respiratory ventilation and gas exchange  Outcome: Progressing     Problem: Skin Integrity  Goal: Skin integrity is maintained or improved  Outcome: Progressing       Patient is not progressing towards the following goals:

## 2023-10-18 NOTE — PROGRESS NOTES
"Pulmonary Progress Note    Date of admission  10/12/2023    Chief Complaint  58 y.o. female admitted 10/12/2023 with hypoxic respiratory failure    Hospital Course  \"58 y.o. female who presented 10/12/2023 with complaints of cough and chest pain for the past 3 days.  She has a past medical history of type 2 diabetes, hypertension, schizoaffective disorder, Parkinson's disease and intellectual disability.  She lives in a group home.  She has been having chest pain and productive cough for the past 3 days.  EMS was contacted, she was saturating 88% on room air per report and brought to the ER.  In the ER she was tachycardic, mildly hypotensive which responded to fluid resuscitation.  Flu/COVID/RSV swab was negative.  No leukocytosis, BNP 4000, hemoglobin 8.3 below baseline.  CT angiogram of the chest demonstrated moderate to large pericardial effusion, moderate left and small right pleural effusion, and bilateral compressive atelectasis.  Prior CT 2022 demonstrated small effusions and atelectasis. Incidental 3 mm LEFT upper lobe pulmonary nodule is unchanged since 2022\" - Dr. Armando    Interval Problem Update  10/16/23 - s/p 750ml out from left chest, appears to be a chronic transudate (appears exudative but without worrisome features).  Path pending, cultures negative to date.    10/18 - No change in respiratory status.  US abdomen negative for cirrhosis.  Plan for possible repeat thora.        Review of Systems  Review of Systems   Unable to perform ROS: Mental acuity    Denies pain, mumbles incoherently to questions.     Vital Signs for last 24 hours   Temp:  [37.1 °C (98.8 °F)] 37.1 °C (98.8 °F)  Pulse:  [] 100  Resp:  [10-46] 16  BP: (114-157)/(63-95) 126/63  SpO2:  [84 %-97 %] 90 %    Hemodynamic parameters for last 24 hours       Respiratory Information for the last 24 hours       Physical Exam   Physical Exam  Constitutional:       Appearance: She is ill-appearing.   HENT:      Head: Atraumatic. "   Cardiovascular:      Rate and Rhythm: Regular rhythm. Tachycardia present.   Pulmonary:      Effort: Pulmonary effort is normal.      Breath sounds: Normal breath sounds. No wheezing, rhonchi or rales.   Abdominal:      Palpations: Abdomen is soft.   Musculoskeletal:         General: No swelling.   Skin:     General: Skin is warm and dry.   Neurological:      General: No focal deficit present.         Medications  Current Facility-Administered Medications   Medication Dose Route Frequency Provider Last Rate Last Admin    metoprolol tartrate (Lopressor) tablet 25 mg  25 mg Oral TWICE DAILY Trip Lombardo M.D.   25 mg at 10/18/23 0546    levothyroxine (Synthroid) tablet 175 mcg  175 mcg Oral AM ES Trip Lombardo M.D.   175 mcg at 10/18/23 0547    senna-docusate (Pericolace Or Senokot S) 8.6-50 MG per tablet 2 Tablet  2 Tablet Oral BID Andrew Mancuso M.D.   2 Tablet at 10/17/23 1711    And    polyethylene glycol/lytes (Miralax) PACKET 1 Packet  1 Packet Oral QDAY PRN Andrew Mancuso M.D.   1 Packet at 10/15/23 0532    And    magnesium hydroxide (Milk Of Magnesia) suspension 30 mL  30 mL Oral QDAY PRN Andrew Mancuso M.D.   30 mL at 10/15/23 1724    And    bisacodyl (Dulcolax) suppository 10 mg  10 mg Rectal QDAY PRN Andrew Mancuso M.D.        Respiratory Therapy Consult   Nebulization Continuous RT Andrew Mancuso M.D.        acetaminophen (Tylenol) tablet 650 mg  650 mg Oral Q6HRS PRN Andrew Mancuso M.D.   650 mg at 10/16/23 1351    labetalol (Normodyne/Trandate) injection 10 mg  10 mg Intravenous Q4HRS PRN Andrew Mancuso M.D.        ondansetron (Zofran) syringe/vial injection 4 mg  4 mg Intravenous Q4HRS PRN Andrew Mancuso M.D.        ondansetron (Zofran ODT) dispertab 4 mg  4 mg Oral Q4HRS PRN Andrew Mancuso M.D.   4 mg at 10/16/23 1351    promethazine (Phenergan) tablet 12.5-25 mg  12.5-25 mg Oral Q4HRS PRN Andrew Mancuso M.D.        promethazine (Phenergan) suppository  12.5-25 mg  12.5-25 mg Rectal Q4HRS PRN Andrew Mancuso M.D.        prochlorperazine (Compazine) injection 5-10 mg  5-10 mg Intravenous Q4HRS PRN Andrew Mancuso M.D.        ARIPiprazole (Abilify) tablet 20 mg  20 mg Oral QHS Andrew Mancuso M.D.   20 mg at 10/17/23 2149    atorvastatin (Lipitor) tablet 20 mg  20 mg Oral QHS JEROME MarceloDParvez   20 mg at 10/17/23 2149    divalproex (Depakote) delayed-release tablet 1,000 mg  1,000 mg Oral QHS Andrew Mancuso M.D.   1,000 mg at 10/17/23 2149    QUEtiapine (SEROquel) tablet 150 mg  150 mg Oral GELY Mancuso M.D.   150 mg at 10/18/23 0841    QUEtiapine (SEROquel) tablet 200 mg  200 mg Oral QHS Andrew Mancuso M.D.   200 mg at 10/17/23 2149    sertraline (Zoloft) tablet 100 mg  100 mg Oral GELY Mancuso M.D.   100 mg at 10/18/23 0546    oxybutynin SR (Ditropan-XL) tablet 10 mg  10 mg Oral DAILY Andrew Mancuso M.D.   10 mg at 10/18/23 0547       Fluids  No intake or output data in the 24 hours ending 10/18/23 1531      Laboratory          Recent Labs     10/16/23  0230 10/17/23  0251   SODIUM 136 139   POTASSIUM 3.9 4.1   CHLORIDE 103 102   CO2 24 24   BUN 15 19   CREATININE 0.64 0.81   MAGNESIUM  --  2.2   PHOSPHORUS  --  4.3   CALCIUM 8.5 8.9       Recent Labs     10/16/23  0230 10/17/23  0251   ALTSGPT 7 16   ASTSGOT 10* 24   ALKPHOSPHAT 73 98   TBILIRUBIN <0.2 <0.2   GLUCOSE 112* 144*       Recent Labs     10/16/23  0230 10/16/23  0345 10/16/23  1131 10/16/23  1716 10/17/23  0251   WBC  --    < > 7.0 7.0 7.4   NEUTSPOLYS  --   --   --  73.10*  --    LYMPHOCYTES  --   --   --  15.10*  --    MONOCYTES  --   --   --  11.00  --    EOSINOPHILS  --   --   --  0.10  --    BASOPHILS  --   --   --  0.10  --    ASTSGOT 10*  --   --   --  24   ALTSGPT 7  --   --   --  16   ALKPHOSPHAT 73  --   --   --  98   TBILIRUBIN <0.2  --   --   --  <0.2    < > = values in this interval not displayed.       Recent Labs     10/16/23  1131 10/16/23  1716  10/17/23  0251   RBC 3.82* 3.70* 3.73*   HEMOGLOBIN 10.2* 9.8* 9.9*   HEMATOCRIT 33.8* 33.0* 33.2*   PLATELETCT 268 225 308         Imaging  X-Ray:  I have personally reviewed the images and compared with prior images.  CT:    Reviewed  Echo:   Reviewed    Assessment/Plan  #Bilateral pleural effusions  #Hypoxic respiratory failure  Plan:  - Aggressive diuresis  - CTD studies and path - inflammatory cells, no malignancy, + Anti SSB, RF weakly positive  - Consider repeat tap  - HOB elevation  - Aspiration precautions  - Titrate oxygen to an SpO2 of 88-94%  - Airway clearance as needed  - Pulmonary to sign off, Please do not hesitate to reconsult if further questions arise.      Laura Garcia MD RD  Pulmonary and Critical Care    Available on [facility]

## 2023-10-19 ENCOUNTER — APPOINTMENT (OUTPATIENT)
Dept: RADIOLOGY | Facility: MEDICAL CENTER | Age: 58
DRG: 189 | End: 2023-10-19
Attending: HOSPITALIST
Payer: MEDICARE

## 2023-10-19 LAB
BACTERIA FLD AEROBE CULT: NORMAL
GRAM STN SPEC: NORMAL
SIGNIFICANT IND 70042: NORMAL
SITE SITE: NORMAL
SOURCE SOURCE: NORMAL

## 2023-10-19 PROCEDURE — A9270 NON-COVERED ITEM OR SERVICE: HCPCS | Performed by: INTERNAL MEDICINE

## 2023-10-19 PROCEDURE — 700102 HCHG RX REV CODE 250 W/ 637 OVERRIDE(OP): Performed by: INTERNAL MEDICINE

## 2023-10-19 PROCEDURE — 99291 CRITICAL CARE FIRST HOUR: CPT | Performed by: HOSPITALIST

## 2023-10-19 PROCEDURE — 97167 OT EVAL HIGH COMPLEX 60 MIN: CPT

## 2023-10-19 PROCEDURE — 76604 US EXAM CHEST: CPT

## 2023-10-19 PROCEDURE — 94640 AIRWAY INHALATION TREATMENT: CPT

## 2023-10-19 PROCEDURE — 770020 HCHG ROOM/CARE - TELE (206)

## 2023-10-19 RX ADMIN — OXYBUTYNIN CHLORIDE 10 MG: 10 TABLET, EXTENDED RELEASE ORAL at 05:34

## 2023-10-19 RX ADMIN — QUETIAPINE FUMARATE 150 MG: 50 TABLET ORAL at 09:38

## 2023-10-19 RX ADMIN — QUETIAPINE FUMARATE 200 MG: 200 TABLET ORAL at 22:20

## 2023-10-19 RX ADMIN — LEVOTHYROXINE SODIUM 175 MCG: 0.17 TABLET ORAL at 05:34

## 2023-10-19 RX ADMIN — METOPROLOL TARTRATE 25 MG: 25 TABLET, FILM COATED ORAL at 18:37

## 2023-10-19 RX ADMIN — ARIPIPRAZOLE 20 MG: 10 TABLET ORAL at 20:43

## 2023-10-19 RX ADMIN — ATORVASTATIN CALCIUM 20 MG: 20 TABLET, FILM COATED ORAL at 20:43

## 2023-10-19 RX ADMIN — DOCUSATE SODIUM 50 MG AND SENNOSIDES 8.6 MG 2 TABLET: 8.6; 5 TABLET, FILM COATED ORAL at 18:37

## 2023-10-19 RX ADMIN — METOPROLOL TARTRATE 25 MG: 25 TABLET, FILM COATED ORAL at 05:34

## 2023-10-19 RX ADMIN — SERTRALINE 100 MG: 100 TABLET, FILM COATED ORAL at 05:34

## 2023-10-19 RX ADMIN — DIVALPROEX SODIUM 1000 MG: 500 TABLET, DELAYED RELEASE ORAL at 20:43

## 2023-10-19 ASSESSMENT — PAIN DESCRIPTION - PAIN TYPE
TYPE: ACUTE PAIN

## 2023-10-19 ASSESSMENT — FIBROSIS 4 INDEX
FIB4 SCORE: 1.15
FIB4 SCORE: 1.15

## 2023-10-19 ASSESSMENT — ACTIVITIES OF DAILY LIVING (ADL): TOILETING: UNABLE TO DETERMINE AT THIS TIME

## 2023-10-19 ASSESSMENT — COGNITIVE AND FUNCTIONAL STATUS - GENERAL
DRESSING REGULAR UPPER BODY CLOTHING: A LOT
HELP NEEDED FOR BATHING: A LOT
EATING MEALS: A LOT
PERSONAL GROOMING: A LOT
DAILY ACTIVITIY SCORE: 12
DRESSING REGULAR LOWER BODY CLOTHING: A LOT
TOILETING: A LOT
SUGGESTED CMS G CODE MODIFIER DAILY ACTIVITY: CL

## 2023-10-19 NOTE — PROGRESS NOTES
Care assumed on patient, A&O x2. Patient is resting comfortably on 9L oxymask and declines pain and SOB.     Tele on and rate and rhythm checked on monitor.     Bed alarm on and bed is in lowest and locked position. Patient is not appropriate for call light use. She does not appear to be in any obvious signs of distress at this time. Hourly rounding in place.     POC: monitor O2

## 2023-10-19 NOTE — PROGRESS NOTES
Hospital Medicine Daily Progress Note    Date of Service  10/19/2023    Chief Complaint  Erlinda Verde is a 58 y.o. female admitted 10/12/2023 with cough and hypoxia.    Hospital Course  Ms. Verde is a 58 y.o. female with past medical history of type 2 diabetes, hypertension, schizoaffective disorder, Parkinson disease who presented 10/12/2023 from group home for cough for 3 days, desaturation to 88% on room air per EMS report, some chest pain with cough.  Patient is poor historian due to her intellectual disability.  In ER she noted to have tachycardia 124, which appears to be sinus tachycardia with nonspecific T wave flattening, but soft blood pressure even after given septic bolus.  On 2 L nasal cannula, SPO2 91%.  B19/influenza/RSV screen negative.  CBC does not showed WBC elevation, did showed hemoglobin 8.3 which is below baseline.  Troponin 27, proBNP 4000, lactic acid 1.4.  CTA of the chest negative for PE showed moderate to large pericardial effusion, moderate left and small right pleural effusion, bilateral compressive atelectasis.  Follow-up stat echo did show small  effusion in the pericardium, no tamponade.  EF 61%, grade 1 diastolic dysfunction.  ERP spoke to cardiology, Dr. Maolney, there was no additional recommendations.  Patient also was decided to be admitted to [facility] per conversation with Dr. Sandoval/critical care     Patient became hypoxic in IMCU and she was placed on high flow nasal cannula.  I requested consultation with pulmonology.  I called patient's legal guardian service and I spoke with Ani Chaudhary.   Pulmonology was consulted. She underwent a left thoracentesis with 750 mL removed. Echocardiogram revealed an EF of 75%. Due to hypotension she was placed on midodrine. Hemoglobin went down to 6.8 and she was transfused one unit of RBCs. She was initially treated with empiric antibiotics which were stopped. She was treated with diuretics.     Interval Problem Update  10/17: Ms.  Ammon was evaluated and examined in the IMCU. She is on high flow oxygen 40 liters 70%. Due to hypotension, she is on midodrine 5 mg three times a day. She denies pain. Liver ultrasound has been ordered. Chest xray ordered for tomorrow to eval the bilat pleural effusions.   10/18: Ms. Verde was seen and evaluated in the IMCU. She is on high flow oxygen at 40 L and 60%. Repeat cxr reviewed. Repeat thoracentesis ordered. She is tolerating a diet without apparent aspiration. She remains a non-historian.   10/19: Ms. Verde was evaluated and examined in the IMCU. A right-sided thoracentesis has been ordered due to re-accumulation of the right pleural effusion. She is much more lucid today but remains a poor historian. She is on high flow oxygen 30 liters 40%.     I have discussed this patient's plan of care and discharge plan at IDT rounds today with Case Management, Nursing, Nursing leadership, and other members of the IDT team.    Consultants/Specialty  Pulmonology. I discussed with Dr. Garcia in person    Code Status  Full Code    Disposition  The patient is not medically cleared for discharge to home or a post-acute facility.  Anticipate discharge to: skilled nursing facility    I have placed the appropriate orders for post-discharge needs.    Review of Systems  Review of Systems   Unable to perform ROS: Mental acuity        Physical Exam  Temp:  [37.1 °C (98.8 °F)] 37.1 °C (98.8 °F)  Pulse:  [] 104  Resp:  [16-48] 47  BP: (126-167)/(63-89) 137/78  SpO2:  [85 %-97 %] 94 %    Physical Exam  Vitals and nursing note reviewed.   Constitutional:       General: She is not in acute distress.     Appearance: She is ill-appearing. She is not toxic-appearing.   HENT:      Mouth/Throat:      Mouth: Mucous membranes are dry.   Cardiovascular:      Rate and Rhythm: Tachycardia present.   Pulmonary:      Comments: High flow oxygen  Normal work of breathing  Crackles throughout  Moderate air movement  No  wheezing  Abdominal:      General: There is no distension.      Tenderness: There is no abdominal tenderness.   Musculoskeletal:      Right lower leg: No edema.      Left lower leg: No edema.   Skin:     Coloration: Skin is pale.   Neurological:      Mental Status: She is alert.      Comments: She is oriented to person, hospital, not year  Speech is clear   Psychiatric:      Comments: Compliant with exam         Fluids  No intake or output data in the 24 hours ending 10/19/23 0723      Laboratory  Recent Labs     10/16/23  1131 10/16/23  1716 10/17/23  0251   WBC 7.0 7.0 7.4   RBC 3.82* 3.70* 3.73*   HEMOGLOBIN 10.2* 9.8* 9.9*   HEMATOCRIT 33.8* 33.0* 33.2*   MCV 88.5 89.2 89.0   MCH 26.7* 26.5* 26.5*   MCHC 30.2* 29.7* 29.8*   RDW 52.6* 53.1* 53.2*   PLATELETCT 268 225 308   MPV 9.1 9.2 9.1       Recent Labs     10/17/23  0251   SODIUM 139   POTASSIUM 4.1   CHLORIDE 102   CO2 24   GLUCOSE 144*   BUN 19   CREATININE 0.81   CALCIUM 8.9                       Imaging  US-RUQ   Final Result      1.  RIGHT pleural effusion   2.  Otherwise unremarkable RIGHT upper quadrant ultrasound      DX-CHEST-LIMITED (1 VIEW)   Final Result      1.  There are stable changes of pulmonary edema with moderate bilateral layering pleural effusions.      DX-CHEST-LIMITED (1 VIEW)   Final Result         1.  Pulmonary edema and/or infiltrates are identified, which are stable since the prior exam.   2.  Layering bilateral pleural effusions, appear somewhat increased since prior study   3.  Cardiomegaly      [id number] VIEW   Final Result      Nonobstructive bowel gas pattern. Small to moderate amount of stool throughout the colon.      DX-CHEST-PORTABLE (1 VIEW)   Final Result      1.  Hypoinflation with small RIGHT pleural effusion, increased from prior with associated infiltrate or atelectasis.   2.  Minimal LEFT pleural fluid with basilar consolidation again seen.   3.  No pneumothorax.      US-THORACENTESIS PUNCTURE LEFT   Final Result       1. Ultrasound guided left sided diagnostic/therapeutic thoracentesis.      2. 750 mL of fluid withdrawn.      EC-ECHOCARDIOGRAM COMPLETE W/O CONT   Final Result      CT-CTA CHEST PULMONARY ARTERY W/ RECONS   Final Result      1.  No central or segmental pulmonary embolus   2.  Moderate to large pericardial effusion   3.  Small RIGHT pleural effusion   4.  Moderate LEFT pleural effusion   5.  BILATERAL compressive atelectasis   6.  Unchanged 3 mm LEFT upper lobe pulmonary nodule, very likely benign   7.  Enlarged pretracheal lymph node      Findings were communicated with and acknowledged by JEAN CLAUDE WHITE via [employer] on 10/12/2023 9:17 PM.            DX-CHEST-PORTABLE (1 VIEW)   Final Result      1.  Hypoinflation and bibasilar atelectasis.  Superimposed pneumonia is not excluded.   2.  Stable cardiomegaly.      US-THORACENTESIS PUNCTURE RIGHT    (Results Pending)        Assessment/Plan  * Acute respiratory failure with hypoxia (HCC)  Assessment & Plan  Secondary to pneumonia and pleural effusion  CT the chest negative for PE.  Echo showed grade 1 diastolic dysfunction with a normal ejection fraction.  Patient developed worsening of her respiratory status and she was placed on high flow nasal cannula.  Patient underwent thoracentesis and 750 cc of fluid was removed from left lung negative for malignancy and negative for infection consistent with a transudate. Repeat xray reveals that the effusion is back. Another thoracentesis has been ordered.  She is requiring 30 L of oxygen with 40% of FiO2 to maintain oxygen saturation.  Pulmonology consulted  An ultrasound of the liver negative for occult cirrhosis.      Pneumonia due to infectious agent- (present on admission)  Assessment & Plan  Treated with a course of antibiotics.      Hypotension  Assessment & Plan  She has required midodrine which has been held.  She has been transfused 1 unit packed red blood cells      Pleural effusion, left  Assessment &  Plan  Patient found to have significant left pleural effusion.  Patient underwent left thoracentesis and 750 cc of fluid was removed.  Cells are negative for malignancy as well as for infection.  Liver ultrasound has been ordered to evaluate for possible occult cirrhosis.    Tachycardia- (present on admission)  Assessment & Plan  10/16/23    Possibly rebound tachycardia due to withholding or not taking metoprolol  Continue metoprolol.  Patient found to have low TSH decrease the dose of levothyroxine.      Essential hypertension- (present on admission)  Assessment & Plan  History of  Home lisinopril, metoprolol, and HCTZ have been held.     Anemia- (present on admission)  Assessment & Plan  Hemoglobin went down to 6.8 despite evidence of bleeding.  She was transfused 1 unit packed red blood cells.  Iron panel does not show iron deficiency anemia.  This appears to be a production problem.    Diabetes (HCC)- (present on admission)  Assessment & Plan  Will hold Synjardy and glipizide  Continue insulin sliding scale with hypoglycemia protocol.  Hemoglobin A1c is elevated at 10.9.    Lactic acidosis  Assessment & Plan  This has resolved    Schizophrenia (HCC)- (present on admission)  Assessment & Plan  Reported history of.  With significant cognitive impairment    Depakote    Parkinson's disease (tremor, stiffness, slow motion, unstable posture)- (present on admission)  Assessment & Plan  Continue Depakote         VTE prophylaxis:   SCDs/TEDs      I have performed a physical exam and reviewed and updated ROS and Plan today (10/19/2023). In review of yesterday's note (10/18/2023), there are no changes except as documented above.    Ms. Verde is critically ill. 32 minutes of critical care time were spent with patient, nursing, pharmacy, and in specific management of acute respiratory failure requiring active titration of high flow oxygen in the IMCU. Prognosis is guarded. Please see orders.

## 2023-10-19 NOTE — PROGRESS NOTES
4 Eyes Skin Assessment Completed by ROD Kramer and Victor M RN.    Head WDL  Ears WDL  Nose WDL  Mouth WDL  Neck WDL  Breast/Chest WDL  Shoulder Blades WDL  Spine WDL  (R) Arm/Elbow/Hand WDL  (L) Arm/Elbow/Hand WDL  Abdomen WDL  Groin WDL  Scrotum/Coccyx/Buttocks Redness and Blanching  (R) Leg WDL  (L) Leg WDL  (R) Heel/Foot/Toe WDL  (L) Heel/Foot/Toe WDL          Devices In Places Tele Box, Pulse Ox, and Oxy Mask      Interventions In Place Pillows    Possible Skin Injury No    Pictures Uploaded Into Epic N/A  Wound Consult Placed N/A  RN Wound Prevention Protocol Ordered No

## 2023-10-19 NOTE — CARE PLAN
Problem: Humidified High Flow Nasal Cannula  Goal: Maintain adequate oxygenation dependent on patient condition  Description: Target End Date:  resolve prior to discharge or when underlying condition is resolved/stabilized    1.  Implement humidified high flow oxygen therapy  2.  Titrate high flow oxygen to maintain appropriate SpO2  Outcome: Progressing   Pt trailing off Quique flow at 12 Oxy

## 2023-10-19 NOTE — CARE PLAN
The patient is Watcher - Medium risk of patient condition declining or worsening    Shift Goals  Clinical Goals: O2 Sat will ramain within goal range  Patient Goals: ARLETTE  Family Goals: not p    Progress made toward(s) clinical / shift goals:    Problem: Knowledge Deficit - Standard  Goal: Patient and family/care givers will demonstrate understanding of plan of care, disease process/condition, diagnostic tests and medications  Outcome: Progressing     Problem: Hemodynamics  Goal: Patient's hemodynamics, fluid balance and neurologic status will be stable or improve  Outcome: Progressing     Problem: Respiratory  Goal: Patient will achieve/maintain optimum respiratory ventilation and gas exchange  Outcome: Progressing     Problem: Skin Integrity  Goal: Skin integrity is maintained or improved  Outcome: Progressing     Problem: Fall Risk  Goal: Patient will remain free from falls  Outcome: Progressing     Problem: Pain - Standard  Goal: Alleviation of pain or a reduction in pain to the patient’s comfort goal  Outcome: Progressing       Patient is not progressing towards the following goals:

## 2023-10-19 NOTE — THERAPY
Occupational Therapy   Initial Evaluation     Patient Name: Erlinda Verde  Age:  58 y.o., Sex:  female  Medical Record #: [id number]  Today's Date: 10/19/2023     Precautions: Fall Risk    Assessment  Patient is 58 y.o. female admitted with acute respiratory failure with hypoxia and L pleural effusion with hx of type 2 diabetes, hypertension, schizoaffective disorder, Parkinson disease, and intellectual disability seen for OT evaluation. Pt speaking nonsensical and unable to answer questions, demonstrating low volition for mobility today requiring significant encouragement and participate. Will follow for skilled OT services, and pending prior level and group home ability to care for pt on current level, will update recommendations.    Plan    Occupational Therapy Initial Treatment Plan   Treatment Interventions: Self Care / Activities of Daily Living, Adaptive Equipment, Cognitive Skill Development, Neuro Re-Education / Balance, Therapeutic Exercises, Therapeutic Activity  Treatment Frequency: 3 Times per Week  Duration: Until Therapy Goals Met    DC Equipment Recommendations: Unable to determine at this time  Discharge Recommendations:  (pending level of function, anticipate DC home)        10/19/23 0935   Prior Living Situation   Prior Services Unable To Determine At This Time   Housing / Facility Group Home   Comments per chart, resides at , will need to confirm PLOF   Prior Level of ADL Function   Self Feeding Unable To Determine At This Time   Grooming / Hygiene Unable To Determine At This Time   Bathing Unable To Determine At This Time   Dressing Unable To Determine At This Time   Toileting Unable To Determine At This Time   Prior Level of IADL Function   Medication Management Unable To Determine At This Time   Laundry Unable To Determine At This Time   Kitchen Mobility Unable To Determine At This Time   Finances Unable To Determine At This Time   Home Management Unable To Determine At This Time   Shopping  "Unable To Determine At This Time   Precautions   Precautions Fall Risk   Pain 0 - 10 Group   Therapist Pain Assessment   (yelling during mobility stating \"it hurts\", unable to state wear pain was located, or may be 2/2 fear)   Cognition    Cognition / Consciousness X   Comments Pt low volition for mobility today, she would initate movement then state \"I cant\" requiring lots of encouragement to participate, yelling profanity throughout   Active ROM Upper Body   Active ROM Upper Body  WDL   Comments appeared functional   Strength Upper Body   Upper Body Strength  WDL   Comments appeared functional but unable to fully assess 2/2 volition   Balance Assessment   Sitting Balance (Static) Fair   Sitting Balance (Dynamic) Fair   Weight Shift Sitting Poor   Bed Mobility    Supine to Sit Maximal Assist   Sit to Supine Maximal Assist   Scooting Maximal Assist   ADL Assessment   Grooming Maximal Assist   Upper Body Dressing Maximal Assist   Lower Body Dressing Maximal Assist   How much help from another person does the patient currently need...   6 Clicks Daily Activity Score 12   Functional Mobility   Sit to Stand Unable to Participate   Patient / Family Goals   Patient / Family Goal #1 to go home   Short Term Goals   Short Term Goal # 1 Pt will tolerate 10 min unsupported EOB ADLs   Short Term Goal # 2 Pt will demo functional transfer mod A  (will need to confirm baseline, and modify goals as needed)   Education Group   Role of Occupational Therapist Patient Response Patient;Acceptance;Explanation   Occupational Therapy Initial Treatment Plan    Treatment Interventions Self Care / Activities of Daily Living;Adaptive Equipment;Cognitive Skill Development;Neuro Re-Education / Balance;Therapeutic Exercises;Therapeutic Activity   Treatment Frequency 3 Times per Week   Duration Until Therapy Goals Met   Problem List   Problem List Decreased Active Daily Living Skills;Decreased Homemaking Skills;Decreased Functional " Mobility;Decreased Activity Tolerance;Safety Awareness Deficits / Cognition;Impaired Postural Control / Balance

## 2023-10-20 ENCOUNTER — APPOINTMENT (OUTPATIENT)
Dept: RADIOLOGY | Facility: MEDICAL CENTER | Age: 58
DRG: 189 | End: 2023-10-20
Attending: HOSPITALIST
Payer: MEDICARE

## 2023-10-20 LAB
GLUCOSE BLD STRIP.AUTO-MCNC: 111 MG/DL (ref 65–99)
GLUCOSE BLD STRIP.AUTO-MCNC: 191 MG/DL (ref 65–99)
HGB RETIC QN AUTO: 21.1 PG/CELL (ref 29–35)
IMM RETICS NFR: 31.4 % (ref 2.6–16.1)
RETICS # AUTO: 0.13 M/UL (ref 0.04–0.12)
RETICS/RBC NFR: 3.4 % (ref 0.8–2.6)

## 2023-10-20 PROCEDURE — 700102 HCHG RX REV CODE 250 W/ 637 OVERRIDE(OP): Performed by: INTERNAL MEDICINE

## 2023-10-20 PROCEDURE — 99233 SBSQ HOSP IP/OBS HIGH 50: CPT | Performed by: HOSPITALIST

## 2023-10-20 PROCEDURE — 770020 HCHG ROOM/CARE - TELE (206)

## 2023-10-20 PROCEDURE — 36415 COLL VENOUS BLD VENIPUNCTURE: CPT

## 2023-10-20 PROCEDURE — 85046 RETICYTE/HGB CONCENTRATE: CPT

## 2023-10-20 PROCEDURE — 700102 HCHG RX REV CODE 250 W/ 637 OVERRIDE(OP): Performed by: HOSPITALIST

## 2023-10-20 PROCEDURE — A9270 NON-COVERED ITEM OR SERVICE: HCPCS | Performed by: INTERNAL MEDICINE

## 2023-10-20 PROCEDURE — 700111 HCHG RX REV CODE 636 W/ 250 OVERRIDE (IP): Mod: JZ | Performed by: HOSPITALIST

## 2023-10-20 PROCEDURE — 71045 X-RAY EXAM CHEST 1 VIEW: CPT

## 2023-10-20 PROCEDURE — 82962 GLUCOSE BLOOD TEST: CPT

## 2023-10-20 RX ORDER — FUROSEMIDE 10 MG/ML
60 INJECTION INTRAMUSCULAR; INTRAVENOUS ONCE
Status: COMPLETED | OUTPATIENT
Start: 2023-10-20 | End: 2023-10-20

## 2023-10-20 RX ADMIN — INSULIN HUMAN 3 UNITS: 100 INJECTION, SOLUTION PARENTERAL at 20:23

## 2023-10-20 RX ADMIN — DIVALPROEX SODIUM 1000 MG: 500 TABLET, DELAYED RELEASE ORAL at 20:22

## 2023-10-20 RX ADMIN — FUROSEMIDE 60 MG: 10 INJECTION, SOLUTION INTRAVENOUS at 17:13

## 2023-10-20 RX ADMIN — LEVOTHYROXINE SODIUM 175 MCG: 0.17 TABLET ORAL at 04:43

## 2023-10-20 RX ADMIN — DOCUSATE SODIUM 50 MG AND SENNOSIDES 8.6 MG 2 TABLET: 8.6; 5 TABLET, FILM COATED ORAL at 17:14

## 2023-10-20 RX ADMIN — DOCUSATE SODIUM 50 MG AND SENNOSIDES 8.6 MG 2 TABLET: 8.6; 5 TABLET, FILM COATED ORAL at 04:41

## 2023-10-20 RX ADMIN — METOPROLOL TARTRATE 25 MG: 25 TABLET, FILM COATED ORAL at 04:42

## 2023-10-20 RX ADMIN — SERTRALINE 100 MG: 100 TABLET, FILM COATED ORAL at 04:42

## 2023-10-20 RX ADMIN — QUETIAPINE FUMARATE 150 MG: 50 TABLET ORAL at 08:16

## 2023-10-20 RX ADMIN — QUETIAPINE FUMARATE 200 MG: 200 TABLET ORAL at 20:22

## 2023-10-20 RX ADMIN — OXYBUTYNIN CHLORIDE 10 MG: 10 TABLET, EXTENDED RELEASE ORAL at 04:42

## 2023-10-20 RX ADMIN — ARIPIPRAZOLE 20 MG: 10 TABLET ORAL at 20:22

## 2023-10-20 RX ADMIN — METOPROLOL TARTRATE 25 MG: 25 TABLET, FILM COATED ORAL at 17:14

## 2023-10-20 RX ADMIN — ATORVASTATIN CALCIUM 20 MG: 20 TABLET, FILM COATED ORAL at 20:22

## 2023-10-20 RX ADMIN — ACETAMINOPHEN 650 MG: 325 TABLET, FILM COATED ORAL at 09:26

## 2023-10-20 ASSESSMENT — COGNITIVE AND FUNCTIONAL STATUS - GENERAL
HELP NEEDED FOR BATHING: A LOT
MOVING TO AND FROM BED TO CHAIR: UNABLE
DRESSING REGULAR LOWER BODY CLOTHING: A LOT
TURNING FROM BACK TO SIDE WHILE IN FLAT BAD: UNABLE
MOBILITY SCORE: 7
CLIMB 3 TO 5 STEPS WITH RAILING: TOTAL
TOILETING: A LOT
DRESSING REGULAR UPPER BODY CLOTHING: A LOT
WALKING IN HOSPITAL ROOM: TOTAL
STANDING UP FROM CHAIR USING ARMS: A LOT
SUGGESTED CMS G CODE MODIFIER DAILY ACTIVITY: CL
MOVING FROM LYING ON BACK TO SITTING ON SIDE OF FLAT BED: UNABLE
DAILY ACTIVITIY SCORE: 12
EATING MEALS: A LOT
SUGGESTED CMS G CODE MODIFIER MOBILITY: CM
PERSONAL GROOMING: A LOT

## 2023-10-20 ASSESSMENT — FIBROSIS 4 INDEX: FIB4 SCORE: 1.15

## 2023-10-20 ASSESSMENT — PAIN DESCRIPTION - PAIN TYPE: TYPE: ACUTE PAIN

## 2023-10-20 NOTE — CARE PLAN
The patient is Stable - Low risk of patient condition declining or worsening    Shift Goals  Clinical Goals: vss, titrate down 02, maintain safety  Patient Goals: ARLETTE  Family Goals: ARLETTE- no family present    Progress made toward(s) clinical / shift goals:        Patient is not progressing towards the following goals:      Problem: Knowledge Deficit - Standard  Goal: Patient and family/care givers will demonstrate understanding of plan of care, disease process/condition, diagnostic tests and medications  Description: Target End Date:  1-3 days or as soon as patient condition allows    Document in Patient Education    1.  Patient and family/caregiver oriented to unit, equipment, visitation policy and means for communicating concern  2.  Complete/review Learning Assessment  3.  Assess knowledge level of disease process/condition, treatment plan, diagnostic tests and medications  4.  Explain disease process/condition, treatment plan, diagnostic tests and medications  Outcome: Not Progressing     Problem: Respiratory  Goal: Patient will achieve/maintain optimum respiratory ventilation and gas exchange  Description: Target End Date:  Prior to discharge or change in level of care    Document on Assessment flowsheet    1.  Assess and monitor rate, rhythm, depth and effort of respiration  2.  Breath sounds assessed qshift and/or as needed  3.  Assess O2 saturation, administer/titrate oxygen as ordered  4.  Position patient for maximum ventilatory efficiency  5.  Turn, cough, and deep breath with splinting to improve effectiveness  6.  Collaborate with RT to administer medication/treatments per order  7.  Encourage use of incentive spirometer and encourage patient to cough after use and utilize splinting techniques if applicable  8.  Airway suctioning  9.  Monitor sputum production for changes in color, consistency and frequency  10. Perform frequent oral hygiene  11. Alternate physical activity with rest periods  Outcome:  Not Progressing     Problem: Skin Integrity  Goal: Skin integrity is maintained or improved  Description: Target End Date:  Prior to discharge or change in level of care    Document interventions on Skin Risk/Rohith flowsheet groups and corresponding LDA    1.  Assess and monitor skin integrity, appearance and/or temperature  2.  Assess risk factors for impaired skin integrity and/or pressures ulcers  3.  Implement precautions to protect skin integrity in collaboration with interdisciplinary team  4.  Implement pressure ulcer prevention protocol if at risk for skin breakdown  5.  Confirm wound care consult if at risk for skin breakdown  6.  Ensure patient use of pressure relieving devices  (Low air loss bed, waffle overlay, heel protectors, ROHO cushion, etc)  Outcome: Progressing     Problem: Fall Risk  Goal: Patient will remain free from falls  Description: Target End Date:  Prior to discharge or change in level of care    Document interventions on the Toribio Kt Fall Risk Assessment    1.  Assess for fall risk factors  2.  Implement fall precautions  Outcome: Progressing     Problem: Pain - Standard  Goal: Alleviation of pain or a reduction in pain to the patient’s comfort goal  Description: Target End Date:  Prior to discharge or change in level of care    Document on Vitals flowsheet    1.  Document pain using the appropriate pain scale per order or unit policy  2.  Educate and implement non-pharmacologic comfort measures (i.e. relaxation, distraction, massage, cold/heat therapy, etc.)  3.  Pain management medications as ordered  4.  Reassess pain after pain med administration per policy  5.  If opiods administered assess patient's response to pain medication is appropriate per POSS sedation scale  6.  Follow pain management plan developed in collaboration with patient and interdisciplinary team (including palliative care or pain specialists if applicable)  Outcome: Progressing

## 2023-10-20 NOTE — DISCHARGE PLANNING
called guardian Ann and group home owner Dianne ([phone]) to give them an updated on the patient's status. Prior to this admission Erlinda was able to propel herself independently in her wheelchair. Dianne and raul Juarez are in agreement with SNF if rehab is needed before she returns tot he group home. Patient will remain here over the weekend per MD. She was previously on service with [facility] ESCALAN[facility], liaison for [facility] requested to have a referral sent to them for the patient. Referral will be sent today.

## 2023-10-20 NOTE — PROGRESS NOTES
Hospital Medicine Daily Progress Note    Date of Service  10/20/2023    Chief Complaint  Erlinda Verde is a 58 y.o. female admitted 10/12/2023 with SOB    Hospital Course  58-year-old history of type 2 diabetes, hypertension, schizoaffective disorder, Parkinson's disease.  Patient is a resident of a group home and was sent to us after having had 3 days of cough and noted to be hypoxic at 88% on room air.  On presentation normal white count and negative viral screen, hemoglobin 8.3, BNP 4000, lactic acid 1.4.  CTA of the chest negative for PE but showing moderate to large pericardial effusion with moderate to small right pleural effusion.  Transthoracic echo showing EF of 61% grade 1 diastolic dysfunction, study was negative for tamponade physiology.  Patient had a thoracentesis of 750 mL.  Her hemoglobin reached a jarrell of 6.8 for which she was transfused 1 unit.  She was in the IMCU on high flow nasal cannula.  She was noted to have reaccumulation of her pleural effusion, and a thoracentesis was attempted again however the patient was too agitated to allow the procedure.    Interval Problem Update  ROS limited by underlying cognitive status.  Pt is without complaint on my exam      SBP 120s  On 8 LNC  AFebrile  I have discussed this patient's plan of care and discharge plan at IDT rounds today with Case Management, Nursing, Nursing leadership, and other members of the IDT team.    Consultants/Specialty  pulmonary    Code Status  Full Code    Disposition  The patient is not medically cleared for discharge to home or a post-acute facility.    I have placed the appropriate orders for post-discharge needs.    Review of Systems  Review of Systems   Unable to perform ROS: Psychiatric disorder        Physical Exam  Temp:  [36.4 °C (97.6 °F)-37 °C (98.6 °F)] 36.5 °C (97.7 °F)  Pulse:  [] 109  Resp:  [19-59] 22  BP: (118-150)/(72-98) 126/82  SpO2:  [83 %-95 %] 92 %    Physical Exam  Constitutional:        General: She is not in acute distress.     Appearance: Normal appearance. She is well-developed. She is not diaphoretic.   HENT:      Head: Normocephalic and atraumatic.   Neck:      Vascular: No JVD.   Cardiovascular:      Rate and Rhythm: Normal rate and regular rhythm.      Heart sounds: No murmur heard.  Pulmonary:      Effort: Pulmonary effort is normal. No respiratory distress.      Breath sounds: No stridor. No wheezing or rales.   Abdominal:      Palpations: Abdomen is soft.      Tenderness: There is no abdominal tenderness. There is no guarding or rebound.   Musculoskeletal:         General: No tenderness.      Right lower leg: No edema.      Left lower leg: No edema.   Skin:     General: Skin is warm and dry.      Findings: No rash.   Neurological:      Mental Status: She is oriented to person, place, and time.   Psychiatric:         Mood and Affect: Mood is anxious.         Behavior: Behavior is agitated.       Fluids    Intake/Output Summary (Last 24 hours) at 10/20/2023 0708  Last data filed at 10/19/2023 2100  Gross per 24 hour   Intake 340 ml   Output 0 ml   Net 340 ml       Laboratory                        Imaging  US-CHEST   Final Result      Patient has adequate fluid for right-sided thoracentesis, however patient was not able to stay still or follow directions. The procedure was aborted to prevent harming the patient.      Case discussed with Dr. Esposito.      US-RUQ   Final Result      1.  RIGHT pleural effusion   2.  Otherwise unremarkable RIGHT upper quadrant ultrasound      DX-CHEST-LIMITED (1 VIEW)   Final Result      1.  There are stable changes of pulmonary edema with moderate bilateral layering pleural effusions.      DX-CHEST-LIMITED (1 VIEW)   Final Result         1.  Pulmonary edema and/or infiltrates are identified, which are stable since the prior exam.   2.  Layering bilateral pleural effusions, appear somewhat increased since prior study   3.  Cardiomegaly      [id number] VIEW    Final Result      Nonobstructive bowel gas pattern. Small to moderate amount of stool throughout the colon.      DX-CHEST-PORTABLE (1 VIEW)   Final Result      1.  Hypoinflation with small RIGHT pleural effusion, increased from prior with associated infiltrate or atelectasis.   2.  Minimal LEFT pleural fluid with basilar consolidation again seen.   3.  No pneumothorax.      US-THORACENTESIS PUNCTURE LEFT   Final Result      1. Ultrasound guided left sided diagnostic/therapeutic thoracentesis.      2. 750 mL of fluid withdrawn.      EC-ECHOCARDIOGRAM COMPLETE W/O CONT   Final Result      CT-CTA CHEST PULMONARY ARTERY W/ RECONS   Final Result      1.  No central or segmental pulmonary embolus   2.  Moderate to large pericardial effusion   3.  Small RIGHT pleural effusion   4.  Moderate LEFT pleural effusion   5.  BILATERAL compressive atelectasis   6.  Unchanged 3 mm LEFT upper lobe pulmonary nodule, very likely benign   7.  Enlarged pretracheal lymph node      Findings were communicated with and acknowledged by JEAN CLAUDE WHITE via [employer] on 10/12/2023 9:17 PM.            DX-CHEST-PORTABLE (1 VIEW)   Final Result      1.  Hypoinflation and bibasilar atelectasis.  Superimposed pneumonia is not excluded.   2.  Stable cardiomegaly.      US-THORACENTESIS PUNCTURE RIGHT    (Results Pending)   DX-CHEST-PORTABLE (1 VIEW)    (Results Pending)        Assessment/Plan  * Acute respiratory failure with hypoxia (HCC)  Assessment & Plan  Secondary to pneumonia and pleural effusion  CT the chest negative for PE.    Echo showed grade 1 diastolic dysfunction with a normal ejection fraction.  Patient underwent thoracentesis and 750 cc of fluid was removed from left lung negative for malignancy and negative for infection consistent with a transudate.   Cytology showing inflamatory cells, no neoplastic findings  Cultures neg  Now off Abxs  ?chronic inflamatory process  Check repeat CXR  US to tap        Hypotension  Assessment &  Plan  Was requiring midodrine but is now off and back on her BP meds      Pleural effusion, left  Assessment & Plan  Patient found to have significant left pleural effusion.  Patient underwent left thoracentesis and 750 cc of fluid was removed.  Patient underwent thoracentesis and 750 cc of fluid was removed from left lung negative for malignancy and negative for infection consistent with a transudate.   Cytology showing inflamatory cells, no neoplastic findings  Cultures neg  RUQ US neg for signs of cirrhosis/ascites  Now off Abxs  ?chronic inflamatory process  Check repeat CXR  US to tap          Pneumonia due to infectious agent- (present on admission)  Assessment & Plan  Treated with a course of antibiotics.      Tachycardia- (present on admission)  Assessment & Plan  10/16/23    Possibly rebound tachycardia due to withholding or not taking metoprolol  Continue metoprolol.  Patient found to have low TSH decrease the dose of levothyroxine.      Essential hypertension- (present on admission)  Assessment & Plan  History of  Home lisinopril, metoprolol, and HCTZ    Anemia- (present on admission)  Assessment & Plan  Hemoglobin went down to 6.8 despite evidence of bleeding.  She was transfused 1 unit packed red blood cells.  Iron panel does not show iron deficiency anemia.  Check reticulocyte count    Diabetes (HCC)- (present on admission)  Assessment & Plan  Will hold Synjardy and glipizide  Continue insulin sliding scale with hypoglycemia protocol.  Carb consistent diet  Hemoglobin A1c is elevated at 10.9.    Lactic acidosis  Assessment & Plan  This has resolved    Schizophrenia (HCC)- (present on admission)  Assessment & Plan  Reported history of.  With significant cognitive impairment    Depakote    Parkinson's disease (tremor, stiffness, slow motion, unstable posture)- (present on admission)  Assessment & Plan  Continue Depakote         VTE prophylaxis:   SCDs/TEDs      I have performed a physical exam and  reviewed and updated ROS and Plan today (10/20/2023). In review of yesterday's note (10/19/2023), there are no changes except as documented above.

## 2023-10-20 NOTE — DIETARY
"Nutrition services: Day 7 of admit.  Erlinda Verde is a 58 y.o. female with admitting DX of acute respiratory failure with hypoxia   Consult received for LOS with variable PO intake     RD met with pt at bedside. Pt wearing oxymask and with slurred speech, pt was unable to engage in effective interview. RD able to locate food preferences from previous admission which includes chocolate, sandwiches, macaroni and cheese, pancakes, french toast. Pt nodded head up and down when asked if she still prefers these foods. RD contacted RN regarding blood glucose management, RN to confirm appropriate medication interventions with MD.     Assessment:  Height: 162.6 cm (5' 4\")  Weight: 56.3 kg (124 lb 1.9 oz) via bed scale  Body mass index is 21.3 kg/m²., BMI classification: normal   Diet order: Regiular  PO intake: <25% x3 meals, 25-50% x2 meals, 50-75% x2 meals (per flowsheet)     Evaluation:   HbA1c 10.9, POC glucose 102-227 since admit, Hemoglobin 9.9   Medications: Abilify, Lipitor, Depakote, synthroid, oxybutynin, Seroquel, senokot, Zoloft, bowel regimen (PRN), antiemetics (PRN)   GI: WDL, LBM 10/19 per flowsheet   Wt during hospitalization: pt with admit wt of 57.8 kg, wt garcia slightly trended down with 1.5 kg (2.5%) wt loss in 1 week.     Malnutrition Risk: criteria not met at this time     Recommendations/Plan:  Continue current diet order, Regular   Addition of food preferences add to [employer]   Encourage intake of meals  Document intake of all meals  as % taken in ADL's to provide interdisciplinary communication across all shifts.   Achieve PO intake 50% of meals  Achieve blood glucose control 100-180 during hospitalization   Monitor weight.  Nutrition rep will continue to see patient for ongoing meal and snack preferences.   Obtain ONS order per RD as needed     RD following            "

## 2023-10-20 NOTE — PROGRESS NOTES
Assumed care on patient, alert and oriented x 1-2, in no apparent distress, tolerating 9L oxymask. Denies pain. Tele on and heart and rhythm checked on monitor. Noticed patient yells out loud at times, reoriented and instructed to use call light for needs and assistance, she verbalized understanding. Lights off per her request.     Will maintain safety and round hourly d/t patient's mentation. Side rails up x 3, bed to lowest level and alarm turned on.

## 2023-10-20 NOTE — PROGRESS NOTES
Care assumed on patient , A&O x2- baseline. Patient is yelling in bed- provided assistance with elimination and hydration. She remains on 9L oxymask and declines pain and SOB.     Tele on and rate and rhythm checked on monitor.     Bed alarm on and bed is in lowest and locked position. Patient is not appropriate for call light use. She does not appear to be in any obvious signs of distress at this time. Hourly rounding in place.

## 2023-10-21 ENCOUNTER — APPOINTMENT (OUTPATIENT)
Dept: RADIOLOGY | Facility: MEDICAL CENTER | Age: 58
DRG: 189 | End: 2023-10-21
Attending: HOSPITALIST
Payer: MEDICARE

## 2023-10-21 LAB
GLUCOSE BLD STRIP.AUTO-MCNC: 113 MG/DL (ref 65–99)
GLUCOSE BLD STRIP.AUTO-MCNC: 119 MG/DL (ref 65–99)
GLUCOSE BLD STRIP.AUTO-MCNC: 145 MG/DL (ref 65–99)
GLUCOSE BLD STRIP.AUTO-MCNC: 164 MG/DL (ref 65–99)

## 2023-10-21 PROCEDURE — 700102 HCHG RX REV CODE 250 W/ 637 OVERRIDE(OP): Performed by: INTERNAL MEDICINE

## 2023-10-21 PROCEDURE — 99233 SBSQ HOSP IP/OBS HIGH 50: CPT | Performed by: HOSPITALIST

## 2023-10-21 PROCEDURE — 0W9B3ZZ DRAINAGE OF LEFT PLEURAL CAVITY, PERCUTANEOUS APPROACH: ICD-10-PCS | Performed by: RADIOLOGY

## 2023-10-21 PROCEDURE — 71045 X-RAY EXAM CHEST 1 VIEW: CPT

## 2023-10-21 PROCEDURE — A9270 NON-COVERED ITEM OR SERVICE: HCPCS | Performed by: INTERNAL MEDICINE

## 2023-10-21 PROCEDURE — C1729 CATH, DRAINAGE: HCPCS

## 2023-10-21 PROCEDURE — 82962 GLUCOSE BLOOD TEST: CPT | Mod: 91

## 2023-10-21 PROCEDURE — 770020 HCHG ROOM/CARE - TELE (206)

## 2023-10-21 PROCEDURE — 700111 HCHG RX REV CODE 636 W/ 250 OVERRIDE (IP): Performed by: HOSPITALIST

## 2023-10-21 PROCEDURE — 74176 CT ABD & PELVIS W/O CONTRAST: CPT

## 2023-10-21 RX ORDER — FUROSEMIDE 10 MG/ML
40 INJECTION INTRAMUSCULAR; INTRAVENOUS
Status: DISCONTINUED | OUTPATIENT
Start: 2023-10-21 | End: 2023-10-22

## 2023-10-21 RX ORDER — OXYCODONE HYDROCHLORIDE 5 MG/1
5-10 TABLET ORAL EVERY 4 HOURS PRN
Status: DISCONTINUED | OUTPATIENT
Start: 2023-10-21 | End: 2023-10-25 | Stop reason: HOSPADM

## 2023-10-21 RX ORDER — LORAZEPAM 2 MG/ML
2 INJECTION INTRAMUSCULAR ONCE
Status: COMPLETED | OUTPATIENT
Start: 2023-10-21 | End: 2023-10-21

## 2023-10-21 RX ORDER — ENOXAPARIN SODIUM 100 MG/ML
40 INJECTION SUBCUTANEOUS DAILY
Status: DISCONTINUED | OUTPATIENT
Start: 2023-10-21 | End: 2023-10-25 | Stop reason: HOSPADM

## 2023-10-21 RX ADMIN — DIVALPROEX SODIUM 1000 MG: 500 TABLET, DELAYED RELEASE ORAL at 20:24

## 2023-10-21 RX ADMIN — LEVOTHYROXINE SODIUM 175 MCG: 0.17 TABLET ORAL at 04:20

## 2023-10-21 RX ADMIN — QUETIAPINE FUMARATE 150 MG: 50 TABLET ORAL at 08:45

## 2023-10-21 RX ADMIN — INSULIN HUMAN 3 UNITS: 100 INJECTION, SOLUTION PARENTERAL at 09:23

## 2023-10-21 RX ADMIN — FUROSEMIDE 40 MG: 10 INJECTION, SOLUTION INTRAVENOUS at 15:17

## 2023-10-21 RX ADMIN — ACETAMINOPHEN 650 MG: 325 TABLET, FILM COATED ORAL at 17:23

## 2023-10-21 RX ADMIN — LORAZEPAM 2 MG: 2 INJECTION INTRAMUSCULAR; INTRAVENOUS at 14:17

## 2023-10-21 RX ADMIN — INSULIN HUMAN 3 UNITS: 100 INJECTION, SOLUTION PARENTERAL at 20:19

## 2023-10-21 RX ADMIN — DOCUSATE SODIUM 50 MG AND SENNOSIDES 8.6 MG 2 TABLET: 8.6; 5 TABLET, FILM COATED ORAL at 04:20

## 2023-10-21 RX ADMIN — METOPROLOL TARTRATE 25 MG: 25 TABLET, FILM COATED ORAL at 17:05

## 2023-10-21 RX ADMIN — QUETIAPINE FUMARATE 200 MG: 200 TABLET ORAL at 20:24

## 2023-10-21 RX ADMIN — ENOXAPARIN SODIUM 40 MG: 100 INJECTION SUBCUTANEOUS at 17:05

## 2023-10-21 RX ADMIN — METOPROLOL TARTRATE 25 MG: 25 TABLET, FILM COATED ORAL at 04:20

## 2023-10-21 RX ADMIN — SERTRALINE 100 MG: 100 TABLET, FILM COATED ORAL at 04:20

## 2023-10-21 RX ADMIN — ARIPIPRAZOLE 20 MG: 10 TABLET ORAL at 20:24

## 2023-10-21 RX ADMIN — ATORVASTATIN CALCIUM 20 MG: 20 TABLET, FILM COATED ORAL at 20:25

## 2023-10-21 RX ADMIN — OXYBUTYNIN CHLORIDE 10 MG: 10 TABLET, EXTENDED RELEASE ORAL at 04:20

## 2023-10-21 ASSESSMENT — FIBROSIS 4 INDEX: FIB4 SCORE: 1.15

## 2023-10-21 NOTE — OR SURGEON
EXAMINATION:  10/21/2023 1:56 PM    HISTORY/REASON FOR EXAM:  Shortness of breath  Left pleural effusion    TECHNIQUE/EXAM DESCRIPTION:   Ultrasound-guided thoracentesis.    Indication:  LEFT pleural fluid collection.    COMPARISON:  None    PROCEDURE:     Informed consent was obtained. A timeout was taken. A left pleural effusion was localized with real-time ultrasound guidance. The left posterior chest wall was prepped and draped in a sterile manner. Following local anesthesia with 1% lidocaine, a 5 Greek Imperatoreh pigtail catheter was advanced into the pleural space with trocar technique and pleural fluid was drained. The patient tolerated the procedure well without evidence of complication. A post thoracentesis chest radiograph is forthcoming.    FINDINGS:         Fluid was not sent to the laboratory. No specimen. No blood loss.    Fluid character: straw colored    IMPRESSION:  1. Ultrasound guided left sided therapeutic thoracentesis.    2. 1000 mL of fluid withdrawn.

## 2023-10-21 NOTE — PROGRESS NOTES
Care assumed on patient, A&O x2- baseline. Patient is resting comfortably in bed on 5L NC and declines pain and SOB.     Tele on and rate and rhythm checked on monitor.     Bed alarm on and bed is in lowest and locked position. Patient is not appropriate for call light use. She does not appear to be in any obvious distress at this time. Hourly rounding in place.

## 2023-10-21 NOTE — PROGRESS NOTES
Received bedside report from outgoing RN, and I assumed care of the patient.  Patient was sitting in bed requesting chocolate ice cream.  She is AO x3 with VSS.  There were no signs of acute distress.  The bed is in lowest position with the call light and personal belongings within reach.  Bed alarm activated for extra precaution.  One hour rounding initiated.

## 2023-10-21 NOTE — CARE PLAN
The patient is Stable - Low risk of patient condition declining or worsening    Shift Goals  Clinical Goals: Monitor O2 and titrate if possible  Patient Goals: Sleep  Family Goals: billy    Progress made toward(s) clinical / shift goals:        Problem: Knowledge Deficit - Standard  Goal: Patient and family/care givers will demonstrate understanding of plan of care, disease process/condition, diagnostic tests and medications  Outcome: Progressing     Problem: Hemodynamics  Goal: Patient's hemodynamics, fluid balance and neurologic status will be stable or improve  Outcome: Progressing     Problem: Skin Integrity  Goal: Skin integrity is maintained or improved  Outcome: Progressing     Problem: Fall Risk  Goal: Patient will remain free from falls  Outcome: Progressing     Problem: Pain - Standard  Goal: Alleviation of pain or a reduction in pain to the patient’s comfort goal  Outcome: Progressing       Patient is not progressing towards the following goals:

## 2023-10-22 ENCOUNTER — APPOINTMENT (OUTPATIENT)
Dept: CARDIOLOGY | Facility: MEDICAL CENTER | Age: 58
DRG: 189 | End: 2023-10-22
Attending: HOSPITALIST
Payer: MEDICARE

## 2023-10-22 ENCOUNTER — APPOINTMENT (OUTPATIENT)
Dept: RADIOLOGY | Facility: MEDICAL CENTER | Age: 58
DRG: 189 | End: 2023-10-22
Payer: MEDICARE

## 2023-10-22 LAB
ANION GAP SERPL CALC-SCNC: 13 MMOL/L (ref 7–16)
BASE EXCESS BLDA CALC-SCNC: 0 MMOL/L (ref -4–3)
BASE EXCESS BLDA CALC-SCNC: 1 MMOL/L (ref -4–3)
BODY TEMPERATURE: 36.4 CENTIGRADE
BODY TEMPERATURE: ABNORMAL DEGREES
BUN SERPL-MCNC: 25 MG/DL (ref 8–22)
CALCIUM SERPL-MCNC: 8.8 MG/DL (ref 8.5–10.5)
CHLORIDE SERPL-SCNC: 103 MMOL/L (ref 96–112)
CO2 BLDA-SCNC: 24 MMOL/L (ref 20–33)
CO2 SERPL-SCNC: 23 MMOL/L (ref 20–33)
CREAT SERPL-MCNC: 0.7 MG/DL (ref 0.5–1.4)
DELSYS IDSYS: ABNORMAL
ERYTHROCYTE [DISTWIDTH] IN BLOOD BY AUTOMATED COUNT: 53.7 FL (ref 35.9–50)
GFR SERPLBLD CREATININE-BSD FMLA CKD-EPI: 99 ML/MIN/1.73 M 2
GLUCOSE BLD STRIP.AUTO-MCNC: 184 MG/DL (ref 65–99)
GLUCOSE BLD STRIP.AUTO-MCNC: 228 MG/DL (ref 65–99)
GLUCOSE BLD STRIP.AUTO-MCNC: 287 MG/DL (ref 65–99)
GLUCOSE BLD STRIP.AUTO-MCNC: 93 MG/DL (ref 65–99)
GLUCOSE BLD STRIP.AUTO-MCNC: 95 MG/DL (ref 65–99)
GLUCOSE SERPL-MCNC: 131 MG/DL (ref 65–99)
HCO3 BLDA-SCNC: 23 MMOL/L (ref 17–25)
HCO3 BLDA-SCNC: 23 MMOL/L (ref 17–25)
HCT VFR BLD AUTO: 36.7 % (ref 37–47)
HGB BLD-MCNC: 10.7 G/DL (ref 12–16)
HOROWITZ INDEX BLDA+IHG-RTO: 71 MM[HG]
INHALED O2 FLOW RATE: ABNORMAL L/MIN
LPM ILPM: 40 LPM
LV EJECT FRACT MOD 2C 99903: 63.98
LV EJECT FRACT MOD 4C 99902: 53.78
LV EJECT FRACT MOD BP 99901: 56.61
MCH RBC QN AUTO: 25.8 PG (ref 27–33)
MCHC RBC AUTO-ENTMCNC: 29.2 G/DL (ref 32.2–35.5)
MCV RBC AUTO: 88.6 FL (ref 81.4–97.8)
O2/TOTAL GAS SETTING VFR VENT: 70 %
PCO2 BLDA: 30.7 MMHG (ref 26–37)
PCO2 BLDA: 31.8 MMHG (ref 26–37)
PCO2 TEMP ADJ BLDA: 29.9 MMHG (ref 26–37)
PH BLDA: 7.48 [PH] (ref 7.4–7.5)
PH BLDA: 7.48 [PH] (ref 7.4–7.5)
PH TEMP ADJ BLDA: 7.49 [PH] (ref 7.4–7.5)
PLATELET # BLD AUTO: 363 K/UL (ref 164–446)
PMV BLD AUTO: 8.4 FL (ref 9–12.9)
PO2 BLDA: 50 MMHG (ref 64–87)
PO2 BLDA: 57.7 MMHG (ref 64–87)
PO2 TEMP ADJ BLDA: 48 MMHG (ref 64–87)
POTASSIUM SERPL-SCNC: 4 MMOL/L (ref 3.6–5.5)
RBC # BLD AUTO: 4.14 M/UL (ref 4.2–5.4)
SAO2 % BLDA: 88 % (ref 93–99)
SAO2 % BLDA: 88.7 % (ref 93–99)
SODIUM SERPL-SCNC: 139 MMOL/L (ref 135–145)
SPECIMEN DRAWN FROM PATIENT: ABNORMAL
WBC # BLD AUTO: 7 K/UL (ref 4.8–10.8)

## 2023-10-22 PROCEDURE — 71045 X-RAY EXAM CHEST 1 VIEW: CPT

## 2023-10-22 PROCEDURE — 93321 DOPPLER ECHO F-UP/LMTD STD: CPT | Mod: 26 | Performed by: INTERNAL MEDICINE

## 2023-10-22 PROCEDURE — 82803 BLOOD GASES ANY COMBINATION: CPT

## 2023-10-22 PROCEDURE — 700111 HCHG RX REV CODE 636 W/ 250 OVERRIDE (IP): Mod: JZ | Performed by: HOSPITALIST

## 2023-10-22 PROCEDURE — 700102 HCHG RX REV CODE 250 W/ 637 OVERRIDE(OP): Performed by: HOSPITALIST

## 2023-10-22 PROCEDURE — 700102 HCHG RX REV CODE 250 W/ 637 OVERRIDE(OP): Performed by: INTERNAL MEDICINE

## 2023-10-22 PROCEDURE — 93321 DOPPLER ECHO F-UP/LMTD STD: CPT

## 2023-10-22 PROCEDURE — 36415 COLL VENOUS BLD VENIPUNCTURE: CPT

## 2023-10-22 PROCEDURE — 80048 BASIC METABOLIC PNL TOTAL CA: CPT

## 2023-10-22 PROCEDURE — 85027 COMPLETE CBC AUTOMATED: CPT

## 2023-10-22 PROCEDURE — A9270 NON-COVERED ITEM OR SERVICE: HCPCS | Performed by: INTERNAL MEDICINE

## 2023-10-22 PROCEDURE — 770020 HCHG ROOM/CARE - TELE (206)

## 2023-10-22 PROCEDURE — 93308 TTE F-UP OR LMTD: CPT | Mod: 26 | Performed by: INTERNAL MEDICINE

## 2023-10-22 PROCEDURE — 99233 SBSQ HOSP IP/OBS HIGH 50: CPT | Performed by: HOSPITALIST

## 2023-10-22 PROCEDURE — 94640 AIRWAY INHALATION TREATMENT: CPT

## 2023-10-22 PROCEDURE — A9270 NON-COVERED ITEM OR SERVICE: HCPCS | Performed by: HOSPITALIST

## 2023-10-22 PROCEDURE — 82962 GLUCOSE BLOOD TEST: CPT

## 2023-10-22 RX ORDER — FUROSEMIDE 10 MG/ML
40 INJECTION INTRAMUSCULAR; INTRAVENOUS
Status: DISCONTINUED | OUTPATIENT
Start: 2023-10-23 | End: 2023-10-25 | Stop reason: HOSPADM

## 2023-10-22 RX ADMIN — METOPROLOL TARTRATE 25 MG: 25 TABLET, FILM COATED ORAL at 04:43

## 2023-10-22 RX ADMIN — INSULIN HUMAN 6 UNITS: 100 INJECTION, SOLUTION PARENTERAL at 07:47

## 2023-10-22 RX ADMIN — OXYCODONE HYDROCHLORIDE 10 MG: 5 TABLET ORAL at 22:05

## 2023-10-22 RX ADMIN — QUETIAPINE FUMARATE 200 MG: 200 TABLET ORAL at 21:25

## 2023-10-22 RX ADMIN — LEVOTHYROXINE SODIUM 175 MCG: 0.17 TABLET ORAL at 04:45

## 2023-10-22 RX ADMIN — SERTRALINE 100 MG: 100 TABLET, FILM COATED ORAL at 04:45

## 2023-10-22 RX ADMIN — DOCUSATE SODIUM 50 MG AND SENNOSIDES 8.6 MG 2 TABLET: 8.6; 5 TABLET, FILM COATED ORAL at 04:45

## 2023-10-22 RX ADMIN — ATORVASTATIN CALCIUM 20 MG: 20 TABLET, FILM COATED ORAL at 20:47

## 2023-10-22 RX ADMIN — METOPROLOL TARTRATE 25 MG: 25 TABLET, FILM COATED ORAL at 17:39

## 2023-10-22 RX ADMIN — ARIPIPRAZOLE 20 MG: 10 TABLET ORAL at 20:47

## 2023-10-22 RX ADMIN — INSULIN HUMAN 3 UNITS: 100 INJECTION, SOLUTION PARENTERAL at 20:51

## 2023-10-22 RX ADMIN — QUETIAPINE FUMARATE 150 MG: 50 TABLET ORAL at 07:44

## 2023-10-22 RX ADMIN — DIVALPROEX SODIUM 1000 MG: 500 TABLET, DELAYED RELEASE ORAL at 20:47

## 2023-10-22 RX ADMIN — INSULIN HUMAN 9 UNITS: 100 INJECTION, SOLUTION PARENTERAL at 17:39

## 2023-10-22 RX ADMIN — FUROSEMIDE 40 MG: 10 INJECTION, SOLUTION INTRAVENOUS at 04:43

## 2023-10-22 RX ADMIN — OXYBUTYNIN CHLORIDE 10 MG: 10 TABLET, EXTENDED RELEASE ORAL at 04:44

## 2023-10-22 RX ADMIN — ENOXAPARIN SODIUM 40 MG: 100 INJECTION SUBCUTANEOUS at 17:39

## 2023-10-22 ASSESSMENT — FIBROSIS 4 INDEX: FIB4 SCORE: 0.98

## 2023-10-22 NOTE — PROGRESS NOTES
Report received from outgoing RN. Assumed care on pt. Pt is AAOx2, on HHFNC 50L 70% FiO2 satting mid to high 90's, VSS. No signs of acute distress.   Bed Alarm on, bed locked and on lowest position. Hourly rounding initiated. Care ongoing.

## 2023-10-22 NOTE — PROGRESS NOTES
NOC CROSSCOVER    RN reported increased oxygen demands.  Rapid response was called.  I examined the patient at bedside.   I have ordered CXR which showed only minimal bilateral pleural effusions.  Patient had received thoracentesis yesterday afternoon.  Patient was placed on high flow  50L @ 70% and is maintaining oxygen saturation above 90%.  She is more alert and stable.    GURWINDER Severino

## 2023-10-22 NOTE — CARE PLAN
The patient is Stable - Low risk of patient condition declining or worsening    Shift Goals  Clinical Goals: hemodynamic stability  Patient Goals: rest  Family Goals: billy    Progress made toward(s) clinical / shift goals:  yes    Patient is not progressing towards the following goals:      Problem: Knowledge Deficit - Standard  Goal: Patient and family/care givers will demonstrate understanding of plan of care, disease process/condition, diagnostic tests and medications  Outcome: Progressing     Problem: Hemodynamics  Goal: Patient's hemodynamics, fluid balance and neurologic status will be stable or improve  Outcome: Progressing     Problem: Skin Integrity  Goal: Skin integrity is maintained or improved  Outcome: Progressing     Problem: Fall Risk  Goal: Patient will remain free from falls  Outcome: Progressing     Problem: Pain - Standard  Goal: Alleviation of pain or a reduction in pain to the patient’s comfort goal  Outcome: Progressing

## 2023-10-22 NOTE — PROGRESS NOTES
Hospital Medicine Daily Progress Note    Date of Service  10/22/2023    Chief Complaint  Erlinda Verde is a 58 y.o. female admitted 10/12/2023 with SOB    Hospital Course  58-year-old history of type 2 diabetes, hypertension, schizoaffective disorder, Parkinson's disease.  Patient is a resident of a group home and was sent to us after having had 3 days of cough and noted to be hypoxic at 88% on room air.  On presentation normal white count and negative viral screen, hemoglobin 8.3, BNP 4000, lactic acid 1.4.  CTA of the chest negative for PE but showing moderate to large pericardial effusion with moderate to small right pleural effusion.  Transthoracic echo showing EF of 61% grade 1 diastolic dysfunction, study was negative for tamponade physiology.  Patient had a thoracentesis of 750 mL.  Her hemoglobin reached a jarrell of 6.8 for which she was transfused 1 unit.  She was in the IMCU on high flow nasal cannula.  She was noted to have reaccumulation of her pleural effusion, and a thoracentesis was attempted again however the patient was too agitated to allow the procedure.  Thoracentesis of 100ml L side    Interval Problem Update  ROS limited by underlying cognitive status.  Pt is without complaint on my exam  Tapped of L effusion yesterday, became hypoxic post.  CXR neg for PNMTX or significant re-expansion edema.  Placed on HFNC    HR 90s  -110s  HFNC 40LPM/60%FiO2  AFebrile  UOP not charted.  Reviewed with RN staff    I have discussed this patient's plan of care and discharge plan at IDT rounds today with Case Management, Nursing, Nursing leadership, and other members of the IDT team.    Consultants/Specialty  pulmonary    Code Status  Full Code    Disposition  The patient is not medically cleared for discharge to home or a post-acute facility.      I have placed the appropriate orders for post-discharge needs.    Review of Systems  Review of Systems   Unable to perform ROS: Psychiatric disorder         Physical Exam  Temp:  [36.4 °C (97.5 °F)-36.6 °C (97.9 °F)] 36.6 °C (97.9 °F)  Pulse:  [] 99  Resp:  [16-20] 20  BP: ()/(6-83) 108/62  SpO2:  [89 %-97 %] 95 %    Physical Exam  Constitutional:       General: She is not in acute distress.     Appearance: Normal appearance. She is well-developed. She is not diaphoretic.   HENT:      Head: Normocephalic and atraumatic.   Neck:      Vascular: No JVD.   Cardiovascular:      Rate and Rhythm: Normal rate and regular rhythm.      Heart sounds: No murmur heard.  Pulmonary:      Effort: Pulmonary effort is normal. No respiratory distress.      Breath sounds: No stridor. No wheezing or rales.   Abdominal:      Palpations: Abdomen is soft.      Tenderness: There is no abdominal tenderness. There is no guarding or rebound.   Musculoskeletal:         General: No tenderness.      Right lower leg: No edema.      Left lower leg: No edema.   Skin:     General: Skin is warm and dry.      Findings: No rash.   Neurological:      Comments: Oriented to place  Follows commands with 4 extremities  Speech is somewhat difficult to understand but mostly logical when intelligible.   Psychiatric:         Mood and Affect: Mood is not anxious.         Behavior: Behavior is not agitated.         Fluids    Intake/Output Summary (Last 24 hours) at 10/22/2023 0720  Last data filed at 10/21/2023 1600  Gross per 24 hour   Intake 440 ml   Output 0 ml   Net 440 ml         Laboratory  Recent Labs     10/22/23  0256   WBC 7.0   RBC 4.14*   HEMOGLOBIN 10.7*   HEMATOCRIT 36.7*   MCV 88.6   MCH 25.8*   MCHC 29.2*   RDW 53.7*   PLATELETCT 363   MPV 8.4*     Recent Labs     10/22/23  0256   SODIUM 139   POTASSIUM 4.0   CHLORIDE 103   CO2 23   GLUCOSE 131*   BUN 25*   CREATININE 0.70   CALCIUM 8.8                   Imaging  DX-CHEST-PORTABLE (1 VIEW)   Final Result         Small bilateral pleural effusions with bibasilar opacities.      CT-ABDOMEN-PELVIS W/O   Final Result      1.  No acute  abnormality within the abdomen or pelvis      2.  Increase in expected amount of stool within the colon      3.  Moderate-sized pericardial effusion      4.  Small left pleural effusion and small-moderate size right pleural effusion with associated atelectasis      DX-CHEST-LIMITED (1 VIEW)   Final Result      1.  Negative for pneumothorax      2.  Interval decrease in size of left pleural effusion secondary to thoracentesis      3.  Moderate-sized right pleural effusion      4.  Pulmonary airspace process consistent with edema and/or atelectasis      5.  Enlarged cardiac silhouette         DX-CHEST-PORTABLE (1 VIEW)   Final Result      1.  Right pleural effusion which is probably moderate in size      2.  Enlarged cardiac silhouette      3.  Left basilar atelectasis      4.  Small left pleural effusion      US-THORACENTESIS PUNCTURE LEFT   Final Result      1. Ultrasound guided left sided therapeutic thoracentesis.      2. 1000 mL of fluid withdrawn.      DX-CHEST-PORTABLE (1 VIEW)   Final Result      1. Stable moderate left pleural effusion.   2. Interval decrease in size of the right pleural effusion, now small-to-moderate.   3. Improving interstitial edema, but incompletely resolved.      US-CHEST   Final Result      Patient has adequate fluid for right-sided thoracentesis, however patient was not able to stay still or follow directions. The procedure was aborted to prevent harming the patient.      Case discussed with Dr. Esposito.      US-RUQ   Final Result      1.  RIGHT pleural effusion   2.  Otherwise unremarkable RIGHT upper quadrant ultrasound      DX-CHEST-LIMITED (1 VIEW)   Final Result      1.  There are stable changes of pulmonary edema with moderate bilateral layering pleural effusions.      DX-CHEST-LIMITED (1 VIEW)   Final Result         1.  Pulmonary edema and/or infiltrates are identified, which are stable since the prior exam.   2.  Layering bilateral pleural effusions, appear somewhat increased  since prior study   3.  Cardiomegaly      [id number] VIEW   Final Result      Nonobstructive bowel gas pattern. Small to moderate amount of stool throughout the colon.      DX-CHEST-PORTABLE (1 VIEW)   Final Result      1.  Hypoinflation with small RIGHT pleural effusion, increased from prior with associated infiltrate or atelectasis.   2.  Minimal LEFT pleural fluid with basilar consolidation again seen.   3.  No pneumothorax.      US-THORACENTESIS PUNCTURE LEFT   Final Result      1. Ultrasound guided left sided diagnostic/therapeutic thoracentesis.      2. 750 mL of fluid withdrawn.      EC-ECHOCARDIOGRAM COMPLETE W/O CONT   Final Result      CT-CTA CHEST PULMONARY ARTERY W/ RECONS   Final Result      1.  No central or segmental pulmonary embolus   2.  Moderate to large pericardial effusion   3.  Small RIGHT pleural effusion   4.  Moderate LEFT pleural effusion   5.  BILATERAL compressive atelectasis   6.  Unchanged 3 mm LEFT upper lobe pulmonary nodule, very likely benign   7.  Enlarged pretracheal lymph node      Findings were communicated with and acknowledged by JEAN CLAUDE WHITE via [employer] on 10/12/2023 9:17 PM.            DX-CHEST-PORTABLE (1 VIEW)   Final Result      1.  Hypoinflation and bibasilar atelectasis.  Superimposed pneumonia is not excluded.   2.  Stable cardiomegaly.           Assessment/Plan  * Acute respiratory failure with hypoxia (HCC)  Assessment & Plan  Secondary to pneumonia and pleural effusion  CT the chest negative for PE.    Echo showed grade 1 diastolic dysfunction with a normal ejection fraction.  Patient underwent thoracentesis and 750 cc of fluid was removed from left lung negative for malignancy and negative for infection consistent with a transudate.   Cytology showing inflamatory cells, no neoplastic findings  Cultures neg  Now off Abxs  ?chronic inflamatory process  Repeat CXR showing re-accumulation    10/21  Patient underwent second thoracentesis yesterday of 1000 mL  from the left side  Repeat chest x-ray does not show any pneumothorax or post inflation edema  Continuing O2 and RT protocols        Hypotension  Assessment & Plan  Was requiring midodrine but is now off and back on her BP meds      Pleural effusion, left  Assessment & Plan  Patient found to have significant left pleural effusion.  underwent left thoracentesis and 750 cc of fluid was removed 10/14  fluid was removed from left lung negative for malignancy and negative for infection consistent with a transudate.   Cytology showing inflamatory cells, no neoplastic findings  Cultures neg  RUQ US neg for signs of cirrhosis/ascites  UA with trivial protein though pt is hypoabluminemic likely due to nutritional status vs chronic inflamatory process  Now off Abxs  ?chronic inflamatory process  Check repeat CXR has shown re-accumulation  US to tap  DW Pulm who recommend a lymphangiogram  starit IV lasix 40mg IV BID  Daily BMP  May need a pleurodesis if it re-accumulates despite diuresis    10/21  Repeat thoracentesis done on 1020 of left side and 1000 mL of fluid  Continue aggressive diuresis  As noted discussed with pulmonology who recommended consideration of an angiogram if recurrent  If reaccumulates will need pleurodesis        Pneumonia due to infectious agent- (present on admission)  Assessment & Plan  Treated with a course of antibiotics.      Tachycardia- (present on admission)  Assessment & Plan  10/16/23  Over replaced thyroid vs BB withdrawal vs pleural effusion  Have decreased dose of synthroid  Addressing effusion  Cont Tele  Given the rate of recurrence of her pleural effusion, we will repeat a cardiac echo to make sure her cardiac effusion which have been characterized as small and posterior on a previous echo has not enlarged or become hemodynamically significant      Essential hypertension- (present on admission)  Assessment & Plan  History of  Home lisinopril, metoprolol, and HCTZ    Anemia- (present on  admission)  Assessment & Plan  Hemoglobin went down to 6.8 despite evidence of bleeding.  She was transfused 1 unit packed red blood cells.  Iron panel does not show iron deficiency anemia.  Reticulocyte count is elevated however adjusted for her degree of anemia, is still on the low side  Hemoglobin is staying stable in the 10-11 range which is her baseline.  She may need further work-up as an outpatient    Diabetes ([facility])- (present on admission)  Assessment & Plan  Will hold Synjardy and glipizide  Continue insulin sliding scale with hypoglycemia protocol.  Carb consistent diet  Hemoglobin A1c is elevated at 10.9.    Lactic acidosis  Assessment & Plan  This has resolved    Schizophrenia ([facility])- (present on admission)  Assessment & Plan  Reported history of.  With significant cognitive impairment    Depakote    Parkinson's disease (tremor, stiffness, slow motion, unstable posture)- (present on admission)  Assessment & Plan  Continue Depakote         VTE prophylaxis:    enoxaparin ppx      I have performed a physical exam and reviewed and updated ROS and Plan today (10/22/2023). In review of yesterday's note (10/21/2023), there are no changes except as documented above.

## 2023-10-22 NOTE — PROGRESS NOTES
Provider notified and aware of increased O2 demands and patient endorsing abd pain. Tachycardic in 120s, O2 90s on 6L NC, tylenol PRN given. VSS. STAT cxr and CT abd ordered

## 2023-10-22 NOTE — RESPIRATORY CARE
Respond to a rapid response call; pt in respiratory distress Sp02 consistent at 87% while on 15L oxymask; placed pt on 15L NRB; APRN ordered pt to be on HHFNC; placed pt on HHFNC 40L at 75%; Sp02 improved to 94%; will continue to monitor

## 2023-10-22 NOTE — CARE PLAN
Problem: Humidified High Flow Nasal Cannula  Goal: Maintain adequate oxygenation dependent on patient condition  Description: Target End Date:  resolve prior to discharge or when underlying condition is resolved/stabilized    1.  Implement humidified high flow oxygen therapy  2.  Titrate high flow oxygen to maintain appropriate SpO2  Outcome: Not Met  Pt on HHFNC 40L 75%

## 2023-10-22 NOTE — CARE PLAN
The patient is Watcher - Medium risk of patient condition declining or worsening    Shift Goals  Clinical Goals: hemodynamic stability  Patient Goals: rest  Family Goals: billy    Progress made toward(s) clinical / shift goals:    Problem: Knowledge Deficit - Standard  Goal: Patient and family/care givers will demonstrate understanding of plan of care, disease process/condition, diagnostic tests and medications  Outcome: Progressing     Problem: Hemodynamics  Goal: Patient's hemodynamics, fluid balance and neurologic status will be stable or improve  Outcome: Progressing     Problem: Skin Integrity  Goal: Skin integrity is maintained or improved  Outcome: Progressing     Problem: Fall Risk  Goal: Patient will remain free from falls  Outcome: Progressing     Problem: Pain - Standard  Goal: Alleviation of pain or a reduction in pain to the patient’s comfort goal  Outcome: Progressing       Patient is not progressing towards the following goals:

## 2023-10-22 NOTE — PROGRESS NOTES
Rapid Response Summary     Rapid response called at 0230 for: Increased oxygen demand     VS: Hypoxic  (See Vitals Flowsheet)  Additional info: N/A  Interventions:    Imaging/Tests: Chest X-ray   Labs: CBC, BMP, and ABG    Medications: N/A   Other: 40L 70% HFNC  Disposition: Improved with rapid response team interventions. Primary RN updated on plan of care. Transfer not indicated at this time.

## 2023-10-23 ENCOUNTER — APPOINTMENT (OUTPATIENT)
Dept: RADIOLOGY | Facility: MEDICAL CENTER | Age: 58
DRG: 189 | End: 2023-10-23
Attending: HOSPITALIST
Payer: MEDICARE

## 2023-10-23 LAB
ANION GAP SERPL CALC-SCNC: 12 MMOL/L (ref 7–16)
BUN SERPL-MCNC: 32 MG/DL (ref 8–22)
CALCIUM SERPL-MCNC: 8.3 MG/DL (ref 8.5–10.5)
CHLORIDE SERPL-SCNC: 100 MMOL/L (ref 96–112)
CO2 SERPL-SCNC: 22 MMOL/L (ref 20–33)
CREAT SERPL-MCNC: 0.72 MG/DL (ref 0.5–1.4)
GFR SERPLBLD CREATININE-BSD FMLA CKD-EPI: 96 ML/MIN/1.73 M 2
GLUCOSE BLD STRIP.AUTO-MCNC: 152 MG/DL (ref 65–99)
GLUCOSE BLD STRIP.AUTO-MCNC: 155 MG/DL (ref 65–99)
GLUCOSE BLD STRIP.AUTO-MCNC: 215 MG/DL (ref 65–99)
GLUCOSE BLD STRIP.AUTO-MCNC: 296 MG/DL (ref 65–99)
GLUCOSE SERPL-MCNC: 178 MG/DL (ref 65–99)
POTASSIUM SERPL-SCNC: 3.7 MMOL/L (ref 3.6–5.5)
SODIUM SERPL-SCNC: 134 MMOL/L (ref 135–145)

## 2023-10-23 PROCEDURE — 80048 BASIC METABOLIC PNL TOTAL CA: CPT

## 2023-10-23 PROCEDURE — 700102 HCHG RX REV CODE 250 W/ 637 OVERRIDE(OP): Performed by: HOSPITALIST

## 2023-10-23 PROCEDURE — 700111 HCHG RX REV CODE 636 W/ 250 OVERRIDE (IP): Mod: JZ | Performed by: HOSPITALIST

## 2023-10-23 PROCEDURE — A9270 NON-COVERED ITEM OR SERVICE: HCPCS | Performed by: INTERNAL MEDICINE

## 2023-10-23 PROCEDURE — 99233 SBSQ HOSP IP/OBS HIGH 50: CPT | Performed by: HOSPITALIST

## 2023-10-23 PROCEDURE — 770020 HCHG ROOM/CARE - TELE (206)

## 2023-10-23 PROCEDURE — 82962 GLUCOSE BLOOD TEST: CPT | Mod: 91

## 2023-10-23 PROCEDURE — 36415 COLL VENOUS BLD VENIPUNCTURE: CPT

## 2023-10-23 PROCEDURE — 700101 HCHG RX REV CODE 250: Performed by: HOSPITALIST

## 2023-10-23 PROCEDURE — A9270 NON-COVERED ITEM OR SERVICE: HCPCS | Performed by: HOSPITALIST

## 2023-10-23 PROCEDURE — 94640 AIRWAY INHALATION TREATMENT: CPT

## 2023-10-23 PROCEDURE — 700102 HCHG RX REV CODE 250 W/ 637 OVERRIDE(OP): Performed by: INTERNAL MEDICINE

## 2023-10-23 PROCEDURE — 71045 X-RAY EXAM CHEST 1 VIEW: CPT

## 2023-10-23 RX ORDER — IPRATROPIUM BROMIDE AND ALBUTEROL SULFATE 2.5; .5 MG/3ML; MG/3ML
3 SOLUTION RESPIRATORY (INHALATION)
Status: DISCONTINUED | OUTPATIENT
Start: 2023-10-23 | End: 2023-10-25 | Stop reason: HOSPADM

## 2023-10-23 RX ORDER — FLUCONAZOLE 100 MG/1
200 TABLET ORAL ONCE
Status: COMPLETED | OUTPATIENT
Start: 2023-10-23 | End: 2023-10-23

## 2023-10-23 RX ADMIN — LEVOTHYROXINE SODIUM 175 MCG: 0.17 TABLET ORAL at 05:57

## 2023-10-23 RX ADMIN — QUETIAPINE FUMARATE 200 MG: 200 TABLET ORAL at 20:04

## 2023-10-23 RX ADMIN — INSULIN HUMAN 3 UNITS: 100 INJECTION, SOLUTION PARENTERAL at 17:41

## 2023-10-23 RX ADMIN — ARIPIPRAZOLE 20 MG: 10 TABLET ORAL at 20:04

## 2023-10-23 RX ADMIN — OXYBUTYNIN CHLORIDE 10 MG: 10 TABLET, EXTENDED RELEASE ORAL at 05:57

## 2023-10-23 RX ADMIN — INSULIN HUMAN 3 UNITS: 100 INJECTION, SOLUTION PARENTERAL at 12:31

## 2023-10-23 RX ADMIN — ATORVASTATIN CALCIUM 20 MG: 20 TABLET, FILM COATED ORAL at 20:04

## 2023-10-23 RX ADMIN — IPRATROPIUM BROMIDE AND ALBUTEROL SULFATE 3 ML: 2.5; .5 SOLUTION RESPIRATORY (INHALATION) at 02:49

## 2023-10-23 RX ADMIN — METOPROLOL TARTRATE 25 MG: 25 TABLET, FILM COATED ORAL at 17:38

## 2023-10-23 RX ADMIN — ENOXAPARIN SODIUM 40 MG: 100 INJECTION SUBCUTANEOUS at 17:43

## 2023-10-23 RX ADMIN — INSULIN HUMAN 9 UNITS: 100 INJECTION, SOLUTION PARENTERAL at 20:14

## 2023-10-23 RX ADMIN — METOPROLOL TARTRATE 25 MG: 25 TABLET, FILM COATED ORAL at 06:10

## 2023-10-23 RX ADMIN — DIVALPROEX SODIUM 1000 MG: 500 TABLET, DELAYED RELEASE ORAL at 20:04

## 2023-10-23 RX ADMIN — FLUCONAZOLE 200 MG: 100 TABLET ORAL at 13:58

## 2023-10-23 RX ADMIN — DOCUSATE SODIUM 50 MG AND SENNOSIDES 8.6 MG 2 TABLET: 8.6; 5 TABLET, FILM COATED ORAL at 05:56

## 2023-10-23 RX ADMIN — INSULIN HUMAN 6 UNITS: 100 INJECTION, SOLUTION PARENTERAL at 08:21

## 2023-10-23 RX ADMIN — FUROSEMIDE 40 MG: 10 INJECTION, SOLUTION INTRAVENOUS at 05:49

## 2023-10-23 RX ADMIN — SERTRALINE 100 MG: 100 TABLET, FILM COATED ORAL at 05:57

## 2023-10-23 RX ADMIN — QUETIAPINE FUMARATE 150 MG: 50 TABLET ORAL at 08:29

## 2023-10-23 ASSESSMENT — FIBROSIS 4 INDEX: FIB4 SCORE: 0.98

## 2023-10-23 NOTE — CARE PLAN
The patient is Watcher - Medium risk of patient condition declining or worsening    Shift Goals  Clinical Goals: hemodynamic stability  Patient Goals: comfort  Family Goals: billy    Progress made toward(s) clinical / shift goals:    Problem: Knowledge Deficit - Standard  Goal: Patient and family/care givers will demonstrate understanding of plan of care, disease process/condition, diagnostic tests and medications  Outcome: Progressing     Problem: Skin Integrity  Goal: Skin integrity is maintained or improved  Outcome: Progressing     Problem: Fall Risk  Goal: Patient will remain free from falls  Outcome: Progressing       Patient is not progressing towards the following goals:

## 2023-10-23 NOTE — DISCHARGE PLANNING
Case Management Discharge Planning    Admission Date: 10/12/2023  GMLOS: 3.6  ALOS: 10    6-Clicks ADL Score: 12  6-Clicks Mobility Score: 7  PT and/or OT Eval ordered: Yes  Post-acute Referrals Ordered: Yes  Post-acute Choice Obtained: No  Has referral(s) been sent to post-acute provider:  Yes      Anticipated Discharge Dispo: Discharge Disposition: Discharged to home/self care (01)    DME Needed: No    Action(s) Taken: Updated Provider/Nurse on Discharge Plan and Referral(s) sent    Escalations Completed: None    Medically Clear: No    Next Steps: Per IDT rounds, pt to possibly transfer to the ICU, patient is on high flow. SW called patient's group home; [facility] and spoke with Shanika (p) [phone] to inquire if patient utilized 02 at the group home. Per Shanika, patient did not utilize 02. Shanika reports, current need for oxygen for patient is new. Shanika reports, patient is on service with [facility].     Barriers to Discharge: Medical clearance, high flow 02, pending SNF placement vs return to group home.     Is the patient up for discharge tomorrow: No

## 2023-10-23 NOTE — CARE PLAN
The patient is Watcher - Medium risk of patient condition declining or worsening    Shift Goals  Clinical Goals: hemodynamic stability  Patient Goals: comfort  Family Goals: billy    Progress made toward(s) clinical / shift goals:  yes    Patient is not progressing towards the following goals:      Problem: Knowledge Deficit - Standard  Goal: Patient and family/care givers will demonstrate understanding of plan of care, disease process/condition, diagnostic tests and medications  Outcome: Progressing     Problem: Hemodynamics  Goal: Patient's hemodynamics, fluid balance and neurologic status will be stable or improve  Outcome: Progressing     Problem: Respiratory  Goal: Patient will achieve/maintain optimum respiratory ventilation and gas exchange  Outcome: Progressing

## 2023-10-23 NOTE — PROGRESS NOTES
Hospital Medicine Daily Progress Note    Date of Service  10/23/2023    Chief Complaint  Erlinda Verde is a 58 y.o. female admitted 10/12/2023 with SOB    Hospital Course  58-year-old history of type 2 diabetes, hypertension, schizoaffective disorder, Parkinson's disease.  Patient is a resident of a group home and was sent to us after having had 3 days of cough and noted to be hypoxic at 88% on room air.  On presentation normal white count and negative viral screen, hemoglobin 8.3, BNP 4000, lactic acid 1.4.  CTA of the chest negative for PE but showing moderate to large pericardial effusion with moderate to small right pleural effusion.  Transthoracic echo showing EF of 61% grade 1 diastolic dysfunction, study was negative for tamponade physiology.  Patient had a thoracentesis of 750 mL.  Her hemoglobin reached a jarrell of 6.8 for which she was transfused 1 unit.  She was in the IMCU on high flow nasal cannula.  She was noted to have reaccumulation of her pleural effusion, and a thoracentesis was attempted again however the patient was too agitated to allow the procedure.  Thoracentesis of 1000ml L side    Interval Problem Update  ROS limited by underlying cognitive status.  Pt is without complaint on my exam, resting comfortably    HR 90s  -110s  HFNC 40LPM/70%FiO2  AFebrile  UOP not charted. As pt incontinent    I have discussed this patient's plan of care and discharge plan at IDT rounds today with Case Management, Nursing, Nursing leadership, and other members of the IDT team.    Consultants/Specialty  pulmonary    Code Status  Full Code    Disposition  Medically Cleared  I have placed the appropriate orders for post-discharge needs.    Review of Systems  Review of Systems   Unable to perform ROS: Psychiatric disorder        Physical Exam  Temp:  [36.2 °C (97.2 °F)-36.7 °C (98.1 °F)] 36.2 °C (97.2 °F)  Pulse:  [] 99  Resp:  [16-20] 16  BP: ()/(57-79) 102/64  SpO2:  [91 %-100 %] 94  %    Physical Exam  Constitutional:       General: She is not in acute distress.     Appearance: Normal appearance. She is well-developed. She is not diaphoretic.   HENT:      Head: Normocephalic and atraumatic.   Neck:      Vascular: No JVD.   Cardiovascular:      Rate and Rhythm: Normal rate and regular rhythm.      Heart sounds: No murmur heard.  Pulmonary:      Effort: Pulmonary effort is normal. No respiratory distress.      Breath sounds: No stridor. No wheezing or rales.   Abdominal:      Palpations: Abdomen is soft.      Tenderness: There is no abdominal tenderness. There is no guarding or rebound.   Musculoskeletal:         General: No tenderness.      Right lower leg: No edema.      Left lower leg: No edema.   Skin:     General: Skin is warm and dry.      Findings: No rash.   Neurological:      Comments: Oriented to place  Follows commands with 4 extremities  Speech is somewhat difficult to understand but mostly logical when intelligible.   Psychiatric:         Mood and Affect: Mood is not anxious.         Behavior: Behavior is not agitated.         Fluids    Intake/Output Summary (Last 24 hours) at 10/23/2023 1249  Last data filed at 10/23/2023 0711  Gross per 24 hour   Intake 360 ml   Output 0 ml   Net 360 ml         Laboratory  Recent Labs     10/22/23  0256   WBC 7.0   RBC 4.14*   HEMOGLOBIN 10.7*   HEMATOCRIT 36.7*   MCV 88.6   MCH 25.8*   MCHC 29.2*   RDW 53.7*   PLATELETCT 363   MPV 8.4*       Recent Labs     10/22/23  0256 10/23/23  0204   SODIUM 139 134*   POTASSIUM 4.0 3.7   CHLORIDE 103 100   CO2 23 22   GLUCOSE 131* 178*   BUN 25* 32*   CREATININE 0.70 0.72   CALCIUM 8.8 8.3*                     Imaging  EC-ECHOCARDIOGRAM LTD W/O CONT   Final Result      DX-CHEST-PORTABLE (1 VIEW)   Final Result         Small bilateral pleural effusions with bibasilar opacities.      CT-ABDOMEN-PELVIS W/O   Final Result      1.  No acute abnormality within the abdomen or pelvis      2.  Increase in expected  amount of stool within the colon      3.  Moderate-sized pericardial effusion      4.  Small left pleural effusion and small-moderate size right pleural effusion with associated atelectasis      DX-CHEST-LIMITED (1 VIEW)   Final Result      1.  Negative for pneumothorax      2.  Interval decrease in size of left pleural effusion secondary to thoracentesis      3.  Moderate-sized right pleural effusion      4.  Pulmonary airspace process consistent with edema and/or atelectasis      5.  Enlarged cardiac silhouette         DX-CHEST-PORTABLE (1 VIEW)   Final Result      1.  Right pleural effusion which is probably moderate in size      2.  Enlarged cardiac silhouette      3.  Left basilar atelectasis      4.  Small left pleural effusion      US-THORACENTESIS PUNCTURE LEFT   Final Result      1. Ultrasound guided left sided therapeutic thoracentesis.      2. 1000 mL of fluid withdrawn.      DX-CHEST-PORTABLE (1 VIEW)   Final Result      1. Stable moderate left pleural effusion.   2. Interval decrease in size of the right pleural effusion, now small-to-moderate.   3. Improving interstitial edema, but incompletely resolved.      US-CHEST   Final Result      Patient has adequate fluid for right-sided thoracentesis, however patient was not able to stay still or follow directions. The procedure was aborted to prevent harming the patient.      Case discussed with Dr. Esposito.      US-RUQ   Final Result      1.  RIGHT pleural effusion   2.  Otherwise unremarkable RIGHT upper quadrant ultrasound      DX-CHEST-LIMITED (1 VIEW)   Final Result      1.  There are stable changes of pulmonary edema with moderate bilateral layering pleural effusions.      DX-CHEST-LIMITED (1 VIEW)   Final Result         1.  Pulmonary edema and/or infiltrates are identified, which are stable since the prior exam.   2.  Layering bilateral pleural effusions, appear somewhat increased since prior study   3.  Cardiomegaly      [id number] VIEW   Final  Result      Nonobstructive bowel gas pattern. Small to moderate amount of stool throughout the colon.      DX-CHEST-PORTABLE (1 VIEW)   Final Result      1.  Hypoinflation with small RIGHT pleural effusion, increased from prior with associated infiltrate or atelectasis.   2.  Minimal LEFT pleural fluid with basilar consolidation again seen.   3.  No pneumothorax.      US-THORACENTESIS PUNCTURE LEFT   Final Result      1. Ultrasound guided left sided diagnostic/therapeutic thoracentesis.      2. 750 mL of fluid withdrawn.      EC-ECHOCARDIOGRAM COMPLETE W/O CONT   Final Result      CT-CTA CHEST PULMONARY ARTERY W/ RECONS   Final Result      1.  No central or segmental pulmonary embolus   2.  Moderate to large pericardial effusion   3.  Small RIGHT pleural effusion   4.  Moderate LEFT pleural effusion   5.  BILATERAL compressive atelectasis   6.  Unchanged 3 mm LEFT upper lobe pulmonary nodule, very likely benign   7.  Enlarged pretracheal lymph node      Findings were communicated with and acknowledged by JEAN CLAUDE WHITE via [employer] on 10/12/2023 9:17 PM.            DX-CHEST-PORTABLE (1 VIEW)   Final Result      1.  Hypoinflation and bibasilar atelectasis.  Superimposed pneumonia is not excluded.   2.  Stable cardiomegaly.           Assessment/Plan  * Acute respiratory failure with hypoxia (HCC)  Assessment & Plan  Secondary to pneumonia and pleural effusion  CT the chest negative for PE.    Echo showed grade 1 diastolic dysfunction with a normal ejection fraction.  Has been tapped on the L side on 10/14 of 750ml and again on 10/21 of 1,000ml  agressively diuresing  Repeat CXR  Cont O2/RT protocols        Hypotension  Assessment & Plan  Was requiring midodrine but is now off and back on her BP meds      Pleural effusion, left  Assessment & Plan  Has been tapped on the L twice: 10/14 750ml and 10/21 1,000ml  fluid was removed from left lung negative for malignancy and negative for infection consistent with a  transudate.   Cytology showing inflamatory cells, no neoplastic findings  Cultures neg  RUQ US neg for signs of cirrhosis/ascites  UA with trivial protein though pt is hypoabluminemic likely due to nutritional status vs chronic inflamatory process  Now off Abxs  CHF vs chronic inflamatory process vs chronic inflamatory process  Follow CXR  At present am diuresing agressively with IV lasix  I&Os not in chart as pt incontinent: will place salvador to track accurate UOP  DW pulm   May need pleurodesis vs lymphangiogram if fails to respond to diuresis            Pneumonia due to infectious agent- (present on admission)  Assessment & Plan  Treated with a course of antibiotics.      Tachycardia- (present on admission)  Assessment & Plan  10/16/23  Over replaced thyroid vs BB withdrawal vs pleural effusion  Have decreased dose of synthroid  Addressing effusion  Pt had a neg CTA chest on 10/12  Repeated TTE to confirm no siginificant pericardial effusion 10/22  Cont Tele      Essential hypertension- (present on admission)  Assessment & Plan  History of  Home lisinopril, metoprolol, and HCTZ    Anemia- (present on admission)  Assessment & Plan  Hemoglobin went down to 6.8 despite evidence of bleeding.  She was transfused 1 unit packed red blood cells.  Iron panel does not show iron deficiency anemia.  Reticulocyte count is elevated however adjusted for her degree of anemia, is still on the low side  Hemoglobin is staying stable in the 10-11 range which is her baseline.  She may need further work-up as an outpatient    Diabetes ([facility])- (present on admission)  Assessment & Plan  Will hold Synjardy and glipizide  Continue insulin sliding scale with hypoglycemia protocol.  Carb consistent diet  Hemoglobin A1c is elevated at 10.9.    Lactic acidosis  Assessment & Plan  This has resolved    Schizophrenia ([facility])- (present on admission)  Assessment & Plan  Reported history of.  With significant cognitive  impairment    Depakote    Parkinson's disease (tremor, stiffness, slow motion, unstable posture)- (present on admission)  Assessment & Plan  Continue Depakote         VTE prophylaxis:    enoxaparin ppx      I have performed a physical exam and reviewed and updated ROS and Plan today (10/23/2023). In review of yesterday's note (10/22/2023), there are no changes except as documented above.

## 2023-10-24 LAB
ANION GAP SERPL CALC-SCNC: 12 MMOL/L (ref 7–16)
BUN SERPL-MCNC: 25 MG/DL (ref 8–22)
CALCIUM SERPL-MCNC: 8.9 MG/DL (ref 8.5–10.5)
CHLORIDE SERPL-SCNC: 101 MMOL/L (ref 96–112)
CO2 SERPL-SCNC: 26 MMOL/L (ref 20–33)
CREAT SERPL-MCNC: 0.59 MG/DL (ref 0.5–1.4)
GFR SERPLBLD CREATININE-BSD FMLA CKD-EPI: 104 ML/MIN/1.73 M 2
GLUCOSE BLD STRIP.AUTO-MCNC: 103 MG/DL (ref 65–99)
GLUCOSE BLD STRIP.AUTO-MCNC: 171 MG/DL (ref 65–99)
GLUCOSE BLD STRIP.AUTO-MCNC: 213 MG/DL (ref 65–99)
GLUCOSE BLD STRIP.AUTO-MCNC: 218 MG/DL (ref 65–99)
GLUCOSE SERPL-MCNC: 159 MG/DL (ref 65–99)
POTASSIUM SERPL-SCNC: 4 MMOL/L (ref 3.6–5.5)
SODIUM SERPL-SCNC: 139 MMOL/L (ref 135–145)

## 2023-10-24 PROCEDURE — 36415 COLL VENOUS BLD VENIPUNCTURE: CPT

## 2023-10-24 PROCEDURE — 700111 HCHG RX REV CODE 636 W/ 250 OVERRIDE (IP): Mod: JZ | Performed by: HOSPITALIST

## 2023-10-24 PROCEDURE — A9270 NON-COVERED ITEM OR SERVICE: HCPCS | Performed by: INTERNAL MEDICINE

## 2023-10-24 PROCEDURE — 80048 BASIC METABOLIC PNL TOTAL CA: CPT

## 2023-10-24 PROCEDURE — 770020 HCHG ROOM/CARE - TELE (206)

## 2023-10-24 PROCEDURE — 99233 SBSQ HOSP IP/OBS HIGH 50: CPT | Performed by: INTERNAL MEDICINE

## 2023-10-24 PROCEDURE — 700102 HCHG RX REV CODE 250 W/ 637 OVERRIDE(OP): Performed by: INTERNAL MEDICINE

## 2023-10-24 PROCEDURE — 82962 GLUCOSE BLOOD TEST: CPT

## 2023-10-24 PROCEDURE — 94640 AIRWAY INHALATION TREATMENT: CPT

## 2023-10-24 RX ADMIN — ARIPIPRAZOLE 20 MG: 10 TABLET ORAL at 20:12

## 2023-10-24 RX ADMIN — METOPROLOL TARTRATE 25 MG: 25 TABLET, FILM COATED ORAL at 18:26

## 2023-10-24 RX ADMIN — INSULIN HUMAN 6 UNITS: 100 INJECTION, SOLUTION PARENTERAL at 20:06

## 2023-10-24 RX ADMIN — INSULIN HUMAN 6 UNITS: 100 INJECTION, SOLUTION PARENTERAL at 10:06

## 2023-10-24 RX ADMIN — FUROSEMIDE 40 MG: 10 INJECTION, SOLUTION INTRAVENOUS at 04:56

## 2023-10-24 RX ADMIN — DIVALPROEX SODIUM 1000 MG: 500 TABLET, DELAYED RELEASE ORAL at 20:12

## 2023-10-24 RX ADMIN — ATORVASTATIN CALCIUM 20 MG: 20 TABLET, FILM COATED ORAL at 20:12

## 2023-10-24 RX ADMIN — ENOXAPARIN SODIUM 40 MG: 100 INJECTION SUBCUTANEOUS at 18:27

## 2023-10-24 RX ADMIN — DOCUSATE SODIUM 50 MG AND SENNOSIDES 8.6 MG 2 TABLET: 8.6; 5 TABLET, FILM COATED ORAL at 18:27

## 2023-10-24 RX ADMIN — DOCUSATE SODIUM 50 MG AND SENNOSIDES 8.6 MG 2 TABLET: 8.6; 5 TABLET, FILM COATED ORAL at 04:59

## 2023-10-24 RX ADMIN — INSULIN HUMAN 3 UNITS: 100 INJECTION, SOLUTION PARENTERAL at 14:09

## 2023-10-24 RX ADMIN — QUETIAPINE FUMARATE 150 MG: 50 TABLET ORAL at 08:24

## 2023-10-24 RX ADMIN — SERTRALINE 100 MG: 100 TABLET, FILM COATED ORAL at 04:59

## 2023-10-24 RX ADMIN — QUETIAPINE FUMARATE 200 MG: 200 TABLET ORAL at 20:12

## 2023-10-24 RX ADMIN — OXYBUTYNIN CHLORIDE 10 MG: 10 TABLET, EXTENDED RELEASE ORAL at 04:58

## 2023-10-24 RX ADMIN — LEVOTHYROXINE SODIUM 175 MCG: 0.17 TABLET ORAL at 04:59

## 2023-10-24 RX ADMIN — METOPROLOL TARTRATE 25 MG: 25 TABLET, FILM COATED ORAL at 04:59

## 2023-10-24 ASSESSMENT — FIBROSIS 4 INDEX: FIB4 SCORE: 0.98

## 2023-10-24 NOTE — PROGRESS NOTES
Telemetry Shift Summary     Rhythm: SR-ST  HR:     Measurements: .14/.10/.34                                                    Normal Values  Rhythm: SR  HR:   Measurements: 0.12-0.20/0.08-0.10/0.30-0.52

## 2023-10-24 NOTE — CARE PLAN
Problem: Respiratory  Goal: Patient will achieve/maintain optimum respiratory ventilation and gas exchange  Outcome: Progressing   Able to wean O2 with RT. We will trial patient on nasal canula and try to wean high flow.     Problem: Fall Risk  Goal: Patient will remain free from falls  Outcome: Progressing  Patient educated on the importance of calling before getting out of bed. Patient remains free from falls.      The patient is Watcher - Medium risk of patient condition declining or worsening    Shift Goals  Clinical Goals: wean O2, safety  Patient Goals: rest, TV  Family Goals: billy

## 2023-10-24 NOTE — DISCHARGE PLANNING
3911  Received Choice form at 8065  Agency/Facility Name: [facility]  Referral sent per Choice form @ [id number]

## 2023-10-24 NOTE — PROGRESS NOTES
Hospital Medicine Daily Progress Note    Date of Service  10/24/2023    Chief Complaint  Erlinda Verde is a 58 y.o. female admitted 10/12/2023 with SOB    Hospital Course  58-year-old history of type 2 diabetes, hypertension, schizoaffective disorder, Parkinson's disease.  Patient is a resident of a group home and was sent to us after having had 3 days of cough and noted to be hypoxic at 88% on room air.  On presentation normal white count and negative viral screen, hemoglobin 8.3, BNP 4000, lactic acid 1.4.  CTA of the chest negative for PE but showing moderate to large pericardial effusion with moderate to small right pleural effusion.  Transthoracic echo showing EF of 61% grade 1 diastolic dysfunction, study was negative for tamponade physiology.  Patient had a thoracentesis of 750 mL.  Her hemoglobin reached a jarrell of 6.8 for which she was transfused 1 unit.  She was in the IMCU on high flow nasal cannula.  She was noted to have reaccumulation of her pleural effusion, and a thoracentesis was attempted again however the patient was too agitated to allow the procedure.  Thoracentesis of 1000ml L side    Interval Problem Update  ROS limited by underlying cognitive status.  Pt is without complaint on my exam, resting comfortably      Patient was seen and examined at bedside.  I have personally reviewed and interpreted vitals, labs, and imaging.    10/24.  Afebrile.  Has been tachycardic.  On 30 L heated high flow.  Patient is not very clear on exam.  She points her belly when asked about pain.  Unable to elucidate any further time course, aggravating/relieving factors.  Continue to diurese and wean off supplemental oxygen as tolerated.  May need pleurodesis.    I have discussed this patient's plan of care and discharge plan at IDT rounds today with Case Management, Nursing, Nursing leadership, and other members of the IDT team.    Consultants/Specialty  pulmonary    Code Status  Full Code    Disposition  The  patient is not medically cleared for discharge to home or a post-acute facility.  Anticipate discharge to: skilled nursing facility    I have placed the appropriate orders for post-discharge needs.    Review of Systems  Review of Systems   Unable to perform ROS: Psychiatric disorder        Physical Exam  Temp:  [36.1 °C (97 °F)-36.6 °C (97.9 °F)] 36.4 °C (97.5 °F)  Pulse:  [] 99  Resp:  [16-20] 16  BP: ()/(60-78) 99/70  SpO2:  [93 %-98 %] 93 %    Physical Exam  Constitutional:       General: She is not in acute distress.     Appearance: Normal appearance. She is well-developed. She is not diaphoretic.   HENT:      Head: Normocephalic and atraumatic.   Neck:      Vascular: No JVD.   Cardiovascular:      Rate and Rhythm: Normal rate and regular rhythm.      Heart sounds: No murmur heard.  Pulmonary:      Effort: Pulmonary effort is normal. No respiratory distress.      Breath sounds: No stridor. No wheezing or rales.   Abdominal:      Palpations: Abdomen is soft.      Tenderness: There is no abdominal tenderness. There is no guarding or rebound.   Musculoskeletal:         General: No tenderness.      Right lower leg: No edema.      Left lower leg: No edema.   Skin:     General: Skin is warm and dry.      Findings: No rash.   Neurological:      Comments: Oriented to place  Follows commands with 4 extremities  Speech is somewhat difficult to understand but mostly logical when intelligible.   Psychiatric:         Mood and Affect: Mood is not anxious.         Behavior: Behavior is not agitated.         Fluids    Intake/Output Summary (Last 24 hours) at 10/24/2023 0730  Last data filed at 10/24/2023 0325  Gross per 24 hour   Intake 510 ml   Output 400 ml   Net 110 ml         Laboratory  Recent Labs     10/22/23  0256   WBC 7.0   RBC 4.14*   HEMOGLOBIN 10.7*   HEMATOCRIT 36.7*   MCV 88.6   MCH 25.8*   MCHC 29.2*   RDW 53.7*   PLATELETCT 363   MPV 8.4*       Recent Labs     10/22/23  0256 10/23/23  0204  10/24/23  0532   SODIUM 139 134* 139   POTASSIUM 4.0 3.7 4.0   CHLORIDE 103 100 101   CO2 23 22 26   GLUCOSE 131* 178* 159*   BUN 25* 32* 25*   CREATININE 0.70 0.72 0.59   CALCIUM 8.8 8.3* 8.9                     Imaging  DX-CHEST-PORTABLE (1 VIEW)   Final Result      1.  Ongoing bibasilar opacities consistent with atelectasis or airspace consolidation along with bilateral pleural effusions.   2.  No significant change from 10/22/2023.      EC-ECHOCARDIOGRAM LTD W/O CONT   Final Result      DX-CHEST-PORTABLE (1 VIEW)   Final Result         Small bilateral pleural effusions with bibasilar opacities.      CT-ABDOMEN-PELVIS W/O   Final Result      1.  No acute abnormality within the abdomen or pelvis      2.  Increase in expected amount of stool within the colon      3.  Moderate-sized pericardial effusion      4.  Small left pleural effusion and small-moderate size right pleural effusion with associated atelectasis      DX-CHEST-LIMITED (1 VIEW)   Final Result      1.  Negative for pneumothorax      2.  Interval decrease in size of left pleural effusion secondary to thoracentesis      3.  Moderate-sized right pleural effusion      4.  Pulmonary airspace process consistent with edema and/or atelectasis      5.  Enlarged cardiac silhouette         DX-CHEST-PORTABLE (1 VIEW)   Final Result      1.  Right pleural effusion which is probably moderate in size      2.  Enlarged cardiac silhouette      3.  Left basilar atelectasis      4.  Small left pleural effusion      US-THORACENTESIS PUNCTURE LEFT   Final Result      1. Ultrasound guided left sided therapeutic thoracentesis.      2. 1000 mL of fluid withdrawn.      DX-CHEST-PORTABLE (1 VIEW)   Final Result      1. Stable moderate left pleural effusion.   2. Interval decrease in size of the right pleural effusion, now small-to-moderate.   3. Improving interstitial edema, but incompletely resolved.      US-CHEST   Final Result      Patient has adequate fluid for right-sided  thoracentesis, however patient was not able to stay still or follow directions. The procedure was aborted to prevent harming the patient.      Case discussed with Dr. Esposito.      US-RUQ   Final Result      1.  RIGHT pleural effusion   2.  Otherwise unremarkable RIGHT upper quadrant ultrasound      DX-CHEST-LIMITED (1 VIEW)   Final Result      1.  There are stable changes of pulmonary edema with moderate bilateral layering pleural effusions.      DX-CHEST-LIMITED (1 VIEW)   Final Result         1.  Pulmonary edema and/or infiltrates are identified, which are stable since the prior exam.   2.  Layering bilateral pleural effusions, appear somewhat increased since prior study   3.  Cardiomegaly      [id number] VIEW   Final Result      Nonobstructive bowel gas pattern. Small to moderate amount of stool throughout the colon.      DX-CHEST-PORTABLE (1 VIEW)   Final Result      1.  Hypoinflation with small RIGHT pleural effusion, increased from prior with associated infiltrate or atelectasis.   2.  Minimal LEFT pleural fluid with basilar consolidation again seen.   3.  No pneumothorax.      US-THORACENTESIS PUNCTURE LEFT   Final Result      1. Ultrasound guided left sided diagnostic/therapeutic thoracentesis.      2. 750 mL of fluid withdrawn.      EC-ECHOCARDIOGRAM COMPLETE W/O CONT   Final Result      CT-CTA CHEST PULMONARY ARTERY W/ RECONS   Final Result      1.  No central or segmental pulmonary embolus   2.  Moderate to large pericardial effusion   3.  Small RIGHT pleural effusion   4.  Moderate LEFT pleural effusion   5.  BILATERAL compressive atelectasis   6.  Unchanged 3 mm LEFT upper lobe pulmonary nodule, very likely benign   7.  Enlarged pretracheal lymph node      Findings were communicated with and acknowledged by JEAN CLAUDE WHITE via [employer] on 10/12/2023 9:17 PM.            DX-CHEST-PORTABLE (1 VIEW)   Final Result      1.  Hypoinflation and bibasilar atelectasis.  Superimposed pneumonia is not  excluded.   2.  Stable cardiomegaly.           Assessment/Plan  * Acute respiratory failure with hypoxia (HCC)  Assessment & Plan  10/24/2023  Secondary to pneumonia and pleural effusion  CT the chest negative for PE.    Echo showed grade 1 diastolic dysfunction with a normal ejection fraction.  Has been tapped on the L side on 10/14 of 750ml and again on 10/21 of 1,000ml  agressively diuresing  Repeat CXR  Cont O2/RT protocols    Hypotension  Assessment & Plan  10/24/2023  Was requiring midodrine but is now off and back on her BP meds    Pleural effusion, left  Assessment & Plan  10/24/2023  Has been tapped on the L twice: 10/14 750ml and 10/21 1,000ml  fluid was removed from left lung negative for malignancy and negative for infection consistent with a transudate.   Cytology showing inflamatory cells, no neoplastic findings  Cultures neg  RUQ US neg for signs of cirrhosis/ascites  UA with trivial protein though pt is hypoabluminemic likely due to nutritional status vs chronic inflamatory process  Now off Abxs  CHF vs chronic inflamatory process vs chronic inflamatory process  Follow CXR  At present am diuresing agressively with IV lasix  I&Os not in chart as pt incontinent: will place salvador to track accurate UOP  DW pulm   May need pleurodesis vs lymphangiogram if fails to respond to diuresis    Pneumonia due to infectious agent- (present on admission)  Assessment & Plan  10/24/2023  Treated with a course of antibiotics.    Tachycardia- (present on admission)  Assessment & Plan  10/24/2023  Over replaced thyroid vs BB withdrawal vs pleural effusion  Have decreased dose of synthroid  Addressing effusion  Pt had a neg CTA chest on 10/12  Repeated TTE to confirm no siginificant pericardial effusion 10/22  Cont Tele    Essential hypertension- (present on admission)  Assessment & Plan  10/24/2023  History of  Home lisinopril, metoprolol, and HCTZ    Anemia- (present on admission)  Assessment &  Plan  10/24/2023  Hemoglobin went down to 6.8 despite evidence of bleeding.  She was transfused 1 unit packed red blood cells.  Iron panel does not show iron deficiency anemia.  Reticulocyte count is elevated however adjusted for her degree of anemia, is still on the low side  Hemoglobin is staying stable in the 10-11 range which is her baseline.  She may need further work-up as an outpatient    Diabetes ([facility])- (present on admission)  Assessment & Plan  10/24/2023  Will hold Synjardy and glipizide  Continue insulin sliding scale with hypoglycemia protocol.  Carb consistent diet  Hemoglobin A1c is elevated at 10.9.    Lactic acidosis  Assessment & Plan  10/24/2023  This has resolved    Schizophrenia ([facility])- (present on admission)  Assessment & Plan  10/24/2023  Reported history of.  With significant cognitive impairment    Depakote    Parkinson's disease (tremor, stiffness, slow motion, unstable posture)- (present on admission)  Assessment & Plan  10/24/2023  Continue Depakote         VTE prophylaxis: Lovenox    I have performed a physical exam and reviewed and updated ROS and Plan today (10/24/2023). In review of yesterday's note (10/23/2023), there are no changes except as documented above.    Patient is critically ill.   The patient continues to have: Acute hypoxic respiratory failure requiring non invasive positive pressure ventilation secondary to recurrent pleural effusion.  Currently on 30L heated high flow and 60% FiO2    The vital organ system that is affected is the: Respiratory  If untreated there is a high chance of deterioration into: Respiratory failure and eventually death.   The critical care that I am providing today is: heated high flow  The critical that has been undertaken is medically complex.   There has been no overlap in critical care time.   Critical Care Time not including procedures: 35 minutes    Greater than 51 minutes spent prepping to see patient (e.g. review of tests) obtaining and/or  reviewing separately obtained history. Performing a medically appropriate examination and/ evaluation.  Counseling and educating the patient/family/caregiver.  Ordering medications, tests, or procedures.  Referring and communicating with other health care professionals.  Documenting clinical information in EPIC.  Independently interpreting results and communicating results to patient/family/caregiver.  Care coordination.

## 2023-10-24 NOTE — CARE PLAN
Respiratory Update    Treatment modality: HFNC 30L/60%   Frequency: Q4     Pt tolerating current treatments well with no adverse reactions.

## 2023-10-24 NOTE — DISCHARGE PLANNING
Discussed discharge options and patient's status in IDT rounds. Patient on high flow oxygen (30L) which local SNFs are unable to accommodate. Choice form for LTAC signed by patient for [facility]. Writer requested [facility] to send referral to [facility]. Will continue to follow referral status.

## 2023-10-24 NOTE — PROGRESS NOTES
Report received from night shift RN, assumed care of pt. Pt A&Ox4. Plan of care discussed with pt, labs and chart reviewed. All needs met at this time. Tele box on. On 30L at 60% O2 via HF nasal canula. Call light within reach, bed locked and in lowest position. All fall precautions and hourly rounding in place.

## 2023-10-25 VITALS
BODY MASS INDEX: 19.27 KG/M2 | OXYGEN SATURATION: 96 % | HEART RATE: 102 BPM | HEIGHT: 64 IN | WEIGHT: 112.88 LBS | DIASTOLIC BLOOD PRESSURE: 39 MMHG | RESPIRATION RATE: 21 BRPM | TEMPERATURE: 97.7 F | SYSTOLIC BLOOD PRESSURE: 100 MMHG

## 2023-10-25 PROBLEM — E03.9 ACQUIRED HYPOTHYROIDISM: Status: ACTIVE | Noted: 2023-10-25

## 2023-10-25 PROBLEM — J18.9 PNEUMONIA DUE TO INFECTIOUS AGENT: Status: RESOLVED | Noted: 2023-10-13 | Resolved: 2023-10-25

## 2023-10-25 PROBLEM — I95.9 HYPOTENSION: Status: RESOLVED | Noted: 2023-10-13 | Resolved: 2023-10-25

## 2023-10-25 PROBLEM — E87.20 LACTIC ACIDOSIS: Status: RESOLVED | Noted: 2020-05-16 | Resolved: 2023-10-25

## 2023-10-25 PROBLEM — J90 PLEURAL EFFUSION, LEFT: Status: RESOLVED | Noted: 2023-10-13 | Resolved: 2023-10-25

## 2023-10-25 LAB
ANION GAP SERPL CALC-SCNC: 10 MMOL/L (ref 7–16)
BASOPHILS # BLD AUTO: 0.5 % (ref 0–1.8)
BASOPHILS # BLD: 0.03 K/UL (ref 0–0.12)
BUN SERPL-MCNC: 26 MG/DL (ref 8–22)
CALCIUM SERPL-MCNC: 8.5 MG/DL (ref 8.5–10.5)
CHLORIDE SERPL-SCNC: 100 MMOL/L (ref 96–112)
CO2 SERPL-SCNC: 30 MMOL/L (ref 20–33)
CREAT SERPL-MCNC: 0.74 MG/DL (ref 0.5–1.4)
EOSINOPHIL # BLD AUTO: 0.07 K/UL (ref 0–0.51)
EOSINOPHIL NFR BLD: 1.3 % (ref 0–6.9)
ERYTHROCYTE [DISTWIDTH] IN BLOOD BY AUTOMATED COUNT: 52.9 FL (ref 35.9–50)
GFR SERPLBLD CREATININE-BSD FMLA CKD-EPI: 93 ML/MIN/1.73 M 2
GLUCOSE BLD STRIP.AUTO-MCNC: 211 MG/DL (ref 65–99)
GLUCOSE SERPL-MCNC: 106 MG/DL (ref 65–99)
HCT VFR BLD AUTO: 34.9 % (ref 37–47)
HGB BLD-MCNC: 9.8 G/DL (ref 12–16)
IMM GRANULOCYTES # BLD AUTO: 0.04 K/UL (ref 0–0.11)
IMM GRANULOCYTES NFR BLD AUTO: 0.7 % (ref 0–0.9)
LYMPHOCYTES # BLD AUTO: 2.6 K/UL (ref 1–4.8)
LYMPHOCYTES NFR BLD: 46.6 % (ref 22–41)
MAGNESIUM SERPL-MCNC: 1.9 MG/DL (ref 1.5–2.5)
MCH RBC QN AUTO: 24.9 PG (ref 27–33)
MCHC RBC AUTO-ENTMCNC: 28.1 G/DL (ref 32.2–35.5)
MCV RBC AUTO: 88.8 FL (ref 81.4–97.8)
MONOCYTES # BLD AUTO: 0.57 K/UL (ref 0–0.85)
MONOCYTES NFR BLD AUTO: 10.2 % (ref 0–13.4)
NEUTROPHILS # BLD AUTO: 2.27 K/UL (ref 1.82–7.42)
NEUTROPHILS NFR BLD: 40.7 % (ref 44–72)
NRBC # BLD AUTO: 0 K/UL
NRBC BLD-RTO: 0 /100 WBC (ref 0–0.2)
PHOSPHATE SERPL-MCNC: 4.2 MG/DL (ref 2.5–4.5)
PLATELET # BLD AUTO: 277 K/UL (ref 164–446)
PMV BLD AUTO: 8.1 FL (ref 9–12.9)
POTASSIUM SERPL-SCNC: 3.8 MMOL/L (ref 3.6–5.5)
RBC # BLD AUTO: 3.93 M/UL (ref 4.2–5.4)
SODIUM SERPL-SCNC: 140 MMOL/L (ref 135–145)
WBC # BLD AUTO: 5.6 K/UL (ref 4.8–10.8)

## 2023-10-25 PROCEDURE — 84100 ASSAY OF PHOSPHORUS: CPT

## 2023-10-25 PROCEDURE — 36415 COLL VENOUS BLD VENIPUNCTURE: CPT

## 2023-10-25 PROCEDURE — 80048 BASIC METABOLIC PNL TOTAL CA: CPT

## 2023-10-25 PROCEDURE — 700102 HCHG RX REV CODE 250 W/ 637 OVERRIDE(OP): Mod: JZ | Performed by: INTERNAL MEDICINE

## 2023-10-25 PROCEDURE — 99239 HOSP IP/OBS DSCHRG MGMT >30: CPT | Performed by: INTERNAL MEDICINE

## 2023-10-25 PROCEDURE — 85025 COMPLETE CBC W/AUTO DIFF WBC: CPT

## 2023-10-25 PROCEDURE — A9270 NON-COVERED ITEM OR SERVICE: HCPCS | Mod: JZ | Performed by: INTERNAL MEDICINE

## 2023-10-25 PROCEDURE — A9270 NON-COVERED ITEM OR SERVICE: HCPCS | Performed by: INTERNAL MEDICINE

## 2023-10-25 PROCEDURE — 83735 ASSAY OF MAGNESIUM: CPT

## 2023-10-25 PROCEDURE — 700102 HCHG RX REV CODE 250 W/ 637 OVERRIDE(OP): Performed by: INTERNAL MEDICINE

## 2023-10-25 PROCEDURE — 700111 HCHG RX REV CODE 636 W/ 250 OVERRIDE (IP): Mod: JZ | Performed by: HOSPITALIST

## 2023-10-25 PROCEDURE — 82962 GLUCOSE BLOOD TEST: CPT

## 2023-10-25 RX ORDER — LEVOTHYROXINE SODIUM 175 UG/1
175 TABLET ORAL
Qty: 30 TABLET | Refills: 0 | Status: ON HOLD
Start: 2023-10-26 | End: 2023-11-22 | Stop reason: SDUPTHER

## 2023-10-25 RX ORDER — POTASSIUM CHLORIDE 20 MEQ/1
20 TABLET, EXTENDED RELEASE ORAL ONCE
Status: COMPLETED | OUTPATIENT
Start: 2023-10-25 | End: 2023-10-25

## 2023-10-25 RX ORDER — FUROSEMIDE 40 MG/1
40 TABLET ORAL DAILY
Qty: 30 TABLET | Refills: 0 | Status: ON HOLD
Start: 2023-10-25 | End: 2023-11-22

## 2023-10-25 RX ORDER — LANOLIN ALCOHOL/MO/W.PET/CERES
400 CREAM (GRAM) TOPICAL 2 TIMES DAILY
Status: DISCONTINUED | OUTPATIENT
Start: 2023-10-25 | End: 2023-10-25 | Stop reason: HOSPADM

## 2023-10-25 RX ORDER — IPRATROPIUM BROMIDE AND ALBUTEROL SULFATE 2.5; .5 MG/3ML; MG/3ML
3 SOLUTION RESPIRATORY (INHALATION) EVERY 6 HOURS PRN
Status: SHIPPED
Start: 2023-10-25

## 2023-10-25 RX ADMIN — Medication 400 MG: at 07:29

## 2023-10-25 RX ADMIN — QUETIAPINE FUMARATE 150 MG: 50 TABLET ORAL at 08:36

## 2023-10-25 RX ADMIN — LEVOTHYROXINE SODIUM 175 MCG: 0.17 TABLET ORAL at 05:16

## 2023-10-25 RX ADMIN — OXYBUTYNIN CHLORIDE 10 MG: 10 TABLET, EXTENDED RELEASE ORAL at 05:16

## 2023-10-25 RX ADMIN — INSULIN HUMAN 6 UNITS: 100 INJECTION, SOLUTION PARENTERAL at 08:39

## 2023-10-25 RX ADMIN — FUROSEMIDE 40 MG: 10 INJECTION, SOLUTION INTRAVENOUS at 05:17

## 2023-10-25 RX ADMIN — DOCUSATE SODIUM 50 MG AND SENNOSIDES 8.6 MG 2 TABLET: 8.6; 5 TABLET, FILM COATED ORAL at 05:16

## 2023-10-25 RX ADMIN — SERTRALINE 100 MG: 100 TABLET, FILM COATED ORAL at 05:16

## 2023-10-25 RX ADMIN — METOPROLOL TARTRATE 25 MG: 25 TABLET, FILM COATED ORAL at 05:16

## 2023-10-25 RX ADMIN — POTASSIUM CHLORIDE 20 MEQ: 1500 TABLET, EXTENDED RELEASE ORAL at 07:29

## 2023-10-25 ASSESSMENT — FIBROSIS 4 INDEX: FIB4 SCORE: 1.28

## 2023-10-25 NOTE — DISCHARGE PLANNING
0842  Agency/Facility Name: [facility]  Spoke to: Codie  Outcome:DPA called on bed availability, per Codie Pt can be accepted today, and will call DPA back for when to set up transport.     0952  Agency/Facility Name: [facility]   Spoke To: Codie   Outcome: Codie called to request transport to be set up between 1354-2681.   KATIA VELASCO notified.

## 2023-10-25 NOTE — DISCHARGE INSTRUCTIONS
Discharge Instructions per LARS Mayen.O.    DIET: Diet Order Diet: Regular    ACTIVITY: As tolerated    A proper diet that is low in grease, fat, and salt, along with 30 minutes of exercise per day will lead to weight loss, and better controlled blood sugar and blood pressure.    DIAGNOSIS: Acute respiratory failure with hypoxia (HCC)    Follow up with your Primary Care Provider Whitney Graham M.D. as scheduled or sooner if your symptoms persist or worsen.  Return to Emergency Room for sever chest pain, shortness of breath, signs of a stroke, or any other emergencies.

## 2023-10-25 NOTE — PROGRESS NOTES
Bedside report received. Pt A&Ox1. POC discussed with pt.  Call light and belongings within reach. Bed locked and in lowest position. Alarm and fall precautions in place.

## 2023-10-25 NOTE — CARE PLAN
The patient is Stable - Low risk of patient condition declining or worsening    Shift Goals  Clinical Goals: wean O2, patient safety, reduce anxitey  Patient Goals: ARLETTE  Family Goals: arlette    Progress made toward(s) clinical / shift goals:    Problem: Knowledge Deficit - Standard  Goal: Patient and family/care givers will demonstrate understanding of plan of care, disease process/condition, diagnostic tests and medications  Description: Target End Date:  1-3 days or as soon as patient condition allows    Document in Patient Education    1.  Patient and family/caregiver oriented to unit, equipment, visitation policy and means for communicating concern  2.  Complete/review Learning Assessment  3.  Assess knowledge level of disease process/condition, treatment plan, diagnostic tests and medications  4.  Explain disease process/condition, treatment plan, diagnostic tests and medications  Outcome: Progressing     Problem: Hemodynamics  Goal: Patient's hemodynamics, fluid balance and neurologic status will be stable or improve  Description: Target End Date:  Prior to discharge or change in level of care    Document on Assessment and I/O flowsheet templates    1.  Monitor vital signs, pulse oximetry and cardiac monitor per provider order and/or policy  2.  Maintain blood pressure per provider order  3.  Hemodynamic monitoring per provider order  4.  Manage IV fluids and IV infusions  5.  Monitor intake and output  6.  Daily weights per unit policy or provider order  7.  Assess peripheral pulses and capillary refill  8.  Assess color and body temperature  9.  Position patient for maximum circulation/cardiac output  10. Monitor for signs/symptoms of excessive bleeding  11. Assess mental status, restlessness and changes in level of consciousness  12. Monitor temperature and report fever or hypothermia to provider immediately. Consideration of targeted temperature management.  Outcome: Progressing     Problem:  Respiratory  Goal: Patient will achieve/maintain optimum respiratory ventilation and gas exchange  Description: Target End Date:  Prior to discharge or change in level of care    Document on Assessment flowsheet    1.  Assess and monitor rate, rhythm, depth and effort of respiration  2.  Breath sounds assessed qshift and/or as needed  3.  Assess O2 saturation, administer/titrate oxygen as ordered  4.  Position patient for maximum ventilatory efficiency  5.  Turn, cough, and deep breath with splinting to improve effectiveness  6.  Collaborate with RT to administer medication/treatments per order  7.  Encourage use of incentive spirometer and encourage patient to cough after use and utilize splinting techniques if applicable  8.  Airway suctioning  9.  Monitor sputum production for changes in color, consistency and frequency  10. Perform frequent oral hygiene  11. Alternate physical activity with rest periods  Outcome: Progressing     Problem: Skin Integrity  Goal: Skin integrity is maintained or improved  Description: Target End Date:  Prior to discharge or change in level of care    Document interventions on Skin Risk/Rohith flowsheet groups and corresponding LDA    1.  Assess and monitor skin integrity, appearance and/or temperature  2.  Assess risk factors for impaired skin integrity and/or pressures ulcers  3.  Implement precautions to protect skin integrity in collaboration with interdisciplinary team  4.  Implement pressure ulcer prevention protocol if at risk for skin breakdown  5.  Confirm wound care consult if at risk for skin breakdown  6.  Ensure patient use of pressure relieving devices  (Low air loss bed, waffle overlay, heel protectors, ROHO cushion, etc)  Outcome: Progressing     Problem: Fall Risk  Goal: Patient will remain free from falls  Description: Target End Date:  Prior to discharge or change in level of care    Document interventions on the [facility] Meraz Fall Risk Assessment    1.  Assess for  fall risk factors  2.  Implement fall precautions  Outcome: Progressing     Problem: Pain - Standard  Goal: Alleviation of pain or a reduction in pain to the patient’s comfort goal  Description: Target End Date:  Prior to discharge or change in level of care    Document on Vitals flowsheet    1.  Document pain using the appropriate pain scale per order or unit policy  2.  Educate and implement non-pharmacologic comfort measures (i.e. relaxation, distraction, massage, cold/heat therapy, etc.)  3.  Pain management medications as ordered  4.  Reassess pain after pain med administration per policy  5.  If opiods administered assess patient's response to pain medication is appropriate per POSS sedation scale  6.  Follow pain management plan developed in collaboration with patient and interdisciplinary team (including palliative care or pain specialists if applicable)  Outcome: Progressing       Patient is not progressing towards the following goals:

## 2023-10-25 NOTE — DISCHARGE PLANNING
RN CM attempted to call patient's guardian, Ann Urias ([phone]) to update on patient's transfer to [facility]. No answer, left voicemail requesting call back.     ROD MONTALVO called Dianne at patient's group home [phone] to update her on patient's transfer to [facility].

## 2023-10-25 NOTE — DISCHARGE PLANNING
Transport scheduled to [facility] via [facility] with pick-up time at 1115 confirmed by [place].   Discharge packet with face sheet X2, 72 hours of chart copies, and discharge summary given to bedside RN.   Bedside RN given nurse station number for report ([phone]).   Public guardian Ann Urias notified of transfer and agreeable. Ann requested discharge summary be faxed to her for court records. Discharge summary faxed by writer to [phone]

## 2023-10-25 NOTE — DISCHARGE PLANNING
DC Transport Scheduled    Received request at: 10/25/2023 at 1001    Transport Company Scheduled:  [employer]  Spoke with Debbie at [facility] to schedule transport.  [facility] Trip #: [id number]     Scheduled Date: 10/25/2023  Scheduled Time: 1115    Destination: [facility] [address] [facility]     Notified care team of scheduled transport via Voalte.     If there are any changes needed to the DC transportation scheduled, please contact [facility] at ext. [phone] between the hours of 2736-0503 Mon-Fri. If outside those hours, contact the ED Case Manager at ext. [phone].

## 2023-10-25 NOTE — DISCHARGE SUMMARY
Discharge Summary    CHIEF COMPLAINT ON ADMISSION  Chief Complaint   Patient presents with    Cough     Chest pain with cough for 3 days       Reason for Admission  ems     Admission Date  10/12/2023    CODE STATUS  Full Code    HPI & HOSPITAL COURSE  58-year-old history of type 2 diabetes, hypertension, schizoaffective disorder, Parkinson's disease.  Patient is a resident of a group home and was sent to us after having had 3 days of cough and noted to be hypoxic at 88% on room air.  On presentation normal white count and negative viral screen, hemoglobin 8.3, BNP 4000, lactic acid 1.4.  CTA of the chest negative for PE but showing moderate to large pericardial effusion with moderate to small right pleural effusion.  Transthoracic echo showing EF of 61% grade 1 diastolic dysfunction, study was negative for tamponade physiology.       On 10/14 patient had left-sided thoracentesis with 750 mL removed.  On 10/21 pleural effusion and reaccumulated and she had a repeat left-sided thoracentesis with 1 L removed.     Her hemoglobin reached a jarrell of 6.8 for which she was transfused 1 unit.  Patient did have a low TSH concerning for oversupplemented hypothyroid.  Hyperthyroid was felt to be contributing to her tachycardia so levothyroxine was decreased from 200 mcg to 175 mcg.  She was in the IMCU on high flow nasal cannula.  With thoracentesis and diuresis patient was able to be weaned off high flow nasal cannula.  She was also treated for pneumonia with ceftriaxone and azithromycin completing a 5-day course.  Patient is at risk to have pleural effusion reaccumulate.  If multiple thoracentesis are required in the future she may need a pleurodesis.  Pulmonology was consulted during admission.     Patient was accepted at [facility].  Medically stable to discharge to LTAC.  Follow-up with primary care as outpatient.    Therefore, she is discharged in fair and stable condition to a long-term acute care hospital.    The patient  met 2-midnight criteria for an inpatient stay at the time of discharge.    Discharge Date  10/25/2023    FOLLOW UP ITEMS POST DISCHARGE  None    DISCHARGE DIAGNOSES  Principal Problem:    Acute respiratory failure with hypoxia (HCC) (POA: Yes)  Active Problems:    Parkinson's disease (tremor, stiffness, slow motion, unstable posture) (POA: Yes)    Schizophrenia (HCC) (POA: Yes)    Diabetes (HCC) (POA: Yes)    Anemia (POA: Yes)    Essential hypertension (POA: Yes)    Tachycardia (POA: Yes)    Acquired hypothyroidism (POA: Unknown)  Resolved Problems:    Lactic acidosis (POA: Yes)    Pneumonia due to infectious agent (POA: Yes)    Pleural effusion, left (POA: Yes)    Hypotension (POA: Yes)      FOLLOW UP  Whitney Graham M.D.  [address] [facility] [address]  [phone]    Schedule an appointment as soon as possible for a visit in 2 week(s)  please call to schedule a follow up appointment for your recent hospital stay      MEDICATIONS ON DISCHARGE     Medication List        START taking these medications        Instructions   furosemide 40 MG Tabs  Commonly known as: Lasix   Take 1 Tablet by mouth every day.  Dose: 40 mg     ipratropium-albuterol 0.5-2.5 (3) MG/3ML nebulizer solution  Commonly known as: Duoneb   Take 3 mL by nebulization every 6 hours as needed for Shortness of Breath (SOB).  Dose: 3 mL     metoprolol tartrate 25 MG Tabs  Commonly known as: Lopressor   Take 1 Tablet by mouth 2 times a day.  Dose: 25 mg            CHANGE how you take these medications        Instructions   levothyroxine 175 MCG Tabs  Start taking on: October 26, 2023  What changed:   medication strength  how much to take  Commonly known as: Synthroid   Take 1 Tablet by mouth every morning on an empty stomach.  Dose: 175 mcg            CONTINUE taking these medications        Instructions   aripiprazole 20 MG tablet  Commonly known as: Abilify   Take 20 mg by mouth at bedtime.  Dose: 20 mg     atorvastatin 20 MG  Tabs  Commonly known as: Lipitor   Take 20 mg by mouth at bedtime.  Dose: 20 mg     divalproex 500 MG Tbec  Commonly known as: Depakote   Take 1,000 mg by mouth at bedtime. 2 tablets = 1,000 mg.  Dose: 1,000 mg     donepezil 10 MG tablet  Commonly known as: Aricept   Take 10 mg by mouth every morning.  Dose: 10 mg     glipiZIDE ER 5 MG Tb24  Commonly known as: Glucotrol XL   Take 5 mg by mouth every morning.  Dose: 5 mg     lisinopril 20 MG Tabs  Commonly known as: Prinivil   Take 20 mg by mouth every morning.  Dose: 20 mg     * SEROquel  MG Tb24  Generic drug: QUEtiapine Fumarate   Take 150 mg by mouth every morning.  Dose: 150 mg     * quetiapine 200 MG XR tablet  Commonly known as: SEROquel XR   Take 200 mg by mouth at bedtime.  Dose: 200 mg     sertraline 100 MG Tabs  Commonly known as: Zoloft   Take 100 mg by mouth every morning.  Dose: 100 mg     Synjardy XR 12.5-1000 MG Tb24  Generic drug: Empagliflozin-metFORMIN HCl ER   Take 1 Tablet by mouth every morning.  Dose: 1 Tablet     tolterodine ER 4 MG Cp24  Commonly known as: Detrol-LA   Take 4 mg by mouth every morning.  Dose: 4 mg           * This list has 2 medication(s) that are the same as other medications prescribed for you. Read the directions carefully, and ask your doctor or other care provider to review them with you.                STOP taking these medications      cephALEXin 500 MG Caps  Commonly known as: Keflex     metoprolol-hydrochlorothiazide 50-25 MG per tablet  Commonly known as: Lopressor HCT              Allergies  Allergies   Allergen Reactions    Risperdal Unspecified     No reaction documented       DIET  Orders Placed This Encounter   Procedures    Diet Order Diet: Regular     Standing Status:   Standing     Number of Occurrences:   1     Order Specific Question:   Diet:     Answer:   Regular [1]       ACTIVITY  As tolerated.  Weight bearing as tolerated    CONSULTATIONS  Pulm    PROCEDURES  Thoracentesis    LABORATORY  Lab  Results   Component Value Date    SODIUM 140 10/25/2023    POTASSIUM 3.8 10/25/2023    CHLORIDE 100 10/25/2023    CO2 30 10/25/2023    GLUCOSE 106 (H) 10/25/2023    BUN 26 (H) 10/25/2023    CREATININE 0.74 10/25/2023        Lab Results   Component Value Date    WBC 5.6 10/25/2023    HEMOGLOBIN 9.8 (L) 10/25/2023    HEMATOCRIT 34.9 (L) 10/25/2023    PLATELETCT 277 10/25/2023        I discussed medications and side effects with the patient.  I discussed prognosis and importance of medical compliance with the patient.  I counseled the patient about diet, exercise, weight loss, smoking cessation, and life style modifications.  All questions and concerns have been addressed.  Total time of the discharge process was 39 minutes.

## 2023-10-25 NOTE — PROGRESS NOTES
Telemetry Shift Summary     Rhythm: SR-ST  HR:     Measurements: .15/.08/.27              Normal Values  Rhythm: SR  HR:   Measurements: 0.12-0.20/0.08-0.10/0.30-0.52

## 2023-10-25 NOTE — THERAPY
Occupational Therapy Contact Note    Patient Name: Erlinda Verde  Age:  58 y.o., Sex:  female  Medical Record #: [id number]  Today's Date: 10/25/2023    Pt d/c'ing [facility] within the hour. Will hold OT session, but continue to follow in case of change in POC.

## 2023-10-25 NOTE — CARE PLAN
Problem: Knowledge Deficit - Standard  Goal: Patient and family/care givers will demonstrate understanding of plan of care, disease process/condition, diagnostic tests and medications  Outcome: Progressing     Problem: Skin Integrity  Goal: Skin integrity is maintained or improved  Outcome: Progressing     Problem: Fall Risk  Goal: Patient will remain free from falls  Outcome: Progressing   The patient is Stable - Low risk of patient condition declining or worsening    Shift Goals  Clinical Goals: wean o2, transfer to LTAC  Patient Goals: billy  Family Goals: billy    Progress made toward(s) clinical / shift goals:  yes    Patient is not progressing towards the following goals:

## 2023-11-14 LAB
FUNGUS SPEC CULT: NORMAL
FUNGUS SPEC FUNGUS STN: NORMAL
SIGNIFICANT IND 70042: NORMAL
SITE SITE: NORMAL
SOURCE SOURCE: NORMAL

## 2023-11-20 ENCOUNTER — APPOINTMENT (OUTPATIENT)
Dept: RADIOLOGY | Facility: MEDICAL CENTER | Age: 58
DRG: 193 | End: 2023-11-20
Attending: EMERGENCY MEDICINE
Payer: MEDICARE

## 2023-11-20 ENCOUNTER — HOSPITAL ENCOUNTER (INPATIENT)
Facility: MEDICAL CENTER | Age: 58
LOS: 2 days | DRG: 193 | End: 2023-11-22
Attending: EMERGENCY MEDICINE | Admitting: HOSPITALIST
Payer: MEDICARE

## 2023-11-20 DIAGNOSIS — S72.002A HIP FRACTURE REQUIRING OPERATIVE REPAIR, LEFT, CLOSED, INITIAL ENCOUNTER (HCC): ICD-10-CM

## 2023-11-20 DIAGNOSIS — R79.89 ELEVATED LACTIC ACID LEVEL: ICD-10-CM

## 2023-11-20 DIAGNOSIS — A41.9 SEPSIS WITHOUT ACUTE ORGAN DYSFUNCTION, DUE TO UNSPECIFIED ORGANISM (HCC): ICD-10-CM

## 2023-11-20 DIAGNOSIS — F20.9 SCHIZOPHRENIA, UNSPECIFIED TYPE (HCC): ICD-10-CM

## 2023-11-20 DIAGNOSIS — E03.9 ACQUIRED HYPOTHYROIDISM: ICD-10-CM

## 2023-11-20 DIAGNOSIS — J18.9 PNEUMONIA OF RIGHT LOWER LOBE DUE TO INFECTIOUS ORGANISM: ICD-10-CM

## 2023-11-20 LAB
ALBUMIN SERPL BCP-MCNC: 2.9 G/DL (ref 3.2–4.9)
ALBUMIN/GLOB SERPL: 0.8 G/DL
ALP SERPL-CCNC: 95 U/L (ref 30–99)
ALT SERPL-CCNC: 7 U/L (ref 2–50)
ANION GAP SERPL CALC-SCNC: 13 MMOL/L (ref 7–16)
ANISOCYTOSIS BLD QL SMEAR: ABNORMAL
APPEARANCE UR: CLEAR
AST SERPL-CCNC: 20 U/L (ref 12–45)
BASOPHILS # BLD AUTO: 0.6 % (ref 0–1.8)
BASOPHILS # BLD: 0.04 K/UL (ref 0–0.12)
BILIRUB SERPL-MCNC: <0.2 MG/DL (ref 0.1–1.5)
BILIRUB UR QL STRIP.AUTO: NEGATIVE
BUN SERPL-MCNC: 33 MG/DL (ref 8–22)
CALCIUM ALBUM COR SERPL-MCNC: 9 MG/DL (ref 8.5–10.5)
CALCIUM SERPL-MCNC: 8.1 MG/DL (ref 8.5–10.5)
CHLORIDE SERPL-SCNC: 109 MMOL/L (ref 96–112)
CO2 SERPL-SCNC: 19 MMOL/L (ref 20–33)
COLOR UR: YELLOW
COMMENT 1642: NORMAL
CREAT SERPL-MCNC: 0.48 MG/DL (ref 0.5–1.4)
EOSINOPHIL # BLD AUTO: 0.1 K/UL (ref 0–0.51)
EOSINOPHIL NFR BLD: 1.5 % (ref 0–6.9)
ERYTHROCYTE [DISTWIDTH] IN BLOOD BY AUTOMATED COUNT: 64 FL (ref 35.9–50)
EST. AVERAGE GLUCOSE BLD GHB EST-MCNC: 143 MG/DL
GFR SERPLBLD CREATININE-BSD FMLA CKD-EPI: 109 ML/MIN/1.73 M 2
GLOBULIN SER CALC-MCNC: 3.6 G/DL (ref 1.9–3.5)
GLUCOSE BLD STRIP.AUTO-MCNC: 114 MG/DL (ref 65–99)
GLUCOSE BLD STRIP.AUTO-MCNC: 37 MG/DL (ref 65–99)
GLUCOSE SERPL-MCNC: 91 MG/DL (ref 65–99)
GLUCOSE UR STRIP.AUTO-MCNC: >=1000 MG/DL
HBA1C MFR BLD: 6.6 % (ref 4–5.6)
HCT VFR BLD AUTO: 37.5 % (ref 37–47)
HGB BLD-MCNC: 10.1 G/DL (ref 12–16)
HYPOCHROMIA BLD QL SMEAR: ABNORMAL
IMM GRANULOCYTES # BLD AUTO: 0.02 K/UL (ref 0–0.11)
IMM GRANULOCYTES NFR BLD AUTO: 0.3 % (ref 0–0.9)
KETONES UR STRIP.AUTO-MCNC: ABNORMAL MG/DL
LACTATE SERPL-SCNC: 3.5 MMOL/L (ref 0.5–2)
LACTATE SERPL-SCNC: 3.8 MMOL/L (ref 0.5–2)
LACTATE SERPL-SCNC: 3.8 MMOL/L (ref 0.5–2)
LEUKOCYTE ESTERASE UR QL STRIP.AUTO: NEGATIVE
LYMPHOCYTES # BLD AUTO: 2.25 K/UL (ref 1–4.8)
LYMPHOCYTES NFR BLD: 34.2 % (ref 22–41)
MCH RBC QN AUTO: 25.1 PG (ref 27–33)
MCHC RBC AUTO-ENTMCNC: 26.9 G/DL (ref 32.2–35.5)
MCV RBC AUTO: 93.3 FL (ref 81.4–97.8)
MICRO URNS: ABNORMAL
MICROCYTES BLD QL SMEAR: ABNORMAL
MONOCYTES # BLD AUTO: 0.55 K/UL (ref 0–0.85)
MONOCYTES NFR BLD AUTO: 8.4 % (ref 0–13.4)
MORPHOLOGY BLD-IMP: NORMAL
NEUTROPHILS # BLD AUTO: 3.61 K/UL (ref 1.82–7.42)
NEUTROPHILS NFR BLD: 55 % (ref 44–72)
NITRITE UR QL STRIP.AUTO: NEGATIVE
NRBC # BLD AUTO: 0 K/UL
NRBC BLD-RTO: 0 /100 WBC (ref 0–0.2)
PH UR STRIP.AUTO: 6.5 [PH] (ref 5–8)
PLATELET # BLD AUTO: 307 K/UL (ref 164–446)
PLATELET BLD QL SMEAR: NORMAL
PMV BLD AUTO: 8.6 FL (ref 9–12.9)
POTASSIUM SERPL-SCNC: 4.8 MMOL/L (ref 3.6–5.5)
PROCALCITONIN SERPL-MCNC: 0.11 NG/ML
PROT SERPL-MCNC: 6.5 G/DL (ref 6–8.2)
PROT UR QL STRIP: NEGATIVE MG/DL
RBC # BLD AUTO: 4.02 M/UL (ref 4.2–5.4)
RBC BLD AUTO: PRESENT
RBC UR QL AUTO: NEGATIVE
SODIUM SERPL-SCNC: 141 MMOL/L (ref 135–145)
SP GR UR STRIP.AUTO: 1.03
UROBILINOGEN UR STRIP.AUTO-MCNC: 0.2 MG/DL
WBC # BLD AUTO: 6.6 K/UL (ref 4.8–10.8)

## 2023-11-20 PROCEDURE — 700102 HCHG RX REV CODE 250 W/ 637 OVERRIDE(OP): Performed by: HOSPITALIST

## 2023-11-20 PROCEDURE — 700105 HCHG RX REV CODE 258: Performed by: HOSPITALIST

## 2023-11-20 PROCEDURE — 80053 COMPREHEN METABOLIC PANEL: CPT

## 2023-11-20 PROCEDURE — 81003 URINALYSIS AUTO W/O SCOPE: CPT

## 2023-11-20 PROCEDURE — 84443 ASSAY THYROID STIM HORMONE: CPT

## 2023-11-20 PROCEDURE — 99223 1ST HOSP IP/OBS HIGH 75: CPT | Mod: AI | Performed by: HOSPITALIST

## 2023-11-20 PROCEDURE — 83036 HEMOGLOBIN GLYCOSYLATED A1C: CPT

## 2023-11-20 PROCEDURE — 82962 GLUCOSE BLOOD TEST: CPT | Mod: 91

## 2023-11-20 PROCEDURE — 85025 COMPLETE CBC W/AUTO DIFF WBC: CPT

## 2023-11-20 PROCEDURE — 36415 COLL VENOUS BLD VENIPUNCTURE: CPT

## 2023-11-20 PROCEDURE — 96372 THER/PROPH/DIAG INJ SC/IM: CPT

## 2023-11-20 PROCEDURE — 84145 PROCALCITONIN (PCT): CPT

## 2023-11-20 PROCEDURE — 84439 ASSAY OF FREE THYROXINE: CPT

## 2023-11-20 PROCEDURE — 83605 ASSAY OF LACTIC ACID: CPT | Mod: 91

## 2023-11-20 PROCEDURE — 87086 URINE CULTURE/COLONY COUNT: CPT

## 2023-11-20 PROCEDURE — 96365 THER/PROPH/DIAG IV INF INIT: CPT

## 2023-11-20 PROCEDURE — 96375 TX/PRO/DX INJ NEW DRUG ADDON: CPT

## 2023-11-20 PROCEDURE — 700105 HCHG RX REV CODE 258: Mod: UD | Performed by: EMERGENCY MEDICINE

## 2023-11-20 PROCEDURE — 87040 BLOOD CULTURE FOR BACTERIA: CPT

## 2023-11-20 PROCEDURE — 770020 HCHG ROOM/CARE - TELE (206)

## 2023-11-20 PROCEDURE — 71045 X-RAY EXAM CHEST 1 VIEW: CPT

## 2023-11-20 PROCEDURE — 700111 HCHG RX REV CODE 636 W/ 250 OVERRIDE (IP): Mod: UD | Performed by: EMERGENCY MEDICINE

## 2023-11-20 PROCEDURE — 99285 EMERGENCY DEPT VISIT HI MDM: CPT

## 2023-11-20 PROCEDURE — A9270 NON-COVERED ITEM OR SERVICE: HCPCS | Performed by: HOSPITALIST

## 2023-11-20 RX ORDER — SODIUM CHLORIDE, SODIUM LACTATE, POTASSIUM CHLORIDE, CALCIUM CHLORIDE 600; 310; 30; 20 MG/100ML; MG/100ML; MG/100ML; MG/100ML
INJECTION, SOLUTION INTRAVENOUS CONTINUOUS
Status: DISCONTINUED | OUTPATIENT
Start: 2023-11-20 | End: 2023-11-21

## 2023-11-20 RX ORDER — NYSTATIN 100000 [USP'U]/G
1 POWDER TOPICAL 2 TIMES DAILY
COMMUNITY

## 2023-11-20 RX ORDER — PROMETHAZINE HYDROCHLORIDE 25 MG/1
12.5-25 TABLET ORAL EVERY 4 HOURS PRN
Status: DISCONTINUED | OUTPATIENT
Start: 2023-11-20 | End: 2023-11-22 | Stop reason: HOSPADM

## 2023-11-20 RX ORDER — PROMETHAZINE HYDROCHLORIDE 25 MG/1
12.5-25 SUPPOSITORY RECTAL EVERY 4 HOURS PRN
Status: DISCONTINUED | OUTPATIENT
Start: 2023-11-20 | End: 2023-11-22 | Stop reason: HOSPADM

## 2023-11-20 RX ORDER — ACETAMINOPHEN 325 MG/1
650 TABLET ORAL EVERY 6 HOURS PRN
Status: DISCONTINUED | OUTPATIENT
Start: 2023-11-20 | End: 2023-11-22 | Stop reason: HOSPADM

## 2023-11-20 RX ORDER — ATORVASTATIN CALCIUM 20 MG/1
20 TABLET, FILM COATED ORAL
Status: DISCONTINUED | OUTPATIENT
Start: 2023-11-20 | End: 2023-11-22 | Stop reason: HOSPADM

## 2023-11-20 RX ORDER — OXYBUTYNIN CHLORIDE 5 MG/1
5 TABLET ORAL 2 TIMES DAILY
COMMUNITY

## 2023-11-20 RX ORDER — IPRATROPIUM BROMIDE AND ALBUTEROL SULFATE 2.5; .5 MG/3ML; MG/3ML
3 SOLUTION RESPIRATORY (INHALATION)
Status: DISCONTINUED | OUTPATIENT
Start: 2023-11-20 | End: 2023-11-22 | Stop reason: HOSPADM

## 2023-11-20 RX ORDER — ACETAMINOPHEN 325 MG/1
650 TABLET ORAL 3 TIMES DAILY
COMMUNITY

## 2023-11-20 RX ORDER — SERTRALINE HYDROCHLORIDE 100 MG/1
100 TABLET, FILM COATED ORAL EVERY MORNING
Status: DISCONTINUED | OUTPATIENT
Start: 2023-11-21 | End: 2023-11-22 | Stop reason: HOSPADM

## 2023-11-20 RX ORDER — GUAIFENESIN/DEXTROMETHORPHAN 100-10MG/5
10 SYRUP ORAL EVERY 6 HOURS PRN
Status: DISCONTINUED | OUTPATIENT
Start: 2023-11-20 | End: 2023-11-22 | Stop reason: HOSPADM

## 2023-11-20 RX ORDER — DOXYCYCLINE 100 MG/1
100 TABLET ORAL EVERY 12 HOURS
Status: DISCONTINUED | OUTPATIENT
Start: 2023-11-20 | End: 2023-11-21

## 2023-11-20 RX ORDER — LORAZEPAM 2 MG/ML
1 INJECTION INTRAMUSCULAR ONCE
Status: COMPLETED | OUTPATIENT
Start: 2023-11-20 | End: 2023-11-20

## 2023-11-20 RX ORDER — SODIUM CHLORIDE, SODIUM LACTATE, POTASSIUM CHLORIDE, CALCIUM CHLORIDE 600; 310; 30; 20 MG/100ML; MG/100ML; MG/100ML; MG/100ML
1000 INJECTION, SOLUTION INTRAVENOUS ONCE
Status: COMPLETED | OUTPATIENT
Start: 2023-11-20 | End: 2023-11-20

## 2023-11-20 RX ORDER — DIVALPROEX SODIUM 500 MG/1
1000 TABLET, DELAYED RELEASE ORAL
Status: DISCONTINUED | OUTPATIENT
Start: 2023-11-20 | End: 2023-11-22 | Stop reason: HOSPADM

## 2023-11-20 RX ORDER — PROCHLORPERAZINE EDISYLATE 5 MG/ML
5-10 INJECTION INTRAMUSCULAR; INTRAVENOUS EVERY 4 HOURS PRN
Status: DISCONTINUED | OUTPATIENT
Start: 2023-11-20 | End: 2023-11-22 | Stop reason: HOSPADM

## 2023-11-20 RX ORDER — SODIUM CHLORIDE, SODIUM LACTATE, POTASSIUM CHLORIDE, AND CALCIUM CHLORIDE .6; .31; .03; .02 G/100ML; G/100ML; G/100ML; G/100ML
30 INJECTION, SOLUTION INTRAVENOUS ONCE
Status: COMPLETED | OUTPATIENT
Start: 2023-11-20 | End: 2023-11-20

## 2023-11-20 RX ORDER — ARIPIPRAZOLE 10 MG/1
20 TABLET ORAL
Status: DISCONTINUED | OUTPATIENT
Start: 2023-11-20 | End: 2023-11-22 | Stop reason: HOSPADM

## 2023-11-20 RX ORDER — QUETIAPINE FUMARATE 200 MG/1
200 TABLET, FILM COATED ORAL
Status: DISCONTINUED | OUTPATIENT
Start: 2023-11-20 | End: 2023-11-22 | Stop reason: HOSPADM

## 2023-11-20 RX ORDER — DONEPEZIL HYDROCHLORIDE 5 MG/1
10 TABLET, FILM COATED ORAL EVERY MORNING
Status: DISCONTINUED | OUTPATIENT
Start: 2023-11-21 | End: 2023-11-22 | Stop reason: HOSPADM

## 2023-11-20 RX ORDER — QUETIAPINE FUMARATE 50 MG/1
150 TABLET, FILM COATED ORAL EVERY MORNING
Status: DISCONTINUED | OUTPATIENT
Start: 2023-11-21 | End: 2023-11-22 | Stop reason: HOSPADM

## 2023-11-20 RX ORDER — HALOPERIDOL 5 MG/ML
1 INJECTION INTRAMUSCULAR EVERY 4 HOURS PRN
Status: DISCONTINUED | OUTPATIENT
Start: 2023-11-20 | End: 2023-11-22 | Stop reason: HOSPADM

## 2023-11-20 RX ORDER — METOPROLOL TARTRATE AND HYDROCHLOROTHIAZIDE 50; 25 MG/1; MG/1
0.5 TABLET ORAL 2 TIMES DAILY
Status: ON HOLD | COMMUNITY
End: 2023-11-22

## 2023-11-20 RX ORDER — LISINOPRIL 20 MG/1
20 TABLET ORAL EVERY MORNING
Status: DISCONTINUED | OUTPATIENT
Start: 2023-11-21 | End: 2023-11-22 | Stop reason: HOSPADM

## 2023-11-20 RX ORDER — ONDANSETRON 2 MG/ML
4 INJECTION INTRAMUSCULAR; INTRAVENOUS EVERY 4 HOURS PRN
Status: DISCONTINUED | OUTPATIENT
Start: 2023-11-20 | End: 2023-11-22 | Stop reason: HOSPADM

## 2023-11-20 RX ORDER — HALOPERIDOL 5 MG/ML
5 INJECTION INTRAMUSCULAR ONCE
Status: COMPLETED | OUTPATIENT
Start: 2023-11-20 | End: 2023-11-20

## 2023-11-20 RX ORDER — ONDANSETRON 4 MG/1
4 TABLET, ORALLY DISINTEGRATING ORAL EVERY 4 HOURS PRN
Status: DISCONTINUED | OUTPATIENT
Start: 2023-11-20 | End: 2023-11-22 | Stop reason: HOSPADM

## 2023-11-20 RX ORDER — METOPROLOL TARTRATE 50 MG/1
50 TABLET, FILM COATED ORAL 2 TIMES DAILY
Status: DISCONTINUED | OUTPATIENT
Start: 2023-11-20 | End: 2023-11-20

## 2023-11-20 RX ORDER — CEFTRIAXONE 2 G/1
2000 INJECTION, POWDER, FOR SOLUTION INTRAMUSCULAR; INTRAVENOUS ONCE
Status: COMPLETED | OUTPATIENT
Start: 2023-11-20 | End: 2023-11-20

## 2023-11-20 RX ORDER — DIPHENHYDRAMINE HYDROCHLORIDE 50 MG/ML
25 INJECTION INTRAMUSCULAR; INTRAVENOUS ONCE
Status: COMPLETED | OUTPATIENT
Start: 2023-11-20 | End: 2023-11-20

## 2023-11-20 RX ORDER — ARIPIPRAZOLE 20 MG/1
20 TABLET ORAL
COMMUNITY

## 2023-11-20 RX ADMIN — QUETIAPINE FUMARATE 200 MG: 200 TABLET ORAL at 23:34

## 2023-11-20 RX ADMIN — LORAZEPAM 1 MG: 2 INJECTION INTRAMUSCULAR; INTRAVENOUS at 18:38

## 2023-11-20 RX ADMIN — SODIUM CHLORIDE, POTASSIUM CHLORIDE, SODIUM LACTATE AND CALCIUM CHLORIDE 1000 ML: 600; 310; 30; 20 INJECTION, SOLUTION INTRAVENOUS at 20:26

## 2023-11-20 RX ADMIN — SODIUM CHLORIDE, POTASSIUM CHLORIDE, SODIUM LACTATE AND CALCIUM CHLORIDE 1497 ML: 600; 310; 30; 20 INJECTION, SOLUTION INTRAVENOUS at 18:22

## 2023-11-20 RX ADMIN — DOXYCYCLINE 100 MG: 100 TABLET, FILM COATED ORAL at 22:53

## 2023-11-20 RX ADMIN — DIPHENHYDRAMINE HYDROCHLORIDE 25 MG: 50 INJECTION, SOLUTION INTRAMUSCULAR; INTRAVENOUS at 18:38

## 2023-11-20 RX ADMIN — HALOPERIDOL LACTATE 5 MG: 5 INJECTION, SOLUTION INTRAMUSCULAR at 18:38

## 2023-11-20 RX ADMIN — DEXTROSE MONOHYDRATE 25 G: 100 INJECTION, SOLUTION INTRAVENOUS at 21:00

## 2023-11-20 RX ADMIN — ARIPIPRAZOLE 20 MG: 10 TABLET ORAL at 22:52

## 2023-11-20 RX ADMIN — CEFTRIAXONE SODIUM 2000 MG: 2 INJECTION, POWDER, FOR SOLUTION INTRAMUSCULAR; INTRAVENOUS at 18:46

## 2023-11-20 RX ADMIN — METOPROLOL TARTRATE 25 MG: 25 TABLET, FILM COATED ORAL at 22:52

## 2023-11-20 RX ADMIN — ATORVASTATIN CALCIUM 20 MG: 20 TABLET, FILM COATED ORAL at 23:07

## 2023-11-20 RX ADMIN — DIVALPROEX SODIUM 1000 MG: 500 TABLET, DELAYED RELEASE ORAL at 22:52

## 2023-11-20 ASSESSMENT — ENCOUNTER SYMPTOMS
BLURRED VISION: 0
DIAPHORESIS: 0
SHORTNESS OF BREATH: 1
EYE PAIN: 0
CHILLS: 1
MYALGIAS: 0
WEAKNESS: 0
STRIDOR: 0
BRUISES/BLEEDS EASILY: 0
NAUSEA: 0
FLANK PAIN: 0
HEARTBURN: 0
FEVER: 0
VOMITING: 0
HEADACHES: 0
SINUS PAIN: 0
ORTHOPNEA: 0
PHOTOPHOBIA: 0
NECK PAIN: 0
DEPRESSION: 0
DIZZINESS: 0
COUGH: 1
WHEEZING: 0
TINGLING: 0
HALLUCINATIONS: 0
SORE THROAT: 0
SPUTUM PRODUCTION: 0
CONSTIPATION: 0
HEMOPTYSIS: 0
TREMORS: 0
DOUBLE VISION: 0
ABDOMINAL PAIN: 0
FALLS: 0
POLYDIPSIA: 0
PALPITATIONS: 0
PND: 0
CLAUDICATION: 0
BACK PAIN: 0
BLOOD IN STOOL: 0
DIARRHEA: 0

## 2023-11-20 ASSESSMENT — COGNITIVE AND FUNCTIONAL STATUS - GENERAL
MOVING FROM LYING ON BACK TO SITTING ON SIDE OF FLAT BED: A LOT
HELP NEEDED FOR BATHING: A LITTLE
MOVING TO AND FROM BED TO CHAIR: A LITTLE
DAILY ACTIVITIY SCORE: 17
STANDING UP FROM CHAIR USING ARMS: A LOT
TURNING FROM BACK TO SIDE WHILE IN FLAT BAD: A LITTLE
EATING MEALS: A LITTLE
TOILETING: A LOT
CLIMB 3 TO 5 STEPS WITH RAILING: A LOT
MOBILITY SCORE: 14
DRESSING REGULAR UPPER BODY CLOTHING: A LITTLE
WALKING IN HOSPITAL ROOM: A LOT
DRESSING REGULAR LOWER BODY CLOTHING: A LITTLE
PERSONAL GROOMING: A LITTLE
SUGGESTED CMS G CODE MODIFIER MOBILITY: CL
SUGGESTED CMS G CODE MODIFIER DAILY ACTIVITY: CK

## 2023-11-20 ASSESSMENT — FIBROSIS 4 INDEX
FIB4 SCORE: 0.77
FIB4 SCORE: 1.45

## 2023-11-20 ASSESSMENT — PAIN DESCRIPTION - PAIN TYPE: TYPE: ACUTE PAIN

## 2023-11-20 ASSESSMENT — LIFESTYLE VARIABLES: SUBSTANCE_ABUSE: 0

## 2023-11-21 ENCOUNTER — APPOINTMENT (OUTPATIENT)
Dept: RADIOLOGY | Facility: MEDICAL CENTER | Age: 58
DRG: 193 | End: 2023-11-21
Payer: MEDICARE

## 2023-11-21 ENCOUNTER — APPOINTMENT (OUTPATIENT)
Dept: RADIOLOGY | Facility: MEDICAL CENTER | Age: 58
DRG: 193 | End: 2023-11-21
Attending: INTERNAL MEDICINE
Payer: MEDICARE

## 2023-11-21 PROBLEM — R91.8 PULMONARY INFILTRATES ON CXR: Status: ACTIVE | Noted: 2023-11-20

## 2023-11-21 LAB
ALBUMIN SERPL BCP-MCNC: 2.9 G/DL (ref 3.2–4.9)
ALBUMIN/GLOB SERPL: 0.9 G/DL
ALP SERPL-CCNC: 94 U/L (ref 30–99)
ALT SERPL-CCNC: 8 U/L (ref 2–50)
ANION GAP SERPL CALC-SCNC: 15 MMOL/L (ref 7–16)
AST SERPL-CCNC: 15 U/L (ref 12–45)
BASOPHILS # BLD AUTO: 0.2 % (ref 0–1.8)
BASOPHILS # BLD: 0.02 K/UL (ref 0–0.12)
BILIRUB SERPL-MCNC: <0.2 MG/DL (ref 0.1–1.5)
BUN SERPL-MCNC: 24 MG/DL (ref 8–22)
CALCIUM ALBUM COR SERPL-MCNC: 9.4 MG/DL (ref 8.5–10.5)
CALCIUM SERPL-MCNC: 8.5 MG/DL (ref 8.5–10.5)
CHLORIDE SERPL-SCNC: 107 MMOL/L (ref 96–112)
CO2 SERPL-SCNC: 19 MMOL/L (ref 20–33)
CREAT SERPL-MCNC: 0.52 MG/DL (ref 0.5–1.4)
EOSINOPHIL # BLD AUTO: 0.06 K/UL (ref 0–0.51)
EOSINOPHIL NFR BLD: 0.7 % (ref 0–6.9)
ERYTHROCYTE [DISTWIDTH] IN BLOOD BY AUTOMATED COUNT: 59.7 FL (ref 35.9–50)
GFR SERPLBLD CREATININE-BSD FMLA CKD-EPI: 107 ML/MIN/1.73 M 2
GLOBULIN SER CALC-MCNC: 3.4 G/DL (ref 1.9–3.5)
GLUCOSE BLD STRIP.AUTO-MCNC: 105 MG/DL (ref 65–99)
GLUCOSE BLD STRIP.AUTO-MCNC: 107 MG/DL (ref 65–99)
GLUCOSE BLD STRIP.AUTO-MCNC: 183 MG/DL (ref 65–99)
GLUCOSE BLD STRIP.AUTO-MCNC: 95 MG/DL (ref 65–99)
GLUCOSE SERPL-MCNC: 115 MG/DL (ref 65–99)
HCT VFR BLD AUTO: 36.5 % (ref 37–47)
HGB BLD-MCNC: 10.3 G/DL (ref 12–16)
IMM GRANULOCYTES # BLD AUTO: 0.04 K/UL (ref 0–0.11)
IMM GRANULOCYTES NFR BLD AUTO: 0.5 % (ref 0–0.9)
LACTATE SERPL-SCNC: 1 MMOL/L (ref 0.5–2)
LACTATE SERPL-SCNC: 1.6 MMOL/L (ref 0.5–2)
LACTATE SERPL-SCNC: 3.7 MMOL/L (ref 0.5–2)
LYMPHOCYTES # BLD AUTO: 1.23 K/UL (ref 1–4.8)
LYMPHOCYTES NFR BLD: 14.2 % (ref 22–41)
MCH RBC QN AUTO: 25.1 PG (ref 27–33)
MCHC RBC AUTO-ENTMCNC: 28.2 G/DL (ref 32.2–35.5)
MCV RBC AUTO: 89 FL (ref 81.4–97.8)
MONOCYTES # BLD AUTO: 0.8 K/UL (ref 0–0.85)
MONOCYTES NFR BLD AUTO: 9.2 % (ref 0–13.4)
NEUTROPHILS # BLD AUTO: 6.51 K/UL (ref 1.82–7.42)
NEUTROPHILS NFR BLD: 75.2 % (ref 44–72)
NRBC # BLD AUTO: 0 K/UL
NRBC BLD-RTO: 0 /100 WBC (ref 0–0.2)
PLATELET # BLD AUTO: 272 K/UL (ref 164–446)
PMV BLD AUTO: 8.6 FL (ref 9–12.9)
POTASSIUM SERPL-SCNC: 4.2 MMOL/L (ref 3.6–5.5)
PROT SERPL-MCNC: 6.3 G/DL (ref 6–8.2)
RBC # BLD AUTO: 4.1 M/UL (ref 4.2–5.4)
SODIUM SERPL-SCNC: 141 MMOL/L (ref 135–145)
T4 FREE SERPL-MCNC: 1.19 NG/DL (ref 0.93–1.7)
T4 FREE SERPL-MCNC: 1.6 NG/DL (ref 0.93–1.7)
TSH SERPL DL<=0.005 MIU/L-ACNC: 0.04 UIU/ML (ref 0.38–5.33)
TSH SERPL DL<=0.005 MIU/L-ACNC: 0.04 UIU/ML (ref 0.38–5.33)
TSH SERPL DL<=0.005 MIU/L-ACNC: 0.08 UIU/ML (ref 0.38–5.33)
WBC # BLD AUTO: 8.7 K/UL (ref 4.8–10.8)

## 2023-11-21 PROCEDURE — 73620 X-RAY EXAM OF FOOT: CPT | Mod: RT

## 2023-11-21 PROCEDURE — 700105 HCHG RX REV CODE 258: Performed by: HOSPITALIST

## 2023-11-21 PROCEDURE — 71275 CT ANGIOGRAPHY CHEST: CPT

## 2023-11-21 PROCEDURE — 85025 COMPLETE CBC W/AUTO DIFF WBC: CPT

## 2023-11-21 PROCEDURE — 99233 SBSQ HOSP IP/OBS HIGH 50: CPT | Mod: GC | Performed by: INTERNAL MEDICINE

## 2023-11-21 PROCEDURE — 80053 COMPREHEN METABOLIC PANEL: CPT

## 2023-11-21 PROCEDURE — 84439 ASSAY OF FREE THYROXINE: CPT

## 2023-11-21 PROCEDURE — 97166 OT EVAL MOD COMPLEX 45 MIN: CPT

## 2023-11-21 PROCEDURE — 82962 GLUCOSE BLOOD TEST: CPT | Mod: 91

## 2023-11-21 PROCEDURE — A9270 NON-COVERED ITEM OR SERVICE: HCPCS | Performed by: HOSPITALIST

## 2023-11-21 PROCEDURE — 700102 HCHG RX REV CODE 250 W/ 637 OVERRIDE(OP): Performed by: HOSPITALIST

## 2023-11-21 PROCEDURE — 84443 ASSAY THYROID STIM HORMONE: CPT

## 2023-11-21 PROCEDURE — 700117 HCHG RX CONTRAST REV CODE 255: Performed by: INTERNAL MEDICINE

## 2023-11-21 PROCEDURE — 700111 HCHG RX REV CODE 636 W/ 250 OVERRIDE (IP): Mod: JZ | Performed by: HOSPITALIST

## 2023-11-21 PROCEDURE — 83605 ASSAY OF LACTIC ACID: CPT

## 2023-11-21 PROCEDURE — 770020 HCHG ROOM/CARE - TELE (206)

## 2023-11-21 PROCEDURE — 97162 PT EVAL MOD COMPLEX 30 MIN: CPT

## 2023-11-21 PROCEDURE — 36415 COLL VENOUS BLD VENIPUNCTURE: CPT

## 2023-11-21 RX ORDER — LEVOTHYROXINE SODIUM 0.1 MG/1
100 TABLET ORAL
Status: DISCONTINUED | OUTPATIENT
Start: 2023-11-22 | End: 2023-11-21

## 2023-11-21 RX ORDER — LEVOTHYROXINE SODIUM 0.15 MG/1
150 TABLET ORAL
Status: DISCONTINUED | OUTPATIENT
Start: 2023-11-22 | End: 2023-11-22 | Stop reason: HOSPADM

## 2023-11-21 RX ADMIN — DIVALPROEX SODIUM 1000 MG: 500 TABLET, DELAYED RELEASE ORAL at 20:39

## 2023-11-21 RX ADMIN — SERTRALINE 100 MG: 100 TABLET, FILM COATED ORAL at 06:00

## 2023-11-21 RX ADMIN — LEVOTHYROXINE SODIUM 175 MCG: 0.1 TABLET ORAL at 05:39

## 2023-11-21 RX ADMIN — DOXYCYCLINE 100 MG: 100 TABLET, FILM COATED ORAL at 05:38

## 2023-11-21 RX ADMIN — ATORVASTATIN CALCIUM 20 MG: 20 TABLET, FILM COATED ORAL at 20:39

## 2023-11-21 RX ADMIN — QUETIAPINE FUMARATE 150 MG: 50 TABLET ORAL at 05:39

## 2023-11-21 RX ADMIN — LISINOPRIL 20 MG: 20 TABLET ORAL at 05:39

## 2023-11-21 RX ADMIN — METOPROLOL TARTRATE 25 MG: 25 TABLET, FILM COATED ORAL at 17:49

## 2023-11-21 RX ADMIN — DONEPEZIL HYDROCHLORIDE 10 MG: 5 TABLET, FILM COATED ORAL at 05:38

## 2023-11-21 RX ADMIN — ARIPIPRAZOLE 20 MG: 10 TABLET ORAL at 20:38

## 2023-11-21 RX ADMIN — HALOPERIDOL LACTATE 1 MG: 5 INJECTION, SOLUTION INTRAMUSCULAR at 16:11

## 2023-11-21 RX ADMIN — SODIUM CHLORIDE, POTASSIUM CHLORIDE, SODIUM LACTATE AND CALCIUM CHLORIDE: 600; 310; 30; 20 INJECTION, SOLUTION INTRAVENOUS at 12:55

## 2023-11-21 RX ADMIN — QUETIAPINE FUMARATE 200 MG: 200 TABLET ORAL at 20:38

## 2023-11-21 RX ADMIN — IOHEXOL 50 ML: 350 INJECTION, SOLUTION INTRAVENOUS at 10:15

## 2023-11-21 RX ADMIN — METOPROLOL TARTRATE 25 MG: 25 TABLET, FILM COATED ORAL at 05:38

## 2023-11-21 ASSESSMENT — ENCOUNTER SYMPTOMS
FEVER: 0
SHORTNESS OF BREATH: 0
DIZZINESS: 0
SENSORY CHANGE: 0
CHILLS: 0
COUGH: 0
HEADACHES: 0

## 2023-11-21 ASSESSMENT — LIFESTYLE VARIABLES
ALCOHOL_USE: NO
AVERAGE NUMBER OF DAYS PER WEEK YOU HAVE A DRINK CONTAINING ALCOHOL: 0
ON A TYPICAL DAY WHEN YOU DRINK ALCOHOL HOW MANY DRINKS DO YOU HAVE: 0
EVER HAD A DRINK FIRST THING IN THE MORNING TO STEADY YOUR NERVES TO GET RID OF A HANGOVER: NO
DOES PATIENT WANT TO STOP DRINKING: NO
HOW MANY TIMES IN THE PAST YEAR HAVE YOU HAD 5 OR MORE DRINKS IN A DAY: 0
TOTAL SCORE: 0
TOTAL SCORE: 0
DOES PATIENT WANT TO STOP DRINKING: CANNOT ASSESS
CONSUMPTION TOTAL: NEGATIVE
HAVE YOU EVER FELT YOU SHOULD CUT DOWN ON YOUR DRINKING: NO
DOES PATIENT WANT TO STOP DRINKING: CANNOT ASSESS
EVER FELT BAD OR GUILTY ABOUT YOUR DRINKING: NO
DOES PATIENT WANT TO STOP DRINKING: CANNOT ASSESS
HAVE PEOPLE ANNOYED YOU BY CRITICIZING YOUR DRINKING: NO
TOTAL SCORE: 0

## 2023-11-21 ASSESSMENT — PATIENT HEALTH QUESTIONNAIRE - PHQ9
9. THOUGHTS THAT YOU WOULD BE BETTER OFF DEAD, OR OF HURTING YOURSELF: NOT AT ALL
6. FEELING BAD ABOUT YOURSELF - OR THAT YOU ARE A FAILURE OR HAVE LET YOURSELF OR YOUR FAMILY DOWN: NOT AL ALL
7. TROUBLE CONCENTRATING ON THINGS, SUCH AS READING THE NEWSPAPER OR WATCHING TELEVISION: NOT AT ALL
3. TROUBLE FALLING OR STAYING ASLEEP OR SLEEPING TOO MUCH: SEVERAL DAYS
8. MOVING OR SPEAKING SO SLOWLY THAT OTHER PEOPLE COULD HAVE NOTICED. OR THE OPPOSITE, BEING SO FIGETY OR RESTLESS THAT YOU HAVE BEEN MOVING AROUND A LOT MORE THAN USUAL: SEVERAL DAYS
4. FEELING TIRED OR HAVING LITTLE ENERGY: SEVERAL DAYS
5. POOR APPETITE OR OVEREATING: SEVERAL DAYS
SUM OF ALL RESPONSES TO PHQ9 QUESTIONS 1 AND 2: 1
2. FEELING DOWN, DEPRESSED, IRRITABLE, OR HOPELESS: SEVERAL DAYS
SUM OF ALL RESPONSES TO PHQ QUESTIONS 1-9: 5
1. LITTLE INTEREST OR PLEASURE IN DOING THINGS: NOT AT ALL

## 2023-11-21 ASSESSMENT — COGNITIVE AND FUNCTIONAL STATUS - GENERAL
DRESSING REGULAR UPPER BODY CLOTHING: A LITTLE
HELP NEEDED FOR BATHING: A LOT
TOILETING: A LOT
TURNING FROM BACK TO SIDE WHILE IN FLAT BAD: A LOT
PERSONAL GROOMING: A LITTLE
SUGGESTED CMS G CODE MODIFIER DAILY ACTIVITY: CK
MOVING FROM LYING ON BACK TO SITTING ON SIDE OF FLAT BED: UNABLE
CLIMB 3 TO 5 STEPS WITH RAILING: TOTAL
SUGGESTED CMS G CODE MODIFIER MOBILITY: CM
MOBILITY SCORE: 8
MOVING TO AND FROM BED TO CHAIR: UNABLE
WALKING IN HOSPITAL ROOM: TOTAL
EATING MEALS: A LITTLE
DRESSING REGULAR LOWER BODY CLOTHING: A LOT
DAILY ACTIVITIY SCORE: 15
STANDING UP FROM CHAIR USING ARMS: A LOT

## 2023-11-21 ASSESSMENT — GAIT ASSESSMENTS: GAIT LEVEL OF ASSIST: UNABLE TO PARTICIPATE

## 2023-11-21 ASSESSMENT — FIBROSIS 4 INDEX
FIB4 SCORE: 1.15
FIB4 SCORE: 1.45
FIB4 SCORE: 1.45

## 2023-11-21 ASSESSMENT — PAIN DESCRIPTION - PAIN TYPE: TYPE: ACUTE PAIN

## 2023-11-21 ASSESSMENT — ACTIVITIES OF DAILY LIVING (ADL): TOILETING: REQUIRES ASSIST

## 2023-11-21 NOTE — ED TRIAGE NOTES
"Chief Complaint   Patient presents with    Hypotension     Pt BIB EMS from her group home, per pt's home health nurse on scene pt had a BP of 70/40 and was lightheaded upon standing. Per EMS, pt's BP was 90 systolic upon arrival. Pt given 400 mL of NS by EMS during transport.      /65   Pulse (!) 114   Temp 36.4 °C (97.6 °F) (Temporal)   Resp 17   Ht 1.626 m (5' 4\")   Wt 49.9 kg (110 lb)   SpO2 94%   BMI 18.88 kg/m²       "

## 2023-11-21 NOTE — H&P
Hospital Medicine History & Physical Note    Date of Service  11/20/2023    Primary Care Physician  Whitney Graham M.D.    Consultants      Specialist Names:     Code Status  Full Code    Chief Complaint  Chief Complaint   Patient presents with    Hypotension     Pt BIB EMS from her group home, per pt's home health nurse on scene pt had a BP of 70/40 and was lightheaded upon standing. Per EMS, pt's BP was 90 systolic upon arrival. Pt given 400 mL of NS by EMS during transport.        History of Presenting Illness  Erlinda Verde is a 58 y.o. female who presented 11/20/2023 with past medical history of hypertension, diabetes, schizoaffective disorder, Parkinson disease history of pericardial effusion who lives in a group home that came for hypotension.  Patient states that she been feeling sick for the past 3 days.  She has a mild cough and shortness of breath.  She denies any fever, nausea, vomiting, diarrhea, abdominal pain.  The patient was admitted in the hospital in early October for a large pericardial effusion and pleural effusion.  She underwent a thoracentesis, diuresis.  She was treated for pneumonia at the time.  On 10/22 she did have a repeat cardiac echo that was negative for pericardial effusion.    Chest x-ray interpreted by me found a right lower lobe infiltrate    I discussed the plan of care with patient.    Review of Systems  Review of Systems   Constitutional:  Positive for chills. Negative for diaphoresis, fever and malaise/fatigue.   HENT:  Negative for congestion, ear discharge, ear pain, hearing loss, nosebleeds, sinus pain, sore throat and tinnitus.    Eyes:  Negative for blurred vision, double vision, photophobia and pain.   Respiratory:  Positive for cough and shortness of breath. Negative for hemoptysis, sputum production, wheezing and stridor.    Cardiovascular:  Negative for chest pain, palpitations, orthopnea, claudication, leg swelling and PND.   Gastrointestinal:  Negative for  abdominal pain, blood in stool, constipation, diarrhea, heartburn, melena, nausea and vomiting.   Genitourinary:  Negative for dysuria, flank pain, frequency, hematuria and urgency.   Musculoskeletal:  Negative for back pain, falls, joint pain, myalgias and neck pain.   Skin:  Negative for itching and rash.   Neurological:  Negative for dizziness, tingling, tremors, weakness and headaches.   Endo/Heme/Allergies:  Negative for environmental allergies and polydipsia. Does not bruise/bleed easily.   Psychiatric/Behavioral:  Negative for depression, hallucinations, substance abuse and suicidal ideas.        Past Medical History   has a past medical history of Diabetes (HCC), Dysthymic disorder, HTN (hypertension), Intellectual disability, Parkinsonism, and Schizoaffective disorder (HCC).    Surgical History   has a past surgical history that includes pr partial hip replacement (Left, 2/26/2022).     Family History  Family history reviewed with patient. There is no family history that is pertinent to the chief complaint.     Social History   reports that she has never smoked. She has never used smokeless tobacco. She reports that she does not drink alcohol and does not use drugs.    Allergies  Allergies   Allergen Reactions    Risperdal Unspecified     No reaction documented       Medications  Prior to Admission Medications   Prescriptions Last Dose Informant Patient Reported? Taking?   QUEtiapine Fumarate (SEROQUEL XR) 150 MG TABLET SR 24 HR  Other Facility Yes No   Sig: Take 150 mg by mouth every morning.   SYNJARDY XR 12.5-1000 MG TABLET SR 24 HR  Other Facility Yes No   Sig: Take 1 Tablet by mouth every morning.   aripiprazole (ABILIFY) 20 MG tablet  Other Facility Yes No   Sig: Take 20 mg by mouth at bedtime.   atorvastatin (LIPITOR) 20 MG Tab  Other Facility Yes No   Sig: Take 20 mg by mouth at bedtime.   divalproex (DEPAKOTE) 500 MG Tablet Delayed Response  Other Facility Yes No   Sig: Take 1,000 mg by mouth at  bedtime. 2 tablets = 1,000 mg.   donepezil (ARICEPT) 10 MG tablet  Other Facility Yes No   Sig: Take 10 mg by mouth every morning.   furosemide (LASIX) 40 MG Tab   No No   Sig: Take 1 Tablet by mouth every day.   glipiZIDE SR (GLUCOTROL) 5 MG TABLET SR 24 HR  Other Facility Yes No   Sig: Take 5 mg by mouth every morning.   ipratropium-albuterol (DUONEB) 0.5-2.5 (3) MG/3ML nebulizer solution   No No   Sig: Take 3 mL by nebulization every 6 hours as needed for Shortness of Breath (SOB).   levothyroxine (SYNTHROID) 175 MCG Tab   No No   Sig: Take 1 Tablet by mouth every morning on an empty stomach.   lisinopril (PRINIVIL) 20 MG Tab  Other Facility Yes No   Sig: Take 20 mg by mouth every morning.   metoprolol tartrate (LOPRESSOR) 25 MG Tab   No No   Sig: Take 1 Tablet by mouth 2 times a day.   quetiapine (SEROQUEL XR) 200 MG XR tablet  Other Facility Yes No   Sig: Take 200 mg by mouth at bedtime.   sertraline (ZOLOFT) 100 MG Tab  Other Facility Yes No   Sig: Take 100 mg by mouth every morning.   tolterodine ER (DETROL LA) 4 MG CAPSULE SR 24 HR  Other Facility Yes No   Sig: Take 4 mg by mouth every morning.      Facility-Administered Medications: None       Physical Exam  Temp:  [36.4 °C (97.6 °F)] 36.4 °C (97.6 °F)  Pulse:  [] 86  Resp:  [17-20] 20  BP: (111-147)/(63-72) 132/63  SpO2:  [89 %-95 %] 95 %  Blood Pressure: 132/63   Temperature: 36.4 °C (97.6 °F)   Pulse: 86   Respiration: 20   Pulse Oximetry: 95 %       Physical Exam  Vitals and nursing note reviewed.   Constitutional:       General: She is not in acute distress.     Appearance: Normal appearance. She is not ill-appearing, toxic-appearing or diaphoretic.   HENT:      Head: Normocephalic and atraumatic.      Nose: No congestion or rhinorrhea.      Mouth/Throat:      Pharynx: No oropharyngeal exudate or posterior oropharyngeal erythema.   Eyes:      General: No scleral icterus.  Neck:      Vascular: No carotid bruit or JVD.   Cardiovascular:      Rate  "and Rhythm: Normal rate and regular rhythm.      Pulses: Normal pulses.      Heart sounds: Normal heart sounds. No murmur heard.     No friction rub. No gallop.   Pulmonary:      Effort: Pulmonary effort is normal. No respiratory distress.      Breath sounds: No stridor. Rhonchi present. No wheezing or rales.   Abdominal:      General: Abdomen is flat. There is no distension.      Palpations: There is no mass.      Tenderness: There is no abdominal tenderness. There is no left CVA tenderness, guarding or rebound.      Hernia: No hernia is present.   Musculoskeletal:         General: No swelling. Normal range of motion.      Cervical back: No rigidity. No muscular tenderness.      Right lower leg: No edema.      Left lower leg: No edema.   Lymphadenopathy:      Cervical: No cervical adenopathy.   Skin:     General: Skin is warm and dry.      Capillary Refill: Capillary refill takes more than 3 seconds.      Coloration: Skin is not jaundiced or pale.      Findings: No bruising or erythema.   Neurological:      Mental Status: She is alert.         Laboratory:  Recent Labs     11/20/23  1720   WBC 6.6   RBC 4.02*   HEMOGLOBIN 10.1*   HEMATOCRIT 37.5   MCV 93.3   MCH 25.1*   MCHC 26.9*   RDW 64.0*   PLATELETCT 307   MPV 8.6*     Recent Labs     11/20/23  1720   SODIUM 141   POTASSIUM 4.8   CHLORIDE 109   CO2 19*   GLUCOSE 91   BUN 33*   CREATININE 0.48*   CALCIUM 8.1*     Recent Labs     11/20/23  1720   ALTSGPT 7   ASTSGOT 20   ALKPHOSPHAT 95   TBILIRUBIN <0.2   GLUCOSE 91         No results for input(s): \"NTPROBNP\" in the last 72 hours.      No results for input(s): \"TROPONINT\" in the last 72 hours.    Imaging:  DX-CHEST-PORTABLE (1 VIEW)   Final Result      1.  Right lower lobe pneumonitis and/or atelectasis.      EC-ECHOCARDIOGRAM COMPLETE W/O CONT    (Results Pending)       X-Ray:  I have personally reviewed the images and compared with prior images.    Assessment/Plan:  Justification for Admission Status  I " anticipate this patient will require at least two midnights for appropriate medical management, necessitating inpatient admission because hypotension    Patient will need a Med/Surg bed on MEDICAL service .  The need is secondary to hypotension.    * Pneumonia due to infectious organism- (present on admission)  Assessment & Plan  Right-sided infiltrate on chest x-ray  Start IV Unasyn and doxycycline  Sputum cultures and blood cultures    Acquired hypothyroidism- (present on admission)  Assessment & Plan  Continue home medications    Hypotension- (present on admission)  Assessment & Plan  Monitor for hypotension secondary to dehydration or sepsis  Continue IV hydration  Repeat cardiac echo to rule out pericardial effusion    Essential hypertension- (present on admission)  Assessment & Plan  Continue medications for now with holding parameters  Hold or discontinue Lasix    Diabetes (HCC)- (present on admission)  Assessment & Plan  Start on insulin sliding scale with serial Accu-Checks  Hypoglycemic protocol in place  Hemoglobin A1c 10.9    Schizophrenia (HCC)- (present on admission)  Assessment & Plan  Continue home medications  Haldol as needed        VTE prophylaxis: SCDs/TEDs

## 2023-11-21 NOTE — DOCUMENTATION QUERY
[facility]                                                                       Query Response Note      PATIENT:               JASSON MENDEZ  ACCT #:                  [id number]  MRN:                     [id number]  :                      1965  ADMIT DATE:       2023 4:45 PM  DISCH DATE:          RESPONDING  PROVIDER #:        [id number]           QUERY TEXT:    The diagnosis of sepsis has been documented in H&P and ED notes.     Please clarify the status of sepsis by providing SIRS criteria and sepsis-related organ dysfunction.        Sepsis - real or suspected infection plus 2 or more SIRS criteria + organ dysfunction related to sepsis    Temp <96.8 or >101  HR >90  RR >20  WBC <4,000 or > 12,000  >10% bandemia         The patient's Clinical Indicators include:  ED Note:    -- Does appear to be dehydrated with a BUN of 33 and lactate is elevated at 3.5   -- Concerned about sepsis unclear source but chest x-ray does show probable infiltrate with right-sided pneumonitis  -- Final Diagnosis: Sepsis without acute organ dysfunction, due to unspecified organism     Clinical Findings (): WBC 6.6, HR up to 120, RR 17-21, TMax 36.4, lactic acid 3.5, reported BP 70/40 prior to presentation  Risk Factors: Pneumonia due to infectious organism   Treatment: IV Unasyn, doxycycline, IVF resuscitation     Please contact me for any questions.    Thank you for time and attention,  KARYNA Quintana RN  [email or web address]  Contact via Voal[employer] Messenger  Options provided:   -- Sepsis exists, (please provide SIRS criteria plus sepsis-related organ dysfunction)   -- Sepsis does not exist   -- Other explanation, Please specify      Query created by: Hayley Felipe on 2023 12:24 PM    RESPONSE TEXT:    Sepsis does not exist          Electronically signed by:  JOLYNN SIMMONS MD 2023 12:30 PM

## 2023-11-21 NOTE — DIETARY
"Nutrition services: Day 1 of admit. Erlinda Verde is a 58 y.o. female with admitting DX of pneumonia d/t infectious organism.    Consult received for MST score of 2 for reported unintentional loss of 2-13 lb x 1 month and decreased appetite.    Did not meet w/ pt at this time as she is disoriented and confused per flowsheets. Interpolated information from past RD visits, w/ the most recent being on 10/12/23, including food preferences (including chocolate, sandwiches, macaroni and cheese, pancakes, and [place] toast) and wt hx.     Assessment:  Height: 162.6 cm (5' 4.02\")  Weight: 55.3 kg (121 lb 14.6 oz) taken via be scale on 11/21  Body mass index is 20.92 kg/m²., BMI classification: normal  Diet/Intake: regular / no PO yet recorded in ADLs    Evaluation:   Per chart review, pt has lost 19 lb (13.6%) in the past year and ~6 lb (4.7%) in the past 6 weeks, neither of which are significant losses.  Pt has gained wt since last discharge from [facility] on 10/25, whereby pt weighed 112 lb taken via bed scale  Labs: BUN 24, A1c  6.6 (taken 11/20)  Meds: SSI, lopressor  Last BM: 11/20    Malnutrition Risk: pt does not meet ASPEN criteria at this time.    Recommendations/Plan:  RD to monitor oral intake to determine adequacy and if supplementation is indicated  Add pt preferences in Comput[employer] program   Encourage intake of >50% of meals.  Document intake of all meals as % taken in ADL's to provide interdisciplinary communication across all shifts.   Monitor weight.  Nutrition rep will continue to see patient for ongoing meal and snack preferences.       RD following  "

## 2023-11-21 NOTE — FACE TO FACE
Face to Face Supporting Documentation - Home Health    The encounter with this patient was in whole or in part the primary reason for home health admission.    Date of encounter:   Patient:                    MRN:                       YOB: 2024  Erlinda Verde  [id number]  1965     Home health to see patient for:  Physical Therapy evaluation and treatment    Skilled need for:  Exacerbation of Chronic Disease State Recent hip ORIF     Skilled nursing interventions to include:  Comment: Assistance with daily activities     Homebound status evidenced by:  Needs the assistance of another person in order to leave the home. Leaving home requires a considerable and taxing effort. There is a normal inability to leave the home.    Community Physician to provide follow up care: Whitney Graham M.D.     Optional Interventions? No      I certify the face to face encounter for this home health care referral meets the CMS requirements and the encounter/clinical assessment with the patient was, in whole, or in part, for the medical condition(s) listed above, which is the primary reason for home health care. Based on my clinical findings: the service(s) are medically necessary, support the need for home health care, and the homebound criteria are met.  I certify that this patient has had a face to face encounter by myself.  Silvano Whitt M.D. - NPI: [id number]

## 2023-11-21 NOTE — ED NOTES
Bedside report from ROD Pickering    Pt is resting in bed with unlabored breathing. Call light within reach.  Gurney in low position, brakes locked.    Oxygen: Room air  Pulse ox: Yes  Cardiac monitor: Yes  Fall risk: Moderate  PIV: G20 Left hand with ongoing LR bolus

## 2023-11-21 NOTE — THERAPY
Physical Therapy   Initial Evaluation     Patient Name: Erlinda Verde  Age:  58 y.o., Sex:  female  Medical Record #: [id number]  Today's Date: 11/21/2023     Precautions  Precautions: (P) Fall Risk    Assessment  Patient is 58 y.o. female who presented 11/20/2023 for hypotension.   Found to have PNA  PMH: hypertension, diabetes, schizoaffective disorder, Parkinson disease, pericardial effusion, hypothyroidism    Patient seen for PT evaluation. Patient in bed, agreeable for the session with encouragement. Able to demonstrate functional mobility tasks as detailed below. Patient's baseline mobility is unclear at this time, will continue to follow up as able. Currently recommending  PT, however if patient is at baseline mobility, she may not require any PT services.     Plan    Physical Therapy Initial Treatment Plan   Treatment Plan : (P) Bed Mobility, Neuro Re-Education / Balance, Therapeutic Activities, Therapeutic Exercise  Treatment Frequency: (P) 3 Times per Week  Duration: (P) Until Therapy Goals Met    DC Equipment Recommendations: (P) Unable to determine at this time  Discharge Recommendations: (P) Recommend home health for continued physical therapy services (Return back to group home; If patient is not at her baseline mobility, will benefit from  PT.)    Objective       11/21/23 0923   Charge Group   PT Evaluation PT Evaluation Mod   Total Time Spent   PT Evaluation Time Spent (Mins) 15   Initial Contact Note    Initial Contact Note Order Received and Verified, Physical Therapy Evaluation in Progress with Full Report to Follow.   Precautions   Precautions Fall Risk   Vitals   O2 Delivery Device None - Room Air   Pain   Pain Scales 0 to 10 Scale    Intervention Declines   Prior Living Situation   Prior Services Continuous (24 Hour) Care Giving Per Service   Housing / Facility Group Home  ([facility])   Equipment Owned Front-Wheel Walker;Wheelchair   Lives with - Patient's Self Care  Capacity Attendant / Paid Care Giver   Prior Level of Functional Mobility   Comments Patient unable to confirm her baseline mobility, initially she mentioned that she is able to ambulate and later she reported that she is non ambulatory and is able to do transfers only. May have to confirm her PLOF.   Cognition    Cognition / Consciousness X   Orientation Level Not Oriented to Time;Not Oriented to Place;Not Oriented to Reason   Level of Consciousness Alert   Ability To Follow Commands 1 Step   Safety Awareness Impaired   New Learning Impaired   Attention Impaired   Sequencing Impaired   Initiation Impaired   Passive ROM Lower Body   Passive ROM Lower Body WDL   Active ROM Lower Body    Active ROM Lower Body  WDL   Strength Lower Body   Lower Body Strength  X   Comments Grossly BLE 3/5   Balance Assessment   Sitting Balance (Static) Fair -   Sitting Balance (Dynamic) Poor +   Standing Balance (Static) Poor +   Standing Balance (Dynamic) Poor   Weight Shift Sitting Fair   Weight Shift Standing Poor   Comments Seated EOB; Standing with hand held assist   Bed Mobility    Supine to Sit Maximal Assist   Scooting Maximal Assist   Gait Analysis   Gait Level Of Assist Unable to Participate   Comments Attempted to take steps, but noticed increased difficulty   Functional Mobility   Sit to Stand Maximal Assist   Bed, Chair, Wheelchair Transfer Maximal Assist   Transfer Method Stand Pivot   Mobility Bed-transport chair   How much difficulty does the patient currently have...   Turning over in bed (including adjusting bedclothes, sheets and blankets)? 2   Sitting down on and standing up from a chair with arms (e.g., wheelchair, bedside commode, etc.) 1   Moving from lying on back to sitting on the side of the bed? 1   How much help from another person does the patient currently need...   Moving to and from a bed to a chair (including a wheelchair)? 2   Need to walk in a hospital room? 1   Climbing 3-5 steps with a railing? 1   6  clicks Mobility Score 8   Activity Tolerance   Sitting in Chair Post session   Patient / Family Goals    Patient / Family Goal #1 To return home   Short Term Goals    Short Term Goal # 1 Patient will perform supine-sit with HOB flat with CGA in 6 visits   Short Term Goal # 2 Patient will perform sit-stand and chair transfers with LRAD with CGA in 6 visits   Education Group   Education Provided Role of Physical Therapist   Role of Physical Therapist Patient Response Patient;Acceptance;Explanation;Verbal Demonstration;Reinforcement Needed;No Learning Evidence;Teaching Refused   Physical Therapy Initial Treatment Plan    Treatment Plan  Bed Mobility;Neuro Re-Education / Balance;Therapeutic Activities;Therapeutic Exercise   Treatment Frequency 3 Times per Week   Duration Until Therapy Goals Met   Problem List    Problems Impaired Bed Mobility;Impaired Transfers;Impaired Balance;Decreased Activity Tolerance;Safety Awareness Deficits / Cognition;Motor Planning / Sequencing   Anticipated Discharge Equipment and Recommendations   DC Equipment Recommendations Unable to determine at this time   Discharge Recommendations Recommend home health for continued physical therapy services  (Return back to group home; If patient is not at her baseline mobility, will benefit from  PT.)   Interdisciplinary Plan of Care Collaboration   IDT Collaboration with  Nursing;Occupational Therapist   Patient Position at End of Therapy Seated  (Patient going for imaging)   Session Information   Date / Session Number  11/21-1(1/3, 11/27)     Per PT evaluation from Feb 2022, Patient was independent with transfers-squat pivot transfers to  & was non ambulatory.

## 2023-11-21 NOTE — DISCHARGE PLANNING
"Care Transition Team Assessment    Information Source  Who is responsible for making decisions for patient? : Guardian  Name(s) of Primary Decision Maker: Ann Urias [phone], h- [phone], w- [phone]    Readmission Evaluation  Is this a readmission?: Yes - unplanned readmission  Why do you think you were readmitted?: pneumonia  Was an appointment arranged for you prior to discharge?: Yes, attended appointment  Were there new prescriptions you were supposed to fill after you were discharged?: Yes, prescriptions filled  Did you understand your discharge instructions?: Yes  Did you have enough support after your last discharge?: Yes    Elopement Risk  Legal Hold: No  Ambulatory or Self Mobile in Wheelchair: No-Not an Elopement Risk    Interdisciplinary Discharge Planning  Lives with - Patient's Self Care Capacity: Other (Comments) ([facility])  Patient or legal guardian wants to designate a caregiver: Yes (Pt lives in group home)  Caregiver name: [facility] \"[place] [phone]\"  Support Systems: Other (Comments)  Housing / Facility: residential  Name of Care Facility: [facility] [phone]  Durable Medical Equipment: Unknown    Discharge Preparedness  What is your plan after discharge?: Group home, Home with help  What are your discharge supports?: Guardian, Other (comment) (Group Home Staff)  Prior Functional Level: Ambulatory, Needs Assist with Medication Management, Needs Assist with Activities of Daily Living    Functional Assesment  Prior Functional Level: Ambulatory, Needs Assist with Medication Management, Needs Assist with Activities of Daily Living    Finances  Financial Barriers to Discharge: No  Prescription Coverage: Yes    Vision / Hearing Impairment  Vision Impairment : No  Hearing Impairment : No    Advance Directive  Advance Directive?: None    Domestic Abuse  Have you ever been the victim of abuse or violence?: " Refused  Physical Abuse or Sexual Abuse: Unable to Assess due to Patient Condition  Verbal Abuse or Emotional Abuse: Unable to Assess due to Patient Condition  Possible Abuse/Neglect Reported to:: Case Management    Psychological Assessment  History of Substance Abuse: None (Patient reports that she has never smoked. She has never used smokeless tobacco. She reports that she does not drink alcohol and does not use drugs.)  History of Psychiatric Problems: Yes (Schizophrenia; Moderate Intellectual Disability; Dysthymic Disorder; Injury, self-inficted)  Non-compliant with Treatment: No  Newly Diagnosed Illness: Yes    Discharge Risks or Barriers  Discharge risks or barriers?: Mental health, Complex medical needs  Patient risk factors: Cognitive / sensory / physical deficit, Complex medical needs, Mental health, Polypharmacy (>7 prescriptions), Readmission, Vulnerable adult    Anticipated Discharge Information  Discharge Disposition: D/T to facility providing FDC/supportive care (04)  Discharge Address: [facility] at [address] [place] [address] [place] [address]  Discharge Contact Phone Number: Dianne Hensley (owner) [phone]  Ann Urias (guardian): [phone]      Case Management Discharge Planning    Admission Date: 11/20/2023  GMLOS: 5.1  ALOS: 1    6-Clicks ADL Score: 17  6-Clicks Mobility Score: 14  PT and/or OT Eval ordered: Yes  Post-acute Referrals Ordered: No, pending PT/OT evaluations and recommendations  Post-acute Choice Obtained: No  Has referral(s) been sent to post-acute provider:  No, pending PT/OT evaluations and recommendations      Anticipated Discharge Dispo: Discharge Disposition: D/T to facility providing FDC/supportive care (04)  Discharge Address: [facility] at [address] [place] [address] [place] [address]  Discharge Contact Phone Number: Dianne Hensley (owner) [phone]  Ann Urias (guardian): [phone]    DME Needed: Unable  "to determine at this time. Has a wheelchair and walker that she uses.    Action(s) Taken: Updated Provider/Nurse on Discharge Plan and OTHER: RN CM attended IDT rounds and collaborated with the team regarding discharge planning needs and barriers. RN CM made call to Ann Urias, public guardian, # [phone] to discuss discharge plans and obtain current level of care.    Ann indicates that patient has been under a public guardian for \"decades\". She states that patient was using a walker at baseline and has declined to now using a wheelchair with her increased hospitalizations and prior hip injury/repair after a fall in the bathroom of her previous group home. She reports the patient is weight bearing and able to transfer herself. She propels the wheelchair with her feet. She reports patient is normally non-combative, exhibits no behaviors or adverse psych issues, and is very compliant with care. States patient is pleasant and friendly at baseline.    Ann indicates patient lives in Group Home run by [facility] [employer], which is owned by Dianne Hensley #[phone]. She reports patient has been doing much better there than previous group home. She asks that she be notified of any moves or pending discharge.     Escalations Completed: Provider and Bedside RN    Medically Clear: No    Next Steps: Follow-up with the Attending and Bedside RN for any issues/changes and update the discharge plan accordingly.    Barriers to Discharge: Medical clearance, Pending PT Evaluation, and Pending Procedures    Is the patient up for discharge tomorrow: No        "

## 2023-11-21 NOTE — PROGRESS NOTES
Pt extremely tired eyes look heavy.  Pt continues to call out and is arguing with someone, periodically yelling profanity, at one point she was hitting herself in the head with her fist. Pt is coherent enough to ask to be cleaned up after she soils herself.  Pt oxygen saturation 88% on RA. 1.5 L NC applied Pt 02 sat 94%.

## 2023-11-21 NOTE — ASSESSMENT & PLAN NOTE
Differential includes sepsis, pulmonary embolism, poor oral intake.  -CT PE ruled out pulmonary embolism  -Sepsis is less likely given there is low suspicion for ongoing infectious process.  Follow blood cultures  -Continue IV fluids.  LR at 83 mL/h

## 2023-11-21 NOTE — ED NOTES
Med rec updated and complete. Allergies reviewed.   Placed call to Dianne ( [phone]) from [facility].  Updated med rec per 3 way speaker phone call with caregiver(s) at group [place].    No recent antibiotic use in last 30 days.  No anticoagulant or antiplatelet  medications.    Pt takes both tolterodine and oxybutynin    Recent changes in some of the patients medications.      Home pharmacy [facility]   [phone]

## 2023-11-21 NOTE — PROGRESS NOTES
Spoke with Alma ([phone]) her state appointed caregiver and provided update. Admit completed with her help as well.

## 2023-11-21 NOTE — PROGRESS NOTES
4 Eyes Skin Assessment Completed by ROD Baires and Markos RN.    Head WDL  Ears WDL  Nose WDL  Mouth WDL  Neck WDL  Breast/Chest WDL  Shoulder Blades WDL  Spine WDL  (R) Arm/Elbow/Hand WDL  (L) Arm/Elbow/Hand WDL  Abdomen WDL  Groin WDL  Scrotum/Coccyx/Buttocks WDL  (R) Leg Bruising and Edema  (L) Leg Bruising and Edema  (R) Heel/Foot/Toe Edema  (L) Heel/Foot/Toe Edema          Devices In Places Tele Box      Interventions In Place Waffle Overlay    Possible Skin Injury No    Pictures Uploaded Into Epic N/A  Wound Consult Placed N/A  RN Wound Prevention Protocol Ordered Yes eyes

## 2023-11-21 NOTE — ASSESSMENT & PLAN NOTE
Suspicious for pneumonitis.  There is low suspicion for pneumonia as patient does not have a white blood cell count, is asymptomatic, and procalcitonin is unremarkable.  -Discontinue antibiotics   -Continue to monitor

## 2023-11-21 NOTE — HOSPITAL COURSE
Erlinda Verde is a 58-year-old female with past medical history of hypertension, type 2 diabetes, schizoaffective disorder, Parkinson's disease and a history of a pericardial effusion was admitted to the hospital on 11/20/2023 secondary to dizziness and hypotension.  She underwent a chest x-ray upon presentation which demonstrated possible pneumonitis.  She was hypoglycemic to 37 upon presentation and had a lactic acid level of 3.8.  Her lactic acidosis improved with IV fluid resuscitation.

## 2023-11-21 NOTE — ED PROVIDER NOTES
ER Provider Note    Scribed for Timmy Huitron M.d. by Traci Temple. 11/20/2023  5:02 PM    Primary Care Provider: Whitney Graham M.D.    CHIEF COMPLAINT  Chief Complaint   Patient presents with    Hypotension     Pt BIB EMS from her group home, per pt's home health nurse on scene pt had a BP of 70/40 and was lightheaded upon standing. Per EMS, pt's BP was 90 systolic upon arrival. Pt given 400 mL of NS by EMS during transport.      EXTERNAL RECORDS REVIEWED  Inpatient Notes reviewed admission to [facility]: October 25    HPI/ROS  LIMITATION TO HISTORY   Select: Altered mental status / Confusion  OUTSIDE HISTORIAN(S):  None    Erlinda Verde is a 58 y.o. female with schizophrenia who presents to the ED via EMS complaining of hypotension onset earlier today. The patient is currently residing in a nursing home and today when her home health nurse took her blood pressure it was 70/40 prompting them to call EMS. The patient was given 400 mL NS en route to the ED. In the ED, the patient's blood pressure is 112/65. Upon evaluation the patient was very confused and became distressed after being asked questions. She was unable to answer any questions about her medical history. The patient is allergic to Risperdal.    PAST MEDICAL HISTORY  Past Medical History:   Diagnosis Date    Diabetes (HCC)     Dysthymic disorder     HTN (hypertension)     Intellectual disability     moderate    Parkinsonism     Schizoaffective disorder (HCC)        SURGICAL HISTORY  Past Surgical History:   Procedure Laterality Date    PB PARTIAL HIP REPLACEMENT Left 2/26/2022    Procedure: HEMIARTHROPLASTY, HIP;  Surgeon: Ubaldo Marquis M.D.;  Location: SURGERY [facility];  Service: Orthopedics       FAMILY HISTORY  History reviewed. No pertinent family history.    SOCIAL HISTORY   reports that she has never smoked. She has never used smokeless tobacco. She reports that she does not drink alcohol and does not use  "drugs.    CURRENT MEDICATIONS  Previous Medications    ARIPIPRAZOLE (ABILIFY) 20 MG TABLET    Take 20 mg by mouth at bedtime.    ATORVASTATIN (LIPITOR) 20 MG TAB    Take 20 mg by mouth at bedtime.    DIVALPROEX (DEPAKOTE) 500 MG TABLET DELAYED RESPONSE    Take 1,000 mg by mouth at bedtime. 2 tablets = 1,000 mg.    DONEPEZIL (ARICEPT) 10 MG TABLET    Take 10 mg by mouth every morning.    FUROSEMIDE (LASIX) 40 MG TAB    Take 1 Tablet by mouth every day.    GLIPIZIDE SR (GLUCOTROL) 5 MG TABLET SR 24 HR    Take 5 mg by mouth every morning.    IPRATROPIUM-ALBUTEROL (DUONEB) 0.5-2.5 (3) MG/3ML NEBULIZER SOLUTION    Take 3 mL by nebulization every 6 hours as needed for Shortness of Breath (SOB).    LEVOTHYROXINE (SYNTHROID) 175 MCG TAB    Take 1 Tablet by mouth every morning on an empty stomach.    LISINOPRIL (PRINIVIL) 20 MG TAB    Take 20 mg by mouth every morning.    METOPROLOL TARTRATE (LOPRESSOR) 25 MG TAB    Take 1 Tablet by mouth 2 times a day.    QUETIAPINE (SEROQUEL XR) 200 MG XR TABLET    Take 200 mg by mouth at bedtime.    QUETIAPINE FUMARATE (SEROQUEL XR) 150 MG TABLET SR 24 HR    Take 150 mg by mouth every morning.    SERTRALINE (ZOLOFT) 100 MG TAB    Take 100 mg by mouth every morning.    SYNJARDY XR 12.5-1000 MG TABLET SR 24 HR    Take 1 Tablet by mouth every morning.    TOLTERODINE ER (DETROL LA) 4 MG CAPSULE SR 24 HR    Take 4 mg by mouth every morning.       ALLERGIES  Risperdal    PHYSICAL EXAM  /65   Pulse (!) 114   Temp 36.4 °C (97.6 °F) (Temporal)   Resp 17   Ht 1.626 m (5' 4\")   Wt 49.9 kg (110 lb)   SpO2 94%   BMI 18.88 kg/m²     Constitutional: Thin appearing, appears older than stated age, No acute distress, Non-toxic appearance. Agitated and confused, chronically ill appearing.  Patient does have frequent outbursts yelling and screaming and exam was quite difficult as result  HENT: Normocephalic, Atraumatic, Bilateral external ears normal, Oropharynx is clear mucous membranes are " moist. No oral exudates or nasal discharge.   Eyes: Pupils are equal round and reactive, EOMI, Conjunctiva normal, No discharge.   Neck: Normal range of motion, No tenderness, Supple, No stridor. No meningismus.  Lymphatic: No lymphadenopathy noted.   Cardiovascular: Tachycardic rate and regular rhythm without murmur rub or gallop.  Thorax & Lungs: Clear breath sounds bilaterally without wheezes, rhonchi or rales. There is no chest wall tenderness.   Abdomen: Soft non-tender non-distended. There is no rebound or guarding. No organomegaly is appreciated. Bowel sounds are normal.  Skin: Normal without rash.   Back: No CVA or spinal tenderness.   Extremities: Intact distal pulses, No edema, No tenderness, No cyanosis, No clubbing. Capillary refill is less than 2 seconds.  Musculoskeletal: Good range of motion in all major joints. No tenderness to palpation or major deformities noted.   Neurologic: Alert & oriented x 1, Normal motor function, Normal sensory function, No focal deficits noted. Reflexes are normal.  Psychiatric: Affect normal, Judgment normal, Mood normal. There is no suicidal ideation or patient reported hallucinations.      DIAGNOSTIC STUDIES    Labs:   Labs Reviewed   LACTIC ACID - Abnormal; Notable for the following components:       Result Value    Lactic Acid 3.5 (*)     All other components within normal limits   CBC WITH DIFFERENTIAL - Abnormal; Notable for the following components:    RBC 4.02 (*)     Hemoglobin 10.1 (*)     MCH 25.1 (*)     MCHC 26.9 (*)     RDW 64.0 (*)     MPV 8.6 (*)     All other components within normal limits   COMP METABOLIC PANEL - Abnormal; Notable for the following components:    Co2 19 (*)     Bun 33 (*)     Creatinine 0.48 (*)     Calcium 8.1 (*)     Albumin 2.9 (*)     Globulin 3.6 (*)     All other components within normal limits   ESTIMATED GFR   PLATELET ESTIMATE   MORPHOLOGY   PERIPHERAL SMEAR REVIEW   DIFFERENTIAL COMMENT   LACTIC ACID   LACTIC ACID   URINALYSIS  "  URINE CULTURE(NEW)   BLOOD CULTURE    Narrative:     Per Hospital Policy: Only change Specimen Src: to \"Line\" if  specified by physician order.  Release to patient->Immediate   BLOOD CULTURE    Narrative:     Per Hospital Policy: Only change Specimen Src: to \"Line\" if  specified by physician order.  Release to patient->Immediate     Radiology:   The attending emergency physician has independently interpreted the diagnostic imaging associated with this visit and am waiting the final reading from the radiologist.   Preliminary interpretation is a follows: Possible pneumonia right lower lobe    Radiologist interpretation:   DX-CHEST-PORTABLE (1 VIEW)   Final Result      1.  Right lower lobe pneumonitis and/or atelectasis.           COURSE & MEDICAL DECISION MAKING     ED Observation Status? Yes; I am placing the patient in to an observation status due to a diagnostic uncertainty as well as therapeutic intensity. Patient placed in observation status at 5:09 PM, 11/20/2023.     Observation plan is as follows: Monitor for symptom management and diagnostic results.    Upon Reevaluation, the patient's condition has: not improved; and will be escalated to hospitalization.    Patient discharged from ED Observation status at 8: 10 PM (Time) 11/20/2023 (Date).     INITIAL ASSESSMENT, COURSE AND PLAN  Care Narrative:     5:48 PM - Patient presents to the ED with hypotension. Ordered for labs and imaging to evaluate her symptoms.      6:33 PM - The nurse informed me that the patient is being combative.    6:38 PM - I reviewed the patient's imaging results which show pneumonitis and/or atelectasis.    6:45 PM - Patient will be treated with infusion bolus, 25 mg Benadryl, 1 mg Ativan, and 5 mg Haldol.  Medication was helpful given is compliant so that we could perform straight cath for urine which turned out to be clear and is pending further analysis.    Laboratory evaluation reveals no leukocytosis, shift, anemia, electrolyte " derangements or acidosis.  No evidence of significant renal derangement however does appear to be dehydrated with a BUN of 33 and lactate is elevated at 3.5 of concern.  Concerned about sepsis unclear source but chest x-ray does show probable infiltrate with right-sided pneumonitis    Obtaining urinalysis was quite difficult as many cath could not be done with the patient being combative.  We needed to sedate her with Haldol and Ativan as well as Benadryl and thereafter we could obtain urine which did not show any cloudy character and is unlikely source.  Working diagnosis is likely pneumonia with some mild hypoxia seen best as we come back in the room with her sedate she is about 89% with good waveform    CRITICAL CARE  The very real possibilty of a deterioration of this patient's condition required the highest level of my preparedness for sudden, emergent intervention.  I provided critical care services, which included medication orders, frequent reevaluations of the patient's condition and response to treatment, ordering and reviewing test results, and discussing the case with various consultants.  The critical care time associated with the care of the patient was 30 minutes. Review chart for interventions. This time is exclusive of any other billable procedures.      HYDRATION: Based on the patient's presentation of Sepsis the patient was given IV fluids. IV Hydration was used because oral hydration was not adequate alone. Upon recheck following hydration, the patient was feeling improved.        DISPOSITION AND DISCUSSIONS  I have discussed management of the patient with the following physicians and JOSE ALFREDO's: Spoke with hospitalist about 8:10 PM for admission    Discussion of management with other QHP or appropriate source(s): RT Oxygen and respiratory consult and Radiologist reviewed imaging        FINAL DIAGNOSIS  1. Sepsis without acute organ dysfunction, due to unspecified organism (HCC)    2. Elevated lactic  acid level    3. Schizophrenia, unspecified type (HCC)    4. Pneumonia of right lower lobe due to infectious organism      5.  Critical care time: 30 minutes     Traci CORNEJO (Scribe), am scribing for, and in the presence of, Timmy Huitron M.D..    Electronically signed by: Traci Temple (Scribe), 11/20/2023    Timmy CORNEJO M.D. personally performed the services described in this documentation, as scribed by Traci Temple in my presence, and it is both accurate and complete.   The note accurately reflects work and decisions made by me.  Timmy Huitron M.D.  11/20/2023  8:07 PM

## 2023-11-21 NOTE — DISCHARGE SUMMARY
[facility] Internal Medicine Discharge Summary    Attending: Wili Henriquez M.d.  Senior Resident: Dr. Garcia  Intern:  Dr. Whitt  Contact Number: [phone]    CHIEF COMPLAINT ON ADMISSION  Chief Complaint   Patient presents with    Hypotension     Pt BIB EMS from her group home, per pt's home health nurse on scene pt had a BP of 70/40 and was lightheaded upon standing. Per EMS, pt's BP was 90 systolic upon arrival. Pt given 400 mL of NS by EMS during transport.        Reason for Admission  ems     Admission Date  11/20/2023    CODE STATUS  Full Code    HPI & HOSPITAL COURSE  Erlinda Verde is a 57 y/o woman with a PMHx of hypertension, cognitive delay, DMII, schizoaffective disorder, parkinson's disease who was admitted on 11/20/2023 for dizziness and hypotension.  She had a recent admission pericardial effusion and recurrent pleural effusion, which seemed to be associated with a pneumonia on 10/12-10/25.  She was discharged to an LTAC following this hospitalization, and then discharged to a group home on 11/13/2023.  While at her group home, she had an episode of lightheadedness upon standing, and fell down.  She was found to be hypotensive and EMS was called.  She was given a fluid bolus and blood pressures improved.  Her lactic acids were initially elevated, without clear indication as to why, but did resolve and return to normal.  She was initially treated for pneumonia, procalcitonin resulted negative and antibiotics were stopped.  A CTA of the chest showed no PE, but did show recurrence of her pleural effusions.  Her hypotension is likely a result of polypharmacy and dehydration in the setting of recent transfer to group home with history of cognitive delay.  Lisinopril, glipizide have been discontinued and she will need close outpatient follow-up for blood pressure and diabetes control.  Glipizide has been discontinued for hypoglycemia upon presentation.  At this time, she is stable and appropriate for discharge  back to her group home with home health physical therapy.       Therefore, she is discharged in fair and stable condition to home with organized home healthcare and close outpatient follow-up.    The patient met 2-midnight criteria for an inpatient stay at the time of discharge.    Discharge Date  11/22/2023    Physical Exam on Day of Discharge  Physical Exam  Vitals and nursing note reviewed.   Constitutional:       General: She is not in acute distress.  HENT:      Mouth/Throat:      Mouth: Mucous membranes are moist.      Pharynx: Oropharynx is clear.   Eyes:      General: No scleral icterus.        Right eye: No discharge.         Left eye: No discharge.   Cardiovascular:      Rate and Rhythm: Normal rate and regular rhythm.      Pulses: Normal pulses.      Heart sounds: Normal heart sounds.   Pulmonary:      Effort: Pulmonary effort is normal.      Breath sounds: Normal breath sounds.   Abdominal:      General: There is no distension.      Tenderness: There is no abdominal tenderness.   Musculoskeletal:      Right lower leg: No edema.      Left lower leg: No edema.      Comments: Significantly tender to touch at right ankle    Skin:     General: Skin is warm and dry.   Neurological:      Mental Status: She is oriented to person, place, and time.         FOLLOW UP ITEMS POST DISCHARGE  PCP follow-up for medication, particularly blood pressure management.  PCP follow-up for diabetes management.  PCP or psychiatry follow-up for psych medication management, many of these can contribute to orthostatic hypotension.    Will need close outpatient follow-up for pleural effusion management, and whether or not to resume lasix.      DISCHARGE DIAGNOSES  Principal Problem:    Pulmonary infiltrates on CXR (POA: Yes)  Active Problems:    Schizophrenia (HCC) (POA: Yes)    Diabetes (HCC) (POA: Yes)    Essential hypertension (POA: Yes)    Hypotension (POA: Yes)    Acquired hypothyroidism (POA: Yes)  Resolved Problems:     Parkinson's disease (tremor, stiffness, slow motion, unstable posture) (POA: Yes)      FOLLOW UP  No future appointments.  No follow-up provider specified.    MEDICATIONS ON DISCHARGE     Medication List        START taking these medications        Instructions   metoprolol tartrate 25 MG Tabs  Commonly known as: Lopressor   Take 1 Tablet by mouth 2 times a day for 30 days.  Dose: 25 mg            CHANGE how you take these medications        Instructions   levothyroxine 150 MCG Tabs  What changed:   medication strength  how much to take  Commonly known as: Synthroid   Take 1 Tablet by mouth every morning on an empty stomach for 30 days.  Dose: 150 mcg            CONTINUE taking these medications        Instructions   acetaminophen 325 MG Tabs  Commonly known as: Tylenol   Take 650 mg by mouth 3 times a day. * scheduled*  Dose: 650 mg     aripiprazole 20 MG tablet  Commonly known as: Abilify   Take 20 mg by mouth at bedtime.  Dose: 20 mg     atorvastatin 20 MG Tabs  Commonly known as: Lipitor   Take 20 mg by mouth at bedtime.  Dose: 20 mg     divalproex 500 MG Tbec  Commonly known as: Depakote   Take 1,000 mg by mouth at bedtime. 500 mg x 2 tablets  = 1000 mg  Dose: 1,000 mg     donepezil 10 MG tablet  Commonly known as: Aricept   Take 10 mg by mouth every morning.  Dose: 10 mg     ipratropium-albuterol 0.5-2.5 (3) MG/3ML nebulizer solution  Commonly known as: Duoneb   Take 3 mL by nebulization every 6 hours as needed for Shortness of Breath (SOB).  Dose: 3 mL     nystatin powder  Commonly known as: Mycostatin   Apply 1 Application topically 2 times a day.  Dose: 1 Application     oxybutynin 5 MG Tabs  Commonly known as: Ditropan   Take 5 mg by mouth 2 times a day.  Dose: 5 mg     * SEROquel  MG Tb24  Generic drug: QUEtiapine Fumarate   Take 150 mg by mouth every morning.  Dose: 150 mg     * quetiapine 200 MG XR tablet  Commonly known as: SEROquel XR   Take 200 mg by mouth at bedtime.  Dose: 200 mg      sertraline 100 MG Tabs  Commonly known as: Zoloft   Take 100 mg by mouth every morning.  Dose: 100 mg     Synjardy XR 12.5-1000 MG Tb24  Generic drug: Empagliflozin-metFORMIN HCl ER   Take 2 Tablets by mouth every morning.  Dose: 2 Tablet           * This list has 2 medication(s) that are the same as other medications prescribed for you. Read the directions carefully, and ask your doctor or other care provider to review them with you.                STOP taking these medications      furosemide 40 MG Tabs  Commonly known as: Lasix     glipiZIDE ER 5 MG Tb24  Commonly known as: Glucotrol XL     lisinopril 20 MG Tabs  Commonly known as: Prinivil     metoprolol-hydrochlorothiazide 50-25 MG per tablet  Commonly known as: Lopressor HCT     tolterodine ER 4 MG Cp24  Commonly known as: Detrol-LA              Allergies  Allergies   Allergen Reactions    Risperidone Unspecified     Unknown reaction per group home       DIET  Orders Placed This Encounter   Procedures    Diet Order Diet: Regular     Standing Status:   Standing     Number of Occurrences:   1     Order Specific Question:   Diet:     Answer:   Regular [1]       ACTIVITY  As tolerated.  Weight bearing as tolerated    CONSULTATIONS  N/a    PROCEDURES  N/a    LABORATORY  Lab Results   Component Value Date    SODIUM 141 11/21/2023    POTASSIUM 4.2 11/21/2023    CHLORIDE 107 11/21/2023    CO2 19 (L) 11/21/2023    GLUCOSE 115 (H) 11/21/2023    BUN 24 (H) 11/21/2023    CREATININE 0.52 11/21/2023        Lab Results   Component Value Date    WBC 8.7 11/21/2023    HEMOGLOBIN 10.3 (L) 11/21/2023    HEMATOCRIT 36.5 (L) 11/21/2023    PLATELETCT 272 11/21/2023        Total time of the discharge process exceeds 35 minutes.

## 2023-11-21 NOTE — ASSESSMENT & PLAN NOTE
Patient found to have a TSH of 0.080 indicating overtreatment of her hypothyroidism.  Likely contributory to her tachycardia.  -Decreasing Synthroid dose to 150 mcg

## 2023-11-21 NOTE — PROGRESS NOTES
[facility] Internal Medicine Daily Progress Note    Date of Service  11/21/2023    R Team: R IM Green Team   Attending: Wili Henriquez M.d.  Senior Resident: Dr. Garcia  Intern:  Dr. Whitt  Contact Number: [phone]    Chief Complaint  Erlinda Verde is a 58 y.o. female admitted 11/20/2023 with possible pneumonia.     Hospital Course  Erlinda Verde is a 58-year-old female with past medical history of hypertension, type 2 diabetes, schizoaffective disorder, Parkinson's disease and a history of a pericardial effusion was admitted to the hospital on 11/20/2023 secondary to dizziness and hypotension.  She underwent a chest x-ray upon presentation which demonstrated possible pneumonitis.  She was hypoglycemic to 37 upon presentation and had a lactic acid level of 3.8.  Her lactic acidosis improved with IV fluid resuscitation.    Interval Problem Update  Patient was combative in the emergency department and had to be given Haldol.  She was sleepy while I was present in the room.  She reported she felt fine and does not feel sick.  She denies feeling dizzy. She reported she would like to go home today.   -Ordered right foot x-ray as patient is significantly tender to touch near her lateral malleolus     I have discussed this patient's plan of care and discharge plan at IDT rounds today with Case Management, Nursing, Nursing leadership, and other members of the IDT team.    Consultants/Specialty  None    Code Status  Full Code    Disposition  The patient is not medically cleared for discharge to home or a post-acute facility.      I have placed the appropriate orders for post-discharge needs.    Review of Systems  Review of Systems   Constitutional:  Negative for chills and fever.   Respiratory:  Negative for cough and shortness of breath.    Cardiovascular:  Negative for chest pain.   Neurological:  Negative for dizziness, sensory change and headaches.        Physical Exam  Temp:  [36.1 °C (96.9 °F)-36.4 °C (97.6 °F)]  36.4 °C (97.5 °F)  Pulse:  [] 95  Resp:  [17-21] 18  BP: (103-147)/(63-78) 103/66  SpO2:  [89 %-95 %] 95 %    Physical Exam  Constitutional:       General: She is not in acute distress.  Eyes:      General: No scleral icterus.        Right eye: No discharge.         Left eye: No discharge.   Cardiovascular:      Rate and Rhythm: Regular rhythm. Tachycardia present.      Pulses: Normal pulses.      Heart sounds: Normal heart sounds.   Pulmonary:      Effort: Pulmonary effort is normal.      Breath sounds: Normal breath sounds.   Abdominal:      General: There is no distension.      Tenderness: There is no abdominal tenderness.   Musculoskeletal:      Right lower leg: No edema.      Left lower leg: No edema.      Comments: Significantly tender to touch at right ankle    Skin:     General: Skin is warm and dry.   Neurological:      Mental Status: She is oriented to person, place, and time.         Fluids    Intake/Output Summary (Last 24 hours) at 11/21/2023 0743  Last data filed at 11/21/2023 0527  Gross per 24 hour   Intake 3192 ml   Output 1 ml   Net 3191 ml       Laboratory  Recent Labs     11/20/23  1720 11/21/23  0036   WBC 6.6 8.7   RBC 4.02* 4.10*   HEMOGLOBIN 10.1* 10.3*   HEMATOCRIT 37.5 36.5*   MCV 93.3 89.0   MCH 25.1* 25.1*   MCHC 26.9* 28.2*   RDW 64.0* 59.7*   PLATELETCT 307 272   MPV 8.6* 8.6*     Recent Labs     11/20/23  1720 11/21/23  0036   SODIUM 141 141   POTASSIUM 4.8 4.2   CHLORIDE 109 107   CO2 19* 19*   GLUCOSE 91 115*   BUN 33* 24*   CREATININE 0.48* 0.52   CALCIUM 8.1* 8.5                   Imaging  DX-CHEST-PORTABLE (1 VIEW)   Final Result      1.  Right lower lobe pneumonitis and/or atelectasis.      EC-ECHOCARDIOGRAM COMPLETE W/O CONT    (Results Pending)        Assessment/Plan  Problem Representation:    * Pulmonary infiltrates on CXR- (present on admission)  Assessment & Plan  Suspicious for pneumonitis.  There is low suspicion for pneumonia as patient does not have a white blood  cell count, is asymptomatic, and procalcitonin is unremarkable.  -Discontinue antibiotics   -Continue to monitor     Acquired hypothyroidism- (present on admission)  Assessment & Plan  Patient found to have a TSH of 0.080 indicating overtreatment of her hypothyroidism.  Likely contributory to her tachycardia.  -Decreasing Synthroid dose to 150 mcg    Hypotension- (present on admission)  Assessment & Plan  Differential includes sepsis, pulmonary embolism, poor oral intake.  -CT PE ruled out pulmonary embolism  -Sepsis is less likely given there is low suspicion for ongoing infectious process.  Follow blood cultures  -Continue IV fluids.  LR at 83 mL/h    Essential hypertension- (present on admission)  Assessment & Plan  -Continue home metoprolol   -Holding lisinopril in the setting of hypotension on presentation    Diabetes ([facility])- (present on admission)  Assessment & Plan  Blood glucoses have been well controlled with an A1c of 6.6%.  -Continue sliding scale insulin with hypoglycemia protocol    Schizophrenia (HCC)- (present on admission)  Assessment & Plan  Continue home medications  Haldol as needed         VTE prophylaxis: SCDs/TEDs    I have performed a physical exam and reviewed and updated ROS and Plan today (11/21/2023). In review of yesterday's note (11/20/2023), there are no changes except as documented above.

## 2023-11-21 NOTE — CARE PLAN
"The patient is Watcher - Medium risk of patient condition declining or worsening    Shift Goals  Clinical Goals: Monitor BP, CBG, IV fluids, Rest  Patient Goals: Rest  Family Goals: billy    Progress made toward(s) clinical / shift goals:      Problem: Skin Integrity  Goal: Skin integrity is maintained or improved  Outcome: Progressing  Note: RLE edematous, bruising noted, pedal pulse +1/thready, lateral aspect of LE bone appears to be out of place, Pt grimaced during transfer. LLE edematous, faint bruising, pedal pulse +1/thready.       Problem: Fall Risk  Goal: Patient will remain free from falls  Outcome: Progressing  Note: Fall precautions in place. Call light at Pt side. Encouraged Pt to use call light if she needs  assistance.               LR infusing at 83 mL/hr. Pt snacking on henrik crackers.    Pt repeats \"Your a doll\" is very friendly, but when asked pertinent admission questions she remains quiet and looks at you.        Patient is not progressing towards the following goals:      "

## 2023-11-21 NOTE — ASSESSMENT & PLAN NOTE
Right-sided infiltrate on chest x-ray  Start IV Unasyn and doxycycline  Sputum cultures and blood cultures

## 2023-11-21 NOTE — ASSESSMENT & PLAN NOTE
Blood glucoses have been well controlled with an A1c of 6.6%.  -Continue sliding scale insulin with hypoglycemia protocol

## 2023-11-21 NOTE — THERAPY
Occupational Therapy   Initial Evaluation     Patient Name: Erlinda Verde  Age:  58 y.o., Sex:  female  Medical Record #: [id number]  Today's Date: 11/21/2023     Precautions  Precautions: Fall Risk    Assessment    Patient is 58 y.o. female admitted for hypotension, lightheadedness, feeling sick for 3 days. Per EMR pt lives at a group home uses a wheelchair for mobility and has assistance with ADLs at baseline from her group home staff. Pt required [facility] for bed mobility, maxA for lower body ADLs and transfer to wheelchair. Anticipate pt is at or near her functional baseline would benefit from return to group home when medically cleared.       Plan    DC Equipment Recommendations: (P) None  Discharge Recommendations: (P) Anticipate that the patient will have no further occupational therapy needs after discharge from the hospital     Objective       11/21/23 0920   Prior Living Situation   Prior Services Continuous (24 Hour) Care Giving Per Service   Housing / Facility Group Home   Equipment Owned Wheelchair   Lives with - Patient's Self Care Capacity Attendant / Paid Care Giver   Prior Level of ADL Function   Self Feeding Independent   Grooming / Hygiene Independent   Bathing Requires Assist   Dressing Requires Assist   Toileting Requires Assist   Prior Level of IADL Function   Medication Management Requires Assist   Laundry Requires Assist   Kitchen Mobility Requires Assist   Finances Requires Assist   Home Management Requires Assist   Shopping Requires Assist   Prior Level Of Mobility Uses Wheel Chair for Community Mobility;Uses Wheel Chair for in Home Mobility   Precautions   Precautions Fall Risk   Cognition    Cognition / Consciousness X   Orientation Level Not Oriented to Time;Not Oriented to Place;Not Oriented to Reason   Level of Consciousness Alert   Ability To Follow Commands 1 Step   Safety Awareness Impaired   New Learning Impaired   Attention Impaired   Sequencing Impaired   Initiation Impaired    Active ROM Upper Body   Active ROM Upper Body  WDL   Balance Assessment   Sitting Balance (Static) Fair -   Sitting Balance (Dynamic) Poor +   Standing Balance (Static) Poor +   Standing Balance (Dynamic) Poor   Weight Shift Sitting Fair   Weight Shift Standing Poor   Comments w/ HHA   Bed Mobility    Supine to Sit Maximal Assist   Scooting Maximal Assist   ADL Assessment   Grooming Contact Guard Assist;Seated   Upper Body Dressing Maximal Assist   Lower Body Dressing Maximal Assist   How much help from another person does the patient currently need...   Putting on and taking off regular lower body clothing? 2   Bathing (including washing, rinsing, and drying)? 2   Toileting, which includes using a toilet, bedpan, or urinal? 2   Putting on and taking off regular upper body clothing? 3   Taking care of personal grooming such as brushing teeth? 3   Eating meals? 3   6 Clicks Daily Activity Score 15   Functional Mobility   Sit to Stand Maximal Assist   Bed, Chair, Wheelchair Transfer Maximal Assist   Transfer Method Stand Pivot   Mobility bed mobility, transfer to transport chair   Comments w/ HHA   Activity Tolerance   Sitting in Chair left in transport chair   Standing 5 min   Education Group   Education Provided Role of Occupational Therapist   Role of Occupational Therapist Patient Response Patient;Nonacceptance;Explanation   Problem List   Problem List None   Anticipated Discharge Equipment and Recommendations   DC Equipment Recommendations None   Discharge Recommendations Anticipate that the patient will have no further occupational therapy needs after discharge from the hospital   Interdisciplinary Plan of Care Collaboration   IDT Collaboration with  Nursing;Physical Therapist   Patient Position at End of Therapy Seated  (left with transport and RN for imaging)   Collaboration Comments RN updated

## 2023-11-22 ENCOUNTER — APPOINTMENT (OUTPATIENT)
Dept: CARDIOLOGY | Facility: MEDICAL CENTER | Age: 58
DRG: 193 | End: 2023-11-22
Attending: STUDENT IN AN ORGANIZED HEALTH CARE EDUCATION/TRAINING PROGRAM
Payer: MEDICARE

## 2023-11-22 VITALS
TEMPERATURE: 97 F | RESPIRATION RATE: 18 BRPM | OXYGEN SATURATION: 92 % | HEART RATE: 96 BPM | WEIGHT: 114.2 LBS | SYSTOLIC BLOOD PRESSURE: 106 MMHG | HEIGHT: 64 IN | BODY MASS INDEX: 19.5 KG/M2 | DIASTOLIC BLOOD PRESSURE: 77 MMHG

## 2023-11-22 LAB
ANION GAP SERPL CALC-SCNC: 13 MMOL/L (ref 7–16)
BASOPHILS # BLD AUTO: 0.5 % (ref 0–1.8)
BASOPHILS # BLD: 0.03 K/UL (ref 0–0.12)
BUN SERPL-MCNC: 13 MG/DL (ref 8–22)
CALCIUM SERPL-MCNC: 8.5 MG/DL (ref 8.5–10.5)
CHLORIDE SERPL-SCNC: 107 MMOL/L (ref 96–112)
CO2 SERPL-SCNC: 21 MMOL/L (ref 20–33)
CREAT SERPL-MCNC: 0.61 MG/DL (ref 0.5–1.4)
EOSINOPHIL # BLD AUTO: 0.15 K/UL (ref 0–0.51)
EOSINOPHIL NFR BLD: 2.4 % (ref 0–6.9)
ERYTHROCYTE [DISTWIDTH] IN BLOOD BY AUTOMATED COUNT: 60.6 FL (ref 35.9–50)
GFR SERPLBLD CREATININE-BSD FMLA CKD-EPI: 103 ML/MIN/1.73 M 2
GLUCOSE BLD STRIP.AUTO-MCNC: 113 MG/DL (ref 65–99)
GLUCOSE BLD STRIP.AUTO-MCNC: 116 MG/DL (ref 65–99)
GLUCOSE SERPL-MCNC: 142 MG/DL (ref 65–99)
HCT VFR BLD AUTO: 34.6 % (ref 37–47)
HGB BLD-MCNC: 9.8 G/DL (ref 12–16)
IMM GRANULOCYTES # BLD AUTO: 0.03 K/UL (ref 0–0.11)
IMM GRANULOCYTES NFR BLD AUTO: 0.5 % (ref 0–0.9)
LV EJECT FRACT  99904: 60
LV EJECT FRACT MOD 2C 99903: 60.86
LV EJECT FRACT MOD 4C 99902: 50.56
LV EJECT FRACT MOD BP 99901: 54.28
LYMPHOCYTES # BLD AUTO: 1.61 K/UL (ref 1–4.8)
LYMPHOCYTES NFR BLD: 26.1 % (ref 22–41)
MCH RBC QN AUTO: 25 PG (ref 27–33)
MCHC RBC AUTO-ENTMCNC: 28.3 G/DL (ref 32.2–35.5)
MCV RBC AUTO: 88.3 FL (ref 81.4–97.8)
MONOCYTES # BLD AUTO: 0.68 K/UL (ref 0–0.85)
MONOCYTES NFR BLD AUTO: 11 % (ref 0–13.4)
NEUTROPHILS # BLD AUTO: 3.68 K/UL (ref 1.82–7.42)
NEUTROPHILS NFR BLD: 59.5 % (ref 44–72)
NRBC # BLD AUTO: 0 K/UL
NRBC BLD-RTO: 0 /100 WBC (ref 0–0.2)
PLATELET # BLD AUTO: 275 K/UL (ref 164–446)
PMV BLD AUTO: 8.9 FL (ref 9–12.9)
POTASSIUM SERPL-SCNC: 3.4 MMOL/L (ref 3.6–5.5)
RBC # BLD AUTO: 3.92 M/UL (ref 4.2–5.4)
SODIUM SERPL-SCNC: 141 MMOL/L (ref 135–145)
WBC # BLD AUTO: 6.2 K/UL (ref 4.8–10.8)

## 2023-11-22 PROCEDURE — A9270 NON-COVERED ITEM OR SERVICE: HCPCS | Mod: JZ | Performed by: STUDENT IN AN ORGANIZED HEALTH CARE EDUCATION/TRAINING PROGRAM

## 2023-11-22 PROCEDURE — A9270 NON-COVERED ITEM OR SERVICE: HCPCS | Performed by: HOSPITALIST

## 2023-11-22 PROCEDURE — 700102 HCHG RX REV CODE 250 W/ 637 OVERRIDE(OP): Mod: JZ | Performed by: STUDENT IN AN ORGANIZED HEALTH CARE EDUCATION/TRAINING PROGRAM

## 2023-11-22 PROCEDURE — A9270 NON-COVERED ITEM OR SERVICE: HCPCS

## 2023-11-22 PROCEDURE — 700102 HCHG RX REV CODE 250 W/ 637 OVERRIDE(OP): Performed by: HOSPITALIST

## 2023-11-22 PROCEDURE — 93306 TTE W/DOPPLER COMPLETE: CPT | Mod: 26 | Performed by: INTERNAL MEDICINE

## 2023-11-22 PROCEDURE — 700102 HCHG RX REV CODE 250 W/ 637 OVERRIDE(OP)

## 2023-11-22 PROCEDURE — 85025 COMPLETE CBC W/AUTO DIFF WBC: CPT

## 2023-11-22 PROCEDURE — 99239 HOSP IP/OBS DSCHRG MGMT >30: CPT | Mod: GC | Performed by: INTERNAL MEDICINE

## 2023-11-22 PROCEDURE — 82962 GLUCOSE BLOOD TEST: CPT

## 2023-11-22 PROCEDURE — 700117 HCHG RX CONTRAST REV CODE 255: Performed by: STUDENT IN AN ORGANIZED HEALTH CARE EDUCATION/TRAINING PROGRAM

## 2023-11-22 PROCEDURE — 93306 TTE W/DOPPLER COMPLETE: CPT

## 2023-11-22 PROCEDURE — 80048 BASIC METABOLIC PNL TOTAL CA: CPT

## 2023-11-22 PROCEDURE — 36415 COLL VENOUS BLD VENIPUNCTURE: CPT

## 2023-11-22 RX ORDER — LEVOTHYROXINE SODIUM 0.15 MG/1
150 TABLET ORAL
Qty: 30 TABLET | Refills: 0 | Status: SHIPPED | OUTPATIENT
Start: 2023-11-22 | End: 2023-12-22

## 2023-11-22 RX ORDER — POTASSIUM CHLORIDE 20 MEQ/1
40 TABLET, EXTENDED RELEASE ORAL ONCE
Status: COMPLETED | OUTPATIENT
Start: 2023-11-22 | End: 2023-11-22

## 2023-11-22 RX ADMIN — QUETIAPINE FUMARATE 150 MG: 50 TABLET ORAL at 05:28

## 2023-11-22 RX ADMIN — DONEPEZIL HYDROCHLORIDE 10 MG: 5 TABLET, FILM COATED ORAL at 05:28

## 2023-11-22 RX ADMIN — HUMAN ALBUMIN MICROSPHERES AND PERFLUTREN 3 ML: 10; .22 INJECTION, SOLUTION INTRAVENOUS at 12:30

## 2023-11-22 RX ADMIN — SERTRALINE 100 MG: 100 TABLET, FILM COATED ORAL at 05:28

## 2023-11-22 RX ADMIN — METOPROLOL TARTRATE 25 MG: 25 TABLET, FILM COATED ORAL at 05:27

## 2023-11-22 RX ADMIN — POTASSIUM CHLORIDE 40 MEQ: 1500 TABLET, EXTENDED RELEASE ORAL at 05:27

## 2023-11-22 RX ADMIN — LEVOTHYROXINE SODIUM 150 MCG: 0.15 TABLET ORAL at 05:28

## 2023-11-22 ASSESSMENT — FIBROSIS 4 INDEX: FIB4 SCORE: 1.14

## 2023-11-22 NOTE — CARE PLAN
The patient is Watcher - Medium risk of patient condition declining or worsening    Shift Goals  Clinical Goals: VSS, fluids  Patient Goals: Rest  Family Goals: figure out whats wrong    Progress made toward(s) clinical / shift goals:  progressing    Patient is not progressing towards the following goals:      Problem: Skin Integrity  Goal: Skin integrity is maintained or improved  Outcome: Progressing  Note: Pt placed on waffle mattress, pure wick in place and hourly pad changes to ensure pt stays dry and skin intact.      Problem: Knowledge Deficit - Standard  Goal: Patient and family/care givers will demonstrate understanding of plan of care, disease process/condition, diagnostic tests and medications  Outcome: Not Met  Note: Pt unable to receive teaching regarding medical care. Spoke with caregiver and updated on plan of care

## 2023-11-22 NOTE — PROGRESS NOTES
Pt discharged. IV and monitor removed; monitor room notified. Pt left unit to group home with transporter via wheelchair. Personal belongings with pt when leaving unit. Pt given discharge instructions prior to leaving unit including where to  prescriptions and when to follow-up; verbalizes understanding. Copy of discharge instructions with pt and in the chart.

## 2023-11-22 NOTE — DOCUMENTATION QUERY
Formerly [facility], [facility]                                                                       Query Response Note      PATIENT:               JASSON MENDEZ  ACCT #:                  [id number]  MRN:                     [id number]  :                      1965  ADMIT DATE:       2023 4:45 PM  DISCH DATE:          RESPONDING  PROVIDER #:        [id number]           QUERY TEXT:    AMS/Confusion is documented in the Medical Record.     Can a more specific diagnosis be provided?    The patient's Clinical Indicators include:  ED: Upon evaluation the patient was very confused and became distressed after being asked questions.   HM: She is oriented to person, place, and time.    Clinical Indicators: AMS/confusion, combativeness, agitation, A&O x1 in ED,  A&O x3 as of    Risk Factors: Lactic acidosis, hypotension, dehydration    Treatment: IVF hydration, Haldol PRN, lab monitoring     Please contact me for any questions.    Thank you for time and attention,  Hayley Felipe MSN RN  [email or web address]  Contact via [employer] Messenger  Options provided:   -- Acute metabolic encephalopathy   -- Other type of encephalopathy   -- Other explanation, (please specify other explanation)   -- Unable to determine      Query created by: Hayley Felipe on 2023 11:31 AM    RESPONSE TEXT:    Acute metabolic encephalopathy          Electronically signed by:  JOLYNN SIMMONS MD 2023 1:59 PM

## 2023-11-22 NOTE — CARE PLAN
The patient is Stable - Low risk of patient condition declining or worsening    Shift Goals  Clinical Goals: safety, comfort, rest  Patient Goals: rest  Family Goals: na    Progress made toward(s) clinical / shift goals:      Problem: Knowledge Deficit - Standard  Goal: Patient and family/care givers will demonstrate understanding of plan of care, disease process/condition, diagnostic tests and medications  Description: Target End Date:  1-3 days or as soon as patient condition allows    Document in Patient Education    1.  Patient and family/caregiver oriented to unit, equipment, visitation policy and means for communicating concern  2.  Complete/review Learning Assessment  3.  Assess knowledge level of disease process/condition, treatment plan, diagnostic tests and medications  4.  Explain disease process/condition, treatment plan, diagnostic tests and medications  Outcome: Progressing     Problem: Skin Integrity  Goal: Skin integrity is maintained or improved  Description: Target End Date:  Prior to discharge or change in level of care    Document interventions on Skin Risk/Rohith flowsheet groups and corresponding LDA    1.  Assess and monitor skin integrity, appearance and/or temperature  2.  Assess risk factors for impaired skin integrity and/or pressures ulcers  3.  Implement precautions to protect skin integrity in collaboration with interdisciplinary team  4.  Implement pressure ulcer prevention protocol if at risk for skin breakdown  5.  Confirm wound care consult if at risk for skin breakdown  6.  Ensure patient use of pressure relieving devices  (Low air loss bed, waffle overlay, heel protectors, ROHO cushion, etc)  Outcome: Progressing     Problem: Fall Risk  Goal: Patient will remain free from falls  Description: Target End Date:  Prior to discharge or change in level of care    Document interventions on the Malu Meraz Fall Risk Assessment    1.  Assess for fall risk factors  2.  Implement fall  precautions  Outcome: Progressing       Patient is not progressing towards the following goals:

## 2023-11-22 NOTE — DISCHARGE PLANNING
RN CM notified by Attending that patient is medically cleared for discharge. Call made to Ann Urias, Public Guardian for [facility], to inform. Ann requests D/C summary, H&P, Progress notes, and Therapy Notes be faxed to her at [phone] for submission to the court. Done.     Call made to Dianne  owner to inform of discharge and inquire on a preferred transport time. She indicates patient can return at any time and asked that the discharge summary be faxed to her at [phone] so she has the most current medication list on discharge. Done.    [facility] Transportation request filled out and faxed to Linda at [facility]. Pending confirmation of ride time. Attending, Charge RN, and Bedside RN notified.    1320: RN CM received transportation confirmation from Marizol at [facility]. Transport via T wheelchair transport 11/22/2023 between [id number]. [facility] Trip # [id number]. COBRA packet completed and provided to the Charge RN for discharge.

## 2023-11-22 NOTE — DISCHARGE PLANNING
DC Transport Scheduled    Received request at: 11/22/2023 at 1234    Transport Company Scheduled:  [employer]  Spoke with Basia at [facility] to schedule transport.  [facility] Trip #: [id number]    Scheduled Date: 11/22/2023  Scheduled Time: [id number]    Destination: Home at [address] [place]     Notified care team of scheduled transport via Voalte.     If there are any changes needed to the DC transportation scheduled, please contact [facility] at ext. [phone] between the hours of 4748-6310 Mon-Fri. If outside those hours, contact the ED Case Manager at ext. [phone].

## 2023-11-22 NOTE — CARE PLAN
The patient is Watcher - Medium risk of patient condition declining or worsening    Shift Goals  Clinical Goals: Safety, comfort, rest  Patient Goals: snacks, rest  Family Goals: ARLETTE    Progress made toward(s) clinical / shift goals:      Patient potassium 3.4 per morning labs. MD notified and oral supplementation ordered to be given with morning medications later this morning.   Rest and sleep promoted this evening with cluster care, lights off, TV off, and cluster care.     Problem: Knowledge Deficit - Standard  Goal: Patient and family/care givers will demonstrate understanding of plan of care, disease process/condition, diagnostic tests and medications  Outcome: Progressing     Problem: Skin Integrity  Goal: Skin integrity is maintained or improved  Outcome: Progressing  Note: Waffle overlay on bed. Purwick in place and frequent incontinence care completed to promote skin integrity.   Patient able to turn self in bed.   Pillow used to elevate heels as well to further promote skin integrity.      Problem: Fall Risk  Goal: Patient will remain free from falls  Outcome: Progressing  Note: Bed in the lowest position. Bed alarm in place and on. Frequent rounding utilized. All personal items within reach, including call light, water, mao pack (purse), and tissues.        Patient is not progressing towards the following goals:

## 2023-11-23 LAB
BACTERIA UR CULT: NORMAL
SIGNIFICANT IND 70042: NORMAL
SITE SITE: NORMAL
SOURCE SOURCE: NORMAL

## 2023-11-25 LAB
BACTERIA BLD CULT: NORMAL
BACTERIA BLD CULT: NORMAL
SIGNIFICANT IND 70042: NORMAL
SIGNIFICANT IND 70042: NORMAL
SITE SITE: NORMAL
SITE SITE: NORMAL
SOURCE SOURCE: NORMAL
SOURCE SOURCE: NORMAL

## 2023-11-26 LAB
MYCOBACTERIUM SPEC CULT: NORMAL
RHODAMINE-AURAMINE STN SPEC: NORMAL
SIGNIFICANT IND 70042: NORMAL
SITE SITE: NORMAL
SOURCE SOURCE: NORMAL

## 2023-12-18 DIAGNOSIS — E03.9 ACQUIRED HYPOTHYROIDISM: ICD-10-CM

## 2023-12-18 RX ORDER — LEVOTHYROXINE SODIUM 0.15 MG/1
150 TABLET ORAL
Qty: 0.01 TABLET | Refills: 10 | OUTPATIENT
Start: 2023-12-18

## 2023-12-20 ENCOUNTER — HOSPITAL ENCOUNTER (OUTPATIENT)
Facility: MEDICAL CENTER | Age: 58
End: 2023-12-20
Attending: FAMILY MEDICINE
Payer: MEDICARE

## 2023-12-20 LAB
ALBUMIN SERPL BCP-MCNC: 3.9 G/DL (ref 3.2–4.9)
ALBUMIN/GLOB SERPL: 1.1 G/DL
ALP SERPL-CCNC: 134 U/L (ref 30–99)
ALT SERPL-CCNC: 7 U/L (ref 2–50)
ANION GAP SERPL CALC-SCNC: 14 MMOL/L (ref 7–16)
ANISOCYTOSIS BLD QL SMEAR: ABNORMAL
AST SERPL-CCNC: 22 U/L (ref 12–45)
BASOPHILS # BLD AUTO: 0.4 % (ref 0–1.8)
BASOPHILS # BLD: 0.03 K/UL (ref 0–0.12)
BILIRUB SERPL-MCNC: 0.2 MG/DL (ref 0.1–1.5)
BUN SERPL-MCNC: 17 MG/DL (ref 8–22)
CALCIUM ALBUM COR SERPL-MCNC: 9.7 MG/DL (ref 8.5–10.5)
CALCIUM SERPL-MCNC: 9.6 MG/DL (ref 8.5–10.5)
CHLORIDE SERPL-SCNC: 104 MMOL/L (ref 96–112)
CO2 SERPL-SCNC: 22 MMOL/L (ref 20–33)
COMMENT 1642: NORMAL
CREAT SERPL-MCNC: 0.48 MG/DL (ref 0.5–1.4)
CRP SERPL HS-MCNC: 7.28 MG/DL (ref 0–0.75)
EOSINOPHIL # BLD AUTO: 0.04 K/UL (ref 0–0.51)
EOSINOPHIL NFR BLD: 0.6 % (ref 0–6.9)
ERYTHROCYTE [DISTWIDTH] IN BLOOD BY AUTOMATED COUNT: 58.1 FL (ref 35.9–50)
ERYTHROCYTE [SEDIMENTATION RATE] IN BLOOD BY WESTERGREN METHOD: 48 MM/HOUR (ref 0–25)
GFR SERPLBLD CREATININE-BSD FMLA CKD-EPI: 109 ML/MIN/1.73 M 2
GLOBULIN SER CALC-MCNC: 3.7 G/DL (ref 1.9–3.5)
GLUCOSE SERPL-MCNC: 119 MG/DL (ref 65–99)
HCT VFR BLD AUTO: 40.7 % (ref 37–47)
HGB BLD-MCNC: 11.8 G/DL (ref 12–16)
IMM GRANULOCYTES # BLD AUTO: 0.04 K/UL (ref 0–0.11)
IMM GRANULOCYTES NFR BLD AUTO: 0.6 % (ref 0–0.9)
LYMPHOCYTES # BLD AUTO: 2.01 K/UL (ref 1–4.8)
LYMPHOCYTES NFR BLD: 29.3 % (ref 22–41)
MCH RBC QN AUTO: 25.3 PG (ref 27–33)
MCHC RBC AUTO-ENTMCNC: 29 G/DL (ref 32.2–35.5)
MCV RBC AUTO: 87.2 FL (ref 81.4–97.8)
MICROCYTES BLD QL SMEAR: ABNORMAL
MONOCYTES # BLD AUTO: 0.51 K/UL (ref 0–0.85)
MONOCYTES NFR BLD AUTO: 7.4 % (ref 0–13.4)
MORPHOLOGY BLD-IMP: NORMAL
NEUTROPHILS # BLD AUTO: 4.22 K/UL (ref 1.82–7.42)
NEUTROPHILS NFR BLD: 61.7 % (ref 44–72)
NRBC # BLD AUTO: 0 K/UL
NRBC BLD-RTO: 0 /100 WBC (ref 0–0.2)
PLATELET # BLD AUTO: 285 K/UL (ref 164–446)
PLATELET BLD QL SMEAR: NORMAL
PMV BLD AUTO: 9 FL (ref 9–12.9)
POTASSIUM SERPL-SCNC: 5.3 MMOL/L (ref 3.6–5.5)
PROT SERPL-MCNC: 7.6 G/DL (ref 6–8.2)
RBC # BLD AUTO: 4.67 M/UL (ref 4.2–5.4)
RBC BLD AUTO: PRESENT
SODIUM SERPL-SCNC: 140 MMOL/L (ref 135–145)
WBC # BLD AUTO: 6.9 K/UL (ref 4.8–10.8)

## 2023-12-20 PROCEDURE — 83036 HEMOGLOBIN GLYCOSYLATED A1C: CPT

## 2023-12-20 PROCEDURE — 85025 COMPLETE CBC W/AUTO DIFF WBC: CPT

## 2023-12-20 PROCEDURE — 85652 RBC SED RATE AUTOMATED: CPT

## 2023-12-20 PROCEDURE — 80053 COMPREHEN METABOLIC PANEL: CPT

## 2023-12-20 PROCEDURE — 86140 C-REACTIVE PROTEIN: CPT

## 2023-12-21 LAB
EST. AVERAGE GLUCOSE BLD GHB EST-MCNC: 140 MG/DL
HBA1C MFR BLD: 6.5 % (ref 4–5.6)

## 2024-08-03 ENCOUNTER — HOSPITAL ENCOUNTER (EMERGENCY)
Facility: MEDICAL CENTER | Age: 59
End: 2024-08-03
Attending: EMERGENCY MEDICINE
Payer: MEDICARE

## 2024-08-03 VITALS
BODY MASS INDEX: 20.49 KG/M2 | SYSTOLIC BLOOD PRESSURE: 153 MMHG | TEMPERATURE: 97 F | HEIGHT: 64 IN | WEIGHT: 120 LBS | DIASTOLIC BLOOD PRESSURE: 75 MMHG | OXYGEN SATURATION: 95 % | RESPIRATION RATE: 14 BRPM | HEART RATE: 85 BPM

## 2024-08-03 DIAGNOSIS — E11.65 UNCONTROLLED TYPE 2 DIABETES MELLITUS WITH HYPERGLYCEMIA (HCC): ICD-10-CM

## 2024-08-03 DIAGNOSIS — N39.0 ACUTE UTI: ICD-10-CM

## 2024-08-03 LAB
ANION GAP SERPL CALC-SCNC: 15 MMOL/L (ref 7–16)
APPEARANCE UR: CLEAR
BACTERIA #/AREA URNS HPF: ABNORMAL /HPF
BASOPHILS # BLD AUTO: 0.4 % (ref 0–1.8)
BASOPHILS # BLD: 0.03 K/UL (ref 0–0.12)
BILIRUB UR QL STRIP.AUTO: NEGATIVE
BUN SERPL-MCNC: 18 MG/DL (ref 8–22)
CALCIUM SERPL-MCNC: 9.7 MG/DL (ref 8.5–10.5)
CHLORIDE SERPL-SCNC: 101 MMOL/L (ref 96–112)
CO2 SERPL-SCNC: 20 MMOL/L (ref 20–33)
COLOR UR: YELLOW
CREAT SERPL-MCNC: 0.79 MG/DL (ref 0.5–1.4)
EOSINOPHIL # BLD AUTO: 0.02 K/UL (ref 0–0.51)
EOSINOPHIL NFR BLD: 0.3 % (ref 0–6.9)
EPI CELLS #/AREA URNS HPF: NEGATIVE /HPF
ERYTHROCYTE [DISTWIDTH] IN BLOOD BY AUTOMATED COUNT: 42.6 FL (ref 35.9–50)
EST. AVERAGE GLUCOSE BLD GHB EST-MCNC: 269 MG/DL
GFR SERPLBLD CREATININE-BSD FMLA CKD-EPI: 86 ML/MIN/1.73 M 2
GLUCOSE SERPL-MCNC: 453 MG/DL (ref 65–99)
GLUCOSE UR STRIP.AUTO-MCNC: >=1000 MG/DL
HBA1C MFR BLD: 11 % (ref 4–5.6)
HCT VFR BLD AUTO: 40.1 % (ref 37–47)
HGB BLD-MCNC: 13.1 G/DL (ref 12–16)
HYALINE CASTS #/AREA URNS LPF: ABNORMAL /LPF
IMM GRANULOCYTES # BLD AUTO: 0.03 K/UL (ref 0–0.11)
IMM GRANULOCYTES NFR BLD AUTO: 0.4 % (ref 0–0.9)
KETONES UR STRIP.AUTO-MCNC: NEGATIVE MG/DL
LEUKOCYTE ESTERASE UR QL STRIP.AUTO: ABNORMAL
LYMPHOCYTES # BLD AUTO: 1.66 K/UL (ref 1–4.8)
LYMPHOCYTES NFR BLD: 21.2 % (ref 22–41)
MCH RBC QN AUTO: 27.5 PG (ref 27–33)
MCHC RBC AUTO-ENTMCNC: 32.7 G/DL (ref 32.2–35.5)
MCV RBC AUTO: 84.1 FL (ref 81.4–97.8)
MICRO URNS: ABNORMAL
MONOCYTES # BLD AUTO: 0.59 K/UL (ref 0–0.85)
MONOCYTES NFR BLD AUTO: 7.5 % (ref 0–13.4)
NEUTROPHILS # BLD AUTO: 5.51 K/UL (ref 1.82–7.42)
NEUTROPHILS NFR BLD: 70.2 % (ref 44–72)
NITRITE UR QL STRIP.AUTO: NEGATIVE
NRBC # BLD AUTO: 0 K/UL
NRBC BLD-RTO: 0 /100 WBC (ref 0–0.2)
PH UR STRIP.AUTO: 5.5 [PH] (ref 5–8)
PLATELET # BLD AUTO: 280 K/UL (ref 164–446)
PMV BLD AUTO: 9.2 FL (ref 9–12.9)
POTASSIUM SERPL-SCNC: 4.3 MMOL/L (ref 3.6–5.5)
PROT UR QL STRIP: NEGATIVE MG/DL
RBC # BLD AUTO: 4.77 M/UL (ref 4.2–5.4)
RBC # URNS HPF: ABNORMAL /HPF
RBC UR QL AUTO: ABNORMAL
SODIUM SERPL-SCNC: 136 MMOL/L (ref 135–145)
SP GR UR STRIP.AUTO: 1.02
UROBILINOGEN UR STRIP.AUTO-MCNC: 0.2 MG/DL
WBC # BLD AUTO: 7.8 K/UL (ref 4.8–10.8)
WBC #/AREA URNS HPF: ABNORMAL /HPF

## 2024-08-03 PROCEDURE — 80048 BASIC METABOLIC PNL TOTAL CA: CPT

## 2024-08-03 PROCEDURE — A9270 NON-COVERED ITEM OR SERVICE: HCPCS | Mod: UD | Performed by: EMERGENCY MEDICINE

## 2024-08-03 PROCEDURE — 36415 COLL VENOUS BLD VENIPUNCTURE: CPT

## 2024-08-03 PROCEDURE — 700105 HCHG RX REV CODE 258: Mod: UD | Performed by: EMERGENCY MEDICINE

## 2024-08-03 PROCEDURE — 99285 EMERGENCY DEPT VISIT HI MDM: CPT

## 2024-08-03 PROCEDURE — 700102 HCHG RX REV CODE 250 W/ 637 OVERRIDE(OP): Mod: UD | Performed by: EMERGENCY MEDICINE

## 2024-08-03 PROCEDURE — 81001 URINALYSIS AUTO W/SCOPE: CPT

## 2024-08-03 PROCEDURE — 51701 INSERT BLADDER CATHETER: CPT

## 2024-08-03 PROCEDURE — 85025 COMPLETE CBC W/AUTO DIFF WBC: CPT

## 2024-08-03 PROCEDURE — 83036 HEMOGLOBIN GLYCOSYLATED A1C: CPT

## 2024-08-03 RX ORDER — CEPHALEXIN 500 MG/1
500 CAPSULE ORAL ONCE
Status: COMPLETED | OUTPATIENT
Start: 2024-08-03 | End: 2024-08-03

## 2024-08-03 RX ORDER — GLIPIZIDE 5 MG/1
5 TABLET ORAL 2 TIMES DAILY
Qty: 60 TABLET | Refills: 0 | Status: SHIPPED | OUTPATIENT
Start: 2024-08-03 | End: 2024-09-02

## 2024-08-03 RX ORDER — CEPHALEXIN 500 MG/1
500 CAPSULE ORAL 4 TIMES DAILY
Qty: 20 CAPSULE | Refills: 0 | Status: ACTIVE | OUTPATIENT
Start: 2024-08-03 | End: 2024-08-08

## 2024-08-03 RX ORDER — SODIUM CHLORIDE, SODIUM LACTATE, POTASSIUM CHLORIDE, CALCIUM CHLORIDE 600; 310; 30; 20 MG/100ML; MG/100ML; MG/100ML; MG/100ML
1000 INJECTION, SOLUTION INTRAVENOUS ONCE
Status: COMPLETED | OUTPATIENT
Start: 2024-08-03 | End: 2024-08-03

## 2024-08-03 RX ADMIN — CEPHALEXIN 500 MG: 500 CAPSULE ORAL at 15:11

## 2024-08-03 RX ADMIN — SODIUM CHLORIDE, POTASSIUM CHLORIDE, SODIUM LACTATE AND CALCIUM CHLORIDE 1000 ML: 600; 310; 30; 20 INJECTION, SOLUTION INTRAVENOUS at 15:13

## 2024-08-03 ASSESSMENT — LIFESTYLE VARIABLES
EVER HAD A DRINK FIRST THING IN THE MORNING TO STEADY YOUR NERVES TO GET RID OF A HANGOVER: NO
TOTAL SCORE: 0
HAVE YOU EVER FELT YOU SHOULD CUT DOWN ON YOUR DRINKING: NO
EVER FELT BAD OR GUILTY ABOUT YOUR DRINKING: NO
HAVE PEOPLE ANNOYED YOU BY CRITICIZING YOUR DRINKING: NO
DO YOU DRINK ALCOHOL: NO
CONSUMPTION TOTAL: INCOMPLETE
TOTAL SCORE: 0
TOTAL SCORE: 0

## 2024-08-03 ASSESSMENT — FIBROSIS 4 INDEX: FIB4 SCORE: 1.72

## 2024-08-03 NOTE — ED TRIAGE NOTES
"Chief Complaint   Patient presents with    Blood in Urine     Blood in urine this AM seen by caregiver, +bright red blood, incontinence at baseline, distended ABD, +generalized swelling, no pain, hx DM2 glucose 359        BIB [facility] for above complaint. Caregiver states pt with pain in vagina earlier and sore. EMS vitals 122/87  94% RA gcs 15    BP (!) 146/81   Pulse (!) 107   Temp 36.7 °C (98.1 °F) (Temporal)   Resp 17   Ht 1.626 m (5' 4\")   Wt 54.4 kg (120 lb)   SpO2 92%   BMI 20.60 kg/m²      "

## 2024-08-03 NOTE — ED PROVIDER NOTES
ER Provider Note     Jose Jorge M.D.. 8/3/2024  2:07 PM    Primary Care Provider: Whitney Graham M.D.    CHIEF COMPLAINT  Chief Complaint   Patient presents with    Blood in Urine     Blood in urine this AM seen by caregiver, +bright red blood, incontinence at baseline, distended ABD, +generalized swelling, no pain, hx DM2 glucose 359     EXTERNAL RECORDS REVIEWED  Outside records show a history of diabetes, psychiatric illness, moderate intellectual disability, hence presentation to the emergency department with a care provider.    HPI/ROS  LIMITATION TO HISTORY   History of moderate intellectual disability.    OUTSIDE HISTORIAN(S):  The patient's care provider the bedside contributes significantly to history due to the patient's intellectual disability and mental health disorder.    Erlinda Verde is a chronic, pleasant, cooperative, developmentally delayed 58 y.o. female who presents to the ED because her care provider, who accompanies her, reported finding a small amount of blood in the patient's diaper.  She has not been complaining of dysuria, fevers or chills, nausea or vomiting.    PAST MEDICAL HISTORY  Past Medical History:   Diagnosis Date    Diabetes (HCC)     Dysthymic disorder     HTN (hypertension)     Intellectual disability     moderate    Parkinsonism (HCC)     Schizoaffective disorder (HCC)        SURGICAL HISTORY  Past Surgical History:   Procedure Laterality Date    PB PARTIAL HIP REPLACEMENT Left 2/26/2022    Procedure: HEMIARTHROPLASTY, HIP;  Surgeon: Ubaldo Marquis M.D.;  Location: SURGERY [facility];  Service: Orthopedics       FAMILY HISTORY  History reviewed. No pertinent family history.    SOCIAL HISTORY   reports that she has never smoked. She has never used smokeless tobacco. She reports that she does not drink alcohol and does not use drugs.    CURRENT MEDICATIONS  Discharge Medication List as of 8/3/2024  3:45 PM        CONTINUE these medications which have NOT CHANGED  "   Details   aripiprazole (ABILIFY) 20 MG tablet Take 20 mg by mouth at bedtime., Historical Med      nystatin (MYCOSTATIN) powder Apply 1 Application topically 2 times a day., Historical Med      oxybutynin (DITROPAN) 5 MG Tab Take 5 mg by mouth 2 times a day., Historical Med      acetaminophen (TYLENOL) 325 MG Tab Take 650 mg by mouth 3 times a day. * scheduled*, Historical Med      ipratropium-albuterol (DUONEB) 0.5-2.5 (3) MG/3ML nebulizer solution Take 3 mL by nebulization every 6 hours as needed for Shortness of Breath (SOB)., No Print      divalproex (DEPAKOTE) 500 MG Tablet Delayed Response Take 1,000 mg by mouth at bedtime. 500 mg x 2 tablets  = 1000 mg, Historical Med      !! quetiapine (SEROQUEL XR) 200 MG XR tablet Take 200 mg by mouth at bedtime., Historical Med      !! QUEtiapine Fumarate (SEROQUEL XR) 150 MG TABLET SR 24 HR Take 150 mg by mouth every morning., Historical Med      donepezil (ARICEPT) 10 MG tablet Take 10 mg by mouth every morning., Historical Med      sertraline (ZOLOFT) 100 MG Tab Take 100 mg by mouth every morning., Historical Med      atorvastatin (LIPITOR) 20 MG Tab Take 20 mg by mouth at bedtime., Historical Med       !! - Potential duplicate medications found. Please discuss with provider.          ALLERGIES  Risperidone    PHYSICAL EXAM  VITAL SIGNS: BP (!) 153/75   Pulse 85   Temp 36.1 °C (97 °F) (Temporal)   Resp 14   Ht 1.626 m (5' 4\")   Wt 54.4 kg (120 lb)   SpO2 95%   BMI 20.60 kg/m²   Pulse ox interpretation: I interpret this pulse ox as normal.  Constitutional: Alert in no apparent distress.  HENT: No signs of trauma, Bilateral external ears normal, Nose normal.   Eyes: Conjunctiva normal, Non-icteric.   Neck: Normal range of motion, Supple, No stridor.   Lymphatic: No lymphadenopathy noted.   Cardiovascular: Regular rate and rhythm, no murmurs.   Thorax & Lungs: Normal breath sounds, No respiratory distress, No wheezing  Abdomen: Bowel sounds normal, Soft, " questional mild suprapubic tenderness, No masses, No pulsatile masses. No peritoneal signs.  Skin: small skin tear to labia, dried blood, no active bleed. Warm, Dry, No erythema, No rash.   Extremities: Intact distal pulses, No edema, No cyanosis.  Musculoskeletal: Good range of motion in all major joints. No or major deformities noted.   Neurologic: Alert , Normal motor function, Normal sensory function, No focal deficits noted.   Psychiatric: Affect normal, Judgment normal, Mood normal.     DIAGNOSTIC STUDIES    Labs:   Labs Reviewed   CBC WITH DIFFERENTIAL - Abnormal; Notable for the following components:       Result Value    Lymphocytes 21.20 (*)     All other components within normal limits   BASIC METABOLIC PANEL - Abnormal; Notable for the following components:    Glucose 453 (*)     All other components within normal limits   URINALYSIS - Abnormal; Notable for the following components:    Glucose >=1000 (*)     Leukocyte Esterase Trace (*)     Occult Blood Moderate (*)     All other components within normal limits   URINE MICROSCOPIC (W/UA) - Abnormal; Notable for the following components:    WBC 20-50 (*)     RBC 20-50 (*)     Bacteria Few (*)     All other components within normal limits   HEMOGLOBIN A1C - Abnormal; Notable for the following components:    Glycohemoglobin 11.0 (*)     All other components within normal limits   ESTIMATED GFR             Radiology:   The attending emergency physician has independently interpreted the diagnostic imaging associated with this visit and am waiting the final reading from the radiologist.     Preliminary interpretation is a follows:     Radiologist interpretation:   No orders to display       COURSE & MEDICAL DECISION MAKING     INITIAL ASSESSMENT, COURSE AND PLAN  Care Narrative:         2:07 PM Patient presents to the ED with hematuria and a small labial skin tear, suspicious for being accidentally self-inflicted with scratching or similar.  No signs of any  other peritoneal trauma. Per triage protocol, screening labs and urine was ordered. Patient evaluated at bedside and discussed plan of care, including the above.  Differential diagnoses include but not limited to: Urinary tract infection, perineal blood from small soft tissue injury.    2:59 PM looking at the patient's labs, there is evidence of mild UTI.  I confirmed with the patient's care provider that the patient is indeed a diabetic.  She takes glipizide 5 mg once daily, and metformin 500 mg once daily.  We discussed that this is not likely to be adequate any longer, most likely, for glycemic control, and that primary care follow-up will be indicated.  I will increase her oral hypoglycemics.  She is not currently on insulin, and her care provider says that she is not authorized to perform injections, but that her boss might be able to, and the patient could probably be trained to administer her own insulin if necessary.     ADDITIONAL PROBLEM MANAGED  Poorly controlled hyperglycemia    DISPOSITION AND DISCUSSIONS    Escalation of care considered, and ultimately not performed: acute inpatient care management, however at this time, the patient is most appropriate for outpatient management.    Barriers to care at this time, including but not limited to:  Developmental delay interferes with medical literacy and doctor-patient relationship to some degree .     Decision tools and prescription drugs considered including, but not limited to: Medication modification was performed, to increase oral hypoglycemics. .     The patient will return for new or worsening symptoms and is stable at the time of discharge.    The patient is referred to a primary physician for blood pressure management, diabetic screening, and for all other preventative health concerns.      DISPOSITION:  Patient will be discharged home in stable condition.    FOLLOW UP:  Whitney Graham M.D.  [address] [facility] [place]  [address]  [phone]            OUTPATIENT MEDICATIONS:  Discharge Medication List as of 8/3/2024  3:45 PM        START taking these medications    Details   metformin (GLUCOPHAGE) 1000 MG tablet Take 1 Tablet by mouth 2 times a day with meals., Disp-60 Tablet, R-0, Normal      glipiZIDE (GLUCOTROL) 5 MG Tab Take 1 Tablet by mouth 2 times a day for 30 days., Disp-60 Tablet, R-0, Normal      cephALEXin (KEFLEX) 500 MG Cap Take 1 Capsule by mouth 4 times a day for 5 days., Disp-20 Capsule, R-0, Normal               FINAL DIAGNOSIS  1. Acute UTI    2. Uncontrolled type 2 diabetes mellitus with hyperglycemia (HCC)          Jose CORNEJO M.D. (Scribe), am scribing for, and in the presence of, Jose Jorge M.D..    Electronically signed by: Jose Jorge M.D. (Demarcoibe), 8/3/2024    Jose CORNEJO M.D. personally performed the services described in this documentation, as scribed by Jose Jorge M.D. in my presence, and it is both accurate and complete.

## 2024-08-03 NOTE — DISCHARGE INSTRUCTIONS
Please complete the prescribed antibiotics for your urinary tract infection.    We have increased your metformin and glipizide, because of very poorly controlled blood sugar, which was 453 here today.  You may need further increases in these medications, or the addition of insulin.  Please call Monday to schedule follow-up with your primary care provider.  We did add a hemoglobin A1c to your laboratory panel which may be helpful to your primary care provider.

## 2024-08-03 NOTE — ED NOTES
Faxed d/c paper to Ann legal guardian.  Pt discharge home with caretaker, caretaker given discharge instructions and prescription sent to pharmacy. She verbalized understanding, all questions answered ,vss upon d/c.   provided with cab voucher, called teagan.

## 2024-11-22 ENCOUNTER — HOSPITAL ENCOUNTER (EMERGENCY)
Facility: MEDICAL CENTER | Age: 59
End: 2024-11-23
Attending: EMERGENCY MEDICINE
Payer: MEDICARE

## 2024-11-22 ENCOUNTER — APPOINTMENT (OUTPATIENT)
Dept: RADIOLOGY | Facility: MEDICAL CENTER | Age: 59
End: 2024-11-22
Attending: EMERGENCY MEDICINE
Payer: MEDICARE

## 2024-11-22 VITALS
WEIGHT: 120 LBS | HEART RATE: 105 BPM | OXYGEN SATURATION: 92 % | BODY MASS INDEX: 20.49 KG/M2 | TEMPERATURE: 97.1 F | SYSTOLIC BLOOD PRESSURE: 123 MMHG | RESPIRATION RATE: 15 BRPM | DIASTOLIC BLOOD PRESSURE: 69 MMHG | HEIGHT: 64 IN

## 2024-11-22 DIAGNOSIS — N81.4 UTERINE PROLAPSE: ICD-10-CM

## 2024-11-22 DIAGNOSIS — K52.9 ENTERITIS: ICD-10-CM

## 2024-11-22 LAB
ABO GROUP BLD: NORMAL
ALBUMIN SERPL BCP-MCNC: 4.5 G/DL (ref 3.2–4.9)
ALBUMIN/GLOB SERPL: 1.4 G/DL
ALP SERPL-CCNC: 174 U/L (ref 30–99)
ALT SERPL-CCNC: 28 U/L (ref 2–50)
ANION GAP SERPL CALC-SCNC: 17 MMOL/L (ref 7–16)
APPEARANCE UR: CLEAR
APTT PPP: 29.2 SEC (ref 24.7–36)
AST SERPL-CCNC: 27 U/L (ref 12–45)
BACTERIA #/AREA URNS HPF: ABNORMAL /HPF
BASOPHILS # BLD AUTO: 0.2 % (ref 0–1.8)
BASOPHILS # BLD: 0.02 K/UL (ref 0–0.12)
BILIRUB SERPL-MCNC: <0.2 MG/DL (ref 0.1–1.5)
BILIRUB UR QL STRIP.AUTO: NEGATIVE
BLD GP AB SCN SERPL QL: NORMAL
BUN SERPL-MCNC: 26 MG/DL (ref 8–22)
CALCIUM ALBUM COR SERPL-MCNC: 9 MG/DL (ref 8.5–10.5)
CALCIUM SERPL-MCNC: 9.4 MG/DL (ref 8.5–10.5)
CASTS URNS QL MICRO: ABNORMAL /LPF (ref 0–2)
CHLORIDE SERPL-SCNC: 101 MMOL/L (ref 96–112)
CO2 SERPL-SCNC: 19 MMOL/L (ref 20–33)
COLOR UR: YELLOW
CREAT SERPL-MCNC: 0.85 MG/DL (ref 0.5–1.4)
EOSINOPHIL # BLD AUTO: 0.05 K/UL (ref 0–0.51)
EOSINOPHIL NFR BLD: 0.5 % (ref 0–6.9)
EPITHELIAL CELLS 1715: ABNORMAL /HPF (ref 0–5)
ERYTHROCYTE [DISTWIDTH] IN BLOOD BY AUTOMATED COUNT: 48.2 FL (ref 35.9–50)
GFR SERPLBLD CREATININE-BSD FMLA CKD-EPI: 78 ML/MIN/1.73 M 2
GLOBULIN SER CALC-MCNC: 3.2 G/DL (ref 1.9–3.5)
GLUCOSE SERPL-MCNC: 155 MG/DL (ref 65–99)
GLUCOSE UR STRIP.AUTO-MCNC: >=1000 MG/DL
HCT VFR BLD AUTO: 43.1 % (ref 37–47)
HGB BLD-MCNC: 13.5 G/DL (ref 12–16)
IMM GRANULOCYTES # BLD AUTO: 0.04 K/UL (ref 0–0.11)
IMM GRANULOCYTES NFR BLD AUTO: 0.4 % (ref 0–0.9)
INR PPP: 1 (ref 0.87–1.13)
KETONES UR STRIP.AUTO-MCNC: ABNORMAL MG/DL
LEUKOCYTE ESTERASE UR QL STRIP.AUTO: ABNORMAL
LIPASE SERPL-CCNC: 42 U/L (ref 11–82)
LYMPHOCYTES # BLD AUTO: 1.91 K/UL (ref 1–4.8)
LYMPHOCYTES NFR BLD: 18.4 % (ref 22–41)
MCH RBC QN AUTO: 27.4 PG (ref 27–33)
MCHC RBC AUTO-ENTMCNC: 31.3 G/DL (ref 32.2–35.5)
MCV RBC AUTO: 87.4 FL (ref 81.4–97.8)
MICRO URNS: ABNORMAL
MONOCYTES # BLD AUTO: 0.69 K/UL (ref 0–0.85)
MONOCYTES NFR BLD AUTO: 6.6 % (ref 0–13.4)
NEUTROPHILS # BLD AUTO: 7.69 K/UL (ref 1.82–7.42)
NEUTROPHILS NFR BLD: 73.9 % (ref 44–72)
NITRITE UR QL STRIP.AUTO: NEGATIVE
NRBC # BLD AUTO: 0 K/UL
NRBC BLD-RTO: 0 /100 WBC (ref 0–0.2)
PH UR STRIP.AUTO: 5.5 [PH] (ref 5–8)
PLATELET # BLD AUTO: 274 K/UL (ref 164–446)
PMV BLD AUTO: 8.7 FL (ref 9–12.9)
POTASSIUM SERPL-SCNC: 4.5 MMOL/L (ref 3.6–5.5)
PROT SERPL-MCNC: 7.7 G/DL (ref 6–8.2)
PROT UR QL STRIP: NEGATIVE MG/DL
PROTHROMBIN TIME: 13.2 SEC (ref 12–14.6)
RBC # BLD AUTO: 4.93 M/UL (ref 4.2–5.4)
RBC # URNS HPF: ABNORMAL /HPF (ref 0–2)
RBC UR QL AUTO: ABNORMAL
RH BLD: NORMAL
SODIUM SERPL-SCNC: 137 MMOL/L (ref 135–145)
SP GR UR STRIP.AUTO: 1.03
UROBILINOGEN UR STRIP.AUTO-MCNC: 1 EU/DL
WBC # BLD AUTO: 10.4 K/UL (ref 4.8–10.8)
WBC #/AREA URNS HPF: ABNORMAL /HPF

## 2024-11-22 PROCEDURE — 80053 COMPREHEN METABOLIC PANEL: CPT

## 2024-11-22 PROCEDURE — 36415 COLL VENOUS BLD VENIPUNCTURE: CPT

## 2024-11-22 PROCEDURE — 81001 URINALYSIS AUTO W/SCOPE: CPT

## 2024-11-22 PROCEDURE — 85025 COMPLETE CBC W/AUTO DIFF WBC: CPT

## 2024-11-22 PROCEDURE — 700117 HCHG RX CONTRAST REV CODE 255: Performed by: EMERGENCY MEDICINE

## 2024-11-22 PROCEDURE — 83690 ASSAY OF LIPASE: CPT

## 2024-11-22 PROCEDURE — 86900 BLOOD TYPING SEROLOGIC ABO: CPT

## 2024-11-22 PROCEDURE — 74174 CTA ABD&PLVS W/CONTRAST: CPT

## 2024-11-22 PROCEDURE — 86850 RBC ANTIBODY SCREEN: CPT

## 2024-11-22 PROCEDURE — 85730 THROMBOPLASTIN TIME PARTIAL: CPT

## 2024-11-22 PROCEDURE — 85610 PROTHROMBIN TIME: CPT

## 2024-11-22 PROCEDURE — 86901 BLOOD TYPING SEROLOGIC RH(D): CPT

## 2024-11-22 PROCEDURE — 99284 EMERGENCY DEPT VISIT MOD MDM: CPT

## 2024-11-22 RX ADMIN — IOHEXOL 80 ML: 350 INJECTION, SOLUTION INTRAVENOUS at 21:57

## 2024-11-22 ASSESSMENT — FIBROSIS 4 INDEX: FIB4 SCORE: 1.78

## 2024-11-22 ASSESSMENT — PAIN DESCRIPTION - DESCRIPTORS: DESCRIPTORS: ACHING

## 2024-11-23 NOTE — ED TRIAGE NOTES
"Chief Complaint   Patient presents with    Bloody Stools     Pt BIB EMS for x1 dark, jell-like bloody stool and 4/10 left lower abdominal pain. Pt Hx of parkinson's, DM and hyperthyroid      Pt has caregiver at bedside, pt baseline A&Ox1. Pt placed on bed alarm and changed into gown. IV inserted.     Pt brought from  in [facility]    /79   Pulse (!) 103   Resp 19   Ht 1.626 m (5' 4\")   Wt 54.4 kg (120 lb)   SpO2 93%   BMI 20.60 kg/m²     "

## 2024-11-23 NOTE — ED NOTES
Lab called ; samples for CBC and coagulation was clotted; new samples collected and sent to lab; patient tolerated well

## 2024-11-23 NOTE — ED PROVIDER NOTES
ED Provider Note    CHIEF COMPLAINT  Chief Complaint   Patient presents with    Bloody Stools     Pt BIB EMS for x1 dark, jell-like bloody stool and 4/10 left lower abdominal pain. Pt Hx of parkinson's, DM and hyperthyroid        EXTERNAL RECORDS REVIEWED  Inpatient Notes most recent ER visit 8/3/2024 with the patient was diagnosed with UTI.  Patient had admission in April 2024 for cellulitis    HPI/ROS  LIMITATION TO HISTORY   Select: Altered mental status / Confusion  OUTSIDE HISTORIAN(S):  Caregiver group home provider at bedside    Erlinda Verde is a 59 y.o. female who presents to the emergency department for concern over possible  bleeding.  Patient on chart review was seen within the last few months for hematuria.  Currently living at local group home with baseline orientation to person only.  Patient not on blood thinners.  Care provider at bedside and EMS state that there was reported dark blood on diaper starting this evening.    There was report on intake that the patient was having some abdominal pain although this does not seem to be active at this time on this initial evaluation.  Again history is limited from patient given baseline mentation and majority of history has been provided from care provider at bedside.    PAST MEDICAL HISTORY   has a past medical history of Diabetes (HCC), Dysthymic disorder, HTN (hypertension), Intellectual disability, Parkinsonism (HCC), and Schizoaffective disorder (HCC).    SURGICAL HISTORY   has a past surgical history that includes partial hip replacement (Left, 2/26/2022).    FAMILY HISTORY  No family history on file.    SOCIAL HISTORY  Social History     Tobacco Use    Smoking status: Never    Smokeless tobacco: Never   Vaping Use    Vaping status: Never Used   Substance and Sexual Activity    Alcohol use: Never    Drug use: Never    Sexual activity: Not on file       CURRENT MEDICATIONS  Home Medications    **Home medications have not yet been reviewed for  "this encounter**       Audit from Redirected Encounters    **Home medications have not yet been reviewed for this encounter**         ALLERGIES  Allergies   Allergen Reactions    Risperidone Unspecified     Unknown reaction per group home       PHYSICAL EXAM  VITAL SIGNS: /69   Pulse (!) 105   Temp 36.2 °C (97.1 °F) (Temporal)   Resp 15   Ht 1.626 m (5' 4\")   Wt 54.4 kg (120 lb)   SpO2 92%   BMI 20.60 kg/m²          Pulse ox interpretation: I interpret this pulse ox as normal.  Constitutional: Alert in no apparent distress.  HENT: No signs of trauma, Bilateral external ears normal, Nose normal.   Eyes: Pupils are equal and reactive  Neck: Normal range of motion, No tenderness, Supple  Cardiovascular: Regular rate and rhythm, no murmurs.   Thorax & Lungs: Normal breath sounds, No respiratory distress  Abdomen: Bowel sounds normal, Soft, No tenderness  : Scant streak of blood in the pins anteriorly.  Patient does appear to have vaginal prolapse.  No evidence of blood at rectum.  No external hemorrhoids noted.  Skin: Warm, Dry,  Musculoskeletal: Good range of motion in all major joints. No tenderness to palpation or major deformities noted.   Neurologic: Alert , awake.  Oriented to person only.      EKG/LABS  Results for orders placed or performed during the hospital encounter of 11/22/24   COD (ADULT)    Collection Time: 11/22/24  8:42 PM   Result Value Ref Range    ABO Grouping Only B     Rh Grouping Only POS     Antibody Screen-Cod NEG    COMP METABOLIC PANEL    Collection Time: 11/22/24  8:42 PM   Result Value Ref Range    Sodium 137 135 - 145 mmol/L    Potassium 4.5 3.6 - 5.5 mmol/L    Chloride 101 96 - 112 mmol/L    Co2 19 (L) 20 - 33 mmol/L    Anion Gap 17.0 (H) 7.0 - 16.0    Glucose 155 (H) 65 - 99 mg/dL    Bun 26 (H) 8 - 22 mg/dL    Creatinine 0.85 0.50 - 1.40 mg/dL    Calcium 9.4 8.5 - 10.5 mg/dL    Correct Calcium 9.0 8.5 - 10.5 mg/dL    AST(SGOT) 27 12 - 45 U/L    ALT(SGPT) 28 2 - 50 U/L    " Alkaline Phosphatase 174 (H) 30 - 99 U/L    Total Bilirubin <0.2 0.1 - 1.5 mg/dL    Albumin 4.5 3.2 - 4.9 g/dL    Total Protein 7.7 6.0 - 8.2 g/dL    Globulin 3.2 1.9 - 3.5 g/dL    A-G Ratio 1.4 g/dL   LIPASE    Collection Time: 11/22/24  8:42 PM   Result Value Ref Range    Lipase 42 11 - 82 U/L   ESTIMATED GFR    Collection Time: 11/22/24  8:42 PM   Result Value Ref Range    GFR (CKD-EPI) 78 >60 mL/min/1.73 m 2   CBC WITH DIFFERENTIAL    Collection Time: 11/22/24  9:36 PM   Result Value Ref Range    WBC 10.4 4.8 - 10.8 K/uL    RBC 4.93 4.20 - 5.40 M/uL    Hemoglobin 13.5 12.0 - 16.0 g/dL    Hematocrit 43.1 37.0 - 47.0 %    MCV 87.4 81.4 - 97.8 fL    MCH 27.4 27.0 - 33.0 pg    MCHC 31.3 (L) 32.2 - 35.5 g/dL    RDW 48.2 35.9 - 50.0 fL    Platelet Count 274 164 - 446 K/uL    MPV 8.7 (L) 9.0 - 12.9 fL    Neutrophils-Polys 73.90 (H) 44.00 - 72.00 %    Lymphocytes 18.40 (L) 22.00 - 41.00 %    Monocytes 6.60 0.00 - 13.40 %    Eosinophils 0.50 0.00 - 6.90 %    Basophils 0.20 0.00 - 1.80 %    Immature Granulocytes 0.40 0.00 - 0.90 %    Nucleated RBC 0.00 0.00 - 0.20 /100 WBC    Neutrophils (Absolute) 7.69 (H) 1.82 - 7.42 K/uL    Lymphs (Absolute) 1.91 1.00 - 4.80 K/uL    Monos (Absolute) 0.69 0.00 - 0.85 K/uL    Eos (Absolute) 0.05 0.00 - 0.51 K/uL    Baso (Absolute) 0.02 0.00 - 0.12 K/uL    Immature Granulocytes (abs) 0.04 0.00 - 0.11 K/uL    NRBC (Absolute) 0.00 K/uL   Prothrombin Time    Collection Time: 11/22/24  9:36 PM   Result Value Ref Range    PT 13.2 12.0 - 14.6 sec    INR 1.00 0.87 - 1.13   APTT    Collection Time: 11/22/24  9:36 PM   Result Value Ref Range    APTT 29.2 24.7 - 36.0 sec   URINALYSIS    Collection Time: 11/22/24 10:18 PM    Specimen: Urine, Cath   Result Value Ref Range    Color Yellow     Character Clear     Specific Gravity 1.032 <1.035    Ph 5.5 5.0 - 8.0    Glucose >=1000 (A) Negative mg/dL    Ketones Trace (A) Negative mg/dL    Protein Negative Negative mg/dL    Bilirubin Negative Negative     Urobilinogen, Urine 1.0 <=1.0 EU/dL    Nitrite Negative Negative    Leukocyte Esterase Small (A) Negative    Occult Blood Small (A) Negative    Micro Urine Req Microscopic    URINE MICROSCOPIC (W/UA)    Collection Time: 11/22/24 10:18 PM   Result Value Ref Range    WBC 6-10 (A) /hpf    RBC 6-10 (A) 0 - 2 /hpf    Bacteria None Seen None /hpf    Epithelial Cells 0-2 0 - 5 /hpf    Urine Casts 0-2 0 - 2 /lpf         RADIOLOGY/PROCEDURES   I have independently interpreted the diagnostic imaging associated with this visit and am waiting the final reading from the radiologist.   My preliminary interpretation is as follows: No bowel obstruction    Radiologist interpretation:  CTA ABDOMEN PELVIS W & W/O POST PROCESS   Final Result         1. No active GI bleed identified.      2. Several loops of small bowel with wall thickening, consistent with enteritis.             COURSE & MEDICAL DECISION MAKING    ASSESSMENT, COURSE AND PLAN  Care Narrative: 59-year-old female presenting to the emergency department with above presentation.  Will complete workup for both urologic, gynecologic and GI etiologies considered    DISPOSITION AND DISCUSSIONS  I have discussed management of the patient with the following physicians and JOSE ALFREDO's: None    Discussion of management with other QHP or appropriate source(s): None     Escalation of care considered, and ultimately not performed:acute inpatient care management, however at this time, the patient is most appropriate for outpatient management    Barriers to care at this time, including but not limited to:  Chronic altered mental status .     59-year-old presenting the emerged part with above presentation.  Workup as above.  CT showing some enteritis but again on physical exam there is no evidence of overt GI bleed.  Patient is not anemic.  Does appear to be somewhat dehydrated on labs.  She is tolerating oral fluids for ongoing rehydration.   exam significant for prolapse.  I do believe that  there may be some vaginitis causing some bleeding.  Will defer for further outpatient gynecologic evaluation for more in-depth GYN workup.  Patient does not have overt hematuria and I do not believe that UA is correlative to acute UTI    FINAL DIAGNOSIS  1. Uterine prolapse    2. Enteritis         Electronically signed by: Rajat Alicia M.D., 11/22/2024 8:46 PM

## 2024-11-23 NOTE — ED NOTES
Pt straight cath at bedside, 200 mL output and UA sent to lab. Bed alarm in place and call light within reach.

## 2025-02-21 ENCOUNTER — HOSPITAL ENCOUNTER (OUTPATIENT)
Dept: LAB | Facility: MEDICAL CENTER | Age: 60
End: 2025-02-21
Attending: FAMILY MEDICINE
Payer: MEDICARE

## 2025-02-21 LAB
ALBUMIN SERPL BCP-MCNC: 4.3 G/DL (ref 3.2–4.9)
ALBUMIN/GLOB SERPL: 1.4 G/DL
ALP SERPL-CCNC: 160 U/L (ref 30–99)
ALT SERPL-CCNC: 31 U/L (ref 2–50)
ANION GAP SERPL CALC-SCNC: 14 MMOL/L (ref 7–16)
AST SERPL-CCNC: 28 U/L (ref 12–45)
BASOPHILS # BLD AUTO: 0.5 % (ref 0–1.8)
BASOPHILS # BLD: 0.03 K/UL (ref 0–0.12)
BILIRUB SERPL-MCNC: <0.2 MG/DL (ref 0.1–1.5)
BUN SERPL-MCNC: 17 MG/DL (ref 8–22)
CALCIUM ALBUM COR SERPL-MCNC: 9.3 MG/DL (ref 8.5–10.5)
CALCIUM SERPL-MCNC: 9.5 MG/DL (ref 8.5–10.5)
CHLORIDE SERPL-SCNC: 104 MMOL/L (ref 96–112)
CO2 SERPL-SCNC: 22 MMOL/L (ref 20–33)
CREAT SERPL-MCNC: 0.85 MG/DL (ref 0.5–1.4)
EOSINOPHIL # BLD AUTO: 0.02 K/UL (ref 0–0.51)
EOSINOPHIL NFR BLD: 0.4 % (ref 0–6.9)
ERYTHROCYTE [DISTWIDTH] IN BLOOD BY AUTOMATED COUNT: 49.3 FL (ref 35.9–50)
GFR SERPLBLD CREATININE-BSD FMLA CKD-EPI: 78 ML/MIN/1.73 M 2
GLOBULIN SER CALC-MCNC: 3.1 G/DL (ref 1.9–3.5)
GLUCOSE SERPL-MCNC: 59 MG/DL (ref 65–99)
HCT VFR BLD AUTO: 47.3 % (ref 37–47)
HGB BLD-MCNC: 14.3 G/DL (ref 12–16)
IMM GRANULOCYTES # BLD AUTO: 0.02 K/UL (ref 0–0.11)
IMM GRANULOCYTES NFR BLD AUTO: 0.4 % (ref 0–0.9)
LYMPHOCYTES # BLD AUTO: 2.29 K/UL (ref 1–4.8)
LYMPHOCYTES NFR BLD: 40.9 % (ref 22–41)
MCH RBC QN AUTO: 26.8 PG (ref 27–33)
MCHC RBC AUTO-ENTMCNC: 30.2 G/DL (ref 32.2–35.5)
MCV RBC AUTO: 88.6 FL (ref 81.4–97.8)
MONOCYTES # BLD AUTO: 0.35 K/UL (ref 0–0.85)
MONOCYTES NFR BLD AUTO: 6.3 % (ref 0–13.4)
NEUTROPHILS # BLD AUTO: 2.89 K/UL (ref 1.82–7.42)
NEUTROPHILS NFR BLD: 51.5 % (ref 44–72)
NRBC # BLD AUTO: 0 K/UL
NRBC BLD-RTO: 0 /100 WBC (ref 0–0.2)
PLATELET # BLD AUTO: 297 K/UL (ref 164–446)
PMV BLD AUTO: 9.2 FL (ref 9–12.9)
POTASSIUM SERPL-SCNC: 4 MMOL/L (ref 3.6–5.5)
PROT SERPL-MCNC: 7.4 G/DL (ref 6–8.2)
RBC # BLD AUTO: 5.34 M/UL (ref 4.2–5.4)
SODIUM SERPL-SCNC: 140 MMOL/L (ref 135–145)
TSH SERPL-ACNC: 0.03 UIU/ML (ref 0.35–5.5)
WBC # BLD AUTO: 5.6 K/UL (ref 4.8–10.8)

## 2025-02-21 PROCEDURE — 85025 COMPLETE CBC W/AUTO DIFF WBC: CPT

## 2025-02-21 PROCEDURE — 84443 ASSAY THYROID STIM HORMONE: CPT

## 2025-02-21 PROCEDURE — 36415 COLL VENOUS BLD VENIPUNCTURE: CPT

## 2025-02-21 PROCEDURE — 80053 COMPREHEN METABOLIC PANEL: CPT

## 2025-04-16 ENCOUNTER — HOSPITAL ENCOUNTER (OUTPATIENT)
Facility: MEDICAL CENTER | Age: 60
End: 2025-04-16
Attending: STUDENT IN AN ORGANIZED HEALTH CARE EDUCATION/TRAINING PROGRAM
Payer: MEDICARE

## 2025-04-16 ENCOUNTER — GYNECOLOGY VISIT (OUTPATIENT)
Dept: OBGYN | Facility: CLINIC | Age: 60
End: 2025-04-16
Payer: MEDICARE

## 2025-04-16 VITALS — BODY MASS INDEX: 20.6 KG/M2 | WEIGHT: 120 LBS

## 2025-04-16 DIAGNOSIS — E11.620 TYPE 2 DIABETES MELLITUS WITH DIABETIC DERMATITIS, UNSPECIFIED WHETHER LONG TERM INSULIN USE (HCC): ICD-10-CM

## 2025-04-16 DIAGNOSIS — F71 MODERATE INTELLECTUAL DISABILITY: ICD-10-CM

## 2025-04-16 DIAGNOSIS — B35.6 TINEA CRURIS: Primary | ICD-10-CM

## 2025-04-16 PROCEDURE — 87480 CANDIDA DNA DIR PROBE: CPT

## 2025-04-16 PROCEDURE — 87660 TRICHOMONAS VAGIN DIR PROBE: CPT

## 2025-04-16 PROCEDURE — 99203 OFFICE O/P NEW LOW 30 MIN: CPT | Performed by: STUDENT IN AN ORGANIZED HEALTH CARE EDUCATION/TRAINING PROGRAM

## 2025-04-16 PROCEDURE — 87510 GARDNER VAG DNA DIR PROBE: CPT

## 2025-04-16 RX ORDER — KETOCONAZOLE 20 MG/G
1 CREAM TOPICAL DAILY
Qty: 60 G | Refills: 1 | Status: SHIPPED | OUTPATIENT
Start: 2025-04-16

## 2025-04-16 RX ORDER — FLUCONAZOLE 150 MG/1
TABLET ORAL
Qty: 2 TABLET | Refills: 0 | Status: SHIPPED | OUTPATIENT
Start: 2025-04-16

## 2025-04-16 RX ORDER — KETOCONAZOLE 20 MG/G
1 CREAM TOPICAL 2 TIMES DAILY
Qty: 60 G | Refills: 1 | Status: SHIPPED | OUTPATIENT
Start: 2025-04-16 | End: 2025-04-16

## 2025-04-16 ASSESSMENT — FIBROSIS 4 INDEX: FIB4 SCORE: 1.02

## 2025-04-16 NOTE — PROGRESS NOTES
Pt presents for NEW GYN-ER follow-up for possible prolapse.  Here with caregiver, states no vaginal bleeding or pain that can be expressed.  Possible vaginal d/c?   Pt is incontinant and wears diapers daily.

## 2025-04-17 LAB
CANDIDA DNA VAG QL PROBE+SIG AMP: NEGATIVE
G VAGINALIS DNA VAG QL PROBE+SIG AMP: NEGATIVE
T VAGINALIS DNA VAG QL PROBE+SIG AMP: NEGATIVE

## (undated) DEVICE — GLOVE SZ 7 BIOGEL PI MICRO - PF LF (50PR/BX 4BX/CA)

## (undated) DEVICE — TUBING CLEARLINK DUO-VENT - C-FLO (48EA/CA)

## (undated) DEVICE — GOWN WARMING STANDARD FLEX - (30/CA)

## (undated) DEVICE — CANISTER SUCTION 3000ML MECHANICAL FILTER AUTO SHUTOFF MEDI-VAC NONSTERILE LF DISP  (40EA/CA)

## (undated) DEVICE — GLOVE BIOGEL INDICATOR SZ 7.5 SURGICAL PF LTX - (50PR/BX 4BX/CA)

## (undated) DEVICE — SENSOR SPO2 NEO LNCS ADHESIVE (20/BX) SEE USER NOTES

## (undated) DEVICE — SET EXTENSION WITH 2 PORTS (48EA/CA) ***PART #2C8610 IS A SUBSTITUTE*****

## (undated) DEVICE — SUTURE 5 TI-CRON HOS-14 - (36/BX)

## (undated) DEVICE — DRAPE LARGE 3 QUARTER - (20/CA)

## (undated) DEVICE — DRAPE SURG STERI-DRAPE 7X11OD - (40EA/CA)

## (undated) DEVICE — KIT ANESTHESIA W/CIRCUIT & 3/LT BAG W/FILTER (20EA/CA)

## (undated) DEVICE — Device

## (undated) DEVICE — WATER IRRIGATION STERILE 1000ML (12EA/CA)

## (undated) DEVICE — GLOVE BIOGEL PI ORTHO SZ 7.5 PF LF (40PR/BX)

## (undated) DEVICE — DRAPE STRLE REG TOWEL 18X24 - (10/BX 4BX/CA)"

## (undated) DEVICE — MIXER BONE CEMENT REVOLUTION - W/FEMORAL PRESSURIZER (6/CA)

## (undated) DEVICE — LENS/HOOD FOR SPACESUIT - (32/PK) PEEL AWAY FACE

## (undated) DEVICE — SLEEVE, VASO, THIGH, MED

## (undated) DEVICE — LACTATED RINGERS INJ 1000 ML - (14EA/CA 60CA/PF)

## (undated) DEVICE — SUTURE GENERAL

## (undated) DEVICE — TOWEL STOP TIMEOUT SAFETY FLAG (40EA/CA)

## (undated) DEVICE — SODIUM CHL IRRIGATION 0.9% 1000ML (12EA/CA)

## (undated) DEVICE — SUCTION INSTRUMENT YANKAUER BULBOUS TIP W/O VENT (50EA/CA)

## (undated) DEVICE — SUTURE 2-0 VICRYL PLUS CTX - 36 INCH (36/BX)

## (undated) DEVICE — BLADE SAGITTAL SAW DUAL CUT 25.0 X 90.0 X 1.27MM (1/EA)

## (undated) DEVICE — HANDPIECE 10FT INTPLS SCT PLS IRRIGATION HAND CONTROL SET (6/PK)

## (undated) DEVICE — TOWELS CLOTH SURGICAL - (4/PK 20PK/CA)

## (undated) DEVICE — SODIUM CHL. IRRIGATION 0.9% 3000ML (4EA/CA 65CA/PF)

## (undated) DEVICE — HEAD HOLDER JUNIOR/ADULT

## (undated) DEVICE — BOVIE BLADE COATED - (50/PK)

## (undated) DEVICE — ELECTRODE 850 FOAM ADHESIVE - HYDROGEL RADIOTRNSPRNT (50/PK)

## (undated) DEVICE — DRESSING AQUACEL AG ADVANTAGE 3.5 X 10" (10EA/BX)"

## (undated) DEVICE — HOOD, SURGICAL

## (undated) DEVICE — GLOVE BIOGEL INDICATOR SZ 7SURGICAL PF LTX - (50/BX 4BX/CA)

## (undated) DEVICE — BRUSH FMCNL TIP IRR STRL - (12/PKG) FOR SURGILAV

## (undated) DEVICE — GLOVE BIOGEL INDICATOR SZ 8 SURGICAL PF LTX - (50/BX 4BX/CA)

## (undated) DEVICE — GLOVE BIOGEL ECLIPSE PF LATEX SIZE 7.5

## (undated) DEVICE — PILLOW ABDUCTION HIP SMALL - (6EA/CA)

## (undated) DEVICE — PROTECTOR ULNA NERVE - (36PR/CA)

## (undated) DEVICE — PACK TOTAL HIP - (1/CA)

## (undated) DEVICE — DRESSING POST OP BORDER 4 X 10 (5EA/BX)

## (undated) DEVICE — SET LEADWIRE 5 LEAD BEDSIDE DISPOSABLE ECG (1SET OF 5/EA)

## (undated) DEVICE — ELECTRODE DUAL RETURN W/ CORD - (50/PK)

## (undated) DEVICE — MASK ANESTHESIA ADULT  - (100/CA)

## (undated) DEVICE — KIT HIP PREP IM ENCHANCE TOTAL (5EA/BX)

## (undated) DEVICE — NEPTUNE 4 PORT MANIFOLD - (20/PK)

## (undated) DEVICE — SUTURE 0 VICRYL PLUS CTX - 36 INCH (36/BX)

## (undated) DEVICE — STAPLER SKIN DISP - (6/BX 10BX/CA) VISISTAT

## (undated) DEVICE — TIP INTPLS HFLO ML ORFC BTRY - (12/CS)  FOR SURGILAV

## (undated) DEVICE — STOCKINETTE IMPERVIOUS 12X48 - STERILELF (10/CA)"